# Patient Record
Sex: FEMALE | Race: BLACK OR AFRICAN AMERICAN | Employment: OTHER | URBAN - METROPOLITAN AREA
[De-identification: names, ages, dates, MRNs, and addresses within clinical notes are randomized per-mention and may not be internally consistent; named-entity substitution may affect disease eponyms.]

---

## 2017-01-03 ENCOUNTER — HOSPITAL ENCOUNTER (OUTPATIENT)
Dept: MRI IMAGING | Age: 67
Discharge: HOME | End: 2017-01-03
Attending: PSYCHIATRY & NEUROLOGY
Payer: MEDICARE

## 2017-01-03 DIAGNOSIS — G93.9 RIGHT FRONTAL LOBE LESION: ICD-10-CM

## 2017-01-03 PROCEDURE — 70553 MRI BRAIN STEM W/O & W/DYE: CPT

## 2017-01-03 PROCEDURE — A9585 GADOBUTROL INJECTION: HCPCS | Performed by: RADIOLOGY

## 2017-01-03 PROCEDURE — 74011250636 HC RX REV CODE- 250/636: Performed by: RADIOLOGY

## 2017-01-03 RX ADMIN — GADOBUTROL 8 ML: 604.72 INJECTION INTRAVENOUS at 08:59

## 2017-01-20 ENCOUNTER — OFFICE VISIT (OUTPATIENT)
Dept: INTERNAL MEDICINE CLINIC | Age: 67
End: 2017-01-20

## 2017-01-20 VITALS
OXYGEN SATURATION: 95 % | BODY MASS INDEX: 31.88 KG/M2 | TEMPERATURE: 98.2 F | SYSTOLIC BLOOD PRESSURE: 138 MMHG | HEART RATE: 70 BPM | WEIGHT: 186.7 LBS | HEIGHT: 64 IN | DIASTOLIC BLOOD PRESSURE: 76 MMHG

## 2017-01-20 DIAGNOSIS — I42.9 CARDIOMYOPATHY (HCC): ICD-10-CM

## 2017-01-20 DIAGNOSIS — I10 ESSENTIAL HYPERTENSION: ICD-10-CM

## 2017-01-20 DIAGNOSIS — M10.9 ACUTE GOUT OF LEFT FOOT, UNSPECIFIED CAUSE: Primary | ICD-10-CM

## 2017-01-20 RX ORDER — ALLOPURINOL 300 MG/1
300 TABLET ORAL DAILY
Qty: 30 TAB | Refills: 11 | Status: SHIPPED | OUTPATIENT
Start: 2017-01-20 | End: 2018-04-10 | Stop reason: SDUPTHER

## 2017-01-20 RX ORDER — PREDNISONE 20 MG/1
20 TABLET ORAL 2 TIMES DAILY
Qty: 30 TAB | Refills: 0 | Status: SHIPPED | OUTPATIENT
Start: 2017-01-20 | End: 2019-01-07

## 2017-01-20 RX ORDER — NAPROXEN 500 MG/1
500 TABLET ORAL 2 TIMES DAILY WITH MEALS
COMMUNITY
End: 2017-01-21 | Stop reason: CLARIF

## 2017-01-20 NOTE — PROGRESS NOTES
[address] [place] [address] and Primary Care  [facility]  [address] [place] [address]  Phone:  [phone]  Fax: [phone]       Chief Complaint   Patient presents with    Diabetes     6 week follow up     Foot Pain     left foot x 1 week   . SUBJECTIVE:    Diego Cook is a 61 y.o. female comes in for return visit complaining of pain in her left foot. This started about a week ago. She has not had any similar symptoms in the past.  It was rather severe. She went to [facility] and they gave her Naproxen which has not worked. From a cardiovascular standpoint she denies any shortness of breath or chest pain. From a diabetic perspective her insulin was changed presumably to Humulin N and she is taking 80 units of that every morning. She has been taking her antihypertensive medication as prescribed as is the case with her statin for increased cardiovascular risk. She continues to drink alcohol also. Current Outpatient Prescriptions   Medication Sig Dispense Refill    naproxen (NAPROSYN) 500 mg tablet Take 500 mg by mouth two (2) times daily (with meals).  predniSONE (DELTASONE) 20 mg tablet Take 1 Tab by mouth two (2) times a day. 30 Tab 0    allopurinol (ZYLOPRIM) 300 mg tablet Take 1 Tab by mouth daily. 30 Tab 11    glimepiride (AMARYL) 4 mg tablet   TAKE 1 TABLET BY MOUTH TWICE DAILY BEFORE A MEAL 180 Tab 3    alcohol swabs (BD SINGLE USE SWABS REGULAR) padm USE TO TEST BLOOD SUGAR 100 Pad 11    HYDROcodone-acetaminophen (NORCO) 5-325 mg per tablet Take 1 Tab by mouth every eight (8) hours as needed for Pain. Max Daily Amount: 3 Tabs. 40 Tab 0    traMADol (ULTRAM) 50 mg tablet Take 1 Tab by mouth every six (6) hours as needed for Pain.  Max Daily Amount: 200 mg. 30 Tab 0    metFORMIN ER (GLUCOPHAGE XR) 500 mg tablet TAKE 1 TABLET BY MOUTH THREE TIMES DAILY WITH MEALS 90 Tab 11    butalbital-acetaminophen (PHRENILIN)  mg tablet Take 1 Tab by mouth every six (6) hours as needed. 40 Tab 0    lisinopril (PRINIVIL, ZESTRIL) 20 mg tablet TAKE 1 TABLET BY MOUTH EVERY MORNING 90 Tab 3    metoprolol succinate (TOPROL-XL) 100 mg tablet TAKE 1 TABLET BY MOUTH DAILY 30 Tab 11    insulin glargine (LANTUS SOLOSTAR) 100 unit/mL (3 mL) pen INJECT 92 UNITS EVERY NIGHT 30 mL 11    acetaminophen-codeine (TYLENOL #3) 300-30 mg per tablet Take 1 Tab by mouth every six (6) hours as needed for Pain. Max Daily Amount: 4 Tabs. 50 Tab 0    potassium chloride SR (KLOR-CON 10) 10 mEq tablet 2 QAM 60 Tab 11    furosemide (LASIX) 40 mg tablet TAKE 3 TABLETS BY MOUTH EVERY MORNING 90 Tab 3    BD INSULIN PEN NEEDLE UF SHORT 31 gauge x 5/16\" ndle USE WITH INSULIN PENS TWICE DAILY AS DIRECTED 100 Pen Needle 11    ONE TOUCH DELICA 33 gauge misc   11    NURIA PEN NEEDLE 32 x 5/32 \" ndle       Blood-Glucose Meter monitoring kit Testing BID-DX:250.00 1 kit 0    Lancets (ONE TOUCH ULTRASOFT LANCETS) misc TESTING BID 1 Package 11    glucose blood VI test strips (ASCENSIA AUTODISC VI, ONE TOUCH ULTRA TEST VI) strip TESTING BID 1 Package 11    atorvastatin (LIPITOR) 40 mg tablet Take 1 tablet by mouth daily. 30 tablet 11     Past Medical History   Diagnosis Date    Arthritis     Congestive heart failure, unspecified     Diabetes ([facility] [address]     Hypercholesterolemia     Hypertension      History reviewed. No pertinent past surgical history.   No Known Allergies      REVIEW OF SYSTEMS:  General: negative for - chills or fever  ENT: negative for - headaches, nasal congestion or tinnitus  Respiratory: negative for - cough, hemoptysis, shortness of breath or wheezing  Cardiovascular : negative for - chest pain, edema, palpitations or shortness of breath  Gastrointestinal: negative for - abdominal pain, blood in stools, heartburn or nausea/vomiting  Genito-Urinary: no dysuria, trouble voiding, or hematuria  Musculoskeletal: negative for - gait disturbance, joint pain, joint stiffness or joint swelling  Neurological: no TIA or stroke symptoms  Hematologic: no bruises, no bleeding, no swollen glands  Integument: no lumps, mole changes, nail changes or rash  Endocrine: no malaise/lethargy or unexpected weight changes      Social History     Social History    Marital status:      Spouse name: N/A    Number of children: 3    Years of education: 12     Occupational History    retired      Social History Main Topics    Smoking status: Former Smoker     Packs/day: 0.25    Smokeless tobacco: Never Used    Alcohol use 0.0 oz/week     0 Standard drinks or equivalent per week      Comment: occ    Drug use: No    Sexual activity: Not Asked     Other Topics Concern    None     Social History Narrative     Family History   Problem Relation Age of Onset    Diabetes Mother        OBJECTIVE:    Visit Vitals    /76 (BP 1 Location: Left arm, BP Patient Position: Sitting)    Pulse 70    Temp 98.2 °F (36.8 °C) (Oral)    Ht 5' 4\" (1.626 m)    Wt 186 lb 11.2 oz (84.7 kg)    SpO2 95%    BMI 32.05 kg/m2     CONSTITUTIONAL: well , well nourished, appears age appropriate  EYES: perrla, eom intact  ENMT:moist mucous membranes, pharynx clear,no JVD  NECK: supple. Thyroid normal  RESPIRATORY: Chest: clear to ascultation and percussion   CARDIOVASCULAR: Heart: regular rate and rhythm  GASTROINTESTINAL: Abdomen: soft, bowel sounds active  HEMATOLOGIC: no pathological lymph nodes palpated  MUSCULOSKELETAL: Extremities: no edema, pulse 1+, moderate swelling first MTP joint left foot, mild swelling medial malleolus left  to palpation, mild tenderness to palpation of the second through fourth MTP joint left foot  INTEGUMENT: No unusual rashes or suspicious skin lesions noted. Nails appear normal.  NEUROLOGIC: non-focal exam   MENTAL STATUS: alert and oriented, appropriate affect      ASSESSMENT:  1. Acute gout of left foot, unspecified cause    2. Insulin dependent diabetes mellitus ([facility] [address]    3. Cardiomyopathy ([address]    4. Essential hypertension        PLAN:    1. The patient has gout of her foot. My options are limited as far as suppression of the inflammatory process. I am forced to put her on Prednisone 20 mg b.i.d for now. I will also concomitantly start Allopurinol 300 mg every day. 2. The steroids will indeed exacerbate her diabetes. I will call her on a daily basis for now and increase her insulin accordingly. 3. Her cardiomyopathy is reasonably stable but no evidence of decompensation. 4. Blood pressure is excellent. .  Orders Placed This Encounter    URIC ACID    METABOLIC PANEL, BASIC    HEMOGLOBIN A1C WITH EAG    naproxen (NAPROSYN) 500 mg tablet    predniSONE (DELTASONE) 20 mg tablet    allopurinol (ZYLOPRIM) 300 mg tablet         Follow-up Disposition:  Return in about 1 week (around 1/27/2017).       Iris Soares MD

## 2017-01-20 NOTE — MR AVS SNAPSHOT
Visit Information Date & Time Provider Department Dept. Phone Encounter #  
 1/20/2017 10:15 AM Sridhar Farrell MD [facility] and [facility] [phone] [id number] Upcoming Health Maintenance Date Due INFLUENZA AGE 9 TO ADULT 8/1/2017* EYE EXAM RETINAL OR DILATED Q1 10/10/2017* HEMOGLOBIN A1C Q6M 2/26/2017 FOOT EXAM Q1 4/29/2017 MICROALBUMIN Q1 4/29/2017 FOBT Q 1 YEAR AGE 50-75 4/29/2017 LIPID PANEL Q1 8/26/2017 PAP AKA CERVICAL CYTOLOGY 3/16/2018 BREAST CANCER SCRN MAMMOGRAM 8/29/2018 DTaP/Tdap/Td series (2 - Td) 1/20/2027 *Topic was postponed. The date shown is not the original due date. Allergies as of 1/20/2017  Review Complete On: 1/20/2017 By: Flaquita Ramos No Known Allergies Current Immunizations  Never Reviewed No immunizations on file. Not reviewed this visit You Were Diagnosed With   
  
 Codes Comments Acute gout of left foot, unspecified cause    -  Primary ICD-10-CM: M10.9 ICD-9-CM: 274.01 Insulin dependent diabetes mellitus (HCC)     ICD-10-CM: E11.9, Z79.4 ICD-9-CM: 250.00, V58.67 Cardiomyopathy ([facility]     ICD-10-CM: I42.9 ICD-9-CM: 425.4 Essential hypertension     ICD-10-CM: I10 
ICD-9-CM: 401.9 Vitals BP Pulse Temp Height(growth percentile) Weight(growth percentile) SpO2  
 138/76 (BP 1 Location: Left arm, BP Patient Position: Sitting) 70 98.2 °F (36.8 °C) (Oral) 5' 4\" (1.626 m) 186 lb 11.2 oz (84.7 kg) 95% BMI OB Status Smoking Status 32.05 kg/m2 Postmenopausal Former Smoker BMI and BSA Data Body Mass Index Body Surface Area 32.05 kg/m 2 1.96 m 2 Preferred Pharmacy Pharmacy Name Phone [facility] [id number] [place] [phone] Your Updated Medication List  
  
   
This list is accurate as of: 1/20/17  1:06 PM.  Always use your most recent med list.  
  
  
  
  
 acetaminophen-codeine 300-30 mg per tablet Commonly known as:  TYLENOL #3 Take 1 Tab by mouth every six (6) hours as needed for Pain. Max Daily Amount: 4 Tabs. alcohol swabs Padm Commonly known as:  BD Single Use Swabs Regular USE TO TEST BLOOD SUGAR  
  
 allopurinol 300 mg tablet Commonly known as:  Wandy Casa Take 1 Tab by mouth daily. atorvastatin 40 mg tablet Commonly known as:  LIPITOR Take 1 tablet by mouth daily. Blood-Glucose Meter monitoring kit Testing BID-DX:250.00  
  
 butalbital-acetaminophen  mg tablet Commonly known as:  Lendel Paradise Take 1 Tab by mouth every six (6) hours as needed. furosemide 40 mg tablet Commonly known as:  LASIX TAKE 3 TABLETS BY MOUTH EVERY MORNING  
  
 glimepiride 4 mg tablet Commonly known as:  AMARYL  
TAKE 1 TABLET BY MOUTH TWICE DAILY BEFORE A MEAL  
  
 glucose blood VI test strips strip Commonly known as:  ASCENSIA AUTODISC VI, ONE TOUCH ULTRA TEST VI  
TESTING BID HYDROcodone-acetaminophen 5-325 mg per tablet Commonly known as:  [place] Take 1 Tab by mouth every eight (8) hours as needed for Pain. Max Daily Amount: 3 Tabs. insulin glargine 100 unit/mL (3 mL) pen Commonly known as:  LANTUS SOLOSTAR INJECT 92 UNITS EVERY NIGHT * Lancets Misc Commonly known as:  ONETOUCH ULTRASOFT LANCETS TESTING BID * One Touch Delica 33 gauge Misc Generic drug:  lancets  
  
 lisinopril 20 mg tablet Commonly known as:  PRINIVIL, ZESTRIL  
TAKE 1 TABLET BY MOUTH EVERY MORNING  
  
 metFORMIN  mg tablet Commonly known as:  GLUCOPHAGE XR  
TAKE 1 TABLET BY MOUTH THREE TIMES DAILY WITH MEALS  
  
 metoprolol succinate 100 mg tablet Commonly known as:  TOPROL-XL  
TAKE 1 TABLET BY MOUTH DAILY Dariana Pen Needle 32 gauge x 5/32\" Ndle Generic drug:  Insulin Needles (Disposable) BD INSULIN PEN NEEDLE UF SHORT 31 gauge x 5/16\" Ndle Generic drug:  Insulin Needles (Disposable) USE WITH INSULIN PENS TWICE DAILY AS DIRECTED  
  
 naproxen 500 mg tablet Commonly known as:  NAPROSYN Take 500 mg by mouth two (2) times daily (with meals). potassium chloride SR 10 mEq tablet Commonly known as:  KLOR-CON 10  
2 QAM  
  
 predniSONE 20 mg tablet Commonly known as:  Latha Pound Take 1 Tab by mouth two (2) times a day. traMADol 50 mg tablet Commonly known as:  ULTRAM  
Take 1 Tab by mouth every six (6) hours as needed for Pain. Max Daily Amount: 200 mg.  
  
 * Notice: This list has 2 medication(s) that are the same as other medications prescribed for you. Read the directions carefully, and ask your doctor or other care provider to review them with you. Prescriptions Sent to Pharmacy Refills  
 predniSONE (DELTASONE) 20 mg tablet 0 Sig: Take 1 Tab by mouth two (2) times a day. Class: Normal  
 Pharmacy: [address] Ph #: [phone] Route: Oral  
 allopurinol (ZYLOPRIM) 300 mg tablet 11 Sig: Take 1 Tab by mouth daily. Class: Normal  
 Pharmacy: [address] Ph #: [phone] Route: Oral  
  
We Performed the Following HEMOGLOBIN A1C WITH EAG [97090 CPT(R)] METABOLIC PANEL, BASIC [99368 CPT(R)] URIC ACID [id number] CPT(R)] Please provide this summary of care documentation to your next provider. Your primary care clinician is listed as Pearl Wilson. If you have any questions after today's visit, please call [phone].

## 2017-01-20 NOTE — PROGRESS NOTES
Chief Complaint   Patient presents with    Diabetes     6 week follow up     Foot Pain     left foot x 1 week     1. Have you been to the ER, urgent care clinic since your last visit? Hospitalized since your last visit? Yes Reason for visit: foot pain last Tuesday. 2. Have you seen or consulted any other health care providers outside of the [address] [place] [address] since your last visit? Include any pap smears or colon screening.  Yes Where: [facility]

## 2017-01-21 DIAGNOSIS — M10.00 IDIOPATHIC GOUT, UNSPECIFIED CHRONICITY, UNSPECIFIED SITE: Primary | ICD-10-CM

## 2017-01-21 LAB
BUN SERPL-MCNC: 21 MG/DL (ref 8–27)
BUN/CREAT SERPL: 34 (ref 11–26)
CALCIUM SERPL-MCNC: 9.7 MG/DL (ref 8.7–10.3)
CHLORIDE SERPL-SCNC: 101 MMOL/L (ref 96–106)
CO2 SERPL-SCNC: 26 MMOL/L (ref 18–29)
CREAT SERPL-MCNC: 0.62 MG/DL (ref 0.57–1)
EST. AVERAGE GLUCOSE BLD GHB EST-MCNC: 206 MG/DL
GLUCOSE SERPL-MCNC: 82 MG/DL (ref 65–99)
HBA1C MFR BLD: 8.8 % (ref 4.8–5.6)
POTASSIUM SERPL-SCNC: 3.9 MMOL/L (ref 3.5–5.2)
SODIUM SERPL-SCNC: 146 MMOL/L (ref 134–144)
URATE SERPL-MCNC: 7.8 MG/DL (ref 2.5–7.1)

## 2017-01-21 RX ORDER — INDOMETHACIN 50 MG/1
50 CAPSULE ORAL 3 TIMES DAILY
Qty: 60 CAP | Refills: 0 | Status: SHIPPED | OUTPATIENT
Start: 2017-01-21 | End: 2017-04-21

## 2017-01-31 DIAGNOSIS — Z12.31 ENCOUNTER FOR SCREENING MAMMOGRAM FOR HIGH-RISK PATIENT: Primary | ICD-10-CM

## 2017-02-09 ENCOUNTER — TELEPHONE (OUTPATIENT)
Dept: INTERNAL MEDICINE CLINIC | Age: 67
End: 2017-02-09

## 2017-02-09 NOTE — TELEPHONE ENCOUNTER
Per dr. Jasmeet Jurado patient ask to re-start her allopurinol 300mg that she had stopped on her own.

## 2017-02-24 ENCOUNTER — HOSPITAL ENCOUNTER (OUTPATIENT)
Dept: MAMMOGRAPHY | Age: 67
Discharge: HOME | End: 2017-02-24
Attending: INTERNAL MEDICINE

## 2017-02-24 DIAGNOSIS — R92.8 ABNORMAL MAMMOGRAM: ICD-10-CM

## 2017-03-03 ENCOUNTER — HOSPITAL ENCOUNTER (OUTPATIENT)
Dept: MAMMOGRAPHY | Age: 67
Discharge: HOME | End: 2017-03-03
Attending: INTERNAL MEDICINE
Payer: MEDICARE

## 2017-03-03 ENCOUNTER — HOSPITAL ENCOUNTER (OUTPATIENT)
Dept: ULTRASOUND IMAGING | Age: 67
Discharge: HOME | End: 2017-03-03
Attending: INTERNAL MEDICINE
Payer: MEDICARE

## 2017-03-03 DIAGNOSIS — R92.8 ABNORMAL MAMMOGRAM: ICD-10-CM

## 2017-03-03 PROCEDURE — 77066 DX MAMMO INCL CAD BI: CPT

## 2017-03-22 RX ORDER — FUROSEMIDE 40 MG/1
TABLET ORAL
Qty: 90 TAB | Refills: 11 | Status: SHIPPED | OUTPATIENT
Start: 2017-03-22 | End: 2018-07-13 | Stop reason: SDUPTHER

## 2017-04-21 RX ORDER — LISINOPRIL 20 MG/1
TABLET ORAL
Qty: 90 TAB | Refills: 3 | Status: SHIPPED | OUTPATIENT
Start: 2017-04-21 | End: 2018-04-10 | Stop reason: SDUPTHER

## 2017-04-26 ENCOUNTER — TELEPHONE (OUTPATIENT)
Dept: INTERNAL MEDICINE CLINIC | Age: 67
End: 2017-04-26

## 2017-05-02 ENCOUNTER — OFFICE VISIT (OUTPATIENT)
Dept: INTERNAL MEDICINE CLINIC | Age: 67
End: 2017-05-02

## 2017-05-02 VITALS
DIASTOLIC BLOOD PRESSURE: 73 MMHG | HEART RATE: 69 BPM | TEMPERATURE: 97.9 F | HEIGHT: 64 IN | OXYGEN SATURATION: 94 % | BODY MASS INDEX: 33.41 KG/M2 | RESPIRATION RATE: 20 BRPM | WEIGHT: 195.7 LBS | SYSTOLIC BLOOD PRESSURE: 144 MMHG

## 2017-05-02 DIAGNOSIS — Z00.00 ROUTINE GENERAL MEDICAL EXAMINATION AT A HEALTH CARE FACILITY: ICD-10-CM

## 2017-05-02 DIAGNOSIS — E78.5 DYSLIPIDEMIA: ICD-10-CM

## 2017-05-02 DIAGNOSIS — I10 ESSENTIAL HYPERTENSION: ICD-10-CM

## 2017-05-02 DIAGNOSIS — I42.9 CARDIOMYOPATHY, UNSPECIFIED TYPE (HCC): ICD-10-CM

## 2017-05-02 NOTE — PROGRESS NOTES
Chief Complaint   Patient presents with   [facility] Annual Wellness Visit   1. Have you been to the ER, urgent care clinic since your last visit? Hospitalized since your last visit? No    2. Have you seen or consulted any other health care providers outside of the [address] [place] [address] since your last visit? Include any pap smears or colon screening.  No

## 2017-05-02 NOTE — MR AVS SNAPSHOT
Visit Information Date & Time Provider Department Dept. Phone Encounter #  
 5/2/2017  3:30 PM Krystal Juarez MD [facility] [phone] [id number] Your Appointments 8/22/2017  3:00 PM  
Any with Krystal Juarez MD  
[address] [place] [address] and Primary Care Ava Violet) Appt Note: 3 month f/u  
 [address] [facility]  
  
   
 [phone] [id number] Upcoming Health Maintenance Date Due  
 GLAUCOMA SCREENING Q2Y 3/4/2017 OSTEOPOROSIS SCREENING (DEXA) 3/4/2017 Pneumococcal 65+ Low/Medium Risk (1 of 2 - PCV13) 3/4/2017 MEDICARE YEARLY EXAM 3/4/2017 FOOT EXAM Q1 4/29/2017 MICROALBUMIN Q1 4/29/2017 FOBT Q 1 YEAR AGE 50-75 4/29/2017 EYE EXAM RETINAL OR DILATED Q1 10/10/2017* HEMOGLOBIN A1C Q6M 7/20/2017 INFLUENZA AGE 9 TO ADULT 8/1/2017 LIPID PANEL Q1 8/26/2017 BREAST CANCER SCRN MAMMOGRAM 3/3/2019 DTaP/Tdap/Td series (2 - Td) 1/20/2027 *Topic was postponed. The date shown is not the original due date. Allergies as of 5/2/2017  Review Complete On: 5/2/2017 By: Martine Moss No Known Allergies Current Immunizations  Never Reviewed No immunizations on file. Not reviewed this visit You Were Diagnosed With   
  
 Codes Comments Insulin dependent diabetes mellitus ([facility]    -  Primary ICD-10-CM: E11.9, Z79.4 ICD-9-CM: 250.00, V58.67 Cardiomyopathy, unspecified type     ICD-10-CM: I42.9 ICD-9-CM: 425.4 Dyslipidemia     ICD-10-CM: E78.5 ICD-9-CM: 272.4 Essential hypertension     ICD-10-CM: I10 
ICD-9-CM: 401.9 Vitals BP Pulse Temp Resp Height(growth percentile) Weight(growth percentile) 144/73 (BP 1 Location: Right arm, BP Patient Position: Sitting) 69 97.9 °F (36.6 °C) (Oral) 20 5' 4\" (1.626 m) 195 lb 11.2 oz (88.8 kg) SpO2 BMI OB Status Smoking Status 94% 33.59 kg/m2 Postmenopausal Former Smoker BMI and BSA Data Body Mass Index Body Surface Area  
 33.59 kg/m 2 2 m 2 Preferred Pharmacy Pharmacy Name Phone [facility] [address] [phone] Your Updated Medication List  
  
   
This list is accurate as of: 5/2/17  4:51 PM.  Always use your most recent med list.  
  
  
  
  
 acetaminophen-codeine 300-30 mg per tablet Commonly known as:  TYLENOL #3 Take 1 Tab by mouth every six (6) hours as needed for Pain. Max Daily Amount: 4 Tabs. alcohol swabs Padm Commonly known as:  BD Single Use Swabs Regular USE TO TEST BLOOD SUGAR  
  
 allopurinol 300 mg tablet Commonly known as:  Barry Donald Take 1 Tab by mouth daily. atorvastatin 40 mg tablet Commonly known as:  LIPITOR Take 1 tablet by mouth daily. Blood-Glucose Meter monitoring kit Testing BID-DX:250.00  
  
 butalbital-acetaminophen  mg tablet Commonly known as:  Uche Kelp Take 1 Tab by mouth every six (6) hours as needed. furosemide 40 mg tablet Commonly known as:  LASIX TAKE 3 TABLETS BY MOUTH EVERY MORNING  
  
 glimepiride 4 mg tablet Commonly known as:  AMARYL  
TAKE 1 TABLET BY MOUTH TWICE DAILY BEFORE A MEAL  
  
 glucose blood VI test strips strip Commonly known as:  ASCENSIA AUTODISC VI, ONE TOUCH ULTRA TEST VI  
TESTING BID HYDROcodone-acetaminophen 5-325 mg per tablet Commonly known as:  [place] Take 1 Tab by mouth every eight (8) hours as needed for Pain. Max Daily Amount: 3 Tabs. insulin glargine 100 unit/mL (3 mL) pen Commonly known as:  LANTUS SOLOSTAR INJECT 92 UNITS EVERY NIGHT * Lancets Misc Commonly known as:  ONETOUCH ULTRASOFT LANCETS TESTING BID * One Touch Delica 33 gauge Misc Generic drug:  lancets  
  
 lisinopril 20 mg tablet Commonly known as:  PRINIVIL, ZESTRIL  
TAKE 1 TABLET BY MOUTH EVERY MORNING  
  
 metFORMIN  mg tablet Commonly known as:  GLUCOPHAGE XR  
TAKE 1 TABLET BY MOUTH THREE TIMES DAILY WITH MEALS  
  
 metoprolol succinate 100 mg tablet Commonly known as:  TOPROL-XL  
TAKE 1 TABLET BY MOUTH DAILY Dariana Pen Needle 32 gauge x 5/32\" Ndle Generic drug:  Insulin Needles (Disposable) BD INSULIN PEN NEEDLE UF SHORT 31 gauge x 5/16\" Ndle Generic drug:  Insulin Needles (Disposable) USE WITH INSULIN PENS TWICE DAILY AS DIRECTED  
  
 potassium chloride SR 10 mEq tablet Commonly known as:  KLOR-CON 10  
2 QAM  
  
 predniSONE 20 mg tablet Commonly known as:  Aracely Tia Take 1 Tab by mouth two (2) times a day. traMADol 50 mg tablet Commonly known as:  ULTRAM  
Take 1 Tab by mouth every six (6) hours as needed for Pain. Max Daily Amount: 200 mg.  
  
 * Notice: This list has 2 medication(s) that are the same as other medications prescribed for you. Read the directions carefully, and ask your doctor or other care provider to review them with you. We Performed the Following HEMOGLOBIN A1C WITH EAG [24074 CPT(R)] METABOLIC PANEL, BASIC [60358 CPT(R)] Introducing [facility] SERVICES! Dear Nikunj Francis: Thank you for requesting a [employer] account. Our records indicate that you have previously registered for a [employer] account but its currently inactive. Please call our [employer] support line at [phone]. Additional Information If you have questions, please visit the Frequently Asked Questions section of the [employer] website at [email or web address] Remember, [employer] is NOT to be used for urgent needs. For medical emergencies, dial 911. Now available from your iPhone and Android! Please provide this summary of care documentation to your next provider. Your primary care clinician is listed as Pearl [facility]. If you have any questions after today's visit, please call [phone].

## 2017-05-04 LAB
BUN SERPL-MCNC: 18 MG/DL (ref 8–27)
BUN/CREAT SERPL: 30 (ref 12–28)
CALCIUM SERPL-MCNC: 9.2 MG/DL (ref 8.7–10.3)
CHLORIDE SERPL-SCNC: 103 MMOL/L (ref 96–106)
CO2 SERPL-SCNC: 22 MMOL/L (ref 18–29)
CREAT SERPL-MCNC: 0.61 MG/DL (ref 0.57–1)
EST. AVERAGE GLUCOSE BLD GHB EST-MCNC: 143 MG/DL
GLUCOSE SERPL-MCNC: 96 MG/DL (ref 65–99)
HBA1C MFR BLD: 6.6 % (ref 4.8–5.6)
POTASSIUM SERPL-SCNC: 4 MMOL/L (ref 3.5–5.2)
SODIUM SERPL-SCNC: 143 MMOL/L (ref 134–144)

## 2017-05-07 NOTE — PROGRESS NOTES
[address] [place] [address] and Primary Care  [facility]  [address] [place] [address]  Phone:  [phone]  Fax: [phone]       Chief Complaint   Patient presents with   [facility] Visit   . SUBJECTIVE:    Sandhya Leonard is a 74 y.o. female comes in for return visit for general follow-up. The patient is being followed at [facility] and her insulin was changed to Humulin N. This is not optimal for her but it accomodates the insurance company because they will not pay for basal insulin. Her risk of hypoglycemia and weight gain is significantly higher with this intermediate insulin. She denies any orthopnea, PND, or dyspnea on exertion. She did see a cardiologist.  She is well compensated. She has been taking her antihypertensive medication as prescribed as is the case with her statin in view of her increased cardiovascular risk. She also wants something to stimulate her libido which she has had previously. Current Outpatient Prescriptions   Medication Sig Dispense Refill    lisinopril (PRINIVIL, ZESTRIL) 20 mg tablet TAKE 1 TABLET BY MOUTH EVERY MORNING 90 Tab 3    furosemide (LASIX) 40 mg tablet TAKE 3 TABLETS BY MOUTH EVERY MORNING 90 Tab 11    predniSONE (DELTASONE) 20 mg tablet Take 1 Tab by mouth two (2) times a day. 30 Tab 0    allopurinol (ZYLOPRIM) 300 mg tablet Take 1 Tab by mouth daily. 30 Tab 11    glimepiride (AMARYL) 4 mg tablet   TAKE 1 TABLET BY MOUTH TWICE DAILY BEFORE A MEAL 180 Tab 3    alcohol swabs (BD SINGLE USE SWABS REGULAR) padm USE TO TEST BLOOD SUGAR 100 Pad 11    HYDROcodone-acetaminophen (NORCO) 5-325 mg per tablet Take 1 Tab by mouth every eight (8) hours as needed for Pain. Max Daily Amount: 3 Tabs. 40 Tab 0    traMADol (ULTRAM) 50 mg tablet Take 1 Tab by mouth every six (6) hours as needed for Pain.  Max Daily Amount: 200 mg. 30 Tab 0    metFORMIN ER (GLUCOPHAGE XR) 500 mg tablet TAKE 1 TABLET BY MOUTH THREE TIMES DAILY WITH MEALS 90 Tab 11    butalbital-acetaminophen (PHRENILIN)  mg tablet Take 1 Tab by mouth every six (6) hours as needed. 40 Tab 0    metoprolol succinate (TOPROL-XL) 100 mg tablet TAKE 1 TABLET BY MOUTH DAILY 30 Tab 11    insulin glargine (LANTUS SOLOSTAR) 100 unit/mL (3 mL) pen INJECT 92 UNITS EVERY NIGHT 30 mL 11    acetaminophen-codeine (TYLENOL #3) 300-30 mg per tablet Take 1 Tab by mouth every six (6) hours as needed for Pain. Max Daily Amount: 4 Tabs. 50 Tab 0    potassium chloride SR (KLOR-CON 10) 10 mEq tablet 2 QAM 60 Tab 11    BD INSULIN PEN NEEDLE UF SHORT 31 gauge x 5/16\" ndle USE WITH INSULIN PENS TWICE DAILY AS DIRECTED 100 Pen Needle 11    ONE TOUCH DELICA 33 gauge misc   11    NURIA PEN NEEDLE 32 x 5/32 \" ndle       Blood-Glucose Meter monitoring kit Testing BID-DX:250.00 1 kit 0    Lancets (ONE TOUCH ULTRASOFT LANCETS) misc TESTING BID 1 Package 11    glucose blood VI test strips (ASCENSIA AUTODISC VI, ONE TOUCH ULTRA TEST VI) strip TESTING BID 1 Package 11    atorvastatin (LIPITOR) 40 mg tablet Take 1 tablet by mouth daily. 30 tablet 11     Past Medical History:   Diagnosis Date    Arthritis     Congestive heart failure, unspecified     Diabetes ([facility] [address]     Hypercholesterolemia     Hypertension      History reviewed. No pertinent surgical history.   No Known Allergies      REVIEW OF SYSTEMS:  General: negative for - chills or fever  ENT: negative for - headaches, nasal congestion or tinnitus  Respiratory: negative for - cough, hemoptysis, shortness of breath or wheezing  Cardiovascular : negative for - chest pain, edema, palpitations or shortness of breath  Gastrointestinal: negative for - abdominal pain, blood in stools, heartburn or nausea/vomiting  Genito-Urinary: no dysuria, trouble voiding, or hematuria  Musculoskeletal: negative for - gait disturbance, joint pain, joint stiffness or joint swelling  Neurological: no TIA or stroke symptoms  Hematologic: no bruises, no bleeding, no swollen glands  Integument: no lumps, mole changes, nail changes or rash  Endocrine: no malaise/lethargy or unexpected weight changes      Social History     Social History    Marital status:      Spouse name: N/A    Number of children: 3    Years of education: 12     Occupational History    retired      Social History Main Topics    Smoking status: Former Smoker     Packs/day: 0.25    Smokeless tobacco: Never Used    Alcohol use 0.0 oz/week     0 Standard drinks or equivalent per week      Comment: occ    Drug use: No    Sexual activity: Not Asked     Other Topics Concern    None     Social History Narrative     Family History   Problem Relation Age of Onset    Diabetes Mother        OBJECTIVE:    Visit Vitals    /73 (BP 1 Location: Right arm, BP Patient Position: Sitting)    Pulse 69    Temp 97.9 °F (36.6 °C) (Oral)    Resp 20    Ht 5' 4\" (1.626 m)    Wt 195 lb 11.2 oz (88.8 kg)    SpO2 94%    BMI 33.59 kg/m2     CONSTITUTIONAL: well , well nourished, appears age appropriate  EYES: perrla, eom intact  ENMT:moist mucous membranes, pharynx clear  NECK: supple. Thyroid normal  RESPIRATORY: Chest: clear to ascultation and percussion   CARDIOVASCULAR: Heart: regular rate and rhythm  GASTROINTESTINAL: Abdomen: soft, bowel sounds active  HEMATOLOGIC: no pathological lymph nodes palpated  MUSCULOSKELETAL: Extremities: no edema, pulse 1+   INTEGUMENT: No unusual rashes or suspicious skin lesions noted. Nails appear normal.  NEUROLOGIC: non-focal exam   MENTAL STATUS: alert and oriented, appropriate affect      ASSESSMENT:  1. Insulin dependent diabetes mellitus (HCC)    2. Cardiomyopathy, unspecified type    3. Dyslipidemia    4. Essential hypertension    5. Routine general medical examination at a health care facility        PLAN:    1. Her diabetes hopefully is fairly well controlled but I have no idea. She checks her blood sugars quite sporadically.   Additionally she is taking an intermediate insulin. This was changed at [facility] inappropriately. This was done exclusively for cost reasons. The likelihood of her getting an insulin reaction is extremely high. I will await the results of her hemoglobin A1C.   2. Her cardiomyopathy is reasonably stable. She did see a cardiologist and no adjustments were made in her medication. 3. She will continue her statin as prescribed in view of her increased cardiovascular risk. 4. Blood pressure is excellent today and no adjustments are made. 5. Her alcohol consumption os reasonably acceptable at this point. 6. She would like a medication to stimulate her libido. .  Orders Placed This Encounter    HEMOGLOBIN A1C WITH EAG    METABOLIC PANEL, BASIC    LIPID PANEL         Follow-up Disposition:  Return in about 3 months (around 8/2/2017). Dayna Rinaldi MD    This is a Subsequent Medicare Annual Wellness Visit providing Personalized Prevention Plan Services (PPPS) (Performed 12 months after initial AWV and PPPS )    I have reviewed the patient's medical history in detail and updated the computerized patient record. History     Past Medical History:   Diagnosis Date    Arthritis     Congestive heart failure, unspecified     [facility] ([facility] [address]     Hypercholesterolemia     Hypertension       History reviewed. No pertinent surgical history. Current Outpatient Prescriptions   Medication Sig Dispense Refill    lisinopril (PRINIVIL, ZESTRIL) 20 mg tablet TAKE 1 TABLET BY MOUTH EVERY MORNING 90 Tab 3    furosemide (LASIX) 40 mg tablet TAKE 3 TABLETS BY MOUTH EVERY MORNING 90 Tab 11    predniSONE (DELTASONE) 20 mg tablet Take 1 Tab by mouth two (2) times a day. 30 Tab 0    allopurinol (ZYLOPRIM) 300 mg tablet Take 1 Tab by mouth daily.  30 Tab 11    glimepiride (AMARYL) 4 mg tablet   TAKE 1 TABLET BY MOUTH TWICE DAILY BEFORE A MEAL 180 Tab 3    alcohol swabs (BD SINGLE USE SWABS REGULAR) padm USE TO TEST BLOOD SUGAR 100 Pad 11  HYDROcodone-acetaminophen (NORCO) 5-325 mg per tablet Take 1 Tab by mouth every eight (8) hours as needed for Pain. Max Daily Amount: 3 Tabs. 40 Tab 0    traMADol (ULTRAM) 50 mg tablet Take 1 Tab by mouth every six (6) hours as needed for Pain. Max Daily Amount: 200 mg. 30 Tab 0    metFORMIN ER (GLUCOPHAGE XR) 500 mg tablet TAKE 1 TABLET BY MOUTH THREE TIMES DAILY WITH MEALS 90 Tab 11    butalbital-acetaminophen (PHRENILIN)  mg tablet Take 1 Tab by mouth every six (6) hours as needed. 40 Tab 0    metoprolol succinate (TOPROL-XL) 100 mg tablet TAKE 1 TABLET BY MOUTH DAILY 30 Tab 11    insulin glargine (LANTUS SOLOSTAR) 100 unit/mL (3 mL) pen INJECT 92 UNITS EVERY NIGHT 30 mL 11    acetaminophen-codeine (TYLENOL #3) 300-30 mg per tablet Take 1 Tab by mouth every six (6) hours as needed for Pain. Max Daily Amount: 4 Tabs. 50 Tab 0    potassium chloride SR (KLOR-CON 10) 10 mEq tablet 2 QAM 60 Tab 11    BD INSULIN PEN NEEDLE UF SHORT 31 gauge x 5/16\" ndle USE WITH INSULIN PENS TWICE DAILY AS DIRECTED 100 Pen Needle 11    ONE TOUCH DELICA 33 gauge misc   11    NURIA PEN NEEDLE 32 x 5/32 \" ndle       Blood-Glucose Meter monitoring kit Testing BID-DX:250.00 1 kit 0    Lancets (ONE TOUCH ULTRASOFT LANCETS) misc TESTING BID 1 Package 11    glucose blood VI test strips (ASCENSIA AUTODISC VI, ONE TOUCH ULTRA TEST VI) strip TESTING BID 1 Package 11    atorvastatin (LIPITOR) 40 mg tablet Take 1 tablet by mouth daily.  30 tablet 11     No Known Allergies  Family History   Problem Relation Age of Onset    Diabetes Mother      Social History   Substance Use Topics    Smoking status: Former Smoker     Packs/day: 0.25    Smokeless tobacco: Never Used    Alcohol use 0.0 oz/week     0 Standard drinks or equivalent per week      Comment: occ     Patient Active Problem List   Diagnosis Code    Cardiomyopathy ([facility] [address] I42.9    Insulin dependent diabetes mellitus ([facility] [address] E11.9, Z79.4    Dyslipidemia E78.5    Gout M10.9    Alcohol abuse F10.10    Hypertension I10    Dermatitis L30.9    Primary osteoarthritis of right knee M17.11    Carpal tunnel syndrome of right wrist G56.01    Reactive airway disease J45.909    Former tobacco use--stopped 2014 after >40 yrs smoking Z87.891    Obesity (BMI 30.0-34. 9) E66.9       Depression Risk Factor Screening:     PHQ over the last two weeks 5/2/2017   Little interest or pleasure in doing things Not at all   Feeling down, depressed or hopeless Not at all   Total Score PHQ 2 0     Alcohol Risk Factor Screening: On any occasion during the past 3 months, have you had more than 3 drinks containing alcohol? Yes    Do you average more than 7 drinks per week? Yes      Functional Ability and Level of Safety:     Hearing Loss   none    Activities of Daily Living   Self-care. Requires assistance with: no ADLs    Fall Risk     Fall Risk Assessment, last 12 mths 5/2/2017   Able to walk? Yes   Fall in past 12 months? No     Abuse Screen   Patient is not abused    Review of Systems   A comprehensive review of systems was negative. Physical Examination     Evaluation of Cognitive Function:  Mood/affect:  neutral  Appearance: age appropriate  Family member/caregiver input: na    Visit Vitals    /73 (BP 1 Location: Right arm, BP Patient Position: Sitting)    Pulse 69    Temp 97.9 °F (36.6 °C) (Oral)    Resp 20    Ht 5' 4\" (1.626 m)    Wt 195 lb 11.2 oz (88.8 kg)    SpO2 94%    BMI 33.59 kg/m2     General appearance: alert, cooperative, no distress, appears stated age  Neck: supple, symmetrical, trachea midline, no adenopathy, thyroid: not enlarged, symmetric, no tenderness/mass/nodules, no carotid bruit and no JVD  Lungs: clear to auscultation bilaterally  Heart: regular rate and rhythm, S1, S2 normal, no murmur, click, rub or gallop  Abdomen: soft, non-tender.  Bowel sounds normal. No masses,  no organomegaly  Extremities: extremities normal, atraumatic, no cyanosis or edema  Neurologic: Grossly normal    Patient Care Team:  Shaan Solares MD as PCP - General (Internal Medicine)    Advice/Referrals/Counseling   Education and counseling provided:  Are appropriate based on today's review and evaluation      Assessment/Plan       ICD-10-CM ICD-9-CM    1. Insulin dependent diabetes mellitus (HCC) E11.9 250.00 HEMOGLOBIN A1C WITH EAG    Z79.4 V58.67    2. Cardiomyopathy, unspecified type N36.5 198.5 METABOLIC PANEL, BASIC   3. Dyslipidemia E78.5 272.4 LIPID PANEL   4. Essential hypertension I10 401.9    5. Routine general medical examination at a health care facility Z00.00 V70.0    .

## 2017-06-30 ENCOUNTER — PATIENT OUTREACH (OUTPATIENT)
Dept: INTERNAL MEDICINE CLINIC | Age: 67
End: 2017-06-30

## 2017-07-25 RX ORDER — POTASSIUM CHLORIDE 750 MG/1
TABLET, FILM COATED, EXTENDED RELEASE ORAL
Qty: 60 TAB | Refills: 11 | Status: SHIPPED | OUTPATIENT
Start: 2017-07-25 | End: 2018-09-17 | Stop reason: SDUPTHER

## 2017-08-11 ENCOUNTER — HOSPITAL ENCOUNTER (EMERGENCY)
Age: 67
Discharge: HOME | End: 2017-08-11
Attending: EMERGENCY MEDICINE
Payer: MEDICARE

## 2017-08-11 VITALS
HEART RATE: 97 BPM | DIASTOLIC BLOOD PRESSURE: 93 MMHG | HEIGHT: 66 IN | SYSTOLIC BLOOD PRESSURE: 161 MMHG | TEMPERATURE: 98.4 F | OXYGEN SATURATION: 96 % | BODY MASS INDEX: 29.73 KG/M2 | WEIGHT: 185 LBS | RESPIRATION RATE: 18 BRPM

## 2017-08-11 DIAGNOSIS — R04.0 EPISTAXIS: Primary | ICD-10-CM

## 2017-08-11 DIAGNOSIS — R03.0 ELEVATED BLOOD PRESSURE READING: ICD-10-CM

## 2017-08-11 LAB
ALBUMIN SERPL BCP-MCNC: 3.6 G/DL (ref 3.5–5)
ALBUMIN/GLOB SERPL: 0.8 {RATIO} (ref 1.1–2.2)
ALP SERPL-CCNC: 65 U/L (ref 45–117)
ALT SERPL-CCNC: 37 U/L (ref 12–78)
ANION GAP BLD CALC-SCNC: 6 MMOL/L (ref 5–15)
AST SERPL W P-5'-P-CCNC: 25 U/L (ref 15–37)
BASOPHILS # BLD AUTO: 0 K/UL (ref 0–0.1)
BASOPHILS # BLD: 0 % (ref 0–1)
BILIRUB SERPL-MCNC: 0.4 MG/DL (ref 0.2–1)
BUN SERPL-MCNC: 20 MG/DL (ref 6–20)
BUN/CREAT SERPL: 31 (ref 12–20)
CALCIUM SERPL-MCNC: 8.7 MG/DL (ref 8.5–10.1)
CHLORIDE SERPL-SCNC: 104 MMOL/L (ref 97–108)
CO2 SERPL-SCNC: 27 MMOL/L (ref 21–32)
CREAT SERPL-MCNC: 0.64 MG/DL (ref 0.55–1.02)
EOSINOPHIL # BLD: 0.2 K/UL (ref 0–0.4)
EOSINOPHIL NFR BLD: 3 % (ref 0–7)
ERYTHROCYTE [DISTWIDTH] IN BLOOD BY AUTOMATED COUNT: 13.3 % (ref 11.5–14.5)
GLOBULIN SER CALC-MCNC: 4.8 G/DL (ref 2–4)
GLUCOSE BLD STRIP.AUTO-MCNC: 197 MG/DL (ref 65–100)
GLUCOSE SERPL-MCNC: 226 MG/DL (ref 65–100)
HCT VFR BLD AUTO: 33.5 % (ref 35–47)
HGB BLD-MCNC: 11.1 G/DL (ref 11.5–16)
LYMPHOCYTES # BLD AUTO: 27 % (ref 12–49)
LYMPHOCYTES # BLD: 1.3 K/UL (ref 0.8–3.5)
MCH RBC QN AUTO: 27.7 PG (ref 26–34)
MCHC RBC AUTO-ENTMCNC: 33.1 G/DL (ref 30–36.5)
MCV RBC AUTO: 83.5 FL (ref 80–99)
MONOCYTES # BLD: 0.1 K/UL (ref 0–1)
MONOCYTES NFR BLD AUTO: 2 % (ref 5–13)
NEUTS SEG # BLD: 3.4 K/UL (ref 1.8–8)
NEUTS SEG NFR BLD AUTO: 68 % (ref 32–75)
PLATELET # BLD AUTO: 178 K/UL (ref 150–400)
POTASSIUM SERPL-SCNC: 3.7 MMOL/L (ref 3.5–5.1)
PROT SERPL-MCNC: 8.4 G/DL (ref 6.4–8.2)
RBC # BLD AUTO: 4.01 M/UL (ref 3.8–5.2)
SERVICE CMNT-IMP: ABNORMAL
SODIUM SERPL-SCNC: 137 MMOL/L (ref 136–145)
WBC # BLD AUTO: 5 K/UL (ref 3.6–11)

## 2017-08-11 PROCEDURE — 74011000250 HC RX REV CODE- 250: Performed by: EMERGENCY MEDICINE

## 2017-08-11 PROCEDURE — 77030011649 HC PK NSL RHNO S&N -B

## 2017-08-11 PROCEDURE — 99285 EMERGENCY DEPT VISIT HI MDM: CPT

## 2017-08-11 PROCEDURE — 74011000250 HC RX REV CODE- 250

## 2017-08-11 PROCEDURE — 80053 COMPREHEN METABOLIC PANEL: CPT | Performed by: EMERGENCY MEDICINE

## 2017-08-11 PROCEDURE — 85025 COMPLETE CBC W/AUTO DIFF WBC: CPT | Performed by: EMERGENCY MEDICINE

## 2017-08-11 PROCEDURE — 82962 GLUCOSE BLOOD TEST: CPT

## 2017-08-11 PROCEDURE — 36415 COLL VENOUS BLD VENIPUNCTURE: CPT | Performed by: EMERGENCY MEDICINE

## 2017-08-11 PROCEDURE — 74011250637 HC RX REV CODE- 250/637: Performed by: EMERGENCY MEDICINE

## 2017-08-11 PROCEDURE — 75810000284 HC CNTRL NASAL HEMORHRAGE SIMPLE

## 2017-08-11 RX ORDER — LISINOPRIL 20 MG/1
20 TABLET ORAL
Status: COMPLETED | OUTPATIENT
Start: 2017-08-11 | End: 2017-08-11

## 2017-08-11 RX ORDER — LIDOCAINE HYDROCHLORIDE 20 MG/ML
15 SOLUTION OROPHARYNGEAL
Status: COMPLETED | OUTPATIENT
Start: 2017-08-11 | End: 2017-08-11

## 2017-08-11 RX ORDER — HYDROCODONE BITARTRATE AND ACETAMINOPHEN 5; 325 MG/1; MG/1
1 TABLET ORAL
Qty: 10 TAB | Refills: 0 | Status: SHIPPED | OUTPATIENT
Start: 2017-08-11 | End: 2018-01-09 | Stop reason: CLARIF

## 2017-08-11 RX ORDER — METOPROLOL SUCCINATE 50 MG/1
100 TABLET, EXTENDED RELEASE ORAL
Status: COMPLETED | OUTPATIENT
Start: 2017-08-11 | End: 2017-08-11

## 2017-08-11 RX ORDER — SILVER NITRATE 38.21; 12.74 MG/1; MG/1
STICK TOPICAL
Status: COMPLETED
Start: 2017-08-11 | End: 2017-08-11

## 2017-08-11 RX ORDER — CEPHALEXIN 250 MG/1
500 CAPSULE ORAL
Status: COMPLETED | OUTPATIENT
Start: 2017-08-11 | End: 2017-08-11

## 2017-08-11 RX ORDER — AMOXICILLIN AND CLAVULANATE POTASSIUM 875; 125 MG/1; MG/1
1 TABLET, FILM COATED ORAL 2 TIMES DAILY
Qty: 10 TAB | Refills: 0 | Status: SHIPPED | OUTPATIENT
Start: 2017-08-11 | End: 2017-08-16

## 2017-08-11 RX ADMIN — SILVER NITRATE APPLICATORS 1 APPLICATOR: 25; 75 STICK TOPICAL at 06:30

## 2017-08-11 RX ADMIN — LISINOPRIL 20 MG: 20 TABLET ORAL at 07:55

## 2017-08-11 RX ADMIN — CEPHALEXIN 500 MG: 250 CAPSULE ORAL at 08:34

## 2017-08-11 RX ADMIN — LIDOCAINE HYDROCHLORIDE 15 ML: 20 SOLUTION ORAL; TOPICAL at 06:50

## 2017-08-11 RX ADMIN — METOPROLOL SUCCINATE 100 MG: 50 TABLET, EXTENDED RELEASE ORAL at 08:34

## 2017-08-11 NOTE — ED NOTES
Patient ambulated to the restroom. Patient reports no additional bleeding, dizziness, or any other symptoms. Patient also reports coughing up less blood.

## 2017-08-11 NOTE — ED NOTES
Bedside shift change report given to Analy King (oncoming nurse) by Princess Cohn (offgoing nurse). Report included the following information SBAR, Kardex, ED Summary and MAR.

## 2017-08-11 NOTE — ED NOTES
Assumed care of patient who is lying quietly on the stretcher in no apparent distress. Pt is alert and oriented x 4. Respirations are even and unlabored. No needs are expressed at this time.

## 2017-08-11 NOTE — ED NOTES
Dr Washington Nj reviewed discharge instructions with the patient. The patient verbalized understanding. All questions and concerns were addressed. The patient declined a wheelchair and is discharged ambulatory in the care of family members with instructions and prescriptions in hand. Pt is alert and oriented x 4. Respirations are clear and unlabored.

## 2017-08-11 NOTE — ED NOTES
Provider reviewing discharge information with patient. All questions and concerns answered at this time. No additional needs. Patient ambulatory out.

## 2017-08-11 NOTE — ED PROVIDER NOTES
HPI Comments: Luther Ellis, 74 y.o. Female with PMHx of HTN, DM, CHF, and hypercholesterolemia presents via EMS to the ED with cc of bleeding from bilateral nares x 5 AM. She has not had a nosebleed in the past. She denies any injury or pain to her nose. Pt is taking HTN medications and normally takes it in the morning but she has been unable to do so thus far. She is not on O2 therapy at home. Pt denies any fevers, chills, N/V/D, pain, cough, congestion, rhinorrhea, SOB or sinus pressure. PCP: Maria E Callejas MD    Social history significant for: - Tobacco, + EtOH, - Illicit Drug Use    There are no other complaints, changes, or physical findings at this time. Written by Abel Oneill ED Scribe, as dictated by Juan David Benrabe. [place] MD.      The history is provided by the patient. No  was used. Past Medical History:   Diagnosis Date    Arthritis     Congestive heart failure, unspecified     Diabetes ([address]     Hypercholesterolemia     Hypertension        History reviewed. No pertinent surgical history. Family History:   Problem Relation Age of Onset    Diabetes Mother        Social History     Social History    Marital status:      Spouse name: N/A    Number of children: 3    Years of education: 15     Occupational History    retired      Social History Main Topics    Smoking status: Former Smoker     Packs/day: 0.25    Smokeless tobacco: Never Used    Alcohol use 0.0 oz/week     0 Standard drinks or equivalent per week      Comment: occ    Drug use: No    Sexual activity: Not on file     Other Topics Concern    Not on file     Social History Narrative         ALLERGIES: Review of patient's allergies indicates no known allergies. Review of Systems   Constitutional: Negative for chills and fever. HENT: Positive for nosebleeds. Negative for congestion. Eyes: Negative for visual disturbance. Respiratory: Negative for chest tightness. Cardiovascular: Negative for chest pain and leg swelling. Gastrointestinal: Negative for abdominal pain and vomiting. Endocrine: Negative for polyuria. Genitourinary: Negative for dysuria and frequency. Musculoskeletal: Negative for myalgias. Skin: Negative for color change. Allergic/Immunologic: Negative for immunocompromised state. Neurological: Negative for numbness. Patient Vitals for the past 12 hrs:   Temp Pulse Resp BP SpO2   08/11/17 1015 - - - (!) 161/93 96 %   08/11/17 1000 - - - (!) 164/94 97 %   08/11/17 0945 - - - 154/80 97 %   08/11/17 0915 - - - 161/77 96 %   08/11/17 0900 - - - 171/79 95 %   08/11/17 0845 - - - 140/87 95 %   08/11/17 0830 - - - 172/78 96 %   08/11/17 0815 - - - 155/84 94 %   08/11/17 0800 - - - 170/81 97 %   08/11/17 0755 - 97 - 158/90 -   08/11/17 0754 - - - 158/90 97 %   08/11/17 0543 98.4 °F (36.9 °C) 93 18 (!) 193/110 93 %       Physical Exam     Nursing note and vitals reviewed. General appearance: non-toxic, slightly anxious  Eyes: PERRL, EOMI, conjunctiva normal, anicteric sclera  HEENT: mucous membranes moist w/ blood in OP. Blood in bilateral nares (R>L)  Pulmonary: clear to auscultation bilaterally  Cardiac: normal rate and regular rhythm, no murmurs, gallops, or rubs, 2+DP pulses, 2+ radial pulses  Abdomen: soft, nontender, nondistended, bowel sounds present  MSK: moves all extremities well  Neuro: Alert, answers questions appropriately  Skin: capillary refill brisk      MDM  Number of Diagnoses or Management Options  Elevated blood pressure reading:   Epistaxis:   Diagnosis management comments: DDx: anterior vs posterior epistaxis, possibly HTN related. No nasal trauma reported. Required B/L A-P packing to achieve hemostasis. Normal respirations, O2 98%. Start ppx Abx, d/w ENT and recommend d/c with f/u Mon or Tuesday. Careful return precautions reviewed. Advised pt to sleep upright in chair for comfort and will require mouth breathing. Amount and/or Complexity of Data Reviewed  Clinical lab tests: ordered and reviewed  Review and summarize past medical records: yes  Discuss the patient with other providers: yes (ENT)    Patient Progress  Patient progress: stable    ED Course       Procedures      Procedure Note - Epistaxis Management:   6:49 AM  Performed by: Austin Fuentes MD .  Complexity: complex  After administration of viscous lidocaine, the patient underwent nasal pack placement with RhinoRocket in the bilateral nare(s). Anterior RhinoRocket - hemostasis not achieved. Anterior RhinoRocket removed. Anterior-posterior RhinoRocket placed. Hemostasis not achieved. Bilateral anterior-posterior RhinoRocket placed with hemostasis after B/L packing/placement. Estimated blood loss: 20-30 mL  The procedure took 16-30 minutes, and pt tolerated well. Written by Sonia Vinson ED Scribe, as dictated by Fermin Doe. Carol Ann Fuentes MD.      8:23 AM   Pt ambulated without difficulty in the ED with no dizziness or nose bleeding reported. Oxygen saturation 98%. Written by Sonia Vinson ED Scribe, as dictated by Fermin Doe. Carol Ann Fuentes MD.        CONSULT NOTE:   9:44 AM  Fermin Doe. Carol Ann Fuentes MD spoke with Dr. Lazarus Bruin,   Specialty: ENT  Discussed pt's hx, disposition, and available diagnostic and imaging results. Reviewed care plans. Consultant agrees with plans as outlined. Dr. Viveros thinks d/c home is reasonable ad recommends f/u on Monday in their office. Written by Sonia Vinson ED Scribe, as dictated by Fermin Doe.  Carol Ann Fuentes MD.      LABORATORY TESTS:  Recent Results (from the past 12 hour(s))   GLUCOSE, POC    Collection Time: 08/11/17  6:24 AM   Result Value Ref Range    Glucose (POC) 197 (H) 65 - 100 mg/dL    Performed by Adry Iglesias    CBC WITH AUTOMATED DIFF    Collection Time: 08/11/17  7:43 AM   Result Value Ref Range    WBC 5.0 3.6 - 11.0 K/uL    RBC 4.01 3.80 - 5.20 M/uL    HGB 11.1 (L) 11.5 - 16.0 g/dL    HCT 33.5 (L) 35.0 - 47.0 %    MCV 83.5 80.0 - 99.0 FL MCH 27.7 26.0 - 34.0 PG    MCHC 33.1 30.0 - 36.5 g/dL    RDW 13.3 11.5 - 14.5 %    PLATELET 529 893 - 257 K/uL    NEUTROPHILS 68 32 - 75 %    LYMPHOCYTES 27 12 - 49 %    MONOCYTES 2 (L) 5 - 13 %    EOSINOPHILS 3 0 - 7 %    BASOPHILS 0 0 - 1 %    ABS. NEUTROPHILS 3.4 1.8 - 8.0 K/UL    ABS. LYMPHOCYTES 1.3 0.8 - 3.5 K/UL    ABS. MONOCYTES 0.1 0.0 - 1.0 K/UL    ABS. EOSINOPHILS 0.2 0.0 - 0.4 K/UL    ABS. BASOPHILS 0.0 0.0 - 0.1 K/UL   METABOLIC PANEL, COMPREHENSIVE    Collection Time: 08/11/17  7:43 AM   Result Value Ref Range    Sodium 137 136 - 145 mmol/L    Potassium 3.7 3.5 - 5.1 mmol/L    Chloride 104 97 - 108 mmol/L    CO2 27 21 - 32 mmol/L    Anion gap 6 5 - 15 mmol/L    Glucose 226 (H) 65 - 100 mg/dL    BUN 20 6 - 20 MG/DL    Creatinine 0.64 0.55 - 1.02 MG/DL    BUN/Creatinine ratio 31 (H) 12 - 20      GFR est AA >60 >60 ml/min/1.73m2    GFR est non-AA >60 >60 ml/min/1.73m2    Calcium 8.7 8.5 - 10.1 MG/DL    Bilirubin, total 0.4 0.2 - 1.0 MG/DL    ALT (SGPT) 37 12 - 78 U/L    AST (SGOT) 25 15 - 37 U/L    Alk. phosphatase 65 45 - 117 U/L    Protein, total 8.4 (H) 6.4 - 8.2 g/dL    Albumin 3.6 3.5 - 5.0 g/dL    Globulin 4.8 (H) 2.0 - 4.0 g/dL    A-G Ratio 0.8 (L) 1.1 - 2.2           MEDICATIONS GIVEN:  Medications   silver nitrate applicators (ARZOL) 00-44 % (1 Applicator  Given 1/21/01 0630)   lidocaine (XYLOCAINE) 2 % viscous solution 15 mL (15 mL Mouth/Throat Given 8/11/17 0650)   lisinopril (PRINIVIL, ZESTRIL) tablet 20 mg (20 mg Oral Given 8/11/17 8571)   cephALEXin (KEFLEX) capsule 500 mg (500 mg Oral Given 8/11/17 0834)   metoprolol succinate (TOPROL-XL) XL tablet 100 mg (100 mg Oral Given 8/11/17 0834)       IMPRESSION:  1. Epistaxis    2. Elevated blood pressure reading        PLAN:  1. Current Discharge Medication List      START taking these medications    Details   amoxicillin-clavulanate (AUGMENTIN) 875-125 mg per tablet Take 1 Tab by mouth two (2) times a day for 5 days.   Qty: 10 Tab, Refills: 0         CONTINUE these medications which have CHANGED    Details   HYDROcodone-acetaminophen (LORTAB 5-325) 5-325 mg per tablet Take 1 Tab by mouth every four (4) hours as needed for Pain. Max Daily Amount: 6 Tabs. Indications: causes drowsiness;do not drink,drive, or use machinery while taking; take with a stool softener  Qty: 10 Tab, Refills: 0         CONTINUE these medications which have NOT CHANGED    Details   potassium chloride SR (KLOR-CON 10) 10 mEq tablet 2 QAM  Qty: 60 Tab, Refills: 11      insulin NPH (NOVOLIN N, HUMULIN N) 100 unit/mL injection INJECT 75 UNITS  EVERY MORNING  Qty: 20 mL, Refills: 11      !! glucose blood VI test strips (FREESTYLE LITE STRIPS) strip Use to test blood sugar three times daily. [id number]  Qty: 100 Strip, Refills: 11      lisinopril (PRINIVIL, ZESTRIL) 20 mg tablet TAKE 1 TABLET BY MOUTH EVERY MORNING  Qty: 90 Tab, Refills: 3    Comments: **Patient requests 90 days supply**      furosemide (LASIX) 40 mg tablet TAKE 3 TABLETS BY MOUTH EVERY MORNING  Qty: 90 Tab, Refills: 11      predniSONE (DELTASONE) 20 mg tablet Take 1 Tab by mouth two (2) times a day. Qty: 30 Tab, Refills: 0      allopurinol (ZYLOPRIM) 300 mg tablet Take 1 Tab by mouth daily. Qty: 30 Tab, Refills: 11      glimepiride (AMARYL) 4 mg tablet   TAKE 1 TABLET BY MOUTH TWICE DAILY BEFORE A MEAL  Qty: 180 Tab, Refills: 3      alcohol swabs (BD SINGLE USE SWABS REGULAR) padm USE TO TEST BLOOD SUGAR  Qty: 100 Pad, Refills: 11      metFORMIN ER (GLUCOPHAGE XR) 500 mg tablet TAKE 1 TABLET BY MOUTH THREE TIMES DAILY WITH MEALS  Qty: 90 Tab, Refills: 11      butalbital-acetaminophen (PHRENILIN)  mg tablet Take 1 Tab by mouth every six (6) hours as needed.   Qty: 40 Tab, Refills: 0      metoprolol succinate (TOPROL-XL) 100 mg tablet TAKE 1 TABLET BY MOUTH DAILY  Qty: 30 Tab, Refills: 11      insulin glargine (LANTUS SOLOSTAR) 100 unit/mL (3 mL) pen INJECT 92 UNITS EVERY NIGHT  Qty: 30 mL, Refills: 11      BD INSULIN PEN NEEDLE UF SHORT 31 gauge x 5/16\" ndle USE WITH INSULIN PENS TWICE DAILY AS DIRECTED  Qty: 100 Pen Needle, Refills: 11      !! ONE TOUCH DELICA 33 gauge misc Refills: 11      NURIA PEN NEEDLE 32 x 5 \" ndle       Blood-Glucose Meter monitoring kit Testing [id number]  Qty: 1 kit, Refills: 0    Comments: One touch ultra glucose meter      !! Lancets (ONE TOUCH ULTRASOFT LANCETS) misc TESTING BID  Qty: 1 Package, Refills: 11      !! glucose blood VI test strips (ASCENSIA AUTODISC VI, ONE TOUCH ULTRA TEST VI) strip TESTING BID  Qty: 1 Package, Refills: 11      atorvastatin (LIPITOR) 40 mg tablet Take 1 tablet by mouth daily. Qty: 30 tablet, Refills: 11       !! - Potential duplicate medications found. Please discuss with provider. STOP taking these medications       traMADol (ULTRAM) 50 mg tablet Comments:   Reason for Stopping:         acetaminophen-codeine (TYLENOL #3) 300-30 mg per tablet Comments:   Reason for Stoppin.   Follow-up Information     Follow up With Details Comments Aric Covarrubias MD Schedule an appointment as soon as possible for a visit in 3 days As needed [address]  [phone]      [facility] EMERGENCY DEPT Go in 1 day If symptoms worsen [address] Munir Viveros IV, MD Schedule an appointment as soon as possible for a visit today [place] [address] [phone]          Return to ED if worse     Discharge Note:  10:36 AM  The pt is ready for discharge. The pt's signs, symptoms, diagnosis, and discharge instructions have been discussed and pt has conveyed their understanding. The pt is to follow up as recommended or return to ER should their symptoms worsen. Plan has been discussed and pt is in agreement. This note is prepared by Hanny Porras, acting as a Scribe for  Meryle Dec. Clabe Cluck, MD.    Meryle Dec. Bebeto Kim MD: The scribe's documentation has been prepared under my direction and personally reviewed by me in its entirety. I confirm that the notes above accurately reflects all work, treatment, procedures, and medical decision making performed by me.

## 2017-08-11 NOTE — DISCHARGE INSTRUCTIONS
Nosebleeds: Care Instructions  Your Care Instructions    Nosebleeds are common, especially if you have colds or allergies. Many things can cause a nosebleed. Some nosebleeds stop on their own with pressure. Others need packing. Some get cauterized (sealed). If you have gauze or other packing materials in your nose, you will need to follow up with your doctor to have the packing removed. You may need more treatment if you get nosebleeds a lot. The doctor has checked you carefully, but problems can develop later. If you notice any problems or new symptoms, get medical treatment right away. Follow-up care is a key part of your treatment and safety. Be sure to make and go to all appointments, and call your doctor if you are having problems. It's also a good idea to know your test results and keep a list of the medicines you take. How can you care for yourself at home? · If you get another nosebleed:  ¨ Sit up and tilt your head slightly forward. This keeps blood from going down your throat. ¨ Use your thumb and index finger to pinch your nose shut for 10 minutes. Use a clock. Do not check to see if the bleeding has stopped before the 10 minutes are up. If the bleeding has not stopped, pinch your nose shut for another 10 minutes. ¨ When the bleeding has stopped, try not to pick, rub, or blow your nose for 12 hours. Avoiding these things helps keep your nose from bleeding again. · If your doctor prescribed antibiotics, take them as directed. Do not stop taking them just because you feel better. You need to take the full course of antibiotics. To prevent nosebleeds  · Do not blow your nose too hard. · Try not to lift or strain after a nosebleed. · Raise your head on a pillow while you sleep. · Put a thin layer of a saline- or water-based nasal gel, such as NasoGel, inside your nose. Put it on the septum, which divides your nostrils. This will prevent dryness that can cause nosebleeds.   · Use a vaporizer or humidifier to add moisture to your bedroom. Follow the directions for cleaning the machine. · Do not use aspirin, ibuprofen (Advil, Motrin), or naproxen (Aleve) for 36 to 48 hours after a nosebleed unless your doctor tells you to. You can use acetaminophen (Tylenol) for pain relief. · Talk to your doctor about stopping any other medicines you are taking. Some medicines may make you more likely to get a nosebleed. · Do not use cold medicines or nasal sprays without first talking to your doctor. They can make your nose dry. When should you call for help? Call 911 anytime you think you may need emergency care. For example, call if:  · You passed out (lost consciousness). Call your doctor now or seek immediate medical care if:  · You get another nosebleed and your nose is still bleeding after you have applied pressure 3 times for 10 minutes each time (30 minutes total). · There is a lot of blood running down the back of your throat even after you pinch your nose and tilt your head forward. · You have a fever. · You have sinus pain. Watch closely for changes in your health, and be sure to contact your doctor if:  · You get nosebleeds often, even if they stop. · You do not get better as expected. Where can you learn more? Go to http://mitch-north.info/. Enter S156 in the search box to learn more about \"Nosebleeds: Care Instructions. \"  Current as of: March 20, 2017  Content Version: 11.3  © 5247-3390 [employer]. Care instructions adapted under license by [employer] (which disclaims liability or warranty for this information). If you have questions about a medical condition or this instruction, always ask your healthcare professional. [place] [address] any warranty or liability for your use of this information.          Elevated Blood Pressure: Care Instructions  Your Care Instructions    Blood pressure is a measure of how hard the blood pushes against the walls of your arteries. It's normal for blood pressure to go up and down throughout the day. But if it stays up over time, you have high blood pressure. Two numbers tell you your blood pressure. The first number is the systolic pressure. It shows how hard the blood pushes when your heart is pumping. The second number is the diastolic pressure. It shows how hard the blood pushes between heartbeats, when your heart is relaxed and filling with blood. An ideal blood pressure in adults is less than 120/80 (say \"120 over 80\"). High blood pressure is 140/90 or higher. You have high blood pressure if your top number is 140 or higher or your bottom number is 90 or higher, or both. The main test for high blood pressure is simple, fast, and painless. To diagnose high blood pressure, your doctor will test your blood pressure at different times. After testing your blood pressure, your doctor may ask you to test it again when you are home. If you are diagnosed with high blood pressure, you can work with your doctor to make a long-term plan to manage it. Follow-up care is a key part of your treatment and safety. Be sure to make and go to all appointments, and call your doctor if you are having problems. It's also a good idea to know your test results and keep a list of the medicines you take. How can you care for yourself at home? · Do not smoke. Smoking increases your risk for heart attack and stroke. If you need help quitting, talk to your doctor about stop-smoking programs and medicines. These can increase your chances of quitting for good. · Stay at a healthy weight. · Try to limit how much sodium you eat to less than 2,300 milligrams (mg) a day. Your doctor may ask you to try to eat less than 1,500 mg a day. · Be physically active. Get at least 30 minutes of exercise on most days of the week. Walking is a good choice.  You also may want to do other activities, such as running, swimming, cycling, or playing tennis or team sports. · Avoid or limit alcohol. Talk to your doctor about whether you can drink any alcohol. · Eat plenty of fruits, vegetables, and low-fat dairy products. Eat less saturated and total fats. · Learn how to check your blood pressure at home. When should you call for help? Call your doctor now or seek immediate medical care if:  · Your blood pressure is much higher than normal (such as 180/110 or higher). · You think high blood pressure is causing symptoms such as:  ¨ Severe headache. ¨ Blurry vision. Watch closely for changes in your health, and be sure to contact your doctor if:  · You do not get better as expected. Where can you learn more? Go to http://mitch-north.info/. Enter K593 in the search box to learn more about \"Elevated Blood Pressure: Care Instructions. \"  Current as of: April 3, 2017  Content Version: 11.3  © 5089-9188 [employer]. Care instructions adapted under license by [employer] (which disclaims liability or warranty for this information). If you have questions about a medical condition or this instruction, always ask your healthcare professional. [place] [address] any warranty or liability for your use of this information.

## 2017-08-13 ENCOUNTER — HOSPITAL ENCOUNTER (EMERGENCY)
Age: 67
Discharge: HOME | End: 2017-08-13
Attending: EMERGENCY MEDICINE | Admitting: EMERGENCY MEDICINE
Payer: MEDICARE

## 2017-08-13 VITALS
RESPIRATION RATE: 16 BRPM | WEIGHT: 189.15 LBS | DIASTOLIC BLOOD PRESSURE: 76 MMHG | HEIGHT: 66 IN | BODY MASS INDEX: 30.4 KG/M2 | OXYGEN SATURATION: 98 % | HEART RATE: 87 BPM | SYSTOLIC BLOOD PRESSURE: 137 MMHG

## 2017-08-13 DIAGNOSIS — R04.0 EPISTAXIS: Primary | ICD-10-CM

## 2017-08-13 PROCEDURE — 74011250637 HC RX REV CODE- 250/637: Performed by: EMERGENCY MEDICINE

## 2017-08-13 PROCEDURE — 77030013848 HC PK NSL EPITAX S&N -B

## 2017-08-13 PROCEDURE — 99284 EMERGENCY DEPT VISIT MOD MDM: CPT

## 2017-08-13 RX ORDER — OXYMETAZOLINE HCL 0.05 %
2 SPRAY, NON-AEROSOL (ML) NASAL
Status: COMPLETED | OUTPATIENT
Start: 2017-08-13 | End: 2017-08-13

## 2017-08-13 RX ADMIN — OXYMETAZOLINE HYDROCHLORIDE 2 SPRAY: 5 SPRAY NASAL at 08:30

## 2017-08-13 NOTE — ED NOTES
Pt for discharge a blood clot had just spit out  Dr. Jennifer Lanes in with pt  Patient identified and read over and explained discharge instructions with time for questions by attending MD/PA. Patient has verbalized understanding of discharge instructions.

## 2017-08-13 NOTE — DISCHARGE INSTRUCTIONS
Nosebleeds: Care Instructions  Your Care Instructions    Nosebleeds are common, especially if you have colds or allergies. Many things can cause a nosebleed. Some nosebleeds stop on their own with pressure. Others need packing. Some get cauterized (sealed). If you have gauze or other packing materials in your nose, you will need to follow up with your doctor to have the packing removed. You may need more treatment if you get nosebleeds a lot. The doctor has checked you carefully, but problems can develop later. If you notice any problems or new symptoms, get medical treatment right away. Follow-up care is a key part of your treatment and safety. Be sure to make and go to all appointments, and call your doctor if you are having problems. It's also a good idea to know your test results and keep a list of the medicines you take. How can you care for yourself at home? · If you get another nosebleed:  ¨ Sit up and tilt your head slightly forward. This keeps blood from going down your throat. ¨ Use your thumb and index finger to pinch your nose shut for 10 minutes. Use a clock. Do not check to see if the bleeding has stopped before the 10 minutes are up. If the bleeding has not stopped, pinch your nose shut for another 10 minutes. ¨ When the bleeding has stopped, try not to pick, rub, or blow your nose for 12 hours. Avoiding these things helps keep your nose from bleeding again. · If your doctor prescribed antibiotics, take them as directed. Do not stop taking them just because you feel better. You need to take the full course of antibiotics. To prevent nosebleeds  · Do not blow your nose too hard. · Try not to lift or strain after a nosebleed. · Raise your head on a pillow while you sleep. · Put a thin layer of a saline- or water-based nasal gel, such as NasoGel, inside your nose. Put it on the septum, which divides your nostrils. This will prevent dryness that can cause nosebleeds.   · Use a vaporizer or humidifier to add moisture to your bedroom. Follow the directions for cleaning the machine. · Do not use aspirin, ibuprofen (Advil, Motrin), or naproxen (Aleve) for 36 to 48 hours after a nosebleed unless your doctor tells you to. You can use acetaminophen (Tylenol) for pain relief. · Talk to your doctor about stopping any other medicines you are taking. Some medicines may make you more likely to get a nosebleed. · Do not use cold medicines or nasal sprays without first talking to your doctor. They can make your nose dry. When should you call for help? Call 911 anytime you think you may need emergency care. For example, call if:  · You passed out (lost consciousness). Call your doctor now or seek immediate medical care if:  · You get another nosebleed and your nose is still bleeding after you have applied pressure 3 times for 10 minutes each time (30 minutes total). · There is a lot of blood running down the back of your throat even after you pinch your nose and tilt your head forward. · You have a fever. · You have sinus pain. Watch closely for changes in your health, and be sure to contact your doctor if:  · You get nosebleeds often, even if they stop. · You do not get better as expected. Where can you learn more? Go to http://mitch-north.info/. Enter S156 in the search box to learn more about \"Nosebleeds: Care Instructions. \"  Current as of: March 20, 2017  Content Version: 11.3  © 8266-3304 [employer]. Care instructions adapted under license by [employer] (which disclaims liability or warranty for this information). If you have questions about a medical condition or this instruction, always ask your healthcare professional. [address] any warranty or liability for your use of this information.

## 2017-08-13 NOTE — ED PROVIDER NOTES
HPI Comments: Paula Ontiveros is a 74 y.o. female with PMhx significant for HTN, DM, CHF, and hypercholesterolemia who presents ambulatory to the ED with cc of continued BL nosebleed since onset at 0730 this morning. Pt states that she was seen in the ED on 8/11/2017 for the same symptoms, at which point she had bilateral rhinorockets placed with achieved hemostasis. However, since this morning pt states she has continued to experience bleeding from the bilateral nares, noting blood is also passing post-nasally. She denies any use of anticoagulants. She has an appointment scheduled to be seen by ENT tomorrow. She denies any alleviating or exacerbating factors. She denies any fever, N/V/D. Social Hx: - Tobacco (former), + EtOH (occasional), - Illicit Drugs    PCP: Joi Worthington MD    There are no other complaints, changes or physical findings at this time. The history is provided by the patient. No  was used. Past Medical History:   Diagnosis Date    Arthritis     Congestive heart failure, unspecified     Diabetes ([facility] [address]     Hypercholesterolemia     Hypertension        No past surgical history on file. Family History:   Problem Relation Age of Onset    Diabetes Mother        Social History     Social History    Marital status:      Spouse name: N/A    Number of children: 3    Years of education: 15     Occupational History    retired      Social History Main Topics    Smoking status: Former Smoker     Packs/day: 0.25    Smokeless tobacco: Never Used    Alcohol use 0.0 oz/week     0 Standard drinks or equivalent per week      Comment: occ    Drug use: No    Sexual activity: Not on file     Other Topics Concern    Not on file     Social History Narrative         ALLERGIES: Review of patient's allergies indicates no known allergies. Review of Systems   Constitutional: Negative for fatigue and fever. HENT: Positive for nosebleeds. Eyes: Negative. Respiratory: Negative for shortness of breath and wheezing. Cardiovascular: Negative for chest pain and leg swelling. Gastrointestinal: Negative for blood in stool, constipation, diarrhea, nausea and vomiting. Endocrine: Negative. Genitourinary: Negative for difficulty urinating and dysuria. Musculoskeletal: Negative. Skin: Negative for rash. Allergic/Immunologic: Negative. Neurological: Negative for weakness and numbness. Hematological: Negative. Psychiatric/Behavioral: Negative. Patient Vitals for the past 12 hrs:   Pulse Resp BP SpO2   08/13/17 0930 - - 137/76 98 %   08/13/17 0900 - - 139/81 97 %   08/13/17 0833 87 16 131/71 -   08/13/17 0830 - - 131/71 97 %   08/13/17 0821 85 16 125/78 97 %   08/13/17 0815 - - 125/78 98 %   08/13/17 0802 68 18 160/78 -            Physical Exam   Constitutional: She is oriented to person, place, and time. She appears well-developed and well-nourished. No distress. HENT:   Head: Normocephalic and atraumatic. BL Rhinorockets in place. Unable to visualize oropharynx. Eyes: Conjunctivae and EOM are normal.   Neck: Neck supple. No JVD present. No tracheal deviation present. Cardiovascular: Normal rate, regular rhythm and intact distal pulses. Exam reveals no gallop and no friction rub. No murmur heard. Pulmonary/Chest: Effort normal and breath sounds normal. No stridor. No respiratory distress. She has no wheezes. Abdominal: Soft. Bowel sounds are normal. She exhibits no distension and no mass. There is no tenderness. There is no guarding. Musculoskeletal: Normal range of motion. She exhibits no edema or tenderness. No deformity   Neurological: She is alert and oriented to person, place, and time. She has normal strength. No focal deficits   Skin: Skin is warm, dry and intact. No rash noted. Psychiatric: She has a normal mood and affect.  Her behavior is normal. Judgment and thought content normal.   Nursing note and vitals reviewed. MDM  Number of Diagnoses or Management Options  Epistaxis:   Diagnosis management comments: Pt presenting with epistaxis, had been seen several days ago for similar presentation and has bilateral rhino rockets in place. BP is controlled. Will treat symptomatically with afrin, then reassess. If patient continues to bleed, will likely have to repack. Amount and/or Complexity of Data Reviewed  Review and summarize past medical records: yes    Patient Progress  Patient progress: stable    ED Course       Procedures    PROGRESS NOTE:  9:01 AM  Pt has been re-evaluated. Bleeding has improved following use of Afrin Nasal Spray. Will continue to monitor to ensure bleeding has resolved. Procedure Note - Epistaxis Management:   9:21 AM  Performed by: Jonas Chiang DO  Complexity: Complex  Afrin was administered to the BL nares where Rhinorockets were placed (see procedure note from prior visit). Hemostasis was achieved after placement. Estimated blood loss: Less than 5mL's  The procedure took 1-15 minutes, and pt tolerated well. PROGRESS NOTE:  9:42 AM  Pt has been re-evaluated. Bleeding has largely resolved. Will leave rhino rockets in place. She will follow up with ENT tomorrow as scheduled. Anticipate discharge. MEDICATIONS GIVEN:  Medications   oxymetazoline (AFRIN) 0.05 % nasal spray 2 Spray (2 Sprays Both Nostrils Given 8/13/17 0830)       IMPRESSION:  1. Epistaxis        PLAN:  1. Continue to use the Afrin Nasal Spray as needed. Follow up with ENT tomorrow as scheduled. 2.   Follow-up Information     Follow up With Details Comments Contact Info    Your ENT doctor Go in 1 day      [facility]  As needed, If symptoms worsen [address] [place] [address]  [phone]        Return to ED if worse     Discharge Note:  9:50 AM  The patient has been re-evaluated and is ready for discharge. Reviewed available results with patient.  Counseled patient on diagnosis and care plan. Patient has expressed understanding, and all questions have been answered. Patient agrees with plan and agrees to follow up as recommended, or return to the ED if their symptoms worsen. Discharge instructions have been provided and explained to the patient, along with reasons to return to the ED. Attestation: This note is prepared by Katherine Galo, acting as Scribe for Seamus Lugo DO. Seamus Lugo DO: The scribe's documentation has been prepared under my direction and personally reviewed by me in its entirety. I confirm that the note above accurately reflects all work, treatment, procedures, and medical decision making performed by me.

## 2017-08-14 ENCOUNTER — HOSPITAL ENCOUNTER (EMERGENCY)
Age: 67
Discharge: HOME | End: 2017-08-14
Attending: EMERGENCY MEDICINE
Payer: MEDICARE

## 2017-08-14 ENCOUNTER — PATIENT OUTREACH (OUTPATIENT)
Dept: INTERNAL MEDICINE CLINIC | Age: 67
End: 2017-08-14

## 2017-08-14 VITALS
RESPIRATION RATE: 18 BRPM | WEIGHT: 189.15 LBS | OXYGEN SATURATION: 95 % | DIASTOLIC BLOOD PRESSURE: 51 MMHG | TEMPERATURE: 97.8 F | HEIGHT: 66 IN | SYSTOLIC BLOOD PRESSURE: 113 MMHG | HEART RATE: 72 BPM | BODY MASS INDEX: 30.4 KG/M2

## 2017-08-14 DIAGNOSIS — R04.0 EPISTAXIS: Primary | ICD-10-CM

## 2017-08-14 LAB
ALBUMIN SERPL BCP-MCNC: 3.8 G/DL (ref 3.5–5)
ALBUMIN/GLOB SERPL: 0.7 {RATIO} (ref 1.1–2.2)
ALP SERPL-CCNC: 55 U/L (ref 45–117)
ALT SERPL-CCNC: 33 U/L (ref 12–78)
ANION GAP BLD CALC-SCNC: 7 MMOL/L (ref 5–15)
AST SERPL W P-5'-P-CCNC: 24 U/L (ref 15–37)
BASOPHILS # BLD AUTO: 0 K/UL (ref 0–0.1)
BASOPHILS # BLD: 0 % (ref 0–1)
BILIRUB SERPL-MCNC: 0.7 MG/DL (ref 0.2–1)
BUN SERPL-MCNC: 27 MG/DL (ref 6–20)
BUN/CREAT SERPL: 26 (ref 12–20)
CALCIUM SERPL-MCNC: 9.1 MG/DL (ref 8.5–10.1)
CHLORIDE SERPL-SCNC: 99 MMOL/L (ref 97–108)
CO2 SERPL-SCNC: 30 MMOL/L (ref 21–32)
CREAT SERPL-MCNC: 1.02 MG/DL (ref 0.55–1.02)
EOSINOPHIL # BLD: 0.3 K/UL (ref 0–0.4)
EOSINOPHIL NFR BLD: 4 % (ref 0–7)
ERYTHROCYTE [DISTWIDTH] IN BLOOD BY AUTOMATED COUNT: 13.7 % (ref 11.5–14.5)
GLOBULIN SER CALC-MCNC: 5.6 G/DL (ref 2–4)
GLUCOSE SERPL-MCNC: 157 MG/DL (ref 65–100)
HCT VFR BLD AUTO: 31.3 % (ref 35–47)
HGB BLD-MCNC: 10 G/DL (ref 11.5–16)
INR PPP: 1.1 (ref 0.9–1.1)
LYMPHOCYTES # BLD AUTO: 26 % (ref 12–49)
LYMPHOCYTES # BLD: 1.8 K/UL (ref 0.8–3.5)
MCH RBC QN AUTO: 27.9 PG (ref 26–34)
MCHC RBC AUTO-ENTMCNC: 31.9 G/DL (ref 30–36.5)
MCV RBC AUTO: 87.2 FL (ref 80–99)
MONOCYTES # BLD: 0 K/UL (ref 0–1)
MONOCYTES NFR BLD AUTO: 0 % (ref 5–13)
NEUTS SEG # BLD: 5 K/UL (ref 1.8–8)
NEUTS SEG NFR BLD AUTO: 70 % (ref 32–75)
PLATELET # BLD AUTO: 230 K/UL (ref 150–400)
POTASSIUM SERPL-SCNC: 3.3 MMOL/L (ref 3.5–5.1)
PROT SERPL-MCNC: 9.4 G/DL (ref 6.4–8.2)
PROTHROMBIN TIME: 10.8 SEC (ref 9–11.1)
RBC # BLD AUTO: 3.59 M/UL (ref 3.8–5.2)
SODIUM SERPL-SCNC: 136 MMOL/L (ref 136–145)
WBC # BLD AUTO: 7.1 K/UL (ref 3.6–11)

## 2017-08-14 PROCEDURE — 99285 EMERGENCY DEPT VISIT HI MDM: CPT

## 2017-08-14 PROCEDURE — 77030011649 HC PK NSL RHNO S&N -B

## 2017-08-14 PROCEDURE — 74011250637 HC RX REV CODE- 250/637: Performed by: EMERGENCY MEDICINE

## 2017-08-14 PROCEDURE — 80053 COMPREHEN METABOLIC PANEL: CPT | Performed by: EMERGENCY MEDICINE

## 2017-08-14 PROCEDURE — 74011000250 HC RX REV CODE- 250: Performed by: EMERGENCY MEDICINE

## 2017-08-14 PROCEDURE — 36415 COLL VENOUS BLD VENIPUNCTURE: CPT | Performed by: EMERGENCY MEDICINE

## 2017-08-14 PROCEDURE — 75810000284 HC CNTRL NASAL HEMORHRAGE SIMPLE

## 2017-08-14 PROCEDURE — 85025 COMPLETE CBC W/AUTO DIFF WBC: CPT | Performed by: EMERGENCY MEDICINE

## 2017-08-14 PROCEDURE — 85610 PROTHROMBIN TIME: CPT | Performed by: EMERGENCY MEDICINE

## 2017-08-14 RX ORDER — OXYCODONE HYDROCHLORIDE 5 MG/1
5 TABLET ORAL
Status: COMPLETED | OUTPATIENT
Start: 2017-08-14 | End: 2017-08-14

## 2017-08-14 RX ORDER — OXYMETAZOLINE HCL 0.05 %
2 SPRAY, NON-AEROSOL (ML) NASAL
Status: DISCONTINUED | OUTPATIENT
Start: 2017-08-14 | End: 2017-08-15 | Stop reason: HOSPADM

## 2017-08-14 RX ORDER — LIDOCAINE HYDROCHLORIDE 20 MG/ML
15 SOLUTION OROPHARYNGEAL
Status: COMPLETED | OUTPATIENT
Start: 2017-08-14 | End: 2017-08-14

## 2017-08-14 RX ADMIN — LIDOCAINE HYDROCHLORIDE 15 ML: 20 SOLUTION ORAL; TOPICAL at 23:21

## 2017-08-14 RX ADMIN — OXYCODONE HYDROCHLORIDE 5 MG: 5 TABLET ORAL at 23:19

## 2017-08-14 RX ADMIN — OXYMETAZOLINE HYDROCHLORIDE 2 SPRAY: 5 SPRAY NASAL at 23:21

## 2017-08-14 NOTE — PROGRESS NOTES
[facility] 39 Discharge Follow-Up      Date/Time:  8/14/2017 11:46 AM    Patient listed on discharge ROBERTS D [facility]) report on 8/11/2017. Patient discharged from [facility] for Epistaxis/HTN. RRAT score: Low Risk            3       Total Score        3 Charlson Comorbidity Score (Age + Comorbid Conditions)        Criteria that do not apply:    Has Seen PCP in Last 6 Months (Yes=3, No=0)    . Living with Significant Other. Assisted Living. LTAC. SNF. or   Rehab    Patient Length of Stay (>5 days = 3)    IP Visits Last 12 Months (1-3=4, 4=9, >4=11)    Pt. Coverage (Medicare=5 , Medicaid, or Self-Pay=4)     Low    Medical History:     Past Medical History:   Diagnosis Date    Arthritis     Congestive heart failure, unspecified     Diabetes ([facility] [address]     Hypercholesterolemia     Hypertension      Epistaxis    Elevated blood pressure reading     74 y.o. Female with PMHx of HTN, DM, CHF, and hypercholesterolemia presents via EMS to the ED with cc of bleeding from bilateral nares x 5 AM. She has not had a nosebleed in the past. She denies any injury or pain to her nose. Pt is taking HTN medications and normally takes it in the morning but she has been unable to do so thus far. She is not on O2 therapy at home. Pt denies any fevers, chills, N/V/D, pain, cough, congestion, rhinorrhea, SOB or sinus pressure.      08/11/17 1015 - - - (!) 161/93 96 %   08/11/17 1000 - - - (!) 164/94 97 %     08/11/17 0543 98.4 °F (36.9 °C) 93 18 (!) 193/110 93 %      Performed by: Bessy Kenny. Rajan Rosales MD .  Complexity: complex  After administration of viscous lidocaine, the patient underwent nasal pack placement with RhinoRocket in the bilateral nare(s). Anterior RhinoRocket - hemostasis not achieved. Anterior RhinoRocket removed. Anterior-posterior RhinoRocket placed. Hemostasis not achieved. Bilateral anterior-posterior RhinoRocket placed with hemostasis after B/L packing/placement.     Estimated blood loss: 20-30 mL  The procedure took 16-30 minutes, and pt tolerated well. Nurse Navigator(NN) contacted the patient by telephone to perform post [facility] ED discharge assessment. Verified  and address with patient as identifiers. Provided introduction to self, and explanation of the Nurses Navigator role. Diet:   Patient reports: Diabetic Diet and Renal Diet. Activity:    Patient reports: mostly moving around the house    Medication:   Performed medication reconciliation with patient, and patient verbalizes understanding of administration of home medications. There were no barriers to obtaining medications identified at this time. Support system:  patient and spouse    Discharge Instructions :  Reviewed discharge instructions with patient. Patient verbalizes understanding of discharge instructions and follow-up care. Red Flags:  Nose Bleed. HA. Dizziness/Lightheadedness      Advance Care Planning:   Patient was offered the opportunity to discuss advance care planning:  No-patient had nose packing-agreed to speak on at f/u visit    Does patient have an Advance Directive:  no   If no, did you provide information on Caring Connections? No-patient was not wanting to speak on due to nose packing difficulty. PCP/Specialist follow up: Patient scheduled to follow up with Chao Mercado MD on 2017 (agreed to up date closer post ENT Visit). Reviewed red flags with patient, and patient verbalizes understanding. Patient given an opportunity to ask questions. No other clinical/social/functional needs noted. The patient agrees to contact the PCP office for questions related to their healthcare. Patient sees ENT Specialist today. The patient expressed thanks, offered no additional questions and ended the call. Case management plan: Attempt to contact the patient by telephone or during office visit within the next 7-10 days. Will continue to follow as necessary for the next 30 days.  Will reassess for case management needs prior to discharge from case management service on or about 30 days.

## 2017-08-15 ENCOUNTER — PATIENT OUTREACH (OUTPATIENT)
Dept: INTERNAL MEDICINE CLINIC | Age: 67
End: 2017-08-15

## 2017-08-15 NOTE — ED NOTES
Pt received discharge instructions from Dr. Malika Masterson. Pt ambulatory to exit with a steady gait.

## 2017-08-15 NOTE — DISCHARGE INSTRUCTIONS
Nosebleeds: Care Instructions  Your Care Instructions    Nosebleeds are common, especially if you have colds or allergies. Many things can cause a nosebleed. Some nosebleeds stop on their own with pressure. Others need packing. Some get cauterized (sealed). If you have gauze or other packing materials in your nose, you will need to follow up with your doctor to have the packing removed. You may need more treatment if you get nosebleeds a lot. The doctor has checked you carefully, but problems can develop later. If you notice any problems or new symptoms, get medical treatment right away. Follow-up care is a key part of your treatment and safety. Be sure to make and go to all appointments, and call your doctor if you are having problems. It's also a good idea to know your test results and keep a list of the medicines you take. How can you care for yourself at home? · If you get another nosebleed:  ¨ Sit up and tilt your head slightly forward. This keeps blood from going down your throat. ¨ Use your thumb and index finger to pinch your nose shut for 10 minutes. Use a clock. Do not check to see if the bleeding has stopped before the 10 minutes are up. If the bleeding has not stopped, pinch your nose shut for another 10 minutes. ¨ When the bleeding has stopped, try not to pick, rub, or blow your nose for 12 hours. Avoiding these things helps keep your nose from bleeding again. · If your doctor prescribed antibiotics, take them as directed. Do not stop taking them just because you feel better. You need to take the full course of antibiotics. To prevent nosebleeds  · Do not blow your nose too hard. · Try not to lift or strain after a nosebleed. · Raise your head on a pillow while you sleep. · Put a thin layer of a saline- or water-based nasal gel, such as NasoGel, inside your nose. Put it on the septum, which divides your nostrils. This will prevent dryness that can cause nosebleeds.   · Use a vaporizer or humidifier to add moisture to your bedroom. Follow the directions for cleaning the machine. · Do not use aspirin, ibuprofen (Advil, Motrin), or naproxen (Aleve) for 36 to 48 hours after a nosebleed unless your doctor tells you to. You can use acetaminophen (Tylenol) for pain relief. · Talk to your doctor about stopping any other medicines you are taking. Some medicines may make you more likely to get a nosebleed. · Do not use cold medicines or nasal sprays without first talking to your doctor. They can make your nose dry. When should you call for help? Call 911 anytime you think you may need emergency care. For example, call if:  · You passed out (lost consciousness). Call your doctor now or seek immediate medical care if:  · You get another nosebleed and your nose is still bleeding after you have applied pressure 3 times for 10 minutes each time (30 minutes total). · There is a lot of blood running down the back of your throat even after you pinch your nose and tilt your head forward. · You have a fever. · You have sinus pain. Watch closely for changes in your health, and be sure to contact your doctor if:  · You get nosebleeds often, even if they stop. · You do not get better as expected. Where can you learn more? Go to http://mitch-north.info/. Enter S156 in the search box to learn more about \"Nosebleeds: Care Instructions. \"  Current as of: March 20, 2017  Content Version: 11.3  © 1380-7965 [employer]. Care instructions adapted under license by [employer] (which disclaims liability or warranty for this information). If you have questions about a medical condition or this instruction, always ask your healthcare professional. [address] any warranty or liability for your use of this information.

## 2017-08-15 NOTE — PROGRESS NOTES
NNTOCED ED [facility] 2017  Epistaxis    Ashley 39 Discharge Follow-Up      Date/Time:  2017 3:32 PM    Patient listed on discharge [facility]) report on 8/15/2017. Patient discharged from [facility] for Epistaxis. RRAT score: Low Risk            3       Total Score        3 Charlson Comorbidity Score (Age + Comorbid Conditions)        Criteria that do not apply:    Has Seen PCP in Last 6 Months (Yes=3, No=0)    . Living with Significant Other. Assisted Living. LTAC. SNF. or   Rehab    Patient Length of Stay (>5 days = 3)    IP Visits Last 12 Months (1-3=4, 4=9, >4=11)    Pt. Coverage (Medicare=5 , Medicaid, or Self-Pay=4)     Low    Medical History:     Past Medical History:   Diagnosis Date    Arthritis     Congestive heart failure, unspecified     Diabetes ([facility] [address]     Hypercholesterolemia     Hypertension        Nurse Navigator(NN) contacted the patient by telephone to perform post ED [facility] ED discharge assessment. Verified  and address with patient as identifiers. Provided introduction to self, and explanation of the Nurses Navigator role. Diet:   Patient reports: Renal Diet     Activity:    Patient reports: somewalking outside the house    Medication:   Performed medication reconciliation with patient, and patient verbalizes understanding of administration of home medications. There were no barriers to obtaining medications identified at this time. Support system:  patient and spouse    Discharge Instructions :  Reviewed discharge instructions with patient. Patient verbalizes understanding of discharge instructions and follow-up care. Red Flags:  Nose bleed    Advance Care Planning:   Patient was offered the opportunity to discuss advance care planning:  yes     Does patient have an Advance Directive:  no   If no, did you provide information on Caring Connections?   yes     PCP/Specialist follow up: Patient scheduled to follow up with Loan Miller MD on 8/22/2017. Reviewed red flags with patient, and patient verbalizes understanding. Patient given an opportunity to ask questions. No other clinical/social/functional needs noted. The patient agrees to contact the PCP office for questions related to their healthcare. The patient expressed thanks, offered no additional questions and ended the call. Case management plan: Attempt to contact the patient by telephone or during office visit within the next 7-10 days. Will continue to follow as necessary for the next 30 days. Will reassess for case management needs prior to discharge from case management service on or about 30 days.

## 2017-08-15 NOTE — ED PROVIDER NOTES
HPI Comments: Baljinder Montemayor is a 74 y.o. female with pertinent PMHx of HTN, DM, hypercholesterolemia, CHF, and arthritis who presents ambulatory to ED for evaluation after her right nasal packing fell out. Pt states that she was evaluated by ENT today and reports that afterwards her packing fell out. She states that the left nasal packing is still intact. She notes that she has another standing appointment with ENT tomorrow. She reports intermittent nasal bleeding. Pt denies any current anticoagulant use. Pt also denies any liver problems or Hx of easy bleeding. Pt specifically denies any rash. PCP:  Zan Solitario MD  ENT: Dr. Kathy England Hx:  tobacco use (former), EtOH use (+)    There are no other complaints, changes or physical findings at this time. The history is provided by the patient. No  was used. Past Medical History:   Diagnosis Date    Arthritis     Congestive heart failure, unspecified     Diabetes ([address]     Hypercholesterolemia     Hypertension        History reviewed. No pertinent surgical history. Family History:   Problem Relation Age of Onset    Diabetes Mother        Social History     Social History    Marital status:      Spouse name: N/A    Number of children: 3    Years of education: 15     Occupational History    retired      Social History Main Topics    Smoking status: Former Smoker     Packs/day: 0.25    Smokeless tobacco: Never Used    Alcohol use 0.0 oz/week     0 Standard drinks or equivalent per week      Comment: occ    Drug use: No    Sexual activity: Not on file     Other Topics Concern    Not on file     Social History Narrative         ALLERGIES: Review of patient's allergies indicates no known allergies. Review of Systems   Constitutional: Negative for chills, fatigue and fever. HENT: Negative for congestion and sore throat. (+) epistaxis   Eyes: Negative for pain and visual disturbance. Respiratory: Negative for cough and shortness of breath. Cardiovascular: Negative for chest pain and leg swelling. Gastrointestinal: Negative for abdominal distention, abdominal pain, blood in stool, diarrhea and nausea. Endocrine: Negative for polyuria. Genitourinary: Negative for difficulty urinating, dysuria, flank pain, vaginal bleeding and vaginal discharge. Musculoskeletal: Negative for myalgias and neck pain. Skin: Negative for rash. Allergic/Immunologic: Negative for immunocompromised state. Neurological: Negative for weakness and headaches. Psychiatric/Behavioral: Negative for dysphoric mood. Patient Vitals for the past 12 hrs:   Temp Pulse Resp BP SpO2   08/14/17 2057 97.8 °F (36.6 °C) 72 18 120/71 98 %       Physical Exam   Constitutional: She is oriented to person, place, and time. She appears well-developed and well-nourished. HENT:   Head: Normocephalic and atraumatic. Moist mucous membranes  A rhino rocket in the left nare. Bleeding from the right. Turbinates are edematous no obvious source of bleeding, could be due to some blood in the posterior oropharynx. Eyes: Conjunctivae are normal. Pupils are equal, round, and reactive to light. Right eye exhibits no discharge. Left eye exhibits no discharge. Neck: Normal range of motion. Neck supple. No tracheal deviation present. Cardiovascular: Normal rate, regular rhythm, normal heart sounds and intact distal pulses. No murmur heard. Pulmonary/Chest: Effort normal and breath sounds normal. No respiratory distress. She has no wheezes. She has no rales. Abdominal: Soft. Bowel sounds are normal. There is no tenderness. There is no rebound and no guarding. Musculoskeletal: Normal range of motion. She exhibits no edema, tenderness or deformity. Neurological: She is alert and oriented to person, place, and time. Skin: Skin is warm and dry. No rash noted. No erythema.    Psychiatric: She has a normal mood and affect. Her behavior is normal.   Nursing note and vitals reviewed. MDM  Number of Diagnoses or Management Options  Epistaxis:   Diagnosis management comments: DDx: consistent with nose bleed. Rule out thrombocytopenia and coagulopathy. Will assess for acute blood anemia. Will repack nose and discharge home if lab results are normal. Although pt's H&H has dropped slightly, I do not feel the need for hospitalization as we have stabilized the bleeding and pt can follow up with ENT. Amount and/or Complexity of Data Reviewed  Clinical lab tests: ordered and reviewed  Review and summarize past medical records: yes    Patient Progress  Patient progress: stable    ED Course       Procedures    Procedure Note - Nasal packing  10:20 PM   Performed by: Dr. Edin Riggins DO  Nasal packing performed with Rhino rocket, 7.5 centimeters. anaesthetistetised with lidocaine jelly transmucosaly prior to the procedure. Hemostasis obtained. Estimated blood loss: Minimal, will observe for another 30 minutes for breakthrough bleeding. The procedure took 1-15 minutes, and pt tolerated well. Written by Obdulia Vazquez ED Scribe, as dictated by Dr. Edin Riggins DO. Progress Note  11:02 PM  There is no active bleeding in the posterior pharynx and pt no longer spitting up blood. Her H&H has dropped slightly, but now that the bleeding is controlled I do not feel she warrants hospitalization.     LABORATORY TESTS:  Recent Results (from the past 12 hour(s))   CBC WITH AUTOMATED DIFF    Collection Time: 08/14/17 10:04 PM   Result Value Ref Range    WBC 7.1 3.6 - 11.0 K/uL    RBC 3.59 (L) 3.80 - 5.20 M/uL    HGB 10.0 (L) 11.5 - 16.0 g/dL    HCT 31.3 (L) 35.0 - 47.0 %    MCV 87.2 80.0 - 99.0 FL    MCH 27.9 26.0 - 34.0 PG    MCHC 31.9 30.0 - 36.5 g/dL    RDW 13.7 11.5 - 14.5 %    PLATELET 656 707 - 978 K/uL    NEUTROPHILS 70 32 - 75 %    LYMPHOCYTES 26 12 - 49 %    MONOCYTES 0 (L) 5 - 13 %    EOSINOPHILS 4 0 - 7 %    BASOPHILS 0 0 - 1 % ABS. NEUTROPHILS 5.0 1.8 - 8.0 K/UL    ABS. LYMPHOCYTES 1.8 0.8 - 3.5 K/UL    ABS. MONOCYTES 0.0 0.0 - 1.0 K/UL    ABS. EOSINOPHILS 0.3 0.0 - 0.4 K/UL    ABS. BASOPHILS 0.0 0.0 - 0.1 K/UL   METABOLIC PANEL, COMPREHENSIVE    Collection Time: 08/14/17 10:04 PM   Result Value Ref Range    Sodium 136 136 - 145 mmol/L    Potassium 3.3 (L) 3.5 - 5.1 mmol/L    Chloride 99 97 - 108 mmol/L    CO2 30 21 - 32 mmol/L    Anion gap 7 5 - 15 mmol/L    Glucose 157 (H) 65 - 100 mg/dL    BUN 27 (H) 6 - 20 MG/DL    Creatinine 1.02 0.55 - 1.02 MG/DL    BUN/Creatinine ratio 26 (H) 12 - 20      GFR est AA >60 >60 ml/min/1.73m2    GFR est non-AA 54 (L) >60 ml/min/1.73m2    Calcium 9.1 8.5 - 10.1 MG/DL    Bilirubin, total 0.7 0.2 - 1.0 MG/DL    ALT (SGPT) 33 12 - 78 U/L    AST (SGOT) 24 15 - 37 U/L    Alk. phosphatase 55 45 - 117 U/L    Protein, total 9.4 (H) 6.4 - 8.2 g/dL    Albumin 3.8 3.5 - 5.0 g/dL    Globulin 5.6 (H) 2.0 - 4.0 g/dL    A-G Ratio 0.7 (L) 1.1 - 2.2     PROTHROMBIN TIME + INR    Collection Time: 08/14/17 10:04 PM   Result Value Ref Range    INR 1.1 0.9 - 1.1      Prothrombin time 10.8 9.0 - 11.1 sec       MEDICATIONS GIVEN:  Medications   oxymetazoline (AFRIN) 0.05 % nasal spray 2 Spray (not administered)   lidocaine (XYLOCAINE) 2 % viscous solution 15 mL (not administered)   oxyCODONE IR (ROXICODONE) tablet 5 mg (not administered)       IMPRESSION:  1. Epistaxis        PLAN:  1. Current Discharge Medication List        2. Follow-up Information     Follow up With Details Comments [address] T May MD [facility]  Tomorrow as scheduled [address]  [phone]          Return to ED if worse     Discharge Note:  11:10 PM  The pt is ready for discharge. The pt's signs, symptoms, diagnosis, and discharge instructions have been discussed and pt has conveyed their understanding. The pt is to follow up as recommended or return to ER should their symptoms worsen.  Plan has been discussed and pt is in agreement. This note is prepared by Ivory Marsh, acting as a Scribe for Dr. Kala Oakes.    Dr. Vivian Burris, DO: The scribe's documentation has been prepared under my direction and personally reviewed by me in its entirety. I confirm that the notes above accurately reflects all work, treatment, procedures, and medical decision making performed by me.

## 2017-08-15 NOTE — ED NOTES
Pt arrives via wheelchair to room. Pt c/o epistaxis that has been uncontrolled x 1 hour. Pt seen by ENT this am and is due to go back tomorrow. Monitor x 2. Call bell within reach and plan of care discussed.

## 2017-08-22 ENCOUNTER — OFFICE VISIT (OUTPATIENT)
Dept: INTERNAL MEDICINE CLINIC | Age: 67
End: 2017-08-22

## 2017-08-22 VITALS
BODY MASS INDEX: 30.13 KG/M2 | SYSTOLIC BLOOD PRESSURE: 112 MMHG | HEART RATE: 66 BPM | TEMPERATURE: 98.1 F | OXYGEN SATURATION: 94 % | DIASTOLIC BLOOD PRESSURE: 60 MMHG | WEIGHT: 187.5 LBS | HEIGHT: 66 IN | RESPIRATION RATE: 16 BRPM

## 2017-08-22 DIAGNOSIS — R04.0 EPISTAXIS: Primary | ICD-10-CM

## 2017-08-22 DIAGNOSIS — I42.9 CARDIOMYOPATHY, UNSPECIFIED TYPE (HCC): ICD-10-CM

## 2017-08-22 DIAGNOSIS — F10.10 ALCOHOL ABUSE: ICD-10-CM

## 2017-08-22 DIAGNOSIS — M10.00 IDIOPATHIC GOUT, UNSPECIFIED CHRONICITY, UNSPECIFIED SITE: ICD-10-CM

## 2017-08-22 NOTE — MR AVS SNAPSHOT
Visit Information Date & Time Provider Department Dept. Phone Encounter #  
 8/22/2017  3:00 PM Kiersten Kumar MD [facility] [phone] Upcoming Health Maintenance Date Due  
 GLAUCOMA SCREENING Q2Y 3/4/2017 OSTEOPOROSIS SCREENING (DEXA) 3/4/2017 Pneumococcal 65+ Low/Medium Risk (1 of 2 - PCV13) 3/4/2017 FOOT EXAM Q1 4/29/2017 MICROALBUMIN Q1 4/29/2017 FOBT Q 1 YEAR AGE 50-75 4/29/2017 INFLUENZA AGE 9 TO ADULT 8/1/2017 LIPID PANEL Q1 8/26/2017 EYE EXAM RETINAL OR DILATED Q1 10/10/2017* HEMOGLOBIN A1C Q6M 11/3/2017 MEDICARE YEARLY EXAM 5/3/2018 BREAST CANCER SCRN MAMMOGRAM 3/3/2019 DTaP/Tdap/Td series (2 - Td) 1/20/2027 *Topic was postponed. The date shown is not the original due date. Allergies as of 8/22/2017  Review Complete On: 8/22/2017 By: Kuldeep Bangura No Known Allergies Current Immunizations  Never Reviewed No immunizations on file. Not reviewed this visit You Were Diagnosed With   
  
 Codes Comments Epistaxis    -  Primary ICD-10-CM: R04.0 ICD-9-CM: 784.7 Insulin dependent diabetes mellitus (HCC)     ICD-10-CM: E11.9, Z79.4 ICD-9-CM: 250.00, V58.67 Cardiomyopathy, unspecified type ([facility]     ICD-10-CM: I42.9 ICD-9-CM: 425.4 Alcohol abuse     ICD-10-CM: F10.10 ICD-9-CM: 305.00 Vitals BP Pulse Temp Resp Height(growth percentile) Weight(growth percentile) 112/60 (BP 1 Location: Left arm, BP Patient Position: Sitting) 66 98.1 °F (36.7 °C) (Oral) 16 5' 6\" (1.676 m) 187 lb 8 oz (85 kg) SpO2 BMI OB Status Smoking Status 94% 30.26 kg/m2 Postmenopausal Former Smoker BMI and BSA Data Body Mass Index Body Surface Area  
 30.26 kg/m 2 1.99 m 2 Preferred Pharmacy Pharmacy Name Phone [facility] [phone] Your Updated Medication List  
  
   
 This list is accurate as of: 8/22/17  4:53 PM.  Always use your most recent med list.  
  
  
  
  
 alcohol swabs Padm Commonly known as:  BD Single Use Swabs Regular USE TO TEST BLOOD SUGAR  
  
 allopurinol 300 mg tablet Commonly known as:  Evorn Slight Take 1 Tab by mouth daily. atorvastatin 40 mg tablet Commonly known as:  LIPITOR Take 1 tablet by mouth daily. Blood-Glucose Meter monitoring kit Testing BID-DX:250.00  
  
 butalbital-acetaminophen  mg tablet Commonly known as:  Volney Denney Take 1 Tab by mouth every six (6) hours as needed. furosemide 40 mg tablet Commonly known as:  LASIX TAKE 3 TABLETS BY MOUTH EVERY MORNING  
  
 glimepiride 4 mg tablet Commonly known as:  AMARYL  
TAKE 1 TABLET BY MOUTH TWICE DAILY BEFORE A MEAL  
  
 * glucose blood VI test strips strip Commonly known as:  ASCENSIA AUTODISC VI, ONE TOUCH ULTRA TEST VI  
TESTING BID  
  
 * glucose blood VI test strips strip Commonly known as:  FREESTYLE LITE STRIPS Use to test blood sugar three times daily. Dx.e11.9 HYDROcodone-acetaminophen 5-325 mg per tablet Commonly known as:  LORTAB 5-325 Take 1 Tab by mouth every four (4) hours as needed for Pain. Max Daily Amount: 6 Tabs. Indications: causes drowsiness;do not drink,drive, or use machinery while taking; take with a stool softener  
  
 insulin glargine 100 unit/mL (3 mL) Inpn Commonly known as:  Juan Funes INJECT 92 UNITS EVERY NIGHT  
  
 insulin  unit/mL injection Commonly known as:  NOVOLIN N, HUMULIN N  
INJECT 75 UNITS  EVERY MORNING * Lancets Misc Commonly known as:  ONETOUCH ULTRASOFT LANCETS TESTING BID * One Touch Delica 33 gauge Misc Generic drug:  lancets  
  
 lisinopril 20 mg tablet Commonly known as:  PRINIVIL, ZESTRIL  
TAKE 1 TABLET BY MOUTH EVERY MORNING  
  
 metFORMIN  mg tablet Commonly known as:  GLUCOPHAGE XR  
 TAKE 1 TABLET BY MOUTH THREE TIMES DAILY WITH MEALS  
  
 metoprolol succinate 100 mg tablet Commonly known as:  TOPROL-XL  
TAKE 1 TABLET BY MOUTH DAILY Dariana Pen Needle 32 gauge x 5/32\" Ndle Generic drug:  Insulin Needles (Disposable) BD INSULIN PEN NEEDLE UF SHORT 31 gauge x 5/16\" Ndle Generic drug:  Insulin Needles (Disposable) USE WITH INSULIN PENS TWICE DAILY AS DIRECTED  
  
 potassium chloride SR 10 mEq tablet Commonly known as:  KLOR-CON 10  
2 QAM  
  
 predniSONE 20 mg tablet Commonly known as:  Cecillia [place] Take 1 Tab by mouth two (2) times a day. * Notice: This list has 4 medication(s) that are the same as other medications prescribed for you. Read the directions carefully, and ask your doctor or other care provider to review them with you. We Performed the Following CBC WITH AUTOMATED DIFF [26345 CPT(R)] HEMOGLOBIN A1C WITH EAG [05091 CPT(R)] METABOLIC PANEL, BASIC [88780 CPT(R)] PROTEIN ELECTROPHORESIS [46480 CPT(R)] Introducing [address]! [facility] introduces Near Page patient portal. Now you can access parts of your medical record, email your doctor's office, and request medication refills online. 1. In your internet browser, go to [email or web address] 2. Click on the First Time User? Click Here link in the Sign In box. You will see the New Member Sign Up page. 3. Enter your Near Page Access Code exactly as it appears below. You will not need to use this code after youve completed the sign-up process. If you do not sign up before the expiration date, you must request a new code. · Near Page Access Code: [email or web address] Expires: 11/9/2017  6:05 AM 
 
4. Enter the last four digits of your Social Security Number (xxxx) and Date of Birth (mm/dd/yyyy) as indicated and click Submit. You will be taken to the next sign-up page. 5. Create a Near Page ID.  This will be your Near Page login ID and cannot be changed, so think of one that is secure and easy to remember. 6. Create a PriceTag password. You can change your password at any time. 7. Enter your Password Reset Question and Answer. This can be used at a later time if you forget your password. 8. Enter your e-mail address. You will receive e-mail notification when new information is available in [address] 9. Click Sign Up. You can now view and download portions of your medical record. 10. Click the Download Summary menu link to download a portable copy of your medical information. If you have questions, please visit the Frequently Asked Questions section of the PriceTag website. Remember, PriceTag is NOT to be used for urgent needs. For medical emergencies, dial 911. Now available from your iPhone and Android! Please provide this summary of care documentation to your next provider. Your primary care clinician is listed as Pearl Wilson. If you have any questions after today's visit, please call [phone].

## 2017-08-22 NOTE — PROGRESS NOTES
Chief Complaint   Patient presents with    Epistaxis     patient states that she has been to the ED 4 times. patient states that she has been [address]     1. Have you been to the ER, urgent care clinic since your last visit? Hospitalized since your last visit? Yes Reason for visit: nose bleeds    2. Have you seen or consulted any other health care providers outside of the [address] [place] [address] since your last visit? Include any pap smears or colon screening.  Yes Where: [facility]

## 2017-08-23 NOTE — PROGRESS NOTES
[address] [facility]  [place] [address]  [address] [place] [address]  Phone:  [phone]  Fax: [phone]       Chief Complaint   Patient presents with    Epistaxis     patient states that she has been to the ED 4 times. patient states that she has been [facility] [employer]   .      SUBJECTIVE:    Carolina Joel is a 74 y.o. female Comes in for return visit having had four visits to the emergency room because of epistaxis. She has never had this before. She was packed on multiple occasions. She is not tasking any new medications and has not taken aspirin two weeks prior to the episode. She is also not taking any NSAID's. Her diabetes has been doing well. She is taking her Humulin N at 75 units every morning. She has done surprisingly well, as far as having no hyperglycemic reaction, and a reasonably stable hemoglobin A1c. She denies any shortness of breath, orthopnea, or PND. She states that her beer consumption is infrequent currently. Historically his diabetes has indeed been doing quite well. Current Outpatient Prescriptions   Medication Sig Dispense Refill    HYDROcodone-acetaminophen (LORTAB 5-325) 5-325 mg per tablet Take 1 Tab by mouth every four (4) hours as needed for Pain. Max Daily Amount: 6 Tabs. Indications: causes drowsiness;do not drink,drive, or use machinery while taking; take with a stool softener 10 Tab 0    potassium chloride SR (KLOR-CON 10) 10 mEq tablet 2 QAM 60 Tab 11    insulin NPH (NOVOLIN N, HUMULIN N) 100 unit/mL injection INJECT 75 UNITS  EVERY MORNING 20 mL 11    glucose blood VI test strips (FREESTYLE LITE STRIPS) strip Use to test blood sugar three times daily.  Dx.e11.9 100 Strip 11    lisinopril (PRINIVIL, ZESTRIL) 20 mg tablet TAKE 1 TABLET BY MOUTH EVERY MORNING 90 Tab 3    furosemide (LASIX) 40 mg tablet TAKE 3 TABLETS BY MOUTH EVERY MORNING 90 Tab 11    predniSONE (DELTASONE) 20 mg tablet Take 1 Tab by mouth two (2) times a day. 30 Tab 0    allopurinol (ZYLOPRIM) 300 mg tablet Take 1 Tab by mouth daily. 30 Tab 11    glimepiride (AMARYL) 4 mg tablet   TAKE 1 TABLET BY MOUTH TWICE DAILY BEFORE A MEAL 180 Tab 3    alcohol swabs (BD SINGLE USE SWABS REGULAR) padm USE TO TEST BLOOD SUGAR 100 Pad 11    metFORMIN ER (GLUCOPHAGE XR) 500 mg tablet TAKE 1 TABLET BY MOUTH THREE TIMES DAILY WITH MEALS 90 Tab 11    butalbital-acetaminophen (PHRENILIN)  mg tablet Take 1 Tab by mouth every six (6) hours as needed. 40 Tab 0    metoprolol succinate (TOPROL-XL) 100 mg tablet TAKE 1 TABLET BY MOUTH DAILY 30 Tab 11    insulin glargine (LANTUS SOLOSTAR) 100 unit/mL (3 mL) pen INJECT 92 UNITS EVERY NIGHT 30 mL 11    BD INSULIN PEN NEEDLE UF SHORT 31 gauge x 5/16\" ndle USE WITH INSULIN PENS TWICE DAILY AS DIRECTED 100 Pen Needle 11    ONE TOUCH DELICA 33 gauge misc   11    NURIA PEN NEEDLE 32 x 5/32 \" ndle       Blood-Glucose Meter monitoring kit Testing BID-DX:250.00 1 kit 0    Lancets (ONE TOUCH ULTRASOFT LANCETS) misc TESTING BID 1 Package 11    glucose blood VI test strips (ASCENSIA AUTODISC VI, ONE TOUCH ULTRA TEST VI) strip TESTING BID 1 Package 11    atorvastatin (LIPITOR) 40 mg tablet Take 1 tablet by mouth daily. 30 tablet 11     Past Medical History:   Diagnosis Date    Arthritis     Congestive heart failure, unspecified     Diabetes ([address]     Hypercholesterolemia     Hypertension      History reviewed. No pertinent surgical history.   No Known Allergies      REVIEW OF SYSTEMS:  General: negative for - chills or fever  ENT: negative for - headaches, nasal congestion or tinnitus  Respiratory: negative for - cough, hemoptysis, shortness of breath or wheezing  Cardiovascular : negative for - chest pain, edema, palpitations or shortness of breath  Gastrointestinal: negative for - abdominal pain, blood in stools, heartburn or nausea/vomiting  Genito-Urinary: no dysuria, trouble voiding, or hematuria  Musculoskeletal: negative for - gait disturbance, joint pain, joint stiffness or joint swelling  Neurological: no TIA or stroke symptoms  Hematologic: no bruises, no bleeding, no swollen glands  Integument: no lumps, mole changes, nail changes or rash  Endocrine: no malaise/lethargy or unexpected weight changes      Social History     Social History    Marital status:      Spouse name: N/A    Number of children: 3    Years of education: 12     Occupational History    retired      Social History Main Topics    Smoking status: Former Smoker     Packs/day: 0.25    Smokeless tobacco: Never Used    Alcohol use 0.0 oz/week     0 Standard drinks or equivalent per week      Comment: occ    Drug use: No    Sexual activity: Yes     Partners: Male     Other Topics Concern    None     Social History Narrative     Family History   Problem Relation Age of Onset    Diabetes Mother        OBJECTIVE:    Visit Vitals    /60 (BP 1 Location: Left arm, BP Patient Position: Sitting)    Pulse 66    Temp 98.1 °F (36.7 °C) (Oral)    Resp 16    Ht 5' 6\" (1.676 m)    Wt 187 lb 8 oz (85 kg)    SpO2 94%    BMI 30.26 kg/m2     CONSTITUTIONAL: well , well nourished, appears age appropriate  EYES: perrla, eom intact  ENMT:moist mucous membranes, pharynx clear  NECK: supple. Thyroid normal  RESPIRATORY: Chest: clear to ascultation and percussion   CARDIOVASCULAR: Heart: regular rate and rhythm  GASTROINTESTINAL: Abdomen: soft, bowel sounds active  HEMATOLOGIC: no pathological lymph nodes palpated  MUSCULOSKELETAL: Extremities: no edema, pulse 1+   INTEGUMENT: No unusual rashes or suspicious skin lesions noted. Nails appear normal.  NEUROLOGIC: non-focal exam   MENTAL STATUS: alert and oriented, appropriate affect      ASSESSMENT:  1. Epistaxis    2. Insulin dependent diabetes mellitus (HCC)    3. Cardiomyopathy, unspecified type ([facility]    4. Alcohol abuse    5.  Idiopathic gout, unspecified chronicity, unspecified site        PLAN:    1. As far as the epistaxis is concerned, I am going to look for other potential facilitators of this. She has seen two different EMT physicians, so the anatomy has been well defined. Currently with her last dealing she was not actively bleeding. She is not to take any aspirin. 2. Her diabetes is doing well. Hemoglobin A1c will be checked on return visit. 3. Her cardiomyopathy is stable with no overt signs of congestive heart failure. 4. Her alcohol consumption has significantly decreased, which is great. 5. Her gout has been reasonably stable also. She remains on her Allopurinol. 1.      .  Orders Placed This Encounter    PROTEIN ELECTROPHORESIS    HEMOGLOBIN A1C WITH EAG    METABOLIC PANEL, BASIC    CBC WITH AUTOMATED DIFF         Follow-up Disposition:  Return in about 4 weeks (around 9/19/2017).       Jesenia Steele MD

## 2017-08-26 LAB
ALBUMIN SERPL ELPH-MCNC: 3.6 G/DL (ref 2.9–4.4)
ALBUMIN/GLOB SERPL: 0.8 {RATIO} (ref 0.7–1.7)
ALPHA1 GLOB SERPL ELPH-MCNC: 0.3 G/DL (ref 0–0.4)
ALPHA2 GLOB SERPL ELPH-MCNC: 1.1 G/DL (ref 0.4–1)
B-GLOBULIN SERPL ELPH-MCNC: 1.3 G/DL (ref 0.7–1.3)
BASOPHILS # BLD AUTO: 0 X10E3/UL (ref 0–0.2)
BASOPHILS NFR BLD AUTO: 0 %
BUN SERPL-MCNC: 22 MG/DL (ref 8–27)
BUN/CREAT SERPL: 26 (ref 12–28)
CALCIUM SERPL-MCNC: 10.1 MG/DL (ref 8.7–10.3)
CHLORIDE SERPL-SCNC: 96 MMOL/L (ref 96–106)
CO2 SERPL-SCNC: 26 MMOL/L (ref 18–29)
CREAT SERPL-MCNC: 0.84 MG/DL (ref 0.57–1)
EOSINOPHIL # BLD AUTO: 0.3 X10E3/UL (ref 0–0.4)
EOSINOPHIL NFR BLD AUTO: 4 %
ERYTHROCYTE [DISTWIDTH] IN BLOOD BY AUTOMATED COUNT: 14.4 % (ref 12.3–15.4)
EST. AVERAGE GLUCOSE BLD GHB EST-MCNC: 174 MG/DL
GAMMA GLOB SERPL ELPH-MCNC: 1.9 G/DL (ref 0.4–1.8)
GLOBULIN SER CALC-MCNC: 4.6 G/DL (ref 2.2–3.9)
GLUCOSE SERPL-MCNC: 66 MG/DL (ref 65–99)
HBA1C MFR BLD: 7.7 % (ref 4.8–5.6)
HCT VFR BLD AUTO: 27.9 % (ref 34–46.6)
HGB BLD-MCNC: 9.1 G/DL (ref 11.1–15.9)
IMM GRANULOCYTES # BLD: 0 X10E3/UL (ref 0–0.1)
IMM GRANULOCYTES NFR BLD: 0 %
LYMPHOCYTES # BLD AUTO: 2.5 X10E3/UL (ref 0.7–3.1)
LYMPHOCYTES NFR BLD AUTO: 31 %
M PROTEIN SERPL ELPH-MCNC: ABNORMAL G/DL
MCH RBC QN AUTO: 27.9 PG (ref 26.6–33)
MCHC RBC AUTO-ENTMCNC: 32.6 G/DL (ref 31.5–35.7)
MCV RBC AUTO: 86 FL (ref 79–97)
MONOCYTES # BLD AUTO: 0.4 X10E3/UL (ref 0.1–0.9)
MONOCYTES NFR BLD AUTO: 5 %
NEUTROPHILS # BLD AUTO: 4.7 X10E3/UL (ref 1.4–7)
NEUTROPHILS NFR BLD AUTO: 60 %
PLATELET # BLD AUTO: 404 X10E3/UL (ref 150–379)
PLEASE NOTE, 011150: ABNORMAL
POTASSIUM SERPL-SCNC: 4.4 MMOL/L (ref 3.5–5.2)
PROT SERPL-MCNC: 8.2 G/DL (ref 6–8.5)
RBC # BLD AUTO: 3.26 X10E6/UL (ref 3.77–5.28)
SODIUM SERPL-SCNC: 140 MMOL/L (ref 134–144)
WBC # BLD AUTO: 7.9 X10E3/UL (ref 3.4–10.8)

## 2017-10-03 ENCOUNTER — OFFICE VISIT (OUTPATIENT)
Dept: INTERNAL MEDICINE CLINIC | Age: 67
End: 2017-10-03

## 2017-10-03 VITALS
TEMPERATURE: 97.3 F | HEART RATE: 64 BPM | OXYGEN SATURATION: 96 % | BODY MASS INDEX: 30.76 KG/M2 | WEIGHT: 191.4 LBS | HEIGHT: 66 IN | RESPIRATION RATE: 16 BRPM | SYSTOLIC BLOOD PRESSURE: 158 MMHG | DIASTOLIC BLOOD PRESSURE: 75 MMHG

## 2017-10-03 DIAGNOSIS — M10.00 IDIOPATHIC GOUT, UNSPECIFIED CHRONICITY, UNSPECIFIED SITE: ICD-10-CM

## 2017-10-03 DIAGNOSIS — I42.9 CARDIOMYOPATHY, UNSPECIFIED TYPE (HCC): ICD-10-CM

## 2017-10-03 DIAGNOSIS — R04.0 EPISTAXIS: ICD-10-CM

## 2017-10-03 DIAGNOSIS — R68.82 DECREASED LIBIDO: ICD-10-CM

## 2017-10-03 DIAGNOSIS — R43.0 ANOSMIA: ICD-10-CM

## 2017-10-03 NOTE — PROGRESS NOTES
Chief Complaint   Patient presents with    Diabetes     routine follow up      1. Have you been to the ER, urgent care clinic since your last visit? Hospitalized since your last visit? No    2. Have you seen or consulted any other health care providers outside of the [address] [place] [address] since your last visit? Include any pap smears or colon screening.  No

## 2017-10-03 NOTE — MR AVS SNAPSHOT
Visit Information Date & Time Provider Department Dept. Phone Encounter #  
 10/3/2017 11:30 AM Blake Bae MD [facility] Medicine and [place] [address] [id number] Upcoming Health Maintenance Date Due  
 GLAUCOMA SCREENING Q2Y 3/4/2017 FOOT EXAM Q1 4/29/2017 MICROALBUMIN Q1 4/29/2017 FOBT Q 1 YEAR AGE 50-75 4/29/2017 INFLUENZA AGE 9 TO ADULT 8/1/2017 LIPID PANEL Q1 8/26/2017 EYE EXAM RETINAL OR DILATED Q1 10/10/2017* HEMOGLOBIN A1C Q6M 2/22/2018 MEDICARE YEARLY EXAM 5/3/2018 Pneumococcal 65+ Low/Medium Risk (2 of 2 - PPSV23) 10/3/2018 BREAST CANCER SCRN MAMMOGRAM 3/3/2019 DTaP/Tdap/Td series (2 - Td) 1/20/2027 *Topic was postponed. The date shown is not the original due date. Allergies as of 10/3/2017  Review Complete On: 10/3/2017 By: Virgil Moses No Known Allergies Current Immunizations  Never Reviewed No immunizations on file. Not reviewed this visit You Were Diagnosed With   
  
 Codes Comments Epistaxis    -  Primary ICD-10-CM: R04.0 ICD-9-CM: 812. 7 Anosmia     ICD-10-CM: R43.0 ICD-9-CM: 753. 1 [facility]omyopathy, unspecified [facility] ([facility]     ICD-10-CM: I42.9 ICD-9-CM: 425.4 Insulin dependent diabetes mellitus (HCC)     ICD-10-CM: E11.9, Z79.4 ICD-9-CM: 250.00, V58.67 Vitals BP Pulse Temp Resp Height(growth percentile) Weight(growth percentile) 158/75 (BP 1 Location: Right arm, BP Patient Position: Sitting) 64 97.3 °F (36.3 °C) (Oral) 16 5' 6\" (1.676 m) 191 lb 6.4 oz (86.8 kg) SpO2 BMI OB Status Smoking Status 96% 30.89 kg/m2 Postmenopausal Former Smoker Vitals History BMI and BSA Data Body Mass Index Body Surface Area  
 30.89 kg/m 2 2.01 m 2 Preferred Pharmacy Pharmacy Name Phone [facility] [address] [place] [phone] Your Updated Medication List  
  
   
 This list is accurate as of: 10/3/17 12:47 PM.  Always use your most recent med list.  
  
  
  
  
 alcohol swabs Padm Commonly known as:  BD Single Use Swabs Regular USE TO TEST BLOOD SUGAR  
  
 allopurinol 300 mg tablet Commonly known as:  Rawland Pat Take 1 Tab by mouth daily. atorvastatin 40 mg tablet Commonly known as:  LIPITOR Take 1 tablet by mouth daily. Blood-Glucose Meter monitoring kit Testing BID-DX:250.00  
  
 butalbital-acetaminophen  mg tablet Commonly known as:  Rollene Americus Take 1 Tab by mouth every six (6) hours as needed. furosemide 40 mg tablet Commonly known as:  LASIX TAKE 3 TABLETS BY MOUTH EVERY MORNING  
  
 glimepiride 4 mg tablet Commonly known as:  AMARYL  
TAKE 1 TABLET BY MOUTH TWICE DAILY BEFORE A MEAL  
  
 * glucose blood VI test strips strip Commonly known as:  ASCENSIA AUTODISC VI, ONE TOUCH ULTRA TEST VI  
TESTING BID  
  
 * glucose blood VI test strips strip Commonly known as:  FREESTYLE LITE STRIPS Use to test blood sugar three times daily. Dx.e11.9 HYDROcodone-acetaminophen 5-325 mg per tablet Commonly known as:  LORTAB 5-325 Take 1 Tab by mouth every four (4) hours as needed for Pain. Max Daily Amount: 6 Tabs. Indications: causes drowsiness;do not drink,drive, or use machinery while taking; take with a stool softener  
  
 insulin glargine 100 unit/mL (3 mL) Inpn Commonly known as:  Dimitrios Motto INJECT 92 UNITS EVERY NIGHT  
  
 insulin  unit/mL injection Commonly known as:  NOVOLIN N, HUMULIN N  
INJECT 75 UNITS  EVERY MORNING * Lancets Misc Commonly known as:  ONETOUCH ULTRASOFT LANCETS TESTING BID * One Touch Delica 33 gauge Misc Generic drug:  lancets  
  
 lisinopril 20 mg tablet Commonly known as:  PRINIVIL, ZESTRIL  
TAKE 1 TABLET BY MOUTH EVERY MORNING  
  
 metFORMIN  mg tablet Commonly known as:  GLUCOPHAGE XR  
 TAKE 1 TABLET BY MOUTH THREE TIMES DAILY WITH MEALS  
  
 metoprolol succinate 100 mg tablet Commonly known as:  TOPROL-XL  
TAKE 1 TABLET BY MOUTH DAILY Dairana Pen Needle 32 gauge x 5/32\" Ndle Generic drug:  Insulin Needles (Disposable) BD INSULIN PEN NEEDLE UF SHORT 31 gauge x 5/16\" Ndle Generic drug:  Insulin Needles (Disposable) USE WITH INSULIN PENS TWICE DAILY AS DIRECTED  
  
 potassium chloride SR 10 mEq tablet Commonly known as:  KLOR-CON 10  
2 QAM  
  
 predniSONE 20 mg tablet Commonly known as:  Nando Corbett Take 1 Tab by mouth two (2) times a day. * Notice: This list has 4 medication(s) that are the same as other medications prescribed for you. Read the directions carefully, and ask your doctor or other care provider to review them with you. Introducing [facility] & [facility]! Jeffrey Banks introduces [employer] patient portal. Now you can access parts of your medical record, email your doctor's office, and request medication refills online. 1. In your internet browser, go to [email or web address]/[email or web address]TR 2. Click on the First Time User? Click Here link in the Sign In box. You will see the New Member Sign Up page. 3. Enter your Waveseis Access Code exactly as it appears below. You will not need to use this code after youve completed the sign-up process. If you do not sign up before the expiration date, you must request a new code. · [employer] Access Code: [email or web address] Expires: 11/9/2017  6:05 AM 
 
4. Enter the last four digits of your Social Security Number (xxxx) and Date of Birth (mm/dd/yyyy) as indicated and click Submit. You will be taken to the next sign-up page. 5. Create a W[employer]eseis ID. This will be your W[employer]eseis login ID and cannot be changed, so think of one that is secure and easy to remember. 6. Create a W[employer] password. You can change your password at any time. 7. Enter your Password Reset Question and Answer.  This can be used at a later time if you forget your password. 8. Enter your e-mail address. You will receive e-mail notification when new information is available in [address] 9. Click Sign Up. You can now view and download portions of your medical record. 10. Click the Download Summary menu link to download a portable copy of your medical information. If you have questions, please visit the Frequently Asked Questions section of the [employer] website. Remember, An[employer]oom is NOT to be used for urgent needs. For medical emergencies, dial 911. Now available from your iPhone and Android! Please provide this summary of care documentation to your next provider. Your primary care clinician is listed as Pearl Wilson. If you have any questions after today's visit, please call [phone].

## 2017-10-08 NOTE — PROGRESS NOTES
[facility]  [facility]  [address] [place] [address]  Phone:  [phone]  Fax: [phone]       Chief Complaint   Patient presents with    Diabetes     routine follow up    . SUBJECTIVE:    Jeffry Lake is a 74 y.o. female Comes in for return visit stating that she has done fairly well. Concern is about increased urinary frequency. She is now taking Furosemide 120 mg every morning, which was done by the cardiologist.  Her last echocardiogram demonstrated a normal ejection fraction. The follow up with the cardiologist has been somewhat erratic. Her diabetes historically has been doing well. It is now time for a repeat hemoglobin A1c at this point. She has not had any symptoms related to interventional hypoglycemia. She has not had any gouty episodes either. Her intake of alcohol is also significantly decreased. Finally, she has not had any more episodes of epistaxis. She had a rather recalcitrant course, having seen ENT on two or three separate occasions. No apparent coagulopathy existed. Current Outpatient Prescriptions   Medication Sig Dispense Refill    HYDROcodone-acetaminophen (LORTAB 5-325) 5-325 mg per tablet Take 1 Tab by mouth every four (4) hours as needed for Pain. Max Daily Amount: 6 Tabs. Indications: causes drowsiness;do not drink,drive, or use machinery while taking; take with a stool softener 10 Tab 0    potassium chloride SR (KLOR-CON 10) 10 mEq tablet 2 QAM 60 Tab 11    insulin NPH (NOVOLIN N, HUMULIN N) 100 unit/mL injection INJECT 75 UNITS  EVERY MORNING 20 mL 11    glucose blood VI test strips (FREESTYLE LITE STRIPS) strip Use to test blood sugar three times daily.  Dx.e11.9 100 Strip 11    lisinopril (PRINIVIL, ZESTRIL) 20 mg tablet TAKE 1 TABLET BY MOUTH EVERY MORNING 90 Tab 3    furosemide (LASIX) 40 mg tablet TAKE 3 TABLETS BY MOUTH EVERY MORNING 90 Tab 11    predniSONE (DELTASONE) 20 mg tablet Take 1 Tab by mouth two (2) times a day. 30 Tab 0    allopurinol (ZYLOPRIM) 300 mg tablet Take 1 Tab by mouth daily. 30 Tab 11    glimepiride (AMARYL) 4 mg tablet   TAKE 1 TABLET BY MOUTH TWICE DAILY BEFORE A MEAL 180 Tab 3    alcohol swabs (BD SINGLE USE SWABS REGULAR) padm USE TO TEST BLOOD SUGAR 100 Pad 11    metFORMIN ER (GLUCOPHAGE XR) 500 mg tablet TAKE 1 TABLET BY MOUTH THREE TIMES DAILY WITH MEALS 90 Tab 11    butalbital-acetaminophen (PHRENILIN)  mg tablet Take 1 Tab by mouth every six (6) hours as needed. 40 Tab 0    metoprolol succinate (TOPROL-XL) 100 mg tablet TAKE 1 TABLET BY MOUTH DAILY 30 Tab 11    insulin glargine (LANTUS SOLOSTAR) 100 unit/mL (3 mL) pen INJECT 92 UNITS EVERY NIGHT 30 mL 11    BD INSULIN PEN NEEDLE UF SHORT 31 gauge x 5/16\" ndle USE WITH INSULIN PENS TWICE DAILY AS DIRECTED 100 Pen Needle 11    ONE TOUCH DELICA 33 gauge misc   11    NURIA PEN NEEDLE 32 x 5/32 \" ndle       Blood-Glucose Meter monitoring kit Testing BID-DX:250.00 1 kit 0    Lancets (ONE TOUCH ULTRASOFT LANCETS) misc TESTING BID 1 Package 11    glucose blood VI test strips (ASCENSIA AUTODISC VI, ONE TOUCH ULTRA TEST VI) strip TESTING BID 1 Package 11    atorvastatin (LIPITOR) 40 mg tablet Take 1 tablet by mouth daily. 30 tablet 11     Past Medical History:   Diagnosis Date    Arthritis     Congestive heart failure, unspecified     Diabetes ([facility]     Hypercholesterolemia     Hypertension      History reviewed. No pertinent surgical history.   No Known Allergies      REVIEW OF SYSTEMS:  General: negative for - chills or fever  ENT: negative for - headaches, nasal congestion or tinnitus  Respiratory: negative for - cough, hemoptysis, shortness of breath or wheezing  Cardiovascular : negative for - chest pain, edema, palpitations or shortness of breath  Gastrointestinal: negative for - abdominal pain, blood in stools, heartburn or nausea/vomiting  Genito-Urinary: no dysuria, trouble voiding, or hematuria  Musculoskeletal: negative for - gait disturbance, joint pain, joint stiffness or joint swelling  Neurological: no TIA or stroke symptoms  Hematologic: no bruises, no bleeding, no swollen glands  Integument: no lumps, mole changes, nail changes or rash  Endocrine: no malaise/lethargy or unexpected weight changes      Social History     Social History    Marital status:      Spouse name: N/A    Number of children: 3    Years of education: 12     Occupational History    retired      Social History Main Topics    Smoking status: Former Smoker     Packs/day: 0.25    Smokeless tobacco: Never Used    Alcohol use 0.0 oz/week     0 Standard drinks or equivalent per week      Comment: occ    Drug use: No    Sexual activity: Yes     Partners: Male     Other Topics Concern    None     Social History Narrative     Family History   Problem Relation Age of Onset    Diabetes Mother        OBJECTIVE:    Visit Vitals    /75 (BP 1 Location: Right arm, BP Patient Position: Sitting)    Pulse 64    Temp 97.3 °F (36.3 °C) (Oral)    Resp 16    Ht 5' 6\" (1.676 m)    Wt 191 lb 6.4 oz (86.8 kg)    SpO2 96%    BMI 30.89 kg/m2     CONSTITUTIONAL: well , well nourished, appears age appropriate  EYES: perrla, eom intact  ENMT:moist mucous membranes, pharynx clear  NECK: supple. Thyroid normal  RESPIRATORY: Chest: clear to ascultation and percussion   CARDIOVASCULAR: Heart: regular rate and rhythm  GASTROINTESTINAL: Abdomen: soft, bowel sounds active  HEMATOLOGIC: no pathological lymph nodes palpated  MUSCULOSKELETAL: Extremities: no edema, pulse 1+   INTEGUMENT: No unusual rashes or suspicious skin lesions noted. Nails appear normal.  NEUROLOGIC: non-focal exam   MENTAL STATUS: alert and oriented, appropriate affect      ASSESSMENT:  1. Insulin dependent diabetes mellitus (HCC)    2. Cardiomyopathy, unspecified type ([facility]    3. Epistaxis    4. Anosmia    5. Decreased libido    6.  Idiopathic gout, unspecified chronicity, unspecified site        PLAN:    1. Her cardiomyopathy appears to be quite stable. I will reduce her Furosemide to 80 mg q.d. If she notices any symptoms, dyspnea on exertion, orthopnea, PND or weight she will inform me. 2. Her diabetes hopefully is doing well. I will remind her of the need to minimize refined carbohydrate intake, specifically avoiding sweets, white bread, white potatoes. I also suggest to her that she eat at the same time every day. Hemoglobin A1c will be checked on return visit in one month. 3. Fortunately she has not had any more episodes of epistaxis. She needs to moisturize her mucous membranes. .  4. She continues to complain of decreased libido, and we will attempt to get her topical testosterone preparation. Follow-up Disposition:  Return in about 4 weeks (around 10/31/2017).       Juan Carlos Plascencia MD

## 2017-10-12 ENCOUNTER — TELEPHONE (OUTPATIENT)
Dept: INTERNAL MEDICINE CLINIC | Age: 67
End: 2017-10-12

## 2017-11-07 ENCOUNTER — OFFICE VISIT (OUTPATIENT)
Dept: INTERNAL MEDICINE CLINIC | Age: 67
End: 2017-11-07

## 2017-11-07 VITALS
BODY MASS INDEX: 30.52 KG/M2 | HEIGHT: 66 IN | WEIGHT: 189.9 LBS | RESPIRATION RATE: 16 BRPM | HEART RATE: 65 BPM | TEMPERATURE: 98.5 F | DIASTOLIC BLOOD PRESSURE: 78 MMHG | OXYGEN SATURATION: 97 % | SYSTOLIC BLOOD PRESSURE: 131 MMHG

## 2017-11-07 DIAGNOSIS — I10 ESSENTIAL HYPERTENSION: ICD-10-CM

## 2017-11-07 DIAGNOSIS — I42.9 CARDIOMYOPATHY, UNSPECIFIED TYPE (HCC): ICD-10-CM

## 2017-11-07 DIAGNOSIS — E78.5 DYSLIPIDEMIA: ICD-10-CM

## 2017-11-07 DIAGNOSIS — M17.11 PRIMARY OSTEOARTHRITIS OF RIGHT KNEE: Primary | ICD-10-CM

## 2017-11-07 NOTE — PROGRESS NOTES
Chief Complaint   Patient presents with    Diabetes     1 month follow up      1. Have you been to the ER, urgent care clinic since your last visit? Hospitalized since your last visit? No    2. Have you seen or consulted any other health care providers outside of the [address] [place] [address] since your last visit? Include any pap smears or colon screening.  No

## 2017-11-07 NOTE — MR AVS SNAPSHOT
Visit Information Date & Time Provider Department Dept. Phone Encounter #  
 11/7/2017 11:30 AM Magdalena Brush MD [facility] and [address] [id number] Upcoming Health Maintenance Date Due  
 GLAUCOMA SCREENING Q2Y 3/4/2017 FOOT EXAM Q1 4/29/2017 MICROALBUMIN Q1 4/29/2017 FOBT Q 1 YEAR AGE 50-75 4/29/2017 LIPID PANEL Q1 8/26/2017 EYE EXAM RETINAL OR DILATED Q1 2/7/2018* HEMOGLOBIN A1C Q6M 2/22/2018 MEDICARE YEARLY EXAM 5/3/2018 Pneumococcal 65+ Low/Medium Risk (2 of 2 - PPSV23) 10/3/2018 BREAST CANCER SCRN MAMMOGRAM 3/3/2019 DTaP/Tdap/Td series (2 - Td) 1/20/2027 *Topic was postponed. The date shown is not the original due date. Allergies as of 11/7/2017  Review Complete On: 11/7/2017 By: Teresa Romero No Known Allergies Current Immunizations  Never Reviewed No immunizations on file. Not reviewed this visit You Were Diagnosed With   
  
 Codes Comments Primary osteoarthritis of right knee    -  Primary ICD-10-CM: M17.11 ICD-9-CM: 715.16 Cardiomyopathy, unspecified type ([facility]     ICD-10-CM: I42.9 ICD-9-CM: 425.4 Insulin dependent diabetes mellitus (HCC)     ICD-10-CM: E11.9, Z79.4 ICD-9-CM: 250.00, V58.67 Essential hypertension     ICD-10-CM: I10 
ICD-9-CM: 401.9 Dyslipidemia     ICD-10-CM: E78.5 ICD-9-CM: 272.4 Vitals BP Pulse Temp Resp Height(growth percentile) Weight(growth percentile) 131/78 (BP 1 Location: Right arm, BP Patient Position: Sitting) 65 98.5 °F (36.9 °C) (Oral) 16 5' 6\" (1.676 m) 189 lb 14.4 oz (86.1 kg) SpO2 BMI OB Status Smoking Status 97% 30.65 kg/m2 Postmenopausal Former Smoker Vitals History BMI and BSA Data Body Mass Index Body Surface Area  
 30.65 kg/m 2 2 m 2 Preferred Pharmacy Pharmacy Name Phone [facility] [address] [place] [address] [phone] Your Updated Medication List  
  
   
This list is accurate as of: 11/7/17  1:26 PM.  Always use your most recent med list.  
  
  
  
  
 alcohol swabs Padm Commonly known as:  BD Single Use Swabs Regular USE TO TEST BLOOD SUGAR  
  
 allopurinol 300 mg tablet Commonly known as:  Cameron Cons Take 1 Tab by mouth daily. atorvastatin 40 mg tablet Commonly known as:  LIPITOR Take 1 tablet by mouth daily. Blood-Glucose Meter monitoring kit Testing BID-DX:250.00  
  
 butalbital-acetaminophen  mg tablet Commonly known as:  Renny Clement Take 1 Tab by mouth every six (6) hours as needed. furosemide 40 mg tablet Commonly known as:  LASIX TAKE 3 TABLETS BY MOUTH EVERY MORNING  
  
 glimepiride 4 mg tablet Commonly known as:  AMARYL  
TAKE 1 TABLET BY MOUTH TWICE DAILY BEFORE A MEAL  
  
 * glucose blood VI test strips strip Commonly known as:  ASCENSIA AUTODISC VI, ONE TOUCH ULTRA TEST VI  
TESTING BID  
  
 * glucose blood VI test strips strip Commonly known as:  FREESTYLE LITE STRIPS Use to test blood sugar three times daily. Dx.e11.9 HYDROcodone-acetaminophen 5-325 mg per tablet Commonly known as:  LORTAB 5-325 Take 1 Tab by mouth every four (4) hours as needed for Pain. Max Daily Amount: 6 Tabs. Indications: causes drowsiness;do not drink,drive, or use machinery while taking; take with a stool softener  
  
 insulin glargine 100 unit/mL (3 mL) Inpn Commonly known as:  Bevely Albino INJECT 92 UNITS EVERY NIGHT  
  
 insulin  unit/mL injection Commonly known as:  NOVOLIN N, HUMULIN N  
INJECT 75 UNITS  EVERY MORNING * Lancets Misc Commonly known as:  ONETOUCH ULTRASOFT LANCETS TESTING BID * One Touch Delica 33 gauge Misc Generic drug:  lancets  
  
 lisinopril 20 mg tablet Commonly known as:  PRINIVIL, ZESTRIL  
TAKE 1 TABLET BY MOUTH EVERY MORNING  
  
 metFORMIN  mg tablet Commonly known as:  GLUCOPHAGE XR  
 TAKE 1 TABLET BY MOUTH THREE TIMES DAILY WITH MEALS  
  
 metoprolol succinate 100 mg tablet Commonly known as:  TOPROL-XL  
TAKE 1 TABLET BY MOUTH DAILY Dariana Pen Needle 32 gauge x 5/32\" Ndle Generic drug:  Insulin Needles (Disposable) BD INSULIN PEN NEEDLE UF SHORT 31 gauge x 5/16\" Ndle Generic drug:  Insulin Needles (Disposable) USE WITH INSULIN PENS TWICE DAILY AS DIRECTED  
  
 potassium chloride SR 10 mEq tablet Commonly known as:  KLOR-CON 10  
2 QAM  
  
 predniSONE 20 mg tablet Commonly known as:  Nanine Knife Take 1 Tab by mouth two (2) times a day. * Notice: This list has 4 medication(s) that are the same as other medications prescribed for you. Read the directions carefully, and ask your doctor or other care provider to review them with you. We Performed the Following BNP [id number] CPT(R)] METABOLIC PANEL, BASIC [84027 CPT(R)] Introducing [facility] & HEALTH SERVICES! Queta Musa introduces [employer] patient portal. Now you can access parts of your medical record, email your doctor's office, and request medication refills online. 1. In your internet browser, go to [email or web address] 2. Click on the First Time User? Click Here link in the Sign In box. You will see the New Member Sign Up page. 3. Enter your OnSwipe Access Code exactly as it appears below. You will not need to use this code after youve completed the sign-up process. If you do not sign up before the expiration date, you must request a new code. · On[employer] Access Code: [email or web address] Expires: 2/3/2018 12:30 PM 
 
4. Enter the last four digits of your Social Security Number (xxxx) and Date of Birth (mm/dd/yyyy) as indicated and click Submit. You will be taken to the next sign-up page. 5. Create a OnSwipe ID. This will be your OnSwipe login ID and cannot be changed, so think of one that is secure and easy to remember. 6. Create a rollApp password. You can change your password at any time. 7. Enter your Password Reset Question and Answer. This can be used at a later time if you forget your password. 8. Enter your e-mail address. You will receive e-mail notification when new information is available in [address] 9. Click Sign Up. You can now view and download portions of your medical record. 10. Click the Download Summary menu link to download a portable copy of your medical information. If you have questions, please visit the Frequently Asked Questions section of the rollApp website. Remember, rollApp is NOT to be used for urgent needs. For medical emergencies, dial 911. Now available from your iPhone and Android! Please provide this summary of care documentation to your next provider. Your primary care clinician is listed as Pearl Wilson. If you have any questions after today's visit, please call [phone].

## 2017-11-07 NOTE — PROGRESS NOTES
[address] [facility]  [place] [address]  [address] [place] [address]  Phone:  [phone]  Fax: [phone]       Chief Complaint   Patient presents with    Diabetes     1 month follow up    . SUBJECTIVE:    Milind Velásquez is a 74 y.o. female Comes in for return visit complaining of increasing pain in her right knee. She has a severe arthritic right knee, really needs a replacement. Her diabetes has been doing quite well. She has had no interventional hypoglycemia. She is currently taking 25 units of the Humulin N. In her last visit I reduced her Furosemide to 80 mg daily from the 120. She has had no dyspnea on exertion, orthopnea or PND. She has a past history of primary hypertension and dyslipidemia. Current Outpatient Prescriptions   Medication Sig Dispense Refill    HYDROcodone-acetaminophen (LORTAB 5-325) 5-325 mg per tablet Take 1 Tab by mouth every four (4) hours as needed for Pain. Max Daily Amount: 6 Tabs. Indications: causes drowsiness;do not drink,drive, or use machinery while taking; take with a stool softener 10 Tab 0    potassium chloride SR (KLOR-CON 10) 10 mEq tablet 2 QAM 60 Tab 11    insulin NPH (NOVOLIN N, HUMULIN N) 100 unit/mL injection INJECT 75 UNITS  EVERY MORNING 20 mL 11    glucose blood VI test strips (FREESTYLE LITE STRIPS) strip Use to test blood sugar three times daily. Dx.e11.9 100 Strip 11    lisinopril (PRINIVIL, ZESTRIL) 20 mg tablet TAKE 1 TABLET BY MOUTH EVERY MORNING 90 Tab 3    furosemide (LASIX) 40 mg tablet TAKE 3 TABLETS BY MOUTH EVERY MORNING 90 Tab 11    predniSONE (DELTASONE) 20 mg tablet Take 1 Tab by mouth two (2) times a day. 30 Tab 0    allopurinol (ZYLOPRIM) 300 mg tablet Take 1 Tab by mouth daily.  30 Tab 11    glimepiride (AMARYL) 4 mg tablet   TAKE 1 TABLET BY MOUTH TWICE DAILY BEFORE A MEAL 180 Tab 3    alcohol swabs (BD SINGLE USE SWABS REGULAR) padm USE TO TEST BLOOD SUGAR 100 Pad 11    metFORMIN ER (GLUCOPHAGE XR) 500 mg tablet TAKE 1 TABLET BY MOUTH THREE TIMES DAILY WITH MEALS 90 Tab 11    butalbital-acetaminophen (PHRENILIN)  mg tablet Take 1 Tab by mouth every six (6) hours as needed. 40 Tab 0    metoprolol succinate (TOPROL-XL) 100 mg tablet TAKE 1 TABLET BY MOUTH DAILY 30 Tab 11    insulin glargine (LANTUS SOLOSTAR) 100 unit/mL (3 mL) pen INJECT 92 UNITS EVERY NIGHT 30 mL 11    BD INSULIN PEN NEEDLE UF SHORT 31 gauge x 5/16\" ndle USE WITH INSULIN PENS TWICE DAILY AS DIRECTED 100 Pen Needle 11    ONE TOUCH DELICA 33 gauge misc   11    NURIA PEN NEEDLE 32 x 5/32 \" ndle       Blood-Glucose Meter monitoring kit Testing BID-DX:250.00 1 kit 0    Lancets (ONE TOUCH ULTRASOFT LANCETS) misc TESTING BID 1 Package 11    glucose blood VI test strips (ASCENSIA AUTODISC VI, ONE TOUCH ULTRA TEST VI) strip TESTING BID 1 Package 11    atorvastatin (LIPITOR) 40 mg tablet Take 1 tablet by mouth daily. 30 tablet 11     Past Medical History:   Diagnosis Date    Arthritis     Congestive heart failure, unspecified     Diabetes ([facility]     Hypercholesterolemia     Hypertension      History reviewed. No pertinent surgical history.   No Known Allergies      REVIEW OF SYSTEMS:  General: negative for - chills or fever  ENT: negative for - headaches, nasal congestion or tinnitus  Respiratory: negative for - cough, hemoptysis, shortness of breath or wheezing  Cardiovascular : negative for - chest pain, edema, palpitations or shortness of breath  Gastrointestinal: negative for - abdominal pain, blood in stools, heartburn or nausea/vomiting  Genito-Urinary: no dysuria, trouble voiding, or hematuria  Musculoskeletal: negative for - gait disturbance, joint pain, joint stiffness or joint swelling  Neurological: no TIA or stroke symptoms  Hematologic: no bruises, no bleeding, no swollen glands  Integument: no lumps, mole changes, nail changes or rash  Endocrine: no malaise/lethargy or unexpected weight changes      Social History     Social History    Marital status:      Spouse name: N/A    Number of children: 3    Years of education: 15     Occupational History    retired      Social History Main Topics    Smoking status: Former Smoker     Packs/day: 0.25    Smokeless tobacco: Never Used    Alcohol use 0.0 oz/week     0 Standard drinks or equivalent per week      Comment: occ    Drug use: No    Sexual activity: Yes     Partners: Male     Other Topics Concern    None     Social History Narrative     Family History   Problem Relation Age of Onset    Diabetes Mother        OBJECTIVE:    Visit Vitals    /78 (BP 1 Location: Right arm, BP Patient Position: Sitting)    Pulse 65    Temp 98.5 °F (36.9 °C) (Oral)    Resp 16    Ht 5' 6\" (1.676 m)    Wt 189 lb 14.4 oz (86.1 kg)    SpO2 97%    BMI 30.65 kg/m2     CONSTITUTIONAL: well , well nourished, appears age appropriate  EYES: perrla, eom intact  ENMT:moist mucous membranes, pharynx clear  NECK: supple. Thyroid normal,no JVD  RESPIRATORY: Chest: clear to ascultation and percussion   CARDIOVASCULAR: Heart: regular rate and rhythm  GASTROINTESTINAL: Abdomen: soft, bowel sounds active  HEMATOLOGIC: no pathological lymph nodes palpated  MUSCULOSKELETAL: Extremities: no edema, pulse 1+   INTEGUMENT: No unusual rashes or suspicious skin lesions noted. Nails appear normal.  NEUROLOGIC: non-focal exam   MENTAL STATUS: alert and oriented, appropriate affect      ASSESSMENT:  1. Primary osteoarthritis of right knee    2. Cardiomyopathy, unspecified type ([address]    3. Insulin dependent diabetes mellitus ([address]    4. Essential hypertension    5. Dyslipidemia        PLAN:    1. She has a severely inflamed right knee, which is usual.  This is superimposed on severe osteoarthritis. I suggest I will ease this with injection of steroid, but she declines. She really needs to see an orthopaedic physician for a replacement.    2. As far as the diabetes is concerned, no intervention for now. Dual treatment is occurring with the [facility] people and myself making it difficult for continuity. 3. Her cardiac status appears to be stable. The  MBP will be checked today. 4. Blood pressure is excellent. No adjustments are made. 5. She will continue statin as prescribed. Follow-up Disposition:  Return in about 3 months (around 2/7/2018).       Nahed Dickson MD

## 2017-11-08 LAB
BNP SERPL-MCNC: 53.9 PG/ML (ref 0–100)
BUN SERPL-MCNC: 26 MG/DL (ref 8–27)
BUN/CREAT SERPL: 36 (ref 12–28)
CALCIUM SERPL-MCNC: 9.9 MG/DL (ref 8.7–10.3)
CHLORIDE SERPL-SCNC: 100 MMOL/L (ref 96–106)
CO2 SERPL-SCNC: 29 MMOL/L (ref 18–29)
CREAT SERPL-MCNC: 0.73 MG/DL (ref 0.57–1)
GFR SERPLBLD CREATININE-BSD FMLA CKD-EPI: 100 ML/MIN/1.73
GFR SERPLBLD CREATININE-BSD FMLA CKD-EPI: 87 ML/MIN/1.73
GLUCOSE SERPL-MCNC: 60 MG/DL (ref 65–99)
POTASSIUM SERPL-SCNC: 4 MMOL/L (ref 3.5–5.2)
SODIUM SERPL-SCNC: 146 MMOL/L (ref 134–144)

## 2017-11-15 ENCOUNTER — TELEPHONE (OUTPATIENT)
Dept: INTERNAL MEDICINE CLINIC | Age: 67
End: 2017-11-15

## 2017-11-16 RX ORDER — SIMVASTATIN 40 MG/1
40 TABLET, FILM COATED ORAL
Qty: 90 TAB | Refills: 3 | Status: SHIPPED | OUTPATIENT
Start: 2017-11-16 | End: 2017-12-16

## 2017-12-21 RX ORDER — ISOPROPYL ALCOHOL 70 ML/100ML
SWAB TOPICAL
Qty: 100 PAD | Refills: 11 | Status: SHIPPED | OUTPATIENT
Start: 2017-12-21 | End: 2018-01-05 | Stop reason: SDUPTHER

## 2018-01-06 RX ORDER — ISOPROPYL ALCOHOL 70 ML/100ML
SWAB TOPICAL
Qty: 100 PAD | Refills: 11 | Status: SHIPPED | OUTPATIENT
Start: 2018-01-06 | End: 2020-02-18 | Stop reason: SDUPTHER

## 2018-01-09 ENCOUNTER — OFFICE VISIT (OUTPATIENT)
Dept: INTERNAL MEDICINE CLINIC | Age: 68
End: 2018-01-09

## 2018-01-09 VITALS
OXYGEN SATURATION: 92 % | SYSTOLIC BLOOD PRESSURE: 145 MMHG | TEMPERATURE: 97.7 F | DIASTOLIC BLOOD PRESSURE: 76 MMHG | RESPIRATION RATE: 18 BRPM | WEIGHT: 196.3 LBS | BODY MASS INDEX: 31.55 KG/M2 | HEART RATE: 71 BPM | HEIGHT: 66 IN

## 2018-01-09 DIAGNOSIS — I42.9 CARDIOMYOPATHY, UNSPECIFIED TYPE (HCC): Primary | ICD-10-CM

## 2018-01-09 DIAGNOSIS — I10 ESSENTIAL HYPERTENSION: ICD-10-CM

## 2018-01-09 DIAGNOSIS — D64.9 ANEMIA, UNSPECIFIED TYPE: ICD-10-CM

## 2018-01-09 DIAGNOSIS — E78.5 DYSLIPIDEMIA: ICD-10-CM

## 2018-01-09 DIAGNOSIS — M17.11 PRIMARY OSTEOARTHRITIS OF RIGHT KNEE: ICD-10-CM

## 2018-01-09 DIAGNOSIS — E66.9 OBESITY (BMI 30.0-34.9): ICD-10-CM

## 2018-01-09 RX ORDER — TRAMADOL HYDROCHLORIDE 50 MG/1
50 TABLET ORAL
Qty: 40 TAB | Refills: 2 | Status: SHIPPED | OUTPATIENT
Start: 2018-01-09 | End: 2018-07-03 | Stop reason: SDUPTHER

## 2018-01-09 NOTE — MR AVS SNAPSHOT
Visit Information Date & Time Provider Department Dept. Phone Encounter #  
 1/9/2018 11:30 AM Coby Benitez MD [facility] [address] [id number] Upcoming Health Maintenance Date Due  
 GLAUCOMA SCREENING Q2Y 3/4/2017 FOOT EXAM Q1 4/29/2017 MICROALBUMIN Q1 4/29/2017 FOBT Q 1 YEAR AGE 50-75 4/29/2017 LIPID PANEL Q1 8/26/2017 EYE EXAM RETINAL OR DILATED Q1 2/7/2018* HEMOGLOBIN A1C Q6M 2/22/2018 MEDICARE YEARLY EXAM 5/3/2018 Pneumococcal 65+ Low/Medium Risk (2 of 2 - PPSV23) 10/3/2018 BREAST CANCER SCRN MAMMOGRAM 3/3/2019 DTaP/Tdap/Td series (2 - Td) 1/20/2027 *Topic was postponed. The date shown is not the original due date. Allergies as of 1/9/2018  Review Complete On: 1/9/2018 By: Coby Benitez MD  
 No Known Allergies Current Immunizations  Never Reviewed No immunizations on file. Not reviewed this visit You Were Diagnosed With   
  
 Codes Comments Cardiomyopathy, unspecified type ([facility] [address]    -  Primary ICD-10-CM: I42.9 ICD-9-CM: 425.4 Insulin dependent diabetes mellitus (HCC)     ICD-10-CM: E11.9, Z79.4 ICD-9-CM: 250.00, V58.67 Dyslipidemia     ICD-10-CM: E78.5 ICD-9-CM: 272.4 Essential hypertension     ICD-10-CM: I10 
ICD-9-CM: 401.9 Primary osteoarthritis of right knee     ICD-10-CM: M17.11 ICD-9-CM: 715.16 Obesity (BMI 30.0-34.9)     ICD-10-CM: M32.5 ICD-9-CM: 278.00 Anemia, unspecified type     ICD-10-CM: D64.9 ICD-9-CM: 905. 9 Vitals BP Pulse Temp Resp Height(growth percentile) Weight(growth percentile) 145/76 (BP 1 Location: Left arm, BP Patient Position: Sitting) 71 97.7 °F (36.5 °C) (Oral) 18 5' 6\" (1.676 m) 196 lb 4.8 oz (89 kg) SpO2 BMI OB Status Smoking Status 92% 31.68 kg/m2 Postmenopausal Former Smoker Vitals History BMI and BSA Data Body Mass Index Body Surface Area  
 31.68 kg/m 2 2.04 m 2 Preferred Pharmacy Pharmacy Name Phone [facility] [phone] Your Updated Medication List  
  
   
This list is accurate as of: 1/9/18 12:11 PM.  Always use your most recent med list.  
  
  
  
  
 alcohol swabs Padm Commonly known as:  BD Single Use Swabs Regular USE TO TEST BLOOD SUGAR.dx.e11.9  
  
 allopurinol 300 mg tablet Commonly known as:  Saran Croon Take 1 Tab by mouth daily. Blood-Glucose Meter monitoring kit Testing BID-DX:250.00  
  
 butalbital-acetaminophen  mg tablet Commonly known as:  May Copper Take 1 Tab by mouth every six (6) hours as needed. furosemide 40 mg tablet Commonly known as:  LASIX TAKE 3 TABLETS BY MOUTH EVERY MORNING  
  
 glimepiride 4 mg tablet Commonly known as:  AMARYL  
TAKE 1 TABLET BY MOUTH TWICE DAILY BEFORE A MEAL  
  
 * glucose blood VI test strips strip Commonly known as:  ASCENSIA AUTODISC VI, ONE TOUCH ULTRA TEST VI  
TESTING BID  
  
 * glucose blood VI test strips strip Commonly known as:  FREESTYLE LITE STRIPS Use to test blood sugar three times daily. Dx.e11.9  
  
 insulin glargine 100 unit/mL (3 mL) Inpn Commonly known as:  Cherrie Sharps INJECT 92 UNITS EVERY NIGHT  
  
 insulin  unit/mL injection Commonly known as:  NOVOLIN N, HUMULIN N  
INJECT 75 UNITS  EVERY MORNING * Lancets Misc Commonly known as:  ONETOUCH ULTRASOFT LANCETS TESTING BID * One Touch Delica 33 gauge Misc Generic drug:  lancets  
  
 lisinopril 20 mg tablet Commonly known as:  PRINIVIL, ZESTRIL  
TAKE 1 TABLET BY MOUTH EVERY MORNING  
  
 metFORMIN  mg tablet Commonly known as:  GLUCOPHAGE XR  
TAKE 1 TABLET BY MOUTH THREE TIMES DAILY WITH MEALS  
  
 metoprolol succinate 100 mg tablet Commonly known as:  TOPROL-XL  
TAKE 1 TABLET BY MOUTH DAILY Dariana Pen Needle 32 gauge x 5/32\" Ndle Generic drug:  Insulin Needles (Disposable) BD INSULIN PEN NEEDLE UF SHORT 31 gauge x 5/16\" Ndle Generic drug:  Insulin Needles (Disposable) USE WITH INSULIN PENS TWICE DAILY AS DIRECTED  
  
 potassium chloride SR 10 mEq tablet Commonly known as:  KLOR-CON 10  
2 QAM  
  
 predniSONE 20 mg tablet Commonly known as:  Carron Cozier Take 1 Tab by mouth two (2) times a day. traMADol 50 mg tablet Commonly known as:  ULTRAM  
Take 1 Tab by mouth two (2) times daily as needed for Pain. Max Daily Amount: 100 mg.  
  
 * Notice: This list has 4 medication(s) that are the same as other medications prescribed for you. Read the directions carefully, and ask your doctor or other care provider to review them with you. Prescriptions Printed Refills  
 traMADol (ULTRAM) 50 mg tablet 2 Sig: Take 1 Tab by mouth two (2) times daily as needed for Pain. Max Daily Amount: 100 mg. Class: Print Route: Oral  
  
We Performed the Following APOLIPOPROTEIN B Y4000409 CPT(R)] CBC WITH AUTOMATED DIFF [17347 CPT(R)] FERRITIN [94772 CPT(R)] GLUCOSE-6-PHOSPHATE DEHYDROGENASE [id number] CPT(R)] HEMOGLOBIN A1C WITH EAG [85876 CPT(R)] HEMOGLOBIN FRACTIONATION [EOM47064 Custom] IRON PROFILE [id number] CPT(R)] MS COLLECTION VENOUS BLOOD,VENIPUNCTURE L9139433 CPT(R)] VITAMIN B12 [id number] CPT(R)] Introducing [facility]! Carmelita Fothergill introduces [employer] patient portal. Now you can access parts of your medical record, email your doctor's office, and request medication refills online. 1. In your internet browser, go to [email or web address]. [employer]/[facility] 2. Click on the First Time User? Click Here link in the Sign In box. You will see the New Member Sign Up page. 3. Enter your [employer] Access Code exactly as it appears below. You will not need to use this code after youve completed the sign-up process. If you do not sign up before the expiration date, you must request a new code. · [email or web address] Access Code: [email or web address] Expires: 2/3/2018 12:30 PM 
 
4. Enter the last four digits of your Social Security Number (xxxx) and Date of Birth (mm/dd/yyyy) as indicated and click Submit. You will be taken to the next sign-up page. 5. Create a [email or web address] ID. This will be your [email or web address] login ID and cannot be changed, so think of one that is secure and easy to remember. 6. Create a [email or web address] password. You can change your password at any time. 7. Enter your Password Reset Question and Answer. This can be used at a later time if you forget your password. 8. Enter your e-mail address. You will receive e-mail notification when new information is available in [address] 9. Click Sign Up. You can now view and download portions of your medical record. 10. Click the Download Summary menu link to download a portable copy of your medical information. If you have questions, please visit the Frequently Asked Questions section of the [email or web address] website. Remember, [email or web address] is NOT to be used for urgent needs. For medical emergencies, dial 911. Now available from your iPhone and Android! Please provide this summary of care documentation to your next provider. Your primary care clinician is listed as Pearl Wilson. If you have any questions after today's visit, please call [phone].

## 2018-01-09 NOTE — PROGRESS NOTES
[address] [place] [address] and Primary Care  [place] [address]  [address]  [place] [address]  Phone:  [phone]  Fax: [phone]       Chief Complaint   Patient presents with    Diabetes     rm 8 here to follow up     Knee Pain     would like a referral to another knee doctor    . SUBJECTIVE:    Tabitha El is a 74 y.o. female comes in for return visit stating that she has done fairly well other than continuing to have pain in her right knee. She has severe osteoarthritis of her knee. She does not want a replacement at this point. She would like analgesics. Her diabetes allegedly has been doing well. She is currently taking 75 units of Humulin-N daily. This obviously is not optimal.  Her last hemoglobin A1c was reasonable. She has not had any interventional hypoglycemia. She denies any shortness of breath, orthopnea or PND. She is taking all of her cardiac medications. She has a nonischemic cardiomyopathy. She continues to drink moderate amounts of alcohol nocturnally. She has an anemia. This has not been well characterized. It appears to be progressive. This will have to be evaluated. There has been no meaningful weight loss. She is indeed obese. We talked about this at length. Current Outpatient Prescriptions   Medication Sig Dispense Refill    traMADol (ULTRAM) 50 mg tablet Take 1 Tab by mouth two (2) times daily as needed for Pain. Max Daily Amount: 100 mg. 40 Tab 2    alcohol swabs (BD SINGLE USE SWABS REGULAR) padm USE TO TEST BLOOD SUGAR.dx.e11.9 100 Pad 11    potassium chloride SR (KLOR-CON 10) 10 mEq tablet 2 QAM 60 Tab 11    insulin NPH (NOVOLIN N, HUMULIN N) 100 unit/mL injection INJECT 75 UNITS  EVERY MORNING 20 mL 11    glucose blood VI test strips (FREESTYLE LITE STRIPS) strip Use to test blood sugar three times daily.  Dx.e11.9 100 Strip 11    lisinopril (PRINIVIL, ZESTRIL) 20 mg tablet TAKE 1 TABLET BY MOUTH EVERY MORNING 90 Tab 3  furosemide (LASIX) 40 mg tablet TAKE 3 TABLETS BY MOUTH EVERY MORNING 90 Tab 11    allopurinol (ZYLOPRIM) 300 mg tablet Take 1 Tab by mouth daily. 30 Tab 11    glimepiride (AMARYL) 4 mg tablet   TAKE 1 TABLET BY MOUTH TWICE DAILY BEFORE A MEAL 180 Tab 3    metFORMIN ER (GLUCOPHAGE XR) 500 mg tablet TAKE 1 TABLET BY MOUTH THREE TIMES DAILY WITH MEALS 90 Tab 11    butalbital-acetaminophen (PHRENILIN)  mg tablet Take 1 Tab by mouth every six (6) hours as needed. 40 Tab 0    metoprolol succinate (TOPROL-XL) 100 mg tablet TAKE 1 TABLET BY MOUTH DAILY 30 Tab 11    insulin glargine (LANTUS SOLOSTAR) 100 unit/mL (3 mL) pen INJECT 92 UNITS EVERY NIGHT 30 mL 11    BD INSULIN PEN NEEDLE UF SHORT 31 gauge x 5/16\" ndle USE WITH INSULIN PENS TWICE DAILY AS DIRECTED 100 Pen Needle 11    ONE TOUCH DELICA 33 gauge misc   11    NURIA PEN NEEDLE 32 x 5/32 \" ndle       Blood-Glucose Meter monitoring kit Testing BID-DX:250.00 1 kit 0    Lancets (ONE TOUCH ULTRASOFT LANCETS) misc TESTING BID 1 Package 11    glucose blood VI test strips (ASCENSIA AUTODISC VI, ONE TOUCH ULTRA TEST VI) strip TESTING BID 1 Package 11    predniSONE (DELTASONE) 20 mg tablet Take 1 Tab by mouth two (2) times a day. 30 Tab 0     Past Medical History:   Diagnosis Date    Arthritis     Congestive heart failure, unspecified     Diabetes ([facility] [address]     Hypercholesterolemia     Hypertension      History reviewed. No pertinent surgical history.   No Known Allergies      REVIEW OF SYSTEMS:  General: negative for - chills or fever  ENT: negative for - headaches, nasal congestion or tinnitus  Respiratory: negative for - cough, hemoptysis, shortness of breath or wheezing  Cardiovascular : negative for - chest pain, edema, palpitations or shortness of breath  Gastrointestinal: negative for - abdominal pain, blood in stools, heartburn or nausea/vomiting  Genito-Urinary: no dysuria, trouble voiding, or hematuria  Musculoskeletal: negative for - gait disturbance, joint pain, joint stiffness or joint swelling  Neurological: no TIA or stroke symptoms  Hematologic: no bruises, no bleeding, no swollen glands  Integument: no lumps, mole changes, nail changes or rash  Endocrine: no malaise/lethargy or unexpected weight changes      Social History     Social History    Marital status:      Spouse name: N/A    Number of children: 3    Years of education: 12     Occupational History    retired      Social History Main Topics    Smoking status: Former Smoker     Packs/day: 0.25    Smokeless tobacco: Never Used    Alcohol use 0.0 oz/week     0 Standard drinks or equivalent per week      Comment: occ    Drug use: No    Sexual activity: Yes     Partners: Male     Other Topics Concern    None     Social History Narrative     Family History   Problem Relation Age of Onset    Diabetes Mother        OBJECTIVE:    Visit Vitals    /76 (BP 1 Location: Left arm, BP Patient Position: Sitting)  Comment: repeat 128/70    Pulse 71    Temp 97.7 °F (36.5 °C) (Oral)    Resp 18    Ht 5' 6\" (1.676 m)    Wt 196 lb 4.8 oz (89 kg)    SpO2 92%    BMI 31.68 kg/m2     CONSTITUTIONAL: well , well nourished, appears age appropriate  EYES: perrla, eom intact  ENMT:moist mucous membranes, pharynx clear  NECK: supple. Thyroid normal  RESPIRATORY: Chest: clear to ascultation and percussion   CARDIOVASCULAR: Heart: regular rate and rhythm  GASTROINTESTINAL: Abdomen: soft, bowel sounds active  HEMATOLOGIC: no pathological lymph nodes palpated  MUSCULOSKELETAL: Extremities: no edema, pulse 1+   INTEGUMENT: No unusual rashes or suspicious skin lesions noted. Nails appear normal.  NEUROLOGIC: non-focal exam   MENTAL STATUS: alert and oriented, appropriate affect      ASSESSMENT:  1. Cardiomyopathy, unspecified type ([address]    2. Insulin dependent diabetes mellitus ([address]    3. Dyslipidemia    4. Essential hypertension    5. Primary osteoarthritis of right knee    6.  Obesity (BMI 30.0-34.9)    7. Anemia, unspecified type      Diagnoses and all orders for this visit:    1. Cardiomyopathy, unspecified type [facility])  Assessment & Plan:  Stable, based on history, physical exam and review of pertinent labs, studies and medications; meds reconciled; continue current treatment plan. Key CAD CHF Meds             lisinopril (PRINIVIL, ZESTRIL) 20 mg tablet  (Taking) TAKE 1 TABLET BY MOUTH EVERY MORNING    furosemide (LASIX) 40 mg tablet  (Taking) TAKE 3 TABLETS BY MOUTH EVERY MORNING    metoprolol succinate (TOPROL-XL) 100 mg tablet  (Taking) TAKE 1 TABLET BY MOUTH DAILY        Kim Antihyperlipidemia Meds     The patient is on no antihyperlipidemia meds. Lab Results   Component Value Date/Time    B-type Natriuretic Peptide 53.9 11/07/2017 01:19 PM    Sodium 146 11/07/2017 01:19 PM    Potassium 4.0 11/07/2017 01:19 PM    Cholesterol, total 141 08/26/2016 11:48 AM    HDL Cholesterol 43 08/26/2016 11:48 AM    LDL, calculated 25 08/26/2016 11:48 AM    Triglyceride 364 08/26/2016 11:48 AM    INR 1.1 08/14/2017 10:04 PM    Prothrombin time 10.8 08/14/2017 10:04 PM         2. Insulin dependent diabetes mellitus (HCC)  Assessment & Plan:  Stable, based on history, physical exam and review of pertinent labs, studies and medications; meds reconciled; Optimal control cannot be done with an intermediate acting insulin only. Additional things need to occur but is extremely difficult under the current circumstances.   Key Antihyperglycemic Medications             insulin NPH (NOVOLIN N, HUMULIN N) 100 unit/mL injection  (Taking) INJECT 75 UNITS  EVERY MORNING    glimepiride (AMARYL) 4 mg tablet  (Taking)   TAKE 1 TABLET BY MOUTH TWICE DAILY BEFORE A MEAL    metFORMIN ER (GLUCOPHAGE XR) 500 mg tablet  (Taking) TAKE 1 TABLET BY MOUTH THREE TIMES DAILY WITH MEALS    insulin glargine (LANTUS SOLOSTAR) 100 unit/mL (3 mL) pen  (Taking) INJECT 92 UNITS EVERY NIGHT        Other Key Diabetic Medications lisinopril (PRINIVIL, ZESTRIL) 20 mg tablet  (Taking) TAKE 1 TABLET BY MOUTH EVERY MORNING        Lab Results   Component Value Date/Time    Hemoglobin A1c 7.7 08/22/2017 04:47 PM    Glucose 60 11/07/2017 01:19 PM    Creatinine 0.73 11/07/2017 01:19 PM    Cholesterol, total 141 08/26/2016 11:48 AM    HDL Cholesterol 43 08/26/2016 11:48 AM    LDL, calculated 25 08/26/2016 11:48 AM    Triglyceride 364 08/26/2016 11:48 AM     Diabetic Foot and Eye Exam HM Status   Topic Date Due    Diabetic Foot Care  04/29/2017    Eye Exam  02/07/2018 (Originally 3/16/2016)       Orders:  -     HEMOGLOBIN A1C WITH EAG    3. Dyslipidemia  -     APOLIPOPROTEIN B    4. Essential hypertension  -     MO COLLECTION VENOUS BLOOD,VENIPUNCTURE    5. Primary osteoarthritis of right knee  -     traMADol (ULTRAM) 50 mg tablet; Take 1 Tab by mouth two (2) times daily as needed for Pain. Max Daily Amount: 100 mg.    6. Obesity (BMI 30.0-34.9)    7. Anemia, unspecified type  -     CBC WITH AUTOMATED DIFF  -     FERRITIN  -     GLUCOSE-6-PHOSPHATE DEHYDROGENASE  -     HEMOGLOBIN FRACTIONATION  -     IRON PROFILE  -     VITAMIN B12        PLAN:    1. Her cardiomyopathy appears to be well compensated in that she is not in overt heart failure. She will continue her cardiac medications as prescribed. 2. Her diabetes hopefully is doing well. Her last hemoglobin A1c was reasonable. The solitary use of an intermediate acting insulin does not achieve adequate control. 3. The patient probably needs to be on medication for dyslipidemia. She represents an individual who is at increased cardiovascular risk. She however has a nonischemic cardiomyopathy. 4. The patient has high blood pressure which is stable. 5. She has severe osteoarthritis of her right knee and will be given Tramadol. 6. I encouraged her to lose weight by eating meals, reducing snacking and reducing her consumption of processed carbohydrates.     7. Her anemia will be worked up also.      .  Orders Placed This Encounter    CBC WITH AUTOMATED DIFF    APOLIPOPROTEIN B    HEMOGLOBIN A1C WITH EAG    FERRITIN    GLUCOSE-6-PHOSPHATE DEHYDROGENASE    HEMOGLOBIN FRACTIONATION    IRON PROFILE    VITAMIN B12    traMADol (ULTRAM) 50 mg tablet         Follow-up Disposition:  Return in about 3 months (around 4/9/2018).       Sandra Grayson MD

## 2018-01-09 NOTE — ASSESSMENT & PLAN NOTE
Stable, based on history, physical exam and review of pertinent labs, studies and medications; meds reconciled; Optimal control cannot be done with an intermediate acting insulin only. Additional things need to occur but is extremely difficult under the current circumstances.   Key Antihyperglycemic Medications             insulin NPH (NOVOLIN N, HUMULIN N) 100 unit/mL injection  (Taking) INJECT 75 UNITS  EVERY MORNING    glimepiride (AMARYL) 4 mg tablet  (Taking)   TAKE 1 TABLET BY MOUTH TWICE DAILY BEFORE A MEAL    metFORMIN ER (GLUCOPHAGE XR) 500 mg tablet  (Taking) TAKE 1 TABLET BY MOUTH THREE TIMES DAILY WITH MEALS    insulin glargine (LANTUS SOLOSTAR) 100 unit/mL (3 mL) pen  (Taking) INJECT 92 UNITS EVERY NIGHT        Other Key Diabetic Medications             lisinopril (PRINIVIL, ZESTRIL) 20 mg tablet  (Taking) TAKE 1 TABLET BY MOUTH EVERY MORNING        Lab Results   Component Value Date/Time    Hemoglobin A1c 7.7 08/22/2017 04:47 PM    Glucose 60 11/07/2017 01:19 PM    Creatinine 0.73 11/07/2017 01:19 PM    Cholesterol, total 141 08/26/2016 11:48 AM    HDL Cholesterol 43 08/26/2016 11:48 AM    LDL, calculated 25 08/26/2016 11:48 AM    Triglyceride 364 08/26/2016 11:48 AM     Diabetic Foot and Eye Exam HM Status   Topic Date Due    Diabetic Foot Care  04/29/2017    Eye Exam  02/07/2018 (Originally 3/16/2016)

## 2018-01-09 NOTE — PROGRESS NOTES
Chief Complaint   Patient presents with    Diabetes     rm 8 here to follow up     Knee Pain     would like a referral to another knee doctor      1. Have you been to the ER, urgent care clinic since your last visit? Hospitalized since your last visit? No    2. Have you seen or consulted any other health care providers outside of the [address] [place] [address] since your last visit? Include any pap smears or colon screening.  No

## 2018-01-09 NOTE — ASSESSMENT & PLAN NOTE
Stable, based on history, physical exam and review of pertinent labs, studies and medications; meds reconciled; continue current treatment plan. Key CAD CHF Meds             lisinopril (PRINIVIL, ZESTRIL) 20 mg tablet  (Taking) TAKE 1 TABLET BY MOUTH EVERY MORNING    furosemide (LASIX) 40 mg tablet  (Taking) TAKE 3 TABLETS BY MOUTH EVERY MORNING    metoprolol succinate (TOPROL-XL) 100 mg tablet  (Taking) TAKE 1 TABLET BY MOUTH DAILY        Kim Antihyperlipidemia Meds     The patient is on no antihyperlipidemia meds.         Lab Results   Component Value Date/Time    B-type Natriuretic Peptide 53.9 11/07/2017 01:19 PM    Sodium 146 11/07/2017 01:19 PM    Potassium 4.0 11/07/2017 01:19 PM    Cholesterol, total 141 08/26/2016 11:48 AM    HDL Cholesterol 43 08/26/2016 11:48 AM    LDL, calculated 25 08/26/2016 11:48 AM    Triglyceride 364 08/26/2016 11:48 AM    INR 1.1 08/14/2017 10:04 PM    Prothrombin time 10.8 08/14/2017 10:04 PM

## 2018-01-15 LAB
APO B SERPL-MCNC: 108 MG/DL (ref 54–133)
BASOPHILS # BLD AUTO: 0 X10E3/UL (ref 0–0.2)
BASOPHILS NFR BLD AUTO: 0 %
EOSINOPHIL # BLD AUTO: 0.2 X10E3/UL (ref 0–0.4)
EOSINOPHIL NFR BLD AUTO: 4 %
ERYTHROCYTE [DISTWIDTH] IN BLOOD BY AUTOMATED COUNT: 15.4 % (ref 12.3–15.4)
EST. AVERAGE GLUCOSE BLD GHB EST-MCNC: 163 MG/DL
FERRITIN SERPL-MCNC: 226 NG/ML (ref 15–150)
G6PD BLD QN: 175 U/10E12 RBC (ref 146–376)
HBA1C MFR BLD: 7.3 % (ref 4.8–5.6)
HCT VFR BLD AUTO: 36.3 % (ref 34–46.6)
HGB A MFR BLD: 98.2 % (ref 96.4–98.8)
HGB A2 MFR BLD COLUMN CHROM: 1.8 % (ref 1.8–3.2)
HGB BLD-MCNC: 11 G/DL (ref 11.1–15.9)
HGB C MFR BLD: 0 %
HGB F MFR BLD: 0 % (ref 0–2)
HGB FRACT BLD-IMP: NORMAL
HGB OTHER MFR BLD HPLC: NORMAL %
HGB S BLD QL SOLY: NEGATIVE
HGB S MFR BLD: 0 %
IMM GRANULOCYTES # BLD: 0 X10E3/UL (ref 0–0.1)
IMM GRANULOCYTES NFR BLD: 0 %
IRON SATN MFR SERPL: 27 % (ref 15–55)
IRON SERPL-MCNC: 78 UG/DL (ref 27–139)
LYMPHOCYTES # BLD AUTO: 1.1 X10E3/UL (ref 0.7–3.1)
LYMPHOCYTES NFR BLD AUTO: 23 %
MCH RBC QN AUTO: 25.8 PG (ref 26.6–33)
MCHC RBC AUTO-ENTMCNC: 30.3 G/DL (ref 31.5–35.7)
MCV RBC AUTO: 85 FL (ref 79–97)
MONOCYTES # BLD AUTO: 0.3 X10E3/UL (ref 0.1–0.9)
MONOCYTES NFR BLD AUTO: 6 %
NEUTROPHILS # BLD AUTO: 3 X10E3/UL (ref 1.4–7)
NEUTROPHILS NFR BLD AUTO: 67 %
PLATELET # BLD AUTO: 170 X10E3/UL (ref 150–379)
RBC # BLD AUTO: 4.27 X10E6/UL (ref 3.77–5.28)
TIBC SERPL-MCNC: 291 UG/DL (ref 250–450)
UIBC SERPL-MCNC: 213 UG/DL (ref 118–369)
VIT B12 SERPL-MCNC: 582 PG/ML (ref 232–1245)
WBC # BLD AUTO: 4.5 X10E3/UL (ref 3.4–10.8)

## 2018-04-10 ENCOUNTER — OFFICE VISIT (OUTPATIENT)
Dept: INTERNAL MEDICINE CLINIC | Age: 68
End: 2018-04-10

## 2018-04-10 VITALS
DIASTOLIC BLOOD PRESSURE: 65 MMHG | RESPIRATION RATE: 16 BRPM | BODY MASS INDEX: 30.97 KG/M2 | TEMPERATURE: 97.4 F | SYSTOLIC BLOOD PRESSURE: 135 MMHG | OXYGEN SATURATION: 93 % | HEIGHT: 66 IN | WEIGHT: 192.7 LBS | HEART RATE: 72 BPM

## 2018-04-10 DIAGNOSIS — I42.9 CARDIOMYOPATHY, UNSPECIFIED TYPE (HCC): Primary | ICD-10-CM

## 2018-04-10 DIAGNOSIS — I10 ESSENTIAL HYPERTENSION: ICD-10-CM

## 2018-04-10 DIAGNOSIS — E66.9 OBESITY (BMI 30.0-34.9): ICD-10-CM

## 2018-04-10 DIAGNOSIS — E78.5 DYSLIPIDEMIA: ICD-10-CM

## 2018-04-10 DIAGNOSIS — M17.11 PRIMARY OSTEOARTHRITIS OF RIGHT KNEE: ICD-10-CM

## 2018-04-10 NOTE — MR AVS SNAPSHOT
[address] [place] [address] 
 
 
 [id number] 
[phone] Patient: Maikol Pugh MRN: [id number] UQK:[birth year] Visit Information Date & Time Provider Department Dept. Phone Encounter #  
 4/10/2018 11:30 AM MD [facility] [phone] [id number] Your Appointments 7/10/2018  9:30 AM  
Any with Chika Escalona MD  
[address] [place] [address] and Primary Care [facility] Appt Note: 3 month follow up  
 [address] [facility]  
  
   
 [id number] Upcoming Health Maintenance Date Due  
 EYE EXAM RETINAL OR DILATED Q1 3/16/2016 GLAUCOMA SCREENING Q2Y 3/4/2017 MICROALBUMIN Q1 4/29/2017 FOBT Q 1 YEAR AGE 50-75 4/29/2017 LIPID PANEL Q1 8/26/2017 MEDICARE YEARLY EXAM 5/3/2018 HEMOGLOBIN A1C Q6M 7/9/2018 Pneumococcal 65+ Low/Medium Risk (2 of 2 - PPSV23) 10/3/2018 BREAST CANCER SCRN MAMMOGRAM 3/3/2019 FOOT EXAM Q1 4/10/2019 DTaP/Tdap/Td series (2 - Td) 1/20/2027 Allergies as of 4/10/2018  Review Complete On: 4/10/2018 By: Dinesh Cross No Known Allergies Current Immunizations  Never Reviewed No immunizations on file. Not reviewed this visit You Were Diagnosed With   
  
 Codes Comments Cardiomy[facility] unspecified [facility] ([facility]    -  Primary ICD-10-CM: I42.9 ICD-9-CM: 425.4 Insulin dependent diabetes mellitus (HCC)     ICD-10-CM: E11.9, Z79.4 ICD-9-CM: 250.00, V58.67 Dyslipidemia     ICD-10-CM: E78.5 ICD-9-CM: 272.4 Essential hypertension     ICD-10-CM: I10 
ICD-9-CM: 401.9 Obesity (BMI 30.0-34.9)     ICD-10-CM: O75.3 ICD-9-CM: 278.00 Primary osteoarthritis of right knee     ICD-10-CM: M17.11 ICD-9-CM: 715.16 Vitals BP Pulse Temp Resp Height(growth percentile) Weight(growth percentile) 135/65 (BP 1 Location: Right arm, BP Patient Position: Sitting) 72 97.4 °F (36.3 °C) (Oral) 16 5' 6\" (1.676 m) 192 lb 11.2 oz (87.4 kg) SpO2 BMI OB Status Smoking Status 93% 31.1 kg/m2 Postmenopausal Former Smoker Vitals History BMI and BSA Data Body Mass Index Body Surface Area  
 31.1 kg/m 2 2.02 m 2 Preferred Pharmacy Pharmacy Name Phone [facility] [phone] Your Updated Medication List  
  
   
This list is accurate as of 4/10/18  1:34 PM.  Always use your most recent med list.  
  
  
  
  
 alcohol swabs Padm Commonly known as:  BD Single Use Swabs Regular USE TO TEST BLOOD SUGAR.dx.e11.9  
  
 allopurinol 300 mg tablet Commonly known as:  Carlos Sprawls Take 1 Tab by mouth daily. Blood-Glucose Meter monitoring kit Testing BID-DX:250.00  
  
 butalbital-acetaminophen  mg tablet Commonly known as:  Creig Grand Take 1 Tab by mouth every six (6) hours as needed. furosemide 40 mg tablet Commonly known as:  LASIX TAKE 3 TABLETS BY MOUTH EVERY MORNING  
  
 glimepiride 4 mg tablet Commonly known as:  AMARYL  
TAKE 1 TABLET BY MOUTH TWICE DAILY BEFORE A MEAL  
  
 * glucose blood VI test strips strip Commonly known as:  ASCENSIA AUTODISC VI, ONE TOUCH ULTRA TEST VI  
TESTING BID  
  
 * glucose blood VI test strips strip Commonly known as:  FREESTYLE LITE STRIPS Use to test blood sugar three times daily. Dx.e11.9  
  
 insulin glargine 100 unit/mL (3 mL) Inpn Commonly known as:  Ofilia Bosworth INJECT 92 UNITS EVERY NIGHT  
  
 insulin  unit/mL injection Commonly known as:  NOVOLIN N, HUMULIN N  
INJECT 75 UNITS  EVERY MORNING * Lancets Misc Commonly known as:  ONETOUCH ULTRASOFT LANCETS TESTING BID * One Touch Delica 33 gauge Misc Generic drug:  lancets  
  
 lisinopril 20 mg tablet Commonly known as:  Judah Gonzalesing  
 TAKE 1 TABLET BY MOUTH EVERY MORNING  
  
 metFORMIN  mg tablet Commonly known as:  GLUCOPHAGE XR  
TAKE 1 TABLET BY MOUTH THREE TIMES DAILY WITH MEALS  
  
 metoprolol succinate 100 mg tablet Commonly known as:  TOPROL-XL  
TAKE 1 TABLET BY MOUTH DAILY Dariana Pen Needle 32 gauge x 5/32\" Ndle Generic drug:  Insulin Needles (Disposable) BD INSULIN PEN NEEDLE UF SHORT 31 gauge x 5/16\" Ndle Generic drug:  Insulin Needles (Disposable) USE WITH INSULIN PENS TWICE DAILY AS DIRECTED  
  
 potassium chloride SR 10 mEq tablet Commonly known as:  KLOR-CON 10  
2 QAM  
  
 predniSONE 20 mg tablet Commonly known as:  Jefm Canter Take 1 Tab by mouth two (2) times a day. traMADol 50 mg tablet Commonly known as:  ULTRAM  
Take 1 Tab by mouth two (2) times daily as needed for Pain. Max Daily Amount: 100 mg.  
  
 * Notice: This list has 4 medication(s) that are the same as other medications prescribed for you. Read the directions carefully, and ask your doctor or other care provider to review them with you. We Performed the Following HEMOGLOBIN A1C WITH EAG [76878 CPT(R)] LIPID PANEL [76227 CPT(R)] METABOLIC PANEL, BASIC [37193 CPT(R)] MICROALBUMIN, UR, RAND W/ MICROALB/CREAT RATIO [id number] CPT(R)] Introducing [facility]! [employer] introduces [employer] patient portal. Now you can access parts of your medical record, email your doctor's office, and request medication refills online. 1. In your internet browser, go to [email or web address] 2. Click on the First Time User? Click Here link in the Sign In box. You will see the New Member Sign Up page. 3. Enter your [employer] Access Code exactly as it appears below. You will not need to use this code after youve completed the sign-up process. If you do not sign up before the expiration date, you must request a new code. · [employer] Access Code: [email or web address] Expires: 7/9/2018  1:34 PM 
 4. Enter the last four digits of your Social Security Number (xxxx) and Date of Birth (mm/dd/yyyy) as indicated and click Submit. You will be taken to the next sign-up page. 5. Create a [employer] ID. This will be your [employer] login ID and cannot be changed, so think of one that is secure and easy to remember. 6. Create a [employer] password. You can change your password at any time. 7. Enter your Password Reset Question and Answer. This can be used at a later time if you forget your password. 8. Enter your e-mail address. You will receive e-mail notification when new information is available in [address] 9. Click Sign Up. You can now view and download portions of your medical record. 10. Click the Download Summary menu link to download a portable copy of your medical information. If you have questions, please visit the Frequently Asked Questions section of the [employer] website. Remember, [employer] is NOT to be used for urgent needs. For medical emergencies, dial 911. Now available from your iPhone and Android! Please provide this summary of care documentation to your next provider. Your primary care clinician is listed as Pearl Wilson. If you have any questions after today's visit, please call [phone].

## 2018-04-10 NOTE — PROGRESS NOTES
Chief Complaint   Patient presents with    Hypertension     3 month follow up        1. Have you been to the ER, urgent care clinic since your last visit? Hospitalized since your last visit? No    2. Have you seen or consulted any other health care providers outside of the [facility] since your last visit? Include any pap smears or colon screening.  No

## 2018-04-11 LAB
BUN SERPL-MCNC: 22 MG/DL (ref 8–27)
BUN/CREAT SERPL: 30 (ref 12–28)
CALCIUM SERPL-MCNC: 10.1 MG/DL (ref 8.7–10.3)
CHLORIDE SERPL-SCNC: 99 MMOL/L (ref 96–106)
CHOLEST SERPL-MCNC: 175 MG/DL (ref 100–199)
CO2 SERPL-SCNC: 26 MMOL/L (ref 18–29)
CREAT SERPL-MCNC: 0.73 MG/DL (ref 0.57–1)
EST. AVERAGE GLUCOSE BLD GHB EST-MCNC: 137 MG/DL
GFR SERPLBLD CREATININE-BSD FMLA CKD-EPI: 86 ML/MIN/1.73
GFR SERPLBLD CREATININE-BSD FMLA CKD-EPI: 99 ML/MIN/1.73
GLUCOSE SERPL-MCNC: 53 MG/DL (ref 65–99)
HBA1C MFR BLD: 6.4 % (ref 4.8–5.6)
HDLC SERPL-MCNC: 56 MG/DL
LDLC SERPL CALC-MCNC: 75 MG/DL (ref 0–99)
POTASSIUM SERPL-SCNC: 4 MMOL/L (ref 3.5–5.2)
SODIUM SERPL-SCNC: 142 MMOL/L (ref 134–144)
TRIGL SERPL-MCNC: 221 MG/DL (ref 0–149)
VLDLC SERPL CALC-MCNC: 44 MG/DL (ref 5–40)

## 2018-04-15 RX ORDER — ALLOPURINOL 300 MG/1
300 TABLET ORAL DAILY
Qty: 90 TAB | Refills: 3 | Status: SHIPPED | OUTPATIENT
Start: 2018-04-15 | End: 2018-09-17 | Stop reason: SDUPTHER

## 2018-04-15 RX ORDER — LISINOPRIL 20 MG/1
TABLET ORAL
Qty: 90 TAB | Refills: 3 | Status: SHIPPED | OUTPATIENT
Start: 2018-04-15 | End: 2018-06-21 | Stop reason: SDUPTHER

## 2018-04-15 RX ORDER — GLIMEPIRIDE 4 MG/1
TABLET ORAL
Qty: 180 TAB | Refills: 3 | Status: SHIPPED | OUTPATIENT
Start: 2018-04-15 | End: 2019-01-07

## 2018-04-15 RX ORDER — METOPROLOL SUCCINATE 100 MG/1
TABLET, EXTENDED RELEASE ORAL
Qty: 90 TAB | Refills: 3 | Status: SHIPPED | OUTPATIENT
Start: 2018-04-15 | End: 2019-09-11 | Stop reason: SDUPTHER

## 2018-04-15 NOTE — PROGRESS NOTES
[facility]  [facility]  [address] [facility] [address]  Phone:  [phone]  Fax: [phone]       Chief Complaint   Patient presents with    Hypertension     3 month follow up    . SUBJECTIVE:    Savannah Scott is a 79 y.o. female comes in for return visit complaining of increasing pain in her right knee. She has severe osteoarthritis of her knees which is getting worse. She denies any orthopnea, PND or dyspnea on exertion. She does have a cardiomyopathy, nonischemic. She wants to follow with her cardiologist.  The last one that she had apparently follow-up was not adequate. She states her diabetes is indeed doing well. She has had no interventional hypoglycemia. Her alcohol consumption has decreased significantly. She has a past history of primary hypertension and gout. Additionally, she has had a modest weight problem but has actually been losing weight of late. Current Outpatient Prescriptions   Medication Sig Dispense Refill    lisinopril (PRINIVIL, ZESTRIL) 20 mg tablet TAKE 1 TABLET BY MOUTH EVERY MORNING 90 Tab 3    allopurinol (ZYLOPRIM) 300 mg tablet Take 1 Tab by mouth daily. 90 Tab 3    glimepiride (AMARYL) 4 mg tablet   TAKE 1 TABLET BY MOUTH TWICE DAILY BEFORE A MEAL 180 Tab 3    metoprolol succinate (TOPROL-XL) 100 mg tablet TAKE 1 TABLET BY MOUTH DAILY 90 Tab 3    traMADol (ULTRAM) 50 mg tablet Take 1 Tab by mouth two (2) times daily as needed for Pain. Max Daily Amount: 100 mg. 40 Tab 2    alcohol swabs (BD SINGLE USE SWABS REGULAR) padm USE TO TEST BLOOD SUGAR.dx.e11.9 100 Pad 11    potassium chloride SR (KLOR-CON 10) 10 mEq tablet 2 QAM 60 Tab 11    insulin NPH (NOVOLIN N, HUMULIN N) 100 unit/mL injection INJECT 75 UNITS  EVERY MORNING 20 mL 11    glucose blood VI test strips (FREESTYLE LITE STRIPS) strip Use to test blood sugar three times daily.  Dx.e11.9 100 Strip 11    furosemide (LASIX) 40 mg tablet TAKE 3 TABLETS BY MOUTH EVERY MORNING 90 Tab 11    predniSONE (DELTASONE) 20 mg tablet Take 1 Tab by mouth two (2) times a day. 30 Tab 0    metFORMIN ER (GLUCOPHAGE XR) 500 mg tablet TAKE 1 TABLET BY MOUTH THREE TIMES DAILY WITH MEALS 90 Tab 11    butalbital-acetaminophen (PHRENILIN)  mg tablet Take 1 Tab by mouth every six (6) hours as needed. 40 Tab 0    insulin glargine (LANTUS SOLOSTAR) 100 unit/mL (3 mL) pen INJECT 92 UNITS EVERY NIGHT 30 mL 11    BD INSULIN PEN NEEDLE UF SHORT 31 gauge x 5/16\" ndle USE WITH INSULIN PENS TWICE DAILY AS DIRECTED 100 Pen Needle 11    ONE TOUCH DELICA 33 gauge misc   11    NURIA PEN NEEDLE 32 x 5/32 \" ndle       Blood-Glucose Meter monitoring kit Testing BID-DX:250.00 1 kit 0    Lancets (ONE TOUCH ULTRASOFT LANCETS) misc TESTING BID 1 Package 11    glucose blood VI test strips (ASCENSIA AUTODISC VI, ONE TOUCH ULTRA TEST VI) strip TESTING BID 1 Package 11    levoFLOXacin (LEVAQUIN) 750 mg tablet Take 750 mg by mouth daily.  Lactobacillus acidophilus (ACIDOPHILUS) cap Take 2 Caps by mouth daily. Past Medical History:   Diagnosis Date    Arthritis     Congestive heart failure, unspecified     Diabetes ([facility]     Hypercholesterolemia     Hypertension      History reviewed. No pertinent surgical history.   No Known Allergies      REVIEW OF SYSTEMS:  General: negative for - chills or fever  ENT: negative for - headaches, nasal congestion or tinnitus  Respiratory: negative for - cough, hemoptysis, shortness of breath or wheezing  Cardiovascular : negative for - chest pain, edema, palpitations or shortness of breath  Gastrointestinal: negative for - abdominal pain, blood in stools, heartburn or nausea/vomiting  Genito-Urinary: no dysuria, trouble voiding, or hematuria  Musculoskeletal: negative for - gait disturbance, joint pain, joint stiffness or joint swelling  Neurological: no TIA or stroke symptoms  Hematologic: no bruises, no bleeding, no swollen glands  Integument: no lumps, mole changes, nail changes or rash  Endocrine: no malaise/lethargy or unexpected weight changes      Social History     Social History    Marital status:      Spouse name: N/A    Number of children: 3    Years of education: 12     Occupational History    retired      Social History Main Topics    Smoking status: Former Smoker     Packs/day: 0.25    Smokeless tobacco: Never Used    Alcohol use 0.0 oz/week     0 Standard drinks or equivalent per week      Comment: occ    Drug use: No    Sexual activity: Yes     Partners: Male     Other Topics Concern    None     Social History Narrative     Family History   Problem Relation Age of Onset    Diabetes Mother        OBJECTIVE:    Visit Vitals    /65 (BP 1 Location: Right arm, BP Patient Position: Sitting)    Pulse 72    Temp 97.4 °F (36.3 °C) (Oral)    Resp 16    Ht 5' 6\" (1.676 m)    Wt 192 lb 11.2 oz (87.4 kg)    SpO2 93%    BMI 31.1 kg/m2     CONSTITUTIONAL: well , well nourished, appears age appropriate  EYES: perrla, eom intact  ENMT:moist mucous membranes, pharynx clear  NECK: supple. Thyroid normal  RESPIRATORY: Chest: clear to ascultation and percussion   CARDIOVASCULAR: Heart: regular rate and rhythm  GASTROINTESTINAL: Abdomen: soft, bowel sounds active  HEMATOLOGIC: no pathological lymph nodes palpated  MUSCULOSKELETAL: Extremities: no edema, pulse 1+   INTEGUMENT: No unusual rashes or suspicious skin lesions noted. Nails appear normal.  NEUROLOGIC: non-focal exam   MENTAL STATUS: alert and oriented, appropriate affect      ASSESSMENT:  1. Cardiomyopathy, unspecified type ([address]    2. Insulin dependent diabetes mellitus ([address]    3. Dyslipidemia    4. Essential hypertension    5. Obesity (BMI 30.0-34.9)    6. Primary osteoarthritis of right knee        PLAN:    1. The patient is well-compensated from a cardiac perspective.   An appointment will be made for her to see a cardiologist.    2. She has severe osteoarthritis of her right knee which is getting worse. Weight loss is the most important thing. At her request, she will be seen by an orthopaedic physician. 3. Her diabetes hopefully is doing well but I will await the results of the hemoglobin A1c. She has not had any interventional hypoglycemia. 4. She is not on a statin but ideally needs one based on her existing comorbidities as well as her long-standing diabetes. 5. Blood pressure is excellent today. 6. I encouraged her to lose weight which can by accomplished by adhering to her diabetic regimen. This mean eating meals, avoiding snacks other than her hs snack for diabetes and eliminating the consumption of processed carbohydrates. .  Orders Placed This Encounter    LIPID PANEL    MICROALBUMIN, UR, RAND    HEMOGLOBIN A1C WITH EAG    METABOLIC PANEL, BASIC    REFERRAL TO OPHTHALMOLOGY    REFERRAL TO ORTHOPEDICS    REFERRAL TO CARDIOLOGY    REFERRAL TO GYNECOLOGY    lisinopril (PRINIVIL, ZESTRIL) 20 mg tablet    allopurinol (ZYLOPRIM) 300 mg tablet    glimepiride (AMARYL) 4 mg tablet    metoprolol succinate (TOPROL-XL) 100 mg tablet         Follow-up Disposition:  Return in about 3 months (around 7/10/2018).       Maurizio Jimenez MD

## 2018-05-09 ENCOUNTER — OFFICE VISIT (OUTPATIENT)
Dept: CARDIOLOGY CLINIC | Age: 68
End: 2018-05-09

## 2018-05-09 VITALS — DIASTOLIC BLOOD PRESSURE: 71 MMHG | SYSTOLIC BLOOD PRESSURE: 124 MMHG | HEART RATE: 59 BPM

## 2018-05-09 DIAGNOSIS — E78.5 DYSLIPIDEMIA: ICD-10-CM

## 2018-05-09 DIAGNOSIS — R06.09 DOE (DYSPNEA ON EXERTION): ICD-10-CM

## 2018-05-09 DIAGNOSIS — J45.20 MILD INTERMITTENT REACTIVE AIRWAY DISEASE WITHOUT COMPLICATION: ICD-10-CM

## 2018-05-09 DIAGNOSIS — F10.10 ALCOHOL ABUSE: ICD-10-CM

## 2018-05-09 DIAGNOSIS — I42.9 CARDIOMYOPATHY, UNSPECIFIED TYPE (HCC): ICD-10-CM

## 2018-05-09 DIAGNOSIS — E66.9 OBESITY (BMI 30.0-34.9): ICD-10-CM

## 2018-05-09 DIAGNOSIS — I10 HYPERTENSION, UNSPECIFIED TYPE: ICD-10-CM

## 2018-05-09 DIAGNOSIS — R68.82 DECREASED LIBIDO: ICD-10-CM

## 2018-05-09 DIAGNOSIS — R07.9 CHEST PAIN, EXERTIONAL: Primary | ICD-10-CM

## 2018-05-09 DIAGNOSIS — Z87.891 FORMER TOBACCO USE: ICD-10-CM

## 2018-05-09 NOTE — PATIENT INSTRUCTIONS
-- We will get an echo and nuclear stress test  -- Please follow up in 2 weeks, after testing    Cardiac Perfusion Scan (Medicine): About This Test  What is it? A cardiac perfusion scan measures the amount of blood in your heart muscle at rest and after your heart has been made to work hard. During the scan, a camera takes pictures of your heart after a radioactive tracer is injected into a vein in your arm. The tracer travels through the blood and into your heart. As the tracer moves through your heart, areas that have good blood flow absorb the tracer. Areas that do not absorb the tracer may not be getting enough blood or may have been damaged by a heart attack. The pictures show this difference. Two sets of pictures may be made during the test. One set is taken while you are resting. Another set is taken after your heart has been made to work harder (called a stress test). The heart can be stressed by using medicine or exercise. This information is about using medicine to stress the heart. This test is also known by other names, including myocardial perfusion scan, myocardial perfusion imaging, thallium scan, sestamibi cardiac scan, and nuclear stress test.  Why is this test done? The test is often done to find out what may be causing chest pain or pressure. It may be done after a heart attack to see if areas of the heart are not getting enough blood or to find out how much your heart has been damaged from the heart attack. How can you prepare for the test?  Tell your doctor if:  · You take medicine for an erection problem, such as sildenafil (Viagra), tadalafil (Cialis), and vardenafil (Levitra). You may need to take nitroglycerin during this test, which can cause a serious reaction if you have taken a medicine for an erection problem within the past 48 hours. · You have had bleeding problems, or if you take aspirin or some other blood thinner. · You are or might be pregnant. · You are breastfeeding. Don't use your breast milk for 1 to 2 days after the scan. Use formula instead. What happens during the test?  Resting or baseline scan  · You will take your top off and be given a gown to wear. · Electrodes will be attached to your chest to keep track of your heartbeats. · Your arm will be cleaned. You will have a tube, called an IV, going into your arm. A small amount of the radioactive tracer will be put in the IV. · You will lie on your back or your stomach on a table with a large camera positioned above your chest. The camera records the tracer's signals as it moves through your blood. The camera does not produce any radiation, so you are not exposed to any additional radiation while the scan is being done. · You will be asked to remain very still during each scan, which takes about 5 to 10 minutes. The camera will move to take more pictures at different angles. Several scans will be taken. This test takes about 30 to 40 minutes. Stress scan using medicine  The stress scan is done in two parts. In many hospitals, you first have the resting scan. You are then given a medicine that makes your heart work harder and you have another scan. Sometimes the stress scan is done first.  · You will have a test called an electrocardiogram (EKG or ECG), which takes about 5 to 10 minutes. You may have other EKGs during and after the stress test.  · Medicine will be put in your IV. It will make your heart work harder. You may get a headache and feel dizzy, flushed, and nauseated from the medicine, but these symptoms usually do not last long. · Your heartbeat and blood pressure may be checked. · Your symptoms such as chest pain and shortness of breath will be checked. · A few minutes after you get the medicine, another small amount of radioactive tracer is injected. You may be given something to reverse the medicine used to make your heart work harder.   · You will wait for 30 to 40 minutes and then have another resting scan. See the \"Resting or baseline scan\" section. This test takes about 30 to 40 minutes. What else should you know about the test?  · Sometimes more pictures are taken 2 to 4 hours after the stress scan. · No electricity passes through your body during the test. There is no danger of getting an electrical shock. What happens after the test?  · You can go back to your usual activities right away. · You will probably be able to go home right away. · Drink plenty of fluids for the next 24 hours to help flush the tracer out of your body. If you have kidney,heart, or liver disease and have to limit fluids, talk with your doctor before you increase the amountof fluids you drink. When should you call for help? Call 911 anytime you think you may need emergency care. For example, call if:  · You passed out (lost consciousness). · You have been diagnosed with angina, and you have angina symptoms that do not go away with rest or are not getting better within 5 minutes after you take a dose of nitroglycerin. · You have symptoms of a heart attack. These may include:  ¨ Chest pain or pressure, or a strange feeling in the chest.  ¨ Sweating. ¨ Shortness of breath. ¨ Nausea or vomiting. ¨ Pain, pressure, or a strange feeling in the back, neck, jaw, or upper belly or in one or both shoulders or arms. ¨ Lightheadedness or sudden weakness. ¨ A fast or irregular heartbeat. After you call 911, the  may tell you to chew 1 adult-strength or 2 to 4 low-dose aspirin. Wait for an ambulance. Do not try to drive yourself. Watch closely for changes in your health, and be sure to contact your doctor if you have any problems. Follow-up care is a key part of your treatment and safety. Be sure to make and go to all appointments, and call your doctor if you are having problems. It's also a good idea to keep a list of the medicines you take. Ask your doctor when you can expect to have your test results.   Where can you learn more? Go to http://mitch-north.info/. Enter R320 in the search box to learn more about \"Cardiac Perfusion Scan (Medicine): About This Test.\"  Current as of: September 21, 2016  Content Version: 11.4  © 6338-0437 Healthwise, [employer]. Care instructions adapted under license by [employer] (which disclaims liability or warranty for this information). If you have questions about a medical condition or this instruction, always ask your healthcare professional. [address] any warranty or liability for your use of this information.

## 2018-05-09 NOTE — PROGRESS NOTES
Chief Complaint   Patient presents with    Shortness of Breath     pt c/o chest discomfort and right knee swelling. 1. Have you been to the ER, urgent care clinic since your last visit? Hospitalized since your last visit? Yes [facility]: [facility]  [facility] [place] 8/2017    2. Have you seen or consulted any other health care providers outside of the [address] [place] [address] since your last visit? Include any pap smears or colon screening.  No

## 2018-05-09 NOTE — MR AVS SNAPSHOT
[facility] 
 
 
 [address] [place] [address] 
[phone] Patient: Katja Faustin MRN: [id number] HKW:[birth year] Visit Information Date & Time Provider Department Dept. Phone Encounter #  
 5/9/2018 11:30 AM MD [facility] [place] [id number] Your Appointments 5/29/2018  9:15 AM  
ESTABLISHED PATIENT with MD [facility]) Appt Note: 2 WEEKS F/U FOLLOWING ECHO STRESS TEST  
 [address] 
[address] [place] [address]  
[phone] [address]  
  
    
 7/10/2018  9:30 AM  
Any with Sal Christy MD  
[address] [place] [address] and [facility]) Appt Note: 3 month follow up  
 [address] [facility]  
  
   
 [address] [id number] Upcoming Health Maintenance Date Due  
 EYE EXAM RETINAL OR DILATED Q1 3/16/2016 GLAUCOMA SCREENING Q2Y 3/4/2017 MICROALBUMIN Q1 4/29/2017 FOBT Q 1 YEAR AGE 50-75 4/29/2017 MEDICARE YEARLY EXAM 5/3/2018 Influenza Age 5 to Adult 8/1/2018 Pneumococcal 65+ Low/Medium Risk (2 of 2 - PPSV23) 10/3/2018 HEMOGLOBIN A1C Q6M 10/10/2018 BREAST CANCER SCRN MAMMOGRAM 3/3/2019 FOOT EXAM Q1 4/10/2019 LIPID PANEL Q1 4/10/2019 DTaP/Tdap/Td series (2 - Td) 1/20/2027 Allergies as of 5/9/2018  Review Complete On: 5/9/2018 By: Elsy Tyson LPN No Known Allergies Current Immunizations  Never Reviewed No immunizations on file. Not reviewed this visit You Were Diagnosed With   
  
 Codes Comments Chest pain, unspecified type    -  Primary ICD-10-CM: R07.9 ICD-9-CM: 786.50 Hypertension, unspecified type     ICD-10-CM: I10 
ICD-9-CM: 401.9 Former tobacco use     ICD-10-CM: S71.416 ICD-9-CM: V15.82 Obesity (BMI 30.0-34.9)     ICD-10-CM: K15.2 ICD-9-CM: 278.00 Mild intermittent reactive airway disease without complication     [address]: J45.20 ICD-9-CM: 493.90 Cardiomyopathy, unspecified type ([facility].)     ICD-10-CM: I42.9 ICD-9-CM: 425.4 Insulin dependent diabetes mellitus (HCC)     ICD-10-CM: E11.9, Z79.4 ICD-9-CM: 250.00, V58.67 Alcohol abuse     ICD-10-CM: F10.10 ICD-9-CM: 305.00 Dyslipidemia     ICD-10-CM: E78.5 ICD-9-CM: 272.4 Decreased libido     ICD-10-CM: G96.77 
ICD-9-CM: 799.81 Vitals BP Pulse OB Status Smoking Status 124/71 (BP 1 Location: Right arm, BP Patient Position: Sitting) (!) 61 Postmenopausal Former Smoker Vitals History Preferred Pharmacy Pharmacy Name Phone Darius Solorzano[facility] [phone] Your Updated Medication List  
  
   
This list is accurate as of 5/9/18 12:17 PM.  Always use your most recent med list.  
  
  
  
  
 alcohol swabs Padm Commonly known as:  BD Single Use Swabs Regular USE TO TEST BLOOD SUGAR.dx.e11.9  
  
 allopurinol 300 mg tablet Commonly known as:  Ree Abide Take 1 Tab by mouth daily. Blood-Glucose Meter monitoring kit Testing BID-DX:250.00  
  
 butalbital-acetaminophen  mg tablet Commonly known as:  Sushma Hayes Take 1 Tab by mouth every six (6) hours as needed. furosemide 40 mg tablet Commonly known as:  LASIX TAKE 3 TABLETS BY MOUTH EVERY MORNING  
  
 glimepiride 4 mg tablet Commonly known as:  AMARYL  
TAKE 1 TABLET BY MOUTH TWICE DAILY BEFORE A MEAL  
  
 * glucose blood VI test strips strip Commonly known as:  ASCENSIA AUTODISC VI, ONE TOUCH ULTRA TEST VI  
TESTING BID  
  
 * glucose blood VI test strips strip Commonly known as:  FREESTYLE LITE STRIPS Use to test blood sugar three times daily. Dx.e11.9  
  
 insulin glargine 100 unit/mL (3 mL) Inpn Commonly known as:  Maddison Gails INJECT 92 UNITS EVERY NIGHT  
  
 insulin  unit/mL injection Commonly known as:  NOVOLIN N, HUMULIN N  
INJECT 75 UNITS  EVERY MORNING * Lancets Misc Commonly known as:  ONETOUCH ULTRASOFT LANCETS TESTING BID * One Touch Delica 33 gauge Misc Generic drug:  lancets  
  
 lisinopril 20 mg tablet Commonly known as:  PRINIVIL, ZESTRIL  
TAKE 1 TABLET BY MOUTH EVERY MORNING  
  
 metFORMIN  mg tablet Commonly known as:  GLUCOPHAGE XR  
TAKE 1 TABLET BY MOUTH THREE TIMES DAILY WITH MEALS  
  
 metoprolol succinate 100 mg tablet Commonly known as:  TOPROL-XL  
TAKE 1 TABLET BY MOUTH DAILY Dariana Pen Needle 32 gauge x 5/32\" Ndle Generic drug:  Insulin Needles (Disposable) BD ULTRA-FINE SHORT PEN NEEDLE 31 gauge x 5/16\" Ndle Generic drug:  Insulin Needles (Disposable) USE WITH INSULIN PENS TWICE DAILY AS DIRECTED  
  
 potassium chloride SR 10 mEq tablet Commonly known as:  KLOR-CON 10  
2 QAM  
  
 predniSONE 20 mg tablet Commonly known as:  Jones Aver Take 1 Tab by mouth two (2) times a day. traMADol 50 mg tablet Commonly known as:  ULTRAM  
Take 1 Tab by mouth two (2) times daily as needed for Pain. Max Daily Amount: 100 mg.  
  
 * Notice: This list has 4 medication(s) that are the same as other medications prescribed for you. Read the directions carefully, and ask your doctor or other care provider to review them with you. We Performed the Following 2D ECHO COMPLETE ADULT (TTE) W OR WO CONTR [78154 CPT(R)] AMB POC EKG ROUTINE W/ 12 LEADS, INTER & REP [10908 CPT(R)] To-Do List   
 05/15/2018 Imaging:  NM CARDIAC SPECT W STRS/REST MULT   
  
 05/15/2018 ECG:  NUCLEAR STRESS TEST Patient Instructions   
-- We will get an echo and nuclear stress test 
-- Please follow up in 2 weeks, after testing Cardiac Perfusion Scan (Medicine): About This Test 
What is it? A cardiac perfusion scan measures the amount of blood in your heart muscle at rest and after your heart has been made to work hard. During the scan, a camera takes pictures of your heart after a radioactive tracer is injected into a vein in your arm. The tracer travels through the blood and into your heart. As the tracer moves through your heart, areas that have good blood flow absorb the tracer. Areas that do not absorb the tracer may not be getting enough blood or may have been damaged by a heart attack. The pictures show this difference. Two sets of pictures may be made during the test. One set is taken while you are resting. Another set is taken after your heart has been made to work harder (called a stress test). The heart can be stressed by using medicine or exercise. This information is about using medicine to stress the heart. This test is also known by other names, including myocardial perfusion scan, myocardial perfusion imaging, thallium scan, sestamibi cardiac scan, and nuclear stress test. 
Why is this test done? The test is often done to find out what may be causing chest pain or pressure. It may be done after a heart attack to see if areas of the heart are not getting enough blood or to find out how much your heart has been damaged from the heart attack. How can you prepare for the test? 
Tell your doctor if: 
· You take medicine for an erection problem, such as sildenafil (Viagra), tadalafil (Cialis), and vardenafil (Levitra). You may need to take nitroglycerin during this test, which can cause a serious reaction if you have taken a medicine for an erection problem within the past 48 hours. · You have had bleeding problems, or if you take aspirin or some other blood thinner. · You are or might be pregnant. · You are breastfeeding. Don't use your breast milk for 1 to 2 days after the scan. Use formula instead. What happens during the test? 
Resting or baseline scan · You will take your top off and be given a gown to wear. · Electrodes will be attached to your chest to keep track of your heartbeats. · Your arm will be cleaned. You will have a tube, called an IV, going into your arm. A small amount of the radioactive tracer will be put in the IV. · You will lie on your back or your stomach on a table with a large camera positioned above your chest. The camera records the tracer's signals as it moves through your blood. The camera does not produce any radiation, so you are not exposed to any additional radiation while the scan is being done. · You will be asked to remain very still during each scan, which takes about 5 to 10 minutes. The camera will move to take more pictures at different angles. Several scans will be taken. This test takes about 30 to 40 minutes. Stress scan using medicine The stress scan is done in two parts. In many hospitals, you first have the resting scan. You are then given a medicine that makes your heart work harder and you have another scan. Sometimes the stress scan is done first. 
· You will have a test called an electrocardiogram (EKG or ECG), which takes about 5 to 10 minutes. You may have other EKGs during and after the stress test. 
· Medicine will be put in your IV. It will make your heart work harder. You may get a headache and feel dizzy, flushed, and nauseated from the medicine, but these symptoms usually do not last long. · Your heartbeat and blood pressure may be checked. · Your symptoms such as chest pain and shortness of breath will be checked. · A few minutes after you get the medicine, another small amount of radioactive tracer is injected. You may be given something to reverse the medicine used to make your heart work harder. · You will wait for 30 to 40 minutes and then have another resting scan. See the \"Resting or baseline scan\" section. This test takes about 30 to 40 minutes.  
What else should you know about the test? 
 · Sometimes more pictures are taken 2 to 4 hours after the stress scan. · No electricity passes through your body during the test. There is no danger of getting an electrical shock. What happens after the test? 
· You can go back to your usual activities right away. · You will probably be able to go home right away. · Drink plenty of fluids for the next 24 hours to help flush the tracer out of your body. If you have kidney,heart, or liver disease and have to limit fluids, talk with your doctor before you increase the amountof fluids you drink. When should you call for help? Call 911 anytime you think you may need emergency care. For example, call if: 
· You passed out (lost consciousness). · You have been diagnosed with angina, and you have angina symptoms that do not go away with rest or are not getting better within 5 minutes after you take a dose of nitroglycerin. · You have symptoms of a heart attack. These may include: ¨ Chest pain or pressure, or a strange feeling in the chest. 
¨ Sweating. ¨ Shortness of breath. ¨ Nausea or vomiting. ¨ Pain, pressure, or a strange feeling in the back, neck, jaw, or upper belly or in one or both shoulders or arms. ¨ Lightheadedness or sudden weakness. ¨ A fast or irregular heartbeat. After you call 911, the  may tell you to chew 1 adult-strength or 2 to 4 low-dose aspirin. Wait for an ambulance. Do not try to drive yourself. Watch closely for changes in your health, and be sure to contact your doctor if you have any problems. Follow-up care is a key part of your treatment and safety. Be sure to make and go to all appointments, and call your doctor if you are having problems. It's also a good idea to keep a list of the medicines you take. Ask your doctor when you can expect to have your test results. Where can you learn more? Go to http://mitch-north.info/.  
Enter R320 in the search box to learn more about \"Cardiac Perfusion Scan (Medicine): About This Test.\" Current as of: September 21, 2016 Content Version: 11.4 © 7411-4800 [employer]. Care instructions adapted under license by [employer] (which disclaims liability or warranty for this information). If you have questions about a medical condition or this instruction, always ask your healthcare professional. [address] any warranty or liability for your use of this information. Introducing [facility]! Amalia Clemente introduces [employer] patient portal. Now you can access parts of your medical record, email your doctor's office, and request medication refills online. 1. In your internet browser, go to [email or web address] 2. Click on the First Time User? Click Here link in the Sign In box. You will see the New Member Sign Up page. 3. Enter your [employer] Access Code exactly as it appears below. You will not need to use this code after youve completed the sign-up process. If you do not sign up before the expiration date, you must request a new code. · [employer] Access Code: [email or web address] Expires: 7/9/2018  1:34 PM 
 
4. Enter the last four digits of your Social Security Number (xxxx) and Date of Birth (mm/dd/yyyy) as indicated and click Submit. You will be taken to the next sign-up page. 5. Create a [employer] ID. This will be your [employer] login ID and cannot be changed, so think of one that is secure and easy to remember. 6. Create a [employer] password. You can change your password at any time. 7. Enter your Password Reset Question and Answer. This can be used at a later time if you forget your password. 8. Enter your e-mail address. You will receive e-mail notification when new information is available in [address] 9. Click Sign Up. You can now view and download portions of your medical record. 10. Click the Download Summary menu link to download a portable copy of your medical information. If you have questions, please visit the Frequently Asked Questions section of the [employer] website. Remember, [employer] is NOT to be used for urgent needs. For medical emergencies, dial 911. Now available from your iPhone and Android! Please provide this summary of care documentation to your next provider. Your primary care clinician is listed as Pearl Wilson. If you have any questions after today's visit, please call [phone].

## 2018-05-10 PROBLEM — R06.09 DOE (DYSPNEA ON EXERTION): Status: ACTIVE | Noted: 2018-05-10

## 2018-05-10 NOTE — PROGRESS NOTES
[facility]   [address] [facility], [address] [place] [address] Road                                          NEW PATIENT HPI/FOLLOW-UP      NAME:  Vickie Cuevas   :   1950   MRN:   [id number]   PCP:  Carlos Chaudhary MD           Subjective: The patient is a 79y.o. year old female former smoker with PMHx of NICM due to uncontrolled HTN,IDDM,HTN, dyslipidemia, obesity, alcohol abuse, gout, OA, chest pain with normal coronary arteries [facility] since  last f/u  at which time patient switched to this practice( echo done at this time revealed LVEF 45-55%) who returns after initial visit  with progressive PARDO,chest pressure over very short distances over last few weeks/months. Is associated with marked fatigue which resolves with rest. Denies edema, medication intolerance, palpitations, PND/orthopnea wheezing, sputum, syncope, dizziness or light headedness. Concerned about worsening \"heart problems\". Review of Systems  General: Pt denies excessive weight gain or loss. Pt is able to conduct ADL's. Respiratory: +PARDO, -wheezing or stridor.   Cardiovascular: +precordial pain, -palpitations, edema or PND  Gastrointestinal: Denies poor appetite, indigestion, abdominal pain or blood in stool  Peripheral vascular: Denies claudication, leg cramps  Neuropsychiatric: Denies paresthesias,tingling,numbness,anxiety,depression,fatigue  Musculoskeletal: Denies pain,tenderness, soreness,swelling      Past Medical History:   Diagnosis Date    Arthritis     Congestive heart failure, unspecified     Diabetes ([facility]     Hypercholesterolemia     Hypertension      Patient Active Problem List    Diagnosis Date Noted    Chest pain 2018    Decreased libido 10/03/2017    Epistaxis 2017    Former tobacco use--stopped  after >40 yrs smoking 11/15/2016    Obesity (BMI 30.0-34.9) 11/15/2016    Reactive airway disease 2016    Carpal tunnel syndrome of right wrist 12/06/2015    Primary osteoarthritis of right knee 07/12/2015    Cardiomyopathy ([facility] 09/09/2014    Insulin dependent diabetes mellitus ([facility] 09/09/2014    Dyslipidemia 09/09/2014    Gout 09/09/2014    Alcohol abuse 09/09/2014    Hypertension 09/09/2014    Dermatitis 09/09/2014      History reviewed. No pertinent surgical history. No Known Allergies   Family History   Problem Relation Age of Onset    Diabetes Mother       Social History     Social History    Marital status:      Spouse name: N/A    Number of children: 3    Years of education: 15     Occupational History    retired      Social History Main Topics    Smoking status: Former Smoker     Packs/day: 0.25    Smokeless tobacco: Never Used    Alcohol use 0.0 oz/week     0 Standard drinks or equivalent per week      Comment: occ    Drug use: No    Sexual activity: Yes     Partners: Male     Other Topics Concern    Not on file     Social History Narrative      Current Outpatient Prescriptions   Medication Sig    lisinopril (PRINIVIL, ZESTRIL) 20 mg tablet TAKE 1 TABLET BY MOUTH EVERY MORNING    allopurinol (ZYLOPRIM) 300 mg tablet Take 1 Tab by mouth daily.  glimepiride (AMARYL) 4 mg tablet   TAKE 1 TABLET BY MOUTH TWICE DAILY BEFORE A MEAL    metoprolol succinate (TOPROL-XL) 100 mg tablet TAKE 1 TABLET BY MOUTH DAILY    traMADol (ULTRAM) 50 mg tablet Take 1 Tab by mouth two (2) times daily as needed for Pain. Max Daily Amount: 100 mg.    alcohol swabs (BD SINGLE USE SWABS REGULAR) padm USE TO TEST BLOOD SUGAR.dx.e11.9    potassium chloride SR (KLOR-CON 10) 10 mEq tablet 2 QAM    insulin NPH (NOVOLIN N, HUMULIN N) 100 unit/mL injection INJECT 75 UNITS  EVERY MORNING    glucose blood VI test strips (FREESTYLE LITE STRIPS) strip Use to test blood sugar three times daily.  Dx.e11.9    furosemide (LASIX) 40 mg tablet TAKE 3 TABLETS BY MOUTH EVERY MORNING    metFORMIN ER (GLUCOPHAGE XR) 500 mg tablet TAKE 1 TABLET BY MOUTH THREE TIMES DAILY WITH MEALS    insulin glargine (LANTUS SOLOSTAR) 100 unit/mL (3 mL) pen INJECT 92 UNITS EVERY NIGHT    BD INSULIN PEN NEEDLE UF SHORT 31 gauge x 5/16\" ndle USE WITH INSULIN PENS TWICE DAILY AS DIRECTED    ONE TOUCH DELICA 33 gauge misc     NURIA PEN NEEDLE 32 x 5/32 \" ndle     Blood-Glucose Meter monitoring kit Testing BID-DX:250.00    Lancets (ONE TOUCH ULTRASOFT LANCETS) misc TESTING BID    glucose blood VI test strips (ASCENSIA AUTODISC VI, ONE TOUCH ULTRA TEST VI) strip TESTING BID    predniSONE (DELTASONE) 20 mg tablet Take 1 Tab by mouth two (2) times a day.  butalbital-acetaminophen (PHRENILIN)  mg tablet Take 1 Tab by mouth every six (6) hours as needed. No current facility-administered medications for this visit. I have reviewed the nurses notes, vitals, problem list, allergy list, medical history, family medical, social history and medications. Objective:     Physical Exam:     Vitals:    05/09/18 1140 05/09/18 1141   BP: 120/68 124/71   Pulse: 64 (!) 59    There is no height or weight on file to calculate BMI. General: Well developed, in no acute distress. HEENT: No carotid bruits, no JVD, trach is midline. Heart:  Normal S1/S2 negative S3 or S4. Regular, no murmur, gallop or rub.   Respiratory: Clear bilaterally, no wheezing or rales  Abdomen:   Soft, non-tender, bowel sounds are active.   Extremities:  No edema, normal cap refill, no cyanosis. Neuro: A&Ox3, speech clear, gait stable. Skin: Skin color is normal. No rashes or lesions. No diaphoresis.   Vascular: 2+ pulses symmetric in all extremities        Data Review:       Cardiographics:    EKG: SB, 1st degree AV HB, probable LVH with strain and/or ischemia    Cardiology Labs:    Results for orders placed or performed during the hospital encounter of 10/24/16   EKG, 12 LEAD, INITIAL   Result Value Ref Range    Ventricular Rate 78 BPM    Atrial Rate 78 BPM    P-R Interval 210 ms QRS Duration 92 ms    Q-T Interval 390 ms    QTC Calculation (Bezet) 444 ms    Calculated P Axis 61 degrees    Calculated R Axis 7 degrees    Calculated T Axis 21 degrees    Diagnosis       Sinus rhythm with 1st degree AV block with occasional premature ventricular   complexes  Left atrial enlargement  Left ventricular hypertrophy  Nonspecific T wave abnormality  Abnormal ECG    Confirmed by Joaquin Fair MD, Shannan Jackson ([id number]) on 10/24/2016 11:11:39 AM         Lab Results   Component Value Date/Time    Cholesterol, total 175 04/10/2018 01:19 PM    HDL Cholesterol 56 04/10/2018 01:19 PM    LDL, calculated 75 04/10/2018 01:19 PM    Triglyceride 221 (H) 04/10/2018 01:19 PM       Lab Results   Component Value Date/Time    Sodium 142 04/10/2018 01:19 PM    Potassium 4.0 04/10/2018 01:19 PM    Chloride 99 04/10/2018 01:19 PM    CO2 26 04/10/2018 01:19 PM    Anion gap 7 08/14/2017 10:04 PM    Glucose 53 (L) 04/10/2018 01:19 PM    BUN 22 04/10/2018 01:19 PM    Creatinine 0.73 04/10/2018 01:19 PM    BUN/Creatinine ratio 30 (H) 04/10/2018 01:19 PM    GFR est AA 99 04/10/2018 01:19 PM    GFR est non-AA 86 04/10/2018 01:19 PM    Calcium 10.1 04/10/2018 01:19 PM    Bilirubin, total 0.7 08/14/2017 10:04 PM    AST (SGOT) 24 08/14/2017 10:04 PM    Alk. phosphatase 55 08/14/2017 10:04 PM    Protein, total 8.2 08/22/2017 04:47 PM    Albumin 3.8 08/14/2017 10:04 PM    Globulin 5.6 (H) 08/14/2017 10:04 PM    A-G Ratio 0.7 (L) 08/14/2017 10:04 PM    ALT (SGPT) 33 08/14/2017 10:04 PM          Assessment:       ICD-10-CM ICD-9-CM    1. Chest pain, exertional R07.9 786.50 AMB POC EKG ROUTINE W/ 12 LEADS, INTER & REP      2D ECHO COMPLETE ADULT (TTE) W OR WO CONTR      NUCLEAR STRESS TEST      NM CARDIAC SPECT W STRS/REST MULT   2. PARDO (dyspnea on exertion) R06.09 786.09 AMB POC EKG ROUTINE W/ 12 LEADS, INTER & REP      2D ECHO COMPLETE ADULT (TTE) W OR WO CONTR      NUCLEAR STRESS TEST      NM CARDIAC SPECT W STRS/REST MULT   3.  Hypertension, unspecified type I10 401.9 AMB POC EKG ROUTINE W/ 12 LEADS, INTER & REP      2D ECHO COMPLETE ADULT (TTE) W OR WO CONTR      NUCLEAR STRESS TEST      NM CARDIAC SPECT W STRS/REST MULT   4. Former tobacco use--stopped 2014 after >40 yrs smoking Z87. 891 V15.82 2D ECHO COMPLETE ADULT (TTE) W OR WO CONTR      NUCLEAR STRESS TEST      NM CARDIAC SPECT W STRS/REST MULT   5. Obesity (BMI 30.0-34. 9) E66.9 278.00 2D ECHO COMPLETE ADULT (TTE) W OR WO CONTR      NUCLEAR STRESS TEST      NM CARDIAC SPECT W STRS/REST MULT   6. Mild intermittent reactive airway disease without complication O53.74 808.71 2D ECHO COMPLETE ADULT (TTE) W OR WO CONTR      NUCLEAR STRESS TEST      NM CARDIAC SPECT W STRS/REST MULT   7. Cardiomyopathy, unspecified type ([address] I42.9 425.4 2D ECHO COMPLETE ADULT (TTE) W OR WO CONTR      NUCLEAR STRESS TEST      NM CARDIAC SPECT W STRS/REST MULT   8. Insulin dependent diabetes mellitus (HCC) E11.9 250.00 2D ECHO COMPLETE ADULT (TTE) W OR WO CONTR    Z79.4 V58.67 NUCLEAR STRESS TEST      NM CARDIAC SPECT W STRS/REST MULT   9. Alcohol abuse F10.10 305.00 2D ECHO COMPLETE ADULT (TTE) W OR WO CONTR      NUCLEAR STRESS TEST      NM CARDIAC SPECT W STRS/REST MULT   10. Dyslipidemia E78.5 272.4 NUCLEAR STRESS TEST      NM CARDIAC SPECT W STRS/REST MULT   11. Decreased libido R68.82 799.81 NUCLEAR STRESS TEST      NM CARDIAC SPECT W STRS/REST MULT         Discussion: Patient presents at this time with progressive new-onset marked PARDO over short distances accompanied by non-radiating precordial chest pressure,tightening. Suspect progressive angina pectoris. Will obtain Lexiscan NST to exclude underlying CAD given impressive CAD risk factors and echo to exclude structural heart disease. Have advised avoiding strenuous activities. If worse then to ED via EMS. Pleased with present status. Plan: 1. Continue same meds. Lipid profile and labs followed by PCP    2. Encouraged to exercise to tolerance, lose weight and follow low fat, low cholesterol, low sodium predominantly Plant-based (consider Mediterranean) diet. Call with questions or concerns. Will follow up any test results by phone and/or f/u here in office if needed. Melody Worley 3.Follow up: 2-3 weeks    I have discussed the diagnosis with the patient and the intended plan as seen in the above orders. The patient has received an after-visit summary and questions were answered concerning future plans. I have discussed any concerning medication side effects and warnings with the patient as well.     Jonna Ferrer MD  5/10/2018

## 2018-05-18 ENCOUNTER — PATIENT OUTREACH (OUTPATIENT)
Dept: INTERNAL MEDICINE CLINIC | Age: 68
End: 2018-05-18

## 2018-05-22 ENCOUNTER — PATIENT OUTREACH (OUTPATIENT)
Dept: INTERNAL MEDICINE CLINIC | Age: 68
End: 2018-05-22

## 2018-05-22 RX ORDER — LEVOFLOXACIN 750 MG/1
750 TABLET ORAL DAILY
COMMUNITY
End: 2019-01-07

## 2018-05-22 NOTE — PROGRESS NOTES
[facility] 5/18/2018-present:  SOB/CHF/Bacteremia    Hospital Discharge Follow-Up  Date/Time:  5/22/2018 5:00 PM    Patient was admitted to [facility] on 5/18 and discharged on 5/21/2018 for the above. The physician discharge summary was available at the time of outreach. Patient was contacted within 1 business days of discharge. Top Challenges reviewed with the provider   none         Method of communication with provider :none  Inpatient RRAT score: none given  Was this a readmission?  no

## 2018-05-22 NOTE — PROGRESS NOTES
[facility] 5/18/2018-present:  Edema/SOB/CHF/Bacteremia    HCA Chart Review:  Patient remains [facility]

## 2018-05-29 ENCOUNTER — HOSPITAL ENCOUNTER (OUTPATIENT)
Dept: NUCLEAR MEDICINE | Age: 68
Discharge: HOME | End: 2018-05-29
Attending: INTERNAL MEDICINE
Payer: MEDICARE

## 2018-05-29 ENCOUNTER — HOSPITAL ENCOUNTER (OUTPATIENT)
Dept: NON INVASIVE DIAGNOSTICS | Age: 68
Discharge: HOME | End: 2018-05-29
Attending: INTERNAL MEDICINE
Payer: MEDICARE

## 2018-05-29 ENCOUNTER — OFFICE VISIT (OUTPATIENT)
Dept: CARDIOLOGY CLINIC | Age: 68
End: 2018-05-29

## 2018-05-29 VITALS
SYSTOLIC BLOOD PRESSURE: 125 MMHG | OXYGEN SATURATION: 99 % | HEART RATE: 56 BPM | BODY MASS INDEX: 30.05 KG/M2 | DIASTOLIC BLOOD PRESSURE: 61 MMHG | HEIGHT: 66 IN | WEIGHT: 187 LBS | RESPIRATION RATE: 12 BRPM

## 2018-05-29 DIAGNOSIS — I10 HYPERTENSION, UNSPECIFIED TYPE: ICD-10-CM

## 2018-05-29 DIAGNOSIS — J45.909 REACTIVE AIRWAY DISEASE WITHOUT COMPLICATION, UNSPECIFIED ASTHMA SEVERITY, UNSPECIFIED WHETHER PERSISTENT: ICD-10-CM

## 2018-05-29 DIAGNOSIS — E78.5 DYSLIPIDEMIA: ICD-10-CM

## 2018-05-29 DIAGNOSIS — R68.82 DECREASED LIBIDO: ICD-10-CM

## 2018-05-29 DIAGNOSIS — E66.9 OBESITY (BMI 30.0-34.9): ICD-10-CM

## 2018-05-29 DIAGNOSIS — R06.09 DOE (DYSPNEA ON EXERTION): Primary | ICD-10-CM

## 2018-05-29 DIAGNOSIS — R07.9 CHEST PAIN, EXERTIONAL: ICD-10-CM

## 2018-05-29 DIAGNOSIS — R06.09 DOE (DYSPNEA ON EXERTION): ICD-10-CM

## 2018-05-29 DIAGNOSIS — Z87.891 FORMER TOBACCO USE: ICD-10-CM

## 2018-05-29 DIAGNOSIS — F10.10 ALCOHOL ABUSE: ICD-10-CM

## 2018-05-29 DIAGNOSIS — J45.20 MILD INTERMITTENT REACTIVE AIRWAY DISEASE WITHOUT COMPLICATION: ICD-10-CM

## 2018-05-29 DIAGNOSIS — I42.9 CARDIOMYOPATHY, UNSPECIFIED TYPE (HCC): ICD-10-CM

## 2018-05-29 LAB
ATTENDING PHYSICIAN, CST07: NORMAL
DIAGNOSIS, 93000: NORMAL
DUKE TM SCORE RESULT, CST14: NORMAL
DUKE TREADMILL SCORE, CST13: 1
ECG INTERP BEFORE EX, CST11: NORMAL
ECG INTERP DURING EX, CST12: NORMAL
FUNCTIONAL CAPACITY, CST17: NORMAL
KNOWN CARDIAC CONDITION, CST08: NORMAL
MAX. DIASTOLIC BP, CST04: 88 MMHG
MAX. HEART RATE, CST05: 80 BPM
MAX. SYSTOLIC BP, CST03: 158 MMHG
OVERALL BP RESPONSE TO EXERCISE, CST16: NORMAL
OVERALL HR RESPONSE TO EXERCISE, CST15: NORMAL
PEAK EX METS, CST10: 1 METS
PROTOCOL NAME, CST01: NORMAL
TEST INDICATION, CST09: NORMAL

## 2018-05-29 PROCEDURE — 78452 HT MUSCLE IMAGE SPECT MULT: CPT

## 2018-05-29 PROCEDURE — 93017 CV STRESS TEST TRACING ONLY: CPT

## 2018-05-29 PROCEDURE — 74011250636 HC RX REV CODE- 250/636: Performed by: INTERNAL MEDICINE

## 2018-05-29 RX ORDER — SODIUM CHLORIDE 0.9 % (FLUSH) 0.9 %
SYRINGE (ML) INJECTION
Status: DISPENSED
Start: 2018-05-29 | End: 2018-05-29

## 2018-05-29 RX ORDER — SODIUM CHLORIDE 0.9 % (FLUSH) 0.9 %
10 SYRINGE (ML) INJECTION AS NEEDED
Status: DISCONTINUED | OUTPATIENT
Start: 2018-05-29 | End: 2018-06-02 | Stop reason: HOSPADM

## 2018-05-29 RX ADMIN — Medication 10 ML: at 12:50

## 2018-05-29 RX ADMIN — REGADENOSON 0.4 MG: 0.08 INJECTION, SOLUTION INTRAVENOUS at 12:49

## 2018-05-29 RX ADMIN — Medication 10 ML: at 12:49

## 2018-05-29 NOTE — PROGRESS NOTES
PROGRESS NOTE    The patient Camryn Beard a 79 y.o. female arrived on 5/29/2018 for an appointment in cardiology. Patient was transported to cardiology outpatient unit by Gonzalo Barriga RN by wheelchair. RN verifies test explanation by physician with patient. Reviews test and allows for questions. No further questions. Medical history and allergies reviewed  and noted. Home Meds reviewed. Prior to Admission medications    Medication Sig Start Date End Date Taking? Authorizing Provider   levoFLOXacin (LEVAQUIN) 750 mg tablet Take 750 mg by mouth daily. Historical Provider   Lactobacillus acidophilus (ACIDOPHILUS) cap Take 2 Caps by mouth daily. Historical Provider   lisinopril (PRINIVIL, ZESTRIL) 20 mg tablet TAKE 1 TABLET BY MOUTH EVERY MORNING 4/15/18   Juan Manuel Argueta MD   allopurinol (ZYLOPRIM) 300 mg tablet Take 1 Tab by mouth daily. 4/15/18   Juan Manuel Argueta MD   glimepiride (AMARYL) 4 mg tablet   TAKE 1 TABLET BY MOUTH TWICE DAILY BEFORE A MEAL 4/15/18   Juan Manuel Argueta MD   metoprolol succinate (TOPROL-XL) 100 mg tablet TAKE 1 TABLET BY MOUTH DAILY 4/15/18   Juan Manuel Argueta MD   traMADol (ULTRAM) 50 mg tablet Take 1 Tab by mouth two (2) times daily as needed for Pain. Max Daily Amount: 100 mg. 1/9/18   Juan Manuel Argueta MD   alcohol swabs (BD SINGLE USE SWABS REGULAR) padm USE TO TEST BLOOD SUGAR.dx.e11.9 1/6/18   Juan Manuel Argueta MD   potassium chloride SR (KLOR-CON 10) 10 mEq tablet 2 QAM 7/25/17   Juan Manuel Argueta MD   insulin NPH (NOVOLIN N, HUMULIN N) 100 unit/mL injection INJECT 75 UNITS  EVERY MORNING 7/14/17   Dasia Haji MD   glucose blood VI test strips (FREESTYLE LITE STRIPS) strip Use to test blood sugar three times daily.  Dx.e11.9 7/14/17   Dasia Haji MD   furosemide (LASIX) 40 mg tablet TAKE 3 TABLETS BY MOUTH EVERY MORNING  Patient taking differently: TAKE 2 TABLETS BY MOUTH EVERY MORNING 3/22/17   Juan Manuel Argueta MD   predniSONE (DELTASONE) 20 mg tablet Take 1 Tab by mouth two (2) times a day. 1/20/17   Loan Miller MD   metFORMIN ER (GLUCOPHAGE XR) 500 mg tablet TAKE 1 TABLET BY MOUTH THREE TIMES DAILY WITH MEALS 10/24/16   Loan Miller MD   butalbital-acetaminophen (PHRENILIN)  mg tablet Take 1 Tab by mouth every six (6) hours as needed. 10/24/16   Loan Miller MD   insulin glargine (LANTUS SOLOSTAR) 100 unit/mL (3 mL) pen INJECT 92 UNITS EVERY NIGHT 9/7/16   Loan Miller MD   BD INSULIN PEN NEEDLE UF SHORT 31 gauge x 5/16\" ndle USE WITH INSULIN PENS TWICE DAILY AS DIRECTED 3/31/16   Loan Miller MD   [facility]. 33 gauge misc  99/80/15   Historical Provider   NURIA PEN NEEDLE 32 x 5/32 \" ndle  12/15/14   Historical Provider   Blood-Glucose Meter monitoring kit Testing BID-DX:250.00 12/18/14   Loan Miller MD   Lancets (ONE TOUCH ULTRASOFT LANCETS) misc TESTING BID 12/18/14   Loan Miller MD   glucose blood VI test strips (ASCENSIA AUTODISC VI, ONE TOUCH ULTRA TEST VI) strip TESTING BID 12/18/14   Loan Miller MD     Pt was sitting in Dr Ashwin Delgado office and needed to be here in department. Narcisa LOPEZ escorted pt over to [facility]. Patient resting on stretcher with side rails in up position for safety. All BP's noted on stress strip. Patient monitored throughout test. Physician and RN remained in room with patient throughout test.148/88 hr 70 resp 18 room air sats 100 %    Patient prepped for test by this rn. IV started # 22 lft ac by [facility] tech Danita       1230  Dr. Natalio Samayoa, BRENDA and Nuclear Medicine Tech in room, Timeout called. Universal protocol followed. Dr Roberto Rosenberg explained test to t he patient, no further questions noted. Consent obtained. Patient sitting on side of bed with seated leg exercie for Lexiscan stress test.    Patient IV checked for patency. Normal saline flush 10 cc injected and IV remains patent.   [facility] 157 0.04mg/5ml injected over ten seconds followed by saline flush of 10cc. After 40 seconds Myoview injected by Nuclear Medicine Tech and then saline flush of 10 cc injected to maintain patency of IV.  1306  Patient given snack and soda. Patient Marquis Lynne monitored in unit until BP and heart rate returned to baseline. Patient allowed to dress and this RN transported patient by wheelchair to lobby to wait on nuclear medicine images. .iv removed , tip intact, dsg placed. Dr Sissy Dwyer discussed need for pt to have echo scheduled from the office. Pt agreeable. All personal belongings returned to patient. This RN notes to patient if chest pain or shortness of breath occurs in the future, please return to the Emergency Room.

## 2018-05-29 NOTE — PROGRESS NOTES
Chief Complaint   Patient presents with    Hypertension     pt c/o headache ,swelling right knee.  Shortness of Breath     1. Have you been to the ER, urgent care clinic since your last visit? Hospitalized since your last visit? Yes When: 5/16/18; respiratory distress; [facility]    2. Have you seen or consulted any other health care providers outside of the [address] [place] [address] since your last visit? Include any pap smears or colon screening.  Yes When: 2 weeks; [facility] foot doctor; toenails clipped

## 2018-05-29 NOTE — MR AVS SNAPSHOT
[address] [facility] 
 
 
 [address] [place] [address] 
[phone] Patient: Catalina Iglesias MRN: [id number] OHN:[id number] Visit Information Date & Time Provider Department Dept. Phone Encounter #  
 5/29/2018  9:15 AM MD Philomena Fuentes [facility]  Your Appointments 5/31/2018  9:30 AM  
Any with Jackeline Jackson MD  
[address] [place] [address] and [facility]) Appt Note: MOIRA: post [facility] hospital discharge: CHF  
 [address] [phone]  
  
   
 [address] [phone]  
  
    
 7/10/2018  9:30 AM  
Any with Jackeline Jackson MD  
[address] [place] [address] and [facility]) Appt Note: 3 month follow up  
 [address]  
[phone]  
  
    
 11/20/2018 11:15 AM  
ESTABLISHED PATIENT with MD Philomena Fuentes [facility]) Appt Note: 6 MO. F/U FOLLOWING STRESS TEST  
 [address] 
[address] [facility] [address]  
[phone] [address] Upcoming Health Maintenance Date Due  
 EYE EXAM RETINAL OR DILATED Q1 3/16/2016 GLAUCOMA SCREENING Q2Y 3/4/2017 MICROALBUMIN Q1 4/29/2017 FOBT Q 1 YEAR AGE 50-75 4/29/2017 MEDICARE YEARLY EXAM 5/3/2018 Influenza Age 5 to Adult 8/1/2018 Pneumococcal 65+ Low/Medium Risk (2 of 2 - PPSV23) 10/3/2018 HEMOGLOBIN A1C Q6M 10/10/2018 BREAST CANCER SCRN MAMMOGRAM 3/3/2019 FOOT EXAM Q1 4/10/2019 LIPID PANEL Q1 4/10/2019 DTaP/Tdap/Td series (2 - Td) 1/20/2027 Allergies as of 5/29/2018  Review Complete On: 5/29/2018 By: Jovana Cespedes LPN No Known Allergies Current Immunizations  Never Reviewed No immunizations on file. Not reviewed this visit You Were Diagnosed With   
  
 Codes Comments Dyslipidemia    -  Primary ICD-10-CM: E78.5 ICD-9-CM: 272.4 Vitals BP Pulse Resp Height(growth percentile) Weight(growth percentile) SpO2  
 125/61 (BP 1 Location: Right arm, BP Patient Position: Sitting) (!) 56 12 5' 6\" (1.676 m) 187 lb (84.8 kg) 99% BMI OB Status Smoking Status 30.18 kg/m2 Postmenopausal Former Smoker Vitals History BMI and BSA Data Body Mass Index Body Surface Area  
 30.18 kg/m 2 1.99 m 2 Preferred Pharmacy Pharmacy Name Phone [address], [place] [phone] Your Updated Medication List  
  
   
This list is accurate as of 5/29/18 11:25 AM.  Always use your most recent med list.  
  
  
  
  
 ACIDOPHILUS Cap Generic drug:  Lactobacillus acidophilus Take 2 Caps by mouth daily. alcohol swabs Padm Commonly known as:  BD Single Use Swabs Regular USE TO TEST BLOOD SUGAR.dx.e11.9  
  
 allopurinol 300 mg tablet Commonly known as:  Amaliaol Kraig Take 1 Tab by mouth daily. Blood-Glucose Meter monitoring kit Testing BID-DX:250.00  
  
 butalbital-acetaminophen  mg tablet Commonly known as:  Rosalynd Irasema Take 1 Tab by mouth every six (6) hours as needed. furosemide 40 mg tablet Commonly known as:  LASIX TAKE 3 TABLETS BY MOUTH EVERY MORNING  
  
 glimepiride 4 mg tablet Commonly known as:  AMARYL  
TAKE 1 TABLET BY MOUTH TWICE DAILY BEFORE A MEAL  
  
 * glucose blood VI test strips strip Commonly known as:  ASCENSIA AUTODISC VI, ONE TOUCH ULTRA TEST VI  
TESTING BID  
  
 * glucose blood VI test strips strip Commonly known as:  FREESTYLE LITE STRIPS Use to test blood sugar three times daily. Dx.e11.9  
  
 insulin glargine 100 unit/mL (3 mL) Inpn Commonly known as:  Kate Baltazar INJECT 92 UNITS EVERY NIGHT  
  
 insulin  unit/mL injection Commonly known as:  Adarsh Dove  
 INJECT 75 UNITS  EVERY MORNING * Lancets Misc Commonly known as:  ONETOUCH ULTRASOFT LANCETS TESTING BID * One Touch Delica 33 gauge Misc Generic drug:  lancets  
  
 levoFLOXacin 750 mg tablet Commonly known as:  Hollis Gores Take 750 mg by mouth daily. lisinopril 20 mg tablet Commonly known as:  PRINIVIL, ZESTRIL  
TAKE 1 TABLET BY MOUTH EVERY MORNING  
  
 metFORMIN  mg tablet Commonly known as:  GLUCOPHAGE XR  
TAKE 1 TABLET BY MOUTH THREE TIMES DAILY WITH MEALS  
  
 metoprolol succinate 100 mg tablet Commonly known as:  TOPROL-XL  
TAKE 1 TABLET BY MOUTH DAILY Dariana Pen Needle 32 gauge x 5/32\" Ndle Generic drug:  Insulin Needles (Disposable) BD ULTRA-FINE SHORT PEN NEEDLE 31 gauge x 5/16\" Ndle Generic drug:  Insulin Needles (Disposable) USE WITH INSULIN PENS TWICE DAILY AS DIRECTED  
  
 potassium chloride SR 10 mEq tablet Commonly known as:  KLOR-CON 10  
2 QAM  
  
 predniSONE 20 mg tablet Commonly known as:  Akiko Norlander Take 1 Tab by mouth two (2) times a day. traMADol 50 mg tablet Commonly known as:  ULTRAM  
Take 1 Tab by mouth two (2) times daily as needed for Pain. Max Daily Amount: 100 mg.  
  
 * Notice: This list has 4 medication(s) that are the same as other medications prescribed for you. Read the directions carefully, and ask your doctor or other care provider to review them with you. We Performed the Following AMB POC EKG ROUTINE W/ 12 LEADS, INTER & REP [91726 CPT(R)] To-Do List   
 05/29/2018 11:30 AM  
  Appointment with STRESS ROOM [facility] at [facility] NON-INVASIVE [address] [place] [address] ([phone])  
  
 05/29/2018 12:00 PM  
  Appointment with [address] at [address] ([phone]) Introducing [facility]! Swathi Mayfield introduces woohoo mobile marketing patient portal. Now you can access parts of your medical record, email your doctor's office, and request medication refills online.    
 
1. In your internet browser, go to [email or web address] 2. Click on the First Time User? Click Here link in the Sign In box. You will see the New Member Sign Up page. 3. Enter your [employer] Access Code exactly as it appears below. You will not need to use this code after youve completed the sign-up process. If you do not sign up before the expiration date, you must request a new code. · [employer] Access Code: [email or web address] Expires: 7/9/2018  1:34 PM 
 
4. Enter the last four digits of your Social Security Number (xxxx) and Date of Birth (mm/dd/yyyy) as indicated and click Submit. You will be taken to the next sign-up page. 5. Create a The Switch[employer] ID. This will be your [employer] login ID and cannot be changed, so think of one that is secure and easy to remember. 6. Create a [employer] password. You can change your password at any time. 7. Enter your Password Reset Question and Answer. This can be used at a later time if you forget your password. 8. Enter your e-mail address. You will receive e-mail notification when new information is available in [address] 9. Click Sign Up. You can now view and download portions of your medical record. 10. Click the Download Summary menu link to download a portable copy of your medical information. If you have questions, please visit the Frequently Asked Questions section of the [employer] website. Remember, [employer] is NOT to be used for urgent needs. For medical emergencies, dial 911. Now available from your iPhone and Android! Please provide this summary of care documentation to your next provider. Your primary care clinician is listed as Pearl Wilson. If you have any questions after today's visit, please call [phone].

## 2018-05-29 NOTE — PROGRESS NOTES
Progress Note    Patient: Gisela Argueta MRN: [id number]  SSN: [id number]    YOB: 1950  Age: 79 y.o. Sex: female        Subjective:   Ms. Audelia Zurita is a 80 y/o female with history of cardiomyopathy, hypertension, insulin dependent diabetes mellitus and dyslipidemia presenting to clinic for follow up. Patient reports shortness of breath similar to her last visit but feels as if it comes on with shorter distances. Patient denies chest pain, syncope , or edema. Patient is scheduled for lexiscan NST today to exclude CAD. Patient states she felt extremely fatigued yesterday and had blurry vision which she attributes to diabetes. Patient said her blood sugar was low yesterday and is 65 today. She has an appointment with Angelika Ojeda on Friday to discuss DMII medication changes. Patient states she is feeling much better today. On 5/18/18 patient was feeling very short of breath and drove herself to the nearest ED which was [facility]. Patient was admitted for HTN Crisis and stayed at the hospital for two days. Patient denies any cardiology work up while she was in the hospital. She was called back after discharge to be treated for a UTI. She was prescribed Keflex and then switched to  levofloxacin 750mg and has a few more days left. Review of Systems  General: Pt denies excessive weight gain or loss. Pt is able to conduct ADL's. Respiratory: Admits dyspnea on exertion. Denies wheezing or stridor.   Cardiovascular: Denies precordial pain, palpitations, edema or PND  Peripheral vascular: Denies claudication, leg cramps  Neuropsychiatric: Denies paresthesias,tingling,numbness,anxiety,depression  Musculoskeletal: Denies pain,tenderness, soreness,swelling    Objective:     Vitals:    05/29/18 0956 05/29/18 0958   BP: 129/72 125/61   Pulse: (!) 57 (!) 56   Resp: 12    SpO2: 99%    Weight: 187 lb (84.8 kg)    Height: 5' 6\" (1.676 m)      Physical Exam   General: Well developed, in no acute distress. HEENT: No carotid bruits, no JVD, trach is midline. Heart:  Normal S1/S2 negative S3 or S4. Regular, no murmur, gallop or rub. Respiratory: Clear bilaterally, no wheezing or rales  Extremities:  No edema, normal cap refill, no cyanosis. Neuro: A&Ox3, speech clear, gait stable. Skin: Skin color is normal. No rashes or lesions. No diaphoresis. Vascular: 2+ pulses symmetric in all extremities      Assessment/Plan   1. Dyspnea on Exertion   - Complete stress test today   2. Hypertension   - Continue medications    - Follow  Low Sodium Diet      3. DMII   -Keep scheduled appointment with Dr. Zaldivar Comes to discuss medication changes  4. Dyslipidemia   -Continue medications   -Lipid panel followed by PCP  5. Will call to discuss results of Stress Test and determine next follow up appointment     Discussion:    Today patient reports that she is feeling okay but continues to have symptoms of fatigue and exertional dyspnea. She noted that she gets short of breath with shorter distances than before. Patient is scheduled for Lexiscan NST today to exclude CAD. Patient was advised to have echo but it appears she is not scheduled today. Patient will be called with results and follow up will be determined at that time. Patient seen and examined. All pertinent data reviewed. I have reviewed detailed note as outlined by Anais Cole. Case discussed with Nursing/medical assistant staff and Anais Cole. Patient presents after recent hospitalization for UTI CJW. Slowly recovering. Scheduled for NST today. Will schedule echo as well. F/U to discuss. Plans as outlined.        Discussed/Seen with Neeraj Roach PA-C   Signed By: Nolberto Villeda     May 29, 2018      NEWELL [place]

## 2018-05-31 ENCOUNTER — OFFICE VISIT (OUTPATIENT)
Dept: INTERNAL MEDICINE CLINIC | Age: 68
End: 2018-05-31

## 2018-05-31 VITALS
HEART RATE: 60 BPM | BODY MASS INDEX: 30.2 KG/M2 | WEIGHT: 187.9 LBS | TEMPERATURE: 97.6 F | OXYGEN SATURATION: 92 % | RESPIRATION RATE: 14 BRPM | SYSTOLIC BLOOD PRESSURE: 118 MMHG | HEIGHT: 66 IN | DIASTOLIC BLOOD PRESSURE: 69 MMHG

## 2018-05-31 DIAGNOSIS — Z00.00 WELLNESS EXAMINATION: ICD-10-CM

## 2018-05-31 DIAGNOSIS — I10 HYPERTENSION, UNSPECIFIED TYPE: ICD-10-CM

## 2018-05-31 DIAGNOSIS — Z13.39 SCREENING FOR ALCOHOLISM: ICD-10-CM

## 2018-05-31 DIAGNOSIS — I50.41 ACUTE COMBINED SYSTOLIC AND DIASTOLIC CONGESTIVE HEART FAILURE (HCC): ICD-10-CM

## 2018-05-31 DIAGNOSIS — E66.9 OBESITY (BMI 30.0-34.9): ICD-10-CM

## 2018-05-31 DIAGNOSIS — M17.11 PRIMARY OSTEOARTHRITIS OF RIGHT KNEE: ICD-10-CM

## 2018-05-31 DIAGNOSIS — I42.9 CARDIOMYOPATHY, UNSPECIFIED TYPE (HCC): Primary | ICD-10-CM

## 2018-05-31 DIAGNOSIS — Z13.31 SCREENING FOR DEPRESSION: ICD-10-CM

## 2018-05-31 NOTE — PROGRESS NOTES
[facility]  [facility]  [address] [place] [id number]  Phone:  [phone]  Fax: [phone]       Chief Complaint   Patient presents with    Annual Wellness Visit    Transitions Of Care     Patient admitted to hospital for htn and shortness of breath on 5/17/18   . SUBJECTIVE:    Violeta Hill is a 79 y.o. female comes in for a return visit saying that she was most recently hospitalized for congestive heart failure at [facility].  She was also possibly septic during that time. In any event, she had an uneventful recovery. She has subsequently seen her cardiologist and a stress done that was negative. The ejection fraction on the radio nuclide testing was 46% with no evidence of ischemia. She has always had a non-ischemic cardiomyopathy based on previous cardiac catheterizations that have been done for her. Her diabetes is doing fairly well based on her last hemoglobin A1C. She has noted however that her blood sugars have been a bit low of late. She is currently taking Novolin 75 units daily. There has been no meaningful weight loss. I am not entirely sure of the origin of the septic episode but presume it was . She remains on antibiotic for now. She has a past history of primary hypertension and gout. Current Outpatient Prescriptions   Medication Sig Dispense Refill    levoFLOXacin (LEVAQUIN) 750 mg tablet Take 750 mg by mouth daily.  Lactobacillus acidophilus (ACIDOPHILUS) cap Take 2 Caps by mouth daily.  lisinopril (PRINIVIL, ZESTRIL) 20 mg tablet TAKE 1 TABLET BY MOUTH EVERY MORNING 90 Tab 3    allopurinol (ZYLOPRIM) 300 mg tablet Take 1 Tab by mouth daily.  90 Tab 3    glimepiride (AMARYL) 4 mg tablet   TAKE 1 TABLET BY MOUTH TWICE DAILY BEFORE A MEAL 180 Tab 3    metoprolol succinate (TOPROL-XL) 100 mg tablet TAKE 1 TABLET BY MOUTH DAILY 90 Tab 3    traMADol (ULTRAM) 50 mg tablet Take 1 Tab by mouth two (2) times daily as needed for Pain. Max Daily Amount: 100 mg. 40 Tab 2    alcohol swabs (BD SINGLE USE SWABS REGULAR) padm USE TO TEST BLOOD SUGAR.dx.e11.9 100 Pad 11    potassium chloride SR (KLOR-CON 10) 10 mEq tablet 2 QAM 60 Tab 11    insulin NPH (NOVOLIN N, HUMULIN N) 100 unit/mL injection INJECT 75 UNITS  EVERY MORNING 20 mL 11    glucose blood VI test strips (FREESTYLE LITE STRIPS) strip Use to test blood sugar three times daily. Dx.e11.9 100 Strip 11    furosemide (LASIX) 40 mg tablet TAKE 3 TABLETS BY MOUTH EVERY MORNING (Patient taking differently: TAKE 2 TABLETS BY MOUTH EVERY MORNING) 90 Tab 11    predniSONE (DELTASONE) 20 mg tablet Take 1 Tab by mouth two (2) times a day. 30 Tab 0    metFORMIN ER (GLUCOPHAGE XR) 500 mg tablet TAKE 1 TABLET BY MOUTH THREE TIMES DAILY WITH MEALS 90 Tab 11    butalbital-acetaminophen (PHRENILIN)  mg tablet Take 1 Tab by mouth every six (6) hours as needed. 40 Tab 0    insulin glargine (LANTUS SOLOSTAR) 100 unit/mL (3 mL) pen INJECT 92 UNITS EVERY NIGHT 30 mL 11    BD INSULIN PEN NEEDLE UF SHORT 31 gauge x 5/16\" ndle USE WITH INSULIN PENS TWICE DAILY AS DIRECTED 100 Pen Needle 11    ONE TOUCH DELICA 33 gauge misc   11    NURIA PEN NEEDLE 32 x 5/32 \" ndle       Blood-Glucose Meter monitoring kit Testing BID-DX:250.00 1 kit 0    Lancets (ONE TOUCH ULTRASOFT LANCETS) misc TESTING BID 1 Package 11    glucose blood VI test strips (ASCENSIA AUTODISC VI, ONE TOUCH ULTRA TEST VI) strip TESTING BID 1 Package 11     Past Medical History:   Diagnosis Date    Arthritis     Congestive heart failure, unspecified     Diabetes (HCC)     Hypercholesterolemia     Hypertension      History reviewed. No pertinent surgical history.   No Known Allergies      REVIEW OF SYSTEMS:  General: negative for - chills or fever  ENT: negative for - headaches, nasal congestion or tinnitus  Respiratory: negative for - cough, hemoptysis, shortness of breath or wheezing  Cardiovascular Elayne Argue negative for - chest pain, edema, palpitations or shortness of breath  Gastrointestinal: negative for - abdominal pain, blood in stools, heartburn or nausea/vomiting  Genito-Urinary: no dysuria, trouble voiding, or hematuria  Musculoskeletal: negative for - gait disturbance, joint pain, joint stiffness or joint swelling  Neurological: no TIA or stroke symptoms  Hematologic: no bruises, no bleeding, no swollen glands  Integument: no lumps, mole changes, nail changes or rash  Endocrine: no malaise/lethargy or unexpected weight changes      Social History     Social History    Marital status:      Spouse name: N/A    Number of children: 3    Years of education: 12     Occupational History    retired      Social History Main Topics    Smoking status: Former Smoker     Packs/day: 0.25    Smokeless tobacco: Never Used    Alcohol use 0.0 oz/week     0 Standard drinks or equivalent per week      Comment: occ    Drug use: No    Sexual activity: Yes     Partners: Male     Other Topics Concern    None     Social History Narrative     Family History   Problem Relation Age of Onset    Diabetes Mother        OBJECTIVE:    Visit Vitals    /69    Pulse 60    Temp 97.6 °F (36.4 °C) (Oral)    Resp 14    Ht 5' 6\" (1.676 m)    Wt 187 lb 14.4 oz (85.2 kg)    SpO2 92%    BMI 30.33 kg/m2     CONSTITUTIONAL: well , well nourished, appears age appropriate  EYES: perrla, eom intact  ENMT:moist mucous membranes, pharynx clear  NECK: supple. Thyroid normal, no JVD  RESPIRATORY: Chest: clear to ascultation and percussion   CARDIOVASCULAR: Heart: regular rate and rhythm  GASTROINTESTINAL: Abdomen: soft, bowel sounds active  HEMATOLOGIC: no pathological lymph nodes palpated  MUSCULOSKELETAL: Extremities: no edema, pulse 1+, moderate degenerative changes noted both knees  INTEGUMENT: No unusual rashes or suspicious skin lesions noted.  Nails appear normal.  NEUROLOGIC: non-focal exam   MENTAL STATUS: alert and oriented, appropriate affect      ASSESSMENT:  1. Cardiomyopathy, unspecified type ([facility]    2. Acute combined systolic and diastolic congestive heart failure ([facility]    3. Insulin dependent diabetes mellitus ([facility]    4. Hypertension, unspecified type    5. Primary osteoarthritis of right knee    6. Obesity (BMI 30.0-34.9)    7. Screening for alcoholism    8. Screening for depression    9. Wellness examination        PLAN:    1. She is well-compensated today. I suggest she resume her furosemide 120 mg daily. She was on this before and we reduced this to 80 mg and there were no flare-ups so I said to continue this for now. Given the current flare, I would increase the furosemide back up to 120 mg.    2. From a diabetic perspective, she reduced her insulin to 68 units daily. 3. Her blood pressure is excellent today; no adjustments are made. 4. Her osteoarthritic right knee is doing well. 5. I encouraged her to lose weight. 6. She will follow-up with her cardiologist on a regular basis. Follow-up Disposition:  Return in about 4 weeks (around 6/28/2018). Gopi Chester MD      This is the Subsequent Medicare Annual Wellness Exam, performed 12 months or more after the Initial AWV or the last Subsequent AWV    I have reviewed the patient's medical history in detail and updated the computerized patient record. History     Past Medical History:   Diagnosis Date    Arthritis     Congestive heart failure, unspecified     Diabetes ([facility]     Hypercholesterolemia     Hypertension       History reviewed. No pertinent surgical history. Current Outpatient Prescriptions   Medication Sig Dispense Refill    levoFLOXacin (LEVAQUIN) 750 mg tablet Take 750 mg by mouth daily.  Lactobacillus acidophilus (ACIDOPHILUS) cap Take 2 Caps by mouth daily.       lisinopril (PRINIVIL, ZESTRIL) 20 mg tablet TAKE 1 TABLET BY MOUTH EVERY MORNING 90 Tab 3    allopurinol (ZYLOPRIM) 300 mg tablet Take 1 Tab by mouth daily. 90 Tab 3    glimepiride (AMARYL) 4 mg tablet   TAKE 1 TABLET BY MOUTH TWICE DAILY BEFORE A MEAL 180 Tab 3    metoprolol succinate (TOPROL-XL) 100 mg tablet TAKE 1 TABLET BY MOUTH DAILY 90 Tab 3    traMADol (ULTRAM) 50 mg tablet Take 1 Tab by mouth two (2) times daily as needed for Pain. Max Daily Amount: 100 mg. 40 Tab 2    alcohol swabs (BD SINGLE USE SWABS REGULAR) padm USE TO TEST BLOOD SUGAR.dx.e11.9 100 Pad 11    potassium chloride SR (KLOR-CON 10) 10 mEq tablet 2 QAM 60 Tab 11    insulin NPH (NOVOLIN N, HUMULIN N) 100 unit/mL injection INJECT 75 UNITS  EVERY MORNING 20 mL 11    glucose blood VI test strips (FREESTYLE LITE STRIPS) strip Use to test blood sugar three times daily. Dx.e11.9 100 Strip 11    furosemide (LASIX) 40 mg tablet TAKE 3 TABLETS BY MOUTH EVERY MORNING (Patient taking differently: TAKE 2 TABLETS BY MOUTH EVERY MORNING) 90 Tab 11    predniSONE (DELTASONE) 20 mg tablet Take 1 Tab by mouth two (2) times a day. 30 Tab 0    metFORMIN ER (GLUCOPHAGE XR) 500 mg tablet TAKE 1 TABLET BY MOUTH THREE TIMES DAILY WITH MEALS 90 Tab 11    butalbital-acetaminophen (PHRENILIN)  mg tablet Take 1 Tab by mouth every six (6) hours as needed.  40 Tab 0    insulin glargine (LANTUS SOLOSTAR) 100 unit/mL (3 mL) pen INJECT 92 UNITS EVERY NIGHT 30 mL 11    BD INSULIN PEN NEEDLE UF SHORT 31 gauge x 5/16\" ndle USE WITH INSULIN PENS TWICE DAILY AS DIRECTED 100 Pen Needle 11    ONE TOUCH DELICA 33 gauge misc   11    NURIA PEN NEEDLE 32 x 5/32 \" ndle       Blood-Glucose Meter monitoring kit Testing BID-DX:250.00 1 kit 0    Lancets (ONE TOUCH ULTRASOFT LANCETS) misc TESTING BID 1 Package 11    glucose blood VI test strips (ASCENSIA AUTODISC VI, ONE TOUCH ULTRA TEST VI) strip TESTING BID 1 Package 11     No Known Allergies  Family History   Problem Relation Age of Onset    Diabetes Mother      Social History   Substance Use Topics    Smoking status: Former Smoker     Packs/day: 0.25  Smokeless tobacco: Never Used    Alcohol use 0.0 oz/week     0 Standard drinks or equivalent per week      Comment: occ     Patient Active Problem List   Diagnosis Code    Cardiomyopathy ([facility] I42.9    Insulin dependent diabetes mellitus ([facility] E11.9, Z79.4    Dyslipidemia E78.5    Gout M10.9    Alcohol abuse F10.10    Hypertension I10    Dermatitis L30.9    Primary osteoarthritis of right knee M17.11    Carpal tunnel syndrome of right wrist G56.01    Reactive airway disease J45.909    Former tobacco use--stopped 2014 after >40 yrs smoking Z87.891    Obesity (BMI 30.0-34. 9) E66.9    Epistaxis R04.0    Decreased libido R68.82    Chest pain, exertional R07.9    PARDO (dyspnea on exertion) R06.09       Depression Risk Factor Screening:     PHQ over the last two weeks 5/31/2018   Little interest or pleasure in doing things Not at all   Feeling down, depressed or hopeless Not at all   Total Score PHQ 2 0     Alcohol Risk Factor Screening: You do not drink alcohol or very rarely. Functional Ability and Level of Safety:   Hearing Loss  Hearing is good. Activities of Daily Living  The home contains: no safety equipment. Patient does total self care    Fall Risk  Fall Risk Assessment, last 12 mths 5/31/2018   Able to walk? Yes   Fall in past 12 months? No       Abuse Screen  Patient is not abused    Cognitive Screening   Evaluation of Cognitive Function:  Has your family/caregiver stated any concerns about your memory: no  Normal    Patient Care Team   Patient Care Team:  Dana Fothergill, MD as PCP - General (Internal Medicine)  [facility], RN as Ambulatory Care Navigator (General Practice)    Assessment/Plan   Education and counseling provided:  Are appropriate based on today's review and evaluation    Diagnoses and all orders for this visit:    1. Cardiomyopathy, unspecified type ([address]    2. Acute combined systolic and diastolic congestive heart failure ([address]    3.  Insulin dependent diabetes mellitus ([facility]    4. Hypertension, unspecified type    5. Primary osteoarthritis of right knee    6. Obesity (BMI 30.0-34.9)    7. Screening for alcoholism  -     Annual  Alcohol Screen 15 min ()    8. Screening for depression  -     Depression Screen Annual    9.  Wellness examination        Health Maintenance Due   Topic Date Due    GLAUCOMA SCREENING Q2Y  03/04/2017    MICROALBUMIN Q1  04/29/2017    FOBT Q 1 YEAR AGE 50-75  04/29/2017

## 2018-05-31 NOTE — MR AVS SNAPSHOT
[facility] 
 
 
 [id number] 
[phone] Patient: Brissa Renee MRN: [id number] SUGAR:[birth year] Visit Information Date & Time Provider Department Dept. Phone Encounter #  
 5/31/2018  9:30 AM Loulou Lindsey MD [facility] [phone] [id number] Follow-up Instructions Return in about 4 weeks (around 6/28/2018). Your Appointments 7/10/2018  9:30 AM  
Any with Loulou Lindsey MD  
[address] [place] [address] and Primary Care [facility]) Appt Note: 3 month follow up  
 [address] Godinez 1 [facility]  
  
   
 [id number]  
  
    
 11/20/2018 11:15 AM  
ESTABLISHED PATIENT with Bassam Navarro MD  
[facility]) Appt Note: 6 MO. F/U FOLLOWING STRESS TEST  
 [address] [place] [address] [address] [place], [place] [phone] [address] [place] [address] [address] Upcoming Health Maintenance Date Due  
 GLAUCOMA SCREENING Q2Y 3/4/2017 MICROALBUMIN Q1 4/29/2017 FOBT Q 1 YEAR AGE 50-75 4/29/2017 MEDICARE YEARLY EXAM 5/3/2018 EYE EXAM RETINAL OR DILATED Q1 8/31/2018* Influenza Age 5 to Adult 8/1/2018 Pneumococcal 65+ Low/Medium Risk (2 of 2 - PPSV23) 10/3/2018 HEMOGLOBIN A1C Q6M 10/10/2018 BREAST CANCER SCRN MAMMOGRAM 3/3/2019 FOOT EXAM Q1 4/10/2019 LIPID PANEL Q1 4/10/2019 DTaP/Tdap/Td series (2 - Td) 1/20/2027 *Topic was postponed. The date shown is not the original due date. Allergies as of 5/31/2018  Review Complete On: 5/31/2018 By: Demond Moreno No Known Allergies Current Immunizations  Never Reviewed No immunizations on file. Not reviewed this visit You Were Diagnosed With   
  
 Codes Comments Cardiomyopathy, unspecified type ([facility]    -  Primary ICD-10-CM: I42.9 ICD-9-CM: 425.4 Acute combined systolic and diastolic congestive heart failure (HCC)     ICD-10-CM: I50.41 ICD-9-CM: 428.41, 428.0 Insulin dependent diabetes mellitus (HCC)     ICD-10-CM: E11.9, Z79.4 ICD-9-CM: 250.00, V58.67 Hypertension, unspecified type     ICD-10-CM: I10 
ICD-9-CM: 401.9 Primary osteoarthritis of right knee     ICD-10-CM: M17.11 ICD-9-CM: 715.16 Obesity (BMI 30.0-34.9)     ICD-10-CM: O71.1 ICD-9-CM: 278.00 Vitals BP Pulse Temp Resp Height(growth percentile) Weight(growth percentile)  
 118/69 60 97.6 °F (36.4 °C) (Oral) 14 5' 6\" (1.676 m) 187 lb 14.4 oz (85.2 kg) SpO2 BMI OB Status Smoking Status 92% 30.33 kg/m2 Postmenopausal Former Smoker Vitals History BMI and BSA Data Body Mass Index Body Surface Area  
 30.33 kg/m 2 1.99 m 2 Preferred Pharmacy Pharm[facility] Name Phone [address] [facility] [phone] Your Updated Medication List  
  
   
This list is accurate as of 5/31/18 11:05 AM.  Always use your most recent med list.  
  
  
  
  
 ACIDOPHILUS Cap Generic drug:  Lactobacillus acidophilus Take 2 Caps by mouth daily. alcohol swabs Padm Commonly known as:  BD Single Use Swabs Regular USE TO TEST BLOOD SUGAR.dx.e11.9  
  
 allopurinol 300 mg tablet Commonly known as:  Hebert Riedel Take 1 Tab by mouth daily. Blood-Glucose Meter monitoring kit Testing BID-DX:250.00  
  
 butalbital-acetaminophen  mg tablet Commonly known as:  Gutierrez Mend Take 1 Tab by mouth every six (6) hours as needed. furosemide 40 mg tablet Commonly known as:  LASIX TAKE 3 TABLETS BY MOUTH EVERY MORNING  
  
 glimepiride 4 mg tablet Commonly known as:  AMARYL  
TAKE 1 TABLET BY MOUTH TWICE DAILY BEFORE A MEAL  
  
 * glucose blood VI test strips strip Commonly known as:  ASCENSIA AUTODISC VI, ONE TOUCH ULTRA TEST VI  
TESTING BID  
  
 * glucose blood VI test strips strip Commonly known as:  FREESTYLE LITE STRIPS Use to test blood sugar three times daily. Dx.e11.9  
  
 insulin glargine 100 unit/mL (3 mL) Inpn Commonly known as:  Yovanny Sena INJECT 92 UNITS EVERY NIGHT  
  
 insulin  unit/mL injection Commonly known as:  NOVOLIN N, HUMULIN N  
INJECT 75 UNITS  EVERY MORNING * Lancets Misc Commonly known as:  ONETOUCH ULTRASOFT LANCETS TESTING BID * One Touch Delica 33 gauge Misc Generic drug:  lancets  
  
 levoFLOXacin 750 mg tablet Commonly known as:  Paulett Murrain Take 750 mg by mouth daily. lisinopril 20 mg tablet Commonly known as:  PRINIVIL, ZESTRIL  
TAKE 1 TABLET BY MOUTH EVERY MORNING  
  
 metFORMIN  mg tablet Commonly known as:  GLUCOPHAGE XR  
TAKE 1 TABLET BY MOUTH THREE TIMES DAILY WITH MEALS  
  
 metoprolol succinate 100 mg tablet Commonly known as:  TOPROL-XL  
TAKE 1 TABLET BY MOUTH DAILY Dariana Pen Needle 32 gauge x 5/32\" Ndle Generic drug:  Insulin Needles (Disposable) BD ULTRA-FINE SHORT PEN NEEDLE 31 gauge x 5/16\" Ndle Generic drug:  Insulin Needles (Disposable) USE WITH INSULIN PENS TWICE DAILY AS DIRECTED  
  
 potassium chloride SR 10 mEq tablet Commonly known as:  KLOR-CON 10  
2 QAM  
  
 predniSONE 20 mg tablet Commonly known as:  Garrel Moron Take 1 Tab by mouth two (2) times a day. traMADol 50 mg tablet Commonly known as:  ULTRAM  
Take 1 Tab by mouth two (2) times daily as needed for Pain. Max Daily Amount: 100 mg.  
  
 * Notice: This list has 4 medication(s) that are the same as other medications prescribed for you. Read the directions carefully, and ask your doctor or other care provider to review them with you. Follow-up Instructions Return in about 4 weeks (around 6/28/2018). Introducing [address]!    
 [employer] introduces [employer] patient portal. Now you can access parts of your medical record, email your doctor's office, and request medication refills online. 1. In your internet browser, go to [email or web address] 2. Click on the First Time User? Click Here link in the Sign In box. You will see the New Member Sign Up page. 3. Enter your [employer] Access Code exactly as it appears below. You will not need to use this code after youve completed the sign-up process. If you do not sign up before the expiration date, you must request a new code. · [employer] Access Code: [email or web address] Expires: 7/9/2018  1:34 PM 
 
4. Enter the last four digits of your Social Security Number (xxxx) and Date of Birth (mm/dd/yyyy) as indicated and click Submit. You will be taken to the next sign-up page. 5. Create a [employer] ID. This will be your [employer] login ID and cannot be changed, so think of one that is secure and easy to remember. 6. Create a [employer] password. You can change your password at any time. 7. Enter your Password Reset Question and Answer. This can be used at a later time if you forget your password. 8. Enter your e-mail address. You will receive e-mail notification when new information is available in [address] 9. Click Sign Up. You can now view and download portions of your medical record. 10. Click the Download Summary menu link to download a portable copy of your medical information. If you have questions, please visit the Frequently Asked Questions section of the [employer] website. Remember, [employer] is NOT to be used for urgent needs. For medical emergencies, dial 911. Now available from your iPhone and Android! Please provide this summary of care documentation to your next provider. Your primary care clinician is listed as Pearl Wilson. If you have any questions after today's visit, please call [phone].

## 2018-05-31 NOTE — PROGRESS NOTES
Chief Complaint   Patient presents with    Annual Wellness Visit    Transitions Of Care     Patient admitted to hospital for htn and shortness of breath on 5/17/18     1. Have you been to the ER, urgent care clinic since your last visit? Hospitalized since your last visit? Yes When: 5/17/18 Where: [facility] Reason for visit: htn and shortness of breath on 5/17/18    2. Have you seen or consulted any other health care providers outside of the [address] [place] [address] since your last visit? Include any pap smears or colon screening.  No

## 2018-06-03 NOTE — PATIENT INSTRUCTIONS
Medicare Wellness Visit, Female    The best way to live healthy is to have a lifestyle where you eat a well-balanced diet, exercise regularly, limit alcohol use, and quit all forms of tobacco/nicotine, if applicable. Regular preventive services are another way to keep healthy. Preventive services (vaccines, screening tests, monitoring & exams) can help personalize your care plan, which helps you manage your own care. Screening tests can find health problems at the earliest stages, when they are easiest to treat. [facility] follows the current, evidence-based guidelines published by the [facility] Mcihael Ebenezer ([facility]) when recommending preventive services for our patients. Because we follow these guidelines, sometimes recommendations change over time as research supports it. (For example, mammograms used to be recommended annually. Even though Medicare will still pay for an annual mammogram, the newer guidelines recommend a mammogram every two years for women of average risk.)    Of course, you and your provider may decide to screen more often for some diseases, based on your risk and co-morbidities (chronic disease you are already diagnosed with). Preventive services for you include:    - Medicare offers their members a free annual wellness visit, which is time for you and your primary care provider to discuss and plan for your preventive service needs. Take advantage of this benefit every year!    -All people over age 72 should receive the recommended pneumonia vaccines. Current USPSTF guidelines recommend a series of two vaccines for the best pneumonia protection.     -All adults should have a yearly flu vaccine and a tetanus vaccine every 10 years. All adults age 61 years should receive a shingles vaccine once in their lifetime.      -A bone mass density test is recommended when a woman turns 65 to screen for osteoporosis.  This test is only recommended once as a screening. Some providers will use this same test as a disease monitoring tool if you already have osteoporosis. -All adults age 38-68 years who are overweight should have a diabetes screening test once every three years.     -Other screening tests & preventive services for persons with diabetes include: an eye exam to screen for diabetic retinopathy, a kidney function test, a foot exam, and stricter control over your cholesterol.     -Cardiovascular screening for adults with routine risk involves an electrocardiogram (ECG) at intervals determined by the provider.     -Colorectal cancer screenings should be done for adults age 54-65 years with normal risk. There are a number of acceptable methods of screening for this type of cancer. Each test has its own benefits and drawbacks. Discuss with your provider what is most appropriate for you during your annual wellness visit. The different tests include: colonoscopy (considered the best screening method), a fecal occult blood test, a fecal DNA test, and sigmoidoscopy. -Breast cancer screenings are recommended every other year for women of normal risk age 54-69 years.     -Cervical cancer screenings for women over age 72 are only recommended with certain risk factors.     -All adults born between [facility] should be screened once for Hepatitis C.      Here is a list of your current Health Maintenance items (your personalized list of preventive services) with a due date:  Health Maintenance Due   Topic Date Due    Glaucoma Screening   03/04/2017    Albumin Urine Test  04/29/2017    Stool testing for trace blood  04/29/2017

## 2018-06-04 NOTE — PROGRESS NOTES
Spoke with pt . Verified 2 identifers. told pt per   STRESS TEST NEGATIVE     F/U AFTER ECHO TO DISCUSS. Patient verbalize understanding .

## 2018-07-03 ENCOUNTER — OFFICE VISIT (OUTPATIENT)
Dept: INTERNAL MEDICINE CLINIC | Age: 68
End: 2018-07-03

## 2018-07-03 VITALS
TEMPERATURE: 97.6 F | HEIGHT: 66 IN | DIASTOLIC BLOOD PRESSURE: 62 MMHG | SYSTOLIC BLOOD PRESSURE: 106 MMHG | OXYGEN SATURATION: 95 % | BODY MASS INDEX: 29.73 KG/M2 | HEART RATE: 60 BPM | WEIGHT: 185 LBS | RESPIRATION RATE: 16 BRPM

## 2018-07-03 DIAGNOSIS — M75.02 ADHESIVE CAPSULITIS OF LEFT SHOULDER: Primary | ICD-10-CM

## 2018-07-03 DIAGNOSIS — I10 HYPERTENSION, UNSPECIFIED TYPE: ICD-10-CM

## 2018-07-03 DIAGNOSIS — I42.9 CARDIOMYOPATHY, UNSPECIFIED TYPE (HCC): ICD-10-CM

## 2018-07-03 DIAGNOSIS — E78.5 DYSLIPIDEMIA: ICD-10-CM

## 2018-07-03 DIAGNOSIS — R19.5 OCCULT BLOOD IN STOOLS: ICD-10-CM

## 2018-07-03 DIAGNOSIS — M17.11 PRIMARY OSTEOARTHRITIS OF RIGHT KNEE: ICD-10-CM

## 2018-07-03 RX ORDER — TRAMADOL HYDROCHLORIDE 50 MG/1
50 TABLET ORAL
Qty: 40 TAB | Refills: 2 | Status: SHIPPED | OUTPATIENT
Start: 2018-07-03 | End: 2019-01-22

## 2018-07-03 NOTE — MR AVS SNAPSHOT
[address] [facility] [address] 
[phone] Patient: Anitha Yan MRN: [id number] VOY:[birth year] Visit Information Date & Time Provider Department Dept. Phone Encounter #  
 7/3/2018  9:30 AM Alea [facility] [address] [id number] Your Appointments 11/20/2018 11:15 AM  
ESTABLISHED PATIENT with Sharan Guzman MD  
[facility]) Appt Note: 6 MO. F/U FOLLOWING STRESS TEST  
 [address] [facility] 
[address]  
[phone] [address] Upcoming Health Maintenance Date Due  
 GLAUCOMA SCREENING Q2Y 3/4/2017 MICROALBUMIN Q1 4/29/2017 FOBT Q 1 YEAR AGE 50-75 4/29/2017 EYE EXAM RETINAL OR DILATED Q1 8/31/2018* Influenza Age 5 to Adult 8/1/2018 Pneumococcal 65+ Low/Medium Risk (2 of 2 - PPSV23) 10/3/2018 HEMOGLOBIN A1C Q6M 10/10/2018 BREAST CANCER SCRN MAMMOGRAM 3/3/2019 FOOT EXAM Q1 4/10/2019 LIPID PANEL Q1 4/10/2019 DTaP/Tdap/Td series (2 - Td) 1/20/2027 *Topic was postponed. The date shown is not the original due date. Allergies as of 7/3/2018  Review Complete On: 7/3/2018 By: Corina Queen No Known Allergies Current Immunizations  Never Reviewed No immunizations on file. Not reviewed this visit You Were Diagnosed With   
  
 Codes Comments Adhesive capsulitis of left shoulder    -  Primary ICD-10-CM: M75.02 
ICD-9-CM: 726.0 Primary osteoarthritis of right knee     ICD-10-CM: M17.11 ICD-9-CM: 715.16 Cardiomyopathy, unspecified type ([facility] [address]     ICD-10-CM: I42.9 ICD-9-CM: 425.4 Insulin dependent diabetes mellitus (HCC)     ICD-10-CM: E11.9, Z79.4 ICD-9-CM: 250.00, V58.67 Dyslipidemia     ICD-10-CM: E78.5 ICD-9-CM: 272.4 Hypertension, unspecified type     ICD-10-CM: I10 
ICD-9-CM: 401.9 Occult blood in stools     ICD-10-CM: R19.5 ICD-9-CM: 792.1 Vitals BP Pulse Temp Resp Height(growth percentile) Weight(growth percentile) 106/62 60 97.6 °F (36.4 °C) (Oral) 16 5' 6\" (1.676 m) 185 lb (83.9 kg) SpO2 BMI OB Status Smoking Status 95% 29.86 kg/m2 Postmenopausal Former Smoker Vitals History BMI and BSA Data Body Mass Index Body Surface Area  
 29.86 kg/m 2 1.98 m 2 Preferred Pharmacy Pharmacy Name Phone [phone], [place] [phone] Your Updated Medication List  
  
   
This list is accurate as of 7/3/18 10:48 AM.  Always use your most recent med list.  
  
  
  
  
 ACIDOPHILUS Cap Generic drug:  Lactobacillus acidophilus Take 2 Caps by mouth daily. alcohol swabs Padm Commonly known as:  BD Single Use Swabs Regular USE TO TEST BLOOD SUGAR.dx.e11.9  
  
 allopurinol 300 mg tablet Commonly known as:  Bernett Devyn Take 1 Tab by mouth daily. Blood-Glucose Meter monitoring kit Testing BID-DX:250.00  
  
 butalbital-acetaminophen  mg tablet Commonly known as:  Andrena Dennison Take 1 Tab by mouth every six (6) hours as needed. furosemide 40 mg tablet Commonly known as:  LASIX TAKE 3 TABLETS BY MOUTH EVERY MORNING  
  
 glimepiride 4 mg tablet Commonly known as:  AMARYL  
TAKE 1 TABLET BY MOUTH TWICE DAILY BEFORE A MEAL  
  
 * glucose blood VI test strips strip Commonly known as:  ASCENSIA AUTODISC VI, ONE TOUCH ULTRA TEST VI  
TESTING BID  
  
 * glucose blood VI test strips strip Commonly known as:  FREESTYLE LITE STRIPS Use to test blood sugar three times daily. Dx.e11.9  
  
 insulin glargine 100 unit/mL (3 mL) Inpn Commonly known as:  True Hilt INJECT 92 UNITS EVERY NIGHT  
  
 insulin  unit/mL injection Commonly known as:  Urbano Rodríguez  
 INJECT 75 UNITS  EVERY MORNING * Lancets Misc Commonly known as:  ONETOUCH ULTRASOFT LANCETS TESTING BID * One Touch Delica 33 gauge Misc Generic drug:  lancets  
  
 levoFLOXacin 750 mg tablet Commonly known as:  Liliana Oar Take 750 mg by mouth daily. lisinopril 20 mg tablet Commonly known as:  PRINIVIL, ZESTRIL  
TAKE 1 TABLET BY MOUTH ONCE DAILY IN THE MORNING  
  
 metFORMIN  mg tablet Commonly known as:  GLUCOPHAGE XR  
TAKE 1 TABLET BY MOUTH THREE TIMES DAILY WITH MEALS  
  
 metoprolol succinate 100 mg tablet Commonly known as:  TOPROL-XL  
TAKE 1 TABLET BY MOUTH DAILY Dariana Pen Needle 32 gauge x 5/32\" Ndle Generic drug:  Insulin Needles (Disposable) BD ULTRA-FINE SHORT PEN NEEDLE 31 gauge x 5/16\" Ndle Generic drug:  Insulin Needles (Disposable) USE WITH INSULIN PENS TWICE DAILY AS DIRECTED  
  
 potassium chloride SR 10 mEq tablet Commonly known as:  KLOR-CON 10  
2 QAM  
  
 predniSONE 20 mg tablet Commonly known as:  Jennifer Greek Take 1 Tab by mouth two (2) times a day. traMADol 50 mg tablet Commonly known as:  ULTRAM  
Take 1 Tab by mouth two (2) times daily as needed for Pain. Max Daily Amount: 100 mg.  
  
 * Notice: This list has 4 medication(s) that are the same as other medications prescribed for you. Read the directions carefully, and ask your doctor or other care provider to review them with you. Prescriptions Printed Refills  
 traMADol (ULTRAM) 50 mg tablet 2 Sig: Take 1 Tab by mouth two (2) times daily as needed for Pain. Max Daily Amount: 100 mg. Class: Print Route: Oral  
  
We Performed the Following HEMOGLOBIN A1C WITH EAG [67842 CPT(R)] METABOLIC PANEL, BASIC [62467 CPT(R)] MICROALBUMIN, UR, RAND W/ MICROALB/CREAT RATIO [id number] CPT(R)] OCCULT BLOOD, IMMUNOASSAY (FIT) [id number] CPT(R)] REFERRAL TO ORTHOPEDIC SURGERY [REF62 Custom] Comments: Severe osteoarthritis right knee with need for replacement Referral Information Referral ID Referred By Referred To  
  
 [id number] Gianni POWELL Not Available Visits Status Start Date End Date 1 New Request 7/3/18 7/3/19 If your referral has a status of pending review or denied, additional information will be sent to support the outcome of this decision. Introducing [facility]! Saundra Gill introduces Keypr patient portal. Now you can access parts of your medical record, email your doctor's office, and request medication refills online. 1. In your internet browser, go to [email or web address] 2. Click on the First Time User? Click Here link in the Sign In box. You will see the New Member Sign Up page. 3. Enter your Keypr Access Code exactly as it appears below. You will not need to use this code after youve completed the sign-up process. If you do not sign up before the expiration date, you must request a new code. · Keypr Access Code: [email or web address] Expires: 7/9/2018  1:34 PM 
 
4. Enter the last four digits of your Social Security Number (xxxx) and Date of Birth (mm/dd/yyyy) as indicated and click Submit. You will be taken to the next sign-up page. 5. Create a Keypr ID. This will be your Keypr login ID and cannot be changed, so think of one that is secure and easy to remember. 6. Create a Keypr password. You can change your password at any time. 7. Enter your Password Reset Question and Answer. This can be used at a later time if you forget your password. 8. Enter your e-mail address. You will receive e-mail notification when new information is available in [address] 9. Click Sign Up. You can now view and download portions of your medical record. 10. Click the Download Summary menu link to download a portable copy of your medical information.  
 
If you have questions, please visit the Frequently Asked Questions section of the [facility]. Remember, [employer] is NOT to be used for urgent needs. For medical emergencies, dial 911. Now available from your iPhone and Android! Please provide this summary of care documentation to your next provider. Your primary care clinician is listed as Pearl Wilson. If you have any questions after today's visit, please call [phone].

## 2018-07-03 NOTE — PROGRESS NOTES
Chief Complaint   Patient presents with    CHF     1 month follow up      1. Have you been to the ER, urgent care clinic since your last visit? Hospitalized since your last visit? No    2. Have you seen or consulted any other health care providers outside of the [facility] since your last visit? Include any pap smears or colon screening. No     for ultram verified and approved per Dr. Baldev Hall.

## 2018-07-03 NOTE — PROGRESS NOTES
[address] [facility]  [place] [address]  [address] [place] [address]  Phone:  [phone]  Fax: [phone]       Chief Complaint   Patient presents with    CHF     1 month follow up    . SUBJECTIVE:    Wade Dominguez is a 79 y.o. female comes in for a return visit stating that she has not been doing well. She has pain in her right knee and left shoulder. She has severe osteoarthritis of her right knee and a pericapsulitis of her left shoulder. Her diabetes historically has been doing well. Her last hemoglobin A1c has been excellent, but that was also 2½ months ago. She was hospitalized as noted in my previous note with congestive heart failure with slight reduction in her ejection fraction. She has a past history of primary hypertension and dyslipidemia. Current Outpatient Prescriptions   Medication Sig Dispense Refill    traMADol (ULTRAM) 50 mg tablet Take 1 Tab by mouth two (2) times daily as needed for Pain. Max Daily Amount: 100 mg. 40 Tab 2    insulin NPH (NOVOLIN N, HUMULIN N) 100 unit/mL injection INJECT 75 UNITS  EVERY MORNING 20 mL 11    lisinopril (PRINIVIL, ZESTRIL) 20 mg tablet TAKE 1 TABLET BY MOUTH ONCE DAILY IN THE MORNING 90 Tab 3    levoFLOXacin (LEVAQUIN) 750 mg tablet Take 750 mg by mouth daily.  Lactobacillus acidophilus (ACIDOPHILUS) cap Take 2 Caps by mouth daily.  allopurinol (ZYLOPRIM) 300 mg tablet Take 1 Tab by mouth daily. 90 Tab 3    glimepiride (AMARYL) 4 mg tablet   TAKE 1 TABLET BY MOUTH TWICE DAILY BEFORE A MEAL 180 Tab 3    metoprolol succinate (TOPROL-XL) 100 mg tablet TAKE 1 TABLET BY MOUTH DAILY 90 Tab 3    alcohol swabs (BD SINGLE USE SWABS REGULAR) padm USE TO TEST BLOOD SUGAR.dx.e11.9 100 Pad 11    potassium chloride SR (KLOR-CON 10) 10 mEq tablet 2 QAM 60 Tab 11    glucose blood VI test strips (FREESTYLE LITE STRIPS) strip Use to test blood sugar three times daily.  Dx.e11.9 100 Strip 11    furosemide (LASIX) 40 mg tablet TAKE 3 TABLETS BY MOUTH EVERY MORNING (Patient taking differently: TAKE 2 TABLETS BY MOUTH EVERY MORNING) 90 Tab 11    predniSONE (DELTASONE) 20 mg tablet Take 1 Tab by mouth two (2) times a day. 30 Tab 0    metFORMIN ER (GLUCOPHAGE XR) 500 mg tablet TAKE 1 TABLET BY MOUTH THREE TIMES DAILY WITH MEALS 90 Tab 11    butalbital-acetaminophen (PHRENILIN)  mg tablet Take 1 Tab by mouth every six (6) hours as needed. 40 Tab 0    insulin glargine (LANTUS SOLOSTAR) 100 unit/mL (3 mL) pen INJECT 92 UNITS EVERY NIGHT 30 mL 11    BD INSULIN PEN NEEDLE UF SHORT 31 gauge x 5/16\" ndle USE WITH INSULIN PENS TWICE DAILY AS DIRECTED 100 Pen Needle 11    ONE TOUCH DELICA 33 gauge misc   11    NURIA PEN NEEDLE 32 x 5/32 \" ndle       Blood-Glucose Meter monitoring kit Testing BID-DX:250.00 1 kit 0    Lancets (ONE TOUCH ULTRASOFT LANCETS) misc TESTING BID 1 Package 11    glucose blood VI test strips (ASCENSIA AUTODISC VI, ONE TOUCH ULTRA TEST VI) strip TESTING BID 1 Package 11     Past Medical History:   Diagnosis Date    Arthritis     Congestive heart failure, unspecified     Diabetes (HCC)     Hypercholesterolemia     Hypertension      History reviewed. No pertinent surgical history.   No Known Allergies      REVIEW OF SYSTEMS:  General: negative for - chills or fever  ENT: negative for - headaches, nasal congestion or tinnitus  Respiratory: negative for - cough, hemoptysis, shortness of breath or wheezing  Cardiovascular : negative for - chest pain, edema, palpitations or shortness of breath  Gastrointestinal: negative for - abdominal pain, blood in stools, heartburn or nausea/vomiting  Genito-Urinary: no dysuria, trouble voiding, or hematuria  Musculoskeletal: negative for - gait disturbance, joint pain, joint stiffness or joint swelling  Neurological: no TIA or stroke symptoms  Hematologic: no bruises, no bleeding, no swollen glands  Integument: no lumps, mole changes, nail changes or rash  Endocrine: no malaise/lethargy or unexpected weight changes      Social History     Social History    Marital status:      Spouse name: N/A    Number of children: 3    Years of education: 15     Occupational History    retired      Social History Main Topics    Smoking status: Former Smoker     Packs/day: 0.25    Smokeless tobacco: Never Used    Alcohol use 0.0 oz/week     0 Standard drinks or equivalent per week      Comment: occ    Drug use: No    Sexual activity: Yes     Partners: Male     Other Topics Concern    None     Social History Narrative     Family History   Problem Relation Age of Onset    Diabetes Mother        OBJECTIVE:    Visit Vitals    /62    Pulse 60    Temp 97.6 °F (36.4 °C) (Oral)    Resp 16    Ht 5' 6\" (1.676 m)    Wt 185 lb (83.9 kg)    SpO2 95%    BMI 29.86 kg/m2     CONSTITUTIONAL: well , well nourished, appears age appropriate  EYES: perrla, eom intact  ENMT:moist mucous membranes, pharynx clear  NECK: supple. Thyroid normal,no JVD  RESPIRATORY: Chest: clear to ascultation and percussion   CARDIOVASCULAR: Heart: regular rate and rhythm  GASTROINTESTINAL: Abdomen: soft, bowel sounds active  HEMATOLOGIC: no pathological lymph nodes palpated  MUSCULOSKELETAL: Extremities: no edema, pulse 1+, pain elicited to range of motion left shoulder, severe degenerative changes noted right knee  INTEGUMENT: No unusual rashes or suspicious skin lesions noted. Nails appear normal.  NEUROLOGIC: non-focal exam   MENTAL STATUS: alert and oriented, appropriate affect      ASSESSMENT:  1. Adhesive capsulitis of left shoulder    2. Primary osteoarthritis of right knee    3. Cardiomyopathy, unspecified type ([address]    4. Insulin dependent diabetes mellitus ([address]    5. Dyslipidemia    6. Hypertension, unspecified type    7. Occult blood in stools        PLAN:    1. She has a pericapsulitis of her left shoulder. She needs an injection but declines.   She will be seen by orthopedics as it relates to this. 2. She has severe end stage osteoarthritis of her right knee and needs a replacement. Orthopedics will address. 3. She is well-compensated from a cardiac perspective. 4. She will continue a statin as prescribed. 5. Blood pressure is excellent today. .  Orders Placed This Encounter    MICROALBUMIN, UR, RAND    OCCULT BLOOD, IMMUNOASSAY (FIT)    HEMOGLOBIN A1C WITH EAG    METABOLIC PANEL, BASIC    REFERRAL TO ORTHOPEDIC SURGERY    traMADol (ULTRAM) 50 mg tablet         Follow-up Disposition:  Return in about 3 months (around 10/3/2018).       Tracy Louis MD

## 2018-07-13 RX ORDER — FUROSEMIDE 40 MG/1
TABLET ORAL
Qty: 90 TAB | Refills: 11 | Status: SHIPPED | OUTPATIENT
Start: 2018-07-13 | End: 2019-10-20

## 2018-09-18 RX ORDER — ALLOPURINOL 300 MG/1
300 TABLET ORAL DAILY
Qty: 90 TAB | Refills: 3 | Status: SHIPPED | OUTPATIENT
Start: 2018-09-18 | End: 2019-10-13 | Stop reason: SDUPTHER

## 2018-09-18 RX ORDER — POTASSIUM CHLORIDE 750 MG/1
TABLET, FILM COATED, EXTENDED RELEASE ORAL
Qty: 180 TAB | Refills: 3 | Status: SHIPPED | OUTPATIENT
Start: 2018-09-18 | End: 2019-11-13 | Stop reason: SDUPTHER

## 2018-12-11 ENCOUNTER — OFFICE VISIT (OUTPATIENT)
Dept: CARDIOLOGY CLINIC | Age: 68
End: 2018-12-11

## 2018-12-11 VITALS
HEART RATE: 80 BPM | HEIGHT: 66 IN | WEIGHT: 195.8 LBS | SYSTOLIC BLOOD PRESSURE: 162 MMHG | DIASTOLIC BLOOD PRESSURE: 78 MMHG | RESPIRATION RATE: 18 BRPM | BODY MASS INDEX: 31.47 KG/M2

## 2018-12-11 DIAGNOSIS — I10 ESSENTIAL HYPERTENSION: ICD-10-CM

## 2018-12-11 DIAGNOSIS — E78.5 DYSLIPIDEMIA: ICD-10-CM

## 2018-12-11 DIAGNOSIS — J45.909 MILD REACTIVE AIRWAYS DISEASE, UNSPECIFIED WHETHER PERSISTENT: ICD-10-CM

## 2018-12-11 DIAGNOSIS — Z01.818 PREOPERATIVE CLEARANCE: Primary | ICD-10-CM

## 2018-12-11 DIAGNOSIS — M17.11 PRIMARY OSTEOARTHRITIS OF RIGHT KNEE: ICD-10-CM

## 2018-12-11 DIAGNOSIS — R06.09 DOE (DYSPNEA ON EXERTION): ICD-10-CM

## 2018-12-11 DIAGNOSIS — E66.9 OBESITY (BMI 30.0-34.9): ICD-10-CM

## 2018-12-11 DIAGNOSIS — I42.9 CARDIOMYOPATHY, UNSPECIFIED TYPE (HCC): ICD-10-CM

## 2018-12-11 NOTE — PROGRESS NOTES
[facility]   [address] [facility], [address] [place] [address]                                          NEW PATIENT HPI/FOLLOW-UP      NAME:  Sandrita Canchola   :   1950   MRN:   [id number]   PCP:  Kika Lutz MD           Subjective: The patient is a 79y.o. year old female  who returns for a routine follow-up. Since the last visit, patient reports no new symptoms. Denies change in exercise tolerance, chest pain, edema, medication intolerance, palpitations, shortness of breath, PND/orthopnea wheezing, sputum, syncope, dizziness or light headedness. Doing satisfactorily. Review of Systems  General: Pt denies excessive weight gain or loss. Pt is able to conduct ADL's. Respiratory: Denies shortness of breath, PARDO, wheezing or stridor.   Cardiovascular: Denies precordial pain, palpitations, edema or PND  Gastrointestinal: Denies poor appetite, indigestion, abdominal pain or blood in stool  Peripheral vascular: Denies claudication, leg cramps  Neuropsychiatric: Denies paresthesias,tingling,numbness,anxiety,depression,fatigue  Musculoskeletal: Denies pain,tenderness, soreness,swelling      Past Medical History:   Diagnosis Date    Arthritis     Congestive heart failure, unspecified     Diabetes ([facility]     Hypercholesterolemia     Hypertension      Patient Active Problem List    Diagnosis Date Noted    Preoperative clearance 2018    PARDO (dyspnea on exertion) 05/10/2018    Chest pain, exertional 2018    Decreased libido 10/03/2017    Epistaxis 2017    Former tobacco use--stopped  after >40 yrs smoking 11/15/2016    Obesity (BMI 30.0-34.9) 11/15/2016    Reactive airway disease 2016    Carpal tunnel syndrome of right wrist 2015    Primary osteoarthritis of right knee 2015    Cardiomyopathy ([facility] [facility] 2014    Insulin dependent diabetes mellitus ([facility] [facility] 2014    Dyslipidemia 2014    Gout 2014    Alcohol abuse 2014    Hypertension 2014    Dermatitis 2014      No past surgical history on file. No Known Allergies   Family History   Problem Relation Age of Onset    Diabetes Mother       Social History     Socioeconomic History    Marital status:      Spouse name: Not on file    Number of children: 3    Years of education: 15    Highest education level: Not on file   Social Needs    Financial resource strain: Not on file    Food insecurity - worry: Not on file    Food insecurity - inability: Not on file   [employer] needs - medical: Not on file   [employer] needs - non-medical: Not on file   Occupational History    Occupation: retired   Tobacco Use    Smoking status: Former Smoker     Packs/day: 0.25     Years: 20.00     Pack years: 5.00     Last attempt to quit: 2014     Years since quittin.0    Smokeless tobacco: Never Used   Substance and Sexual Activity    Alcohol use: Yes     Alcohol/week: 0.0 oz     Comment: occ    Drug use: No    Sexual activity: Yes     Partners: Male   Other Topics Concern    Not on file   Social History Narrative    Not on file      Current Outpatient Medications   Medication Sig    allopurinol (ZYLOPRIM) 300 mg tablet Take 1 Tab by mouth daily.  potassium chloride SR (KLOR-CON 10) 10 mEq tablet 2 QAM    insulin NPH (HUMULIN N) 100 unit/mL (3 mL) inpn Inject 58 units daily. dx.e11.9    furosemide (LASIX) 40 mg tablet TAKE 3 TABLETS BY MOUTH EVERY MORNING    traMADol (ULTRAM) 50 mg tablet Take 1 Tab by mouth two (2) times daily as needed for Pain. Max Daily Amount: 100 mg.    lisinopril (PRINIVIL, ZESTRIL) 20 mg tablet TAKE 1 TABLET BY MOUTH ONCE DAILY IN THE MORNING    Lactobacillus acidophilus (ACIDOPHILUS) cap Take 2 Caps by mouth daily.     glimepiride (AMARYL) 4 mg tablet   TAKE 1 TABLET BY MOUTH TWICE DAILY BEFORE A MEAL    metoprolol succinate (TOPROL-XL) 100 mg tablet TAKE 1 TABLET BY MOUTH DAILY    alcohol swabs (BD SINGLE USE SWABS REGULAR) padm USE TO TEST BLOOD SUGAR.dx.e11.9    glucose blood VI test strips (FREESTYLE LITE STRIPS) strip Use to test blood sugar three times daily. Dx.e11.9    metFORMIN ER (GLUCOPHAGE XR) 500 mg tablet TAKE 1 TABLET BY MOUTH THREE TIMES DAILY WITH MEALS    butalbital-acetaminophen (PHRENILIN)  mg tablet Take 1 Tab by mouth every six (6) hours as needed.  BD INSULIN PEN NEEDLE UF SHORT 31 gauge x 5/16\" ndle USE WITH INSULIN PENS TWICE DAILY AS DIRECTED    ONE TOUCH DELICA 33 gauge misc     NURIA PEN NEEDLE 32 x 5/32 \" ndle     Blood-Glucose Meter monitoring kit Testing BID-DX:250.00    Lancets (ONE TOUCH ULTRASOFT LANCETS) misc TESTING BID    glucose blood VI test strips (ASCENSIA AUTODISC VI, ONE TOUCH ULTRA TEST VI) strip TESTING BID    levoFLOXacin (LEVAQUIN) 750 mg tablet Take 750 mg by mouth daily.  predniSONE (DELTASONE) 20 mg tablet Take 1 Tab by mouth two (2) times a day. No current facility-administered medications for this visit. I have reviewed the nurses notes, vitals, problem list, allergy list, medical history, family medical, social history and medications. Objective:     Physical Exam:     Vitals:    12/11/18 1350 12/11/18 1358   BP: 168/80 162/78   Pulse: 80    Resp: 18    Weight: 195 lb 12.8 oz (88.8 kg)    Height: 5' 6\" (1.676 m)     Body mass index is 31.6 kg/m². General: Well developed, in no acute distress. HEENT: No carotid bruits, no JVD, trach is midline. Heart:  Normal S1/S2 negative S3 or S4. Regular, no murmur, gallop or rub.   Respiratory: Clear bilaterally, no wheezing or rales  Abdomen:   Soft, non-tender, bowel sounds are active.   Extremities:  No edema, normal cap refill, no cyanosis. Neuro: A&Ox3, speech clear, gait stable. Skin: Skin color is normal. No rashes or lesions. No diaphoresis.   Vascular: 2+ pulses symmetric in all extremities        Data Review:       Cardiographics:    EKG: NSR,LVH with strain, FREDO      Study Result: NUKE    INDICATION:  Dyspnea, cardiac origin suspected; Dyspnea, on exertion; Typical  anginal chest pain; chest pain,htn,dyslipidemia,cardiomyopathy,right knee pain   Essential (primary) hypertension      EXAM: Nuclear Myocardial Perfusion SPECT and Gated Wall Motion Imaging.      Rest and Lexiscan myocardial perfusion SPECT imaging is performed with IV  injections of 11.0 and 33.0 mCi technetium 99m Sestamibi respectively.     SPECT images displayed in three planes show uniform radiotracer uptake in the  myocardium on both sequences. There is no ischemic reversibility. There is no  apparent infarct fixed defect.      Gated SPECT images reviewed in 3 planes show no segmental wall motion  abnormality of the left ventricular myocardium. The calculated LV ejection  fraction is 46%. Pulmonary uptake and left ventricular cavity size appear  normal.        IMPRESSION  IMPRESSION: No ischemia demonstrated. No infarct visible. LVEF 46%.           Cardiology Labs:    Results for orders placed or performed during the hospital encounter of 10/24/16   EKG, 12 LEAD, INITIAL   Result Value Ref Range    Ventricular Rate 78 BPM    Atrial Rate 78 BPM    P-R Interval 210 ms    QRS Duration 92 ms    Q-T Interval 390 ms    QTC Calculation (Bezet) 444 ms    Calculated P Axis 61 degrees    Calculated R Axis 7 degrees    Calculated T Axis 21 degrees    Diagnosis       Sinus rhythm with 1st degree AV block with occasional premature ventricular   complexes  Left atrial enlargement  Left ventricular hypertrophy  Nonspecific T wave abnormality  Abnormal ECG    Confirmed by Angie Devries MD, [facility] ([id number]) on 10/24/2016 11:11:39 AM         Lab Results   Component Value Date/Time    Cholesterol, total 175 04/10/2018 01:19 PM    HDL Cholesterol 56 04/10/2018 01:19 PM    LDL, calculated 75 04/10/2018 01:19 PM    Triglyceride 221 (H) 04/10/2018 01:19 PM       Lab Results   Component Value Date/Time    Sodium 142 04/10/2018 01:19 PM    Potassium 4.0 04/10/2018 01:19 PM    Chloride 99 04/10/2018 01:19 PM    CO2 26 04/10/2018 01:19 PM    Anion gap 7 08/14/2017 10:04 PM    Glucose 53 (L) 04/10/2018 01:19 PM    BUN 22 04/10/2018 01:19 PM    Creatinine 0.73 04/10/2018 01:19 PM    BUN/Creatinine ratio 30 (H) 04/10/2018 01:19 PM    GFR est AA 99 04/10/2018 01:19 PM    GFR est non-AA 86 04/10/2018 01:19 PM    Calcium 10.1 04/10/2018 01:19 PM    Bilirubin, total 0.7 08/14/2017 10:04 PM    AST (SGOT) 24 08/14/2017 10:04 PM    Alk. phosphatase 55 08/14/2017 10:04 PM    Protein, total 8.2 08/22/2017 04:47 PM    Albumin 3.8 08/14/2017 10:04 PM    Globulin 5.6 (H) 08/14/2017 10:04 PM    A-G Ratio 0.7 (L) 08/14/2017 10:04 PM    ALT (SGPT) 33 08/14/2017 10:04 PM          Assessment:       ICD-10-CM ICD-9-CM    1. Preoperative clearance Z01.818 V72.84 2D ECHO COMPLETE ADULT (TTE) W OR WO CONTR   2. Essential hypertension I10 401.9 AMB POC EKG ROUTINE W/ 12 LEADS, INTER & REP      2D ECHO COMPLETE ADULT (TTE) W OR WO CONTR   3. Mild reactive airways disease, unspecified whether persistent J45.909 493.90 2D ECHO COMPLETE ADULT (TTE) W OR WO CONTR   4. Primary osteoarthritis of right knee M17.11 715.16 2D ECHO COMPLETE ADULT (TTE) W OR WO CONTR   5. Obesity (BMI 30.0-34. 9) E66.9 278.00    6. Insulin dependent diabetes mellitus (HCC) E11.9 250.00 2D ECHO COMPLETE ADULT (TTE) W OR WO CONTR    Z79.4 V58.67    7. Dyslipidemia E78.5 272.4    8. PARDO (dyspnea on exertion) R06.09 786.09 2D ECHO COMPLETE ADULT (TTE) W OR WO CONTR   9. Cardiomyopathy, unspecified type ([address] I42.9 425.4 2D ECHO COMPLETE ADULT (TTE) W OR WO CONTR         Discussion: Patient presents at this time stable from a cardiac perspective. Recently had negative NST 5/18 anticipating right TKR 1/19. Has not had echo yet as have mild CM 45-50% EF. Will repeat as I suspect due to HHD/HTN. BP little high today. States usually runs in 120-130 range. Will high monitor as outpatient more consistently.  Call if > 140/90. Tentatively cleared for knee surgery at low-moderate risk but will await echo results. Plan: 1. Continue same meds. Lipid profile and labs followed by PCP. 2.Encouraged to exercise to tolerance, lose weight,stop smoking--4 MONTHS AGO and follow low fat, low carb, low cholesterol, low sodium predominantly Plant-based (consider Mediterranean) diet. Call with questions or concerns. Will follow up any test results by phone and/or f/u here in office if needed. [place] 3.Follow up: 6 months    I have discussed the diagnosis with the patient and the intended plan as seen in the above orders. The patient has received an after-visit summary and questions were answered concerning future plans. I have discussed any concerning medication side effects and warnings with the patient as well.     Lianne Toth MD  12/11/2018

## 2018-12-11 NOTE — PROGRESS NOTES
1. Have you been to the ER, urgent care clinic since your last visit? Hospitalized since your last visit? No    2. Have you seen or consulted any other health care providers outside of the [address] [place] [address] since your last visit? Include any pap smears or colon screening. No    Chief Complaint   Patient presents with    Hypertension     Preop clearance TKR right leg by Dr. Ida Sykes on 1-21-19. Denied cardiac symptoms.

## 2018-12-18 ENCOUNTER — HOSPITAL ENCOUNTER (OUTPATIENT)
Dept: NON INVASIVE DIAGNOSTICS | Age: 68
Discharge: HOME | End: 2018-12-18
Attending: INTERNAL MEDICINE
Payer: MEDICARE

## 2018-12-18 DIAGNOSIS — I10 ESSENTIAL HYPERTENSION: ICD-10-CM

## 2018-12-18 DIAGNOSIS — Z01.818 PREOPERATIVE CLEARANCE: ICD-10-CM

## 2018-12-18 DIAGNOSIS — M17.11 PRIMARY OSTEOARTHRITIS OF RIGHT KNEE: ICD-10-CM

## 2018-12-18 DIAGNOSIS — J45.909 MILD REACTIVE AIRWAYS DISEASE, UNSPECIFIED WHETHER PERSISTENT: ICD-10-CM

## 2018-12-18 DIAGNOSIS — I42.9 CARDIOMYOPATHY, UNSPECIFIED TYPE (HCC): ICD-10-CM

## 2018-12-18 DIAGNOSIS — R06.09 DOE (DYSPNEA ON EXERTION): ICD-10-CM

## 2018-12-18 PROCEDURE — 93306 TTE W/DOPPLER COMPLETE: CPT

## 2018-12-19 ENCOUNTER — TELEPHONE (OUTPATIENT)
Dept: CARDIOLOGY CLINIC | Age: 68
End: 2018-12-19

## 2018-12-19 NOTE — TELEPHONE ENCOUNTER
----- Message from Julisa Alvarado MD sent at 12/19/2018 12:23 AM EST -----  Regarding: ECHO  NORMAL ESSENTIALLY    B  ----- Message -----  From: Rogelio, Card Result In  Sent: 12/18/2018   6:27 PM  To:  Julisa Alvarado MD

## 2019-01-07 ENCOUNTER — HOSPITAL ENCOUNTER (OUTPATIENT)
Dept: PREADMISSION TESTING | Age: 69
Discharge: HOME | End: 2019-01-07
Payer: MEDICARE

## 2019-01-07 VITALS
HEART RATE: 65 BPM | HEIGHT: 64 IN | WEIGHT: 197.8 LBS | OXYGEN SATURATION: 100 % | SYSTOLIC BLOOD PRESSURE: 147 MMHG | TEMPERATURE: 97.3 F | RESPIRATION RATE: 18 BRPM | DIASTOLIC BLOOD PRESSURE: 64 MMHG | BODY MASS INDEX: 33.77 KG/M2

## 2019-01-07 LAB
ABO + RH BLD: NORMAL
ALBUMIN SERPL-MCNC: 3.7 G/DL (ref 3.5–5)
ALBUMIN/GLOB SERPL: 0.8 {RATIO} (ref 1.1–2.2)
ALP SERPL-CCNC: 64 U/L (ref 45–117)
ALT SERPL-CCNC: 27 U/L (ref 12–78)
ANION GAP SERPL CALC-SCNC: 9 MMOL/L (ref 5–15)
APPEARANCE UR: CLEAR
AST SERPL-CCNC: 17 U/L (ref 15–37)
BACTERIA URNS QL MICRO: NEGATIVE /HPF
BILIRUB SERPL-MCNC: 0.6 MG/DL (ref 0.2–1)
BILIRUB UR QL: NEGATIVE
BLOOD GROUP ANTIBODIES SERPL: NORMAL
BUN SERPL-MCNC: 22 MG/DL (ref 6–20)
BUN/CREAT SERPL: 31 (ref 12–20)
CALCIUM SERPL-MCNC: 9 MG/DL (ref 8.5–10.1)
CHLORIDE SERPL-SCNC: 105 MMOL/L (ref 97–108)
CO2 SERPL-SCNC: 28 MMOL/L (ref 21–32)
COLOR UR: ABNORMAL
CREAT SERPL-MCNC: 0.71 MG/DL (ref 0.55–1.02)
EPITH CASTS URNS QL MICRO: ABNORMAL /LPF
ERYTHROCYTE [DISTWIDTH] IN BLOOD BY AUTOMATED COUNT: 13.6 % (ref 11.5–14.5)
EST. AVERAGE GLUCOSE BLD GHB EST-MCNC: 160 MG/DL
GLOBULIN SER CALC-MCNC: 4.9 G/DL (ref 2–4)
GLUCOSE SERPL-MCNC: 94 MG/DL (ref 65–100)
GLUCOSE UR STRIP.AUTO-MCNC: NEGATIVE MG/DL
HBA1C MFR BLD: 7.2 % (ref 4.2–6.3)
HCT VFR BLD AUTO: 38.2 % (ref 35–47)
HGB BLD-MCNC: 12 G/DL (ref 11.5–16)
HGB UR QL STRIP: NEGATIVE
HYALINE CASTS URNS QL MICRO: ABNORMAL /LPF (ref 0–5)
INR PPP: 1.1 (ref 0.9–1.1)
KETONES UR QL STRIP.AUTO: NEGATIVE MG/DL
LEUKOCYTE ESTERASE UR QL STRIP.AUTO: NEGATIVE
MCH RBC QN AUTO: 27.8 PG (ref 26–34)
MCHC RBC AUTO-ENTMCNC: 31.4 G/DL (ref 30–36.5)
MCV RBC AUTO: 88.4 FL (ref 80–99)
NITRITE UR QL STRIP.AUTO: NEGATIVE
NRBC # BLD: 0 K/UL (ref 0–0.01)
NRBC BLD-RTO: 0 PER 100 WBC
PH UR STRIP: 5 [PH] (ref 5–8)
PLATELET # BLD AUTO: 209 K/UL (ref 150–400)
PMV BLD AUTO: 10.4 FL (ref 8.9–12.9)
POTASSIUM SERPL-SCNC: 3.8 MMOL/L (ref 3.5–5.1)
PROT SERPL-MCNC: 8.6 G/DL (ref 6.4–8.2)
PROT UR STRIP-MCNC: ABNORMAL MG/DL
PROTHROMBIN TIME: 11.2 SEC (ref 9–11.1)
RBC # BLD AUTO: 4.32 M/UL (ref 3.8–5.2)
RBC #/AREA URNS HPF: ABNORMAL /HPF (ref 0–5)
SODIUM SERPL-SCNC: 142 MMOL/L (ref 136–145)
SP GR UR REFRACTOMETRY: 1.02 (ref 1–1.03)
SPECIMEN EXP DATE BLD: NORMAL
UA: UC IF INDICATED,UAUC: ABNORMAL
UROBILINOGEN UR QL STRIP.AUTO: 1 EU/DL (ref 0.2–1)
WBC # BLD AUTO: 4.3 K/UL (ref 3.6–11)
WBC URNS QL MICRO: ABNORMAL /HPF (ref 0–4)

## 2019-01-07 PROCEDURE — 83036 HEMOGLOBIN GLYCOSYLATED A1C: CPT

## 2019-01-07 PROCEDURE — 86900 BLOOD TYPING SEROLOGIC ABO: CPT

## 2019-01-07 PROCEDURE — 85610 PROTHROMBIN TIME: CPT

## 2019-01-07 PROCEDURE — 85027 COMPLETE CBC AUTOMATED: CPT

## 2019-01-07 PROCEDURE — 80053 COMPREHEN METABOLIC PANEL: CPT

## 2019-01-07 PROCEDURE — 81001 URINALYSIS AUTO W/SCOPE: CPT

## 2019-01-07 PROCEDURE — 97161 PT EVAL LOW COMPLEX 20 MIN: CPT

## 2019-01-07 PROCEDURE — 36415 COLL VENOUS BLD VENIPUNCTURE: CPT

## 2019-01-07 PROCEDURE — 97110 THERAPEUTIC EXERCISES: CPT

## 2019-01-07 RX ORDER — SODIUM CHLORIDE, SODIUM LACTATE, POTASSIUM CHLORIDE, CALCIUM CHLORIDE 600; 310; 30; 20 MG/100ML; MG/100ML; MG/100ML; MG/100ML
25 INJECTION, SOLUTION INTRAVENOUS CONTINUOUS
Status: CANCELLED | OUTPATIENT
Start: 2019-01-21

## 2019-01-07 RX ORDER — GLYBURIDE 1.25 MG/1
1.25 TABLET ORAL
COMMUNITY
End: 2021-03-09 | Stop reason: ALTCHOICE

## 2019-01-07 NOTE — PROGRESS NOTES
[facility]  Physical Therapy Pre-surgery evaluation  [address] [facility], [address] [facility]    physical Therapy pre TKR surgery EVALUATION  Patient: Camila Howell (15 y.o. female)  Date: 1/7/2019  Primary Diagnosis: rt knee        Precautions:        ASSESSMENT :  Based on the objective data described below, the patient presents with impaired gait, balance, pain, and overall high level functional mobility due to end stage degenerative joint disease in the right knee. Discussed anticipated disposition to home with possible discharge within a 1 to 2 day time frame post-surgery. Patient and  in agreement. Patient's  present for session today and supportive. GOALS: (Goals have been discussed and agreed upon with patient.)  DISCHARGE GOALS: Time Frame: 1 DAY  1. Patient will demonstrate increased strength, range of motion, and pain control via a home exercise program in order to minimize functional deficits in preparation for their upcoming surgery. This will be achieved by using education, demonstration and through the use of an informational handout including a home exercise program.  REHABILITATION POTENTIAL FOR STATED GOALS: Good     RECOMMENDATIONS AND PLANNED INTERVENTIONS: (Benefits and precautions of physical therapy have been discussed with the patient.)  1. Home Exercise Program  TREATMENT PLAN EFFECTIVE DATES: 1/7/2019 TO 1/7/2019  FREQUENCY/DURATION: Patient to continue to perform home exercise program at least twice daily until her surgery. SUBJECTIVE:   Patient stated I hope my surgery helps.     OBJECTIVE DATA SUMMARY:   HISTORY:    Past Medical History:   Diagnosis Date    Arthritis     Congestive heart failure, unspecified     Diabetes ([address]     Hypercholesterolemia     Hypertension      Past Surgical History:   Procedure Laterality Date    HX ORTHOPAEDIC      Arthroscopy right knee     Prior Level of Function/Home Situation: Patient lives with  in a single story home with 4 steps to enter. Patient is ambulatory in community and driving, uses straight cane occasionally. No history of falls. Personal factors and/or comorbidities impacting plan of care:     Patient []   does  [x]   does not state signs/symptoms of shortness of breath/dyspnea on exertion/respiratory distress. Home Situation  Home Environment: (P) Private residence  # Steps to Enter: (P) 4  Rails to Enter: (P) Yes  Hand Rails : (P) Bilateral  One/Two Story Residence: (P) One story  Living Alone: (P) No  Support Systems: (P) Spouse/Significant Other/Partner  Patient Expects to be Discharged to[de-identified] (P) Private residence  Current DME Used/Available at Home: (P) Cane, straight, Raised toilet seat  Tub or Shower Type: (P) Tub/Shower combination    EXAMINATION/PRESENTATION/DECISION MAKING:     ADLs (Current Functional Status): Bathing/Showering:   [x] Independent  [] Requires Assistance from Someone  [] Sponge Bath Only   Ambulation:  [x] Independent  [] Walk Indoors Only  [] Walk Outdoors  [] Use Assistive Device  [] Use Wheelchair Only     Dressing:  [x] Independent    Requires Assistance from Someone for:  [] Sock/Shoes  [] Pants  [] Everything   Household Activities:  [] Routine house and yard work  [x] Light Housework Only  [] None       Critical Behavior:                Strength:    Strength:  Within functional limits                    Tone & Sensation:   Tone: Normal              Sensation: Intact               Range Of Motion:  AROM: Within functional limits           PROM: Within functional limits           Coordination:  Coordination: Within functional limits    Functional Mobility:  Transfers:  Sit to Stand: Independent  Stand to Sit: Independent                       Balance:   Sitting: Intact  Standing: Intact  Ambulation/Gait Training:  Distance (ft): 40 Feet (ft)  Assistive Device: Other (comment)  Ambulation - Level of Assistance: Independent        Gait Abnormalities: Antalgic                                      Therapeutic Exercises:   The patient was educated in, has demonstrated, and has received written instructions to complete for their home exercise program per total knee replacement protocol. Functional Measure:  Lower Extremity Functional Scale (LEFS):      Score21/80     Percentage of impairment CH  0% CI  1-19% CJ  20-39% CK  40-59% CL  60-79% CM  80-99% CN  100%   LEFS score:  0-80 80 64-79 47-63 31-46 16-30 1-15 0     Cutt-offs: None established  TKA and MADELEINE:   (Yariel et al, 2000)  MDC = 9 points   MCID = 9 points           Pain:                      Activity Tolerance:   good   Patient []   does  [x]   does not demonstrate signs/symptoms of shortness of breath/dyspnea on exertion/respiratory distress. COMMUNICATION/EDUCATION:   The patient was educated on:  [x]         Importance of post-operative mobility to achieve their desired outcomes and restore biological function  [x]         The key post-operative time frame to address ROM to prevent additional complications    The patients plan of care was discussed with:   [x]         The patient verbalized understanding of her plan in preparation for their upcoming surgery  [x]         The patient's  was present for this session  []         The patient reports that he/she does not have a  identified at this time  [x]         The  verbalized understanding of the education regarding the patient's upcoming surgery  [x]         Patient/family agree to work toward stated goals and plan of care. []         Patient understands intent and goals of therapy, but is neutral about his/her participation. []         Patient is unable to participate in goal setting and plan of care.     Thank you for this referral.  Steve Moore   Time Calculation: 21 mins

## 2019-01-07 NOTE — PERIOP NOTES
Incentive Spirometer        Using the incentive spirometer helps expand the small air sacs of your lungs, helps you breathe deeply, and helps improve your lung function. Use your incentive spirometer twice a day (10 breaths each time) prior to surgery. How to Use Your Incentive Spirometer:  1. Hold the incentive spirometer in an upright position. 2. Breathe out as usual.   3. Place the mouthpiece in your mouth and seal your lips tightly around it. 4. Take a deep breath. Breathe in slowly and as deeply as possible. Keep the blue flow rate guide between the arrows. 5. Hold your breath as long as possible. Then exhale slowly and allow the piston to fall to the bottom of the column. 6. Rest for a few seconds and repeat steps one through five at least 10 times. PAT Tidal Volume_____1750_____________  x____2____________  Date____1/7/2019___________________    Rolly Darting THE INCENTIVE SPIROMETER WITH YOU TO THE HOSPITAL ON THE DAY OF YOUR SURGERY. Opportunity given to ask and answer questions as well as to observe return demonstration.     Patient signature_____________________________    Witness____________________________

## 2019-01-07 NOTE — PERIOP NOTES
[facility]  Preoperative Instructions        Surgery Date 1/21/2019          Time of Arrival 10:30 AM    1. On the day of your surgery, please report to the Surgical Services Registration Desk and sign in at your designated time. The Surgery Center is located to the right of the Emergency Room. 2. You must have someone with you to drive you home. You should not drive a car for 24 hours following surgery. Please make arrangements for a friend or family member to stay with you for the first 24 hours after your surgery. 3. Do not have anything to eat or drink (including water, gum, mints, coffee, juice) after midnight ?1/20/2019? Lamine Ivet ? This may not apply to medications prescribed by your physician. ?(Please note below the special instructions with medications to take the morning of your procedure.)    4. We recommend you do not drink any alcoholic beverages for 24 hours before and after your surgery. 5. Contact your surgeons office for instructions on the following medications: non-steroidal anti-inflammatory drugs (i.e. Advil, Aleve), vitamins, and supplements. (Some surgeons will want you to stop these medications prior to surgery and others may allow you to take them)  **If you are currently taking Plavix, Coumadin, Aspirin and/or other blood-thinning agents, contact your surgeon for instructions. ** Your surgeon will partner with the physician prescribing these medications to determine if it is safe to stop or if you need to continue taking. Please do not stop taking these medications without instructions from your surgeon    6. Wear comfortable clothes. Wear glasses instead of contacts. Do not bring any money or jewelry. Please bring picture ID, insurance card, and any prearranged co-payment or hospital payment. Do not wear make-up, particularly mascara the morning of your surgery. Do not wear nail polish, particularly if you are having foot /hand surgery.   Wear your hair loose or down, no ponytails, buns, floyd pins or clips. All body piercings must be removed. Please shower with antibacterial soap for three consecutive days before and on the morning of surgery, but do not apply any lotions, powders or deodorants after the shower on the day of surgery. Please use a fresh towels after each shower. Please sleep in clean clothes and change bed linens the night before surgery. Please do not shave for 48 hours prior to surgery. Shaving of the face is acceptable. 7. You should understand that if you do not follow these instructions your surgery may be cancelled. If your physical condition changes (I.e. fever, cold or flu) please contact your surgeon as soon as possible. 8. It is important that you be on time. If a situation occurs where you may be late, please call [phone] (OR Holding Area). 9. If you have any questions and or problems, please call [phone] (Pre-admission Testing). 10. Your surgery time may be subject to change. You will receive a phone call the evening prior if your time changes. 11.  If having outpatient surgery, you must have someone to drive you here, stay with you during the duration of your stay, and to drive you home at time of discharge. 12.   In an effort to improve the efficiency, privacy, and safety for all of our Pre-op patients visitors are not allowed in the Holding area. Once you arrive and are registered your family/visitors will be asked to remain in the waiting room. The Pre-op staff will get you from the Surgical Waiting Area and will explain to you and your family/visitors that the Pre-op phase is beginning. The staff will answer any questions and provide instructions for tracking of the patient, by use of the existing tracking number and color-coded status board in the waiting room.   At this time the staff will also ask for your designated spokesperson information in the event that the physician or staff need to provide an update or obtain any pertinent information. The designated spokesperson will be notified if the physician needs to speak to family during the pre-operative phase. If at any time your family/visitors has questions or concerns they may approach the volunteer desk in the waiting area for assistance. Special Instructions: Bring Incentive spirometer with you to the hospital the day of surgery to the hospital. Verify with  Dr Zaldivar Comes  insulin management Insulin management prior to surgery. MEDICATIONS TO TAKE THE MORNING OF SURGERY WITH A SIP OF WATER:Allopurinol, Furosemide, metoprolol. Tramadol if needed. I understand a pre-operative phone call will be made to verify my surgery time. In the event that I am not available, I give permission for a message to be left on my answering service and/or with another person?   Yes [phone]         ___________________      __________   _________    (Signature of Patient)             (Witness)                (Date and Time)

## 2019-01-07 NOTE — PERIOP NOTES
Preventing Infections Before and After - Your Surgery    IMPORTANT INSTRUCTIONS    Please read and follow these instructions carefully. If you are unable to comply with the below instructions your procedure will be cancelled. Every Night for Three (3) nights before your surgery:  1. Shower with an antibacterial soap, such as Dial, or the soap provided at your preassessment appointment. A shower is better than a bath for cleaning your skin. 2. If needed, ask someone to help you reach all areas of your body. Dont forget to clean your belly button with every shower. The night before your surgery: If you lose your Hibiclens please contact surgery center or you can purchase it at a local pharmacy  1. On the night before your surgery, shower with an antibacterial soap, such as Dial, or the soap provided at your preassessment appointment. 2. With one packet of Hibiclens in hand, turn water off.  3. Apply Hibiclens antiseptic skin cleanser with a clean, freshly washed washcloth. ? Gently apply to your body from chin to toes (except the genital area) and especially the area(s) where your incision(s) will be. ? Leave Hibiclens on your skin for at least 20 seconds. CAUTION: If needed, Hibiclens may be used to clean the folds of skin of the legs (such as in the area of the groin) and on your buttocks and hips. However, do not use Hibiclens above the neck or in the genital area (your bottom) or put inside any area of your body. 4. Turn the water back on and rinse. 5. Dry gently with a clean, freshly washed towel. 6. After your shower, do not use any powder, deodorant, perfumes or lotion. 7. Use clean, freshly washed towels and washcloths every time you shower. 8. Wear clean, freshly washed pajamas to bed the night before surgery. 9. Sleep on clean, freshly washed sheets. 10. Do not allow pets to sleep in your bed with you. The Morning of your surgery:  1.  Shower again thoroughly with an antibacterial soap, such as Dial or the soap provided at your preassessment appointment. If needed, ask someone for help to reach all areas of your body. Dont forget to clean your belly button! Rinse. 2. Dry gently with a clean, freshly washed towel. 3. After your shower, do not use any powder, deodorant, perfumes or lotion prior to surgery. 4. Put on clean, freshly washed clothing. Tips to help prevent infections after your surgery:  1. Protect your surgical wound from germs:  ? Hand washing is the most important thing you and your caregivers can do to prevent infections. ? Keep your bandage clean and dry! ? Do not touch your surgical wound. 2. Use clean, freshly washed towels and washcloths every time you shower; do not share bath linens with others. 3. Until your surgical wound is healed, wear clothing and sleep on bed linens each day that are clean and freshly washed. 4. Do not allow pets to sleep in your bed with you or touch your surgical wound. 5. Do not smoke - smoking delays wound healing. This may be a good time to stop smoking. 6. If you have diabetes, it is important for you to manage your blood sugar levels properly before your surgery as well as after your surgery. Poorly managed blood sugar levels slow down wound healing and prevent you from healing completely. If you lose your Hibiclens, please call the [facility], or it is available for purchase at your pharmacy.                ___________________      ___________________      ________________  (Signature of Patient)          (Witness)                   (Date and Time)

## 2019-01-08 LAB
BACTERIA SPEC CULT: ABNORMAL
BACTERIA SPEC CULT: ABNORMAL
SERVICE CMNT-IMP: ABNORMAL

## 2019-01-08 RX ORDER — PREGABALIN 25 MG/1
75 CAPSULE ORAL ONCE
Status: CANCELLED | OUTPATIENT
Start: 2019-01-21 | End: 2019-01-21

## 2019-01-08 RX ORDER — ACETAMINOPHEN 500 MG
1000 TABLET ORAL ONCE
Status: CANCELLED | OUTPATIENT
Start: 2019-01-21 | End: 2019-01-21

## 2019-01-08 RX ORDER — CEFAZOLIN SODIUM/WATER 2 G/20 ML
2 SYRINGE (ML) INTRAVENOUS ONCE
Status: CANCELLED | OUTPATIENT
Start: 2019-01-21 | End: 2019-01-21

## 2019-01-08 RX ORDER — CELECOXIB 100 MG/1
200 CAPSULE ORAL ONCE
Status: CANCELLED | OUTPATIENT
Start: 2019-01-21 | End: 2019-01-21

## 2019-01-08 RX ORDER — HEPARIN SODIUM 1000 [USP'U]/ML
1000 INJECTION, SOLUTION INTRAVENOUS; SUBCUTANEOUS ONCE
Status: CANCELLED | OUTPATIENT
Start: 2019-01-21 | End: 2019-01-21

## 2019-01-09 RX ORDER — MUPIROCIN 20 MG/G
OINTMENT TOPICAL 2 TIMES DAILY
Qty: 22 G | Refills: 0 | Status: SHIPPED | OUTPATIENT
Start: 2019-01-09 | End: 2019-01-14

## 2019-01-09 RX ORDER — VANCOMYCIN/0.9 % SOD CHLORIDE 1.5G/250ML
1500 PLASTIC BAG, INJECTION (ML) INTRAVENOUS ONCE
Status: CANCELLED | OUTPATIENT
Start: 2019-01-21 | End: 2019-01-21

## 2019-01-09 NOTE — ADVANCED PRACTICE NURSE
PC to pt regarding positive nasal cx (MRSA) and need to start Mupirocin ointment BID x 5 days to B nostrils starting today. Pt verbalized understanding of instructions and will start today as recommended. Allergies and pharmacy of choice reviewed. Rx escribed to pt's pharmacy of choice. PTA medlist updated. Jesus Bowles, assistant to Dr. Goldie Ma notified of results and tx.  Vancomycin added per protocol

## 2019-01-10 NOTE — PERIOP NOTES
Pre-op Hgb A1C result faxed to Dr Batsheva Esparza for review.  Confirmation fax received and placed  in paper chart Zena Aldridge

## 2019-01-17 ENCOUNTER — OFFICE VISIT (OUTPATIENT)
Dept: INTERNAL MEDICINE CLINIC | Age: 69
End: 2019-01-17

## 2019-01-17 VITALS
BODY MASS INDEX: 33.43 KG/M2 | DIASTOLIC BLOOD PRESSURE: 70 MMHG | HEART RATE: 63 BPM | OXYGEN SATURATION: 98 % | SYSTOLIC BLOOD PRESSURE: 144 MMHG | TEMPERATURE: 98.1 F | RESPIRATION RATE: 18 BRPM | HEIGHT: 64 IN | WEIGHT: 195.8 LBS

## 2019-01-17 DIAGNOSIS — I10 ESSENTIAL HYPERTENSION: ICD-10-CM

## 2019-01-17 DIAGNOSIS — M17.11 PRIMARY OSTEOARTHRITIS OF RIGHT KNEE: ICD-10-CM

## 2019-01-17 DIAGNOSIS — E78.5 DYSLIPIDEMIA: ICD-10-CM

## 2019-01-17 DIAGNOSIS — I42.9 CARDIOMYOPATHY, UNSPECIFIED TYPE (HCC): Primary | ICD-10-CM

## 2019-01-17 DIAGNOSIS — E66.9 OBESITY (BMI 30.0-34.9): ICD-10-CM

## 2019-01-17 NOTE — PROGRESS NOTES
Chief Complaint   Patient presents with    Knee Pain     follow up for knee surgery on Monday     1. Have you been to the ER, urgent care clinic since your last visit? Hospitalized since your last visit? No    2. Have you seen or consulted any other health care providers outside of the [address] [place] [address] since your last visit? Include any pap smears or colon screening.  No

## 2019-01-18 NOTE — PERIOP NOTES
Pre-op call made and patient given TOA. Patient instructed may have up to 8 ounces of water up to 8:30 am and then NPO. Patient verbalized understanding.

## 2019-01-20 NOTE — PROGRESS NOTES
[address] [place] [address] and Primary Care  [place] [address]  [address]  [facility] [address]  Phone:  [phone]  Fax: [phone]    Chief Complaint   Patient presents with    Knee Pain     follow up for knee surgery on Monday       SUBJECTIVE:    Violeta Hill is a 79 y.o. female Comes in for return visit planning to have a right total knee replacement in the next several weeks. This will be done by Dr. Michael Sparks. She states that she has generally been doing well. She denies any shortness of breath, orthopnea or PND. She has a nonischemic cardiomyopathy, which has been reasonably stable as long as she is compliant with her medications. Her diabetes has indeed been doing well also based on her last hemoglobin A1c that I did some time ago. She states that most of her blood sugars are generally less than 140. She has had no episodes of gout. She has a past history of primary hypertension and technically dyslipidemia. Finally, there has been no meaningful weight loss. Current Outpatient Medications   Medication Sig Dispense Refill    mupirocin calcium (BACTROBAN) 2 % nasal ointment by Both Nostrils route two (2) times a day.  glyBURIDE (DIABETA) 1.25 mg tablet Take 1.25 mg by mouth Daily (before breakfast).  allopurinol (ZYLOPRIM) 300 mg tablet Take 1 Tab by mouth daily. 90 Tab 3    potassium chloride SR (KLOR-CON 10) 10 mEq tablet 2  Tab 3    insulin NPH (HUMULIN N) 100 unit/mL (3 mL) inpn Inject 58 units daily. dx.e11.9 20 mL 11    furosemide (LASIX) 40 mg tablet TAKE 3 TABLETS BY MOUTH EVERY MORNING 90 Tab 11    traMADol (ULTRAM) 50 mg tablet Take 1 Tab by mouth two (2) times daily as needed for Pain.  Max Daily Amount: 100 mg. 40 Tab 2    lisinopril (PRINIVIL, ZESTRIL) 20 mg tablet TAKE 1 TABLET BY MOUTH ONCE DAILY IN THE MORNING 90 Tab 3    metoprolol succinate (TOPROL-XL) 100 mg tablet TAKE 1 TABLET BY MOUTH DAILY 90 Tab 3    alcohol swabs (BD SINGLE USE SWABS REGULAR) padm USE TO TEST BLOOD SUGAR.dx.e11.9 100 Pad 11    glucose blood VI test strips (FREESTYLE LITE STRIPS) strip Use to test blood sugar three times daily.  Dx.e11.9 100 Strip 11    metFORMIN ER (GLUCOPHAGE XR) 500 mg tablet TAKE 1 TABLET BY MOUTH THREE TIMES DAILY WITH MEALS 90 Tab 11    BD INSULIN PEN NEEDLE UF SHORT 31 gauge x 5/16\" ndle USE WITH INSULIN PENS TWICE DAILY AS DIRECTED 100 Pen Needle 11    ONE TOUCH DELICA 33 gauge misc   11    NURIA PEN NEEDLE 32 x 5/32 \" ndle       Blood-Glucose Meter monitoring kit Testing BID-DX:250.00 1 kit 0    Lancets (ONE TOUCH ULTRASOFT LANCETS) misc TESTING BID 1 Package 11    glucose blood VI test strips (ASCENSIA AUTODISC VI, ONE TOUCH ULTRA TEST VI) strip TESTING BID 1 Package 11     Past Medical History:   Diagnosis Date    Arthritis     Congestive heart failure, unspecified     Diabetes ([facility]     Hypercholesterolemia     Hypertension      Past Surgical History:   Procedure Laterality Date    HX ORTHOPAEDIC      Arthroscopy right knee     No Known Allergies    REVIEW OF SYSTEMS:  General: negative for - chills or fever  ENT: negative for - headaches, nasal congestion or tinnitus  Respiratory: negative for - cough, hemoptysis, shortness of breath or wheezing  Cardiovascular : negative for - chest pain, edema, palpitations or shortness of breath  Gastrointestinal: negative for - abdominal pain, blood in stools, heartburn or nausea/vomiting  Genito-Urinary: no dysuria, trouble voiding, or hematuria  Musculoskeletal: negative for - gait disturbance, joint pain, joint stiffness or joint swelling  Neurological: no TIA or stroke symptoms  Hematologic: no bruises, no bleeding, no swollen glands  Integument: no lumps, mole changes, nail changes or rash  Endocrine:no malaise/lethargy or unexpected weight changes      Social History     Socioeconomic History    Marital status:      Spouse name: Not on file    Number of children: 3    Years of education: 15    Highest education level: Not on file   Occupational History    Occupation: retired   Tobacco Use    Smoking status: Former Smoker     Packs/day: 0.25     Years: 20.00     Pack years: 5.00     Last attempt to quit: 2014     Years since quittin.1    Smokeless tobacco: Never Used   Substance and Sexual Activity    Alcohol use: Yes     Alcohol/week: 0.0 oz     Comment: 21-24 Beers per week    Drug use: No    Sexual activity: Yes     Partners: Male     Family History   Problem Relation Age of Onset    Diabetes Mother        OBJECTIVE:     Visit Vitals  /70   Pulse 63   Temp 98.1 °F (36.7 °C) (Oral)   Resp 18   Ht 5' 4\" (1.626 m)   Wt 195 lb 12.8 oz (88.8 kg)   SpO2 98%   BMI 33.61 kg/m²     CONSTITUTIONAL: well , well nourished, appears age appropriate  EYES: perrla, eom intact  ENMT:moist mucous membranes, pharynx clear  NECK: supple. Thyroid normal,no JVD  RESPIRATORY: Chest: clear to ascultation and percussion   CARDIOVASCULAR: Heart: regular rate and rhythm  GASTROINTESTINAL: Abdomen: soft, bowel sounds active  HEMATOLOGIC: no pathological lymph nodes palpated  MUSCULOSKELETAL: Extremities: no edema, pulse 1+   INTEGUMENT: No unusual rashes or suspicious skin lesions noted. Nails appear normal.  NEUROLOGIC: non-focal exam   MENTAL STATUS: alert and oriented, appropriate affect     ASSESSMENT:   1. Cardiomyopathy, unspecified type ([address]    2. Essential hypertension    3. Insulin dependent diabetes mellitus ([address]    4. Dyslipidemia    5. Obesity (BMI 30.0-34.9)    6. Primary osteoarthritis of right knee        PLAN:    1. Patient appears to be medically stable for planned procedure pending review of lab work. 2. She is well compensated from a cardiac perspective. There are no overt signs of congestive heart failure. 3. Her blood pressure is excellent today, no adjustments are made. 4. Hopefully her diabetes is doing well.  I will await the results of her hemoglobin A1c.  5. I encouraged her to lose weight, but obviously this is not an issue currently, but in the long run it will be an issue. This is the driving force behind her dysmetabolic diseases, as well as her osteoarthritis. This can be accomplished by eating meals, eliminating snacks and avoiding the consumption of processed carbohydrates. Addendum:  Labs are stable,including her hemoglobin A1c. 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 ATTENTION:   This medical record was transcribed using an electronic medical records system. Although proofread, it may and can contain electronic and spelling errors. Other human spelling and other errors may be present. Corrections may be executed at a later time. Please feel free to contact us for any clarifications as needed. Follow-up Disposition:  Return in about 3 months (around 4/17/2019).       Royer James MD

## 2019-01-21 ENCOUNTER — ANESTHESIA EVENT (OUTPATIENT)
Dept: SURGERY | Age: 69
DRG: 470 | End: 2019-01-21
Payer: MEDICARE

## 2019-01-21 ENCOUNTER — HOSPITAL ENCOUNTER (INPATIENT)
Age: 69
LOS: 1 days | Discharge: HOME HEALTH | DRG: 470 | End: 2019-01-22
Attending: ORTHOPAEDIC SURGERY | Admitting: ORTHOPAEDIC SURGERY
Payer: MEDICARE

## 2019-01-21 ENCOUNTER — ANESTHESIA (OUTPATIENT)
Dept: SURGERY | Age: 69
DRG: 470 | End: 2019-01-21
Payer: MEDICARE

## 2019-01-21 DIAGNOSIS — Z96.651 S/P TOTAL KNEE ARTHROPLASTY, RIGHT: Primary | ICD-10-CM

## 2019-01-21 PROBLEM — M17.11 OSTEOARTHROSIS, LOCALIZED, PRIMARY, KNEE, RIGHT: Status: ACTIVE | Noted: 2019-01-21

## 2019-01-21 LAB
GLUCOSE BLD STRIP.AUTO-MCNC: 131 MG/DL (ref 65–100)
GLUCOSE BLD STRIP.AUTO-MCNC: 155 MG/DL (ref 65–100)
GLUCOSE BLD STRIP.AUTO-MCNC: 209 MG/DL (ref 65–100)
GLUCOSE BLD STRIP.AUTO-MCNC: 211 MG/DL (ref 65–100)
GLUCOSE BLD STRIP.AUTO-MCNC: 59 MG/DL (ref 65–100)
GLUCOSE BLD STRIP.AUTO-MCNC: 67 MG/DL (ref 65–100)
GLUCOSE BLD STRIP.AUTO-MCNC: 87 MG/DL (ref 65–100)
SERVICE CMNT-IMP: ABNORMAL
SERVICE CMNT-IMP: NORMAL
SERVICE CMNT-IMP: NORMAL

## 2019-01-21 PROCEDURE — 77030008684 HC TU ET CUF COVD -B: Performed by: NURSE ANESTHETIST, CERTIFIED REGISTERED

## 2019-01-21 PROCEDURE — 77030031139 HC SUT VCRL2 J&J -A: Performed by: ORTHOPAEDIC SURGERY

## 2019-01-21 PROCEDURE — 76060000034 HC ANESTHESIA 1.5 TO 2 HR: Performed by: ORTHOPAEDIC SURGERY

## 2019-01-21 PROCEDURE — 77030039497 HC CST PAD STERILE CHCS -A: Performed by: ORTHOPAEDIC SURGERY

## 2019-01-21 PROCEDURE — 77030006835 HC BLD SAW SAG STRY -B: Performed by: ORTHOPAEDIC SURGERY

## 2019-01-21 PROCEDURE — 77030032490 HC SLV COMPR SCD KNE COVD -B: Performed by: ORTHOPAEDIC SURGERY

## 2019-01-21 PROCEDURE — 77030011640 HC PAD GRND REM COVD -A: Performed by: ORTHOPAEDIC SURGERY

## 2019-01-21 PROCEDURE — 77030008467 HC STPLR SKN COVD -B: Performed by: ORTHOPAEDIC SURGERY

## 2019-01-21 PROCEDURE — 74011000250 HC RX REV CODE- 250

## 2019-01-21 PROCEDURE — 77030018846 HC SOL IRR STRL H20 ICUM -A: Performed by: ORTHOPAEDIC SURGERY

## 2019-01-21 PROCEDURE — 74011250637 HC RX REV CODE- 250/637: Performed by: ORTHOPAEDIC SURGERY

## 2019-01-21 PROCEDURE — 74011250636 HC RX REV CODE- 250/636: Performed by: ORTHOPAEDIC SURGERY

## 2019-01-21 PROCEDURE — 77030013708 HC HNDPC SUC IRR PULS STRY –B: Performed by: ORTHOPAEDIC SURGERY

## 2019-01-21 PROCEDURE — C1713 ANCHOR/SCREW BN/BN,TIS/BN: HCPCS | Performed by: ORTHOPAEDIC SURGERY

## 2019-01-21 PROCEDURE — 77030020788: Performed by: ORTHOPAEDIC SURGERY

## 2019-01-21 PROCEDURE — 0SRC0J9 REPLACEMENT OF RIGHT KNEE JOINT WITH SYNTHETIC SUBSTITUTE, CEMENTED, OPEN APPROACH: ICD-10-PCS | Performed by: ORTHOPAEDIC SURGERY

## 2019-01-21 PROCEDURE — 74011250636 HC RX REV CODE- 250/636: Performed by: ANESTHESIOLOGY

## 2019-01-21 PROCEDURE — 77030000032 HC CUF TRNQT ZIMM -B: Performed by: ORTHOPAEDIC SURGERY

## 2019-01-21 PROCEDURE — 76010000162 HC OR TIME 1.5 TO 2 HR INTENSV-TIER 1: Performed by: ORTHOPAEDIC SURGERY

## 2019-01-21 PROCEDURE — C1776 JOINT DEVICE (IMPLANTABLE): HCPCS | Performed by: ORTHOPAEDIC SURGERY

## 2019-01-21 PROCEDURE — 77030028907 HC WRP KNEE WO BGS SOLM -B

## 2019-01-21 PROCEDURE — 77030002912 HC SUT ETHBND J&J -A: Performed by: ORTHOPAEDIC SURGERY

## 2019-01-21 PROCEDURE — 77030021678 HC GLIDESCP STAT DISP VERT -B: Performed by: NURSE ANESTHETIST, CERTIFIED REGISTERED

## 2019-01-21 PROCEDURE — 74011000250 HC RX REV CODE- 250: Performed by: ORTHOPAEDIC SURGERY

## 2019-01-21 PROCEDURE — 76210000016 HC OR PH I REC 1 TO 1.5 HR: Performed by: ORTHOPAEDIC SURGERY

## 2019-01-21 PROCEDURE — 77030010785: Performed by: ORTHOPAEDIC SURGERY

## 2019-01-21 PROCEDURE — 77030018836 HC SOL IRR NACL ICUM -A: Performed by: ORTHOPAEDIC SURGERY

## 2019-01-21 PROCEDURE — 74011250636 HC RX REV CODE- 250/636

## 2019-01-21 PROCEDURE — 74011000250 HC RX REV CODE- 250: Performed by: ANESTHESIOLOGY

## 2019-01-21 PROCEDURE — 77030012406 HC DRN WND PENRS BARD -A: Performed by: ORTHOPAEDIC SURGERY

## 2019-01-21 PROCEDURE — 74011250636 HC RX REV CODE- 250/636: Performed by: NURSE PRACTITIONER

## 2019-01-21 PROCEDURE — 65270000029 HC RM PRIVATE

## 2019-01-21 PROCEDURE — 77030020782 HC GWN BAIR PAWS FLX 3M -B

## 2019-01-21 PROCEDURE — 77030019908 HC STETH ESOPH SIMS -A: Performed by: NURSE ANESTHETIST, CERTIFIED REGISTERED

## 2019-01-21 PROCEDURE — 82962 GLUCOSE BLOOD TEST: CPT

## 2019-01-21 PROCEDURE — 74011636637 HC RX REV CODE- 636/637: Performed by: ORTHOPAEDIC SURGERY

## 2019-01-21 PROCEDURE — 77030026438 HC STYL ET INTUB CARD -A: Performed by: NURSE ANESTHETIST, CERTIFIED REGISTERED

## 2019-01-21 DEVICE — IMPLANTABLE DEVICE: Type: IMPLANTABLE DEVICE | Site: KNEE | Status: FUNCTIONAL

## 2019-01-21 DEVICE — (D)CEMENT BNE HV R 40GM -- DUPE USE ITEM 353850: Type: IMPLANTABLE DEVICE | Site: KNEE | Status: FUNCTIONAL

## 2019-01-21 DEVICE — COMPONENT PAT DIA31MM THK8MM STD KNEE TI ALLY S STL UHMWPE: Type: IMPLANTABLE DEVICE | Site: KNEE | Status: FUNCTIONAL

## 2019-01-21 DEVICE — TRAY TIB L75MM KNEE CO CHROM I BEAM MOD INTLOK CEM VANGUARD: Type: IMPLANTABLE DEVICE | Site: KNEE | Status: FUNCTIONAL

## 2019-01-21 DEVICE — KNEE CEM FEM/TIB ARC/PAT -- VANGUARD K1: Type: IMPLANTABLE DEVICE | Status: FUNCTIONAL

## 2019-01-21 RX ORDER — ONDANSETRON 2 MG/ML
INJECTION INTRAMUSCULAR; INTRAVENOUS AS NEEDED
Status: DISCONTINUED | OUTPATIENT
Start: 2019-01-21 | End: 2019-01-21 | Stop reason: HOSPADM

## 2019-01-21 RX ORDER — CEFAZOLIN SODIUM/WATER 2 G/20 ML
2 SYRINGE (ML) INTRAVENOUS EVERY 8 HOURS
Status: COMPLETED | OUTPATIENT
Start: 2019-01-21 | End: 2019-01-22

## 2019-01-21 RX ORDER — TRANEXAMIC ACID 100 MG/ML
INJECTION, SOLUTION INTRAVENOUS
Status: DISPENSED
Start: 2019-01-21 | End: 2019-01-22

## 2019-01-21 RX ORDER — ONDANSETRON 4 MG/1
4 TABLET, ORALLY DISINTEGRATING ORAL
Status: DISCONTINUED | OUTPATIENT
Start: 2019-01-21 | End: 2019-01-22 | Stop reason: HOSPADM

## 2019-01-21 RX ORDER — FACIAL-BODY WIPES
10 EACH TOPICAL DAILY PRN
Status: DISCONTINUED | OUTPATIENT
Start: 2019-01-23 | End: 2019-01-22 | Stop reason: HOSPADM

## 2019-01-21 RX ORDER — SUCCINYLCHOLINE CHLORIDE 20 MG/ML
INJECTION INTRAMUSCULAR; INTRAVENOUS AS NEEDED
Status: DISCONTINUED | OUTPATIENT
Start: 2019-01-21 | End: 2019-01-21 | Stop reason: HOSPADM

## 2019-01-21 RX ORDER — OXYCODONE HYDROCHLORIDE 5 MG/1
5 TABLET ORAL
Status: DISCONTINUED | OUTPATIENT
Start: 2019-01-21 | End: 2019-01-22 | Stop reason: HOSPADM

## 2019-01-21 RX ORDER — ALLOPURINOL 300 MG/1
300 TABLET ORAL DAILY
Status: DISCONTINUED | OUTPATIENT
Start: 2019-01-22 | End: 2019-01-22 | Stop reason: HOSPADM

## 2019-01-21 RX ORDER — HEPARIN SODIUM 1000 [USP'U]/ML
1000 INJECTION, SOLUTION INTRAVENOUS; SUBCUTANEOUS ONCE
Status: DISCONTINUED | OUTPATIENT
Start: 2019-01-21 | End: 2019-01-21 | Stop reason: HOSPADM

## 2019-01-21 RX ORDER — POTASSIUM CHLORIDE 750 MG/1
10 TABLET, FILM COATED, EXTENDED RELEASE ORAL DAILY
Status: DISCONTINUED | OUTPATIENT
Start: 2019-01-22 | End: 2019-01-22 | Stop reason: HOSPADM

## 2019-01-21 RX ORDER — MIDAZOLAM HYDROCHLORIDE 1 MG/ML
INJECTION, SOLUTION INTRAMUSCULAR; INTRAVENOUS AS NEEDED
Status: DISCONTINUED | OUTPATIENT
Start: 2019-01-21 | End: 2019-01-21 | Stop reason: HOSPADM

## 2019-01-21 RX ORDER — NALOXONE HYDROCHLORIDE 0.4 MG/ML
0.4 INJECTION, SOLUTION INTRAMUSCULAR; INTRAVENOUS; SUBCUTANEOUS AS NEEDED
Status: DISCONTINUED | OUTPATIENT
Start: 2019-01-21 | End: 2019-01-22 | Stop reason: HOSPADM

## 2019-01-21 RX ORDER — FENTANYL CITRATE 50 UG/ML
INJECTION, SOLUTION INTRAMUSCULAR; INTRAVENOUS AS NEEDED
Status: DISCONTINUED | OUTPATIENT
Start: 2019-01-21 | End: 2019-01-21 | Stop reason: HOSPADM

## 2019-01-21 RX ORDER — SODIUM CHLORIDE, SODIUM LACTATE, POTASSIUM CHLORIDE, CALCIUM CHLORIDE 600; 310; 30; 20 MG/100ML; MG/100ML; MG/100ML; MG/100ML
25 INJECTION, SOLUTION INTRAVENOUS CONTINUOUS
Status: DISCONTINUED | OUTPATIENT
Start: 2019-01-21 | End: 2019-01-21 | Stop reason: HOSPADM

## 2019-01-21 RX ORDER — MAGNESIUM SULFATE 100 %
4 CRYSTALS MISCELLANEOUS AS NEEDED
Status: DISCONTINUED | OUTPATIENT
Start: 2019-01-21 | End: 2019-01-22 | Stop reason: HOSPADM

## 2019-01-21 RX ORDER — ASPIRIN 325 MG
325 TABLET ORAL 2 TIMES DAILY
Status: DISCONTINUED | OUTPATIENT
Start: 2019-01-22 | End: 2019-01-22 | Stop reason: HOSPADM

## 2019-01-21 RX ORDER — POLYETHYLENE GLYCOL 3350 17 G/17G
17 POWDER, FOR SOLUTION ORAL DAILY
Status: DISCONTINUED | OUTPATIENT
Start: 2019-01-22 | End: 2019-01-22 | Stop reason: HOSPADM

## 2019-01-21 RX ORDER — SODIUM CHLORIDE 0.9 % (FLUSH) 0.9 %
5-40 SYRINGE (ML) INJECTION EVERY 8 HOURS
Status: DISCONTINUED | OUTPATIENT
Start: 2019-01-21 | End: 2019-01-22 | Stop reason: HOSPADM

## 2019-01-21 RX ORDER — PHENYLEPHRINE HCL IN 0.9% NACL 0.4MG/10ML
SYRINGE (ML) INTRAVENOUS AS NEEDED
Status: DISCONTINUED | OUTPATIENT
Start: 2019-01-21 | End: 2019-01-21 | Stop reason: HOSPADM

## 2019-01-21 RX ORDER — AMOXICILLIN 250 MG
1 CAPSULE ORAL 2 TIMES DAILY
Status: DISCONTINUED | OUTPATIENT
Start: 2019-01-21 | End: 2019-01-22 | Stop reason: HOSPADM

## 2019-01-21 RX ORDER — DIPHENHYDRAMINE HYDROCHLORIDE 50 MG/ML
12.5 INJECTION, SOLUTION INTRAMUSCULAR; INTRAVENOUS AS NEEDED
Status: DISCONTINUED | OUTPATIENT
Start: 2019-01-21 | End: 2019-01-21 | Stop reason: HOSPADM

## 2019-01-21 RX ORDER — DEXTROSE 50 % IN WATER (D50W) INTRAVENOUS SYRINGE
AS NEEDED
Status: DISCONTINUED | OUTPATIENT
Start: 2019-01-21 | End: 2019-01-21 | Stop reason: HOSPADM

## 2019-01-21 RX ORDER — METOPROLOL SUCCINATE 50 MG/1
100 TABLET, EXTENDED RELEASE ORAL DAILY
Status: DISCONTINUED | OUTPATIENT
Start: 2019-01-22 | End: 2019-01-22 | Stop reason: HOSPADM

## 2019-01-21 RX ORDER — LIDOCAINE HYDROCHLORIDE 10 MG/ML
0.1 INJECTION, SOLUTION EPIDURAL; INFILTRATION; INTRACAUDAL; PERINEURAL AS NEEDED
Status: DISCONTINUED | OUTPATIENT
Start: 2019-01-21 | End: 2019-01-21 | Stop reason: HOSPADM

## 2019-01-21 RX ORDER — DEXAMETHASONE SODIUM PHOSPHATE 4 MG/ML
INJECTION, SOLUTION INTRA-ARTICULAR; INTRALESIONAL; INTRAMUSCULAR; INTRAVENOUS; SOFT TISSUE AS NEEDED
Status: DISCONTINUED | OUTPATIENT
Start: 2019-01-21 | End: 2019-01-21 | Stop reason: HOSPADM

## 2019-01-21 RX ORDER — FAMOTIDINE 20 MG/1
20 TABLET, FILM COATED ORAL 2 TIMES DAILY
Status: DISCONTINUED | OUTPATIENT
Start: 2019-01-21 | End: 2019-01-22 | Stop reason: HOSPADM

## 2019-01-21 RX ORDER — ADHESIVE BANDAGE
30 BANDAGE TOPICAL DAILY PRN
Status: DISCONTINUED | OUTPATIENT
Start: 2019-01-22 | End: 2019-01-22 | Stop reason: HOSPADM

## 2019-01-21 RX ORDER — DEXTROSE 50 % IN WATER (D50W) INTRAVENOUS SYRINGE
25-50 AS NEEDED
Status: DISCONTINUED | OUTPATIENT
Start: 2019-01-21 | End: 2019-01-22 | Stop reason: HOSPADM

## 2019-01-21 RX ORDER — DEXTROSE 50 % IN WATER (D50W) INTRAVENOUS SYRINGE
25 ONCE
Status: COMPLETED | OUTPATIENT
Start: 2019-01-21 | End: 2019-01-21

## 2019-01-21 RX ORDER — SODIUM CHLORIDE 0.9 % (FLUSH) 0.9 %
5-40 SYRINGE (ML) INJECTION EVERY 8 HOURS
Status: DISCONTINUED | OUTPATIENT
Start: 2019-01-21 | End: 2019-01-21 | Stop reason: HOSPADM

## 2019-01-21 RX ORDER — TRAMADOL HYDROCHLORIDE 50 MG/1
50 TABLET ORAL
Status: DISCONTINUED | OUTPATIENT
Start: 2019-01-21 | End: 2019-01-22 | Stop reason: HOSPADM

## 2019-01-21 RX ORDER — PROPOFOL 10 MG/ML
INJECTION, EMULSION INTRAVENOUS AS NEEDED
Status: DISCONTINUED | OUTPATIENT
Start: 2019-01-21 | End: 2019-01-21 | Stop reason: HOSPADM

## 2019-01-21 RX ORDER — EPHEDRINE SULFATE 50 MG/ML
INJECTION, SOLUTION INTRAVENOUS AS NEEDED
Status: DISCONTINUED | OUTPATIENT
Start: 2019-01-21 | End: 2019-01-21 | Stop reason: HOSPADM

## 2019-01-21 RX ORDER — FENTANYL CITRATE 50 UG/ML
25 INJECTION, SOLUTION INTRAMUSCULAR; INTRAVENOUS
Status: DISCONTINUED | OUTPATIENT
Start: 2019-01-21 | End: 2019-01-21 | Stop reason: HOSPADM

## 2019-01-21 RX ORDER — SODIUM CHLORIDE 0.9 % (FLUSH) 0.9 %
5-40 SYRINGE (ML) INJECTION AS NEEDED
Status: DISCONTINUED | OUTPATIENT
Start: 2019-01-21 | End: 2019-01-21 | Stop reason: HOSPADM

## 2019-01-21 RX ORDER — ACETAMINOPHEN 500 MG
1000 TABLET ORAL ONCE
Status: COMPLETED | OUTPATIENT
Start: 2019-01-21 | End: 2019-01-21

## 2019-01-21 RX ORDER — SODIUM CHLORIDE 9 MG/ML
125 INJECTION, SOLUTION INTRAVENOUS CONTINUOUS
Status: DISPENSED | OUTPATIENT
Start: 2019-01-21 | End: 2019-01-22

## 2019-01-21 RX ORDER — DIPHENHYDRAMINE HCL 12.5MG/5ML
12.5 ELIXIR ORAL
Status: DISCONTINUED | OUTPATIENT
Start: 2019-01-21 | End: 2019-01-22 | Stop reason: HOSPADM

## 2019-01-21 RX ORDER — SODIUM CHLORIDE 9 MG/ML
INJECTION, SOLUTION INTRAVENOUS
Status: COMPLETED
Start: 2019-01-21 | End: 2019-01-21

## 2019-01-21 RX ORDER — METFORMIN HYDROCHLORIDE 500 MG/1
500 TABLET, EXTENDED RELEASE ORAL
Status: DISCONTINUED | OUTPATIENT
Start: 2019-01-21 | End: 2019-01-22 | Stop reason: HOSPADM

## 2019-01-21 RX ORDER — MORPHINE SULFATE 10 MG/ML
1 INJECTION, SOLUTION INTRAMUSCULAR; INTRAVENOUS
Status: ACTIVE | OUTPATIENT
Start: 2019-01-21 | End: 2019-01-22

## 2019-01-21 RX ORDER — ROCURONIUM BROMIDE 10 MG/ML
INJECTION, SOLUTION INTRAVENOUS AS NEEDED
Status: DISCONTINUED | OUTPATIENT
Start: 2019-01-21 | End: 2019-01-21 | Stop reason: HOSPADM

## 2019-01-21 RX ORDER — CELECOXIB 200 MG/1
200 CAPSULE ORAL ONCE
Status: COMPLETED | OUTPATIENT
Start: 2019-01-21 | End: 2019-01-21

## 2019-01-21 RX ORDER — SODIUM CHLORIDE 0.9 % (FLUSH) 0.9 %
5-40 SYRINGE (ML) INJECTION AS NEEDED
Status: DISCONTINUED | OUTPATIENT
Start: 2019-01-21 | End: 2019-01-22 | Stop reason: HOSPADM

## 2019-01-21 RX ORDER — GLYCOPYRROLATE 0.2 MG/ML
INJECTION INTRAMUSCULAR; INTRAVENOUS AS NEEDED
Status: DISCONTINUED | OUTPATIENT
Start: 2019-01-21 | End: 2019-01-21 | Stop reason: HOSPADM

## 2019-01-21 RX ORDER — HYDROMORPHONE HYDROCHLORIDE 1 MG/ML
0.2 INJECTION, SOLUTION INTRAMUSCULAR; INTRAVENOUS; SUBCUTANEOUS
Status: DISCONTINUED | OUTPATIENT
Start: 2019-01-21 | End: 2019-01-21 | Stop reason: HOSPADM

## 2019-01-21 RX ORDER — NEOSTIGMINE METHYLSULFATE 1 MG/ML
INJECTION INTRAVENOUS AS NEEDED
Status: DISCONTINUED | OUTPATIENT
Start: 2019-01-21 | End: 2019-01-21 | Stop reason: HOSPADM

## 2019-01-21 RX ORDER — LIDOCAINE HYDROCHLORIDE 20 MG/ML
INJECTION, SOLUTION EPIDURAL; INFILTRATION; INTRACAUDAL; PERINEURAL AS NEEDED
Status: DISCONTINUED | OUTPATIENT
Start: 2019-01-21 | End: 2019-01-21 | Stop reason: HOSPADM

## 2019-01-21 RX ORDER — HEPARIN SODIUM 1000 [USP'U]/ML
INJECTION, SOLUTION INTRAVENOUS; SUBCUTANEOUS AS NEEDED
Status: DISCONTINUED | OUTPATIENT
Start: 2019-01-21 | End: 2019-01-21 | Stop reason: HOSPADM

## 2019-01-21 RX ORDER — ACETAMINOPHEN 325 MG/1
650 TABLET ORAL EVERY 6 HOURS
Status: DISCONTINUED | OUTPATIENT
Start: 2019-01-21 | End: 2019-01-22 | Stop reason: HOSPADM

## 2019-01-21 RX ORDER — INSULIN LISPRO 100 [IU]/ML
INJECTION, SOLUTION INTRAVENOUS; SUBCUTANEOUS
Status: DISCONTINUED | OUTPATIENT
Start: 2019-01-21 | End: 2019-01-22 | Stop reason: HOSPADM

## 2019-01-21 RX ORDER — CEFAZOLIN SODIUM/WATER 2 G/20 ML
2 SYRINGE (ML) INTRAVENOUS ONCE
Status: COMPLETED | OUTPATIENT
Start: 2019-01-21 | End: 2019-01-21

## 2019-01-21 RX ORDER — VANCOMYCIN/0.9 % SOD CHLORIDE 1.5G/250ML
1500 PLASTIC BAG, INJECTION (ML) INTRAVENOUS ONCE
Status: COMPLETED | OUTPATIENT
Start: 2019-01-21 | End: 2019-01-21

## 2019-01-21 RX ORDER — ONDANSETRON 2 MG/ML
4 INJECTION INTRAMUSCULAR; INTRAVENOUS
Status: ACTIVE | OUTPATIENT
Start: 2019-01-21 | End: 2019-01-22

## 2019-01-21 RX ORDER — PREGABALIN 75 MG/1
75 CAPSULE ORAL ONCE
Status: COMPLETED | OUTPATIENT
Start: 2019-01-21 | End: 2019-01-21

## 2019-01-21 RX ORDER — GLYBURIDE 2.5 MG/1
1.25 TABLET ORAL
Status: DISCONTINUED | OUTPATIENT
Start: 2019-01-22 | End: 2019-01-22 | Stop reason: HOSPADM

## 2019-01-21 RX ORDER — OXYCODONE HYDROCHLORIDE 5 MG/1
10 TABLET ORAL
Status: DISCONTINUED | OUTPATIENT
Start: 2019-01-21 | End: 2019-01-22 | Stop reason: HOSPADM

## 2019-01-21 RX ADMIN — SUCCINYLCHOLINE CHLORIDE 140 MG: 20 INJECTION INTRAMUSCULAR; INTRAVENOUS at 12:51

## 2019-01-21 RX ADMIN — STANDARDIZED SENNA CONCENTRATE AND DOCUSATE SODIUM 1 TABLET: 8.6; 5 TABLET, FILM COATED ORAL at 17:41

## 2019-01-21 RX ADMIN — ACETAMINOPHEN 1000 MG: 500 TABLET ORAL at 11:29

## 2019-01-21 RX ADMIN — FENTANYL CITRATE 50 MCG: 50 INJECTION, SOLUTION INTRAMUSCULAR; INTRAVENOUS at 13:10

## 2019-01-21 RX ADMIN — FENTANYL CITRATE 50 MCG: 50 INJECTION, SOLUTION INTRAMUSCULAR; INTRAVENOUS at 12:51

## 2019-01-21 RX ADMIN — Medication 2 G: at 13:04

## 2019-01-21 RX ADMIN — Medication 80 MCG: at 13:13

## 2019-01-21 RX ADMIN — DEXTROSE 50 % IN WATER (D50W) INTRAVENOUS SYRINGE 50 ML: at 14:21

## 2019-01-21 RX ADMIN — NEOSTIGMINE METHYLSULFATE 1 MG: 1 INJECTION INTRAVENOUS at 14:18

## 2019-01-21 RX ADMIN — HEPARIN SODIUM 1000 UNITS: 1000 INJECTION, SOLUTION INTRAVENOUS; SUBCUTANEOUS at 13:05

## 2019-01-21 RX ADMIN — INSULIN LISPRO 2 UNITS: 100 INJECTION, SOLUTION INTRAVENOUS; SUBCUTANEOUS at 23:08

## 2019-01-21 RX ADMIN — MIDAZOLAM HYDROCHLORIDE 2 MG: 1 INJECTION, SOLUTION INTRAMUSCULAR; INTRAVENOUS at 12:40

## 2019-01-21 RX ADMIN — ONDANSETRON 4 MG: 2 INJECTION INTRAMUSCULAR; INTRAVENOUS at 13:16

## 2019-01-21 RX ADMIN — SODIUM CHLORIDE, SODIUM LACTATE, POTASSIUM CHLORIDE, AND CALCIUM CHLORIDE 25 ML/HR: 600; 310; 30; 20 INJECTION, SOLUTION INTRAVENOUS at 11:27

## 2019-01-21 RX ADMIN — METFORMIN HYDROCHLORIDE 500 MG: 500 TABLET, EXTENDED RELEASE ORAL at 17:42

## 2019-01-21 RX ADMIN — DEXTROSE MONOHYDRATE 25 G: 25 INJECTION, SOLUTION INTRAVENOUS at 11:45

## 2019-01-21 RX ADMIN — LIDOCAINE HYDROCHLORIDE 80 MG: 20 INJECTION, SOLUTION EPIDURAL; INFILTRATION; INTRACAUDAL; PERINEURAL at 12:51

## 2019-01-21 RX ADMIN — PROPOFOL 15 MG: 10 INJECTION, EMULSION INTRAVENOUS at 13:07

## 2019-01-21 RX ADMIN — OXYCODONE HYDROCHLORIDE 5 MG: 5 TABLET ORAL at 19:55

## 2019-01-21 RX ADMIN — Medication 2 G: at 20:00

## 2019-01-21 RX ADMIN — SODIUM CHLORIDE 125 ML/HR: 900 INJECTION, SOLUTION INTRAVENOUS at 15:08

## 2019-01-21 RX ADMIN — PROPOFOL 120 MG: 10 INJECTION, EMULSION INTRAVENOUS at 12:51

## 2019-01-21 RX ADMIN — ROCURONIUM BROMIDE 5 MG: 10 INJECTION, SOLUTION INTRAVENOUS at 12:51

## 2019-01-21 RX ADMIN — FENTANYL CITRATE 25 MCG: 50 INJECTION, SOLUTION INTRAMUSCULAR; INTRAVENOUS at 15:09

## 2019-01-21 RX ADMIN — SODIUM CHLORIDE 125 ML/HR: 9 INJECTION, SOLUTION INTRAVENOUS at 15:08

## 2019-01-21 RX ADMIN — GLYCOPYRROLATE 0.2 MG: 0.2 INJECTION INTRAMUSCULAR; INTRAVENOUS at 14:18

## 2019-01-21 RX ADMIN — VANCOMYCIN HYDROCHLORIDE 1500 MG: 10 INJECTION, POWDER, LYOPHILIZED, FOR SOLUTION INTRAVENOUS at 11:27

## 2019-01-21 RX ADMIN — FENTANYL CITRATE 25 MCG: 50 INJECTION, SOLUTION INTRAMUSCULAR; INTRAVENOUS at 14:48

## 2019-01-21 RX ADMIN — PREGABALIN 75 MG: 75 CAPSULE ORAL at 11:29

## 2019-01-21 RX ADMIN — EPHEDRINE SULFATE 10 MG: 50 INJECTION, SOLUTION INTRAVENOUS at 13:10

## 2019-01-21 RX ADMIN — CELECOXIB 200 MG: 200 CAPSULE ORAL at 11:29

## 2019-01-21 RX ADMIN — TRANEXAMIC ACID 1000 MG: 100 INJECTION, SOLUTION INTRAVENOUS at 15:07

## 2019-01-21 RX ADMIN — ACETAMINOPHEN 650 MG: 325 TABLET ORAL at 17:41

## 2019-01-21 RX ADMIN — FAMOTIDINE 20 MG: 20 TABLET, FILM COATED ORAL at 17:41

## 2019-01-21 RX ADMIN — DEXAMETHASONE SODIUM PHOSPHATE 8 MG: 4 INJECTION, SOLUTION INTRA-ARTICULAR; INTRALESIONAL; INTRAMUSCULAR; INTRAVENOUS; SOFT TISSUE at 13:16

## 2019-01-21 RX ADMIN — EPHEDRINE SULFATE 10 MG: 50 INJECTION, SOLUTION INTRAVENOUS at 13:08

## 2019-01-21 RX ADMIN — OXYCODONE HYDROCHLORIDE 5 MG: 5 TABLET ORAL at 17:41

## 2019-01-21 RX ADMIN — ACETAMINOPHEN 650 MG: 325 TABLET ORAL at 23:08

## 2019-01-21 RX ADMIN — FENTANYL CITRATE 25 MCG: 50 INJECTION, SOLUTION INTRAMUSCULAR; INTRAVENOUS at 14:51

## 2019-01-21 NOTE — ANESTHESIA POSTPROCEDURE EVALUATION
Procedure(s):  RIGHT TOTAL KNEE REPLACEMENT (surgical pain sol). Anesthesia Post Evaluation        Patient location during evaluation: PACU  Note status: Adequate. Level of consciousness: responsive to verbal stimuli and sleepy but conscious  Pain management: satisfactory to patient  Airway patency: patent  Anesthetic complications: no  Cardiovascular status: acceptable  Respiratory status: acceptable  Hydration status: acceptable  Comments: +Post-Anesthesia Evaluation and Assessment    Patient: Hayley Tan MRN: [id number]  SSN: [id number]   YOB: 1950  Age: 79 y.o. Sex: female      Cardiovascular Function/Vital Signs    /64 (BP 1 Location: Left arm, BP Patient Position: At rest)   Pulse (!) 57   Temp 37.1 °C (98.7 °F)   Resp 16   Ht 5' 4\" (1.626 m)   Wt 88.2 kg (194 lb 7.1 oz)   SpO2 96%   BMI 33.38 kg/m²     Patient is status post Procedure(s):  RIGHT TOTAL KNEE REPLACEMENT (surgical pain sol). Nausea/Vomiting: Controlled. Postoperative hydration reviewed and adequate. Pain:  Pain Scale 1: Numeric (0 - 10) (01/21/19 1530)  Pain Intensity 1: 4 (01/21/19 1530)   Managed. Neurological Status:   Neuro (WDL): Exceptions to WDL (01/21/19 1434)   At baseline. Mental Status and Level of Consciousness: Arousable. Pulmonary Status:   O2 Device: Nasal cannula (01/21/19 1530)   Adequate oxygenation and airway patent. Complications related to anesthesia: None    Post-anesthesia assessment completed. No concerns.     Signed By: Belem Mendez MD    1/21/2019  Post anesthesia nausea and vomiting:  controlled      Visit Vitals  /64 (BP 1 Location: Left arm, BP Patient Position: At rest)   Pulse (!) 57   Temp 37.1 °C (98.7 °F)   Resp 16   Ht 5' 4\" (1.626 m)   Wt 88.2 kg (194 lb 7.1 oz)   SpO2 96%   BMI 33.38 kg/m²

## 2019-01-21 NOTE — PROGRESS NOTES
Order received and acknowledged. Initiated eval, nursing reported pt needed to use BSC. When attempting to mobilize RLE off the bed pt yelling out in pain . VSS but still slightly drowsy. Assisted nursing with putting pt on bedpan due to poor pain tolerance at this time. Session aborted.

## 2019-01-21 NOTE — BRIEF OP NOTE
BRIEF OPERATIVE NOTE    Date of Procedure: 1/21/2019   Preoperative Diagnosis: RIGHT KNEE OA  Postoperative Diagnosis: RIGHT KNEEM OA    Procedure(s):  RIGHT TOTAL KNEE REPLACEMENT (surgical pain sol)  Surgeon(s) and Role:     Pati Kearns MD - Primary         Surgical Assistant: 0    Surgical Staff:  Circ-1: Edward Houston RN  Scrub Tech-1: Mague Gaytan  Surg Asst-1: Viji Zaldivar [facility] Staff: Maia Carranza RN  No case tracking events are documented in the log.   Anesthesia: General   Estimated Blood Loss: 100  Specimens: * No specimens in log *   Findings: 0   Complications: 0  Implants: * No implants in log *

## 2019-01-21 NOTE — PERIOP NOTES
Handoff Report from Operating Room to PACU    Report received from T. Rosezena Litten and Ayanna Smallwood CRNA regarding Vickie Cuevas. Surgeon(s):  Alberto Jaquez MD  And Procedure(s) (LRB):  RIGHT TOTAL KNEE REPLACEMENT (surgical pain sol) (Right)  confirmed   with allergies, drains and dressings discussed. Anesthesia type, drugs, patient history, complications, estimated blood loss, vital signs, intake and output, and last pain medication, lines, reversal medications and temperature were reviewed.

## 2019-01-21 NOTE — OP NOTES
[address]  OPERATIVE REPORT    Maribell Haque  MR#: [id number]  : 1950  ACCOUNT #: [de-identified]   DATE OF SERVICE: 2019    PREOPERATIVE DIAGNOSIS:  Degenerative joint disease of right knee. PROCEDURE PERFORMED:  Right total knee replacement. SURGEON:  Travon Honeycutt MD    ANESTHESIA:  General.    ESTIMATED BLOOD LOSS:  100 mL. CLOSURE:  Staples. SPECIMENS REMOVED:  None. ASSISTANT:  None. COMPLICATIONS:  None. POSTOPERATIVE DIAGNOSIS:  Degenerative joint disease of right knee. IMPLANTS:  Yes. PROCEDURE IN DETAIL:  The patient was given smooth induction of general anesthesia in supine position on the operating table. The right lower extremity was prepped and draped with a thigh tourniquet. An anterior midline incision was made, then a medial parapatellar retinacular incision. The patella was everted and resected to restore 23 mm of thickness. Three drill holes were made for the 31 patellar button. The knee was then flexed and a drill hole made in the distal femur for the IM guide anthony, 12 mm of distal femur were resected at 4 degrees of valgus. The remaining cuts were made to accommodate a 65 [employer] Vanguard cruciate-retaining femoral prosthesis in 6 degrees of external rotation. The proximal tibia was resected perpendicular and sized at 75. The tibial stem preparation was completed in the appropriate rotation. The bone surfaces were copiously irrigated with antibiotic solution and pulsatile lavage. The 3 components were cemented simultaneously with PALACOS cement. Trial tibial bearings revealed that a 10 lipped would be ideal.  It was placed followed by the locking clip. The knee had excellent range of motion, stability and patellar tracking after a lateral release was performed. The wound was copiously irrigated, tourniquet deflated and hemostasis obtained. The soft tissues were injected with a Marcaine solution.   The fascia was closed with #1 Ethibond and Vicryl over a medium Hemovac drain. Subcutaneous tissues were closed with 3-0 Vicryl and skin with staples. Sterile dressings were applied. The patient was taken to the recovery room in stable extubated condition with a correct count and good pulse to her foot.       MD ANKUR Dimas / DEMETRI  D: 01/21/2019 14:30     T: 01/21/2019 15:29  JOB #: [id number]

## 2019-01-21 NOTE — PERIOP NOTES
TRANSFER - OUT REPORT:    Verbal report given to Kimberly Martinez RN (name) on Vickie Cuevas  being transferred to [facility](unit) for routine progression of care       Report consisted of patients Situation, Background, Assessment and   Recommendations(SBAR). Information from the following report(s) SBAR, OR Summary, Intake/Output, MAR, Recent Results and Cardiac Rhythm Sinus [facility] was reviewed with the receiving nurse. Opportunity for questions and clarification was provided.       Patient transported with:   O2 @ 4 liters  Tech

## 2019-01-21 NOTE — ANESTHESIA PREPROCEDURE EVALUATION
Anesthetic History   No history of anesthetic complications            Review of Systems / Medical History  Patient summary reviewed, nursing notes reviewed and pertinent labs reviewed    Pulmonary  Within defined limits                 Neuro/Psych   Within defined limits           Cardiovascular    Hypertension              Exercise tolerance: >4 METS     GI/Hepatic/Renal  Within defined limits              Endo/Other    Diabetes    Obesity and arthritis     Other Findings              Physical Exam    Airway  Mallampati: IV  TM Distance: 4 - 6 cm  Neck ROM: normal range of motion   Mouth opening: Normal     Cardiovascular  Regular rate and rhythm,  S1 and S2 normal,  no murmur, click, rub, or gallop             Dental  No notable dental hx       Pulmonary  Breath sounds clear to auscultation               Abdominal  GI exam deferred       Other Findings            Anesthetic Plan    ASA: 2  Anesthesia type: general    Monitoring Plan: BIS      Induction: Intravenous  Anesthetic plan and risks discussed with: Patient

## 2019-01-22 VITALS
OXYGEN SATURATION: 100 % | WEIGHT: 194.45 LBS | HEART RATE: 57 BPM | DIASTOLIC BLOOD PRESSURE: 62 MMHG | BODY MASS INDEX: 33.2 KG/M2 | RESPIRATION RATE: 16 BRPM | SYSTOLIC BLOOD PRESSURE: 116 MMHG | HEIGHT: 64 IN | TEMPERATURE: 98.3 F

## 2019-01-22 LAB
ANION GAP SERPL CALC-SCNC: 9 MMOL/L (ref 5–15)
BUN SERPL-MCNC: 33 MG/DL (ref 6–20)
BUN/CREAT SERPL: 28 (ref 12–20)
CALCIUM SERPL-MCNC: 7.9 MG/DL (ref 8.5–10.1)
CHLORIDE SERPL-SCNC: 105 MMOL/L (ref 97–108)
CO2 SERPL-SCNC: 22 MMOL/L (ref 21–32)
CREAT SERPL-MCNC: 1.19 MG/DL (ref 0.55–1.02)
GLUCOSE BLD STRIP.AUTO-MCNC: 127 MG/DL (ref 65–100)
GLUCOSE BLD STRIP.AUTO-MCNC: 178 MG/DL (ref 65–100)
GLUCOSE SERPL-MCNC: 203 MG/DL (ref 65–100)
HGB BLD-MCNC: 9.5 G/DL (ref 11.5–16)
POTASSIUM SERPL-SCNC: 3.9 MMOL/L (ref 3.5–5.1)
SERVICE CMNT-IMP: ABNORMAL
SERVICE CMNT-IMP: ABNORMAL
SODIUM SERPL-SCNC: 136 MMOL/L (ref 136–145)

## 2019-01-22 PROCEDURE — 74011636637 HC RX REV CODE- 636/637: Performed by: ORTHOPAEDIC SURGERY

## 2019-01-22 PROCEDURE — 80048 BASIC METABOLIC PNL TOTAL CA: CPT

## 2019-01-22 PROCEDURE — 85018 HEMOGLOBIN: CPT

## 2019-01-22 PROCEDURE — 74011250637 HC RX REV CODE- 250/637: Performed by: ORTHOPAEDIC SURGERY

## 2019-01-22 PROCEDURE — 97116 GAIT TRAINING THERAPY: CPT

## 2019-01-22 PROCEDURE — 97161 PT EVAL LOW COMPLEX 20 MIN: CPT

## 2019-01-22 PROCEDURE — 97110 THERAPEUTIC EXERCISES: CPT

## 2019-01-22 PROCEDURE — 74011250636 HC RX REV CODE- 250/636: Performed by: ORTHOPAEDIC SURGERY

## 2019-01-22 PROCEDURE — 82962 GLUCOSE BLOOD TEST: CPT

## 2019-01-22 PROCEDURE — 36415 COLL VENOUS BLD VENIPUNCTURE: CPT

## 2019-01-22 RX ORDER — ASPIRIN 325 MG
325 TABLET ORAL 2 TIMES DAILY
Qty: 60 TAB | Refills: 0 | Status: SHIPPED | OUTPATIENT
Start: 2019-01-22 | End: 2019-02-21

## 2019-01-22 RX ORDER — FAMOTIDINE 20 MG/1
20 TABLET, FILM COATED ORAL 2 TIMES DAILY
Qty: 60 TAB | Refills: 0 | Status: SHIPPED | OUTPATIENT
Start: 2019-01-22 | End: 2019-02-21

## 2019-01-22 RX ORDER — OXYCODONE HYDROCHLORIDE 5 MG/1
5-10 TABLET ORAL
Qty: 80 TAB | Refills: 0 | Status: SHIPPED | OUTPATIENT
Start: 2019-01-22 | End: 2020-12-17 | Stop reason: ALTCHOICE

## 2019-01-22 RX ORDER — ACETAMINOPHEN 325 MG/1
650 TABLET ORAL EVERY 6 HOURS
Qty: 112 TAB | Refills: 0 | Status: SHIPPED | OUTPATIENT
Start: 2019-01-22 | End: 2019-02-05

## 2019-01-22 RX ORDER — AMOXICILLIN 250 MG
1 CAPSULE ORAL DAILY
Qty: 30 TAB | Refills: 0 | Status: SHIPPED | OUTPATIENT
Start: 2019-01-22 | End: 2020-12-17 | Stop reason: ALTCHOICE

## 2019-01-22 RX ORDER — POLYETHYLENE GLYCOL 3350 17 G/17G
17 POWDER, FOR SOLUTION ORAL
Qty: 15 PACKET | Refills: 0 | Status: SHIPPED | OUTPATIENT
Start: 2019-01-22 | End: 2019-02-06

## 2019-01-22 RX ADMIN — GLYBURIDE 1.25 MG: 2.5 TABLET ORAL at 08:43

## 2019-01-22 RX ADMIN — HUMAN INSULIN 58 UNITS: 100 INJECTION, SUSPENSION SUBCUTANEOUS at 09:00

## 2019-01-22 RX ADMIN — ALLOPURINOL 300 MG: 300 TABLET ORAL at 08:44

## 2019-01-22 RX ADMIN — OXYCODONE HYDROCHLORIDE 10 MG: 5 TABLET ORAL at 08:40

## 2019-01-22 RX ADMIN — FAMOTIDINE 20 MG: 20 TABLET, FILM COATED ORAL at 08:42

## 2019-01-22 RX ADMIN — Medication 2 G: at 05:30

## 2019-01-22 RX ADMIN — ACETAMINOPHEN 650 MG: 325 TABLET ORAL at 11:46

## 2019-01-22 RX ADMIN — METFORMIN HYDROCHLORIDE 500 MG: 500 TABLET, EXTENDED RELEASE ORAL at 08:43

## 2019-01-22 RX ADMIN — INSULIN LISPRO 2 UNITS: 100 INJECTION, SOLUTION INTRAVENOUS; SUBCUTANEOUS at 08:47

## 2019-01-22 RX ADMIN — ACETAMINOPHEN 650 MG: 325 TABLET ORAL at 05:29

## 2019-01-22 RX ADMIN — METOPROLOL SUCCINATE 100 MG: 50 TABLET, EXTENDED RELEASE ORAL at 08:41

## 2019-01-22 RX ADMIN — POLYETHYLENE GLYCOL 3350 17 G: 17 POWDER, FOR SOLUTION ORAL at 08:39

## 2019-01-22 RX ADMIN — OXYCODONE HYDROCHLORIDE 10 MG: 5 TABLET ORAL at 11:46

## 2019-01-22 RX ADMIN — ASPIRIN 325 MG: 325 TABLET ORAL at 08:41

## 2019-01-22 RX ADMIN — OXYCODONE HYDROCHLORIDE 5 MG: 5 TABLET ORAL at 05:29

## 2019-01-22 RX ADMIN — OXYCODONE HYDROCHLORIDE 10 MG: 5 TABLET ORAL at 16:09

## 2019-01-22 RX ADMIN — STANDARDIZED SENNA CONCENTRATE AND DOCUSATE SODIUM 1 TABLET: 8.6; 5 TABLET, FILM COATED ORAL at 08:44

## 2019-01-22 RX ADMIN — POTASSIUM CHLORIDE 10 MEQ: 750 TABLET, EXTENDED RELEASE ORAL at 08:41

## 2019-01-22 NOTE — PROGRESS NOTES
Pt to discharge home today with a RW provided by [place] and HH to be provided by [facility]. Info entered on pt AVS.  Pt to be transported by family at discharge. Floor RN made aware of pt ready for discharge from CM standpoint. Clinical info faxed to [facility] per their review     Care Management Interventions  PCP Verified by CM:  Yes  Mode of Transport at Discharge: Self  Transition of Care Consult (CM Consult): Discharge Planning  MyChart Signup: No  Discharge Durable Medical Equipment: Yes  Health Maintenance Reviewed: Yes  Physical Therapy Consult: Yes  Occupational Therapy Consult: No  Speech Therapy Consult: No  Current Support Network: Relative's Home  Confirm Follow Up Transport: Family  Plan discussed with Pt/Family/Caregiver: Yes  Discharge Location  Discharge Placement: Home with home health    SAUD Bui  Ext [phone]

## 2019-01-22 NOTE — DISCHARGE INSTRUCTIONS
Luther Ellis  Surgery: Total Knee Replacement  Surgeon:   Octavia Torres MD  Surgery Date:  1/21/2019     To relieve pain:   Use ice/gel packs.  Put the ice pack directly over the wound, or anywhere you are hurting or swollen.  To control pain and swelling, keep ice on regularly, especially after physical activity.  The packs should stay cold for 3-4 hours. When it is not cold anymore, rotate with the packs in the freezer.  Elevate your leg. This will also keep swelling down.  Rest for at least 20 minutes between activity or exercises.  To keep track of your pain medications, write down what you take and when you take it.  The last dose of pain medication you got in the hospital was:       Medication    Dose    Date & Time         Choose your medications based on the pain scale below:     To keep your pain under control, take Tylenol every 6 hours for 14 days - even if you feel like you dont need it.  For mild to moderate pain (4-6 on pain scale), take one pain pill every 3-4 hours as needed.  For severe pain (7-10 on pain scale), take two pain pills every 3-4 hours as needed.  To prevent nausea, take your pain medications with food. Pain Scale                 As your pain lessens:     Slowly start taking less pain medication. You may do this by waiting longer between doses or by taking smaller doses.  Stop using the pain medications as soon as you no longer need it, usually in 2-3 weeks.  Aspirin   To prevent blood clots, you will need to take Aspirin 325 mg twice a day for 30 days.  To prevent stomach upset or bleeding:   Do not take non-steroidal anti-inflammatory medications (Ibuprofen, Advil, Motrin, Naproxen, etc.)    Take Pepcid 20 mg twice a day, or a similar home medication, while you are taking a blood thinner.             OPSITE (Honeycomb dressing)     Keep your clear, waterproof dressing in place for 5-7 days after your leave the hospital.     If you are still having drainage, you will need to change your dressing in 5-7 days. You will be given one extra dressing to use at home.  If there is no more drainage from the wound, you may leave it open to the air. OPSITE DRESSING INSTRUCTIONS                                          DO NOTs:   Do not rub your surgical wound   Do not put lotion or oils on your wound.  Do not take a tub bath or go swimming until your doctor says it is ok.  You may shower with this dressing over your wound.  After showering pat the dressing dry.  If you have staples a home health nurse will remove them in about 10 days.  To increase and promote healing:   Stop Smoking (or at least cut back on       Smoking).  Eat a well-balanced diet (high in protein       and vitamin C).  If you have a poor appetite, drink Ensure, Glucerna, or         Stephens Instant Breakfast for the next 30 days.  If you are diabetic, you should control your blood         sugars to prevent infection and help your wound         to heal.                         To prevent constipation, stay active and drink plenty of fluid.  While using pain medications, you should also take stool softeners and laxatives, such as Pericolace       and Miralax.  If you are having too many bowel       Movements, then you may need       to stop taking the laxatives.  You should have a bowel movement 3-4 days        after surgery and then at least every other day while       on pain medication.  To improve your recovery, you must be active!  Use your walker and take short walks (in your home)         about every 2 hours during the day.  Try to increase how far you walk each day.  You can put as much weight on your leg as you can tolerate while walking.           To avoid getting a stiff knee, work on getting your knee bent and straight as soon as possible.  Home health physical therapy will come to your       home the day after you leave the hospital.  The       therapist will visit about 4 times over the next        couple of weeks to teach you how to get out of        bed, to safely walk in your home, and to do your        exercises.  NO DRIVING until your surgeon tells you it is ok.  You can return to work when cleared by a physician.  Please call your physician immediately if you have:     Constant bleeding from your wound.  Increasing redness or swelling around your wound (Some warmth, bruising and swelling is normal).  Change in wound drainage (increase in amount, color, or bad smell).  Change in mental status (unusual behavior   Temperature over 101.5 degrees Fahrenheit      Pain or redness in the calf (back of your lower leg - see picture)     Increased swelling of the thigh, ankle, calf, or foot.  Emergency: CALL 911 if you have:     Shortness of breath     Chest pain when you cough or taking a deep breath     Please call your surgeons office at [phone]  for a follow up appointment. _   You should call as soon as you get home or the next day after discharge. Ask to make an appointment in 2 weeks.  If you have questions or concerns during normal business hours, you may reach Dr. Kendall Myrick' Team at [phone].

## 2019-01-22 NOTE — PROGRESS NOTES
Reason for Admission:  RIGHT KNEE; OA; Osteoarthroiss, localized, primary, knee, right                    RRAT Score:  10 LOW                   Plan for utilizing home health: Per recommendation                     Likelihood of Readmission:  Low per acuity. Pt has a strong support system. No problems financially or accessing medications. Pt has no concerns for discharge at this time. Transition of Care Plan:                    Pt is a 79year old,  [place] female admitted with RIGHT KNEE; OA; Osteoarthroiss, localized, primary, knee, right. Pt was alert and oriented when meeting with CM, confirming address, emergency contact and PCP. Pt states she lives in a one level home with her daughter, 3/4 steps to enter. Pt has no DME and drives at baseline. Pt has not had HH or been to a SNF in the past. Pt's preferred pharmacy is [facility]. Pt sates no problems affording or accessing medications. At time of discharge pt friend will drive her home. Pt friends and family will drive pt as needed until pt is cleared to drive. Pt has no advanced directives and is not intersted in arranging any at this time. CM consult noted-   Pt will need HH PT at time of discharge. Pt is requesting [address]. Referral sent waiting on response. Pt will also need a RW. Referral sent to [facility], waiting on response. 1:31 PM- CM informed [address] cannot accept due to staffing. CM sent referral to [facility], waiting on response. Pt approved for RW, pt provided RW. CM notified pt nurse navigator of pt admission and d/c.     2:15 PM- CM followed up with Dereje Harper who is unable to accept pt due to pt insurance. CM informed by Dereje Harper to follow up with [facility]. Referral sent. Waiting on response. Care Management Interventions  PCP Verified by CM:  Yes  Mode of Transport at Discharge: Self  Transition of Care Consult (CM Consult): Discharge Planning  MyChart Signup: No  Discharge Durable Medical Equipment: Yes  Health Maintenance Reviewed: Yes  Physical Therapy Consult: Yes  Occupational Therapy Consult: No  Speech Therapy Consult: No  Current Support Network: Relative's Home  Confirm Follow Up Transport: Family  Plan discussed with Pt/Family/Caregiver: Yes  Discharge Location  Discharge Placement: Home with home health    [facility] [place], [address] [facility]    [phone]

## 2019-01-22 NOTE — PROGRESS NOTES
Ortho / Neurosurgery NP Note    POD# 1  s/p RIGHT TOTAL KNEE REPLACEMENT (surgical pain sol)   Pt seen with Dr. Yareli Lee    Pt resting in bed. No complaints. States she has numbness and tingling in lateral right foot  Tingling in toes. She discussed with Dr. Yareli Lee who told her this is likely from numbing medication intra-articular injection    VSS Afebrile. Voiding status: +void per patient report she feels like she is emptying her bladder. Labs  Lab Results   Component Value Date/Time    HGB 9.5 (L) 01/22/2019 05:20 AM      Lab Results   Component Value Date/Time    INR 1.1 01/07/2019 10:01 AM        Body mass index is 33.38 kg/m². : A BMI > 30 is classified as obesity and > 40 is classified as morbid obesity. Glucose 175 this am, 211 last night - pt receiving home medications and SSI Lispro. Dressing c.d.i; HVAC with some bloody drainage - will need to be changed at discharge. Cryotherapy in place over incision  Drain removed earlier this morning  Calves soft and supple; No pain with passive stretch  Sensation some numbness to anteriorlateral right foot.   Motor intact + DF/PF/EHL  SCDs for mechanical DVT proph while in bed     PLAN:  1) PT BID  2) Aspirin 325 mg PO BID for DVT Prophylaxis   3) GI Prophylaxis - Pepcid  4) Readniess for discharge:     [x] Vital Signs stable    [x] Hgb stable    [x] + Voiding    [x] Wound intact, drainage minimal    [x] Tolerating PO intake     [x] Cleared by PT (OT if applicable)     [x] Stair training completed (if applicable)    [x] Independent / Contact Guard Assist (household distance)     [x] Bed mobility     [x] Car transfers     [x] ADLs    [x] Adequate pain control on oral medication alone     Plan home with family today    Sandee Fabry, NP  DNP, ACNP-BC, ONP-C

## 2019-01-22 NOTE — PROGRESS NOTES
Problem: Mobility Impaired (Adult and Pediatric)  Goal: *Acute Goals and Plan of Care (Insert Text)  Physical Therapy Goals  Initiated 1/22/2019    1. Patient will move from supine to sit and sit to supine  in bed with independence within 4 days. 2. Patient will perform sit to stand with modified independence within 4 days. 3. Patient will ambulate with modified independence for 300 feet with the least restrictive device within 4 days. 4. Patient will ascend/descend 4 stairs with B handrail(s) with modified independence within 4 days. 5. Patient will perform home exercise program per protocol with independence within 4 days. 6. Patient will demonstrate AROM 0-90 degrees in operative joint within 4 days. physical Therapy knee EVALUATION  Patient: Luther Ellis (80 y.o. female)  Date: 1/22/2019  Primary Diagnosis: RIGHT KNEE OA  Osteoarthrosis, localized, primary, knee, right  Procedure(s) (LRB):  RIGHT TOTAL KNEE REPLACEMENT (surgical pain sol) (Right) 1 Day Post-Op   Precautions:   WBAT, Fall    ASSESSMENT :  Based on the objective data described below, the patient presents with increased pain, genearlized weakness, decreased activity tolerance, limited ROM, impaired balance and altered gait. Pt was received in supine and cleared by nursing to mobilize. VSS. Bed mobility was performed from flat surface. Ambulated into the bathroom where she only needed verbal cues for toilet transfers. Ambulated into the jaramillo and transported to ortho gym via wheelchair. Negotiated steps with step to pattern and performed car transfer with min A. Ambulated back toward the room and utilized wheelchair for the rest of the distance. She was returned to sitting up in the chair and performed LE exercises. AROM 5-80 degrees. Ice applied at the end of the session. Cleared by PT. Patient will benefit from skilled intervention to address the above impairments.   Patients rehabilitation potential is considered to be Good  Factors which may influence rehabilitation potential include:   [x]         None noted  []         Mental ability/status  []         Medical condition  []         Home/family situation and support systems  []         Safety awareness  []         Pain tolerance/management  []         Other:      PLAN :  Recommendations and Planned Interventions:  [x]           Bed Mobility Training             []    Neuromuscular Re-Education  [x]           Transfer Training                   []    Orthotic/Prosthetic Training  [x]           Gait Training                         []    Modalities  [x]           Therapeutic Exercises           []    Edema Management/Control  [x]           Therapeutic Activities            [x]    Patient and Family Training/Education  [x]           Other (comment):AROM    Frequency/Duration: Patient will be followed by physical therapy twice daily to address goals. Discharge Recommendations: Home Health  Further Equipment Recommendations for Discharge: rolling walker     SUBJECTIVE:   Patient stated I am going to give myself a pat on the back.     OBJECTIVE DATA SUMMARY:   HISTORY:    Past Medical History:   Diagnosis Date    Arthritis     Congestive heart failure, unspecified     Diabetes ([facility] [address]     Hypercholesterolemia     Hypertension      Past Surgical History:   Procedure Laterality Date    HX ORTHOPAEDIC      Arthroscopy right knee     Prior Level of Function/Home Situation: pt using SPC at home.    Personal factors and/or comorbidities impacting plan of care:     Home Situation  Home Environment: Private residence  # Steps to Enter: 4  Rails to Enter: Yes  Hand Rails : Bilateral  One/Two Story Residence: One story  Living Alone: No  Support Systems: Spouse/Significant Other/Partner  Patient Expects to be Discharged to[de-identified] Private residence  Current DME Used/Available at Home: Cane, straight, Glucometer, Raised toilet seat    EXAMINATION/PRESENTATION/DECISION MAKING:   Critical Behavior:  Neurologic State: Alert  Orientation Level: Oriented X4  Cognition: Follows commands     Hearing:     Skin:  Dressing intact  Range Of Motion:  AROM: Generally decreased, functional           PROM: Generally decreased, functional           Strength:    Strength: Generally decreased, functional                    Tone & Sensation:   Tone: Normal              Sensation: Intact               Coordination:  Coordination: Within functional limits  Vision:      Functional Mobility:  Bed Mobility:  Rolling: Supervision  Supine to Sit: Supervision     Scooting: Supervision  Transfers:  Sit to Stand: Stand-by assistance  Stand to Sit: Stand-by assistance                       Balance:   Sitting: Intact  Standing: Intact; With support  Ambulation/Gait Training:  Distance (ft): 250 Feet (ft)  Assistive Device: Gait belt;Walker, rolling  Ambulation - Level of Assistance: Stand-by assistance        Gait Abnormalities: Decreased step clearance; Antalgic        Base of Support: Widened     Speed/Blanca: Slow  Step Length: Left shortened;Right shortened                     Stairs:  Number of Stairs Trained: 4  Stairs - Level of Assistance: Contact guard assistance   Rail Use: Both    Therapeutic Exercises:     EXERCISE   Sets   Reps   Active Active Assist   Passive   Comments   Ankle pumps 1 10 [x]                        []                        []                           Seated marches 1 10 [x]                        []                        []                           LAQ 1 10 [x]                        []                        []                           Heel sldies 1 10 [x]                        []                        []                                 Functional Measure:  Barthel Index:    Bathin  Bladder: 10  Bowels: 10  Groomin  Dressin  Feeding: 10  Mobility: 10  Stairs: 5  Toilet Use: 10  Transfer (Bed to Chair and Back): 10  Total: 75         The Barthel ADL Index: Guidelines  1.  The index should be used as a record of what a patient does, not as a record of what a patient could do. 2. The main aim is to establish degree of independence from any help, physical or verbal, however minor and for whatever reason. 3. The need for supervision renders the patient not independent. 4. A patient's performance should be established using the best available evidence. Asking the patient, friends/relatives and nurses are the usual sources, but direct observation and common sense are also important. However direct testing is not needed. 5. Usually the patient's performance over the preceding 24-48 hours is important, but occasionally longer periods will be relevant. 6. Middle categories imply that the patient supplies over 50 per cent of the effort. 7. Use of aids to be independent is allowed. Jackeline Thomas., Barthel, DAshwinW. (1024). Functional evaluation: the Barthel Index. [address] [facility] (14)2. Rowan Phillips linda RJ Babb, Chante Bautista., Gem Townsend., [place], [address] [place] [address] (1999). Measuring the change indisability after inpatient rehabilitation; comparison of the responsiveness of the Barthel Index and Functional [place] Measure. Journal of Neurology, Neurosurgery, and Psychiatry, 66(4), 742-863. Aliyah Cooper, N.J.A, OPAL [place], & Leonard Valadez MAshwinA. (2004.) Assessment of post-stroke quality of life in cost-effectiveness studies: The usefulness of the Barthel Index and the EuroQoL-5D.  Quality of Life Research, 15, 216-14          Physical Therapy Evaluation Charge Determination   History Examination Presentation Decision-Making   MEDIUM  Complexity : 1-2 comorbidities / personal factors will impact the outcome/ POC  LOW Complexity : 1-2 Standardized tests and measures addressing body structure, function, activity limitation and / or participation in recreation  LOW Complexity : Stable, uncomplicated  Other outcome measures barthel  LOW       Based on the above components, the patient evaluation is determined to be of the following complexity level: LOW     Pain:  Pain Scale 1: Numeric (0 - 10)  Pain Intensity 1: 6  Pain Location 1: Knee  Pain Orientation 1: Right  Pain Description 1: Aching  Pain Intervention(s) 1: Medication (see MAR)  Activity Tolerance:   WFL  Please refer to the flowsheet for vital signs taken during this treatment. After treatment:   [x]         Patient left in no apparent distress sitting up in chair  []         Patient left in no apparent distress in bed  [x]         Call bell left within reach  [x]         Nursing notified  []         Caregiver present  []         Bed alarm activated    COMMUNICATION/EDUCATION:   The patients plan of care was discussed with: Registered Nurse and NP. [x]         Fall prevention education was provided and the patient/caregiver indicated understanding. []         Patient/family have participated as able in goal setting and plan of care. [x]         Patient/family agree to work toward stated goals and plan of care. []         Patient understands intent and goals of therapy, but is neutral about his/her participation. []         Patient is unable to participate in goal setting and plan of care.     Thank you for this referral.  Magy Form, PT, DPT   Time Calculation: 45 mins

## 2019-01-22 NOTE — DISCHARGE SUMMARY
Ortho Discharge Summary    Patient ID:  Ac Colon  [id number]  female  79 y.o.  1950    Admit date: 1/21/2019    Discharge date: 1/22/2019    Admitting Physician: Alfonso Oneill MD     Consulting Physician(s):   Treatment Team: Attending Provider: Davey Kirby MD    Date of Surgery:   1/21/2019     Preoperative Diagnosis:  RIGHT KNEE OA    Postoperative Diagnosis:   RIGHT KNEEM OA    Procedure(s):     RIGHT TOTAL KNEE REPLACEMENT (surgical pain sol)     Anesthesia Type:   General     Surgeon: Davey Kirby MD                            HPI:  Pt is a 79 y.o. female who has a history of RIGHT KNEE OA  with pain and limitations of activities of daily living who presents at this time for a right TKA following the failure of conservative management. PMH:   Past Medical History:   Diagnosis Date    Arthritis     Congestive heart failure, unspecified     Diabetes ([address]     Hypercholesterolemia     Hypertension        Body mass index is 33.38 kg/m². : A BMI > 30 is classified as obesity and > 40 is classified as morbid obesity. Medications upon admission :   Prior to Admission Medications   Prescriptions Last Dose Informant Patient Reported? Taking? BD INSULIN PEN NEEDLE UF SHORT 31 gauge x 5/16\" ndle   No No   Sig: USE WITH INSULIN PENS TWICE DAILY AS DIRECTED   Blood-Glucose Meter monitoring kit   No No   Sig: Testing BID-DX:250.00   Lancets (ONE TOUCH ULTRASOFT LANCETS) misc   No No   Sig: TESTING BID   NURIA PEN NEEDLE 32 x 5/32 \" ndle   Yes No   ONE TOUCH DELICA 33 gauge misc   Yes No   alcohol swabs (BD SINGLE USE SWABS REGULAR) padm   No No   Sig: USE TO TEST BLOOD SUGAR.dx.e11.9   allopurinol (ZYLOPRIM) 300 mg tablet 1/21/2019 at 0700  No Yes   Sig: Take 1 Tab by mouth daily.    furosemide (LASIX) 40 mg tablet 1/21/2019 at 0700  No Yes   Sig: TAKE 3 TABLETS BY MOUTH EVERY MORNING   glucose blood VI test strips (ASCENSIA AUTODISC VI, ONE TOUCH ULTRA TEST VI) strip   No No   Sig: TESTING BID   glucose blood VI test strips (FREESTYLE LITE STRIPS) strip   No No   Sig: Use to test blood sugar three times daily. Dx.e11.9   glyBURIDE (DIABETA) 1.25 mg tablet 2019 at 0830  Yes Yes   Sig: Take 1.25 mg by mouth Daily (before breakfast). insulin NPH (HUMULIN N) 100 unit/mL (3 mL) inpn 2019 at 0800  No Yes   Sig: Inject 58 units daily. dx.e11.9   lisinopril (PRINIVIL, ZESTRIL) 20 mg tablet 2019 at 0830  No Yes   Sig: TAKE 1 TABLET BY MOUTH ONCE DAILY IN THE MORNING   metFORMIN ER (GLUCOPHAGE XR) 500 mg tablet 2019 at 1730  No Yes   Sig: TAKE 1 TABLET BY MOUTH THREE TIMES DAILY WITH MEALS   metoprolol succinate (TOPROL-XL) 100 mg tablet 2019 at 0700  No Yes   Sig: TAKE 1 TABLET BY MOUTH DAILY   mupirocin calcium (BACTROBAN) 2 % nasal ointment 2019  Yes No   Sig: by Both Nostrils route two (2) times a day. potassium chloride SR (KLOR-CON 10) 10 mEq tablet 2019 at 0830  No Yes   Si QAM   traMADol (ULTRAM) 50 mg tablet 2019 at 0830  No Yes   Sig: Take 1 Tab by mouth two (2) times daily as needed for Pain. Max Daily Amount: 100 mg. Facility-Administered Medications: None        Allergies:  No Known Allergies     Hospital Course: The patient underwent surgery. Complications:  None; patient tolerated the procedure well. Was taken to the PACU in stable condition and then transferred to the ortho floor. Perioperative Antibiotics:  Ancef     Postoperative Pain Management:  Oxycodone      DVT Prophylaxis: Aspirin 325    Postoperative transfusions:    Number of units banked PRBCs =   none     Post Op complications: none    Hemoglobin at discharge:    Lab Results   Component Value Date/Time    HGB 9.5 (L) 2019 05:20 AM    INR 1.1 2019 10:01 AM       Dressing was changed on POD # 1. Incision - clean, dry and intact. No significant erythema or swelling. Neurovascular exam found to be within normal limits.  Wound appears to be healing without any evidence of infection. Pt had a HVAC drain that was removed on POD# 1. Physical Therapy started on the day following surgery and participated in bed mobility, transfers and ambulation. Gait:  Gait  Base of Support: Widened  Speed/Blanca: Slow  Step Length: Left shortened, Right shortened  Gait Abnormalities: Decreased step clearance, Antalgic  Ambulation - Level of Assistance: Stand-by assistance  Distance (ft): 250 Feet (ft)  Assistive Device: Gait belt, Walker, rolling  Rail Use: Both  Stairs - Level of Assistance: Contact guard assistance  Number of Stairs Trained: 4                   Discharged to: Home with Home Health. Condition on Discharge:   stable    Discharge instructions:  - Anticoagulate with Aspirin 325 BID  - Take pain medications as prescribed  - Resume pre hospital diet      - Discharge activity: activity as tolerated  - Ambulate with assistive device as needed. - Weight bearing status WBAT  - Wound Care Keep wound clean and dry. See discharge instruction sheet. -DISCHARGE MEDICATION LIST     Current Discharge Medication List      START taking these medications    Details   acetaminophen (TYLENOL) 325 mg tablet Take 2 Tabs by mouth every six (6) hours for 14 days. Qty: 112 Tab, Refills: 0      aspirin (ASPIRIN) 325 mg tablet Take 1 Tab by mouth two (2) times a day for 30 days. Qty: 60 Tab, Refills: 0      famotidine (PEPCID) 20 mg tablet Take 1 Tab by mouth two (2) times a day for 30 days. Qty: 60 Tab, Refills: 0      oxyCODONE IR (ROXICODONE) 5 mg immediate release tablet Take 1-2 Tabs by mouth every four (4) hours as needed. Max Daily Amount: 60 mg. If insurance prior auth./quantity restrictions apply, refer to and look up diagnosis code one prescription. Partial fill as needed is permitted. Please do not contact prescriber.   Qty: 80 Tab, Refills: 0    Associated Diagnoses: S/P total knee arthroplasty, right      senna-docusate (PERICOLACE) 8.6-50 mg per tablet Take 1 Tab by mouth daily. Qty: 30 Tab, Refills: 0      polyethylene glycol (MIRALAX) 17 gram packet Take 1 Packet by mouth daily as needed (constipation) for up to 15 days. Qty: 15 Packet, Refills: 0         CONTINUE these medications which have NOT CHANGED    Details   glyBURIDE (DIABETA) 1.25 mg tablet Take 1.25 mg by mouth Daily (before breakfast). allopurinol (ZYLOPRIM) 300 mg tablet Take 1 Tab by mouth daily. Qty: 90 Tab, Refills: 3      potassium chloride SR (KLOR-CON 10) 10 mEq tablet 2 QAM  Qty: 180 Tab, Refills: 3      insulin NPH (HUMULIN N) 100 unit/mL (3 mL) inpn Inject 58 units daily. dx.e11.9  Qty: 20 mL, Refills: 11      furosemide (LASIX) 40 mg tablet TAKE 3 TABLETS BY MOUTH EVERY MORNING  Qty: 90 Tab, Refills: 11      lisinopril (PRINIVIL, ZESTRIL) 20 mg tablet TAKE 1 TABLET BY MOUTH ONCE DAILY IN THE MORNING  Qty: 90 Tab, Refills: 3      metoprolol succinate (TOPROL-XL) 100 mg tablet TAKE 1 TABLET BY MOUTH DAILY  Qty: 90 Tab, Refills: 3      metFORMIN ER (GLUCOPHAGE XR) 500 mg tablet TAKE 1 TABLET BY MOUTH THREE TIMES DAILY WITH MEALS  Qty: 90 Tab, Refills: 11      alcohol swabs (BD SINGLE USE SWABS REGULAR) padm USE TO TEST BLOOD SUGAR.dx.e11.9  Qty: 100 Pad, Refills: 11      !! glucose blood VI test strips (FREESTYLE LITE STRIPS) strip Use to test blood sugar three times daily. Dx.e11.9  Qty: 100 Strip, Refills: 11      BD INSULIN PEN NEEDLE UF SHORT 31 gauge x 5/16\" ndle USE WITH INSULIN PENS TWICE DAILY AS DIRECTED  Qty: 100 Pen Needle, Refills: 11      !! ONE TOUCH DELICA 33 gauge misc Refills: 11      NURIA PEN NEEDLE 32 x 5/32 \" ndle       Blood-Glucose Meter monitoring kit Testing BID-DX:250.00  Qty: 1 kit, Refills: 0    Comments: One touch ultra glucose meter      !!  Lancets (ONE TOUCH ULTRASOFT LANCETS) misc TESTING BID  Qty: 1 Package, Refills: 11      !! glucose blood VI test strips (ASCENSIA AUTODISC VI, ONE TOUCH ULTRA TEST VI) strip TESTING BID  Qty: 1 Package, Refills: 11 !! - Potential duplicate medications found. Please discuss with provider. STOP taking these medications       traMADol (ULTRAM) 50 mg tablet Comments:   Reason for Stopping:         mupirocin calcium (BACTROBAN) 2 % nasal ointment Comments:   Reason for Stopping:            per medical continuation form      -Follow up in office in 2 weeks      Signed:  Anastasiya Mullen.  Susanna Mayer, HILARYP-BC, ONP-C  Orthopaedic Nurse Practitioner    1/22/2019  11:41 AM

## 2019-01-22 NOTE — PROGRESS NOTES
Reviewed discharge instructions, prescriptions, and side effects with patient and her . Reviewed wound care, follow-up instructions, and medication instructions. Answered all questions and provided contact information for future questions. Took IV out per policy, Catheter tip intact. Going home in a car with home health.

## 2019-01-22 NOTE — DIABETES MGMT
DTC Consult Note    Recommendations/ Comments: If appropriate, please consider :   1. Discontinuing glyburde and starting glipizide 5 mg in the morning (given pt's renal status)    Current hospital DM medication: NPH 58 units, lispro correction - normal sensitivity , metformin  mg tid    Consult received for:  [x]             Assessment of home management                []      Medication Recommendations                []             Meter/monitoring     []             Insulin instruction     []             New diagnosis     []             Outpatient education     []             Insulin pump patient     []             Insulin infusion     []             DKA/HHS    Chart reviewed and initial evaluation complete on Gisela Ellie. Patient is a 79 y.o. female with known DM on NPH 58 units after breakfast, glyburide 1.25 mg daily and metformin  mg tid at home. Pt checks BG 1-2x/weekly fasting (reported fasting BG of 150 mg/dL). Pt uses vial and syringe, rotates injection sites in abdomen, but reported lumpy hard areas where she injects. Pt shared that she has had difficulty checking BG one her One Touch Ultra meter as an error code appears. Pt reports good appetite. Breakfast may include egg with toast, apple sauce, fruit  And juice. Lunch and dinner may include meat with greets or beats. Pt may snack on pound cake at night after diner. Assessed and instructed patient on the following:   ·  blood sugar goals, complications of diabetes mellitus, insulin adjustments, illness management, nutrition, referred to Diabetes Educator and site rotation  · Discussed target BG and importance in achieving these values while healing from surgery.  Discussed that pt may benefit from night-time dose of NPH and to discuss with PCP (given pt reported fasting BG of 150 - however, pt not checking BG consistently)  · Discussed importance in discontinuing sugary beverages and importance in consuming balanced meals, discussed importance in adequate CHO intake/ moderate CHO intake to help with BG control    Encouraged the following:   · dietary modifications: Use myPlate method to help build meals, discontinue sugary beverages, regular blood sugar monitorinx/daily, before meals    Provided patient with the following: [x]             Survival skills education materials               []             Insulin education materials               []             CHO counting education materials               [x]             Outpatient DTC contact number               []             Glucometer                 Discussed with patient and/or family need for follow up appointment for diabetes management after discharge. Pt agreeable to be seen outpt. Schedule survival skills class for 02/15/19. A1c:   Lab Results   Component Value Date/Time    Hemoglobin A1c 7.2 (H) 2019 10:01 AM       Recent Glucose Results:   Lab Results   Component Value Date/Time     (H) 2019 05:20 AM    GLUCPOC 178 (H) 2019 07:55 AM    GLUCPOC 211 (H) 2019 09:19 PM    GLUCPOC 131 (H) 2019 04:14 PM        Lab Results   Component Value Date/Time    Creatinine 1.19 (H) 2019 05:20 AM     Estimated Creatinine Clearance: 50 mL/min (A) (based on SCr of 1.19 mg/dL (H)). Active Orders   Diet    DIET DIABETIC CONSISTENT CARB Regular        PO intake: No data found. Will continue to follow as needed. Thank you.     Norah Heaton, [address]    Office: [phone]      Time spent: 15 minutes

## 2019-01-23 ENCOUNTER — PATIENT OUTREACH (OUTPATIENT)
Dept: INTERNAL MEDICINE CLINIC | Age: 69
End: 2019-01-23

## 2019-01-23 NOTE — PROGRESS NOTES
Hospital Discharge Follow-Up  Date/Time:  2019 2:50 PM    Patient was admitted to [facility] on 1019 and discharged on 2019 for S/P Total Knee Arthroplasty Rt. . The physician discharge summary was available at the time of outreach. Patient was contacted within 2 business days of discharge. Top Challenges reviewed with the provider   Pain Med not released by [facility]-stated too strong. Patient has to go to [facility] to get pain med filled. Method of communication with provider :  Face to face  Inpatient RRAT score: 8  Was this a readmission? no   Patient stated reason for the readmission: n/a    Nurse Navigator (NN) contacted the patient by telephone to perform post hospital discharge assessment. Verified name and  with patient as identifiers. Provided introduction to self, and explanation of the Nurse Navigator role. Reviewed discharge instructions and red flags with patient who verbalized understanding. Patient given an opportunity to ask questions and does not have any further questions or concerns at this time. The patient agrees to contact the PCP office for questions related to their healthcare. NN provided contact information for future reference. Disease Specific:   none    Summary of patient's top problems:  1. Pain during the night; resolved now picked up pain med today. 2. Fall potential--teachings done on prevention. 3.  Diabetic Meter--NN provided patient with Spinal Modulation BS meter. Home Health orders at discharge:  [address]: At [address]  Date of initial visit: 2019    Durable Medical Equipment ordered/company: none  Durable Medical Equipment received: n/a    Barriers to care?  Medication management-resolved pain    Advance Care Planning:   Does patient have an Advance Directive:  not on file; education provided     Medication(s):   New Medications at Discharge:   acetaminophen (TYLENOL) 325 mg tablet Take 2 Tabs by mouth every six (6) hours for 14 days. Qty: 112 Tab, Refills: 0       aspirin (ASPIRIN) 325 mg tablet Take 1 Tab by mouth two (2) times a day for 30 days. Qty: 60 Tab, Refills: 0       famotidine (PEPCID) 20 mg tablet Take 1 Tab by mouth two (2) times a day for 30 days. Qty: 60 Tab, Refills: 0       oxyCODONE IR (ROXICODONE) 5 mg immediate release tablet Take 1-2 Tabs by mouth every four (4) hours as needed. Max Daily Amount: 60 mg. If insurance prior auth./quantity restrictions apply, refer to and look up diagnosis code one prescription. Partial fill as needed is permitted. Please do not contact prescriber. Qty: 80 Tab, Refills: 0     Associated Diagnoses: S/P total knee arthroplasty, right       senna-docusate (PERICOLACE) 8.6-50 mg per tablet Take 1 Tab by mouth daily. Qty: 30 Tab, Refills: 0       polyethylene glycol (MIRALAX) 17 gram packet Take 1 Packet by mouth daily as needed (constipation) for up to 15 days. Qty: 15 Packet, Refills: 0       Changed Medications at Discharge: none  Discontinued Medications at Discharge: Tramadol. Bactroban 2% nasal ointment  Medication reconciliation was performed with patient, who verbalizes understanding of administration of home medications. There were barriers to obtaining medications identified at this time. Patient stated [facility] denied pain medication filling on last evening. States she went all night without pain medication but tylenol. Med filled this AM [facility].      Referral to Pharm D needed: no     Current Outpatient Medications   Medication Sig    acetaminophen (TYLENOL) 325 mg tablet Take 2 Tabs by mouth every six (6) hours for 14 days.  aspirin (ASPIRIN) 325 mg tablet Take 1 Tab by mouth two (2) times a day for 30 days.  famotidine (PEPCID) 20 mg tablet Take 1 Tab by mouth two (2) times a day for 30 days.  oxyCODONE IR (ROXICODONE) 5 mg immediate release tablet Take 1-2 Tabs by mouth every four (4) hours as needed.  Max Daily Amount: 60 mg. If insurance prior auth./quantity restrictions apply, refer to and look up diagnosis code one prescription. Partial fill as needed is permitted. Please do not contact prescriber.  senna-docusate (PERICOLACE) 8.6-50 mg per tablet Take 1 Tab by mouth daily.  polyethylene glycol (MIRALAX) 17 gram packet Take 1 Packet by mouth daily as needed (constipation) for up to 15 days.  glyBURIDE (DIABETA) 1.25 mg tablet Take 1.25 mg by mouth Daily (before breakfast).  allopurinol (ZYLOPRIM) 300 mg tablet Take 1 Tab by mouth daily.  potassium chloride SR (KLOR-CON 10) 10 mEq tablet 2 QAM    insulin NPH (HUMULIN N) 100 unit/mL (3 mL) inpn Inject 58 units daily. dx.e11.9    furosemide (LASIX) 40 mg tablet TAKE 3 TABLETS BY MOUTH EVERY MORNING    lisinopril (PRINIVIL, ZESTRIL) 20 mg tablet TAKE 1 TABLET BY MOUTH ONCE DAILY IN THE MORNING    metoprolol succinate (TOPROL-XL) 100 mg tablet TAKE 1 TABLET BY MOUTH DAILY    alcohol swabs (BD SINGLE USE SWABS REGULAR) padm USE TO TEST BLOOD SUGAR.dx.e11.9    glucose blood VI test strips (FREESTYLE LITE STRIPS) strip Use to test blood sugar three times daily. Dx.e11.9    metFORMIN ER (GLUCOPHAGE XR) 500 mg tablet TAKE 1 TABLET BY MOUTH THREE TIMES DAILY WITH MEALS    BD INSULIN PEN NEEDLE UF SHORT 31 gauge x 5/16\" ndle USE WITH INSULIN PENS TWICE DAILY AS DIRECTED    ONE TOUCH DELICA 33 gauge misc     NURIA PEN NEEDLE 32 x 5/32 \" ndle     Blood-Glucose Meter monitoring kit Testing BID-DX:250.00    Lancets (ONE TOUCH ULTRASOFT LANCETS) misc TESTING BID    glucose blood VI test strips (ASCENSIA AUTODISC VI, ONE TOUCH ULTRA TEST VI) strip TESTING BID     No current facility-administered medications for this visit. There are no discontinued medications.     [facility] follow up appointment(s):   Future Appointments   Date Time Provider John Escalante   2/15/2019  1:00 PM SURVIVAL SKILLS CLASS [facility]   4/18/2019  9:45 AM Delia Sawant Eleuterio Jerez MD [facility] [address]   6/18/2019  9:00 AM Avila Ramos MD [address]      Non-[facility] follow up appointment(s): none  [facility]:  n/a   Goals Addressed                 This Visit's Progress     Patient/Family verbalizes understanding of self-management of chronic disease. On track     1/23/2019:  Patient stated BS not taken today. Patient c/o BS meter malfunction. NN provided FreeStyle meter pickup at office for patient. Patient agreed to have supplies filled.    EW           Goal completion:

## 2019-01-24 DIAGNOSIS — E11.9 DIABETES MELLITUS WITHOUT COMPLICATION (HCC): Primary | ICD-10-CM

## 2019-01-25 RX ORDER — INSULIN PUMP SYRINGE, 3 ML
EACH MISCELLANEOUS
Qty: 1 KIT | Refills: 0 | Status: ON HOLD | OUTPATIENT
Start: 2019-01-25 | End: 2021-03-09

## 2019-02-05 ENCOUNTER — OFFICE VISIT (OUTPATIENT)
Dept: INTERNAL MEDICINE CLINIC | Age: 69
End: 2019-02-05

## 2019-02-05 VITALS
DIASTOLIC BLOOD PRESSURE: 73 MMHG | BODY MASS INDEX: 32.39 KG/M2 | SYSTOLIC BLOOD PRESSURE: 134 MMHG | HEIGHT: 64 IN | TEMPERATURE: 98.1 F | WEIGHT: 189.7 LBS | OXYGEN SATURATION: 91 % | RESPIRATION RATE: 16 BRPM | HEART RATE: 75 BPM

## 2019-02-05 DIAGNOSIS — E66.9 OBESITY (BMI 30.0-34.9): ICD-10-CM

## 2019-02-05 DIAGNOSIS — I42.9 CARDIOMYOPATHY, UNSPECIFIED TYPE (HCC): ICD-10-CM

## 2019-02-05 DIAGNOSIS — N31.9 NEUROGENIC BLADDER: ICD-10-CM

## 2019-02-05 DIAGNOSIS — I10 ESSENTIAL HYPERTENSION: ICD-10-CM

## 2019-02-05 DIAGNOSIS — Z96.651 STATUS POST TOTAL RIGHT KNEE REPLACEMENT: Primary | ICD-10-CM

## 2019-02-05 DIAGNOSIS — E78.5 DYSLIPIDEMIA: ICD-10-CM

## 2019-02-05 NOTE — PROGRESS NOTES
[address] [place] [address] and [facility]  [place] [address]  [address] [place]  [address]  Phone:  [phone]  Fax: [phone]       Chief Complaint   Patient presents with    Surgical Follow-up     Patient had right knee replacement on 1/21/19. .      SUBJECTIVE:    Kay Bourgeois is a 79 y.o. female Comes in for return visit stating that he is doing reasonably well. She had a right total knee replacement done approximately a week ago and is doing quite well. This was done by Dr. Dhara Khan. Her postoperative course has been uneventful. She has diabetes and has not been checking her sugar postoperatively. I explained to her that this is mandatory that she do this to facilitate healing. She apparently does have a glucometer, but it is not working and she needs a new one. She denies any orthopnea, PND or dyspnea on exertion. She does have a history of nonischemic cardiomyopathy, which has been reasonably stable. She has a past history of primary hypertension and dyslipidemia, along with obesity. Finally, she needs to have something for her frequent urination. Current Outpatient Medications   Medication Sig Dispense Refill    mirabegron ER (MYRBETRIQ) 50 mg ER tablet Take 1 Tab by mouth daily. 30 Tab 11    glucose blood VI test strips (FREESTYLE LITE STRIPS) strip Use to test blood sugar three times daily. Dx.e11.9 100 Strip 11    Blood-Glucose Meter (FREESTYLE LITE METER) monitoring kit Test three times a day dx. e119 1 Kit 0    acetaminophen (TYLENOL) 325 mg tablet Take 2 Tabs by mouth every six (6) hours for 14 days. 112 Tab 0    aspirin (ASPIRIN) 325 mg tablet Take 1 Tab by mouth two (2) times a day for 30 days. 60 Tab 0    famotidine (PEPCID) 20 mg tablet Take 1 Tab by mouth two (2) times a day for 30 days. 60 Tab 0    oxyCODONE IR (ROXICODONE) 5 mg immediate release tablet Take 1-2 Tabs by mouth every four (4) hours as needed. Max Daily Amount: 60 mg.  If insurance prior auth./quantity restrictions apply, refer to and look up diagnosis code one prescription. Partial fill as needed is permitted. Please do not contact prescriber. 80 Tab 0    senna-docusate (PERICOLACE) 8.6-50 mg per tablet Take 1 Tab by mouth daily. 30 Tab 0    polyethylene glycol (MIRALAX) 17 gram packet Take 1 Packet by mouth daily as needed (constipation) for up to 15 days. 15 Packet 0    glyBURIDE (DIABETA) 1.25 mg tablet Take 1.25 mg by mouth Daily (before breakfast).  allopurinol (ZYLOPRIM) 300 mg tablet Take 1 Tab by mouth daily. 90 Tab 3    potassium chloride SR (KLOR-CON 10) 10 mEq tablet 2  Tab 3    insulin NPH (HUMULIN N) 100 unit/mL (3 mL) inpn Inject 58 units daily. dx.e11.9 20 mL 11    furosemide (LASIX) 40 mg tablet TAKE 3 TABLETS BY MOUTH EVERY MORNING 90 Tab 11    lisinopril (PRINIVIL, ZESTRIL) 20 mg tablet TAKE 1 TABLET BY MOUTH ONCE DAILY IN THE MORNING 90 Tab 3    metoprolol succinate (TOPROL-XL) 100 mg tablet TAKE 1 TABLET BY MOUTH DAILY 90 Tab 3    alcohol swabs (BD SINGLE USE SWABS REGULAR) padm USE TO TEST BLOOD SUGAR.dx.e11.9 100 Pad 11    metFORMIN ER (GLUCOPHAGE XR) 500 mg tablet TAKE 1 TABLET BY MOUTH THREE TIMES DAILY WITH MEALS 90 Tab 11    BD INSULIN PEN NEEDLE UF SHORT 31 gauge x 5/16\" ndle USE WITH INSULIN PENS TWICE DAILY AS DIRECTED 100 Pen Needle 11    ONE TOUCH DELICA 33 gauge misc   11    NURIA PEN NEEDLE 32 x 5/32 \" ndle       Blood-Glucose Meter monitoring kit Testing BID-DX:250.00 1 kit 0    Lancets (ONE TOUCH ULTRASOFT LANCETS) misc TESTING BID 1 Package 11    glucose blood VI test strips (ASCENSIA AUTODISC VI, ONE TOUCH ULTRA TEST VI) strip TESTING BID 1 Package 11     Past Medical History:   Diagnosis Date    Arthritis     Congestive heart failure, unspecified     Diabetes ([facility] [address]     Hypercholesterolemia     Hypertension      Past Surgical History:   Procedure Laterality Date    HX ORTHOPAEDIC      Arthroscopy right knee No Known Allergies      REVIEW OF SYSTEMS:  General: negative for - chills or fever  ENT: negative for - headaches, nasal congestion or tinnitus  Respiratory: negative for - cough, hemoptysis, shortness of breath or wheezing  Cardiovascular : negative for - chest pain, edema, palpitations or shortness of breath  Gastrointestinal: negative for - abdominal pain, blood in stools, heartburn or nausea/vomiting  Genito-Urinary: no dysuria, trouble voiding, or hematuria  Musculoskeletal: negative for - gait disturbance, joint pain, joint stiffness or joint swelling  Neurological: no TIA or stroke symptoms  Hematologic: no bruises, no bleeding, no swollen glands  Integument: no lumps, mole changes, nail changes or rash  Endocrine: no malaise/lethargy or unexpected weight changes      Social History     Socioeconomic History    Marital status:      Spouse name: Not on file    Number of children: 3    Years of education: 15    Highest education level: Not on file   Occupational History    Occupation: retired   Tobacco Use    Smoking status: Former Smoker     Packs/day: 0.25     Years: 20.00     Pack years: 5.00     Last attempt to quit: 2014     Years since quittin.1    Smokeless tobacco: Never Used   Substance and Sexual Activity    Alcohol use: Yes     Alcohol/week: 0.0 oz     Comment: 21-24 Beers per week    Drug use: No    Sexual activity: Yes     Partners: Male     Family History   Problem Relation Age of Onset    Diabetes Mother        OBJECTIVE:    Visit Vitals  /73   Pulse 75   Temp 98.1 °F (36.7 °C) (Oral)   Resp 16   Ht 5' 4\" (1.626 m)   Wt 189 lb 11.2 oz (86 kg)   SpO2 91%   BMI 32.56 kg/m²     CONSTITUTIONAL: well , well nourished, appears age appropriate  EYES: perrla, eom intact  ENMT:moist mucous membranes, pharynx clear  NECK: supple.  Thyroid normal  RESPIRATORY: Chest: clear to ascultation and percussion   CARDIOVASCULAR: Heart: regular rate and rhythm  GASTROINTESTINAL: Abdomen: soft, bowel sounds active  HEMATOLOGIC: no pathological lymph nodes palpated  MUSCULOSKELETAL: Extremities: no edema, pulse 1+, surgical scar right anterior knee  INTEGUMENT: No unusual rashes or suspicious skin lesions noted. Nails appear normal.  NEUROLOGIC: non-focal exam   MENTAL STATUS: alert and oriented, appropriate affect      ASSESSMENT:  1. Status post total right knee replacement    2. Cardiomyopathy, unspecified type ([address]    3. Essential hypertension    4. Insulin dependent diabetes mellitus ([address]    5. Dyslipidemia    6. Obesity (BMI 30.0-34.9)    7. Neurogenic bladder        PLAN:    1. She is recuperating nicely from her right total knee replacement. She will follow up with her orthopedic physician in the next couple weeks. 2. From a diabetic perspective, it is important that she check blood sugars. Her last hemoglobin A1c was excellent. I see no reason to check that today. 3. Her blood pressure is excellent, no adjustments are made. 4. She is well compensated from a cardiac perspective and no overt signs of congestive heart failure. 5. For her bladder, I will give her Myrbetriq and hopefully her insurance will pay for this. Follow-up Disposition:  Return in about 4 weeks (around 3/5/2019). Trisha Griffiths MD    Discussed the patient's BMI with her. The BMI follow up plan is as follows:     dietary management education, guidance, and counseling  encourage exercise  monitor weight  prescribed dietary intake    An After Visit Summary was printed and given to the patient.

## 2019-02-05 NOTE — PROGRESS NOTES
Chief Complaint   Patient presents with    Surgical Follow-up     Patient had right knee replacement on 1/21/19. 1. Have you been to the ER, urgent care clinic since your last visit? Hospitalized since your last visit? Yes When: 1/21/19 Where: [facility]  Reason for visit: knee surgery    2. Have you seen or consulted any other health care providers outside of the [address] [place] [address] since your last visit? Include any pap smears or colon screening.  No

## 2019-02-05 NOTE — PATIENT INSTRUCTIONS
Body Mass Index: Care Instructions  Your Care Instructions    Body mass index (BMI) can help you see if your weight is raising your risk for health problems. It uses a formula to compare how much you weigh with how tall you are. · A BMI lower than 18.5 is considered underweight. · A BMI between 18.5 and 24.9 is considered healthy. · A BMI between 25 and 29.9 is considered overweight. A BMI of 30 or higher is considered obese. If your BMI is in the normal range, it means that you have a lower risk for weight-related health problems. If your BMI is in the overweight or obese range, you may be at increased risk for weight-related health problems, such as high blood pressure, heart disease, stroke, arthritis or joint pain, and diabetes. If your BMI is in the underweight range, you may be at increased risk for health problems such as fatigue, lower protection (immunity) against illness, muscle loss, bone loss, hair loss, and hormone problems. BMI is just one measure of your risk for weight-related health problems. You may be at higher risk for health problems if you are not active, you eat an unhealthy diet, or you drink too much alcohol or use tobacco products. Follow-up care is a key part of your treatment and safety. Be sure to make and go to all appointments, and call your doctor if you are having problems. It's also a good idea to know your test results and keep a list of the medicines you take. How can you care for yourself at home? · Practice healthy eating habits. This includes eating plenty of fruits, vegetables, whole grains, lean protein, and low-fat dairy. · If your doctor recommends it, get more exercise. Walking is a good choice. Bit by bit, increase the amount you walk every day. Try for at least 30 minutes on most days of the week. · Do not smoke. Smoking can increase your risk for health problems. If you need help quitting, talk to your doctor about stop-smoking programs and medicines. These can increase your chances of quitting for good. · Limit alcohol to 2 drinks a day for men and 1 drink a day for women. Too much alcohol can cause health problems. If you have a BMI higher than 25  · Your doctor may do other tests to check your risk for weight-related health problems. This may include measuring the distance around your waist. A waist measurement of more than 40 inches in men or 35 inches in women can increase the risk of weight-related health problems. · Talk with your doctor about steps you can take to stay healthy or improve your health. You may need to make lifestyle changes to lose weight and stay healthy, such as changing your diet and getting regular exercise. If you have a BMI lower than 18.5  · Your doctor may do other tests to check your risk for health problems. · Talk with your doctor about steps you can take to stay healthy or improve your health. You may need to make lifestyle changes to gain or maintain weight and stay healthy, such as getting more healthy foods in your diet and doing exercises to build muscle. Where can you learn more? Go to http://mitch-north.info/. Enter S176 in the search box to learn more about \"Body Mass Index: Care Instructions. \"  Current as of: October 13, 2016  Content Version: 11.4  © 9807-0393 [employer]. Care instructions adapted under license by [employer] (which disclaims liability or warranty for this information). If you have questions about a medical condition or this instruction, always ask your healthcare professional. [address] any warranty or liability for your use of this information.

## 2019-02-12 DIAGNOSIS — Z96.651 STATUS POST TOTAL RIGHT KNEE REPLACEMENT: Primary | ICD-10-CM

## 2019-07-16 ENCOUNTER — OFFICE VISIT (OUTPATIENT)
Dept: CARDIOLOGY CLINIC | Age: 69
End: 2019-07-16

## 2019-07-16 VITALS
DIASTOLIC BLOOD PRESSURE: 80 MMHG | OXYGEN SATURATION: 95 % | BODY MASS INDEX: 33.63 KG/M2 | HEIGHT: 64 IN | RESPIRATION RATE: 16 BRPM | SYSTOLIC BLOOD PRESSURE: 142 MMHG | WEIGHT: 197 LBS | HEART RATE: 87 BPM

## 2019-07-16 DIAGNOSIS — I10 ESSENTIAL HYPERTENSION: Primary | ICD-10-CM

## 2019-07-16 DIAGNOSIS — E78.5 DYSLIPIDEMIA: ICD-10-CM

## 2019-07-16 DIAGNOSIS — Z87.891 FORMER TOBACCO USE: ICD-10-CM

## 2019-07-16 DIAGNOSIS — E66.9 OBESITY (BMI 30.0-34.9): ICD-10-CM

## 2019-07-16 DIAGNOSIS — N18.30 CKD (CHRONIC KIDNEY DISEASE) STAGE 3, GFR 30-59 ML/MIN (HCC): ICD-10-CM

## 2019-07-16 RX ORDER — ASPIRIN 325 MG
TABLET, DELAYED RELEASE (ENTERIC COATED) ORAL
COMMUNITY
Start: 2019-01-23 | End: 2020-12-17 | Stop reason: ALTCHOICE

## 2019-07-16 RX ORDER — GLIMEPIRIDE 4 MG/1
TABLET ORAL
COMMUNITY
Start: 2018-04-15 | End: 2021-03-09 | Stop reason: ALTCHOICE

## 2019-07-16 NOTE — PROGRESS NOTES
[facility]   [address] [facility], [address] [place] [address]                                          NEW PATIENT HPI/FOLLOW-UP      NAME:  Sandoval Allen   :   1950   MRN:   [id number]   PCP:  Tati Duarte MD           Subjective: The patient is a 81y.o. year old female  who returns for a routine follow-up. Since the last visit, patient reports no new symptoms. Here with Granddaughter. Denies change in exercise tolerance, chest pain, edema, medication intolerance, palpitations, shortness of breath, PND/orthopnea wheezing, sputum, syncope, dizziness or light headedness. Doing satisfactorily. Review of Systems  General: Pt denies excessive weight gain or loss. Pt is able to conduct ADL's. Respiratory: Denies shortness of breath, PARDO, wheezing or stridor.   Cardiovascular: Denies precordial pain, palpitations, edema or PND  Gastrointestinal: Denies poor appetite, indigestion, abdominal pain or blood in stool  Peripheral vascular: Denies claudication, leg cramps  Neuropsychiatric: Denies paresthesias,tingling,numbness,anxiety,depression,fatigue  Musculoskeletal: Denies pain,tenderness, soreness,swelling      Past Medical History:   Diagnosis Date    Arthritis     Congestive heart failure, unspecified     Diabetes ([facility] [address]     Hypercholesterolemia     Hypertension      Patient Active Problem List    Diagnosis Date Noted    Status post total right knee replacement 2019    Osteoarthrosis, localized, primary, knee, right 2019    Preoperative clearance 2018    PARDO (dyspnea on exertion) 05/10/2018    Chest pain, exertional 2018    Decreased libido 10/03/2017    Epistaxis 2017    Former tobacco use--stopped  after >40 yrs smoking 11/15/2016    Obesity (BMI 30.0-34.9) 11/15/2016    Reactive airway disease 2016    Carpal tunnel syndrome of right wrist 2015    Primary osteoarthritis of right knee 2015    Cardiomyopathy ([facility] 2014    Insulin dependent diabetes mellitus ([facility] [address] 2014    Dyslipidemia 2014    Gout 2014    Alcohol abuse 2014    Hypertension 2014    Dermatitis 2014      Past Surgical History:   Procedure Laterality Date    HX ORTHOPAEDIC      Arthroscopy right knee     No Known Allergies   Family History   Problem Relation Age of Onset    Diabetes Mother       Social History     Socioeconomic History    Marital status:      Spouse name: Not on file    Number of children: 3    Years of education: 15    Highest education level: Not on file   Occupational History    Occupation: retired   Social Needs    Financial resource strain: Not on file    Food insecurity:     Worry: Not on file     Inability: Not on file   [employer] needs:     Medical: Not on file     Non-medical: Not on file   Tobacco Use    Smoking status: Former Smoker     Packs/day: 0.25     Years: 20.00     Pack years: 5.00     Last attempt to quit: 2014     Years since quittin.5    Smokeless tobacco: Never Used   Substance and Sexual Activity    Alcohol use:  Yes     Alcohol/week: 0.0 oz     Comment: 21-24 Beers per week    Drug use: No    Sexual activity: Yes     Partners: Male   Lifestyle    Physical activity:     Days per week: Not on file     Minutes per session: Not on file    Stress: Not on file   Relationships    Social connections:     Talks on phone: Not on file     Gets together: Not on file     Attends [employer] service: Not on file     Active member of club or organization: Not on file     Attends meetings of clubs or organizations: Not on file     Relationship status: Not on file    Intimate partner violence:     Fear of current or ex partner: Not on file     Emotionally abused: Not on file     Physically abused: Not on file     Forced sexual activity: Not on file   Other Topics Concern    Not on file   Social History Narrative    Not on file      Current Outpatient Medications   Medication Sig    aspirin delayed-release 325 mg tablet TAKE 1 TABLET BY MOUTH TWO TIMES A DAY FOR 30 DAYS    glimepiride (AMARYL) 4 mg tablet   TAKE 1 TABLET BY MOUTH TWICE DAILY BEFORE A MEAL    mirabegron ER (MYRBETRIQ) 50 mg ER tablet Take 1 Tab by mouth daily.  glucose blood VI test strips (FREESTYLE LITE STRIPS) strip Use to test blood sugar three times daily. Dx.e11.9    Blood-Glucose Meter (FREESTYLE LITE METER) monitoring kit Test three times a day dx. e119    senna-docusate (PERICOLACE) 8.6-50 mg per tablet Take 1 Tab by mouth daily.  allopurinol (ZYLOPRIM) 300 mg tablet Take 1 Tab by mouth daily.  potassium chloride SR (KLOR-CON 10) 10 mEq tablet 2 QAM    insulin NPH (HUMULIN N) 100 unit/mL (3 mL) inpn Inject 58 units daily. dx.e11.9    furosemide (LASIX) 40 mg tablet TAKE 3 TABLETS BY MOUTH EVERY MORNING    lisinopril (PRINIVIL, ZESTRIL) 20 mg tablet TAKE 1 TABLET BY MOUTH ONCE DAILY IN THE MORNING    metoprolol succinate (TOPROL-XL) 100 mg tablet TAKE 1 TABLET BY MOUTH DAILY    alcohol swabs (BD SINGLE USE SWABS REGULAR) padm USE TO TEST BLOOD SUGAR.dx.e11.9    metFORMIN ER (GLUCOPHAGE XR) 500 mg tablet TAKE 1 TABLET BY MOUTH THREE TIMES DAILY WITH MEALS    BD INSULIN PEN NEEDLE UF SHORT 31 gauge x 5/16\" ndle USE WITH INSULIN PENS TWICE DAILY AS DIRECTED    ONE TOUCH DELICA 33 gauge misc     NURIA PEN NEEDLE 32 x 5/32 \" ndle     Blood-Glucose Meter monitoring kit Testing BID-DX:250.00    Lancets (ONE TOUCH ULTRASOFT LANCETS) misc TESTING BID    glucose blood VI test strips (ASCENSIA AUTODISC VI, ONE TOUCH ULTRA TEST VI) strip TESTING BID    oxyCODONE IR (ROXICODONE) 5 mg immediate release tablet Take 1-2 Tabs by mouth every four (4) hours as needed. Max Daily Amount: 60 mg. If insurance prior auth./quantity restrictions apply, refer to and look up diagnosis code one prescription. Partial fill as needed is permitted. Please do not contact prescriber. (Patient not taking: Reported on 7/16/2019)    glyBURIDE (DIABETA) 1.25 mg tablet Take 1.25 mg by mouth Daily (before breakfast). No current facility-administered medications for this visit. I have reviewed the nurses notes, vitals, problem list, allergy list, medical history, family medical, social history and medications. Objective:     Physical Exam:     Vitals:    07/16/19 1050   BP: 142/80   Pulse: 87   Resp: 16   SpO2: 95%   Weight: 197 lb (89.4 kg)   Height: 5' 4\" (1.626 m)    Body mass index is 33.81 kg/m². General: Well developed, in no acute distress. HEENT: No carotid bruits, no JVD, trach is midline. Heart:  Normal S1/S2 negative S3 or S4. Regular, no murmur, gallop or rub.   Respiratory: Clear bilaterally, no wheezing or rales  Abdomen:   Soft, non-tender, bowel sounds are active.   Extremities:  No edema, normal cap refill, no cyanosis. Neuro: A&Ox3, speech clear, gait stable. Skin: Skin color is normal. No rashes or lesions. No diaphoresis.   Vascular: 2+ pulses symmetric in all extremities        Data Review:       Cardiographics:    EKG: NSR,LVH with strain, APC,LAE    Cardiology Labs:    Results for orders placed or performed during the hospital encounter of 10/24/16   EKG, 12 LEAD, INITIAL   Result Value Ref Range    Ventricular Rate 78 BPM    Atrial Rate 78 BPM    P-R Interval 210 ms    QRS Duration 92 ms    Q-T Interval 390 ms    QTC Calculation (Bezet) 444 ms    Calculated P Axis 61 degrees    Calculated R Axis 7 degrees    Calculated T Axis 21 degrees    Diagnosis       Sinus rhythm with 1st degree AV block with occasional premature ventricular   complexes  Left atrial enlargement  Left ventricular hypertrophy  Nonspecific T wave abnormality  Abnormal ECG    Confirmed by Maryanne Grayson MD, Candance Shih ([id number]) on 10/24/2016 11:11:39 AM         Lab Results   Component Value Date/Time    Cholesterol, total 175 04/10/2018 01:19 PM    HDL Cholesterol 56 04/10/2018 01:19 PM    LDL, calculated 75 04/10/2018 01:19 PM    Triglyceride 221 (H) 04/10/2018 01:19 PM       Lab Results   Component Value Date/Time    Sodium 136 01/22/2019 05:20 AM    Potassium 3.9 01/22/2019 05:20 AM    Chloride 105 01/22/2019 05:20 AM    CO2 22 01/22/2019 05:20 AM    Anion gap 9 01/22/2019 05:20 AM    Glucose 203 (H) 01/22/2019 05:20 AM    BUN 33 (H) 01/22/2019 05:20 AM    Creatinine 1.19 (H) 01/22/2019 05:20 AM    BUN/Creatinine ratio 28 (H) 01/22/2019 05:20 AM    GFR est AA 55 (L) 01/22/2019 05:20 AM    GFR est non-AA 45 (L) 01/22/2019 05:20 AM    Calcium 7.9 (L) 01/22/2019 05:20 AM    Bilirubin, total 0.6 01/07/2019 10:01 AM    AST (SGOT) 17 01/07/2019 10:01 AM    Alk. phosphatase 64 01/07/2019 10:01 AM    Protein, total 8.6 (H) 01/07/2019 10:01 AM    Albumin 3.7 01/07/2019 10:01 AM    Globulin 4.9 (H) 01/07/2019 10:01 AM    A-G Ratio 0.8 (L) 01/07/2019 10:01 AM    ALT (SGPT) 27 01/07/2019 10:01 AM          Assessment:       ICD-10-CM ICD-9-CM    1. Essential hypertension I10 401.9 AMB POC EKG ROUTINE W/ 12 LEADS, INTER & REP   2. Insulin dependent diabetes mellitus (HCC) E11.9 250.00     Z79.4 V58.67    3. Dyslipidemia E78.5 272.4    4. Former tobacco use--stopped 2014 after >40 yrs smoking Z87. 891 V15.82    5. Obesity (BMI 30.0-34. 9) E66.9 278.00    6. CKD (chronic kidney disease) stage 3, GFR 30-59 ml/min (HCC) N18.3 585. 3          Discussion: Patient presents at this time stable from a cardiac perspective. BP high normal. No chest pain or bothersome PARDO. Pleased with present cardiac status. No need for ASA from cardiac perspective. Plan: 1. Continue same meds. Lipid profile and labs followed by PCP. 2.Encouraged to exercise to tolerance, lose weight, and follow low fat, low carb,low cholesterol, low sodium predominantly Plant-based (consider Mediterranean) diet. Call with questions or concerns. Will follow up any test results by phone and/or f/u here in office if needed. Socorro Nix       3.Follow up: 1 year    I have discussed the diagnosis with the patient and the intended plan as seen in the above orders. The patient has received an after-visit summary and questions were answered concerning future plans. I have discussed any concerning medication side effects and warnings with the patient as well.     Pavithra Gleason MD  7/16/2019

## 2019-07-16 NOTE — PROGRESS NOTES
Chief Complaint   Patient presents with    Follow-up    Chest Pain     1. Have you been to the ER, urgent care clinic since your last visit? Hospitalized since your last visit? No    2. Have you seen or consulted any other health care providers outside of the [address] [place] [address] since your last visit? Include any pap smears or colon screening.  No

## 2019-07-25 ENCOUNTER — APPOINTMENT (OUTPATIENT)
Dept: GENERAL RADIOLOGY | Age: 69
End: 2019-07-25
Attending: PHYSICIAN ASSISTANT
Payer: MEDICARE

## 2019-07-25 ENCOUNTER — HOSPITAL ENCOUNTER (EMERGENCY)
Age: 69
Discharge: HOME | End: 2019-07-25
Attending: EMERGENCY MEDICINE
Payer: MEDICARE

## 2019-07-25 VITALS
DIASTOLIC BLOOD PRESSURE: 70 MMHG | SYSTOLIC BLOOD PRESSURE: 148 MMHG | HEIGHT: 64 IN | TEMPERATURE: 97.7 F | HEART RATE: 67 BPM | BODY MASS INDEX: 32.95 KG/M2 | RESPIRATION RATE: 16 BRPM | OXYGEN SATURATION: 100 % | WEIGHT: 193 LBS

## 2019-07-25 DIAGNOSIS — M25.461 KNEE EFFUSION, RIGHT: Primary | ICD-10-CM

## 2019-07-25 PROCEDURE — 74011250637 HC RX REV CODE- 250/637: Performed by: PHYSICIAN ASSISTANT

## 2019-07-25 PROCEDURE — L1830 KO IMMOB CANVAS LONG PRE OTS: HCPCS

## 2019-07-25 PROCEDURE — 73564 X-RAY EXAM KNEE 4 OR MORE: CPT

## 2019-07-25 PROCEDURE — 99283 EMERGENCY DEPT VISIT LOW MDM: CPT

## 2019-07-25 RX ORDER — ACETAMINOPHEN 325 MG/1
650 TABLET ORAL
Status: COMPLETED | OUTPATIENT
Start: 2019-07-25 | End: 2019-07-25

## 2019-07-25 RX ORDER — TRAMADOL HYDROCHLORIDE 50 MG/1
50 TABLET ORAL
Qty: 5 TAB | Refills: 0 | Status: SHIPPED | OUTPATIENT
Start: 2019-07-25 | End: 2019-07-28

## 2019-07-25 RX ADMIN — ACETAMINOPHEN 650 MG: 325 TABLET ORAL at 10:28

## 2019-07-25 NOTE — ED NOTES
Crutches and knee immobolizer applied by PA    Discharge instructions provided to patient by PA/MD. VSS. Patient refuses wheelchair and ambulatroy to discharge with steady gait.
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
## 2019-08-08 ENCOUNTER — OFFICE VISIT (OUTPATIENT)
Dept: INTERNAL MEDICINE CLINIC | Age: 69
End: 2019-08-08

## 2019-08-08 VITALS
DIASTOLIC BLOOD PRESSURE: 76 MMHG | HEIGHT: 64 IN | TEMPERATURE: 97.5 F | WEIGHT: 196.1 LBS | OXYGEN SATURATION: 97 % | HEART RATE: 72 BPM | RESPIRATION RATE: 16 BRPM | SYSTOLIC BLOOD PRESSURE: 136 MMHG | BODY MASS INDEX: 33.48 KG/M2

## 2019-08-08 DIAGNOSIS — E66.9 OBESITY (BMI 30.0-34.9): ICD-10-CM

## 2019-08-08 DIAGNOSIS — I10 ESSENTIAL HYPERTENSION: ICD-10-CM

## 2019-08-08 DIAGNOSIS — E78.5 DYSLIPIDEMIA: ICD-10-CM

## 2019-08-08 DIAGNOSIS — R07.89 CHEST WALL PAIN: Primary | ICD-10-CM

## 2019-08-08 DIAGNOSIS — S80.01XA CONTUSION OF RIGHT KNEE, INITIAL ENCOUNTER: ICD-10-CM

## 2019-08-08 RX ORDER — ACETAMINOPHEN AND CODEINE PHOSPHATE 300; 30 MG/1; MG/1
1 TABLET ORAL
COMMUNITY
End: 2020-12-17 | Stop reason: ALTCHOICE

## 2019-08-08 NOTE — PROGRESS NOTES
Chief Complaint   Patient presents with   [address] [place] [address] Annual Wellness Visit     1. Have you been to the ER, urgent care clinic since your last visit? Hospitalized since your last visit? Yes When: 7/25/19 [facility] [place] [facility] Reason for visit: knee pain    2. Have you seen or consulted any other health care providers outside of the [address] [place] [address] since your last visit? Include any pap smears or colon screening.  No

## 2019-08-09 LAB
ALBUMIN SERPL-MCNC: 4.4 G/DL (ref 3.6–4.8)
ALBUMIN/CREAT UR: 57.4 MG/G CREAT (ref 0–30)
ALBUMIN/GLOB SERPL: 1.2 {RATIO} (ref 1.2–2.2)
ALP SERPL-CCNC: 61 IU/L (ref 39–117)
ALT SERPL-CCNC: 17 IU/L (ref 0–32)
APO B SERPL-MCNC: 130 MG/DL
APPEARANCE UR: ABNORMAL
AST SERPL-CCNC: 16 IU/L (ref 0–40)
BACTERIA #/AREA URNS HPF: ABNORMAL /[HPF]
BASOPHILS # BLD AUTO: 0 X10E3/UL (ref 0–0.2)
BASOPHILS NFR BLD AUTO: 0 %
BILIRUB SERPL-MCNC: 0.4 MG/DL (ref 0–1.2)
BILIRUB UR QL STRIP: NEGATIVE
BUN SERPL-MCNC: 24 MG/DL (ref 8–27)
BUN/CREAT SERPL: 26 (ref 12–28)
CALCIUM SERPL-MCNC: 9.6 MG/DL (ref 8.7–10.3)
CASTS URNS MICRO: ABNORMAL
CASTS URNS QL MICRO: PRESENT /LPF
CHLORIDE SERPL-SCNC: 102 MMOL/L (ref 96–106)
CHOLEST SERPL-MCNC: 207 MG/DL (ref 100–199)
CO2 SERPL-SCNC: 25 MMOL/L (ref 20–29)
COLOR UR: YELLOW
CREAT SERPL-MCNC: 0.92 MG/DL (ref 0.57–1)
CREAT UR-MCNC: 54.9 MG/DL
EOSINOPHIL # BLD AUTO: 0.1 X10E3/UL (ref 0–0.4)
EOSINOPHIL NFR BLD AUTO: 2 %
EPI CELLS #/AREA URNS HPF: >10 /HPF (ref 0–10)
ERYTHROCYTE [DISTWIDTH] IN BLOOD BY AUTOMATED COUNT: 14.6 % (ref 12.3–15.4)
EST. AVERAGE GLUCOSE BLD GHB EST-MCNC: 140 MG/DL
GLOBULIN SER CALC-MCNC: 3.7 G/DL (ref 1.5–4.5)
GLUCOSE SERPL-MCNC: 183 MG/DL (ref 65–99)
GLUCOSE UR QL: NEGATIVE
HBA1C MFR BLD: 6.5 % (ref 4.8–5.6)
HCT VFR BLD AUTO: 38.5 % (ref 34–46.6)
HDLC SERPL-MCNC: 46 MG/DL
HGB BLD-MCNC: 12.3 G/DL (ref 11.1–15.9)
HGB UR QL STRIP: NEGATIVE
IMM GRANULOCYTES # BLD AUTO: 0 X10E3/UL (ref 0–0.1)
IMM GRANULOCYTES NFR BLD AUTO: 0 %
KETONES UR QL STRIP: NEGATIVE
LDLC SERPL CALC-MCNC: 105 MG/DL (ref 0–99)
LEUKOCYTE ESTERASE UR QL STRIP: ABNORMAL
LYMPHOCYTES # BLD AUTO: 1.2 X10E3/UL (ref 0.7–3.1)
LYMPHOCYTES NFR BLD AUTO: 26 %
MCH RBC QN AUTO: 28.1 PG (ref 26.6–33)
MCHC RBC AUTO-ENTMCNC: 31.9 G/DL (ref 31.5–35.7)
MCV RBC AUTO: 88 FL (ref 79–97)
MICRO URNS: ABNORMAL
MICROALBUMIN UR-MCNC: 31.5 UG/ML
MONOCYTES # BLD AUTO: 0.3 X10E3/UL (ref 0.1–0.9)
MONOCYTES NFR BLD AUTO: 6 %
MUCOUS THREADS URNS QL MICRO: PRESENT
NEUTROPHILS # BLD AUTO: 3.1 X10E3/UL (ref 1.4–7)
NEUTROPHILS NFR BLD AUTO: 66 %
NITRITE UR QL STRIP: NEGATIVE
PH UR STRIP: 5.5 [PH] (ref 5–7.5)
PLATELET # BLD AUTO: 210 X10E3/UL (ref 150–450)
POTASSIUM SERPL-SCNC: 4 MMOL/L (ref 3.5–5.2)
PROT SERPL-MCNC: 8.1 G/DL (ref 6–8.5)
PROT UR QL STRIP: NEGATIVE
RBC # BLD AUTO: 4.37 X10E6/UL (ref 3.77–5.28)
RBC #/AREA URNS HPF: ABNORMAL /HPF (ref 0–2)
SODIUM SERPL-SCNC: 144 MMOL/L (ref 134–144)
SP GR UR: 1.01 (ref 1–1.03)
TRIGL SERPL-MCNC: 280 MG/DL (ref 0–149)
TSH SERPL DL<=0.005 MIU/L-ACNC: 1.2 UIU/ML (ref 0.45–4.5)
UROBILINOGEN UR STRIP-MCNC: 0.2 MG/DL (ref 0.2–1)
VLDLC SERPL CALC-MCNC: 56 MG/DL (ref 5–40)
WBC # BLD AUTO: 4.7 X10E3/UL (ref 3.4–10.8)
WBC #/AREA URNS HPF: >30 /HPF (ref 0–5)
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
## 2019-08-14 ENCOUNTER — HOSPITAL ENCOUNTER (OUTPATIENT)
Dept: GENERAL RADIOLOGY | Age: 69
Discharge: HOME | End: 2019-08-14
Payer: MEDICARE

## 2019-08-14 DIAGNOSIS — M54.9 BACK PAIN: ICD-10-CM

## 2019-08-14 PROCEDURE — 72100 X-RAY EXAM L-S SPINE 2/3 VWS: CPT

## 2019-08-25 RX ORDER — CIPROFLOXACIN 500 MG/1
500 TABLET ORAL 2 TIMES DAILY
Qty: 14 TAB | Refills: 0 | Status: SHIPPED | OUTPATIENT
Start: 2019-08-25 | End: 2019-09-01

## 2019-08-25 NOTE — PROGRESS NOTES
Tell patient she has a bladder infection and medication will be called in for her. Also remind her to take your cholesterol medication daily.

## 2019-09-12 RX ORDER — METOPROLOL SUCCINATE 100 MG/1
TABLET, EXTENDED RELEASE ORAL
Qty: 90 TAB | Refills: 3 | Status: SHIPPED | OUTPATIENT
Start: 2019-09-12 | End: 2020-09-12

## 2019-09-12 RX ORDER — METFORMIN HYDROCHLORIDE 500 MG/1
TABLET, EXTENDED RELEASE ORAL
Qty: 270 TAB | Refills: 3 | Status: SHIPPED | OUTPATIENT
Start: 2019-09-12 | End: 2020-09-12

## 2019-09-12 RX ORDER — ATORVASTATIN CALCIUM 40 MG/1
40 TABLET, FILM COATED ORAL DAILY
Qty: 90 TAB | Refills: 3 | Status: SHIPPED | OUTPATIENT
Start: 2019-09-12 | End: 2020-09-12

## 2019-09-23 PROBLEM — Z01.818 PREOPERATIVE CLEARANCE: Status: RESOLVED | Noted: 2018-12-11 | Resolved: 2019-09-23

## 2019-10-20 ENCOUNTER — HOSPITAL ENCOUNTER (EMERGENCY)
Age: 69
Discharge: HOME | End: 2019-10-20
Attending: EMERGENCY MEDICINE
Payer: MEDICARE

## 2019-10-20 ENCOUNTER — APPOINTMENT (OUTPATIENT)
Dept: GENERAL RADIOLOGY | Age: 69
End: 2019-10-20
Attending: EMERGENCY MEDICINE
Payer: MEDICARE

## 2019-10-20 VITALS
RESPIRATION RATE: 26 BRPM | SYSTOLIC BLOOD PRESSURE: 105 MMHG | BODY MASS INDEX: 33.42 KG/M2 | WEIGHT: 195.77 LBS | TEMPERATURE: 98.3 F | OXYGEN SATURATION: 97 % | HEART RATE: 68 BPM | HEIGHT: 64 IN | DIASTOLIC BLOOD PRESSURE: 62 MMHG

## 2019-10-20 DIAGNOSIS — I50.9 ACUTE ON CHRONIC CONGESTIVE HEART FAILURE, UNSPECIFIED HEART FAILURE TYPE (HCC): Primary | ICD-10-CM

## 2019-10-20 LAB
ALBUMIN SERPL-MCNC: 3.5 G/DL (ref 3.5–5)
ALBUMIN/GLOB SERPL: 0.8 {RATIO} (ref 1.1–2.2)
ALP SERPL-CCNC: 62 U/L (ref 45–117)
ALT SERPL-CCNC: 44 U/L (ref 12–78)
ANION GAP SERPL CALC-SCNC: 7 MMOL/L (ref 5–15)
AST SERPL-CCNC: 38 U/L (ref 15–37)
BASOPHILS # BLD: 0 K/UL (ref 0–0.1)
BASOPHILS NFR BLD: 1 % (ref 0–1)
BILIRUB SERPL-MCNC: 1 MG/DL (ref 0.2–1)
BNP SERPL-MCNC: 940 PG/ML
BUN SERPL-MCNC: 17 MG/DL (ref 6–20)
BUN/CREAT SERPL: 17 (ref 12–20)
CALCIUM SERPL-MCNC: 8.8 MG/DL (ref 8.5–10.1)
CHLORIDE SERPL-SCNC: 106 MMOL/L (ref 97–108)
CK SERPL-CCNC: 116 U/L (ref 26–192)
CO2 SERPL-SCNC: 26 MMOL/L (ref 21–32)
CREAT SERPL-MCNC: 0.99 MG/DL (ref 0.55–1.02)
DIFFERENTIAL METHOD BLD: NORMAL
EOSINOPHIL # BLD: 0.1 K/UL (ref 0–0.4)
EOSINOPHIL NFR BLD: 3 % (ref 0–7)
ERYTHROCYTE [DISTWIDTH] IN BLOOD BY AUTOMATED COUNT: 13.9 % (ref 11.5–14.5)
GLOBULIN SER CALC-MCNC: 4.6 G/DL (ref 2–4)
GLUCOSE SERPL-MCNC: 130 MG/DL (ref 65–100)
HCT VFR BLD AUTO: 36.7 % (ref 35–47)
HGB BLD-MCNC: 11.5 G/DL (ref 11.5–16)
IMM GRANULOCYTES # BLD AUTO: 0 K/UL (ref 0–0.04)
IMM GRANULOCYTES NFR BLD AUTO: 0 % (ref 0–0.5)
LYMPHOCYTES # BLD: 0.8 K/UL (ref 0.8–3.5)
LYMPHOCYTES NFR BLD: 18 % (ref 12–49)
MCH RBC QN AUTO: 28.1 PG (ref 26–34)
MCHC RBC AUTO-ENTMCNC: 31.3 G/DL (ref 30–36.5)
MCV RBC AUTO: 89.7 FL (ref 80–99)
MONOCYTES # BLD: 0.2 K/UL (ref 0–1)
MONOCYTES NFR BLD: 6 % (ref 5–13)
NEUTS SEG # BLD: 3.2 K/UL (ref 1.8–8)
NEUTS SEG NFR BLD: 72 % (ref 32–75)
NRBC # BLD: 0 K/UL (ref 0–0.01)
NRBC BLD-RTO: 0 PER 100 WBC
PLATELET # BLD AUTO: 176 K/UL (ref 150–400)
PMV BLD AUTO: 10.7 FL (ref 8.9–12.9)
POTASSIUM SERPL-SCNC: 3.8 MMOL/L (ref 3.5–5.1)
PROT SERPL-MCNC: 8.1 G/DL (ref 6.4–8.2)
RBC # BLD AUTO: 4.09 M/UL (ref 3.8–5.2)
SODIUM SERPL-SCNC: 139 MMOL/L (ref 136–145)
TROPONIN I SERPL-MCNC: <0.05 NG/ML
WBC # BLD AUTO: 4.4 K/UL (ref 3.6–11)

## 2019-10-20 PROCEDURE — 74011250636 HC RX REV CODE- 250/636: Performed by: EMERGENCY MEDICINE

## 2019-10-20 PROCEDURE — 96375 TX/PRO/DX INJ NEW DRUG ADDON: CPT

## 2019-10-20 PROCEDURE — 84484 ASSAY OF TROPONIN QUANT: CPT

## 2019-10-20 PROCEDURE — 71045 X-RAY EXAM CHEST 1 VIEW: CPT

## 2019-10-20 PROCEDURE — 93005 ELECTROCARDIOGRAM TRACING: CPT

## 2019-10-20 PROCEDURE — 99284 EMERGENCY DEPT VISIT MOD MDM: CPT

## 2019-10-20 PROCEDURE — 94640 AIRWAY INHALATION TREATMENT: CPT

## 2019-10-20 PROCEDURE — 80053 COMPREHEN METABOLIC PANEL: CPT

## 2019-10-20 PROCEDURE — 83880 ASSAY OF NATRIURETIC PEPTIDE: CPT

## 2019-10-20 PROCEDURE — 77030029684 HC NEB SM VOL KT MONA -A

## 2019-10-20 PROCEDURE — 96374 THER/PROPH/DIAG INJ IV PUSH: CPT

## 2019-10-20 PROCEDURE — 85025 COMPLETE CBC W/AUTO DIFF WBC: CPT

## 2019-10-20 PROCEDURE — 82550 ASSAY OF CK (CPK): CPT

## 2019-10-20 PROCEDURE — 36415 COLL VENOUS BLD VENIPUNCTURE: CPT

## 2019-10-20 PROCEDURE — 74011000250 HC RX REV CODE- 250: Performed by: EMERGENCY MEDICINE

## 2019-10-20 RX ORDER — IPRATROPIUM BROMIDE AND ALBUTEROL SULFATE 2.5; .5 MG/3ML; MG/3ML
3 SOLUTION RESPIRATORY (INHALATION)
Status: COMPLETED | OUTPATIENT
Start: 2019-10-20 | End: 2019-10-20

## 2019-10-20 RX ORDER — FUROSEMIDE 10 MG/ML
40 INJECTION INTRAMUSCULAR; INTRAVENOUS
Status: DISCONTINUED | OUTPATIENT
Start: 2019-10-20 | End: 2019-10-20

## 2019-10-20 RX ORDER — FUROSEMIDE 40 MG/1
TABLET ORAL
Qty: 90 TAB | Refills: 11 | Status: ON HOLD | OUTPATIENT
Start: 2019-10-20 | End: 2021-03-09

## 2019-10-20 RX ORDER — FUROSEMIDE 10 MG/ML
80 INJECTION INTRAMUSCULAR; INTRAVENOUS
Status: COMPLETED | OUTPATIENT
Start: 2019-10-20 | End: 2019-10-20

## 2019-10-20 RX ADMIN — IPRATROPIUM BROMIDE AND ALBUTEROL SULFATE 3 ML: .5; 3 SOLUTION RESPIRATORY (INHALATION) at 16:37

## 2019-10-20 RX ADMIN — FUROSEMIDE 80 MG: 10 INJECTION, SOLUTION INTRAMUSCULAR; INTRAVENOUS at 17:39

## 2019-10-20 RX ADMIN — METHYLPREDNISOLONE SODIUM SUCCINATE 125 MG: 125 INJECTION, POWDER, FOR SOLUTION INTRAMUSCULAR; INTRAVENOUS at 16:38

## 2019-10-20 NOTE — ED PROVIDER NOTES
EMERGENCY DEPARTMENT HISTORY AND PHYSICAL EXAM      Date: 10/20/2019  Patient Name: Sandeep Suárez  Patient Age and Sex: 81 y.o. female     History of Presenting Illness     Chief Complaint   Patient presents with    Shortness of Breath     x 2 days; reports hx of CHF; has been using her inhaler without relief; pt also reports pain to BL LE       History Provided By: Patient    HPI: Sandeep Suárez is a 53-year-old female with a history of diabetes, asthma,'s congestive heart failure presenting with shortness of breath. Patient states that for the past few days has been having shortness of breath, worse on exertion associated with a slight chest pressure and chest tightness as well as nonproductive cough. Denies any fevers, shortness of breath worse on laying down, leg swelling. States that she is on 3 fluid pills for her history of congestive heart failure. Has been taking nebulizers without any relief. There are no other complaints, changes, or physical findings at this time. PCP: Anna Skinner MD    No current facility-administered medications on file prior to encounter. Current Outpatient Medications on File Prior to Encounter   Medication Sig Dispense Refill    allopurinol (ZYLOPRIM) 300 mg tablet TAKE 1 TABLET BY MOUTH DAILY 90 Tab 3    metFORMIN ER (GLUCOPHAGE XR) 500 mg tablet TAKE 1 TABLET BY MOUTH THREE TIMES DAILY WITH MEALS 270 Tab 3    metoprolol succinate (TOPROL-XL) 100 mg tablet TAKE 1 TABLET BY MOUTH DAILY 90 Tab 3    atorvastatin (LIPITOR) 40 mg tablet Take 1 Tab by mouth daily. 90 Tab 3    acetaminophen-codeine (TYLENOL-CODEINE #3) 300-30 mg per tablet Take 1 Tab by mouth every four (4) hours as needed for Pain.  aspirin delayed-release 325 mg tablet TAKE 1 TABLET BY MOUTH TWO TIMES A DAY FOR 30 DAYS      glimepiride (AMARYL) 4 mg tablet   TAKE 1 TABLET BY MOUTH TWICE DAILY BEFORE A MEAL      mirabegron ER (MYRBETRIQ) 50 mg ER tablet Take 1 Tab by mouth daily.  30 Tab 11    glucose blood VI test strips (FREESTYLE LITE STRIPS) strip Use to test blood sugar three times daily. Dx.e11.9 100 Strip 11    Blood-Glucose Meter (FREESTYLE LITE METER) monitoring kit Test three times a day dx. e119 1 Kit 0    oxyCODONE IR (ROXICODONE) 5 mg immediate release tablet Take 1-2 Tabs by mouth every four (4) hours as needed. Max Daily Amount: 60 mg. If insurance prior auth./quantity restrictions apply, refer to and look up diagnosis code one prescription. Partial fill as needed is permitted. Please do not contact prescriber. 80 Tab 0    senna-docusate (PERICOLACE) 8.6-50 mg per tablet Take 1 Tab by mouth daily. 30 Tab 0    glyBURIDE (DIABETA) 1.25 mg tablet Take 1.25 mg by mouth Daily (before breakfast).  potassium chloride SR (KLOR-CON 10) 10 mEq tablet 2  Tab 3    insulin NPH (HUMULIN N) 100 unit/mL (3 mL) inpn Inject 58 units daily. dx.e11.9 20 mL 11    [DISCONTINUED] furosemide (LASIX) 40 mg tablet TAKE 3 TABLETS BY MOUTH EVERY MORNING 90 Tab 11    lisinopril (PRINIVIL, ZESTRIL) 20 mg tablet TAKE 1 TABLET BY MOUTH ONCE DAILY IN THE MORNING 90 Tab 3    alcohol swabs (BD SINGLE USE SWABS REGULAR) padm USE TO TEST BLOOD SUGAR.dx.e11.9 100 Pad 11    BD INSULIN PEN NEEDLE UF SHORT 31 gauge x 5/16\" ndle USE WITH INSULIN PENS TWICE DAILY AS DIRECTED 100 Pen Needle 11    ONE TOUCH DELICA 33 gauge misc   11    NURIA PEN NEEDLE 32 x 5/32 \" ndle       Blood-Glucose Meter monitoring kit Testing BID-DX:250.00 1 kit 0    Lancets (ONE TOUCH ULTRASOFT LANCETS) misc TESTING BID 1 Package 11    glucose blood VI test strips (ASCENSIA AUTODISC VI, ONE TOUCH ULTRA TEST VI) strip TESTING BID 1 Package 11       Past History     Past Medical History:  Past Medical History:   Diagnosis Date    Arthritis     Congestive heart failure, unspecified     Diabetes (HCC)     Hypercholesterolemia     Hypertension        Past Surgical History:  Past Surgical History:   Procedure Laterality Date  HX ORTHOPAEDIC      Arthroscopy right knee       Family History:  Family History   Problem Relation Age of Onset    Diabetes Mother        Social History:  Social History     Tobacco Use    Smoking status: Former Smoker     Packs/day: 0.25     Years: 20.00     Pack years: 5.00     Last attempt to quit: 2014     Years since quittin.8    Smokeless tobacco: Never Used   Substance Use Topics    Alcohol use: Yes     Alcohol/week: 0.0 standard drinks     Comment: 21-24 Beers per week    Drug use: No       Allergies:  No Known Allergies      Review of Systems   Review of Systems   Constitutional: Negative for chills and fever. Respiratory: Positive for cough, chest tightness and shortness of breath. Cardiovascular: Positive for chest pain. Gastrointestinal: Negative for abdominal pain, constipation, diarrhea, nausea and vomiting. Neurological: Negative for weakness and numbness. All other systems reviewed and are negative. Physical Exam   Physical Exam   Constitutional: She is oriented to person, place, and time. She appears well-developed and well-nourished. HENT:   Head: Normocephalic and atraumatic. Eyes: Conjunctivae and EOM are normal.   Neck: Normal range of motion. Neck supple. Cardiovascular: Normal rate and regular rhythm. Pulmonary/Chest: Effort normal. No respiratory distress. She has wheezes. Patient unable to take deep breaths in. Has soft wheezes diffusely   Abdominal: Soft. She exhibits no distension. There is no tenderness. Musculoskeletal: Normal range of motion. Neurological: She is alert and oriented to person, place, and time. Skin: Skin is warm and dry. Psychiatric: She has a normal mood and affect. Nursing note and vitals reviewed.        Diagnostic Study Results     Labs -     Recent Results (from the past 12 hour(s))   EKG, 12 LEAD, INITIAL    Collection Time: 10/20/19  4:14 PM   Result Value Ref Range    Ventricular Rate 71 BPM    Atrial Rate 71 BPM    P-R Interval 210 ms    QRS Duration 90 ms    Q-T Interval 416 ms    QTC Calculation (Bezet) 452 ms    Calculated P Axis 57 degrees    Calculated R Axis 18 degrees    Calculated T Axis -23 degrees    Diagnosis       Sinus rhythm with 1st degree AV block  Possible Left atrial enlargement  Left ventricular hypertrophy  T wave abnormality, consider anterolateral ischemia  When compared with ECG of 24-OCT-2016 02:35,  premature ventricular complexes are no longer present  T wave inversion now evident in Anterior leads     CBC WITH AUTOMATED DIFF    Collection Time: 10/20/19  4:27 PM   Result Value Ref Range    WBC 4.4 3.6 - 11.0 K/uL    RBC 4.09 3.80 - 5.20 M/uL    HGB 11.5 11.5 - 16.0 g/dL    HCT 36.7 35.0 - 47.0 %    MCV 89.7 80.0 - 99.0 FL    MCH 28.1 26.0 - 34.0 PG    MCHC 31.3 30.0 - 36.5 g/dL    RDW 13.9 11.5 - 14.5 %    PLATELET 365 526 - 269 K/uL    MPV 10.7 8.9 - 12.9 FL    NRBC 0.0 0  WBC    ABSOLUTE NRBC 0.00 0.00 - 0.01 K/uL    NEUTROPHILS 72 32 - 75 %    LYMPHOCYTES 18 12 - 49 %    MONOCYTES 6 5 - 13 %    EOSINOPHILS 3 0 - 7 %    BASOPHILS 1 0 - 1 %    IMMATURE GRANULOCYTES 0 0.0 - 0.5 %    ABS. NEUTROPHILS 3.2 1.8 - 8.0 K/UL    ABS. LYMPHOCYTES 0.8 0.8 - 3.5 K/UL    ABS. MONOCYTES 0.2 0.0 - 1.0 K/UL    ABS. EOSINOPHILS 0.1 0.0 - 0.4 K/UL    ABS. BASOPHILS 0.0 0.0 - 0.1 K/UL    ABS. IMM.  GRANS. 0.0 0.00 - 0.04 K/UL    DF AUTOMATED     METABOLIC PANEL, COMPREHENSIVE    Collection Time: 10/20/19  4:27 PM   Result Value Ref Range    Sodium 139 136 - 145 mmol/L    Potassium 3.8 3.5 - 5.1 mmol/L    Chloride 106 97 - 108 mmol/L    CO2 26 21 - 32 mmol/L    Anion gap 7 5 - 15 mmol/L    Glucose 130 (H) 65 - 100 mg/dL    BUN 17 6 - 20 MG/DL    Creatinine 0.99 0.55 - 1.02 MG/DL    BUN/Creatinine ratio 17 12 - 20      GFR est AA >60 >60 ml/min/1.73m2    GFR est non-AA 56 (L) >60 ml/min/1.73m2    Calcium 8.8 8.5 - 10.1 MG/DL    Bilirubin, total 1.0 0.2 - 1.0 MG/DL    ALT (SGPT) 44 12 - 78 U/L    AST (SGOT) 38 (H) 15 - 37 U/L    Alk. phosphatase 62 45 - 117 U/L    Protein, total 8.1 6.4 - 8.2 g/dL    Albumin 3.5 3.5 - 5.0 g/dL    Globulin 4.6 (H) 2.0 - 4.0 g/dL    A-G Ratio 0.8 (L) 1.1 - 2.2     CK W/ REFLX CKMB    Collection Time: 10/20/19  4:27 PM   Result Value Ref Range     26 - 192 U/L   TROPONIN I    Collection Time: 10/20/19  4:27 PM   Result Value Ref Range    Troponin-I, Qt. <0.05 <0.05 ng/mL   NT-PRO BNP    Collection Time: 10/20/19  4:27 PM   Result Value Ref Range    NT pro- (H) <125 PG/ML       Radiologic Studies -   XR CHEST PORT   Final Result   IMPRESSION: Pulmonary vascular congestive changes. CT Results  (Last 48 hours)    None        CXR Results  (Last 48 hours)               10/20/19 1720  XR CHEST PORT Final result    Impression:  IMPRESSION: Pulmonary vascular congestive changes. Narrative:  INDICATION: Shortness of breath. Portable AP semiupright view of the chest.       Direct comparison made to prior chest x-ray dated October 2005. Cardiomediastinal silhouette is mildly enlarged. There is pulmonary vascular   prominence and mild bilateral interstitial edema. No pleural fluid is   visualized. There is no pneumothorax. Medical Decision Making   I am the first provider for this patient. I reviewed the vital signs, available nursing notes, past medical history, past surgical history, family history and social history. Vital Signs-Reviewed the patient's vital signs. Patient Vitals for the past 12 hrs:   Temp Pulse Resp BP SpO2   10/20/19 1715 -- 68 26 105/62 97 %   10/20/19 1641 -- -- -- -- 96 %   10/20/19 1630 -- 68 21 121/68 94 %   10/20/19 1602 98.3 °F (36.8 °C) 73 24 109/59 96 %       Records Reviewed: Nursing Notes and Old Medical Records    Provider Notes (Medical Decision Making):   Patient presenting with shortness of breath worse on exertion. Differential includes ACS though EKG shows no signs of ischemia.   Differential also includes asthma exacerbation versus CHF exacerbation. Acute bronchitis is also on differential    ED Course:   Initial assessment performed. The patients presenting problems have been discussed, and they are in agreement with the care plan formulated and outlined with them. I have encouraged them to ask questions as they arise throughout their visit. Critical Care Time:   0    Disposition:  Discharge Note:  The patient has been re-evaluated and is ready for discharge. Reviewed available results with patient. Counseled patient on diagnosis and care plan. Patient has expressed understanding, and all questions have been answered. Patient agrees with plan and agrees to follow up as recommended, or to return to the ED if their symptoms worsen. Discharge instructions have been provided and explained to the patient, along with reasons to return to the ED. PLAN:  Discharge Medication List as of 10/20/2019  6:08 PM      CONTINUE these medications which have CHANGED    Details   furosemide (LASIX) 40 mg tablet TAKE 3 TABLETS BY MOUTH EVERY MORNING, Normal, Disp-90 Tab, R-11         CONTINUE these medications which have NOT CHANGED    Details   allopurinol (ZYLOPRIM) 300 mg tablet TAKE 1 TABLET BY MOUTH DAILY, Normal, Disp-90 Tab, R-3      metFORMIN ER (GLUCOPHAGE XR) 500 mg tablet TAKE 1 TABLET BY MOUTH THREE TIMES DAILY WITH MEALS, Normal, Disp-270 Tab, R-3      metoprolol succinate (TOPROL-XL) 100 mg tablet TAKE 1 TABLET BY MOUTH DAILY, Normal, Disp-90 Tab, R-3      atorvastatin (LIPITOR) 40 mg tablet Take 1 Tab by mouth daily. , Normal, Disp-90 Tab, R-3      acetaminophen-codeine (TYLENOL-CODEINE #3) 300-30 mg per tablet Take 1 Tab by mouth every four (4) hours as needed for Pain., Historical Med      aspirin delayed-release 325 mg tablet TAKE 1 TABLET BY MOUTH TWO TIMES A DAY FOR 30 DAYS, Historical Med      glimepiride (AMARYL) 4 mg tablet   TAKE 1 TABLET BY MOUTH TWICE DAILY BEFORE A MEAL, Historical Med      mirabegron ER (MYRBETRIQ) 50 mg ER tablet Take 1 Tab by mouth daily. , Normal, Disp-30 Tab, R-11      !! glucose blood VI test strips (FREESTYLE LITE STRIPS) strip Use to test blood sugar three times daily. Dx.e11.9, Normal, Disp-100 Strip, R-11      !! Blood-Glucose Meter (FREESTYLE LITE METER) monitoring kit Test three times a day dx. e119, Normal, Disp-1 Kit, R-0      oxyCODONE IR (ROXICODONE) 5 mg immediate release tablet Take 1-2 Tabs by mouth every four (4) hours as needed. Max Daily Amount: 60 mg. If insurance prior auth./quantity restrictions apply, refer to and look up diagnosis code one prescription. Partial fill as needed is permitted. Please do not contact [facility]. , Print, Disp-80 Tab, R-0      senna-docusate (PERICOLACE) 8.6-50 mg per tablet Take 1 Tab by mouth daily. , Print, Disp-30 Tab, R-0      glyBURIDE (DIABETA) 1.25 mg tablet Take 1.25 mg by mouth Daily (before breakfast). , Historical Med      potassium chloride SR (KLOR-CON 10) 10 mEq tablet 2 QAM, Normal, Disp-180 Tab, R-3      insulin NPH (HUMULIN N) 100 unit/mL (3 mL) inpn Inject 58 units daily. dx.e11.9, Normal, Disp-20 mL, R-11      lisinopril (PRINIVIL, ZESTRIL) 20 mg tablet TAKE 1 TABLET BY MOUTH ONCE DAILY IN THE MORNING, Normal, Disp-90 Tab, R-3      alcohol swabs (BD SINGLE USE SWABS REGULAR) padm USE TO TEST BLOOD SUGAR.dx.e11.9, Normal, Disp-100 Pad, R-11      BD INSULIN PEN NEEDLE UF SHORT 31 gauge x 5/16\" ndle USE WITH INSULIN PENS TWICE DAILY AS DIRECTED, Normal, Disp-100 Pen Needle, R-11      !! ONE TOUCH DELICA 33 gauge [facility] Historical Med, R-11      NURIA PEN NEEDLE 32 x 5/32 \" ndle Historical Med      !! Blood-Glucose Meter monitoring kit Testing BID-DX:250.00, Normal, Disp-1 kit, R-0      !!  Lancets (ONE TOUCH ULTRASOFT LANCETS) [facility] TESTING BID, Normal, Disp-1 Package, R-11      !! glucose blood VI test strips (ASCENSIA AUTODISC VI, ONE TOUCH ULTRA TEST VI) strip TESTING BID, Normal, Disp-1 Package, R-11       !! - Potential duplicate medications found. Please discuss with provider. 2.   Follow-up Information     Follow up With Specialties Details Why Contact Info    Valeria Luke MD Cardiology, Formerly named [facility] Monse Chavarria [facility] Vascular Surgery, Internal Medicine Schedule an appointment as soon as possible for a visit  [address] [place] [address]  [phone]          3. Return to ED if worse     Diagnosis     Clinical Impression:   1. Acute on chronic congestive heart failure, unspecified heart failure type (HCC)        Attestations:    Nate Miller M.D. Please note that this dictation was completed with [employer], the [employer] voice recognition software. Quite often unanticipated grammatical, syntax, homophones, and other interpretive errors are inadvertently transcribed by the computer software. Please disregard these errors. Please excuse any errors that have escaped final proofreading. Thank you.

## 2019-10-20 NOTE — ED NOTES
Pt ambulatory in hallway without hypoxia or tachycardia. Discharge instructions given. Saline lock removed. Pt discharged via wheelchair. Accompanied by family.

## 2019-10-21 LAB
ATRIAL RATE: 71 BPM
CALCULATED P AXIS, ECG09: 57 DEGREES
CALCULATED R AXIS, ECG10: 18 DEGREES
CALCULATED T AXIS, ECG11: -23 DEGREES
DIAGNOSIS, 93000: NORMAL
P-R INTERVAL, ECG05: 210 MS
Q-T INTERVAL, ECG07: 416 MS
QRS DURATION, ECG06: 90 MS
QTC CALCULATION (BEZET), ECG08: 452 MS
VENTRICULAR RATE, ECG03: 71 BPM

## 2019-11-11 ENCOUNTER — OFFICE VISIT (OUTPATIENT)
Dept: INTERNAL MEDICINE CLINIC | Age: 69
End: 2019-11-11

## 2019-11-11 VITALS
BODY MASS INDEX: 33.92 KG/M2 | SYSTOLIC BLOOD PRESSURE: 138 MMHG | HEART RATE: 64 BPM | WEIGHT: 198.7 LBS | TEMPERATURE: 98.4 F | RESPIRATION RATE: 14 BRPM | DIASTOLIC BLOOD PRESSURE: 72 MMHG | HEIGHT: 64 IN | OXYGEN SATURATION: 93 %

## 2019-11-11 DIAGNOSIS — E66.9 OBESITY (BMI 30.0-34.9): ICD-10-CM

## 2019-11-11 DIAGNOSIS — I42.9 CARDIOMYOPATHY, UNSPECIFIED TYPE (HCC): Primary | ICD-10-CM

## 2019-11-11 DIAGNOSIS — E78.5 DYSLIPIDEMIA: ICD-10-CM

## 2019-11-11 DIAGNOSIS — I10 ESSENTIAL HYPERTENSION: ICD-10-CM

## 2019-11-11 RX ORDER — METOLAZONE 5 MG/1
5 TABLET ORAL DAILY
Qty: 30 TAB | Refills: 0 | Status: SHIPPED | OUTPATIENT
Start: 2019-11-11 | End: 2021-03-09 | Stop reason: ALTCHOICE

## 2019-11-11 NOTE — PROGRESS NOTES
Chief Complaint   Patient presents with   [facility] Annual Wellness Visit     1. Have you been to the ER, urgent care clinic since your last visit? Hospitalized since your last visit? No    2. Have you seen or consulted any other health care providers outside of the [address] [place] [address] since your last visit? Include any pap smears or colon screening.  No

## 2019-11-11 NOTE — PROGRESS NOTES
[facility]  [facility]  [address] [place] [address]  Phone:  [phone]  Fax: [phone]       Chief Complaint   Patient presents with   [facility] Wellness Visit   . SUBJECTIVE:    Kari Monsalve is a 81 y.o. female Comes in for return visit stating that she has had progressive dyspnea on exertion of late. It has been present now for the last two to three weeks. When she went to the emergency room her proBNP was in the 800 range. Technically this is normal for someone her age, but is probably abnormal.  Her last echocardiogram was done in December and it was well preserved with a systolic ejection fraction of 55%. She has been taking her Furosemide 120 mg every day, which has been stable up until most recently. After going to the emergency room, another 40 mg was added in the evening, but she remains dyspneic. There has been no meaningful weight loss. She states that her diabetes has been doing reasonably well, although quite vague about blood sugars. She has not had any interventional hypoglycemia. She remains on her Humulin N at 58 units daily. Unfortunately, she continues to smoke cigarettes. She has a past history of primary hypertension and dyslipidemia. Current Outpatient Medications   Medication Sig Dispense Refill    metOLazone (ZAROXOLYN) 5 mg tablet Take 1 Tab by mouth daily. 30 Tab 0    furosemide (LASIX) 40 mg tablet TAKE 3 TABLETS BY MOUTH EVERY MORNING (Patient taking differently: TAKE 3 TABLETS BY MOUTH EVERY MORNING AND 1 TABLET IN THE EVENING) 90 Tab 11    allopurinol (ZYLOPRIM) 300 mg tablet TAKE 1 TABLET BY MOUTH DAILY 90 Tab 3    metFORMIN ER (GLUCOPHAGE XR) 500 mg tablet TAKE 1 TABLET BY MOUTH THREE TIMES DAILY WITH MEALS 270 Tab 3    metoprolol succinate (TOPROL-XL) 100 mg tablet TAKE 1 TABLET BY MOUTH DAILY 90 Tab 3    atorvastatin (LIPITOR) 40 mg tablet Take 1 Tab by mouth daily.  90 Tab 3    acetaminophen-codeine (TYLENOL-CODEINE #3) 300-30 mg per tablet Take 1 Tab by mouth every four (4) hours as needed for Pain.  aspirin delayed-release 325 mg tablet TAKE 1 TABLET BY MOUTH TWO TIMES A DAY FOR 30 DAYS      glimepiride (AMARYL) 4 mg tablet   TAKE 1 TABLET BY MOUTH TWICE DAILY BEFORE A MEAL      mirabegron ER (MYRBETRIQ) 50 mg ER tablet Take 1 Tab by mouth daily. 30 Tab 11    glucose blood VI test strips (FREESTYLE LITE STRIPS) strip Use to test blood sugar three times daily. Dx.e11.9 100 Strip 11    Blood-Glucose Meter (FREESTYLE LITE METER) monitoring kit Test three times a day dx. e119 1 Kit 0    oxyCODONE IR (ROXICODONE) 5 mg immediate release tablet Take 1-2 Tabs by mouth every four (4) hours as needed. Max Daily Amount: 60 mg. If insurance prior auth./quantity restrictions apply, refer to and look up diagnosis code one prescription. Partial fill as needed is permitted. Please do not contact prescriber. 80 Tab 0    senna-docusate (PERICOLACE) 8.6-50 mg per tablet Take 1 Tab by mouth daily. 30 Tab 0    glyBURIDE (DIABETA) 1.25 mg tablet Take 1.25 mg by mouth Daily (before breakfast).  potassium chloride SR (KLOR-CON 10) 10 mEq tablet 2  Tab 3    insulin NPH (HUMULIN N) 100 unit/mL (3 mL) inpn Inject 58 units daily. dx.e11.9 20 mL 11    lisinopril (PRINIVIL, ZESTRIL) 20 mg tablet TAKE 1 TABLET BY MOUTH ONCE DAILY IN THE MORNING 90 Tab 3    alcohol swabs (BD SINGLE USE SWABS REGULAR) padm USE TO TEST BLOOD SUGAR.dx.e11.9 100 Pad 11    BD INSULIN PEN NEEDLE UF SHORT 31 gauge x 5/16\" ndle USE WITH INSULIN PENS TWICE DAILY AS DIRECTED 100 Pen Needle 11    ONE TOUCH DELICA 33 gauge misc   11    NURIA PEN NEEDLE 32 x 5/32 \" ndle       Blood-Glucose Meter monitoring kit Testing BID-DX:250.00 1 kit 0    Lancets (ONE TOUCH ULTRASOFT LANCETS) misc TESTING BID 1 Package 11    glucose blood VI test strips (ASCENSIA AUTODISC VI, ONE TOUCH ULTRA TEST VI) strip TESTING BID 1 Package 11     Past Medical History:   Diagnosis Date    Arthritis     Congestive heart failure, unspecified     Diabetes ([facility] [address]     Hypercholesterolemia     Hypertension      Past Surgical History:   Procedure Laterality Date    HX ORTHOPAEDIC      Arthroscopy right knee     No Known Allergies      REVIEW OF SYSTEMS:  General: negative for - chills or fever  ENT: negative for - headaches, nasal congestion or tinnitus  Respiratory: negative for - cough, hemoptysis, shortness of breath or wheezing  Cardiovascular : negative for - chest pain, edema, palpitations or shortness of breath  Gastrointestinal: negative for - abdominal pain, blood in stools, heartburn or nausea/vomiting  Genito-Urinary: no dysuria, trouble voiding, or hematuria  Musculoskeletal: negative for - gait disturbance, joint pain, joint stiffness or joint swelling  Neurological: no TIA or stroke symptoms  Hematologic: no bruises, no bleeding, no swollen glands  Integument: no lumps, mole changes, nail changes or rash  Endocrine: no malaise/lethargy or unexpected weight changes      Social History     Socioeconomic History    Marital status:      Spouse name: Not on file    Number of children: 3    Years of education: 15    Highest education level: Not on file   Occupational History    Occupation: retired   Tobacco Use    Smoking status: Former Smoker     Packs/day: 0.25     Years: 20.00     Pack years: 5.00     Last attempt to quit: 2014     Years since quittin.9    Smokeless tobacco: Never Used   Substance and Sexual Activity    Alcohol use:  Yes     Alcohol/week: 0.0 standard drinks     Comment: 21-24 Beers per week    Drug use: No    Sexual activity: Yes     Partners: Male     Family History   Problem Relation Age of Onset    Diabetes Mother        OBJECTIVE:    Visit Vitals  /72   Pulse 64   Temp 98.4 °F (36.9 °C) (Oral)   Resp 14   Ht 5' 4\" (1.626 m)   Wt 198 lb 11.2 oz (90.1 kg)   SpO2 93%   BMI 34.11 kg/m² CONSTITUTIONAL: well , well nourished, appears age appropriate  EYES: perrla, eom intact  ENMT:moist mucous membranes, pharynx clear  NECK: supple. Thyroid normal,no JVD  RESPIRATORY: Chest: clear to ascultation and percussion   CARDIOVASCULAR: Heart: regular rate and rhythm  GASTROINTESTINAL: Abdomen: soft, bowel sounds active  HEMATOLOGIC: no pathological lymph nodes palpated  MUSCULOSKELETAL: Extremities: no edema, pulse 1+   INTEGUMENT: No unusual rashes or suspicious skin lesions noted. Nails appear normal.  NEUROLOGIC: non-focal exam   MENTAL STATUS: alert and oriented, appropriate affect      ASSESSMENT:  1. Cardiomyopathy, unspecified type ([address]    2. Essential hypertension    3. Insulin dependent diabetes mellitus ([facility] [address]    4. Dyslipidemia    5. Obesity (BMI 30.0-34. 9)        PLAN:  1. I suspect she probably has a mild heart failure. She needs to have an echocardiogram done for further clarification. I will empirically add Metolazone 5 mg in the evening, continue the 120 mg of Furosemide in the morning. I told her to take Furosemide on an empty stomach. 2. Blood pressure is excellent at 140/70. 3. From a diabetic perspective, I will await the results of her hemoglobin A1c. I remind her of the importance of eating at the same time every day. 4. She will continue statin as prescribed and efficacy and compliance will be assessed. 5. She really needs to lose weight. This can be accomplished by eating meals, eliminating snacks and avoiding the consumption of processed carbohydrates. .  Orders Placed This Encounter    HEMOGLOBIN A1C WITH EAG    APOLIPOPROTEIN B    METABOLIC PANEL, BASIC    metOLazone (ZAROXOLYN) 5 mg tablet         Follow-up and Dispositions    · Return in about 1 week (around 11/18/2019).            Esthela Hernandez MD

## 2019-11-12 LAB
APO B SERPL-MCNC: 86 MG/DL
BUN SERPL-MCNC: 14 MG/DL (ref 8–27)
BUN/CREAT SERPL: 22 (ref 12–28)
CALCIUM SERPL-MCNC: 9.2 MG/DL (ref 8.7–10.3)
CHLORIDE SERPL-SCNC: 105 MMOL/L (ref 96–106)
CO2 SERPL-SCNC: 25 MMOL/L (ref 20–29)
CREAT SERPL-MCNC: 0.63 MG/DL (ref 0.57–1)
EST. AVERAGE GLUCOSE BLD GHB EST-MCNC: 148 MG/DL
GLUCOSE SERPL-MCNC: 139 MG/DL (ref 65–99)
HBA1C MFR BLD: 6.8 % (ref 4.8–5.6)
POTASSIUM SERPL-SCNC: 3.4 MMOL/L (ref 3.5–5.2)
SODIUM SERPL-SCNC: 145 MMOL/L (ref 134–144)

## 2019-11-13 DIAGNOSIS — E87.6 HYPOKALEMIA: Primary | ICD-10-CM

## 2019-11-13 RX ORDER — POTASSIUM CHLORIDE 750 MG/1
TABLET, FILM COATED, EXTENDED RELEASE ORAL
Qty: 360 TAB | Refills: 3 | Status: ON HOLD | OUTPATIENT
Start: 2019-11-13 | End: 2021-03-09

## 2019-11-18 ENCOUNTER — OFFICE VISIT (OUTPATIENT)
Dept: INTERNAL MEDICINE CLINIC | Age: 69
End: 2019-11-18

## 2019-11-18 VITALS
RESPIRATION RATE: 16 BRPM | WEIGHT: 196.2 LBS | BODY MASS INDEX: 33.49 KG/M2 | DIASTOLIC BLOOD PRESSURE: 58 MMHG | SYSTOLIC BLOOD PRESSURE: 115 MMHG | OXYGEN SATURATION: 95 % | HEART RATE: 61 BPM | TEMPERATURE: 97.5 F | HEIGHT: 64 IN

## 2019-11-18 DIAGNOSIS — E78.5 DYSLIPIDEMIA: ICD-10-CM

## 2019-11-18 DIAGNOSIS — R06.09 DOE (DYSPNEA ON EXERTION): ICD-10-CM

## 2019-11-18 DIAGNOSIS — I50.42 CHRONIC COMBINED SYSTOLIC AND DIASTOLIC CONGESTIVE HEART FAILURE (HCC): Primary | ICD-10-CM

## 2019-11-18 DIAGNOSIS — I10 ESSENTIAL HYPERTENSION: ICD-10-CM

## 2019-11-18 DIAGNOSIS — I42.9 CARDIOMYOPATHY, UNSPECIFIED TYPE (HCC): ICD-10-CM

## 2019-11-18 NOTE — PROGRESS NOTES
Chief Complaint   Patient presents with    Shortness of Breath     1 week follow up      1. Have you been to the ER, urgent care clinic since your last visit? Hospitalized since your last visit? No    2. Have you seen or consulted any other health care providers outside of the [address] [place] [address] since your last visit? Include any pap smears or colon screening.  No

## 2019-11-20 ENCOUNTER — HOSPITAL ENCOUNTER (OUTPATIENT)
Dept: NON INVASIVE DIAGNOSTICS | Age: 69
Discharge: HOME | End: 2019-11-20
Attending: INTERNAL MEDICINE
Payer: MEDICARE

## 2019-11-20 DIAGNOSIS — I42.9 CARDIOMYOPATHY, UNSPECIFIED TYPE (HCC): ICD-10-CM

## 2019-11-20 LAB
AV VELOCITY RATIO: 0.38
ECHO AO ROOT DIAM: 3.06 CM
ECHO AV AREA PEAK VELOCITY: 0.9 CM2
ECHO AV AREA/BSA PEAK VELOCITY: 0.5 CM2/M2
ECHO AV PEAK GRADIENT: 14 MMHG
ECHO AV PEAK VELOCITY: 186.93 CM/S
ECHO EST RA PRESSURE: 10 MMHG
ECHO LA AREA 4C: 15.6 CM2
ECHO LA MAJOR AXIS: 3.43 CM
ECHO LA TO AORTIC ROOT RATIO: 1.12
ECHO LA VOL 4C: 37.87 ML (ref 22–52)
ECHO LV EDV TEICHHOLZ: 0.93 ML
ECHO LV ESV TEICHHOLZ: 0.68 ML
ECHO LV INTERNAL DIMENSION DIASTOLIC: 5.56 CM (ref 3.9–5.3)
ECHO LV INTERNAL DIMENSION SYSTOLIC: 4.86 CM
ECHO LV IVSD: 1.22 CM (ref 0.6–0.9)
ECHO LV MASS 2D: 328.3 G (ref 67–162)
ECHO LV POSTERIOR WALL DIASTOLIC: 1.16 CM (ref 0.6–0.9)
ECHO LVOT DIAM: 1.75 CM
ECHO LVOT PEAK GRADIENT: 2 MMHG
ECHO LVOT PEAK VELOCITY: 71.57 CM/S
ECHO LVOT SV: 37.6 ML
ECHO LVOT VTI: 15.63 CM
ECHO MV A VELOCITY: 61.6 CM/S
ECHO MV AREA VTI: 0.7 CM2
ECHO MV E DECELERATION TIME (DT): 253.2 MS
ECHO MV E VELOCITY: 45.7 CM/S
ECHO MV E/A RATIO: 0.74
ECHO MV MAX VELOCITY: 129.72 CM/S
ECHO MV MEAN GRADIENT: 2.5 MMHG
ECHO MV MEAN INFLOW VELOCITY: 0.73 M/S
ECHO MV PEAK GRADIENT: 6.7 MMHG
ECHO MV REGURGITANT PEAK GRADIENT: 10.7 MMHG
ECHO MV REGURGITANT PEAK VELOCITY: 163.9 CM/S
ECHO MV VTI: 57.25 CM
ECHO PULMONARY ARTERY SYSTOLIC PRESSURE (PASP): 35 MMHG
ECHO RA AREA 4C: 11.71 CM2
ECHO RIGHT VENTRICULAR SYSTOLIC PRESSURE (RVSP): 37.5 MMHG
ECHO TV REGURGITANT MAX VELOCITY: 262.12 CM/S
ECHO TV REGURGITANT PEAK GRADIENT: 27.5 MMHG
LVFS 2D: 12.59 %
LVOT MG: 1.02 MMHG
LVOT MV: 0.48 CM/S
LVSV (TEICH): 20.17 ML
MV DEC SLOPE: 3.33

## 2019-11-20 PROCEDURE — 93306 TTE W/DOPPLER COMPLETE: CPT

## 2019-11-23 LAB
BUN SERPL-MCNC: 41 MG/DL (ref 8–27)
BUN/CREAT SERPL: 42 (ref 12–28)
CALCIUM SERPL-MCNC: 9.8 MG/DL (ref 8.7–10.3)
CHLORIDE SERPL-SCNC: 96 MMOL/L (ref 96–106)
CO2 SERPL-SCNC: 29 MMOL/L (ref 20–29)
CREAT SERPL-MCNC: 0.98 MG/DL (ref 0.57–1)
GLUCOSE SERPL-MCNC: 115 MG/DL (ref 65–99)
POTASSIUM SERPL-SCNC: 3.5 MMOL/L (ref 3.5–5.2)
SODIUM SERPL-SCNC: 144 MMOL/L (ref 134–144)

## 2019-11-24 NOTE — PROGRESS NOTES
[facility]  [facility]  [address] [place] [address]  Phone:  [phone]  Fax: [phone]       Chief Complaint   Patient presents with    Shortness of Breath     1 week follow up    . SUBJECTIVE:    Kathya Chase is a 81 y.o. female Comes in stating that she feels better. On her last visit I thought she was in worsening heart failure and I added Metolazone in the evening, 5 mg, along with her continued Furosemide 120 mg in the morning. With this she has lost weight and states that her breathing is significantly better. Unfortunately she has not had her echocardiogram.    She has a past history of primary hypertension, dyslipidemia and diabetes mellitus. Current Outpatient Medications   Medication Sig Dispense Refill    potassium chloride SR (KLOR-CON 10) 10 mEq tablet 2  Tabs 2 times a day 360 Tab 3    metOLazone (ZAROXOLYN) 5 mg tablet Take 1 Tab by mouth daily. 30 Tab 0    furosemide (LASIX) 40 mg tablet TAKE 3 TABLETS BY MOUTH EVERY MORNING (Patient taking differently: TAKE 3 TABLETS BY MOUTH EVERY MORNING AND 1 TABLET IN THE EVENING) 90 Tab 11    allopurinol (ZYLOPRIM) 300 mg tablet TAKE 1 TABLET BY MOUTH DAILY 90 Tab 3    metFORMIN ER (GLUCOPHAGE XR) 500 mg tablet TAKE 1 TABLET BY MOUTH THREE TIMES DAILY WITH MEALS 270 Tab 3    metoprolol succinate (TOPROL-XL) 100 mg tablet TAKE 1 TABLET BY MOUTH DAILY 90 Tab 3    atorvastatin (LIPITOR) 40 mg tablet Take 1 Tab by mouth daily. 90 Tab 3    acetaminophen-codeine (TYLENOL-CODEINE #3) 300-30 mg per tablet Take 1 Tab by mouth every four (4) hours as needed for Pain.  aspirin delayed-release 325 mg tablet TAKE 1 TABLET BY MOUTH TWO TIMES A DAY FOR 30 DAYS      glimepiride (AMARYL) 4 mg tablet   TAKE 1 TABLET BY MOUTH TWICE DAILY BEFORE A MEAL      mirabegron ER (MYRBETRIQ) 50 mg ER tablet Take 1 Tab by mouth daily.  30 Tab 11    glucose blood VI test strips (FREESTYLE LITE STRIPS) strip Use to test blood sugar three times daily. Dx.e11.9 100 Strip 11    Blood-Glucose Meter (FREESTYLE LITE METER) monitoring kit Test three times a day dx. e119 1 Kit 0    oxyCODONE IR (ROXICODONE) 5 mg immediate release tablet Take 1-2 Tabs by mouth every four (4) hours as needed. Max Daily Amount: 60 mg. If insurance prior auth./quantity restrictions apply, refer to and look up diagnosis code one prescription. Partial fill as needed is permitted. Please do not contact prescriber. 80 Tab 0    senna-docusate (PERICOLACE) 8.6-50 mg per tablet Take 1 Tab by mouth daily. 30 Tab 0    glyBURIDE (DIABETA) 1.25 mg tablet Take 1.25 mg by mouth Daily (before breakfast).  insulin NPH (HUMULIN N) 100 unit/mL (3 mL) inpn Inject 58 units daily. dx.e11.9 20 mL 11    lisinopril (PRINIVIL, ZESTRIL) 20 mg tablet TAKE 1 TABLET BY MOUTH ONCE DAILY IN THE MORNING 90 Tab 3    alcohol swabs (BD SINGLE USE SWABS REGULAR) padm USE TO TEST BLOOD SUGAR.dx.e11.9 100 Pad 11    BD INSULIN PEN NEEDLE UF SHORT 31 gauge x 5/16\" ndle USE WITH INSULIN PENS TWICE DAILY AS DIRECTED 100 Pen Needle 11    ONE TOUCH DELICA 33 gauge misc   11    NURIA PEN NEEDLE 32 x 5/32 \" ndle       Blood-Glucose Meter monitoring kit Testing BID-DX:250.00 1 kit 0    Lancets (ONE TOUCH ULTRASOFT LANCETS) misc TESTING BID 1 Package 11    glucose blood VI test strips (ASCENSIA AUTODISC VI, ONE TOUCH ULTRA TEST VI) strip TESTING BID 1 Package 11     Past Medical History:   Diagnosis Date    Arthritis     Congestive heart failure, unspecified     Diabetes ([facility] [address]     Hypercholesterolemia     Hypertension      Past Surgical History:   Procedure Laterality Date    HX ORTHOPAEDIC      Arthroscopy right knee     No Known Allergies      REVIEW OF SYSTEMS:  General: negative for - chills or fever  ENT: negative for - headaches, nasal congestion or tinnitus  Respiratory: negative for - cough, hemoptysis, shortness of breath or wheezing  Cardiovascular : negative for - chest pain, edema, palpitations or shortness of breath  Gastrointestinal: negative for - abdominal pain, blood in stools, heartburn or nausea/vomiting  Genito-Urinary: no dysuria, trouble voiding, or hematuria  Musculoskeletal: negative for - gait disturbance, joint pain, joint stiffness or joint swelling  Neurological: no TIA or stroke symptoms  Hematologic: no bruises, no bleeding, no swollen glands  Integument: no lumps, mole changes, nail changes or rash  Endocrine: no malaise/lethargy or unexpected weight changes      Social History     Socioeconomic History    Marital status:      Spouse name: Not on file    Number of children: 3    Years of education: 15    Highest education level: Not on file   Occupational History    Occupation: retired   Tobacco Use    Smoking status: Former Smoker     Packs/day: 0.25     Years: 20.00     Pack years: 5.00     Last attempt to quit: 2014     Years since quittin.9    Smokeless tobacco: Never Used   Substance and Sexual Activity    Alcohol use: Yes     Alcohol/week: 0.0 standard drinks     Comment: 21-24 Beers per week    Drug use: No    Sexual activity: Yes     Partners: Male     Family History   Problem Relation Age of Onset    Diabetes Mother        OBJECTIVE:    Visit Vitals  /58   Pulse 61   Temp 97.5 °F (36.4 °C) (Oral)   Resp 16   Ht 5' 4\" (1.626 m)   Wt 196 lb 3.2 oz (89 kg)   SpO2 95%   BMI 33.68 kg/m²     CONSTITUTIONAL: well , well nourished, appears age appropriate  EYES: perrla, eom intact  ENMT:moist mucous membranes, pharynx clear  NECK: supple. Thyroid normal, no JVD  RESPIRATORY: Chest: clear to ascultation and percussion   CARDIOVASCULAR: Heart: regular rate and rhythm  GASTROINTESTINAL: Abdomen: soft, bowel sounds active  HEMATOLOGIC: no pathological lymph nodes palpated  MUSCULOSKELETAL: Extremities: no edema, pulse 1+   INTEGUMENT: No unusual rashes or suspicious skin lesions noted.  Nails appear normal.  NEUROLOGIC: non-focal exam   MENTAL STATUS: alert and oriented, appropriate affect      ASSESSMENT:  1. Chronic combined systolic and diastolic congestive heart failure (HCC)    2. Cardiomyopathy, unspecified type ([facility] [address]    3. PARDO (dyspnea on exertion)    4. Essential hypertension    5. Insulin dependent diabetes mellitus ([facility]    6. Dyslipidemia        PLAN:    1. She appears to be well compensated. There are no overt signs of CHF currently. BMP will be checked to ensure she is not developing a worsening prerenal azotemia. I should mention that the patient's cardiomyopathy is non ischemic. 2. BP is excellent today. 3. Her diabetes historically has been doing quite well with her 56 units of Humulin-N.  4. She will continue statin in view of her primary cardiovascular risk prevention status. .  Orders Placed This Encounter    METABOLIC PANEL, BASIC         Follow-up and Dispositions    · Return in about 4 weeks (around 12/16/2019).            Joey Mares MD

## 2019-11-25 NOTE — PROGRESS NOTES
Call made with patient, advised to stop metolazone per Dr. Darling Dee. Pt verbalized understanding.

## 2020-01-20 ENCOUNTER — OFFICE VISIT (OUTPATIENT)
Dept: INTERNAL MEDICINE CLINIC | Age: 70
End: 2020-01-20

## 2020-01-20 VITALS
OXYGEN SATURATION: 93 % | DIASTOLIC BLOOD PRESSURE: 72 MMHG | HEART RATE: 56 BPM | TEMPERATURE: 98.3 F | HEIGHT: 64 IN | RESPIRATION RATE: 16 BRPM | WEIGHT: 192 LBS | BODY MASS INDEX: 32.78 KG/M2 | SYSTOLIC BLOOD PRESSURE: 137 MMHG

## 2020-01-20 DIAGNOSIS — I10 ESSENTIAL HYPERTENSION: ICD-10-CM

## 2020-01-20 DIAGNOSIS — E11.65 UNCONTROLLED TYPE 2 DIABETES MELLITUS WITH HYPERGLYCEMIA (HCC): Primary | ICD-10-CM

## 2020-01-20 DIAGNOSIS — I42.9 CARDIOMYOPATHY, UNSPECIFIED TYPE (HCC): ICD-10-CM

## 2020-01-20 DIAGNOSIS — N18.30 CKD (CHRONIC KIDNEY DISEASE) STAGE 3, GFR 30-59 ML/MIN (HCC): ICD-10-CM

## 2020-01-20 NOTE — PROGRESS NOTES
Chief Complaint   Patient presents with    High Blood Sugar     Patient states that blood pressure has been elevated for the past two days. She states that her reading was this mornin.        1. Have you been to the ER, urgent care clinic since your last visit? Hospitalized since your last visit? No    2. Have you seen or consulted any other health care providers outside of the [address] [place] [address] since your last visit? Include any pap smears or colon screening.  No

## 2020-01-21 LAB
BUN SERPL-MCNC: 22 MG/DL (ref 8–27)
BUN/CREAT SERPL: 26 (ref 12–28)
CALCIUM SERPL-MCNC: 9.5 MG/DL (ref 8.7–10.3)
CHLORIDE SERPL-SCNC: 96 MMOL/L (ref 96–106)
CO2 SERPL-SCNC: 25 MMOL/L (ref 20–29)
CREAT SERPL-MCNC: 0.84 MG/DL (ref 0.57–1)
FRUCTOSAMINE SERPL-SCNC: 413 UMOL/L (ref 0–285)
GLUCOSE SERPL-MCNC: 232 MG/DL (ref 65–99)
POTASSIUM SERPL-SCNC: 4 MMOL/L (ref 3.5–5.2)
SODIUM SERPL-SCNC: 138 MMOL/L (ref 134–144)

## 2020-01-26 PROBLEM — E11.65 UNCONTROLLED TYPE 2 DIABETES MELLITUS WITH HYPERGLYCEMIA (HCC): Status: ACTIVE | Noted: 2020-01-26

## 2020-01-26 NOTE — PROGRESS NOTES
[facility]  [place] [address]  [address] [facility] [address]  Phone:  [phone]  Fax: [phone]       Chief Complaint   Patient presents with    High Blood Sugar     Patient states that blood pressure has been elevated for the past two days. She states that her reading was this mornin. .      SUBJECTIVE:    Ben Iglesias is a 81 y.o. female Comes in for follow up. Her blood sugar has indeed been elevated. She states it started approximately a week ago. No obvious explanation, but on further questioning, the patient eats processed carbs and drinks a moderate amount of beer, at least four cans a day, which provides at least 500 extra calories of carbs. This has been a chronic ongoing problem. She has been taking her antihypertensive medication as prescribed. From a cardiac perspective she appears to be well compensated, denying PARDO, orthopnea or PND. She remains on her diuretic, for which she is taking three tablets a day, and I discontinued Metolazone because it caused her to get dehydrated. Current Outpatient Medications   Medication Sig Dispense Refill    potassium chloride SR (KLOR-CON 10) 10 mEq tablet 2  Tabs 2 times a day 360 Tab 3    metOLazone (ZAROXOLYN) 5 mg tablet Take 1 Tab by mouth daily. 30 Tab 0    furosemide (LASIX) 40 mg tablet TAKE 3 TABLETS BY MOUTH EVERY MORNING (Patient taking differently: TAKE 3 TABLETS BY MOUTH EVERY MORNING AND 1 TABLET IN THE EVENING) 90 Tab 11    allopurinol (ZYLOPRIM) 300 mg tablet TAKE 1 TABLET BY MOUTH DAILY 90 Tab 3    metFORMIN ER (GLUCOPHAGE XR) 500 mg tablet TAKE 1 TABLET BY MOUTH THREE TIMES DAILY WITH MEALS 270 Tab 3    metoprolol succinate (TOPROL-XL) 100 mg tablet TAKE 1 TABLET BY MOUTH DAILY 90 Tab 3    atorvastatin (LIPITOR) 40 mg tablet Take 1 Tab by mouth daily.  90 Tab 3    acetaminophen-codeine (TYLENOL-CODEINE #3) 300-30 mg per tablet Take 1 Tab by mouth every four (4) hours as needed for Pain.  aspirin delayed-release 325 mg tablet TAKE 1 TABLET BY MOUTH TWO TIMES A DAY FOR 30 DAYS      glimepiride (AMARYL) 4 mg tablet   TAKE 1 TABLET BY MOUTH TWICE DAILY BEFORE A MEAL      mirabegron ER (MYRBETRIQ) 50 mg ER tablet Take 1 Tab by mouth daily. 30 Tab 11    glucose blood VI test strips (FREESTYLE LITE STRIPS) strip Use to test blood sugar three times daily. Dx.e11.9 100 Strip 11    Blood-Glucose Meter (FREESTYLE LITE METER) monitoring kit Test three times a day dx. e119 1 Kit 0    oxyCODONE IR (ROXICODONE) 5 mg immediate release tablet Take 1-2 Tabs by mouth every four (4) hours as needed. Max Daily Amount: 60 mg. If insurance prior auth./quantity restrictions apply, refer to and look up diagnosis code one prescription. Partial fill as needed is permitted. Please do not contact prescriber. 80 Tab 0    senna-docusate (PERICOLACE) 8.6-50 mg per tablet Take 1 Tab by mouth daily. 30 Tab 0    glyBURIDE (DIABETA) 1.25 mg tablet Take 1.25 mg by mouth Daily (before breakfast).  insulin NPH (HUMULIN N) 100 unit/mL (3 mL) inpn Inject 58 units daily. dx.e11.9 20 mL 11    lisinopril (PRINIVIL, ZESTRIL) 20 mg tablet TAKE 1 TABLET BY MOUTH ONCE DAILY IN THE MORNING 90 Tab 3    alcohol swabs (BD SINGLE USE SWABS REGULAR) padm USE TO TEST BLOOD SUGAR.dx.e11.9 100 Pad 11    BD INSULIN PEN NEEDLE UF SHORT 31 gauge x 5/16\" ndle USE WITH INSULIN PENS TWICE DAILY AS DIRECTED 100 Pen Needle 11    ONE TOUCH DELICA 33 gauge misc   11    NURIA PEN NEEDLE 32 x 5/32 \" ndle       Blood-Glucose Meter monitoring kit Testing BID-DX:250.00 1 kit 0    Lancets (ONE TOUCH ULTRASOFT LANCETS) misc TESTING BID 1 Package 11    glucose blood VI test strips (ASCENSIA AUTODISC VI, ONE TOUCH ULTRA TEST VI) strip TESTING BID 1 Package 11     Past Medical History:   Diagnosis Date    Arthritis     Congestive heart failure, unspecified     Diabetes (HCC)     Hypercholesterolemia     Hypertension      Past Surgical History:   Procedure Laterality Date    HX ORTHOPAEDIC      Arthroscopy right knee     No Known Allergies      REVIEW OF SYSTEMS:  General: negative for - chills or fever  ENT: negative for - headaches, nasal congestion or tinnitus  Respiratory: negative for - cough, hemoptysis, shortness of breath or wheezing  Cardiovascular : negative for - chest pain, edema, palpitations or shortness of breath  Gastrointestinal: negative for - abdominal pain, blood in stools, heartburn or nausea/vomiting  Genito-Urinary: no dysuria, trouble voiding, or hematuria  Musculoskeletal: negative for - gait disturbance, joint pain, joint stiffness or joint swelling  Neurological: no TIA or stroke symptoms  Hematologic: no bruises, no bleeding, no swollen glands  Integument: no lumps, mole changes, nail changes or rash  Endocrine: no malaise/lethargy or unexpected weight changes      Social History     Socioeconomic History    Marital status:      Spouse name: Not on file    Number of children: 3    Years of education: 15    Highest education level: Not on file   Occupational History    Occupation: retired   Tobacco Use    Smoking status: Former Smoker     Packs/day: 0.25     Years: 20.00     Pack years: 5.00     Last attempt to quit: 2014     Years since quittin.1    Smokeless tobacco: Never Used   Substance and Sexual Activity    Alcohol use: Yes     Alcohol/week: 0.0 standard drinks     Comment: 21-24 Beers per week    Drug use: No    Sexual activity: Yes     Partners: Male     Family History   Problem Relation Age of Onset    Diabetes Mother        OBJECTIVE:    Visit Vitals  /72   Pulse (!) 56   Temp 98.3 °F (36.8 °C) (Oral)   Resp 16   Ht 5' 4\" (1.626 m)   Wt 192 lb (87.1 kg)   SpO2 93%   BMI 32.96 kg/m²     CONSTITUTIONAL: well , well nourished, appears age appropriate  EYES: perrla, eom intact  ENMT:moist mucous membranes, pharynx clear  NECK: supple.  Thyroid normal,no JVD  RESPIRATORY: Chest: clear to ascultation and percussion   CARDIOVASCULAR: Heart: regular rate and rhythm  GASTROINTESTINAL: Abdomen: soft, bowel sounds active  HEMATOLOGIC: no pathological lymph nodes palpated  MUSCULOSKELETAL: Extremities: no edema, pulse 1+   INTEGUMENT: No unusual rashes or suspicious skin lesions noted. Nails appear normal.  NEUROLOGIC: non-focal exam   MENTAL STATUS: alert and oriented, appropriate affect      ASSESSMENT:  1. Uncontrolled type 2 diabetes mellitus with hyperglycemia ([facility] [address]    2. Essential hypertension    3. Cardiomyopathy, unspecified type ([facility] [address]    4. CKD (chronic kidney disease) stage 3, GFR 30-59 ml/min ([facility])        PLAN:    1. Her diabetes is not well controlled. This is related to her dietary indiscretion. I warn her about things she needs to do to improve her diabetes. I will increase her insulin by 4 units to 52 units for now. I remind her of the importance of eating at the same time every day. She also has to reduce her beer consumption. 2. BP is excellent today, no adjustments are made. 3. There are no overt signs of cardiac decompensation. 4. Patient actually has no papo kidney disease and it is all prerenal.     .  Orders Placed This Encounter    METABOLIC PANEL, BASIC    FRUCTOSAMINE         Follow-up and Dispositions    · Return in about 4 weeks (around 2/17/2020).            Nenita Brown MD

## 2020-02-03 ENCOUNTER — OFFICE VISIT (OUTPATIENT)
Dept: INTERNAL MEDICINE CLINIC | Age: 70
End: 2020-02-03

## 2020-02-03 VITALS
BODY MASS INDEX: 33.38 KG/M2 | TEMPERATURE: 97.7 F | OXYGEN SATURATION: 94 % | HEIGHT: 64 IN | SYSTOLIC BLOOD PRESSURE: 133 MMHG | DIASTOLIC BLOOD PRESSURE: 64 MMHG | WEIGHT: 195.5 LBS | HEART RATE: 64 BPM | RESPIRATION RATE: 16 BRPM

## 2020-02-03 DIAGNOSIS — N18.30 CKD (CHRONIC KIDNEY DISEASE) STAGE 3, GFR 30-59 ML/MIN (HCC): ICD-10-CM

## 2020-02-03 DIAGNOSIS — I10 ESSENTIAL HYPERTENSION: ICD-10-CM

## 2020-02-03 DIAGNOSIS — I42.9 CARDIOMYOPATHY, UNSPECIFIED TYPE (HCC): ICD-10-CM

## 2020-02-03 DIAGNOSIS — E11.65 UNCONTROLLED TYPE 2 DIABETES MELLITUS WITH HYPERGLYCEMIA (HCC): Primary | ICD-10-CM

## 2020-02-03 NOTE — PROGRESS NOTES
Chief Complaint   Patient presents with    Diabetes     4 week follow up    . SUBECTIVE:    Mode Pisano is a 81 y.o. female Comes in for return visit stating that her diabetes is significantly better. She is now reducing her carbohydrate intake, including her beer. She is eating at the same time every day. The end result is most of her blood sugars are now less than 120. She has not had any interventional hypoglycemia. She is taking her Humulin-N 60 units a.c. breakfast and 10 units a.c. dinner. Unfortunately, the 10 units is taken a.c. lunch inappropriately. Fortunately, she had no adverse effects from this. She denies any dyspnea on exertion, orthopnea or PND. There has been no meaningful weight loss since I last saw her. She has a past history of primary hypertension and dyslipidemia. Current Outpatient Medications   Medication Sig Dispense Refill    potassium chloride SR (KLOR-CON 10) 10 mEq tablet 2  Tabs 2 times a day 360 Tab 3    metOLazone (ZAROXOLYN) 5 mg tablet Take 1 Tab by mouth daily. 30 Tab 0    furosemide (LASIX) 40 mg tablet TAKE 3 TABLETS BY MOUTH EVERY MORNING (Patient taking differently: TAKE 3 TABLETS BY MOUTH EVERY MORNING AND 1 TABLET IN THE EVENING) 90 Tab 11    allopurinol (ZYLOPRIM) 300 mg tablet TAKE 1 TABLET BY MOUTH DAILY 90 Tab 3    metFORMIN ER (GLUCOPHAGE XR) 500 mg tablet TAKE 1 TABLET BY MOUTH THREE TIMES DAILY WITH MEALS 270 Tab 3    metoprolol succinate (TOPROL-XL) 100 mg tablet TAKE 1 TABLET BY MOUTH DAILY 90 Tab 3    atorvastatin (LIPITOR) 40 mg tablet Take 1 Tab by mouth daily. 90 Tab 3    acetaminophen-codeine (TYLENOL-CODEINE #3) 300-30 mg per tablet Take 1 Tab by mouth every four (4) hours as needed for Pain.       aspirin delayed-release 325 mg tablet TAKE 1 TABLET BY MOUTH TWO TIMES A DAY FOR 30 DAYS      glimepiride (AMARYL) 4 mg tablet   TAKE 1 TABLET BY MOUTH TWICE DAILY BEFORE A MEAL      mirabegron ER (MYRBETRIQ) 50 mg ER tablet Take 1 Tab by mouth daily. 30 Tab 11    glucose blood VI test strips (FREESTYLE LITE STRIPS) strip Use to test blood sugar three times daily. Dx.e11.9 100 Strip 11    Blood-Glucose Meter (FREESTYLE LITE METER) monitoring kit Test three times a day dx. e119 1 Kit 0    oxyCODONE IR (ROXICODONE) 5 mg immediate release tablet Take 1-2 Tabs by mouth every four (4) hours as needed. Max Daily Amount: 60 mg. If insurance prior auth./quantity restrictions apply, refer to and look up diagnosis code one prescription. Partial fill as needed is permitted. Please do not contact prescriber. 80 Tab 0    senna-docusate (PERICOLACE) 8.6-50 mg per tablet Take 1 Tab by mouth daily. 30 Tab 0    glyBURIDE (DIABETA) 1.25 mg tablet Take 1.25 mg by mouth Daily (before breakfast).  insulin NPH (HUMULIN N) 100 unit/mL (3 mL) inpn Inject 58 units daily. dx.e11.9 20 mL 11    lisinopril (PRINIVIL, ZESTRIL) 20 mg tablet TAKE 1 TABLET BY MOUTH ONCE DAILY IN THE MORNING 90 Tab 3    alcohol swabs (BD SINGLE USE SWABS REGULAR) padm USE TO TEST BLOOD SUGAR.dx.e11.9 100 Pad 11    BD INSULIN PEN NEEDLE UF SHORT 31 gauge x 5/16\" ndle USE WITH INSULIN PENS TWICE DAILY AS DIRECTED 100 Pen Needle 11    ONE TOUCH DELICA 33 gauge misc   11    NURIA PEN NEEDLE 32 x 5/32 \" ndle       Blood-Glucose Meter monitoring kit Testing BID-DX:250.00 1 kit 0    Lancets (ONE TOUCH ULTRASOFT LANCETS) misc TESTING BID 1 Package 11    glucose blood VI test strips (ASCENSIA AUTODISC VI, ONE TOUCH ULTRA TEST VI) strip TESTING BID 1 Package 11     Past Medical History:   Diagnosis Date    Arthritis     Congestive heart failure, unspecified     Diabetes ([address]     Hypercholesterolemia     Hypertension      Past Surgical History:   Procedure Laterality Date    HX ORTHOPAEDIC      Arthroscopy right knee     No Known Allergies    REVIEW OF SYSTEMS:  Review of Systems - Negative except   ENT ROS: negative for - headaches, hearing change, nasal congestion, oral lesions, tinnitus, visual changes or   Respiratory ROS: no cough, shortness of breath, or wheezing  Cardiovascular ROS: no chest pain or dyspnea on exertion  Gastrointestinal ROS: no abdominal pain, change in bowel habits, or black or blood  Genito-Urinary ROS: no dysuria, trouble voiding, or hematuria  Musculoskeletal ROS: negative  Neurological ROS: no TIA or stroke symptoms      Social History     Socioeconomic History    Marital status:      Spouse name: Not on file    Number of children: 3    Years of education: 15    Highest education level: Not on file   Occupational History    Occupation: retired   Tobacco Use    Smoking status: Former Smoker     Packs/day: 0.25     Years: 20.00     Pack years: 5.00     Last attempt to quit: 2014     Years since quittin.1    Smokeless tobacco: Never Used   Substance and Sexual Activity    Alcohol use: Yes     Alcohol/week: 0.0 standard drinks     Comment: 21-24 Beers per week    Drug use: No    Sexual activity: Yes     Partners: Male   r  Family History   Problem Relation Age of Onset    Diabetes Mother        OBJECTIVE:  Visit Vitals  /64   Pulse 64   Temp 97.7 °F (36.5 °C) (Oral)   Resp 16   Ht 5' 4\" (1.626 m)   Wt 195 lb 8 oz (88.7 kg)   SpO2 94%   BMI 33.56 kg/m²     ENT: perrla,  eom intact  NECK: supple. Thyroid normal, no JVD  CHEST: clear to ascultation and percussion   HEART: regular rate and rhythm  ABD: soft, bowel sounds active,   EXTREMITIES: no edema, pulse 1+  INTEGUMENT: clear      ASSESSMENT:  1. Uncontrolled type 2 diabetes mellitus with hyperglycemia ([facility])    2. Cardiomyopathy, unspecified type ([facility] [address]    3. Essential hypertension    4. CKD (chronic kidney disease) stage 3, GFR 30-59 ml/min ([facility])        PLAN:    1. Her diabetes appears to be doing better. I will not make any adjustments. She will continue the Humulin-N 60 units a.c. breakfast and 10 units a.c. dinner  2.  She is well compensated today with no overt signs of congestive heart failure. She remains on Furosemide 120 mg q.a.m.  3. Blood pressure is excellent today, no adjustments are made. 4. She does have mild CKD, but this is predominantly a prerenal component. BMP will be checked on her return visit. Follow-up and Dispositions    · Return in about 2 months (around 4/3/2020).            Blake Bae MD

## 2020-02-03 NOTE — PROGRESS NOTES
Chief Complaint   Patient presents with    Diabetes     4 week follow up      1. Have you been to the ER, urgent care clinic since your last visit? Hospitalized since your last visit? No    2. Have you seen or consulted any other health care providers outside of the [address] [place] [address] since your last visit? Include any pap smears or colon screening.  No

## 2020-02-18 RX ORDER — ISOPROPYL ALCOHOL 70 ML/100ML
SWAB TOPICAL
Qty: 100 PAD | Refills: 11 | Status: ON HOLD | OUTPATIENT
Start: 2020-02-18 | End: 2021-03-09

## 2020-02-18 RX ORDER — PEN NEEDLE, DIABETIC 30 GX3/16"
NEEDLE, DISPOSABLE MISCELLANEOUS
Qty: 100 PEN NEEDLE | Refills: 11 | Status: ON HOLD | OUTPATIENT
Start: 2020-02-18 | End: 2021-03-09

## 2020-09-12 RX ORDER — ATORVASTATIN CALCIUM 40 MG/1
TABLET, FILM COATED ORAL
Qty: 90 TAB | Refills: 3 | Status: ON HOLD | OUTPATIENT
Start: 2020-09-12 | End: 2021-03-09

## 2020-09-12 RX ORDER — METOPROLOL SUCCINATE 100 MG/1
TABLET, EXTENDED RELEASE ORAL
Qty: 90 TAB | Refills: 3 | Status: SHIPPED | OUTPATIENT
Start: 2020-09-12 | End: 2020-10-08 | Stop reason: CLARIF

## 2020-09-12 RX ORDER — METFORMIN HYDROCHLORIDE 500 MG/1
TABLET, EXTENDED RELEASE ORAL
Qty: 270 TAB | Refills: 3 | Status: ON HOLD | OUTPATIENT
Start: 2020-09-12 | End: 2021-03-09

## 2020-09-17 ENCOUNTER — OFFICE VISIT (OUTPATIENT)
Dept: CARDIOLOGY CLINIC | Age: 70
End: 2020-09-17
Payer: MEDICARE

## 2020-09-17 VITALS
RESPIRATION RATE: 16 BRPM | SYSTOLIC BLOOD PRESSURE: 123 MMHG | HEART RATE: 74 BPM | WEIGHT: 195.2 LBS | BODY MASS INDEX: 33.32 KG/M2 | TEMPERATURE: 98.3 F | HEIGHT: 64 IN | OXYGEN SATURATION: 99 % | DIASTOLIC BLOOD PRESSURE: 62 MMHG

## 2020-09-17 DIAGNOSIS — E78.5 DYSLIPIDEMIA: ICD-10-CM

## 2020-09-17 DIAGNOSIS — N18.30 CKD (CHRONIC KIDNEY DISEASE) STAGE 3, GFR 30-59 ML/MIN (HCC): ICD-10-CM

## 2020-09-17 DIAGNOSIS — I10 ESSENTIAL HYPERTENSION: Primary | ICD-10-CM

## 2020-09-17 PROCEDURE — G8754 DIAS BP LESS 90: HCPCS | Performed by: SPECIALIST

## 2020-09-17 PROCEDURE — 1090F PRES/ABSN URINE INCON ASSESS: CPT | Performed by: SPECIALIST

## 2020-09-17 PROCEDURE — G8752 SYS BP LESS 140: HCPCS | Performed by: SPECIALIST

## 2020-09-17 PROCEDURE — G8400 PT W/DXA NO RESULTS DOC: HCPCS | Performed by: SPECIALIST

## 2020-09-17 PROCEDURE — G8427 DOCREV CUR MEDS BY ELIG CLIN: HCPCS | Performed by: SPECIALIST

## 2020-09-17 PROCEDURE — 1101F PT FALLS ASSESS-DOCD LE1/YR: CPT | Performed by: SPECIALIST

## 2020-09-17 PROCEDURE — 99213 OFFICE O/P EST LOW 20 MIN: CPT | Performed by: SPECIALIST

## 2020-09-17 PROCEDURE — 3017F COLORECTAL CA SCREEN DOC REV: CPT | Performed by: SPECIALIST

## 2020-09-17 PROCEDURE — G8417 CALC BMI ABV UP PARAM F/U: HCPCS | Performed by: SPECIALIST

## 2020-09-17 PROCEDURE — G8536 NO DOC ELDER MAL SCRN: HCPCS | Performed by: SPECIALIST

## 2020-09-17 PROCEDURE — G8432 DEP SCR NOT DOC, RNG: HCPCS | Performed by: SPECIALIST

## 2020-09-17 NOTE — PROGRESS NOTES
HISTORY OF PRESENT ILLNESS  Yuridia Dallas is a 78 y.o. female     SUMMARY:   Problem List  Date Reviewed: 9/17/2020          Codes Class Noted    Uncontrolled type 2 diabetes mellitus with hyperglycemia ([facility] ICD-10-CM: E11.65  ICD-9-CM: 250.02  1/26/2020        CKD (chronic kidney disease) stage 3, GFR 30-59 ml/min ([facility]) ICD-10-CM: N18.3  ICD-9-CM: 585.3  7/16/2019    Overview Addendum 9/17/2020  8:33 AM by Judy Vogt MD     5/18 negative lexiscan  11/19 lvh, lae, otherwise normal echo             Status post total right knee replacement ICD-10-CM: Z96.651  ICD-9-CM: V43.65  2/5/2019        Osteoarthrosis, localized, primary, knee, right ICD-10-CM: M17.11  ICD-9-CM: 715.16  1/21/2019        PARDO (dyspnea on exertion) ICD-10-CM: R06.00  ICD-9-CM: 786.09  5/10/2018        Chest pain, exertional ICD-10-CM: R07.9  ICD-9-CM: 786.50  5/9/2018        Decreased libido ICD-10-CM: R68.82  ICD-9-CM: 799.81  10/3/2017        Epistaxis ICD-10-CM: R04.0  ICD-9-CM: 784.7  8/22/2017        Former tobacco use--stopped 2014 after >40 yrs smoking ICD-10-CM: Z87.891  ICD-9-CM: V15.82  11/15/2016        Obesity (BMI 30.0-34.9) ICD-10-CM: E66.9  ICD-9-CM: 278.00  11/15/2016        Reactive airway disease ICD-10-CM: J45.909  ICD-9-CM: 493.90  1/12/2016        Carpal tunnel syndrome of right wrist ICD-10-CM: G56.01  ICD-9-CM: 354.0  12/6/2015        Primary osteoarthritis of right knee ICD-10-CM: M17.11  ICD-9-CM: 715.16  7/12/2015        Cardiomyopathy ([address] ICD-10-CM: I42.9  ICD-9-CM: 425.4  9/9/2014    Overview Signed 11/24/2019  5:40 PM by Anjelica Johnson MD     Nonischemic             Dyslipidemia ICD-10-CM: E78.5  ICD-9-CM: 272.4  9/9/2014        Gout ICD-10-CM: M10.9  ICD-9-CM: 274.9  9/9/2014        Alcohol abuse ICD-10-CM: F10.10  ICD-9-CM: 305.00  9/9/2014        Hypertension ICD-10-CM: I10  ICD-9-CM: 401.9  9/9/2014        Dermatitis ICD-10-CM: L30.9  ICD-9-CM: 692.9  9/9/2014              Current Outpatient Medications on File Prior to Visit   Medication Sig    atorvastatin (LIPITOR) 40 mg tablet TAKE 1 TABLET BY MOUTH EVERY DAY    metoprolol succinate (TOPROL-XL) 100 mg tablet TAKE 1 TABLET BY MOUTH EVERY DAY    metFORMIN ER (GLUCOPHAGE XR) 500 mg tablet TAKE 1 TABLET BY MOUTH THREE TIMES DAILY WITH MEALS    Insulin Needles, Disposable, (BD ULTRA-FINE SHORT PEN NEEDLE) 31 gauge x 5/16\" ndle USE WITH INSULIN PENS TWICE DAILY AS DIRECTED    alcohol swabs (BD SINGLE USE SWABS REGULAR) padm USE TO TEST BLOOD SUGAR.dx.e11.9    insulin NPH (HUMULIN N) 100 unit/mL (3 mL) inpn Inject 58 units daily. dx.e11.9 (Patient taking differently: Inject 60 units daily. dx.e11.9)    potassium chloride SR (KLOR-CON 10) 10 mEq tablet 2  Tabs 2 times a day    metOLazone (ZAROXOLYN) 5 mg tablet Take 1 Tab by mouth daily.  furosemide (LASIX) 40 mg tablet TAKE 3 TABLETS BY MOUTH EVERY MORNING (Patient taking differently: TAKE 3 TABLETS BY MOUTH EVERY MORNING AND 1 TABLET IN THE EVENING)    allopurinol (ZYLOPRIM) 300 mg tablet TAKE 1 TABLET BY MOUTH DAILY    acetaminophen-codeine (TYLENOL-CODEINE #3) 300-30 mg per tablet Take 1 Tab by mouth every four (4) hours as needed for Pain.  aspirin delayed-release 325 mg tablet TAKE 1 TABLET BY MOUTH TWO TIMES A DAY FOR 30 DAYS    glimepiride (AMARYL) 4 mg tablet   TAKE 1 TABLET BY MOUTH TWICE DAILY BEFORE A MEAL    mirabegron ER (MYRBETRIQ) 50 mg ER tablet Take 1 Tab by mouth daily.  glucose blood VI test strips (FREESTYLE LITE STRIPS) strip Use to test blood sugar three times daily. Dx.e11.9    Blood-Glucose Meter (FREESTYLE LITE METER) monitoring kit Test three times a day dx. e119    oxyCODONE IR (ROXICODONE) 5 mg immediate release tablet Take 1-2 Tabs by mouth every four (4) hours as needed. Max Daily Amount: 60 mg. If insurance prior auth./quantity restrictions apply, refer to and look up diagnosis code one prescription. Partial fill as needed is permitted. Please do not contact prescriber.  senna-docusate (PERICOLACE) 8.6-50 mg per tablet Take 1 Tab by mouth daily.  lisinopril (PRINIVIL, ZESTRIL) 20 mg tablet TAKE 1 TABLET BY MOUTH ONCE DAILY IN THE MORNING    ONE TOUCH DELICA 33 gauge misc     NURIA PEN NEEDLE 32 x 5/32 \" ndle     Blood-Glucose Meter monitoring kit Testing BID-DX:250.00    Lancets (ONE TOUCH ULTRASOFT LANCETS) misc TESTING BID    glucose blood VI test strips (ASCENSIA AUTODISC VI, ONE TOUCH ULTRA TEST VI) strip TESTING BID    glyBURIDE (DIABETA) 1.25 mg tablet Take 1.25 mg by mouth Daily (before breakfast). No current facility-administered medications on file prior to visit. CARDIOLOGY STUDIES TO DATE:  5/18 negative lexiscan  11/20/19  ECHO ADULT COMPLETE 11/20/2019 11/20/2019  Narrative  · Left Ventricle: Normal wall thickness. Mildly dilated left ventricle. Low normal systolic dysfunction. Estimated left ventricular ejection fraction is 51 - 55%. Visually measured ejection fraction. Inconclusive left ventricular diastolic function. · Mitral Valve: Mitral valve non-specific thickening. Trace mitral valve regurgitation is present. · Tricuspid Valve: Mild tricuspid valve regurgitation is present. · Pulmonary Artery: Pulmonary hypertension. · Left Atrium: Mildly dilated left atrium. Signed by: Ciara Mendes MD      Chief Complaint   Patient presents with    Follow-up     HPI :  She has been followed in the past by Dr. Charan Chanel for a mild cardiomyopathy. I reviewed her most recent echo and summarized it above. She is taking large doses of diuretic in the morning and has been for some time. She is complaining of nocturia as well and there is some question about how well her sugars have been controlled. Other than maintaining her own home she gets no regular exercise. Dr. Lashanda Hsu is following her lipids and her diabetes. She has mild chronic dyspnea on exertion.   CARDIAC ROS:   negative for chest pain, palpitations, syncope, orthopnea, paroxysmal nocturnal dyspnea, exertional chest pressure/discomfort, claudication, lower extremity edema    Family History   Problem Relation Age of Onset    Diabetes Mother        Past Medical History:   Diagnosis Date    Arthritis     Congestive heart failure, unspecified     Diabetes ([facility]     Hypercholesterolemia     Hypertension        GENERAL ROS:  A comprehensive review of systems was negative except for that written in the HPI.     Visit Vitals  /62 (BP 1 Location: Left arm, BP Patient Position: Sitting)   Pulse 74   Temp 98.3 °F (36.8 °C) (Oral)   Resp 16   Ht 5' 4\" (1.626 m)   Wt 195 lb 3.2 oz (88.5 kg)   SpO2 99%   BMI 33.51 kg/m²       Wt Readings from Last 3 Encounters:   09/17/20 195 lb 3.2 oz (88.5 kg)   02/03/20 195 lb 8 oz (88.7 kg)   01/20/20 192 lb (87.1 kg)            BP Readings from Last 3 Encounters:   09/17/20 123/62   02/03/20 133/64   01/20/20 137/72       PHYSICAL EXAM  General appearance: alert, cooperative, no distress, appears stated age  Neurologic: Alert and oriented X 3  Neck: supple, symmetrical, trachea midline, no adenopathy, no carotid bruit and no JVD  Lungs: clear to auscultation bilaterally  Heart: regular rate and rhythm, S1, S2 normal, no murmur, click, rub or gallop  Extremities: extremities normal, atraumatic, no cyanosis or edema    Lab Results   Component Value Date/Time    Cholesterol, total 207 (H) 08/08/2019 02:31 PM    Cholesterol, total 175 04/10/2018 01:19 PM    Cholesterol, total 141 08/26/2016 11:48 AM    HDL Cholesterol 46 08/08/2019 02:31 PM    HDL Cholesterol 56 04/10/2018 01:19 PM    HDL Cholesterol 43 08/26/2016 11:48 AM    LDL, calculated 105 (H) 08/08/2019 02:31 PM    LDL, calculated 75 04/10/2018 01:19 PM    LDL, calculated 25 08/26/2016 11:48 AM    Triglyceride 280 (H) 08/08/2019 02:31 PM    Triglyceride 221 (H) 04/10/2018 01:19 PM    Triglyceride 364 (H) 08/26/2016 11:48 AM     ASSESSMENT :      She appears to be stable and very well compensated at this point on a reasonable medical regimen but I do not think she needs all that diuretic. Working to stop her metolazone. When she sees Dr. Carlos Barajas if she is doing okay might cut back on her Lasix as well. Her nocturia is unlikely to be related to her diuretics so she is going to discuss that further with Dr. Carlos Barajas as well. current treatment plan is effective, no change in therapy  lab results and schedule of future lab studies reviewed with patient  reviewed diet, exercise and weight control    Encounter Diagnoses   Name Primary?  Essential hypertension Yes    CKD (chronic kidney disease) stage 3, GFR 30-59 ml/min (HCC)     Dyslipidemia      No orders of the defined types were placed in this encounter. Follow-up and Dispositions    · Return in about 6 months (around 3/17/2021). Glenna Hatch MD  9/17/2020  Please note that this dictation was completed with [employer], the computer voice recognition software. Quite often unanticipated grammatical, syntax, homophones, and other interpretive errors are inadvertently transcribed by the computer software. Please disregard these errors. Please excuse any errors that have escaped final proofreading. Thank you.

## 2020-09-17 NOTE — PROGRESS NOTES
Identified pt with two pt identifiers(name and ). Reviewed record in preparation for visit and have obtained necessary documentation. Chief Complaint   Patient presents with    Follow-up           Visit Vitals  /62 (BP 1 Location: Left arm, BP Patient Position: Sitting)   Pulse 74   Temp 98.3 °F (36.8 °C) (Oral)   Resp 16   Ht 5' 4\" (1.626 m)   Wt 195 lb 3.2 oz (88.5 kg)   SpO2 99%   BMI 33.51 kg/m²     Pain Scale: 0 - No pain/10    Coordination of Care Questionnaire:  :   1. Have you been to the ER, urgent care clinic since your last visit? Hospitalized since your last visit? No    2. Have you seen or consulted any other health care providers outside of the [address] [place] [address] since your last visit? Include any pap smears or colon screening.  No

## 2020-10-08 ENCOUNTER — OFFICE VISIT (OUTPATIENT)
Dept: INTERNAL MEDICINE CLINIC | Age: 70
End: 2020-10-08
Payer: MEDICARE

## 2020-10-08 VITALS
WEIGHT: 193.2 LBS | OXYGEN SATURATION: 91 % | DIASTOLIC BLOOD PRESSURE: 70 MMHG | BODY MASS INDEX: 32.98 KG/M2 | RESPIRATION RATE: 14 BRPM | SYSTOLIC BLOOD PRESSURE: 120 MMHG | HEART RATE: 83 BPM | TEMPERATURE: 98 F | HEIGHT: 64 IN

## 2020-10-08 DIAGNOSIS — E78.5 DYSLIPIDEMIA: ICD-10-CM

## 2020-10-08 DIAGNOSIS — E66.9 OBESITY (BMI 30.0-34.9): ICD-10-CM

## 2020-10-08 DIAGNOSIS — R35.1 NOCTURIA: ICD-10-CM

## 2020-10-08 DIAGNOSIS — N18.31 STAGE 3A CHRONIC KIDNEY DISEASE (HCC): ICD-10-CM

## 2020-10-08 DIAGNOSIS — Z12.31 SCREENING MAMMOGRAM, ENCOUNTER FOR: Primary | ICD-10-CM

## 2020-10-08 DIAGNOSIS — I42.9 CARDIOMYOPATHY, UNSPECIFIED TYPE (HCC): ICD-10-CM

## 2020-10-08 DIAGNOSIS — E11.65 UNCONTROLLED TYPE 2 DIABETES MELLITUS WITH HYPERGLYCEMIA (HCC): ICD-10-CM

## 2020-10-08 DIAGNOSIS — I10 ESSENTIAL HYPERTENSION: ICD-10-CM

## 2020-10-08 PROCEDURE — 1090F PRES/ABSN URINE INCON ASSESS: CPT | Performed by: INTERNAL MEDICINE

## 2020-10-08 PROCEDURE — G8536 NO DOC ELDER MAL SCRN: HCPCS | Performed by: INTERNAL MEDICINE

## 2020-10-08 PROCEDURE — G8417 CALC BMI ABV UP PARAM F/U: HCPCS | Performed by: INTERNAL MEDICINE

## 2020-10-08 PROCEDURE — G8752 SYS BP LESS 140: HCPCS | Performed by: INTERNAL MEDICINE

## 2020-10-08 PROCEDURE — G8510 SCR DEP NEG, NO PLAN REQD: HCPCS | Performed by: INTERNAL MEDICINE

## 2020-10-08 PROCEDURE — 99215 OFFICE O/P EST HI 40 MIN: CPT | Performed by: INTERNAL MEDICINE

## 2020-10-08 PROCEDURE — 1101F PT FALLS ASSESS-DOCD LE1/YR: CPT | Performed by: INTERNAL MEDICINE

## 2020-10-08 PROCEDURE — G8400 PT W/DXA NO RESULTS DOC: HCPCS | Performed by: INTERNAL MEDICINE

## 2020-10-08 PROCEDURE — G8427 DOCREV CUR MEDS BY ELIG CLIN: HCPCS | Performed by: INTERNAL MEDICINE

## 2020-10-08 PROCEDURE — 36415 COLL VENOUS BLD VENIPUNCTURE: CPT | Performed by: INTERNAL MEDICINE

## 2020-10-08 PROCEDURE — G8754 DIAS BP LESS 90: HCPCS | Performed by: INTERNAL MEDICINE

## 2020-10-08 PROCEDURE — G9899 SCRN MAM PERF RSLTS DOC: HCPCS | Performed by: INTERNAL MEDICINE

## 2020-10-08 PROCEDURE — 2022F DILAT RTA XM EVC RTNOPTHY: CPT | Performed by: INTERNAL MEDICINE

## 2020-10-08 PROCEDURE — 3046F HEMOGLOBIN A1C LEVEL >9.0%: CPT | Performed by: INTERNAL MEDICINE

## 2020-10-08 PROCEDURE — 3017F COLORECTAL CA SCREEN DOC REV: CPT | Performed by: INTERNAL MEDICINE

## 2020-10-08 RX ORDER — OXYBUTYNIN CHLORIDE 5 MG/1
5 TABLET ORAL 3 TIMES DAILY
Qty: 90 TAB | Refills: 11 | Status: ON HOLD | OUTPATIENT
Start: 2020-10-08 | End: 2021-03-19 | Stop reason: SDUPTHER

## 2020-10-08 NOTE — PROGRESS NOTES
Chief Complaint   Patient presents with    Hypertension     routine follow up       1. Have you been to the ER, urgent care clinic since your last visit? Hospitalized since your last visit? No    2. Have you seen or consulted any other health care providers outside of the [address] [place] [address] since your last visit? Include any pap smears or colon screening.  No

## 2020-10-08 NOTE — PROGRESS NOTES
[facility]  [place] [address]  [address] [place] [address]  Phone:  [phone]  Fax: [phone]       Chief Complaint   Patient presents with    Hypertension     routine follow up   . SUBJECTIVE:    Ja Rivera is a 78 y.o. female Comes in for return visit, stating that she has done reasonably well. Her biggest complaint is that of increased urinary frequency, particularly worse nocturnally. Her control is quite poor also. She wishes to have Depends and would like this, if at all possible, paid for by her insurance company. Her diabetes is doing well. She is taking Humulin-N 60 units daily. This is obviously not optimal, but apparently for insurance reasons this is the only thing that is at a reasonable cost for her. She follows up with the cardiologist.  Her beta blocker was discontinued, apparently because of increased urinary frequency, and there may have been another reason. She remains on all of her other cardioactive medications. She does have mild dyspnea on exertion, but this is no different than her usual.  She denies any orthopnea or PND. She is drinking approximately three beers a day, which is an improvement for her. Her arthritis is doing reasonably well. Current Outpatient Medications   Medication Sig Dispense Refill    oxybutynin (DITROPAN) 5 mg tablet Take 1 Tab by mouth three (3) times daily. 90 Tab 11    atorvastatin (LIPITOR) 40 mg tablet TAKE 1 TABLET BY MOUTH EVERY DAY 90 Tab 3    metFORMIN ER (GLUCOPHAGE XR) 500 mg tablet TAKE 1 TABLET BY MOUTH THREE TIMES DAILY WITH MEALS 270 Tab 3    Insulin Needles, Disposable, (BD ULTRA-FINE SHORT PEN NEEDLE) 31 gauge x 5/16\" ndle USE WITH INSULIN PENS TWICE DAILY AS DIRECTED 100 Pen Needle 11    alcohol swabs (BD SINGLE USE SWABS REGULAR) padm USE TO TEST BLOOD SUGAR.dx.e11.9 100 Pad 11    insulin NPH (HUMULIN N) 100 unit/mL (3 mL) inpn Inject 58 units daily. dx.e11.9 (Patient taking differently: Inject 60 units daily. dx.e11.9) 20 mL 11    potassium chloride SR (KLOR-CON 10) 10 mEq tablet 2  Tabs 2 times a day 360 Tab 3    metOLazone (ZAROXOLYN) 5 mg tablet Take 1 Tab by mouth daily. 30 Tab 0    furosemide (LASIX) 40 mg tablet TAKE 3 TABLETS BY MOUTH EVERY MORNING (Patient taking differently: TAKE 3 TABLETS BY MOUTH EVERY MORNING AND 1 TABLET IN THE EVENING) 90 Tab 11    allopurinol (ZYLOPRIM) 300 mg tablet TAKE 1 TABLET BY MOUTH DAILY 90 Tab 3    acetaminophen-codeine (TYLENOL-CODEINE #3) 300-30 mg per tablet Take 1 Tab by mouth every four (4) hours as needed for Pain.  aspirin delayed-release 325 mg tablet TAKE 1 TABLET BY MOUTH TWO TIMES A DAY FOR 30 DAYS      glimepiride (AMARYL) 4 mg tablet   TAKE 1 TABLET BY MOUTH TWICE DAILY BEFORE A MEAL      mirabegron ER (MYRBETRIQ) 50 mg ER tablet Take 1 Tab by mouth daily. 30 Tab 11    glucose blood VI test strips (FREESTYLE LITE STRIPS) strip Use to test blood sugar three times daily. Dx.e11.9 100 Strip 11    Blood-Glucose Meter (FREESTYLE LITE METER) monitoring kit Test three times a day dx. e119 1 Kit 0    oxyCODONE IR (ROXICODONE) 5 mg immediate release tablet Take 1-2 Tabs by mouth every four (4) hours as needed. Max Daily Amount: 60 mg. If insurance prior auth./quantity restrictions apply, refer to and look up diagnosis code one prescription. Partial fill as needed is permitted. Please do not contact prescriber. 80 Tab 0    senna-docusate (PERICOLACE) 8.6-50 mg per tablet Take 1 Tab by mouth daily. 30 Tab 0    glyBURIDE (DIABETA) 1.25 mg tablet Take 1.25 mg by mouth Daily (before breakfast).       lisinopril (PRINIVIL, ZESTRIL) 20 mg tablet TAKE 1 TABLET BY MOUTH ONCE DAILY IN THE MORNING 90 Tab 3    ONE TOUCH DELICA 33 gauge misc   11    NURIA PEN NEEDLE 32 x 5/32 \" ndle       Blood-Glucose Meter monitoring kit Testing BID-DX:250.00 1 kit 0    Lancets (ONE TOUCH ULTRASOFT LANCETS) [facility] TESTING BID 1 Package 11    glucose blood VI test strips (ASCENSIA AUTODISC VI, ONE TOUCH ULTRA TEST VI) strip TESTING BID 1 Package 11     Past Medical History:   Diagnosis Date    Arthritis     Congestive heart failure, unspecified     Diabetes ([address]     Hypercholesterolemia     Hypertension      Past Surgical History:   Procedure Laterality Date    HX ORTHOPAEDIC      Arthroscopy right knee     No Known Allergies      REVIEW OF SYSTEMS:  General: negative for - chills or fever  ENT: negative for - headaches, nasal congestion or tinnitus  Respiratory: negative for - cough, hemoptysis, shortness of breath or wheezing  Cardiovascular : negative for - chest pain, edema, palpitations or shortness of breath  Gastrointestinal: negative for - abdominal pain, blood in stools, heartburn or nausea/vomiting  Genito-Urinary: no dysuria, trouble voiding, or hematuria  Musculoskeletal: negative for - gait disturbance, joint pain, joint stiffness or joint swelling  Neurological: no TIA or stroke symptoms  Hematologic: no bruises, no bleeding, no swollen glands  Integument: no lumps, mole changes, nail changes or rash  Endocrine: no malaise/lethargy or unexpected weight changes      Social History     Socioeconomic History    Marital status:      Spouse name: Not on file    Number of children: 3    Years of education: 15    Highest education level: Not on file   Occupational History    Occupation: retired   Tobacco Use    Smoking status: Former Smoker     Packs/day: 0.25     Years: 20.00     Pack years: 5.00     Last attempt to quit: 2014     Years since quittin.8    Smokeless tobacco: Never Used   Substance and Sexual Activity    Alcohol use:  Yes     Alcohol/week: 0.0 standard drinks     Comment: 21-24 Beers per week    Drug use: No    Sexual activity: Yes     Partners: Male     Family History   Problem Relation Age of Onset    Diabetes Mother        OBJECTIVE:    Visit Vitals  /70   Pulse 83   Temp 98 °F (36.7 °C) (Oral)   Resp 14   Ht 5' 4\" (1.626 m)   Wt 193 lb 3.2 oz (87.6 kg)   SpO2 91%   BMI 33.16 kg/m²     CONSTITUTIONAL: well , well nourished, appears age appropriate  EYES: perrla, eom intact  ENMT:moist mucous membranes, pharynx clear  NECK: supple. Thyroid normal  RESPIRATORY: Chest: clear to ascultation and percussion   CARDIOVASCULAR: Heart: regular rate and rhythm  GASTROINTESTINAL: Abdomen: soft, bowel sounds active  HEMATOLOGIC: no pathological lymph nodes palpated  MUSCULOSKELETAL: Extremities: no edema, pulse 1+   INTEGUMENT: No unusual rashes or suspicious skin lesions noted. Nails appear normal.  NEUROLOGIC: non-focal exam   MENTAL STATUS: alert and oriented, appropriate affect      ASSESSMENT:  1. Screening mammogram, encounter for    2. Cardiomyopathy, unspecified type ([address]    3. Essential hypertension    4. Uncontrolled type 2 diabetes mellitus with hyperglycemia (HCC)    5. Stage 3a chronic kidney disease    6. Dyslipidemia    7. Obesity (BMI 30.0-34.9)    8. Nocturia        PLAN:    1. The patient's cardiomyopathy is reasonably stable at this point. There are no overt signs of congestive heart failure. 2. Blood pressure is excellent, no adjustments are made. 3. I have no idea how her diabetes is doing, but I will await the results of her hemoglobin A1c.  4. She has a prerenal azotemia related to a reduction in her GFR. BMP will be obtained. 5. She has an increased cardiovascular risk and remains on a statin. Efficacy and compliance will be assessed. 6. She needs to lose weight. This can be accomplished by eating meals, eliminating snacks and avoiding the consumption of processed carbohydrates. 7. As far as her frequent urination is concerned, I suspect this is related to her neurogenic bladder. Empiric treatment with Oxybutynin. I will also rule out a UTI, although I doubt this is the case.     .  Orders Placed This Encounter    MAMMOGRAM SCREENING - BILATERAL    MICROALBUMIN, UR, RAND    HEMOGLOBIN A1C WITH EAG    APOLIPOPROTEIN B    CBC WITH AUTOMATED DIFF    LIPID PANEL    METABOLIC PANEL, COMPREHENSIVE    TSH 3RD GENERATION    URINALYSIS W/ RFLX MICROSCOPIC    oxybutynin (DITROPAN) 5 mg tablet         Follow-up and Dispositions    · Return in about 3 months (around 1/8/2021).            Tracy Covarrubias MD

## 2020-10-12 LAB
ALBUMIN SERPL-MCNC: 4.2 G/DL (ref 3.8–4.8)
ALBUMIN/CREAT UR: 123 MG/G CREAT (ref 0–29)
ALBUMIN/GLOB SERPL: 1.3 {RATIO} (ref 1.2–2.2)
ALP SERPL-CCNC: 57 IU/L (ref 39–117)
ALT SERPL-CCNC: 17 IU/L (ref 0–32)
APO B SERPL-MCNC: NORMAL MG/DL
APPEARANCE UR: CLEAR
AST SERPL-CCNC: 20 IU/L (ref 0–40)
BACTERIA #/AREA URNS HPF: ABNORMAL /[HPF]
BASOPHILS # BLD AUTO: 0 X10E3/UL (ref 0–0.2)
BASOPHILS NFR BLD AUTO: 1 %
BILIRUB SERPL-MCNC: 0.7 MG/DL (ref 0–1.2)
BILIRUB UR QL STRIP: NEGATIVE
BUN SERPL-MCNC: 17 MG/DL (ref 8–27)
BUN/CREAT SERPL: 22 (ref 12–28)
CALCIUM SERPL-MCNC: 9.1 MG/DL (ref 8.7–10.3)
CASTS URNS QL MICRO: ABNORMAL /LPF
CHLORIDE SERPL-SCNC: 102 MMOL/L (ref 96–106)
CHOLEST SERPL-MCNC: 143 MG/DL (ref 100–199)
CO2 SERPL-SCNC: 24 MMOL/L (ref 20–29)
COLOR UR: YELLOW
CREAT SERPL-MCNC: 0.76 MG/DL (ref 0.57–1)
CREAT UR-MCNC: 215 MG/DL
EOSINOPHIL # BLD AUTO: 0.2 X10E3/UL (ref 0–0.4)
EOSINOPHIL NFR BLD AUTO: 5 %
EPI CELLS #/AREA URNS HPF: >10 /HPF (ref 0–10)
ERYTHROCYTE [DISTWIDTH] IN BLOOD BY AUTOMATED COUNT: 13.4 % (ref 11.7–15.4)
EST. AVERAGE GLUCOSE BLD GHB EST-MCNC: 197 MG/DL
GLOBULIN SER CALC-MCNC: 3.2 G/DL (ref 1.5–4.5)
GLUCOSE SERPL-MCNC: 93 MG/DL (ref 65–99)
GLUCOSE UR QL: NEGATIVE
HBA1C MFR BLD: 8.5 % (ref 4.8–5.6)
HCT VFR BLD AUTO: 32 % (ref 34–46.6)
HDLC SERPL-MCNC: 46 MG/DL
HGB BLD-MCNC: 10.5 G/DL (ref 11.1–15.9)
HGB UR QL STRIP: NEGATIVE
IMM GRANULOCYTES # BLD AUTO: 0 X10E3/UL (ref 0–0.1)
IMM GRANULOCYTES NFR BLD AUTO: 0 %
KETONES UR QL STRIP: ABNORMAL
LDLC SERPL CALC-MCNC: 73 MG/DL (ref 0–99)
LEUKOCYTE ESTERASE UR QL STRIP: ABNORMAL
LYMPHOCYTES # BLD AUTO: 0.9 X10E3/UL (ref 0.7–3.1)
LYMPHOCYTES NFR BLD AUTO: 19 %
MCH RBC QN AUTO: 28.5 PG (ref 26.6–33)
MCHC RBC AUTO-ENTMCNC: 32.8 G/DL (ref 31.5–35.7)
MCV RBC AUTO: 87 FL (ref 79–97)
MICRO URNS: ABNORMAL
MICROALBUMIN UR-MCNC: 265.4 UG/ML
MONOCYTES # BLD AUTO: 0.4 X10E3/UL (ref 0.1–0.9)
MONOCYTES NFR BLD AUTO: 8 %
MUCOUS THREADS URNS QL MICRO: PRESENT
NEUTROPHILS # BLD AUTO: 3.2 X10E3/UL (ref 1.4–7)
NEUTROPHILS NFR BLD AUTO: 67 %
NITRITE UR QL STRIP: NEGATIVE
PH UR STRIP: 5.5 [PH] (ref 5–7.5)
PLATELET # BLD AUTO: 187 X10E3/UL (ref 150–450)
POTASSIUM SERPL-SCNC: 3.6 MMOL/L (ref 3.5–5.2)
PROT SERPL-MCNC: 7.4 G/DL (ref 6–8.5)
PROT UR QL STRIP: ABNORMAL
RBC # BLD AUTO: 3.68 X10E6/UL (ref 3.77–5.28)
RBC #/AREA URNS HPF: ABNORMAL /HPF (ref 0–2)
SODIUM SERPL-SCNC: 142 MMOL/L (ref 134–144)
SP GR UR: 1.02 (ref 1–1.03)
TRIGL SERPL-MCNC: 137 MG/DL (ref 0–149)
TSH SERPL DL<=0.005 MIU/L-ACNC: 1.05 UIU/ML (ref 0.45–4.5)
UROBILINOGEN UR STRIP-MCNC: 1 MG/DL (ref 0.2–1)
VLDLC SERPL CALC-MCNC: 24 MG/DL (ref 5–40)
WBC # BLD AUTO: 4.8 X10E3/UL (ref 3.4–10.8)
WBC #/AREA URNS HPF: ABNORMAL /HPF (ref 0–5)

## 2020-10-12 RX ORDER — INSULIN GLARGINE 100 [IU]/ML
INJECTION, SOLUTION SUBCUTANEOUS
Qty: 6 ADJUSTABLE DOSE PRE-FILLED PEN SYRINGE | Refills: 11 | Status: ON HOLD | OUTPATIENT
Start: 2020-10-12 | End: 2021-11-30 | Stop reason: SDUPTHER

## 2020-10-13 NOTE — PROGRESS NOTES
Tell patient diabetes is poorly controlled. Her current insulin is the incorrect insulin and was given to her at [facility]. Insulin will be changed to Humulin 70/30 at same dose of 54 units daily.   She has to eat at the same time every day

## 2020-10-29 RX ORDER — ALLOPURINOL 300 MG/1
TABLET ORAL
Qty: 90 TAB | Refills: 3 | Status: SHIPPED | OUTPATIENT
Start: 2020-10-29 | End: 2021-06-01

## 2020-11-30 ENCOUNTER — NURSE TRIAGE (OUTPATIENT)
Dept: OTHER | Facility: CLINIC | Age: 70
End: 2020-11-30

## 2020-11-30 NOTE — TELEPHONE ENCOUNTER
Pt calls in COVID line to make appointment with pcp    Warm transfer back to [facility]        Reason for Disposition   Patient wants to be seen    Answer Assessment - Initial Assessment Questions  1. RESPIRATORY STATUS: \"Describe your breathing? \" (e.g., wheezing, shortness of breath, unable to speak, severe coughing)      - some wheezing at times, able to speak in complete sentence     2. ONSET: \"When did this breathing problem begin? \"      2 days ago    3. PATTERN \"Does the difficult breathing come and go, or has it been constant since it started? \"       Comes and goes    4. SEVERITY: \"How bad is your breathing? \" (e.g., mild, moderate, severe)     - MILD: No SOB at rest, mild SOB with walking, speaks normally in sentences, can lay down, no retractions, pulse < 100.     - MODERATE: SOB at rest, SOB with minimal exertion and prefers to sit, cannot lie down flat, speaks in phrases, mild retractions, audible wheezing, pulse 100-120.     - SEVERE: Very SOB at rest, speaks in single words, struggling to breathe, sitting hunched forward, retractions, pulse > 120       Sob when walking    5. RECURRENT SYMPTOM: \"Have you had difficulty breathing before? \" If so, ask: \"When was the last time? \" and \"What happened that time? \"      No    6. CARDIAC HISTORY: \"Do you have any history of heart disease? \" (e.g., heart attack, angina, bypass surgery, angioplasty)      Takes a fluid pill, no swelling to ble    7. LUNG HISTORY: \"Do you have any history of lung disease? \"  (e.g., pulmonary embolus, asthma, emphysema)     No    8. CAUSE: \"What do you think is causing the breathing problem? \"      Unsure    9. OTHER SYMPTOMS: \"Do you have any other symptoms? (e.g., dizziness, runny nose, cough, chest pain, fever)      No    10. PREGNANCY: \"Is there any chance you are pregnant? \" \"When was your last menstrual period? \"       N/a    11. TRAVEL: \"Have you traveled out of the country in the last month? \" (e.g., travel history, exposures) No    Protocols used: BREATHING DIFFICULTY-ADULT-[place]

## 2020-12-13 ENCOUNTER — HOSPITAL ENCOUNTER (EMERGENCY)
Age: 70
Discharge: HOME | End: 2020-12-13
Attending: EMERGENCY MEDICINE
Payer: MEDICARE

## 2020-12-13 ENCOUNTER — APPOINTMENT (OUTPATIENT)
Dept: GENERAL RADIOLOGY | Age: 70
End: 2020-12-13
Attending: EMERGENCY MEDICINE
Payer: MEDICARE

## 2020-12-13 VITALS
HEART RATE: 87 BPM | HEIGHT: 64 IN | SYSTOLIC BLOOD PRESSURE: 129 MMHG | WEIGHT: 192.9 LBS | TEMPERATURE: 97.9 F | RESPIRATION RATE: 19 BRPM | BODY MASS INDEX: 32.93 KG/M2 | OXYGEN SATURATION: 96 % | DIASTOLIC BLOOD PRESSURE: 70 MMHG

## 2020-12-13 DIAGNOSIS — J18.9 COMMUNITY ACQUIRED PNEUMONIA, UNSPECIFIED LATERALITY: Primary | ICD-10-CM

## 2020-12-13 DIAGNOSIS — R06.2 WHEEZING: ICD-10-CM

## 2020-12-13 DIAGNOSIS — R04.0 NOSEBLEED: ICD-10-CM

## 2020-12-13 LAB
ALBUMIN SERPL-MCNC: 3.6 G/DL (ref 3.5–5)
ALBUMIN/GLOB SERPL: 0.8 {RATIO} (ref 1.1–2.2)
ALP SERPL-CCNC: 60 U/L (ref 45–117)
ALT SERPL-CCNC: 39 U/L (ref 12–78)
ANION GAP SERPL CALC-SCNC: 3 MMOL/L (ref 5–15)
AST SERPL-CCNC: 30 U/L (ref 15–37)
ATRIAL RATE: 77 BPM
BASOPHILS # BLD: 0 K/UL (ref 0–0.1)
BASOPHILS NFR BLD: 1 % (ref 0–1)
BILIRUB SERPL-MCNC: 0.6 MG/DL (ref 0.2–1)
BNP SERPL-MCNC: 505 PG/ML
BUN SERPL-MCNC: 17 MG/DL (ref 6–20)
BUN/CREAT SERPL: 27 (ref 12–20)
CALCIUM SERPL-MCNC: 8.8 MG/DL (ref 8.5–10.1)
CALCULATED P AXIS, ECG09: 61 DEGREES
CALCULATED R AXIS, ECG10: 12 DEGREES
CALCULATED T AXIS, ECG11: 34 DEGREES
CHLORIDE SERPL-SCNC: 109 MMOL/L (ref 97–108)
CO2 SERPL-SCNC: 29 MMOL/L (ref 21–32)
CREAT SERPL-MCNC: 0.62 MG/DL (ref 0.55–1.02)
DIAGNOSIS, 93000: NORMAL
DIFFERENTIAL METHOD BLD: ABNORMAL
EOSINOPHIL # BLD: 0.3 K/UL (ref 0–0.4)
EOSINOPHIL NFR BLD: 9 % (ref 0–7)
ERYTHROCYTE [DISTWIDTH] IN BLOOD BY AUTOMATED COUNT: 14.6 % (ref 11.5–14.5)
GLOBULIN SER CALC-MCNC: 4.5 G/DL (ref 2–4)
GLUCOSE SERPL-MCNC: 130 MG/DL (ref 65–100)
HCT VFR BLD AUTO: 32 % (ref 35–47)
HGB BLD-MCNC: 9.8 G/DL (ref 11.5–16)
IMM GRANULOCYTES # BLD AUTO: 0 K/UL (ref 0–0.04)
IMM GRANULOCYTES NFR BLD AUTO: 0 % (ref 0–0.5)
LYMPHOCYTES # BLD: 0.9 K/UL (ref 0.8–3.5)
LYMPHOCYTES NFR BLD: 30 % (ref 12–49)
MCH RBC QN AUTO: 27.6 PG (ref 26–34)
MCHC RBC AUTO-ENTMCNC: 30.6 G/DL (ref 30–36.5)
MCV RBC AUTO: 90.1 FL (ref 80–99)
MONOCYTES # BLD: 0.2 K/UL (ref 0–1)
MONOCYTES NFR BLD: 8 % (ref 5–13)
NEUTS SEG # BLD: 1.6 K/UL (ref 1.8–8)
NEUTS SEG NFR BLD: 52 % (ref 32–75)
NRBC # BLD: 0 K/UL (ref 0–0.01)
NRBC BLD-RTO: 0 PER 100 WBC
P-R INTERVAL, ECG05: 202 MS
PLATELET # BLD AUTO: 181 K/UL (ref 150–400)
PMV BLD AUTO: 10.3 FL (ref 8.9–12.9)
POTASSIUM SERPL-SCNC: 3.6 MMOL/L (ref 3.5–5.1)
PROT SERPL-MCNC: 8.1 G/DL (ref 6.4–8.2)
Q-T INTERVAL, ECG07: 432 MS
QRS DURATION, ECG06: 96 MS
QTC CALCULATION (BEZET), ECG08: 488 MS
RBC # BLD AUTO: 3.55 M/UL (ref 3.8–5.2)
SODIUM SERPL-SCNC: 141 MMOL/L (ref 136–145)
TROPONIN I SERPL-MCNC: <0.05 NG/ML
VENTRICULAR RATE, ECG03: 77 BPM
WBC # BLD AUTO: 3 K/UL (ref 3.6–11)

## 2020-12-13 PROCEDURE — 77030029684 HC NEB SM VOL KT MONA -A

## 2020-12-13 PROCEDURE — 36415 COLL VENOUS BLD VENIPUNCTURE: CPT

## 2020-12-13 PROCEDURE — 84484 ASSAY OF TROPONIN QUANT: CPT

## 2020-12-13 PROCEDURE — 77030012341 HC CHMB SPCR OPTC MDI VYRM -A

## 2020-12-13 PROCEDURE — 71046 X-RAY EXAM CHEST 2 VIEWS: CPT

## 2020-12-13 PROCEDURE — 87635 SARS-COV-2 COVID-19 AMP PRB: CPT

## 2020-12-13 PROCEDURE — 94640 AIRWAY INHALATION TREATMENT: CPT

## 2020-12-13 PROCEDURE — 99284 EMERGENCY DEPT VISIT MOD MDM: CPT

## 2020-12-13 PROCEDURE — 74011000250 HC RX REV CODE- 250: Performed by: EMERGENCY MEDICINE

## 2020-12-13 PROCEDURE — 74011250637 HC RX REV CODE- 250/637: Performed by: EMERGENCY MEDICINE

## 2020-12-13 PROCEDURE — 80053 COMPREHEN METABOLIC PANEL: CPT

## 2020-12-13 PROCEDURE — 93005 ELECTROCARDIOGRAM TRACING: CPT

## 2020-12-13 PROCEDURE — 83880 ASSAY OF NATRIURETIC PEPTIDE: CPT

## 2020-12-13 PROCEDURE — 85025 COMPLETE CBC W/AUTO DIFF WBC: CPT

## 2020-12-13 PROCEDURE — 2709999900 HC NON-CHARGEABLE SUPPLY

## 2020-12-13 RX ORDER — OXYMETAZOLINE HCL 0.05 %
2 SPRAY, NON-AEROSOL (ML) NASAL
Status: DISCONTINUED | OUTPATIENT
Start: 2020-12-13 | End: 2020-12-13 | Stop reason: HOSPADM

## 2020-12-13 RX ORDER — SODIUM CHLORIDE 0.9 % (FLUSH) 0.9 %
5-40 SYRINGE (ML) INJECTION EVERY 8 HOURS
Status: DISCONTINUED | OUTPATIENT
Start: 2020-12-13 | End: 2020-12-13 | Stop reason: HOSPADM

## 2020-12-13 RX ORDER — ALBUTEROL SULFATE 90 UG/1
1 AEROSOL, METERED RESPIRATORY (INHALATION)
Status: COMPLETED | OUTPATIENT
Start: 2020-12-13 | End: 2020-12-13

## 2020-12-13 RX ORDER — FLUTICASONE PROPIONATE 50 MCG
2 SPRAY, SUSPENSION (ML) NASAL DAILY
Qty: 1 BOTTLE | Refills: 0 | Status: SHIPPED | OUTPATIENT
Start: 2020-12-13

## 2020-12-13 RX ORDER — ALBUTEROL SULFATE 0.83 MG/ML
5 SOLUTION RESPIRATORY (INHALATION)
Status: COMPLETED | OUTPATIENT
Start: 2020-12-13 | End: 2020-12-13

## 2020-12-13 RX ORDER — DOXYCYCLINE 100 MG/1
100 CAPSULE ORAL 2 TIMES DAILY
Qty: 14 CAP | Refills: 0 | Status: SHIPPED | OUTPATIENT
Start: 2020-12-13 | End: 2021-03-09 | Stop reason: ALTCHOICE

## 2020-12-13 RX ORDER — DOXYCYCLINE HYCLATE 100 MG
100 TABLET ORAL
Status: COMPLETED | OUTPATIENT
Start: 2020-12-13 | End: 2020-12-13

## 2020-12-13 RX ORDER — SODIUM CHLORIDE 0.9 % (FLUSH) 0.9 %
5-40 SYRINGE (ML) INJECTION AS NEEDED
Status: DISCONTINUED | OUTPATIENT
Start: 2020-12-13 | End: 2020-12-13 | Stop reason: HOSPADM

## 2020-12-13 RX ORDER — PROMETHAZINE HYDROCHLORIDE AND DEXTROMETHORPHAN HYDROBROMIDE 6.25; 15 MG/5ML; MG/5ML
5 SYRUP ORAL
Qty: 118 ML | Refills: 0 | Status: SHIPPED | OUTPATIENT
Start: 2020-12-13 | End: 2020-12-20

## 2020-12-13 RX ADMIN — ALBUTEROL SULFATE 1 PUFF: 90 AEROSOL, METERED RESPIRATORY (INHALATION) at 07:02

## 2020-12-13 RX ADMIN — ALBUTEROL SULFATE 5 MG: 2.5 SOLUTION RESPIRATORY (INHALATION) at 05:32

## 2020-12-13 RX ADMIN — NASAL DECONGESTANT 2 SPRAY: 0.05 SPRAY NASAL at 06:39

## 2020-12-13 RX ADMIN — DOXYCYCLINE HYCLATE 100 MG: 100 TABLET, COATED ORAL at 07:02

## 2020-12-13 NOTE — ED NOTES
Pt states that she started having sob and cough about a week ago. This evening pt started having a nose bleed that wouldn't subside. Pt states that right before arrival nose bleed subsided. Pt has hx of nosebleeds. Pt denies cp, n/v/d, fevers, chills. Pt resting comfortably in bed with call bell within reach.

## 2020-12-13 NOTE — DISCHARGE INSTRUCTIONS
Patient Education        Nosebleeds: Care Instructions  Your Care Instructions     Nosebleeds are common, especially if you have colds or allergies. Many things can cause a nosebleed. Some nosebleeds stop on their own with pressure. Others need packing. Some get cauterized (sealed). If you have gauze or other packing materials in your nose, you will need to follow up with your doctor to have the packing removed. You may need more treatment if you get nosebleeds a lot. The doctor has checked you carefully, but problems can develop later. If you notice any problems or new symptoms, get medical treatment right away. Follow-up care is a key part of your treatment and safety. Be sure to make and go to all appointments, and call your doctor if you are having problems. It's also a good idea to know your test results and keep a list of the medicines you take. How can you care for yourself at home? · If you get another nosebleed:  ? Sit up and tilt your head slightly forward. This keeps blood from going down your throat. ? Use your thumb and index finger to pinch your nose shut for 10 minutes. Use a clock. Do not check to see if the bleeding has stopped before the 10 minutes are up. If the bleeding has not stopped, pinch your nose shut for another 10 minutes. ? When the bleeding has stopped, try not to pick, rub, or blow your nose for 12 hours. Avoiding these things helps keep your nose from bleeding again. · If your doctor prescribed antibiotics, take them as directed. Do not stop taking them just because you feel better. You need to take the full course of antibiotics. To prevent nosebleeds  · Do not blow your nose too hard. · Try not to lift or strain after a nosebleed. · Raise your head on a pillow while you sleep. · Put a thin layer of a saline- or water-based nasal gel, such as NasoGel, inside your nose. Put it on the septum, which divides your nostrils.  This will prevent dryness that can cause nosebleeds. · Use a vaporizer or humidifier to add moisture to your bedroom. Follow the directions for cleaning the machine. · Do not use aspirin, ibuprofen (Advil, Motrin), or naproxen (Aleve) for 36 to 48 hours after a nosebleed unless your doctor tells you to. You can use acetaminophen (Tylenol) for pain relief. · Talk to your doctor about stopping any other medicines you are taking. Some medicines may make you more likely to get a nosebleed. · Do not use cold medicines or nasal sprays without first talking to your doctor. They can make your nose dry. When should you call for help? Call 911 anytime you think you may need emergency care. For example, call if:    · You passed out (lost consciousness). Call your doctor now or seek immediate medical care if:    · You get another nosebleed and your nose is still bleeding after you have applied pressure 3 times for 10 minutes each time (30 minutes total).     · There is a lot of blood running down the back of your throat even after you pinch your nose and tilt your head forward.     · You have a fever.     · You have sinus pain. Watch closely for changes in your health, and be sure to contact your doctor if:    · You get nosebleeds often, even if they stop.     · You do not get better as expected. Where can you learn more? Go to http://www.rubio.com/  Enter S156 in the search box to learn more about \"Nosebleeds: Care Instructions. \"  Current as of: June 26, 2019               Content Version: 12.6  © 8260-2784 [employer], Incorporated. Care instructions adapted under license by [employer] (which disclaims liability or warranty for this information). If you have questions about a medical condition or this instruction, always ask your healthcare professional. [place] [address] any warranty or liability for your use of this information.          Patient Education        Pneumonia: Care Instructions  Your Care Instructions     Pneumonia is an infection of the lungs. Most cases are caused by infections from bacteria or viruses. Pneumonia may be mild or very severe. If it is caused by bacteria, you will be treated with antibiotics. It may take a few weeks to a few months to recover fully from pneumonia, depending on how sick you were and whether your overall health is good. Follow-up care is a key part of your treatment and safety. Be sure to make and go to all appointments, and call your doctor if you are having problems. It's also a good idea to know your test results and keep a list of the medicines you take. How can you care for yourself at home? · Take your antibiotics exactly as directed. Do not stop taking the medicine just because you are feeling better. You need to take the full course of antibiotics. · Take your medicines exactly as prescribed. Call your doctor if you think you are having a problem with your medicine. · Get plenty of rest and sleep. You may feel weak and tired for a while, but your energy level will improve with time. · To prevent dehydration, drink plenty of fluids, enough so that your urine is light yellow or clear like water. Choose water and other caffeine-free clear liquids until you feel better. If you have kidney, heart, or liver disease and have to limit fluids, talk with your doctor before you increase the amount of fluids you drink. · Take care of your cough so you can rest. A cough that brings up mucus from your lungs is common with pneumonia. It is one way your body gets rid of the infection. But if coughing keeps you from resting or causes severe fatigue and chest-wall pain, talk to your doctor. He or she may suggest that you take a medicine to reduce the cough. · Use a vaporizer or humidifier to add moisture to your bedroom. Follow the directions for cleaning the machine. · Do not smoke or allow others to smoke around you. Smoke will make your cough last longer.  If you need help quitting, talk to your doctor about stop-smoking programs and medicines. These can increase your chances of quitting for good. · Take an over-the-counter pain medicine, such as acetaminophen (Tylenol), ibuprofen (Advil, Motrin), or naproxen (Aleve). Read and follow all instructions on the label. · Do not take two or more pain medicines at the same time unless the doctor told you to. Many pain medicines have acetaminophen, which is Tylenol. Too much acetaminophen (Tylenol) can be harmful. · If you were given a spirometer to measure how well your lungs are working, use it as instructed. This can help your doctor tell how your recovery is going. · To prevent pneumonia in the future, talk to your doctor about getting a flu vaccine (once a year) and a pneumococcal vaccine (one time only for most people). When should you call for help? Call 911 anytime you think you may need emergency care. For example, call if:    · You have severe trouble breathing. Call your doctor now or seek immediate medical care if:    · You cough up dark brown or bloody mucus (sputum).     · You have new or worse trouble breathing.     · You are dizzy or lightheaded, or you feel like you may faint. Watch closely for changes in your health, and be sure to contact your doctor if:    · You have a new or higher fever.     · You are coughing more deeply or more often.     · You are not getting better after 2 days (48 hours).     · You do not get better as expected. Where can you learn more? Go to http://www.[employer].com/  Enter D336 in the search box to learn more about \"Pneumonia: Care Instructions. \"  Current as of: February 24, 2020               Content Version: 12.6  © 8672-1208 [employer]. Care instructions adapted under license by [employer] (which disclaims liability or warranty for this information).  If you have questions about a medical condition or this instruction, always ask your healthcare professional. [facility] [address] any warranty or liability for your use of this information.        USE ALBUTEROL INHALER 2 PUFFS EVERY 4 HOURS WHILE AWAKE FOR THE NEXT 2 DAYS THEN EVERY 4 HOURS AS NEEDED     USE AFRIN TWICE A DAY FOR TWO DAYS THEN STOP    IF YOU HAVE A NOSEBLEED, YOU SQUIRT AFRIN IN EACH NOSTRIL AND THEN HOLD PRESSURE FOR 10 MINUTES    I HAVE SENT A PRESCRIPTION FOR FLONASE, YOUR INSURANCE DOES NOT COVER YOU CAN PICK THIS UP OVER THE COUNTER, I WANT YOU TO USE TWICE A DAY FOR 7 DAYS THEN ONCE A DAY    A COOL MIST HUMIDIFIER MAY HELP IN THE BEDROOM TO KEEP YOUR NASAL PASSAGES MOIST, IT IS COMMON DURING  THE WINTER MONTHS WITH THE DRY HEAT TO CAUSE SOME BLEEDING     YOU CAN USE VICKS VAP-O-[id number] TO THE BOTTOM OF YOUR FEET WITH SOCKS AT BEDTIME AS THIS MAY HELP WITH YOUR COUGHING

## 2020-12-13 NOTE — ED TRIAGE NOTES
Pt states she has had cough, congestion and sob for 2 wks and tonight had a nose bleed. No active bleeding noted at this time.

## 2020-12-13 NOTE — ED PROVIDER NOTES
EMERGENCY DEPARTMENT HISTORY AND PHYSICAL EXAM      Date: 12/13/2020  Patient Name: Austin Kent    History of Presenting Illness     Chief Complaint   Patient presents with    Shortness of Breath    Epistaxis       History Provided By: Patient    HPI: Austin Kent, 78 y.o. female presents to the ED with cc of shortness of breath and nosebleeds. Patient states she began with symptoms approximately week ago with nasal congestion postnasal drip in addition to some cough. Over the last couple days she has had increase in shortness of breath with a productive cough of bloody mucus at times. She is also reported several different nosebleeds however they have stopped with pressure or spontaneously. She denies any recent fever or chills. She denies any headache. She denies any chest pain. He denies any abdominal pain, nausea, vomiting or diarrhea. There have been no urinary symptoms. Patient with no significant lung history but does have a history of cardiac issues in addition to congestive heart failure. Her shortness of breath has been mild in severity at rest however she does have increase in work of breathing with ambulation. There are no other complaints, changes, or physical findings at this time. PCP: Margaret Quinonez MD    No current facility-administered medications on file prior to encounter. Current Outpatient Medications on File Prior to Encounter   Medication Sig Dispense Refill    allopurinoL (ZYLOPRIM) 300 mg tablet TAKE 1 TABLET BY MOUTH DAILY 90 Tab 3    insulin glargine (LANTUS,BASAGLAR) 100 unit/mL (3 mL) inpn 60 units every morning 6 Adjustable Dose Pre-filled Pen Syringe 11    oxybutynin (DITROPAN) 5 mg tablet Take 1 Tab by mouth three (3) times daily.  90 Tab 11    atorvastatin (LIPITOR) 40 mg tablet TAKE 1 TABLET BY MOUTH EVERY DAY 90 Tab 3    metFORMIN ER (GLUCOPHAGE XR) 500 mg tablet TAKE 1 TABLET BY MOUTH THREE TIMES DAILY WITH MEALS 270 Tab 3    Insulin Needles, Disposable, (BD ULTRA-FINE SHORT PEN NEEDLE) 31 gauge x 5/16\" ndle USE WITH INSULIN PENS TWICE DAILY AS DIRECTED 100 Pen Needle 11    alcohol swabs (BD SINGLE USE SWABS REGULAR) padm USE TO TEST BLOOD SUGAR.dx.e11.9 100 Pad 11    potassium chloride SR (KLOR-CON 10) 10 mEq tablet 2  Tabs 2 times a day 360 Tab 3    metOLazone (ZAROXOLYN) 5 mg tablet Take 1 Tab by mouth daily. 30 Tab 0    furosemide (LASIX) 40 mg tablet TAKE 3 TABLETS BY MOUTH EVERY MORNING (Patient taking differently: TAKE 3 TABLETS BY MOUTH EVERY MORNING AND 1 TABLET IN THE EVENING) 90 Tab 11    acetaminophen-codeine (TYLENOL-CODEINE #3) 300-30 mg per tablet Take 1 Tab by mouth every four (4) hours as needed for Pain.  aspirin delayed-release 325 mg tablet TAKE 1 TABLET BY MOUTH TWO TIMES A DAY FOR 30 DAYS      glimepiride (AMARYL) 4 mg tablet   TAKE 1 TABLET BY MOUTH TWICE DAILY BEFORE A MEAL      mirabegron ER (MYRBETRIQ) 50 mg ER tablet Take 1 Tab by mouth daily. 30 Tab 11    glucose blood VI test strips (FREESTYLE LITE STRIPS) strip Use to test blood sugar three times daily. Dx.e11.9 100 Strip 11    Blood-Glucose Meter (FREESTYLE LITE METER) monitoring kit Test three times a day dx. e119 1 Kit 0    oxyCODONE IR (ROXICODONE) 5 mg immediate release tablet Take 1-2 Tabs by mouth every four (4) hours as needed. Max Daily Amount: 60 mg. If insurance prior auth./quantity restrictions apply, refer to and look up diagnosis code one prescription. Partial fill as needed is permitted. Please do not contact prescriber. 80 Tab 0    senna-docusate (PERICOLACE) 8.6-50 mg per tablet Take 1 Tab by mouth daily. 30 Tab 0    glyBURIDE (DIABETA) 1.25 mg tablet Take 1.25 mg by mouth Daily (before breakfast).       lisinopril (PRINIVIL, ZESTRIL) 20 mg tablet TAKE 1 TABLET BY MOUTH ONCE DAILY IN THE MORNING 90 Tab 3    ONE TOUCH DELICA 33 gauge misc   11    NURIA PEN NEEDLE 32 x 5/32 \" ndle       Blood-Glucose Meter monitoring kit Testing BID-DX:250.00 1 kit 0    Lancets (ONE TOUCH ULTRASOFT LANCETS) misc TESTING BID 1 Package 11    glucose blood VI test strips (ASCENSIA AUTODISC VI, ONE TOUCH ULTRA TEST VI) strip TESTING BID 1 Package 11       Past History     Past Medical History:  Past Medical History:   Diagnosis Date    Arthritis     Congestive heart failure, unspecified     Diabetes ([address]     Hypercholesterolemia     Hypertension        Past Surgical History:  Past Surgical History:   Procedure Laterality Date    HX ORTHOPAEDIC      Arthroscopy right knee       Family History:  Family History   Problem Relation Age of Onset    Diabetes Mother        Social History:  Social History     Tobacco Use    Smoking status: Former Smoker     Packs/day: 0.25     Years: 20.00     Pack years: 5.00     Last attempt to quit: 2014     Years since quittin.0    Smokeless tobacco: Never Used   Substance Use Topics    Alcohol use: Yes     Alcohol/week: 0.0 standard drinks     Comment: 21-24 Beers per week    Drug use: No       Allergies:  No Known Allergies      Review of Systems   Review of Systems   Constitutional: Negative. Negative for appetite change, chills, fatigue and fever. HENT: Positive for nosebleeds and postnasal drip. Negative for congestion, rhinorrhea, sinus pressure and sore throat. Eyes: Negative. Respiratory: Positive for cough, shortness of breath and wheezing. Negative for choking and chest tightness. Cardiovascular: Negative. Negative for chest pain, palpitations and leg swelling. Gastrointestinal: Negative for abdominal pain, constipation, diarrhea, nausea and vomiting. Endocrine: Negative. Genitourinary: Negative. Negative for difficulty urinating, dysuria, flank pain and urgency. Musculoskeletal: Negative. Skin: Negative. Neurological: Negative. Negative for dizziness, speech difficulty, weakness, light-headedness, numbness and headaches. Psychiatric/Behavioral: Negative.     All other systems reviewed and are negative. Physical Exam   Physical Exam  Vitals signs and nursing note reviewed. Constitutional:       General: She is not in acute distress. Appearance: She is well-developed. She is obese. She is not diaphoretic. HENT:      Head: Normocephalic and atraumatic. Nose: Congestion present. Comments: Mucosa boggy, erythematous with bloody mucous, no active bleeding       Mouth/Throat:      Mouth: Mucous membranes are moist.      Pharynx: No oropharyngeal exudate. Eyes:      Extraocular Movements: Extraocular movements intact. Conjunctiva/sclera: Conjunctivae normal.      Pupils: Pupils are equal, round, and reactive to light. Neck:      Musculoskeletal: Normal range of motion and neck supple. Vascular: No JVD. Trachea: No tracheal deviation. Cardiovascular:      Rate and Rhythm: Normal rate and regular rhythm. Heart sounds: Normal heart sounds. No murmur. Pulmonary:      Effort: Pulmonary effort is normal. No respiratory distress. Breath sounds: No stridor. Examination of the right-upper field reveals wheezing. Examination of the left-upper field reveals wheezing. Examination of the left-middle field reveals wheezing. Wheezing present. No rales. Chest:      Chest wall: No tenderness. Abdominal:      General: There is no distension. Palpations: Abdomen is soft. Tenderness: There is no abdominal tenderness. There is no guarding or rebound. Musculoskeletal: Normal range of motion. General: No tenderness. Right lower leg: No edema. Left lower leg: No edema. Skin:     General: Skin is warm and dry. Capillary Refill: Capillary refill takes less than 2 seconds. Neurological:      Mental Status: She is alert and oriented to person, place, and time. Cranial Nerves: No cranial nerve deficit.       Comments: No gross motor or sensory deficits    Psychiatric:         Mood and Affect: Mood normal. Behavior: Behavior normal.         Diagnostic Study Results     Labs -        SARS-COV-2 (Final result)   Component (Lab Inquiry)  Collection Time Result Time FCOV2M COVRS XMCV1T XMCV2T XGCOVT   12/13/20 07:16:00 12/14/20 15:52:56 Nasopharyngeal Nasopharyngeal NP Swab Symptomatic Testing Not Detected   (NOTE)   SARS-CoV-2 (th. ..         Final result                          Contains abnormal data CBC WITH AUTOMATED DIFF (Final result)   Component (Lab Inquiry)  Collection Time Result Time WBC RBC HGB HCT MCV   12/13/20 05:47:00 12/13/20 06:13:42 3.0Low  3.55Low  9.8Low  32. 0Low  90.1       Collection Time Result Time MCH MCHC RDW PLT MPLV   12/13/20 05:47:00 12/13/20 06:13:42 27.6 30.6 14.6High  181 10.3       Collection Time Result Time NRBC ANRBC GRANS LYMPH MONOS   12/13/20 05:47:00 12/13/20 06:13:42 0.0 0.00 52 30 8       Collection Time Result Time EOS BASOS IG ABG ABL   12/13/20 05:47:00 12/13/20 06:13:42 9High  1 0 1.6Low  0.9       Collection Time Result Time ABM ILSA ABB AIG DF   12/13/20 05:47:00 12/13/20 06:13:42 0.2 0.3 0.0 0.0 AUTOMATED       Final result                        Contains abnormal data METABOLIC PANEL, COMPREHENSIVE (Final result)   Component (Lab Inquiry)  Collection Time Result Time NA K CL CO2 AGAP   12/13/20 05:47:00 12/13/20 06:32:09 141 3.6 109High  29 3Low        Collection Time Result Time GLU BUN CREA BUCR GFRAA   12/13/20 05:47:00 12/13/20 06:32:09 130High  17 0.62 27High  >60       Collection Time Result Time GFRNA CA TBIL GPT SGOT   12/13/20 05:47:00 12/13/20 06:32:09 >60   Estimated GFR is calcu. .. 8.8 0.6 39 30       Collection Time Result Time AP TP ALB GLOB AGRAT   12/13/20 05:47:00 12/13/20 06:32:09 60 8.1 3.6 4.5High  0.8Low        Final result                        Contains abnormal data NT-PRO BNP (Final result)   Component (Lab Inquiry)  Collection Time Result Time PBNP   12/13/20 05:47:00 12/13/20 06:32:09 505     NT-proBNP is high. [place] Boop [place] Boop High        Final result         Recent Results (from the past 12 hour(s))   EKG, 12 LEAD, INITIAL    Collection Time: 12/13/20  4:19 AM   Result Value Ref Range    Ventricular Rate 77 BPM    Atrial Rate 77 BPM    P-R Interval 202 ms    QRS Duration 96 ms    Q-T Interval 432 ms    QTC Calculation (Bezet) 488 ms    Calculated P Axis 61 degrees    Calculated R Axis 12 degrees    Calculated T Axis 34 degrees    Diagnosis       Normal sinus rhythm  Possible Left atrial enlargement  Left ventricular hypertrophy  T wave abnormality, consider lateral ischemia  Prolonged QT  When compared with ECG of 20-OCT-2019 16:14,  No significant change was found         Radiologic Studies -   XR CHEST PA LAT   Final Result   IMPRESSION:      Right more than left nonspecific pneumonia, new since 2019. Pulmonary edema is   less likely. Recommend followup PA and lateral chest views in 8-10 weeks to ensure   resolution. CT Results  (Last 48 hours)    None        CXR Results  (Last 48 hours)               12/13/20 0459  XR CHEST PA LAT Final result    Impression:  IMPRESSION:       Right more than left nonspecific pneumonia, new since 2019. Pulmonary edema is   less likely. Recommend followup PA and lateral chest views in 8-10 weeks to ensure   resolution. Narrative:  EXAM: XR CHEST PA LAT       INDICATION: Cough, shortness of breath, and congestion. COMPARISON: Chest views on 10/20/2019 and 10/11/2005       TECHNIQUE: PA and lateral chest views       FINDINGS: Cardiomegaly is unchanged. Aortic arch is not enlarged. The pulmonary   vasculature is within normal limits. Bilateral patchy airspace opacities are greater on the right and predominately   peripheral. Bones are osteopenic. Medical Decision Making   I am the first provider for this patient. I reviewed the vital signs, available nursing notes, past medical history, past surgical history, family history and social history.     Vital Signs-Reviewed the patient's vital signs. Patient Vitals for the past 12 hrs:   Temp Pulse Resp BP SpO2   12/13/20 0410 97.9 °F (36.6 °C) 87 19 (!) 154/96 97 %       EKG interpretation: (Preliminary)  NSR, rate 77, LVH, normal pr/qrs, T wave inversion, lateral, Maci Tirado DO      Records Reviewed: Nursing Notes, Old Medical Records, Previous Radiology Studies and Previous Laboratory Studies    Provider Notes (Medical Decision Making):   DDx- Bronchitis, pneumonia, sinusitis, epistaxis, pulmonary edema, pleural effusion    ED Course:   Initial assessment performed. The patients presenting problems have been discussed, and they are in agreement with the care plan formulated and outlined with them. I have encouraged them to ask questions as they arise throughout their visit. ED Course as of Dec 17 0619   Sun Dec 13, 2020   0629 Pt SOA has improved, wheezing resolved. Awaiting CMP and cardiac labs. [JH]      ED Course User Index  [JH] Maximiliano Barrios DO     Given pt's co-morbidities and  with stoma will send COVID test, Maci Tirado DO      Disposition:  Pt dc home, discussed Afrin x 2 days, Discussed cool mist humidifier to help with congestion. . Albuterol MDI 2 puffs, q4. Discussed return to the ED with any worsening shortness of breath. DISCHARGE PLAN:  1. Current Discharge Medication List        2. Follow-up Information     Follow up With Specialties Details Why Contact Info    Jarad Restrepo MD Internal Medicine  As needed [address]  [place] [address] [phone]      [facility] EMERGENCY DEPT Emergency Medicine  If symptoms worsen [address] [facility] [address]   [address] [address] [phone]        3. Return to ED if worse     Diagnosis     Clinical Impression:   1. Community acquired pneumonia, unspecified laterality    2. Nosebleed    3.  Wheezing        Attestations:    Maci Tirado DO    Please note that this dictation was completed with [employer], the [email or web address] voice recognition software. Quite often unanticipated grammatical, syntax, homophones, and other interpretive errors are inadvertently transcribed by the computer software. Please disregard these errors. Please excuse any errors that have escaped final proofreading. Thank you.

## 2020-12-14 ENCOUNTER — PATIENT OUTREACH (OUTPATIENT)
Dept: CASE MANAGEMENT | Age: 70
End: 2020-12-14

## 2020-12-14 LAB
COVID-19, XGCOVT: NOT DETECTED
HEALTH STATUS, XMCV2T: NORMAL
SOURCE, COVRS: NORMAL
SPECIMEN SOURCE, FCOV2M: NORMAL
SPECIMEN TYPE, XMCV1T: NORMAL

## 2020-12-14 NOTE — PROGRESS NOTES
Patient contacted regarding recent discharge and COVID-19 risk. Discussed COVID-19 related testing which was pending at this time. Test results were pending. Patient informed of results, if available? no    Outreach made within 2 business days of discharge: Yes    Care Transition Nurse/ Ambulatory Care Manager/ LPN Care Coordinator contacted the patient by telephone to perform post discharge assessment. Verified name and  with patient as identifiers. Patient has following risk factors of: pneumonia and diabetes. CTN/ACM/LPN reviewed discharge instructions, medical action plan and red flags related to discharge diagnosis. Reviewed and educated them on any new and changed medications related to discharge diagnosis. Advised obtaining a 90-day supply of all daily and as-needed medications. Advance Care Planning:   Does patient have an Advance Directive: health care decision makers updated    Education provided regarding infection prevention, and signs and symptoms of COVID-19 and when to seek medical attention with patient who verbalized understanding. Discussed exposure protocols and quarantine from [address] you at higher risk for severe illness  and given an opportunity for questions and concerns. The patient agrees to contact the COVID-19 hotline [phone] or PCP office for questions related to their healthcare. CTN/ACM/LPN provided contact information for future reference. From CDC: Are you at higher risk for severe illness?  Wash your hands often.  Avoid close contact (6 feet, which is about two arm lengths) with people who are sick.  Put distance between yourself and other people if COVID-19 is spreading in your community.  Clean and disinfect frequently touched surfaces.  Avoid all cruise travel and non-essential air travel.  Call your healthcare professional if you have concerns about COVID-19 and your underlying condition or if you are sick.     For more information on steps you can take to protect yourself, see CDC's How to Protect Yourself      Patient/family/caregiver given information for GetWell Loop and agrees to enroll yes  Patient's preferred e-mail:  [email or web address]   Patient's preferred phone number: [phone]  Based on Loop alert triggers, patient will be contacted by nurse care manager for worsening symptoms. Pt will be further monitored by COVID Loop Team based on severity of symptoms and risk factors.

## 2020-12-17 ENCOUNTER — OFFICE VISIT (OUTPATIENT)
Dept: CARDIOLOGY CLINIC | Age: 70
End: 2020-12-17
Payer: MEDICARE

## 2020-12-17 VITALS
HEART RATE: 78 BPM | DIASTOLIC BLOOD PRESSURE: 65 MMHG | WEIGHT: 187 LBS | OXYGEN SATURATION: 97 % | SYSTOLIC BLOOD PRESSURE: 115 MMHG | TEMPERATURE: 97.3 F | BODY MASS INDEX: 31.92 KG/M2 | HEIGHT: 64 IN | RESPIRATION RATE: 16 BRPM

## 2020-12-17 DIAGNOSIS — E66.9 OBESITY (BMI 30.0-34.9): ICD-10-CM

## 2020-12-17 DIAGNOSIS — E78.5 DYSLIPIDEMIA: ICD-10-CM

## 2020-12-17 DIAGNOSIS — I10 ESSENTIAL HYPERTENSION: Primary | ICD-10-CM

## 2020-12-17 PROCEDURE — G9899 SCRN MAM PERF RSLTS DOC: HCPCS | Performed by: SPECIALIST

## 2020-12-17 PROCEDURE — 3017F COLORECTAL CA SCREEN DOC REV: CPT | Performed by: SPECIALIST

## 2020-12-17 PROCEDURE — 1090F PRES/ABSN URINE INCON ASSESS: CPT | Performed by: SPECIALIST

## 2020-12-17 PROCEDURE — G8536 NO DOC ELDER MAL SCRN: HCPCS | Performed by: SPECIALIST

## 2020-12-17 PROCEDURE — G8752 SYS BP LESS 140: HCPCS | Performed by: SPECIALIST

## 2020-12-17 PROCEDURE — G8754 DIAS BP LESS 90: HCPCS | Performed by: SPECIALIST

## 2020-12-17 PROCEDURE — G8432 DEP SCR NOT DOC, RNG: HCPCS | Performed by: SPECIALIST

## 2020-12-17 PROCEDURE — G8427 DOCREV CUR MEDS BY ELIG CLIN: HCPCS | Performed by: SPECIALIST

## 2020-12-17 PROCEDURE — G8400 PT W/DXA NO RESULTS DOC: HCPCS | Performed by: SPECIALIST

## 2020-12-17 PROCEDURE — G8417 CALC BMI ABV UP PARAM F/U: HCPCS | Performed by: SPECIALIST

## 2020-12-17 PROCEDURE — 99213 OFFICE O/P EST LOW 20 MIN: CPT | Performed by: SPECIALIST

## 2020-12-17 PROCEDURE — 1101F PT FALLS ASSESS-DOCD LE1/YR: CPT | Performed by: SPECIALIST

## 2020-12-17 RX ORDER — DICLOFENAC SODIUM 10 MG/G
GEL TOPICAL
COMMUNITY
Start: 2020-11-01 | End: 2021-03-22 | Stop reason: DRUGHIGH

## 2020-12-17 RX ORDER — NEBULIZER
EACH MISCELLANEOUS
Status: ON HOLD | COMMUNITY
Start: 2020-09-09 | End: 2021-03-09

## 2020-12-17 RX ORDER — FLUTICASONE PROPIONATE AND SALMETEROL 250; 50 UG/1; UG/1
2 POWDER RESPIRATORY (INHALATION) 2 TIMES DAILY
COMMUNITY
Start: 2020-11-25

## 2020-12-17 RX ORDER — INSULIN HUMAN 100 [IU]/ML
INJECTION, SUSPENSION SUBCUTANEOUS
COMMUNITY
Start: 2020-12-14 | End: 2020-12-17 | Stop reason: ALTCHOICE

## 2020-12-17 NOTE — PROGRESS NOTES
Identified pt with two pt identifiers(name and ). Reviewed record in preparation for visit and have obtained necessary documentation. Chief Complaint   Patient presents with    Follow-up           Visit Vitals  /65 (BP 1 Location: Right arm, BP Patient Position: Sitting)   Pulse 78   Temp 97.3 °F (36.3 °C) (Temporal)   Resp 16   Ht 5' 4\" (1.626 m)   Wt 187 lb (84.8 kg)   SpO2 97%   BMI 32.10 kg/m²     Pain Scale: /10    Coordination of Care Questionnaire:  :   1. Have you been to the ER, urgent care clinic since your last visit? Hospitalized since your last visit? Yes When: 2020 ED [facility] ER for Nosebleed and SOB    2. Have you seen or consulted any other health care providers outside of the [address] [place] [address] since your last visit? Include any pap smears or colon screening.  No

## 2020-12-17 NOTE — PROGRESS NOTES
HISTORY OF PRESENT ILLNESS  Lori Baker is a 78 y.o. female     SUMMARY:   Problem List  Date Reviewed: 12/17/2020          Codes Class Noted    Uncontrolled type 2 diabetes mellitus with hyperglycemia ([facility] ICD-10-CM: E11.65  ICD-9-CM: 250.02  1/26/2020        CKD (chronic kidney disease) stage 3, GFR 30-59 ml/min ICD-10-CM: N18.30  ICD-9-CM: 585.3  7/16/2019    Overview Addendum 9/17/2020  8:33 AM by Tye Tidwell MD     5/18 negative lexiscan  11/19 lvh, lae, otherwise normal echo             Status post total right knee replacement ICD-10-CM: Z96.651  ICD-9-CM: V43.65  2/5/2019        Osteoarthrosis, localized, primary, knee, right ICD-10-CM: M17.11  ICD-9-CM: 715.16  1/21/2019        PARDO (dyspnea on exertion) ICD-10-CM: R06.00  ICD-9-CM: 786.09  5/10/2018        Chest pain, exertional ICD-10-CM: R07.9  ICD-9-CM: 786.50  5/9/2018        Decreased libido ICD-10-CM: R68.82  ICD-9-CM: 799.81  10/3/2017        Epistaxis ICD-10-CM: R04.0  ICD-9-CM: 784.7  8/22/2017        Former tobacco use--stopped 2014 after >40 yrs smoking ICD-10-CM: Z87.891  ICD-9-CM: V15.82  11/15/2016        Obesity (BMI 30.0-34.9) ICD-10-CM: E66.9  ICD-9-CM: 278.00  11/15/2016        Reactive airway disease ICD-10-CM: J45.909  ICD-9-CM: 493.90  1/12/2016        Carpal tunnel syndrome of right wrist ICD-10-CM: G56.01  ICD-9-CM: 354.0  12/6/2015        Primary osteoarthritis of right knee ICD-10-CM: M17.11  ICD-9-CM: 715.16  7/12/2015        Cardiomyopathy ([facility] ICD-10-CM: I42.9  ICD-9-CM: 425.4  9/9/2014    Overview Signed 11/24/2019  5:40 PM by Thomas You MD     Nonischemic             Dyslipidemia ICD-10-CM: E78.5  ICD-9-CM: 272.4  9/9/2014        Gout ICD-10-CM: M10.9  ICD-9-CM: 274.9  9/9/2014        Alcohol abuse ICD-10-CM: F10.10  ICD-9-CM: 305.00  9/9/2014        Hypertension ICD-10-CM: I10  ICD-9-CM: 401.9  9/9/2014        Dermatitis ICD-10-CM: L30.9  ICD-9-CM: 692.9  9/9/2014              Current Outpatient Medications on File Prior to Visit   Medication Sig    diclofenac (VOLTAREN) 1 % gel APPLY TO AFFECTED AREA TWICE A DAY AS NEEDED    fluticasone propion-salmeteroL (ADVAIR/WIXELA) 250-50 mcg/dose diskus inhaler     doxycycline (MONODOX) 100 mg capsule Take 1 Cap by mouth two (2) times a day.  fluticasone propionate (FLONASE) 50 mcg/actuation nasal spray 2 Sprays by Both Nostrils route daily.  promethazine-dextromethorphan (PROMETHAZINE-DM) 6.25-15 mg/5 mL syrup Take 5 mL by mouth every four (4) hours as needed for Cough for up to 7 days.  allopurinoL (ZYLOPRIM) 300 mg tablet TAKE 1 TABLET BY MOUTH DAILY    insulin glargine (LANTUS,BASAGLAR) 100 unit/mL (3 mL) inpn 60 units every morning    oxybutynin (DITROPAN) 5 mg tablet Take 1 Tab by mouth three (3) times daily.  atorvastatin (LIPITOR) 40 mg tablet TAKE 1 TABLET BY MOUTH EVERY DAY    metFORMIN ER (GLUCOPHAGE XR) 500 mg tablet TAKE 1 TABLET BY MOUTH THREE TIMES DAILY WITH MEALS    Insulin Needles, Disposable, (BD ULTRA-FINE SHORT PEN NEEDLE) 31 gauge x 5/16\" ndle USE WITH INSULIN PENS TWICE DAILY AS DIRECTED    alcohol swabs (BD SINGLE USE SWABS REGULAR) padm USE TO TEST BLOOD SUGAR.dx.e11.9    potassium chloride SR (KLOR-CON 10) 10 mEq tablet 2  Tabs 2 times a day    metOLazone (ZAROXOLYN) 5 mg tablet Take 1 Tab by mouth daily.  furosemide (LASIX) 40 mg tablet TAKE 3 TABLETS BY MOUTH EVERY MORNING (Patient taking differently: TAKE 3 TABLETS BY MOUTH EVERY MORNING AND 1 TABLET IN THE EVENING)    glimepiride (AMARYL) 4 mg tablet   TAKE 1 TABLET BY MOUTH TWICE DAILY BEFORE A MEAL    glucose blood VI test strips (FREESTYLE LITE STRIPS) strip Use to test blood sugar three times daily. Dx.e11.9    Blood-Glucose Meter (FREESTYLE LITE METER) monitoring kit Test three times a day dx. e119    glyBURIDE (DIABETA) 1.25 mg tablet Take 1.25 mg by mouth Daily (before breakfast).     lisinopril (PRINIVIL, ZESTRIL) 20 mg tablet TAKE 1 TABLET BY MOUTH ONCE DAILY IN THE MORNING    ONE TOUCH DELICA 33 gauge misc     NURIA PEN NEEDLE 32 x 5/32 \" ndle     Blood-Glucose Meter monitoring kit Testing BID-DX:250.00    Lancets (ONE TOUCH ULTRASOFT LANCETS) misc TESTING BID    glucose blood VI test strips (ASCENSIA AUTODISC VI, ONE TOUCH ULTRA TEST VI) strip TESTING BID    Sidestream misc     mirabegron ER (MYRBETRIQ) 50 mg ER tablet Take 1 Tab by mouth daily. No current facility-administered medications on file prior to visit. CARDIOLOGY STUDIES TO DATE:  5/18 negative lexiscan  11/20/19  ECHO ADULT COMPLETE 11/20/2019 11/20/2019  Narrative  · Left Ventricle: Normal wall thickness. Mildly dilated left ventricle. Low normal systolic dysfunction. Estimated left ventricular ejection fraction is 51 - 55%. Visually measured ejection fraction. Inconclusive left ventricular diastolic function. · Mitral Valve: Mitral valve non-specific thickening. Trace mitral valve regurgitation is present. · Tricuspid Valve: Mild tricuspid valve regurgitation is present. · Pulmonary Artery: Pulmonary hypertension. · Left Atrium: Mildly dilated left atrium. Signed by: Violet Nelson MD    Chief Complaint   Patient presents with    Follow-up     HPI :  Since we last met she did stop the metolazone and she has seen Dr. Logan Turpin who put her on a medication for her frequent urination and it is helping. She was recently in the ER with cough wheeze and shortness of breath and was diagnosed with pneumonia may be bronchitis. She is currently on antibiotics and inhalers and things are getting better. Blood pressure is well controlled and she is having no problems with her medications. Her medication list says that she is taking 3 Lasix in the morning and 1 in the evening.   She is only taking 3 in the morning and nothing in the evening at this point  CARDIAC ROS:   negative for chest pain, palpitations, syncope, orthopnea, paroxysmal nocturnal dyspnea, exertional chest pressure/discomfort, claudication, lower extremity edema    Family History   Problem Relation Age of Onset    Diabetes Mother        Past Medical History:   Diagnosis Date    Arthritis     Congestive heart failure, unspecified     Diabetes ([address]     Hypercholesterolemia     Hypertension        GENERAL ROS:  A comprehensive review of systems was negative except for that written in the HPI.     Visit Vitals  /65 (BP 1 Location: Right arm, BP Patient Position: Sitting)   Pulse 78   Temp 97.3 °F (36.3 °C) (Temporal)   Resp 16   Ht 5' 4\" (1.626 m)   Wt 187 lb (84.8 kg)   SpO2 97%   BMI 32.10 kg/m²       Wt Readings from Last 3 Encounters:   12/17/20 187 lb (84.8 kg)   12/13/20 192 lb 14.4 oz (87.5 kg)   10/08/20 193 lb 3.2 oz (87.6 kg)            BP Readings from Last 3 Encounters:   12/17/20 115/65   12/13/20 129/70   10/08/20 120/70       PHYSICAL EXAM  General appearance: alert, cooperative, no distress, appears stated age  Neurologic: Alert and oriented X 3  Neck: supple, symmetrical, trachea midline, no adenopathy, no carotid bruit and no JVD  Lungs: clear to auscultation bilaterally  Heart: regular rate and rhythm, S1, S2 normal, no murmur, click, rub or gallop  Extremities: extremities normal, atraumatic, no cyanosis or edema    Lab Results   Component Value Date/Time    Cholesterol, total 143 10/08/2020 12:01 PM    Cholesterol, total 207 (H) 08/08/2019 02:31 PM    Cholesterol, total 175 04/10/2018 01:19 PM    Cholesterol, total 141 08/26/2016 11:48 AM    HDL Cholesterol 46 10/08/2020 12:01 PM    HDL Cholesterol 46 08/08/2019 02:31 PM    HDL Cholesterol 56 04/10/2018 01:19 PM    HDL Cholesterol 43 08/26/2016 11:48 AM    LDL, calculated 105 (H) 08/08/2019 02:31 PM    LDL, calculated 75 04/10/2018 01:19 PM    LDL, calculated 25 08/26/2016 11:48 AM    LDL Chol Calc (NIH) 73 10/08/2020 12:01 PM    Triglyceride 137 10/08/2020 12:01 PM    Triglyceride 280 (H) 08/08/2019 02:31 PM    Triglyceride 221 (H) 04/10/2018 01:19 PM    Triglyceride 364 (H) 08/26/2016 11:48 AM     ASSESSMENT :      She is stable and I think asymptomatic from a cardiac perspective on a reasonable medical regimen. I still do not think she needs all this diuretic should she is going to cut back to to 40 mg Lasix in the morning. She had a negative stress test just 2 years ago and a unremarkable echo just a year ago so I doubt her current symptoms are cardiac related. current treatment plan is effective, no change in therapy  lab results and schedule of future lab studies reviewed with patient  reviewed diet, exercise and weight control    Encounter Diagnoses   Name Primary?  Essential hypertension Yes    Obesity (BMI 30.0-34. 9)     Dyslipidemia      Orders Placed This Encounter    diclofenac (VOLTAREN) 1 % gel    DISCONTD: HumuLIN N NPH Insulin KwikPen 100 unit/mL (3 mL) inpn    fluticasone propion-salmeteroL (ADVAIR/WIXELA) 250-50 mcg/dose diskus inhaler    Sidestream misc       Follow-up and Dispositions    · Return in about 3 months (around 3/17/2021). Kaykay Talamantes MD  12/17/2020  Please note that this dictation was completed with [employer], the computer voice recognition software. Quite often unanticipated grammatical, syntax, homophones, and other interpretive errors are inadvertently transcribed by the computer software. Please disregard these errors. Please excuse any errors that have escaped final proofreading. Thank you.

## 2021-01-12 ENCOUNTER — OFFICE VISIT (OUTPATIENT)
Dept: INTERNAL MEDICINE CLINIC | Age: 71
End: 2021-01-12
Payer: MEDICARE

## 2021-01-12 VITALS
RESPIRATION RATE: 16 BRPM | WEIGHT: 189.2 LBS | HEIGHT: 64 IN | BODY MASS INDEX: 32.3 KG/M2 | HEART RATE: 90 BPM | TEMPERATURE: 98.5 F | DIASTOLIC BLOOD PRESSURE: 68 MMHG | SYSTOLIC BLOOD PRESSURE: 116 MMHG | OXYGEN SATURATION: 94 %

## 2021-01-12 DIAGNOSIS — E11.65 UNCONTROLLED TYPE 2 DIABETES MELLITUS WITH HYPERGLYCEMIA (HCC): ICD-10-CM

## 2021-01-12 DIAGNOSIS — I42.9 CARDIOMYOPATHY, UNSPECIFIED TYPE (HCC): Primary | ICD-10-CM

## 2021-01-12 DIAGNOSIS — N18.31 STAGE 3A CHRONIC KIDNEY DISEASE (HCC): ICD-10-CM

## 2021-01-12 DIAGNOSIS — Z00.00 WELLNESS EXAMINATION: ICD-10-CM

## 2021-01-12 DIAGNOSIS — R04.0 EPISTAXIS: ICD-10-CM

## 2021-01-12 DIAGNOSIS — J45.40 MODERATE PERSISTENT REACTIVE AIRWAY DISEASE WITHOUT COMPLICATION: ICD-10-CM

## 2021-01-12 DIAGNOSIS — Z13.31 SCREENING FOR DEPRESSION: ICD-10-CM

## 2021-01-12 DIAGNOSIS — E78.5 DYSLIPIDEMIA: ICD-10-CM

## 2021-01-12 DIAGNOSIS — E66.9 OBESITY (BMI 30.0-34.9): ICD-10-CM

## 2021-01-12 DIAGNOSIS — I10 ESSENTIAL HYPERTENSION: ICD-10-CM

## 2021-01-12 DIAGNOSIS — I50.20 SYSTOLIC CONGESTIVE HEART FAILURE, UNSPECIFIED HF CHRONICITY (HCC): ICD-10-CM

## 2021-01-12 PROCEDURE — G8427 DOCREV CUR MEDS BY ELIG CLIN: HCPCS | Performed by: INTERNAL MEDICINE

## 2021-01-12 PROCEDURE — G8752 SYS BP LESS 140: HCPCS | Performed by: INTERNAL MEDICINE

## 2021-01-12 PROCEDURE — G8536 NO DOC ELDER MAL SCRN: HCPCS | Performed by: INTERNAL MEDICINE

## 2021-01-12 PROCEDURE — 1090F PRES/ABSN URINE INCON ASSESS: CPT | Performed by: INTERNAL MEDICINE

## 2021-01-12 PROCEDURE — G8510 SCR DEP NEG, NO PLAN REQD: HCPCS | Performed by: INTERNAL MEDICINE

## 2021-01-12 PROCEDURE — 2022F DILAT RTA XM EVC RTNOPTHY: CPT | Performed by: INTERNAL MEDICINE

## 2021-01-12 PROCEDURE — G0439 PPPS, SUBSEQ VISIT: HCPCS | Performed by: INTERNAL MEDICINE

## 2021-01-12 PROCEDURE — 3046F HEMOGLOBIN A1C LEVEL >9.0%: CPT | Performed by: INTERNAL MEDICINE

## 2021-01-12 PROCEDURE — G9899 SCRN MAM PERF RSLTS DOC: HCPCS | Performed by: INTERNAL MEDICINE

## 2021-01-12 PROCEDURE — G8400 PT W/DXA NO RESULTS DOC: HCPCS | Performed by: INTERNAL MEDICINE

## 2021-01-12 PROCEDURE — 1101F PT FALLS ASSESS-DOCD LE1/YR: CPT | Performed by: INTERNAL MEDICINE

## 2021-01-12 PROCEDURE — G8754 DIAS BP LESS 90: HCPCS | Performed by: INTERNAL MEDICINE

## 2021-01-12 PROCEDURE — 3017F COLORECTAL CA SCREEN DOC REV: CPT | Performed by: INTERNAL MEDICINE

## 2021-01-12 PROCEDURE — 99215 OFFICE O/P EST HI 40 MIN: CPT | Performed by: INTERNAL MEDICINE

## 2021-01-12 PROCEDURE — G8417 CALC BMI ABV UP PARAM F/U: HCPCS | Performed by: INTERNAL MEDICINE

## 2021-01-12 NOTE — PROGRESS NOTES
Chief Complaint   Patient presents with   [facility] Follow-up     Patient seen at [facility] ED on 12/13/2020 for shortness of breath, wheezing, and epistasis.  Annual Wellness Visit         1. Have you been to the ER, urgent care clinic since your last visit? Hospitalized since your last visit? Yes When: 12/13/2020 Where: [facility] Reason for visit: shortness of breath, wheezing, and epistasis    2. Have you seen or consulted any other health care providers outside of the [address] [place] [address] since your last visit? Include any pap smears or colon screening.  No

## 2021-01-12 NOTE — PROGRESS NOTES
[facility]  [place] [address]  [address] [place] [address]  Phone:  [phone]  Fax: [phone]       Chief Complaint   Patient presents with   Jules Alvarez ED Follow-up     Patient seen at [facility] ED on 12/13/2020 for shortness of breath, wheezing, and epistasis.  Annual Wellness Visit   . SUBJECTIVE:    Chayito Kim is a 78 y.o. female Comes in for return visit stating that she has done reasonably well. She complains of shortness of breath and audible wheezing. She went to the emergency room and was given a rescue inhaler only. This has been ineffective in alleviating her bronchospasm. She no longer smokes cigarettes, but does have mild existing COPD related to her years of cigarette smoking. She saw her cardiologist most recently. She has a nonischemic cardiomyopathy and no adjustments were made in her medication. For that matter she was seen in the emergency room and a proBNP was only 505. Chest x-ray was essentially negative, so this does not appear to be related to cardiac decompensation. Her diabetes has historically been doing quite well with no symptomatic hypoglycemia. She has a past history of primary hypertension, dyslipidemia, as well as obesity. Current Outpatient Medications   Medication Sig Dispense Refill    diclofenac (VOLTAREN) 1 % gel APPLY TO AFFECTED AREA TWICE A DAY AS NEEDED      fluticasone propion-salmeteroL (ADVAIR/WIXELA) 250-50 mcg/dose diskus inhaler       Sidestream misc       doxycycline (MONODOX) 100 mg capsule Take 1 Cap by mouth two (2) times a day. 14 Cap 0    fluticasone propionate (FLONASE) 50 mcg/actuation nasal spray 2 Sprays by Both Nostrils route daily.  1 Bottle 0    allopurinoL (ZYLOPRIM) 300 mg tablet TAKE 1 TABLET BY MOUTH DAILY 90 Tab 3    insulin glargine (LANTUS,BASAGLAR) 100 unit/mL (3 mL) inpn 60 units every morning 6 Adjustable Dose Pre-filled Pen Syringe 11    oxybutynin (DITROPAN) 5 mg tablet Take 1 Tab by mouth three (3) times daily. 90 Tab 11    atorvastatin (LIPITOR) 40 mg tablet TAKE 1 TABLET BY MOUTH EVERY DAY 90 Tab 3    metFORMIN ER (GLUCOPHAGE XR) 500 mg tablet TAKE 1 TABLET BY MOUTH THREE TIMES DAILY WITH MEALS 270 Tab 3    Insulin Needles, Disposable, (BD ULTRA-FINE SHORT PEN NEEDLE) 31 gauge x 5/16\" ndle USE WITH INSULIN PENS TWICE DAILY AS DIRECTED 100 Pen Needle 11    alcohol swabs (BD SINGLE USE SWABS REGULAR) padm USE TO TEST BLOOD SUGAR.dx.e11.9 100 Pad 11    potassium chloride SR (KLOR-CON 10) 10 mEq tablet 2  Tabs 2 times a day 360 Tab 3    metOLazone (ZAROXOLYN) 5 mg tablet Take 1 Tab by mouth daily. 30 Tab 0    furosemide (LASIX) 40 mg tablet TAKE 3 TABLETS BY MOUTH EVERY MORNING (Patient taking differently: TAKE 3 TABLETS BY MOUTH EVERY MORNING AND 1 TABLET IN THE EVENING) 90 Tab 11    glimepiride (AMARYL) 4 mg tablet   TAKE 1 TABLET BY MOUTH TWICE DAILY BEFORE A MEAL      mirabegron ER (MYRBETRIQ) 50 mg ER tablet Take 1 Tab by mouth daily. 30 Tab 11    glucose blood VI test strips (FREESTYLE LITE STRIPS) strip Use to test blood sugar three times daily. Dx.e11.9 100 Strip 11    Blood-Glucose Meter (FREESTYLE LITE METER) monitoring kit Test three times a day dx. e119 1 Kit 0    glyBURIDE (DIABETA) 1.25 mg tablet Take 1.25 mg by mouth Daily (before breakfast).       lisinopril (PRINIVIL, ZESTRIL) 20 mg tablet TAKE 1 TABLET BY MOUTH ONCE DAILY IN THE MORNING 90 Tab 3    ONE TOUCH DELICA 33 gauge misc   11    NURIA PEN NEEDLE 32 x 5/32 \" ndle       Blood-Glucose Meter monitoring kit Testing BID-DX:250.00 1 kit 0    Lancets (ONE TOUCH ULTRASOFT LANCETS) misc TESTING BID 1 Package 11    glucose blood VI test strips (ASCENSIA AUTODISC VI, ONE TOUCH ULTRA TEST VI) strip TESTING BID 1 Package 11     Past Medical History:   Diagnosis Date    Arthritis     Congestive heart failure, unspecified     Diabetes (HCC)     Hypercholesterolemia     Hypertension      Past Surgical History:   Procedure Laterality Date    HX ORTHOPAEDIC      Arthroscopy right knee     No Known Allergies      REVIEW OF SYSTEMS:  General: negative for - chills or fever  ENT: negative for - headaches, nasal congestion or tinnitus  Respiratory: negative for - cough, hemoptysis, shortness of breath or wheezing  Cardiovascular : negative for - chest pain, edema, palpitations or shortness of breath  Gastrointestinal: negative for - abdominal pain, blood in stools, heartburn or nausea/vomiting  Genito-Urinary: no dysuria, trouble voiding, or hematuria  Musculoskeletal: negative for - gait disturbance, joint pain, joint stiffness or joint swelling  Neurological: no TIA or stroke symptoms  Hematologic: no bruises, no bleeding, no swollen glands  Integument: no lumps, mole changes, nail changes or rash  Endocrine: no malaise/lethargy or unexpected weight changes      Social History     Socioeconomic History    Marital status:      Spouse name: Not on file    Number of children: 3    Years of education: 15    Highest education level: Not on file   Occupational History    Occupation: retired   Tobacco Use    Smoking status: Former Smoker     Packs/day: 0.25     Years: 20.00     Pack years: 5.00     Quit date: 2014     Years since quittin.0    Smokeless tobacco: Never Used   Substance and Sexual Activity    Alcohol use: Yes     Alcohol/week: 0.0 standard drinks     Comment: 21-24 Beers per week    Drug use: No    Sexual activity: Yes     Partners: Male     Family History   Problem Relation Age of Onset    Diabetes Mother        OBJECTIVE:    Visit Vitals  /68   Pulse 90   Temp 98.5 °F (36.9 °C) (Oral)   Resp 16   Ht 5' 4\" (1.626 m)   Wt 189 lb 3.2 oz (85.8 kg)   SpO2 94%   BMI 32.48 kg/m²     CONSTITUTIONAL: well , well nourished, appears age appropriate  EYES: perrla, eom intact  ENMT:moist mucous membranes, pharynx clear  NECK: supple.  Thyroid normal,no JVD  RESPIRATORY: Chest: Bilateral fine expiratory rhonchi  CARDIOVASCULAR: Heart: regular rate and rhythm  GASTROINTESTINAL: Abdomen: soft, bowel sounds active  HEMATOLOGIC: no pathological lymph nodes palpated  MUSCULOSKELETAL: Extremities: no edema, pulse 1+   INTEGUMENT: No unusual rashes or suspicious skin lesions noted. Nails appear normal.  NEUROLOGIC: non-focal exam   MENTAL STATUS: alert and oriented, appropriate affect      ASSESSMENT:  1. Cardiomyopathy, unspecified type ([address]    2. Essential hypertension    3. Uncontrolled type 2 diabetes mellitus with hyperglycemia ([address]    4. Moderate persistent reactive airway disease without complication    5. Stage 3a chronic kidney disease    6. Dyslipidemia    7. Obesity (BMI 30.0-34.9)    8. Epistaxis    9. Systolic congestive heart failure, unspecified HF chronicity ([address]    10. Screening for depression    11. Wellness examination        PLAN:    1. The patient appears to be well compensated from a cardiac perspective. There are no overt signs of congestive heart failure today. 2. She is wheezing moderately. I think this is the principle source of her dyspnea on exertion. Fortunately, she does not smoke cigarettes and there is no smoking in the home at this point. She needs a maintenance inhaler and has this at home in [facility], but has not been using it on a consistent basis. I suggest one inhalation twice a day, every day, regardless of her perceived need for it or not. I explained to her that the rescue inhaler will help, but will not eliminate the bronchospasm completely. 3. She does have mild renal insufficiency, but this is on the basis of a prerenal azotemia. 4. She will continue statin as prescribed. She has increased cardiovascular risk. 5. She needs to lose weight. This can be accomplished by eating meals, eliminating snacks and avoiding the consumption of processed carbohydrates. 6. She does have epistaxis. She was given Flonase and generic Afrin.   I explained to her that the Afrin will have to be used quite sparingly, one inhalation in each nostril at bedtime only. She needs to use saline nose drops and discontinue use of the Fluticasone nasal spray, which is not indicated currently. .  Orders Placed This Encounter    ANNUAL DEPRESSION SCREEN 8-15 MIN    HEMOGLOBIN A1C WITH EAG    NT-PRO BNP         Follow-up and Dispositions    · Return in about 2 weeks (around 1/26/2021). Juan Castle MD  This is the Subsequent Medicare Annual Wellness Exam, performed 12 months or more after the Initial AWV or the last Subsequent AWV    I have reviewed the patient's medical history in detail and updated the computerized patient record. Depression Risk Factor Screening:     3 most recent PHQ Screens 1/12/2021   Little interest or pleasure in doing things Not at all   Feeling down, depressed, irritable, or hopeless Not at all   Total Score PHQ 2 0       Alcohol Risk Screen    Do you average more than 1 drink per night or more than 7 drinks a week:  No    On any one occasion in the past three months have you have had more than 3 drinks containing alcohol:  No        Functional Ability and Level of Safety:    Hearing: Hearing is good. Activities of Daily Living: The home contains: no safety equipment. Patient does total self care      Ambulation: with no difficulty     Fall Risk:  Fall Risk Assessment, last 12 mths 1/12/2021   Able to walk? Yes   Fall in past 12 months? 0   Do you feel unsteady? 0   Are you worried about falling 0      Abuse Screen:  Patient is not abused       Cognitive Screening    Has your family/caregiver stated any concerns about your memory: no     Cognitive Screening: Not applicable    Assessment/Plan   Education and counseling provided:  Are appropriate based on today's review and evaluation    Diagnoses and all orders for this visit:    1. Cardiomyopathy, unspecified type ([address]    2. Essential hypertension    3.  Uncontrolled type 2 diabetes mellitus with hyperglycemia ([facility]  -     HEMOGLOBIN A1C WITH EAG; Future    4. Moderate persistent reactive airway disease without complication    5. Stage 3a chronic kidney disease    6. Dyslipidemia    7. Obesity (BMI 30.0-34.9)    8. Epistaxis    9. Systolic congestive heart failure, unspecified HF chronicity (HCC)  -     NT-PRO BNP    10. Screening for depression  -     DEPRESSION SCREEN ANNUAL    11. Wellness examination        Health Maintenance Due     Health Maintenance Due   Topic Date Due    GLAUCOMA SCREENING Q2Y  03/04/2017    Colorectal Cancer Screening Combo  04/29/2017    Breast Cancer Screen Mammogram  03/03/2018       Patient Care Team   Patient Care Team:  Lo Nicole MD as PCP - General (Internal Medicine)  Lo Nicole MD as PCP - [facility]  Raulito Wilkins MD as Physician (Cardiology)  Og Krishna MD as Surgeon (Orthopedic Surgery)    History     Patient Active Problem List   Diagnosis Code    Cardiomyopathy [facility]) I42.9    Dyslipidemia E78.5    Gout M10.9    Alcohol abuse F10.10    Hypertension I10    Dermatitis L30.9    Primary osteoarthritis of right knee M17.11    Carpal tunnel syndrome of right wrist G56.01    Reactive airway disease J45.909    Former tobacco use--stopped 2014 after >40 yrs smoking Z87.891    Obesity (BMI 30.0-34. 9) E66.9    Epistaxis R04.0    Decreased libido R68.82    Chest pain, exertional R07.9    PARDO (dyspnea on exertion) R06.00    Osteoarthrosis, localized, primary, knee, right M17.11    Status post total right knee replacement Z96.651    CKD (chronic kidney disease) stage 3, GFR 30-59 ml/min N18.30    Uncontrolled type 2 diabetes mellitus with hyperglycemia (HCC) E11.65     Past Medical History:   Diagnosis Date    Arthritis     Congestive heart failure, unspecified     Diabetes ([facility]     Hypercholesterolemia     Hypertension       Past Surgical History:   Procedure Laterality Date    HX ORTHOPAEDIC Arthroscopy right knee     Current Outpatient Medications   Medication Sig Dispense Refill    diclofenac (VOLTAREN) 1 % gel APPLY TO AFFECTED AREA TWICE A DAY AS NEEDED      fluticasone propion-salmeteroL (ADVAIR/WIXELA) 250-50 mcg/dose diskus inhaler       Sidestream misc       doxycycline (MONODOX) 100 mg capsule Take 1 Cap by mouth two (2) times a day. 14 Cap 0    fluticasone propionate (FLONASE) 50 mcg/actuation nasal spray 2 Sprays by Both Nostrils route daily. 1 Bottle 0    allopurinoL (ZYLOPRIM) 300 mg tablet TAKE 1 TABLET BY MOUTH DAILY 90 Tab 3    insulin glargine (LANTUS,BASAGLAR) 100 unit/mL (3 mL) inpn 60 units every morning 6 Adjustable Dose Pre-filled Pen Syringe 11    oxybutynin (DITROPAN) 5 mg tablet Take 1 Tab by mouth three (3) times daily. 90 Tab 11    atorvastatin (LIPITOR) 40 mg tablet TAKE 1 TABLET BY MOUTH EVERY DAY 90 Tab 3    metFORMIN ER (GLUCOPHAGE XR) 500 mg tablet TAKE 1 TABLET BY MOUTH THREE TIMES DAILY WITH MEALS 270 Tab 3    Insulin Needles, Disposable, (BD ULTRA-FINE SHORT PEN NEEDLE) 31 gauge x 5/16\" ndle USE WITH INSULIN PENS TWICE DAILY AS DIRECTED 100 Pen Needle 11    alcohol swabs (BD SINGLE USE SWABS REGULAR) padm USE TO TEST BLOOD SUGAR.dx.e11.9 100 Pad 11    potassium chloride SR (KLOR-CON 10) 10 mEq tablet 2  Tabs 2 times a day 360 Tab 3    metOLazone (ZAROXOLYN) 5 mg tablet Take 1 Tab by mouth daily. 30 Tab 0    furosemide (LASIX) 40 mg tablet TAKE 3 TABLETS BY MOUTH EVERY MORNING (Patient taking differently: TAKE 3 TABLETS BY MOUTH EVERY MORNING AND 1 TABLET IN THE EVENING) 90 Tab 11    glimepiride (AMARYL) 4 mg tablet   TAKE 1 TABLET BY MOUTH TWICE DAILY BEFORE A MEAL      mirabegron ER (MYRBETRIQ) 50 mg ER tablet Take 1 Tab by mouth daily. 30 Tab 11    glucose blood VI test strips (FREESTYLE LITE STRIPS) strip Use to test blood sugar three times daily.  Dx.e11.9 100 Strip 11    Blood-Glucose Meter (FREESTYLE LITE METER) monitoring kit Test three times a day dx. e119 1 Kit 0    glyBURIDE (DIABETA) 1.25 mg tablet Take 1.25 mg by mouth Daily (before breakfast).  lisinopril (PRINIVIL, ZESTRIL) 20 mg tablet TAKE 1 TABLET BY MOUTH ONCE DAILY IN THE MORNING 90 Tab 3    ONE TOUCH DELICA 33 gauge misc   11    NURIA PEN NEEDLE 32 x 5/32 \" ndle       Blood-Glucose Meter monitoring kit Testing BID-DX:250.00 1 kit 0    Lancets (ONE TOUCH ULTRASOFT LANCETS) misc TESTING BID 1 Package 11    glucose blood VI test strips (ASCENSIA AUTODISC VI, ONE TOUCH ULTRA TEST VI) strip TESTING BID 1 Package 11     No Known Allergies    Family History   Problem Relation Age of Onset    Diabetes Mother      Social History     Tobacco Use    Smoking status: Former Smoker     Packs/day: 0.25     Years: 20.00     Pack years: 5.00     Quit date: 2014     Years since quittin.0    Smokeless tobacco: Never Used   Substance Use Topics    Alcohol use:  Yes     Alcohol/week: 0.0 standard drinks     Comment: 21-24 Beers per week

## 2021-01-13 NOTE — PATIENT INSTRUCTIONS

## 2021-01-14 LAB — NT-PROBNP SERPL-MCNC: 863 PG/ML (ref 0–301)

## 2021-01-25 ENCOUNTER — OFFICE VISIT (OUTPATIENT)
Dept: INTERNAL MEDICINE CLINIC | Age: 71
End: 2021-01-25
Payer: MEDICARE

## 2021-01-25 VITALS
HEART RATE: 97 BPM | OXYGEN SATURATION: 93 % | DIASTOLIC BLOOD PRESSURE: 89 MMHG | SYSTOLIC BLOOD PRESSURE: 142 MMHG | HEIGHT: 64 IN | RESPIRATION RATE: 14 BRPM | BODY MASS INDEX: 32.95 KG/M2 | TEMPERATURE: 98 F | WEIGHT: 193 LBS

## 2021-01-25 DIAGNOSIS — I50.9 CHF (CONGESTIVE HEART FAILURE) (HCC): Primary | ICD-10-CM

## 2021-01-25 DIAGNOSIS — E11.65 UNCONTROLLED TYPE 2 DIABETES MELLITUS WITH HYPERGLYCEMIA (HCC): ICD-10-CM

## 2021-01-25 DIAGNOSIS — R06.00 DYSPNEA, UNSPECIFIED TYPE: ICD-10-CM

## 2021-01-25 DIAGNOSIS — E66.9 OBESITY (BMI 30.0-34.9): ICD-10-CM

## 2021-01-25 DIAGNOSIS — I10 ESSENTIAL HYPERTENSION: ICD-10-CM

## 2021-01-25 DIAGNOSIS — I42.9 CARDIOMYOPATHY, UNSPECIFIED TYPE (HCC): ICD-10-CM

## 2021-01-25 PROCEDURE — 1101F PT FALLS ASSESS-DOCD LE1/YR: CPT | Performed by: INTERNAL MEDICINE

## 2021-01-25 PROCEDURE — G8427 DOCREV CUR MEDS BY ELIG CLIN: HCPCS | Performed by: INTERNAL MEDICINE

## 2021-01-25 PROCEDURE — G8417 CALC BMI ABV UP PARAM F/U: HCPCS | Performed by: INTERNAL MEDICINE

## 2021-01-25 PROCEDURE — 99215 OFFICE O/P EST HI 40 MIN: CPT | Performed by: INTERNAL MEDICINE

## 2021-01-25 PROCEDURE — 3046F HEMOGLOBIN A1C LEVEL >9.0%: CPT | Performed by: INTERNAL MEDICINE

## 2021-01-25 PROCEDURE — 1090F PRES/ABSN URINE INCON ASSESS: CPT | Performed by: INTERNAL MEDICINE

## 2021-01-25 PROCEDURE — G8536 NO DOC ELDER MAL SCRN: HCPCS | Performed by: INTERNAL MEDICINE

## 2021-01-25 PROCEDURE — G8754 DIAS BP LESS 90: HCPCS | Performed by: INTERNAL MEDICINE

## 2021-01-25 PROCEDURE — G8753 SYS BP > OR = 140: HCPCS | Performed by: INTERNAL MEDICINE

## 2021-01-25 PROCEDURE — G8400 PT W/DXA NO RESULTS DOC: HCPCS | Performed by: INTERNAL MEDICINE

## 2021-01-25 PROCEDURE — G8432 DEP SCR NOT DOC, RNG: HCPCS | Performed by: INTERNAL MEDICINE

## 2021-01-25 PROCEDURE — G9899 SCRN MAM PERF RSLTS DOC: HCPCS | Performed by: INTERNAL MEDICINE

## 2021-01-25 PROCEDURE — 2022F DILAT RTA XM EVC RTNOPTHY: CPT | Performed by: INTERNAL MEDICINE

## 2021-01-25 PROCEDURE — 3017F COLORECTAL CA SCREEN DOC REV: CPT | Performed by: INTERNAL MEDICINE

## 2021-01-25 NOTE — PROGRESS NOTES
Chief Complaint   Patient presents with    Breathing Problem     chest tightness           1. Have you been to the ER, urgent care clinic since your last visit? Hospitalized since your last visit? No    2. Have you seen or consulted any other health care providers outside of the [address] [place] [address] since your last visit? Include any pap smears or colon screening.  No

## 2021-01-27 LAB
D DIMER PPP FEU-MCNC: 3.03 MG/L FEU (ref 0–0.49)
EST. AVERAGE GLUCOSE BLD GHB EST-MCNC: 148 MG/DL
HBA1C MFR BLD: 6.8 % (ref 4.8–5.6)

## 2021-01-29 ENCOUNTER — HOSPITAL ENCOUNTER (OUTPATIENT)
Dept: NON INVASIVE DIAGNOSTICS | Age: 71
Discharge: HOME | End: 2021-01-29
Attending: INTERNAL MEDICINE
Payer: MEDICARE

## 2021-01-29 DIAGNOSIS — I42.9 CARDIOMYOPATHY, UNSPECIFIED TYPE (HCC): ICD-10-CM

## 2021-01-29 LAB
ECHO AO ROOT DIAM: 2.41 CM
ECHO AV AREA PEAK VELOCITY: 1.43 CM2
ECHO AV AREA PLAN: 2.54 CM2
ECHO AV MEAN GRADIENT: 2.58 MMHG
ECHO AV PEAK GRADIENT: 3.93 MMHG
ECHO AV VTI: 17.73 CM
ECHO EST RA PRESSURE: 5 MMHG
ECHO LA AREA 4C: 22.61 CM2
ECHO LA MAJOR AXIS: 4.43 CM
ECHO LA VOL 4C: 68.23 ML (ref 22–52)
ECHO LV EDV A4C: 194.96 ML
ECHO LV ESV A4C: 133.38 ML
ECHO LV INTERNAL DIMENSION DIASTOLIC: 5.57 CM (ref 3.9–5.3)
ECHO LV INTERNAL DIMENSION SYSTOLIC: 4.45 CM
ECHO LV IVSD: 0.77 CM (ref 0.6–0.9)
ECHO LV MASS 2D: 220.6 G (ref 67–162)
ECHO LV POSTERIOR WALL DIASTOLIC: 1.25 CM (ref 0.6–0.9)
ECHO LVOT DIAM: 1.79 CM
ECHO LVOT PEAK GRADIENT: 1.26 MMHG
ECHO LVOT PEAK VELOCITY: 56.07 CM/S
ECHO MV A VELOCITY: 17.28 CM/S
ECHO MV AREA PHT: 15.25 CM2
ECHO MV AREA PHT: 8.16 CM2
ECHO MV AREA PLAN: 3.95 CM2
ECHO MV E DECELERATION TIME (DT): 49.76 MS
ECHO MV E VELOCITY: 31.54 CM/S
ECHO MV E/A RATIO: 1.83
ECHO MV MAX VELOCITY: 104.95 CM/S
ECHO MV MEAN GRADIENT: 1.3 MMHG
ECHO MV PEAK GRADIENT: 4.41 MMHG
ECHO MV PRESSURE HALF TIME (PHT): 14.43 MS
ECHO MV PRESSURE HALF TIME (PHT): 26.98 MS
ECHO MV VTI: 13.28 CM
ECHO PV PEAK INSTANTANEOUS GRADIENT SYSTOLIC: 2.2 MMHG
ECHO PV REGURGITANT MAX VELOCITY: 74.07 CM/S
ECHO PV REGURGITANT MAX VELOCITY: 98.95 CM/S
ECHO RA AREA 4C: 20.15 CM2
ECHO RIGHT VENTRICULAR SYSTOLIC PRESSURE (RVSP): 46 MMHG
ECHO TV REGURGITANT MAX VELOCITY: 155.55 CM/S
ECHO TV REGURGITANT MAX VELOCITY: 224.1 CM/S
ECHO TV REGURGITANT MAX VELOCITY: 241.31 CM/S

## 2021-01-29 PROCEDURE — 93306 TTE W/DOPPLER COMPLETE: CPT

## 2021-02-02 ENCOUNTER — OFFICE VISIT (OUTPATIENT)
Dept: INTERNAL MEDICINE CLINIC | Age: 71
End: 2021-02-02
Payer: MEDICARE

## 2021-02-02 VITALS
HEART RATE: 100 BPM | BODY MASS INDEX: 33.02 KG/M2 | HEIGHT: 64 IN | OXYGEN SATURATION: 96 % | SYSTOLIC BLOOD PRESSURE: 149 MMHG | RESPIRATION RATE: 14 BRPM | TEMPERATURE: 98.1 F | DIASTOLIC BLOOD PRESSURE: 93 MMHG | WEIGHT: 193.4 LBS

## 2021-02-02 DIAGNOSIS — I26.94 MULTIPLE SUBSEGMENTAL PULMONARY EMBOLI WITHOUT ACUTE COR PULMONALE (HCC): Primary | ICD-10-CM

## 2021-02-02 DIAGNOSIS — E11.65 UNCONTROLLED TYPE 2 DIABETES MELLITUS WITH HYPERGLYCEMIA (HCC): ICD-10-CM

## 2021-02-02 DIAGNOSIS — I42.9 CARDIOMYOPATHY, UNSPECIFIED TYPE (HCC): Primary | ICD-10-CM

## 2021-02-02 DIAGNOSIS — E66.9 OBESITY (BMI 30.0-34.9): ICD-10-CM

## 2021-02-02 DIAGNOSIS — E78.5 DYSLIPIDEMIA: ICD-10-CM

## 2021-02-02 DIAGNOSIS — R06.09 DOE (DYSPNEA ON EXERTION): ICD-10-CM

## 2021-02-02 DIAGNOSIS — I26.94 MULTIPLE SUBSEGMENTAL PULMONARY EMBOLI WITHOUT ACUTE COR PULMONALE (HCC): ICD-10-CM

## 2021-02-02 DIAGNOSIS — I10 ESSENTIAL HYPERTENSION: ICD-10-CM

## 2021-02-02 PROCEDURE — G8427 DOCREV CUR MEDS BY ELIG CLIN: HCPCS | Performed by: INTERNAL MEDICINE

## 2021-02-02 PROCEDURE — 2022F DILAT RTA XM EVC RTNOPTHY: CPT | Performed by: INTERNAL MEDICINE

## 2021-02-02 PROCEDURE — G8417 CALC BMI ABV UP PARAM F/U: HCPCS | Performed by: INTERNAL MEDICINE

## 2021-02-02 PROCEDURE — G8755 DIAS BP > OR = 90: HCPCS | Performed by: INTERNAL MEDICINE

## 2021-02-02 PROCEDURE — G9899 SCRN MAM PERF RSLTS DOC: HCPCS | Performed by: INTERNAL MEDICINE

## 2021-02-02 PROCEDURE — 1101F PT FALLS ASSESS-DOCD LE1/YR: CPT | Performed by: INTERNAL MEDICINE

## 2021-02-02 PROCEDURE — G8432 DEP SCR NOT DOC, RNG: HCPCS | Performed by: INTERNAL MEDICINE

## 2021-02-02 PROCEDURE — G8753 SYS BP > OR = 140: HCPCS | Performed by: INTERNAL MEDICINE

## 2021-02-02 PROCEDURE — 1090F PRES/ABSN URINE INCON ASSESS: CPT | Performed by: INTERNAL MEDICINE

## 2021-02-02 PROCEDURE — G8400 PT W/DXA NO RESULTS DOC: HCPCS | Performed by: INTERNAL MEDICINE

## 2021-02-02 PROCEDURE — 99215 OFFICE O/P EST HI 40 MIN: CPT | Performed by: INTERNAL MEDICINE

## 2021-02-02 PROCEDURE — G8536 NO DOC ELDER MAL SCRN: HCPCS | Performed by: INTERNAL MEDICINE

## 2021-02-02 PROCEDURE — 3044F HG A1C LEVEL LT 7.0%: CPT | Performed by: INTERNAL MEDICINE

## 2021-02-02 PROCEDURE — 3017F COLORECTAL CA SCREEN DOC REV: CPT | Performed by: INTERNAL MEDICINE

## 2021-02-02 NOTE — PROGRESS NOTES
Chief Complaint   Patient presents with    CHF     1 week follow up          1. Have you been to the ER, urgent care clinic since your last visit? Hospitalized since your last visit? No    2. Have you seen or consulted any other health care providers outside of the [address] [place] [address] since your last visit? Include any pap smears or colon screening.  No

## 2021-02-04 LAB
ANION GAP SERPL CALC-SCNC: 10 MMOL/L (ref 5–15)
BUN SERPL-MCNC: 21 MG/DL (ref 6–20)
BUN/CREAT SERPL: 30 (ref 12–20)
CALCIUM SERPL-MCNC: 9.1 MG/DL (ref 8.5–10.1)
CHLORIDE SERPL-SCNC: 107 MMOL/L (ref 97–108)
CO2 SERPL-SCNC: 27 MMOL/L (ref 21–32)
CREAT SERPL-MCNC: 0.69 MG/DL (ref 0.55–1.02)
GLUCOSE SERPL-MCNC: 135 MG/DL (ref 65–100)
POTASSIUM SERPL-SCNC: 3.8 MMOL/L (ref 3.5–5.1)
SODIUM SERPL-SCNC: 144 MMOL/L (ref 136–145)

## 2021-02-07 NOTE — PROGRESS NOTES
[facility]  [place] [address]  [address] [place] [address]  Phone:  [phone]  Fax: [phone]       Chief Complaint   Patient presents with    CHF     1 week follow up    . SUBJECTIVE:    Jonas Lu is a 78 y.o. female comes in for followup. She states that her shortness of breath has improved somewhat. I increased her Lasix back to the 120 mg in divided doses. She has been taking 80 mg in the morning and 40 mg in the early afternoon. She has noted some improvement. Echocardiogram revealed an abysmal ejection fraction between 20% and 25%. With this ejection fraction, the patient needs to have a defibrillator. I am not entirely sure why Cardiology has not pursued this. I asked the patient about it and she states that she was not approached with this of late. There has been no meaningful weight loss since I last saw her. She states that her diabetes has indeed been doing quite well. She has a past history of primary hypertension, dyslipidemia, and was a former smoker, but not in the last several years. Current Outpatient Medications   Medication Sig Dispense Refill    diclofenac (VOLTAREN) 1 % gel APPLY TO AFFECTED AREA TWICE A DAY AS NEEDED      fluticasone propion-salmeteroL (ADVAIR/WIXELA) 250-50 mcg/dose diskus inhaler       Sidestream misc       doxycycline (MONODOX) 100 mg capsule Take 1 Cap by mouth two (2) times a day. 14 Cap 0    fluticasone propionate (FLONASE) 50 mcg/actuation nasal spray 2 Sprays by Both Nostrils route daily. 1 Bottle 0    allopurinoL (ZYLOPRIM) 300 mg tablet TAKE 1 TABLET BY MOUTH DAILY 90 Tab 3    insulin glargine (LANTUS,BASAGLAR) 100 unit/mL (3 mL) inpn 60 units every morning 6 Adjustable Dose Pre-filled Pen Syringe 11    oxybutynin (DITROPAN) 5 mg tablet Take 1 Tab by mouth three (3) times daily.  90 Tab 11    atorvastatin (LIPITOR) 40 mg tablet TAKE 1 TABLET BY MOUTH EVERY DAY 90 Tab 3    metFORMIN ER (GLUCOPHAGE XR) 500 mg tablet TAKE 1 TABLET BY MOUTH THREE TIMES DAILY WITH MEALS 270 Tab 3    Insulin Needles, Disposable, (BD ULTRA-FINE SHORT PEN NEEDLE) 31 gauge x 5/16\" ndle USE WITH INSULIN PENS TWICE DAILY AS DIRECTED 100 Pen Needle 11    alcohol swabs (BD SINGLE USE SWABS REGULAR) padm USE TO TEST BLOOD SUGAR.dx.e11.9 100 Pad 11    potassium chloride SR (KLOR-CON 10) 10 mEq tablet 2  Tabs 2 times a day 360 Tab 3    metOLazone (ZAROXOLYN) 5 mg tablet Take 1 Tab by mouth daily. 30 Tab 0    furosemide (LASIX) 40 mg tablet TAKE 3 TABLETS BY MOUTH EVERY MORNING (Patient taking differently: TAKE 3 TABLETS BY MOUTH EVERY MORNING AND 1 TABLET IN THE EVENING) 90 Tab 11    glimepiride (AMARYL) 4 mg tablet   TAKE 1 TABLET BY MOUTH TWICE DAILY BEFORE A MEAL      mirabegron ER (MYRBETRIQ) 50 mg ER tablet Take 1 Tab by mouth daily. 30 Tab 11    glucose blood VI test strips (FREESTYLE LITE STRIPS) strip Use to test blood sugar three times daily. Dx.e11.9 100 Strip 11    Blood-Glucose Meter (FREESTYLE LITE METER) monitoring kit Test three times a day dx. e119 1 Kit 0    glyBURIDE (DIABETA) 1.25 mg tablet Take 1.25 mg by mouth Daily (before breakfast).       lisinopril (PRINIVIL, ZESTRIL) 20 mg tablet TAKE 1 TABLET BY MOUTH ONCE DAILY IN THE MORNING 90 Tab 3    ONE TOUCH DELICA 33 gauge misc   11    NURIA PEN NEEDLE 32 x 5/32 \" ndle       Blood-Glucose Meter monitoring kit Testing BID-DX:250.00 1 kit 0    Lancets (ONE TOUCH ULTRASOFT LANCETS) misc TESTING BID 1 Package 11    glucose blood VI test strips (ASCENSIA AUTODISC VI, ONE TOUCH ULTRA TEST VI) strip TESTING BID 1 Package 11     Past Medical History:   Diagnosis Date    Arthritis     Congestive heart failure, unspecified     Diabetes ([address]     Hypercholesterolemia     Hypertension      Past Surgical History:   Procedure Laterality Date    HX ORTHOPAEDIC      Arthroscopy right knee     No Known Allergies      REVIEW OF SYSTEMS:  General: negative for - chills or fever  ENT: negative for - headaches, nasal congestion or tinnitus  Respiratory: negative for - cough, hemoptysis, shortness of breath or wheezing  Cardiovascular : negative for - chest pain, edema, palpitations or shortness of breath  Gastrointestinal: negative for - abdominal pain, blood in stools, heartburn or nausea/vomiting  Genito-Urinary: no dysuria, trouble voiding, or hematuria  Musculoskeletal: negative for - gait disturbance, joint pain, joint stiffness or joint swelling  Neurological: no TIA or stroke symptoms  Hematologic: no bruises, no bleeding, no swollen glands  Integument: no lumps, mole changes, nail changes or rash  Endocrine: no malaise/lethargy or unexpected weight changes      Social History     Socioeconomic History    Marital status:      Spouse name: Not on file    Number of children: 3    Years of education: 15    Highest education level: Not on file   Occupational History    Occupation: retired   Tobacco Use    Smoking status: Former Smoker     Packs/day: 0.25     Years: 20.00     Pack years: 5.00     Quit date: 2014     Years since quittin.1    Smokeless tobacco: Never Used   Substance and Sexual Activity    Alcohol use: Yes     Alcohol/week: 0.0 standard drinks     Comment: 21-24 Beers per week    Drug use: No    Sexual activity: Yes     Partners: Male     Family History   Problem Relation Age of Onset    Diabetes Mother        OBJECTIVE:    Visit Vitals  BP (!) 149/93   Pulse 100   Temp 98.1 °F (36.7 °C) (Oral)   Resp 14   Ht 5' 4\" (1.626 m)   Wt 193 lb 6.4 oz (87.7 kg)   SpO2 96%   BMI 33.20 kg/m²     CONSTITUTIONAL: well , well nourished, appears age appropriate  EYES: perrla, eom intact  ENMT:moist mucous membranes, pharynx clear  NECK: supple.  Thyroid normal,increased JVD  RESPIRATORY: Chest: clear to ascultation and percussion   CARDIOVASCULAR: Heart: regular rate and rhythm  GASTROINTESTINAL: Abdomen: soft, bowel sounds active  HEMATOLOGIC: no pathological lymph nodes palpated  MUSCULOSKELETAL: Extremities: no edema, pulse 1+   INTEGUMENT: No unusual rashes or suspicious skin lesions noted. Nails appear normal.  NEUROLOGIC: non-focal exam   MENTAL STATUS: alert and oriented, appropriate affect      ASSESSMENT:  1. Cardiomyopathy, unspecified type ([address]    2. PARDO (dyspnea on exertion)    3. Essential hypertension    4. Uncontrolled type 2 diabetes mellitus with hyperglycemia ([address]    5. Dyslipidemia    6. Obesity (BMI 30.0-34.9)    7. Multiple subsegmental pulmonary emboli without acute cor pulmonale (HCC)        PLAN:  1. The patient has a cardiomyopathy with a low ejection fraction. This could be the source of her dyspnea on exertion. The increase in diuretic made a difference. She needs to see her cardiologist again, particularly in view of the need for a defibrillator given her low ejection fraction, which has actually been chronic. 2. Her D-dimer was elevated, so I     to rule out a PE. I am awaiting the results of her ventilation/perfusion lung scan. 3. Blood pressure is reasonable. 4. Hopefully, her diabetes is doing well. Her last hemoglobin A1c was reasonable. I reminded her of the importance of eliminating her consumption of processed carbohydrates. 5. She will continue a statin in view of her increased cardiovascular risk. 6. Again, weight reduction is indicated. This can be accomplished by eating meals, eliminating snacks, and avoiding sweets, white breads, and white potatoes and their derivatives. .  Orders Placed This Encounter    METABOLIC PANEL, BASIC         Follow-up and Dispositions    · Return in about 4 weeks (around 3/2/2021).            Rajiv Vu MD

## 2021-02-10 ENCOUNTER — HOSPITAL ENCOUNTER (OUTPATIENT)
Dept: NUCLEAR MEDICINE | Age: 71
Discharge: HOME | End: 2021-02-10
Attending: INTERNAL MEDICINE
Payer: MEDICARE

## 2021-02-10 DIAGNOSIS — I26.94 MULTIPLE SUBSEGMENTAL PULMONARY EMBOLI WITHOUT ACUTE COR PULMONALE (HCC): ICD-10-CM

## 2021-02-10 PROCEDURE — 78580 LUNG PERFUSION IMAGING: CPT

## 2021-02-17 ENCOUNTER — OFFICE VISIT (OUTPATIENT)
Dept: INTERNAL MEDICINE CLINIC | Age: 71
End: 2021-02-17
Payer: MEDICARE

## 2021-02-17 ENCOUNTER — HOSPITAL ENCOUNTER (EMERGENCY)
Age: 71
Discharge: HOME | End: 2021-02-18
Attending: EMERGENCY MEDICINE
Payer: MEDICARE

## 2021-02-17 ENCOUNTER — APPOINTMENT (OUTPATIENT)
Dept: GENERAL RADIOLOGY | Age: 71
End: 2021-02-17
Attending: EMERGENCY MEDICINE
Payer: MEDICARE

## 2021-02-17 VITALS
SYSTOLIC BLOOD PRESSURE: 148 MMHG | HEART RATE: 100 BPM | RESPIRATION RATE: 14 BRPM | TEMPERATURE: 97.7 F | OXYGEN SATURATION: 94 % | BODY MASS INDEX: 32.98 KG/M2 | WEIGHT: 193.2 LBS | HEIGHT: 64 IN | DIASTOLIC BLOOD PRESSURE: 92 MMHG

## 2021-02-17 DIAGNOSIS — I10 ESSENTIAL HYPERTENSION: ICD-10-CM

## 2021-02-17 DIAGNOSIS — I50.82 BIVENTRICULAR CONGESTIVE HEART FAILURE (HCC): Primary | ICD-10-CM

## 2021-02-17 DIAGNOSIS — I50.9 ACUTE CONGESTIVE HEART FAILURE, UNSPECIFIED HEART FAILURE TYPE (HCC): Primary | ICD-10-CM

## 2021-02-17 DIAGNOSIS — I42.9 CARDIOMYOPATHY, UNSPECIFIED TYPE (HCC): ICD-10-CM

## 2021-02-17 DIAGNOSIS — E11.65 UNCONTROLLED TYPE 2 DIABETES MELLITUS WITH HYPERGLYCEMIA (HCC): ICD-10-CM

## 2021-02-17 DIAGNOSIS — E78.5 DYSLIPIDEMIA: ICD-10-CM

## 2021-02-17 DIAGNOSIS — E66.9 OBESITY (BMI 30.0-34.9): ICD-10-CM

## 2021-02-17 LAB
ALBUMIN SERPL-MCNC: 3.3 G/DL (ref 3.5–5)
ALBUMIN/GLOB SERPL: 0.7 {RATIO} (ref 1.1–2.2)
ALP SERPL-CCNC: 62 U/L (ref 45–117)
ALT SERPL-CCNC: 49 U/L (ref 12–78)
ANION GAP SERPL CALC-SCNC: 6 MMOL/L (ref 5–15)
AST SERPL-CCNC: 39 U/L (ref 15–37)
BASOPHILS # BLD: 0.1 K/UL (ref 0–0.1)
BASOPHILS NFR BLD: 1 % (ref 0–1)
BILIRUB SERPL-MCNC: 1.6 MG/DL (ref 0.2–1)
BNP SERPL-MCNC: 2529 PG/ML
BUN SERPL-MCNC: 26 MG/DL (ref 6–20)
BUN/CREAT SERPL: 25 (ref 12–20)
CALCIUM SERPL-MCNC: 8.3 MG/DL (ref 8.5–10.1)
CHLORIDE SERPL-SCNC: 103 MMOL/L (ref 97–108)
CK SERPL-CCNC: 117 U/L (ref 26–192)
CO2 SERPL-SCNC: 30 MMOL/L (ref 21–32)
CREAT SERPL-MCNC: 1.05 MG/DL (ref 0.55–1.02)
DIFFERENTIAL METHOD BLD: ABNORMAL
EOSINOPHIL # BLD: 0.1 K/UL (ref 0–0.4)
EOSINOPHIL NFR BLD: 2 % (ref 0–7)
ERYTHROCYTE [DISTWIDTH] IN BLOOD BY AUTOMATED COUNT: 17.2 % (ref 11.5–14.5)
GLOBULIN SER CALC-MCNC: 4.7 G/DL (ref 2–4)
GLUCOSE SERPL-MCNC: 185 MG/DL (ref 65–100)
HCT VFR BLD AUTO: 34.1 % (ref 35–47)
HGB BLD-MCNC: 10.4 G/DL (ref 11.5–16)
IMM GRANULOCYTES # BLD AUTO: 0.1 K/UL (ref 0–0.04)
IMM GRANULOCYTES NFR BLD AUTO: 1 % (ref 0–0.5)
LYMPHOCYTES # BLD: 0.7 K/UL (ref 0.8–3.5)
LYMPHOCYTES NFR BLD: 14 % (ref 12–49)
MCH RBC QN AUTO: 27.1 PG (ref 26–34)
MCHC RBC AUTO-ENTMCNC: 30.5 G/DL (ref 30–36.5)
MCV RBC AUTO: 88.8 FL (ref 80–99)
MONOCYTES # BLD: 0.3 K/UL (ref 0–1)
MONOCYTES NFR BLD: 5 % (ref 5–13)
NEUTS SEG # BLD: 4 K/UL (ref 1.8–8)
NEUTS SEG NFR BLD: 77 % (ref 32–75)
NRBC # BLD: 0 K/UL (ref 0–0.01)
NRBC BLD-RTO: 0 PER 100 WBC
PLATELET # BLD AUTO: 196 K/UL (ref 150–400)
PMV BLD AUTO: 9.7 FL (ref 8.9–12.9)
POTASSIUM SERPL-SCNC: 3.1 MMOL/L (ref 3.5–5.1)
PROT SERPL-MCNC: 8 G/DL (ref 6.4–8.2)
RBC # BLD AUTO: 3.84 M/UL (ref 3.8–5.2)
RBC MORPH BLD: ABNORMAL
RBC MORPH BLD: ABNORMAL
SODIUM SERPL-SCNC: 139 MMOL/L (ref 136–145)
TROPONIN I SERPL-MCNC: <0.05 NG/ML
WBC # BLD AUTO: 5.3 K/UL (ref 3.6–11)

## 2021-02-17 PROCEDURE — 93005 ELECTROCARDIOGRAM TRACING: CPT

## 2021-02-17 PROCEDURE — 82550 ASSAY OF CK (CPK): CPT

## 2021-02-17 PROCEDURE — 96374 THER/PROPH/DIAG INJ IV PUSH: CPT

## 2021-02-17 PROCEDURE — 99215 OFFICE O/P EST HI 40 MIN: CPT | Performed by: INTERNAL MEDICINE

## 2021-02-17 PROCEDURE — 2022F DILAT RTA XM EVC RTNOPTHY: CPT | Performed by: INTERNAL MEDICINE

## 2021-02-17 PROCEDURE — 1101F PT FALLS ASSESS-DOCD LE1/YR: CPT | Performed by: INTERNAL MEDICINE

## 2021-02-17 PROCEDURE — 1090F PRES/ABSN URINE INCON ASSESS: CPT | Performed by: INTERNAL MEDICINE

## 2021-02-17 PROCEDURE — 3044F HG A1C LEVEL LT 7.0%: CPT | Performed by: INTERNAL MEDICINE

## 2021-02-17 PROCEDURE — G8432 DEP SCR NOT DOC, RNG: HCPCS | Performed by: INTERNAL MEDICINE

## 2021-02-17 PROCEDURE — 84484 ASSAY OF TROPONIN QUANT: CPT

## 2021-02-17 PROCEDURE — 99284 EMERGENCY DEPT VISIT MOD MDM: CPT

## 2021-02-17 PROCEDURE — 74011250636 HC RX REV CODE- 250/636: Performed by: EMERGENCY MEDICINE

## 2021-02-17 PROCEDURE — G8417 CALC BMI ABV UP PARAM F/U: HCPCS | Performed by: INTERNAL MEDICINE

## 2021-02-17 PROCEDURE — 80053 COMPREHEN METABOLIC PANEL: CPT

## 2021-02-17 PROCEDURE — 71046 X-RAY EXAM CHEST 2 VIEWS: CPT

## 2021-02-17 PROCEDURE — 3017F COLORECTAL CA SCREEN DOC REV: CPT | Performed by: INTERNAL MEDICINE

## 2021-02-17 PROCEDURE — 36415 COLL VENOUS BLD VENIPUNCTURE: CPT

## 2021-02-17 PROCEDURE — G9899 SCRN MAM PERF RSLTS DOC: HCPCS | Performed by: INTERNAL MEDICINE

## 2021-02-17 PROCEDURE — G8427 DOCREV CUR MEDS BY ELIG CLIN: HCPCS | Performed by: INTERNAL MEDICINE

## 2021-02-17 PROCEDURE — 83880 ASSAY OF NATRIURETIC PEPTIDE: CPT

## 2021-02-17 PROCEDURE — G8536 NO DOC ELDER MAL SCRN: HCPCS | Performed by: INTERNAL MEDICINE

## 2021-02-17 PROCEDURE — G8400 PT W/DXA NO RESULTS DOC: HCPCS | Performed by: INTERNAL MEDICINE

## 2021-02-17 PROCEDURE — 85025 COMPLETE CBC W/AUTO DIFF WBC: CPT

## 2021-02-17 PROCEDURE — G8755 DIAS BP > OR = 90: HCPCS | Performed by: INTERNAL MEDICINE

## 2021-02-17 PROCEDURE — G8752 SYS BP LESS 140: HCPCS | Performed by: INTERNAL MEDICINE

## 2021-02-17 RX ORDER — FUROSEMIDE 10 MG/ML
80 INJECTION INTRAMUSCULAR; INTRAVENOUS
Status: COMPLETED | OUTPATIENT
Start: 2021-02-17 | End: 2021-02-17

## 2021-02-17 RX ADMIN — FUROSEMIDE 80 MG: 10 INJECTION, SOLUTION INTRAVENOUS at 20:39

## 2021-02-17 NOTE — PROGRESS NOTES
Chief Complaint   Patient presents with   • Shortness of Breath   • Dizziness   • Fatigue           1. Have you been to the ER, urgent care clinic since your last visit?  Hospitalized since your last visit?No    2. Have you seen or consulted any other health care providers outside of the [facility] since your last visit?  Include any pap smears or colon screening. No

## 2021-02-18 VITALS
OXYGEN SATURATION: 98 % | TEMPERATURE: 98.1 F | WEIGHT: 193 LBS | HEART RATE: 92 BPM | BODY MASS INDEX: 32.95 KG/M2 | HEIGHT: 64 IN | DIASTOLIC BLOOD PRESSURE: 99 MMHG | RESPIRATION RATE: 20 BRPM | SYSTOLIC BLOOD PRESSURE: 146 MMHG

## 2021-02-18 LAB
ATRIAL RATE: 97 BPM
CALCULATED P AXIS, ECG09: 67 DEGREES
CALCULATED R AXIS, ECG10: 51 DEGREES
CALCULATED T AXIS, ECG11: -153 DEGREES
DIAGNOSIS, 93000: NORMAL
P-R INTERVAL, ECG05: 198 MS
Q-T INTERVAL, ECG07: 390 MS
QRS DURATION, ECG06: 86 MS
QTC CALCULATION (BEZET), ECG08: 495 MS
VENTRICULAR RATE, ECG03: 97 BPM

## 2021-02-18 NOTE — ED NOTES
Patient was road tested per MD request. Patient was able to ambulate independently without difficulty. Her oxygen stayed around 96% and her heart rate stayed in the 80's. MD notified of the results.

## 2021-02-18 NOTE — ED NOTES
Patient was provided with discharge instructions. Instructions and any medications were reviewed with the patient &/or family by Dr. Tarah Wilson. Questions and concerns addressed by the provider. Patient discharged in stable condition via wheelchair and was escorted by her fiance.

## 2021-02-18 NOTE — ED PROVIDER NOTES
EMERGENCY DEPARTMENT HISTORY AND PHYSICAL EXAM      Date: 2/17/2021  Patient Name: Gregory Yu    History of Presenting Illness     Chief Complaint   Patient presents with   [facility] Referral / Consult     Patient arrives wheeled into triage with CC of referral by PCP for CHF. Patient reports SOB starting 3-4 weeks ago. History Provided By: Patient    HPI: Gregory Yu, 78 y.o. female with PMHx significant for diabetes, hypertension, hyperlipidemia, CHF who presents with a chief complaint of shortness of breath for the last month with associated fatigue. Reports her symptoms are worse with exertion and she does sleep propped up at night, however does not wake up in the middle of the night short of breath. Has been seen by her PCP who increased her dose of Lasix recently but she was told to come to the ER for evaluation. She has not called her cardiologist since her symptoms began. Denies any productive cough, chest pain, abdominal pain, nausea, vomiting. PCP: Kait Valdez MD    There are no other complaints, changes, or physical findings at this time. Current Outpatient Medications   Medication Sig Dispense Refill    diclofenac (VOLTAREN) 1 % gel APPLY TO AFFECTED AREA TWICE A DAY AS NEEDED      fluticasone propion-salmeteroL (ADVAIR/WIXELA) 250-50 mcg/dose diskus inhaler       Sidestream misc       doxycycline (MONODOX) 100 mg capsule Take 1 Cap by mouth two (2) times a day. 14 Cap 0    fluticasone propionate (FLONASE) 50 mcg/actuation nasal spray 2 Sprays by Both Nostrils route daily. 1 Bottle 0    allopurinoL (ZYLOPRIM) 300 mg tablet TAKE 1 TABLET BY MOUTH DAILY 90 Tab 3    insulin glargine (LANTUS,BASAGLAR) 100 unit/mL (3 mL) inpn 60 units every morning 6 Adjustable Dose Pre-filled Pen Syringe 11    oxybutynin (DITROPAN) 5 mg tablet Take 1 Tab by mouth three (3) times daily.  90 Tab 11    atorvastatin (LIPITOR) 40 mg tablet TAKE 1 TABLET BY MOUTH EVERY DAY 90 Tab 3    metFORMIN ER (GLUCOPHAGE XR) 500 mg tablet TAKE 1 TABLET BY MOUTH THREE TIMES DAILY WITH MEALS 270 Tab 3    Insulin Needles, Disposable, (BD ULTRA-FINE SHORT PEN NEEDLE) 31 gauge x 5/16\" ndle USE WITH INSULIN PENS TWICE DAILY AS DIRECTED 100 Pen Needle 11    alcohol swabs (BD SINGLE USE SWABS REGULAR) padm USE TO TEST BLOOD SUGAR.dx.e11.9 100 Pad 11    potassium chloride SR (KLOR-CON 10) 10 mEq tablet 2  Tabs 2 times a day 360 Tab 3    metOLazone (ZAROXOLYN) 5 mg tablet Take 1 Tab by mouth daily. 30 Tab 0    furosemide (LASIX) 40 mg tablet TAKE 3 TABLETS BY MOUTH EVERY MORNING (Patient taking differently: TAKE 3 TABLETS BY MOUTH EVERY MORNING AND 1 TABLET IN THE EVENING) 90 Tab 11    glimepiride (AMARYL) 4 mg tablet   TAKE 1 TABLET BY MOUTH TWICE DAILY BEFORE A MEAL      mirabegron ER (MYRBETRIQ) 50 mg ER tablet Take 1 Tab by mouth daily. 30 Tab 11    glucose blood VI test strips (FREESTYLE LITE STRIPS) strip Use to test blood sugar three times daily. Dx.e11.9 100 Strip 11    Blood-Glucose Meter (FREESTYLE LITE METER) monitoring kit Test three times a day dx. e119 1 Kit 0    glyBURIDE (DIABETA) 1.25 mg tablet Take 1.25 mg by mouth Daily (before breakfast).       lisinopril (PRINIVIL, ZESTRIL) 20 mg tablet TAKE 1 TABLET BY MOUTH ONCE DAILY IN THE MORNING 90 Tab 3    ONE TOUCH DELICA 33 gauge misc   11    NURIA PEN NEEDLE 32 x 5/32 \" ndle       Blood-Glucose Meter monitoring kit Testing BID-DX:250.00 1 kit 0    Lancets (ONE TOUCH ULTRASOFT LANCETS) misc TESTING BID 1 Package 11    glucose blood VI test strips (ASCENSIA AUTODISC VI, ONE TOUCH ULTRA TEST VI) strip TESTING BID 1 Package 11     Past History     Past Medical History:  Past Medical History:   Diagnosis Date    Arthritis     Congestive heart failure, unspecified     Diabetes ([address]     Hypercholesterolemia     Hypertension      Past Surgical History:  Past Surgical History:   Procedure Laterality Date    HX ORTHOPAEDIC      Arthroscopy right knee Family History:  Family History   Problem Relation Age of Onset    Diabetes Mother      Social History:  Social History     Tobacco Use    Smoking status: Former Smoker     Packs/day: 0.25     Years: 20.00     Pack years: 5.00     Quit date: 2014     Years since quittin.1    Smokeless tobacco: Never Used   Substance Use Topics    Alcohol use: Yes     Alcohol/week: 0.0 standard drinks     Comment: 21-24 Beers per week    Drug use: No     Allergies:  No Known Allergies  Review of Systems   Review of Systems   Constitutional: Negative for chills and fever. HENT: Negative for congestion, rhinorrhea and sore throat. Respiratory: Positive for shortness of breath. Negative for cough. Cardiovascular: Negative for chest pain. Gastrointestinal: Negative for abdominal pain, nausea and vomiting. Genitourinary: Negative for dysuria and urgency. Skin: Negative for rash. Neurological: Negative for dizziness, light-headedness and headaches. All other systems reviewed and are negative. Physical Exam   Physical Exam  Vitals signs and nursing note reviewed. Constitutional:       General: She is not in acute distress. Appearance: She is well-developed. HENT:      Head: Normocephalic and atraumatic. Eyes:      Conjunctiva/sclera: Conjunctivae normal.      Pupils: Pupils are equal, round, and reactive to light. Neck:      Musculoskeletal: Normal range of motion. Cardiovascular:      Rate and Rhythm: Normal rate and regular rhythm. Comments: Trace pitting edema b/l  Pulmonary:      Effort: Pulmonary effort is normal. No respiratory distress. Breath sounds: Normal breath sounds. No stridor. No rales. Comments: Speaking in complete sentences  Abdominal:      General: There is no distension. Palpations: Abdomen is soft. Tenderness: There is no abdominal tenderness. Musculoskeletal: Normal range of motion. Skin:     General: Skin is warm and dry. Neurological:      Mental Status: She is alert and oriented to person, place, and time. Diagnostic Study Results   Labs -     Recent Results (from the past 12 hour(s))   EKG, 12 LEAD, INITIAL    Collection Time: 02/17/21  6:00 PM   Result Value Ref Range    Ventricular Rate 97 BPM    Atrial Rate 97 BPM    P-R Interval 198 ms    QRS Duration 86 ms    Q-T Interval 390 ms    QTC Calculation (Bezet) 495 ms    Calculated P Axis 67 degrees    Calculated R Axis 51 degrees    Calculated T Axis -153 degrees    Diagnosis       Normal sinus rhythm  Possible Left atrial enlargement  T wave abnormality, consider lateral ischemia  Prolonged QT  When compared with ECG of 13-DEC-2020 04:19,  T wave inversion no longer evident in Anterior leads     CBC WITH AUTOMATED DIFF    Collection Time: 02/17/21  6:06 PM   Result Value Ref Range    WBC 5.3 3.6 - 11.0 K/uL    RBC 3.84 3.80 - 5.20 M/uL    HGB 10.4 (L) 11.5 - 16.0 g/dL    HCT 34.1 (L) 35.0 - 47.0 %    MCV 88.8 80.0 - 99.0 FL    MCH 27.1 26.0 - 34.0 PG    MCHC 30.5 30.0 - 36.5 g/dL    RDW 17.2 (H) 11.5 - 14.5 %    PLATELET 292 049 - 282 K/uL    MPV 9.7 8.9 - 12.9 FL    NRBC 0.0 0  WBC    ABSOLUTE NRBC 0.00 0.00 - 0.01 K/uL    NEUTROPHILS 77 (H) 32 - 75 %    LYMPHOCYTES 14 12 - 49 %    MONOCYTES 5 5 - 13 %    EOSINOPHILS 2 0 - 7 %    BASOPHILS 1 0 - 1 %    IMMATURE GRANULOCYTES 1 (H) 0.0 - 0.5 %    ABS. NEUTROPHILS 4.0 1.8 - 8.0 K/UL    ABS. LYMPHOCYTES 0.7 (L) 0.8 - 3.5 K/UL    ABS. MONOCYTES 0.3 0.0 - 1.0 K/UL    ABS. EOSINOPHILS 0.1 0.0 - 0.4 K/UL    ABS. BASOPHILS 0.1 0.0 - 0.1 K/UL    ABS. IMM.  GRANS. 0.1 (H) 0.00 - 0.04 K/UL    DF SMEAR SCANNED      RBC COMMENTS ANISOCYTOSIS  1+        RBC COMMENTS MACROCYTOSIS  PRESENT       METABOLIC PANEL, COMPREHENSIVE    Collection Time: 02/17/21  6:06 PM   Result Value Ref Range    Sodium 139 136 - 145 mmol/L    Potassium 3.1 (L) 3.5 - 5.1 mmol/L    Chloride 103 97 - 108 mmol/L    CO2 30 21 - 32 mmol/L    Anion gap 6 5 - 15 mmol/L    Glucose 185 (H) 65 - 100 mg/dL    BUN 26 (H) 6 - 20 MG/DL    Creatinine 1.05 (H) 0.55 - 1.02 MG/DL    BUN/Creatinine ratio 25 (H) 12 - 20      GFR est AA >60 >60 ml/min/1.73m2    GFR est non-AA 52 (L) >60 ml/min/1.73m2    Calcium 8.3 (L) 8.5 - 10.1 MG/DL    Bilirubin, total 1.6 (H) 0.2 - 1.0 MG/DL    ALT (SGPT) 49 12 - 78 U/L    AST (SGOT) 39 (H) 15 - 37 U/L    Alk. phosphatase 62 45 - 117 U/L    Protein, total 8.0 6.4 - 8.2 g/dL    Albumin 3.3 (L) 3.5 - 5.0 g/dL    Globulin 4.7 (H) 2.0 - 4.0 g/dL    A-G Ratio 0.7 (L) 1.1 - 2.2     CK W/ REFLX CKMB    Collection Time: 02/17/21  6:06 PM   Result Value Ref Range     26 - 192 U/L   TROPONIN I    Collection Time: 02/17/21  6:06 PM   Result Value Ref Range    Troponin-I, Qt. <0.05 <0.05 ng/mL   NT-PRO BNP    Collection Time: 02/17/21  6:06 PM   Result Value Ref Range    NT pro-BNP 2,529 (H) <125 PG/ML       Radiologic Studies -   XR CHEST PA LAT   Final Result   1. Unchanged cardiomegaly. Pulmonary vascular congestion without overt pulmonary   edema. Xr Chest Pa Lat    Result Date: 2/17/2021  1. Unchanged cardiomegaly. Pulmonary vascular congestion without overt pulmonary edema. Medical Decision Making   I am the first provider for this patient. I reviewed the vital signs, available nursing notes, past medical history, past surgical history, family history and social history. Vital Signs-Reviewed the patient's vital signs. Patient Vitals for the past 12 hrs:   Temp Pulse Resp BP SpO2   02/17/21 2036 98 °F (36.7 °C) 96 20 (!) 136/96 97 %   02/17/21 1756 98.1 °F (36.7 °C) (!) 101 20 (!) 145/92 93 %       Pulse Oximetry Analysis - 98% on ra    Cardiac Monitor:   Rate: 96 bpm  Rhythm: Normal Sinus Rhythm      ED EKG interpretation:  Rhythm: normal sinus rhythm; and regular . Rate (approx.): 97; Axis: normal; P wave: normal; QRS interval: normal ; ST/T wave: T wave inverted; Other findings: borderline ekg.  This EKG was interpreted by NAVEED Rica Treadwell MD,ED Provider. Records Reviewed: Nursing Notes and Old Medical Records    Provider Notes (Medical Decision Making):   Patient presents with a chief complaint of shortness of breath ongoing for last month. My evaluation she is overall well-appearing with stable vital signs. Not hypoxic on room air and speaking in full and complete sentences. Trace pitting edema noted bilaterally. Patient has no infectious symptoms so doubt pneumonia but will check chest x-ray to rule out focal airspace disease. Will also check basic lab work, troponin, BNP. Will give dose of IV diuretic and reevaluate. ED Course:   Initial assessment performed. The patients presenting problems have been discussed, and they are in agreement with the care plan formulated and outlined with them. I have encouraged them to ask questions as they arise throughout their visit. ED Course as of Feb 17 2332   Wed Feb 17, 2021 2242 Patient with significant diuresis after Lasix. Was able to ambulate with a steady gait without becoming hypoxic. Stable for discharge. [AMOR]      ED Course User Index  Akil Alvarez MD       Procedures:  Procedures    Critical Care:  none    Disposition:  Discharge Note:  The patient has been re-evaluated and is ready for discharge. Reviewed available results with patient. Counseled patient on diagnosis and care plan. Patient has expressed understanding, and all questions have been answered. Patient agrees with plan and agrees to follow up as recommended, or to return to the ED if their symptoms worsen. Discharge instructions have been provided and explained to the patient, along with reasons to return to the ED. PLAN:  1. Current Discharge Medication List        2.    Follow-up Information     Follow up With Specialties Details Why Contact Info    Hosea Glasgow MD Internal Medicine Schedule an appointment as soon as possible for a visit   [address] [facility] [address] Sulaiman Cohn MD Cardiology Schedule an appointment as soon as possible for a visit   [facility] [phone]      [facility] EMERGENCY DEPT Emergency Medicine  As needed, If symptoms worsen [address]  [address] [facility]  [phone]        Return to ED if worse     Diagnosis     Clinical Impression:   1. Acute congestive heart failure, unspecified heart failure type [facility])            Please note that this dictation was completed with [email or web address], the computer voice recognition software. Quite often unanticipated grammatical, syntax, homophones, and other interpretive errors are inadvertently transcribed by the computer software. Please disregard these errors.   Please excuse any errors that have escaped final proofreading

## 2021-02-19 ENCOUNTER — PATIENT OUTREACH (OUTPATIENT)
Dept: CASE MANAGEMENT | Age: 71
End: 2021-02-19

## 2021-02-19 NOTE — ACP (ADVANCE CARE PLANNING)
Pt states she has ACP. Urged pt to present copy at physicians office to be scanned into medical record.

## 2021-02-19 NOTE — PROGRESS NOTES
Patient contacted regarding Franklin Luna. Discussed COVID-19 related testing which was not done at this time. Test results were not done. Care Transition Nurse/ Ambulatory Care Manager contacted the patient by telephone to perform post discharge assessment. Call within 2 business days of discharge: Yes Verified name and  with patient as identifiers. Provided introduction to self, and explanation of the CTN/ACM role, and reason for call due to risk factors for infection and/or exposure to COVID-19. Symptoms reviewed with patient who verbalized the following symptoms: no worsening symptoms      Due to no new or worsening symptoms encounter was not routed to provider for escalation. Discussed follow-up appointments. If no appointment was previously scheduled, appointment scheduling offered:  No  [address] follow up appointment(s):   Future Appointments   Date Time Provider John Escalante   3/18/2021  9:20 AM Zenaida Guevara MD [facility] [address] [facility] BS AMB   3/18/2021 10:45 AM Conor Thomas MD [facility] MAIN BS AMB     Non-[facility] follow up appointment(s): none- pt attempting to get earlier appointment with cardiologist   Advance Care Planning:   Does patient have an Advance Directive:  see ACP notes. Patient has following risk factors of: heart failure. CTN/ACM reviewed discharge instructions, medical action plan and red flags such as increased shortness of breath, increasing fever and signs of decompensation with patient who verbalized understanding. Discussed exposure protocols and quarantine with CDC Guidelines What to do if you are sick with coronavirus disease .  Patient was given an opportunity for questions and concerns. The patient agrees to contact the [facility] exposure line [phone], local health department [address]  [phone] and PCP office for questions related to their healthcare. CTN/ACM provided contact information for future needs.     Reviewed and educated patient on any new and changed medications related to discharge diagnosis     Patient/family/caregiver given information for [employer] and agrees to enroll no  14 day call based on severity of symptoms and risk factors.

## 2021-02-21 NOTE — PROGRESS NOTES
[address] [facility]  [place] [address]  [address] [address] [facility] [address]  Phone:  [phone]  Fax: [phone]       Chief Complaint   Patient presents with    Shortness of Breath    Dizziness    Fatigue   . SUBJECTIVE:    Joy Brown is a 78 y.o. female comes in complaining of increasing shortness of breath. She has significant dyspnea, not currently. She has definite orthopnea and PND. There has been no diuresis since being on the diuretic. She has a past history of diabetes, primary hypertension, dyslipidemia, as well as obesity and COPD. Current Outpatient Medications   Medication Sig Dispense Refill    diclofenac (VOLTAREN) 1 % gel APPLY TO AFFECTED AREA TWICE A DAY AS NEEDED      fluticasone propion-salmeteroL (ADVAIR/WIXELA) 250-50 mcg/dose diskus inhaler       Sidestream misc       doxycycline (MONODOX) 100 mg capsule Take 1 Cap by mouth two (2) times a day. 14 Cap 0    fluticasone propionate (FLONASE) 50 mcg/actuation nasal spray 2 Sprays by Both Nostrils route daily. 1 Bottle 0    allopurinoL (ZYLOPRIM) 300 mg tablet TAKE 1 TABLET BY MOUTH DAILY 90 Tab 3    insulin glargine (LANTUS,BASAGLAR) 100 unit/mL (3 mL) inpn 60 units every morning 6 Adjustable Dose Pre-filled Pen Syringe 11    oxybutynin (DITROPAN) 5 mg tablet Take 1 Tab by mouth three (3) times daily.  90 Tab 11    atorvastatin (LIPITOR) 40 mg tablet TAKE 1 TABLET BY MOUTH EVERY DAY 90 Tab 3    metFORMIN ER (GLUCOPHAGE XR) 500 mg tablet TAKE 1 TABLET BY MOUTH THREE TIMES DAILY WITH MEALS 270 Tab 3    Insulin Needles, Disposable, (BD ULTRA-FINE SHORT PEN NEEDLE) 31 gauge x 5/16\" ndle USE WITH INSULIN PENS TWICE DAILY AS DIRECTED 100 Pen Needle 11    alcohol swabs (BD SINGLE USE SWABS REGULAR) padm USE TO TEST BLOOD SUGAR.dx.e11.9 100 Pad 11    potassium chloride SR (KLOR-CON 10) 10 mEq tablet 2  Tabs 2 times a day 360 Tab 3    metOLazone (ZAROXOLYN) 5 mg tablet Take 1 Tab by mouth daily. 30 Tab 0    furosemide (LASIX) 40 mg tablet TAKE 3 TABLETS BY MOUTH EVERY MORNING (Patient taking differently: TAKE 3 TABLETS BY MOUTH EVERY MORNING AND 1 TABLET IN THE EVENING) 90 Tab 11    glimepiride (AMARYL) 4 mg tablet   TAKE 1 TABLET BY MOUTH TWICE DAILY BEFORE A MEAL      mirabegron ER (MYRBETRIQ) 50 mg ER tablet Take 1 Tab by mouth daily. 30 Tab 11    glucose blood VI test strips (FREESTYLE LITE STRIPS) strip Use to test blood sugar three times daily. Dx.e11.9 100 Strip 11    Blood-Glucose Meter (FREESTYLE LITE METER) monitoring kit Test three times a day dx. e119 1 Kit 0    glyBURIDE (DIABETA) 1.25 mg tablet Take 1.25 mg by mouth Daily (before breakfast).       lisinopril (PRINIVIL, ZESTRIL) 20 mg tablet TAKE 1 TABLET BY MOUTH ONCE DAILY IN THE MORNING 90 Tab 3    ONE TOUCH DELICA 33 gauge misc   11    NURIA PEN NEEDLE 32 x 5/32 \" ndle       Blood-Glucose Meter monitoring kit Testing BID-DX:250.00 1 kit 0    Lancets (ONE TOUCH ULTRASOFT LANCETS) misc TESTING BID 1 Package 11    glucose blood VI test strips (ASCENSIA AUTODISC VI, ONE TOUCH ULTRA TEST VI) strip TESTING BID 1 Package 11     Past Medical History:   Diagnosis Date    Arthritis     Diabetes ([facility] [address]     Hypercholesterolemia     Hypertension      Past Surgical History:   Procedure Laterality Date    HX ORTHOPAEDIC      Arthroscopy right knee     No Known Allergies      REVIEW OF SYSTEMS:  General: negative for - chills or fever  ENT: negative for - headaches, nasal congestion or tinnitus  Respiratory: negative for - cough, hemoptysis, shortness of breath or wheezing  Cardiovascular : negative for - chest pain, edema, palpitations or shortness of breath  Gastrointestinal: negative for - abdominal pain, blood in stools, heartburn or nausea/vomiting  Genito-Urinary: no dysuria, trouble voiding, or hematuria  Musculoskeletal: negative for - gait disturbance, joint pain, joint stiffness or joint swelling  Neurological: no TIA or stroke symptoms  Hematologic: no bruises, no bleeding, no swollen glands  Integument: no lumps, mole changes, nail changes or rash  Endocrine: no malaise/lethargy or unexpected weight changes      Social History     Socioeconomic History    Marital status:      Spouse name: Not on file    Number of children: 3    Years of education: 15    Highest education level: Not on file   Occupational History    Occupation: retired   Tobacco Use    Smoking status: Former Smoker     Packs/day: 0.25     Years: 20.00     Pack years: 5.00     Quit date: 2014     Years since quittin.2    Smokeless tobacco: Never Used   Substance and Sexual Activity    Alcohol use: Yes     Alcohol/week: 0.0 standard drinks     Comment: 21-24 Beers per week    Drug use: No    Sexual activity: Yes     Partners: Male     Family History   Problem Relation Age of Onset    Diabetes Mother        OBJECTIVE:    Visit Vitals  BP (!) 148/92   Pulse 100   Temp 97.7 °F (36.5 °C) (Oral)   Resp 14   Ht 5' 4\" (1.626 m)   Wt 193 lb 3.2 oz (87.6 kg)   SpO2 94%   BMI 33.16 kg/m²     CONSTITUTIONAL: well , well nourished, appears age appropriate  EYES: perrla, eom intact  ENMT:moist mucous membranes, pharynx clear  NECK: supple. Thyroid normal,JVD to angle of jaw  RESPIRATORY: Chest: clear to ascultation and percussion   CARDIOVASCULAR: Heart: regular rate and rhythm  GASTROINTESTINAL: Abdomen: soft, bowel sounds active  HEMATOLOGIC: no pathological lymph nodes palpated  MUSCULOSKELETAL: Extremities: no edema, pulse 1+   INTEGUMENT: No unusual rashes or suspicious skin lesions noted. Nails appear normal.  NEUROLOGIC: non-focal exam   MENTAL STATUS: alert and oriented, appropriate affect      ASSESSMENT:  1. Biventricular congestive heart failure ([address]    2. Cardiomyopathy, unspecified type ([address]    3. Essential hypertension    4. Uncontrolled type 2 diabetes mellitus with hyperglycemia ([address]    5. Dyslipidemia    6. Obesity (BMI 30.0-34. 9) PLAN:  1. The patient has progressive biventricular congestive heart failure. I suggest she increase her furosemide to 80 mg b.i.d., but more importantly I suggest going to the emergency room because she probably needs to be admitted given the recalcitrant symptoms present. She also needs to be evaluated again by her cardiologist.  The information that the cardiologist had when he saw her was not accurate in that the ejection fraction could not be evaluated or was normal, but is obviously not the case currently. She has a progressive cardiomyopathy. Given the degree of LV dysfunction present, she also needs to have a defibrillator placed. 2. Blood pressure is reasonable today. No adjustments are made. 3. Diabetes historically has been doing reasonably well. 4. She will continue her statin as prescribed in view of her increased cardiovascular risk. 5. She also needs to lose weight. This can be accomplished by eating meals, eliminating snacks, and avoiding the consumption of processed carbohydrates. Follow-up and Dispositions    · Return in about 4 weeks (around 3/17/2021).            Angela Black MD

## 2021-03-04 ENCOUNTER — PATIENT OUTREACH (OUTPATIENT)
Dept: CASE MANAGEMENT | Age: 71
End: 2021-03-04

## 2021-03-04 NOTE — PROGRESS NOTES
Patient resolved from Transition of Care episode on 2/18  Patient/family has been provided the following resources and education related to COVID-19:                         Signs, symptoms and red flags related to COVID-19            CDC exposure and quarantine guidelines            [facility] exposure contact - [phone]            Contact for their local Department of Health                 Patient currently reports that the following symptoms have improved:  pt states no covid symptoms but still does not \"feel well\". States has appointment 3/8 with her PCP. Urged pt to attempt to get earlier appointment     No further outreach scheduled with this CTN/ACM. Episode of Care resolved. Patient has this CTN/ACM contact information if future needs arise.

## 2021-03-09 ENCOUNTER — APPOINTMENT (OUTPATIENT)
Dept: NON INVASIVE DIAGNOSTICS | Age: 71
DRG: 291 | End: 2021-03-09
Attending: STUDENT IN AN ORGANIZED HEALTH CARE EDUCATION/TRAINING PROGRAM
Payer: MEDICARE

## 2021-03-09 ENCOUNTER — HOSPITAL ENCOUNTER (INPATIENT)
Age: 71
LOS: 1 days | Discharge: ANOTHER HOSPITAL | DRG: 291 | End: 2021-03-10
Attending: EMERGENCY MEDICINE | Admitting: STUDENT IN AN ORGANIZED HEALTH CARE EDUCATION/TRAINING PROGRAM
Payer: MEDICARE

## 2021-03-09 ENCOUNTER — APPOINTMENT (OUTPATIENT)
Dept: GENERAL RADIOLOGY | Age: 71
DRG: 291 | End: 2021-03-09
Attending: EMERGENCY MEDICINE
Payer: MEDICARE

## 2021-03-09 ENCOUNTER — APPOINTMENT (OUTPATIENT)
Dept: VASCULAR SURGERY | Age: 71
DRG: 291 | End: 2021-03-09
Attending: STUDENT IN AN ORGANIZED HEALTH CARE EDUCATION/TRAINING PROGRAM
Payer: MEDICARE

## 2021-03-09 ENCOUNTER — OFFICE VISIT (OUTPATIENT)
Dept: CARDIOLOGY CLINIC | Age: 71
End: 2021-03-09

## 2021-03-09 VITALS
OXYGEN SATURATION: 99 % | BODY MASS INDEX: 32.44 KG/M2 | WEIGHT: 190 LBS | HEIGHT: 64 IN | HEART RATE: 96 BPM | SYSTOLIC BLOOD PRESSURE: 128 MMHG | TEMPERATURE: 96.9 F | DIASTOLIC BLOOD PRESSURE: 79 MMHG | RESPIRATION RATE: 20 BRPM

## 2021-03-09 DIAGNOSIS — R06.09 DOE (DYSPNEA ON EXERTION): Primary | ICD-10-CM

## 2021-03-09 DIAGNOSIS — I10 ESSENTIAL HYPERTENSION: ICD-10-CM

## 2021-03-09 DIAGNOSIS — E83.42 HYPOMAGNESEMIA: ICD-10-CM

## 2021-03-09 DIAGNOSIS — I42.0 DILATED CARDIOMYOPATHY (HCC): Primary | ICD-10-CM

## 2021-03-09 DIAGNOSIS — E87.6 HYPOKALEMIA: ICD-10-CM

## 2021-03-09 DIAGNOSIS — E78.5 DYSLIPIDEMIA: ICD-10-CM

## 2021-03-09 PROBLEM — I50.9 CHF EXACERBATION (HCC): Status: ACTIVE | Noted: 2021-03-09

## 2021-03-09 LAB
ALBUMIN SERPL-MCNC: 3.1 G/DL (ref 3.5–5)
ALBUMIN/GLOB SERPL: 0.6 {RATIO} (ref 1.1–2.2)
ALP SERPL-CCNC: 85 U/L (ref 45–117)
ALT SERPL-CCNC: 39 U/L (ref 12–78)
ANION GAP SERPL CALC-SCNC: 9 MMOL/L (ref 5–15)
APPEARANCE UR: CLEAR
AST SERPL-CCNC: 30 U/L (ref 15–37)
BACTERIA URNS QL MICRO: NEGATIVE /HPF
BASOPHILS # BLD: 0.1 K/UL (ref 0–0.1)
BASOPHILS NFR BLD: 1 % (ref 0–1)
BILIRUB SERPL-MCNC: 1.3 MG/DL (ref 0.2–1)
BILIRUB UR QL: NEGATIVE
BNP SERPL-MCNC: 2211 PG/ML (ref 0–125)
BUN SERPL-MCNC: 20 MG/DL (ref 6–20)
BUN/CREAT SERPL: 22 (ref 12–20)
CALCIUM SERPL-MCNC: 9 MG/DL (ref 8.5–10.1)
CHLORIDE SERPL-SCNC: 105 MMOL/L (ref 97–108)
CO2 SERPL-SCNC: 30 MMOL/L (ref 21–32)
COLOR UR: ABNORMAL
COVID-19 RAPID TEST, COVR: NOT DETECTED
CREAT SERPL-MCNC: 0.91 MG/DL (ref 0.55–1.02)
D DIMER PPP FEU-MCNC: 0.94 MG/L FEU (ref 0–0.65)
DIFFERENTIAL METHOD BLD: ABNORMAL
ECHO AV CUSP MM: 1.85 CM
ECHO AV MEAN GRADIENT: 2.37 MMHG
ECHO AV PEAK GRADIENT: 3.5 MMHG
ECHO AV PEAK VELOCITY: 93.5 CM/S
ECHO AV VTI: 17.4 CM
ECHO LA AREA 4C: 23.45 CM2
ECHO LA MAJOR AXIS: 4.37 CM
ECHO LA MINOR AXIS: 2.28 CM
ECHO LA VOL 2C: 80.68 ML (ref 22–52)
ECHO LA VOL 4C: 79.28 ML (ref 22–52)
ECHO LA VOL BP: 91.07 ML (ref 22–52)
ECHO LA VOL/BSA BIPLANE: 47.59 ML/M2 (ref 16–28)
ECHO LA VOLUME INDEX A2C: 42.16 ML/M2 (ref 16–28)
ECHO LA VOLUME INDEX A4C: 41.42 ML/M2 (ref 16–28)
ECHO LV INTERNAL DIMENSION DIASTOLIC: 5.77 CM (ref 3.9–5.3)
ECHO LV INTERNAL DIMENSION SYSTOLIC: 5.17 CM
ECHO LV IVSD: 1.04 CM (ref 0.6–0.9)
ECHO LV MASS 2D: 240.2 G (ref 67–162)
ECHO LV MASS INDEX 2D: 125.5 G/M2 (ref 43–95)
ECHO LV POSTERIOR WALL DIASTOLIC: 1.02 CM (ref 0.6–0.9)
ECHO LVOT PEAK GRADIENT: 1.43 MMHG
ECHO LVOT PEAK VELOCITY: 59.75 CM/S
ECHO LVOT VTI: 10.39 CM
ECHO PV MAX VELOCITY: 75.3 CM/S
ECHO PV PEAK INSTANTANEOUS GRADIENT SYSTOLIC: 2.27 MMHG
ECHO RV INTERNAL DIMENSION: 4.14 CM
ECHO TV REGURGITANT MAX VELOCITY: 275.3 CM/S
ECHO TV REGURGITANT PEAK GRADIENT: 30.66 MMHG
EOSINOPHIL # BLD: 0.2 K/UL (ref 0–0.4)
EOSINOPHIL NFR BLD: 4 % (ref 0–7)
EPITH CASTS URNS QL MICRO: ABNORMAL /LPF
ERYTHROCYTE [DISTWIDTH] IN BLOOD BY AUTOMATED COUNT: 17.9 % (ref 11.5–14.5)
GLOBULIN SER CALC-MCNC: 5.1 G/DL (ref 2–4)
GLUCOSE BLD STRIP.AUTO-MCNC: 171 MG/DL (ref 65–100)
GLUCOSE BLD STRIP.AUTO-MCNC: 191 MG/DL (ref 65–100)
GLUCOSE SERPL-MCNC: 143 MG/DL (ref 65–100)
GLUCOSE UR STRIP.AUTO-MCNC: NEGATIVE MG/DL
HCT VFR BLD AUTO: 37.6 % (ref 35–47)
HGB BLD-MCNC: 11.7 G/DL (ref 11.5–16)
HGB UR QL STRIP: ABNORMAL
IMM GRANULOCYTES # BLD AUTO: 0 K/UL (ref 0–0.04)
IMM GRANULOCYTES NFR BLD AUTO: 0 % (ref 0–0.5)
KETONES UR QL STRIP.AUTO: NEGATIVE MG/DL
LA VOL DISK BP: 81.97 ML (ref 22–52)
LEUKOCYTE ESTERASE UR QL STRIP.AUTO: NEGATIVE
LVOT MG: 0.7 MMHG
LYMPHOCYTES # BLD: 0.7 K/UL (ref 0.8–3.5)
LYMPHOCYTES NFR BLD: 13 % (ref 12–49)
MAGNESIUM SERPL-MCNC: 1.3 MG/DL (ref 1.6–2.4)
MCH RBC QN AUTO: 27.5 PG (ref 26–34)
MCHC RBC AUTO-ENTMCNC: 31.1 G/DL (ref 30–36.5)
MCV RBC AUTO: 88.5 FL (ref 80–99)
MONOCYTES # BLD: 0.2 K/UL (ref 0–1)
MONOCYTES NFR BLD: 3 % (ref 5–13)
NEUTS SEG # BLD: 4.4 K/UL (ref 1.8–8)
NEUTS SEG NFR BLD: 79 % (ref 32–75)
NITRITE UR QL STRIP.AUTO: NEGATIVE
NRBC # BLD: 0 K/UL (ref 0–0.01)
NRBC BLD-RTO: 0 PER 100 WBC
PH UR STRIP: 7 [PH] (ref 5–8)
PLATELET # BLD AUTO: 292 K/UL (ref 150–400)
PMV BLD AUTO: 10.6 FL (ref 8.9–12.9)
POTASSIUM SERPL-SCNC: 3.3 MMOL/L (ref 3.5–5.1)
PROT SERPL-MCNC: 8.2 G/DL (ref 6.4–8.2)
PROT UR STRIP-MCNC: 100 MG/DL
RBC # BLD AUTO: 4.25 M/UL (ref 3.8–5.2)
RBC #/AREA URNS HPF: ABNORMAL /HPF (ref 0–5)
RBC MORPH BLD: ABNORMAL
SARS-COV-2, COV2: NORMAL
SERVICE CMNT-IMP: ABNORMAL
SERVICE CMNT-IMP: ABNORMAL
SODIUM SERPL-SCNC: 144 MMOL/L (ref 136–145)
SOURCE, COVRS: NORMAL
SP GR UR REFRACTOMETRY: 1.01 (ref 1–1.03)
TROPONIN I SERPL-MCNC: 0.05 NG/ML
TROPONIN I SERPL-MCNC: 0.08 NG/ML
UA: UC IF INDICATED,UAUC: ABNORMAL
UROBILINOGEN UR QL STRIP.AUTO: 2 EU/DL (ref 0.2–1)
WBC # BLD AUTO: 5.6 K/UL (ref 3.6–11)
WBC URNS QL MICRO: ABNORMAL /HPF (ref 0–4)

## 2021-03-09 PROCEDURE — G8417 CALC BMI ABV UP PARAM F/U: HCPCS | Performed by: SPECIALIST

## 2021-03-09 PROCEDURE — G8400 PT W/DXA NO RESULTS DOC: HCPCS | Performed by: SPECIALIST

## 2021-03-09 PROCEDURE — 82962 GLUCOSE BLOOD TEST: CPT

## 2021-03-09 PROCEDURE — 74011250637 HC RX REV CODE- 250/637: Performed by: STUDENT IN AN ORGANIZED HEALTH CARE EDUCATION/TRAINING PROGRAM

## 2021-03-09 PROCEDURE — 74011000250 HC RX REV CODE- 250: Performed by: STUDENT IN AN ORGANIZED HEALTH CARE EDUCATION/TRAINING PROGRAM

## 2021-03-09 PROCEDURE — G8752 SYS BP LESS 140: HCPCS | Performed by: SPECIALIST

## 2021-03-09 PROCEDURE — 99285 EMERGENCY DEPT VISIT HI MDM: CPT

## 2021-03-09 PROCEDURE — 93005 ELECTROCARDIOGRAM TRACING: CPT

## 2021-03-09 PROCEDURE — 93970 EXTREMITY STUDY: CPT | Performed by: INTERNAL MEDICINE

## 2021-03-09 PROCEDURE — G8536 NO DOC ELDER MAL SCRN: HCPCS | Performed by: SPECIALIST

## 2021-03-09 PROCEDURE — 93306 TTE W/DOPPLER COMPLETE: CPT | Performed by: SPECIALIST

## 2021-03-09 PROCEDURE — 65270000032 HC RM SEMIPRIVATE

## 2021-03-09 PROCEDURE — 3017F COLORECTAL CA SCREEN DOC REV: CPT | Performed by: SPECIALIST

## 2021-03-09 PROCEDURE — 1101F PT FALLS ASSESS-DOCD LE1/YR: CPT | Performed by: SPECIALIST

## 2021-03-09 PROCEDURE — 36415 COLL VENOUS BLD VENIPUNCTURE: CPT

## 2021-03-09 PROCEDURE — 1090F PRES/ABSN URINE INCON ASSESS: CPT | Performed by: SPECIALIST

## 2021-03-09 PROCEDURE — 81001 URINALYSIS AUTO W/SCOPE: CPT

## 2021-03-09 PROCEDURE — 85025 COMPLETE CBC W/AUTO DIFF WBC: CPT

## 2021-03-09 PROCEDURE — 83880 ASSAY OF NATRIURETIC PEPTIDE: CPT

## 2021-03-09 PROCEDURE — 94640 AIRWAY INHALATION TREATMENT: CPT

## 2021-03-09 PROCEDURE — 71045 X-RAY EXAM CHEST 1 VIEW: CPT

## 2021-03-09 PROCEDURE — 80053 COMPREHEN METABOLIC PANEL: CPT

## 2021-03-09 PROCEDURE — 93306 TTE W/DOPPLER COMPLETE: CPT

## 2021-03-09 PROCEDURE — G8754 DIAS BP LESS 90: HCPCS | Performed by: SPECIALIST

## 2021-03-09 PROCEDURE — 74011250636 HC RX REV CODE- 250/636: Performed by: EMERGENCY MEDICINE

## 2021-03-09 PROCEDURE — 83735 ASSAY OF MAGNESIUM: CPT

## 2021-03-09 PROCEDURE — G8432 DEP SCR NOT DOC, RNG: HCPCS | Performed by: SPECIALIST

## 2021-03-09 PROCEDURE — 74011636637 HC RX REV CODE- 636/637: Performed by: STUDENT IN AN ORGANIZED HEALTH CARE EDUCATION/TRAINING PROGRAM

## 2021-03-09 PROCEDURE — 96374 THER/PROPH/DIAG INJ IV PUSH: CPT

## 2021-03-09 PROCEDURE — 74011250636 HC RX REV CODE- 250/636: Performed by: STUDENT IN AN ORGANIZED HEALTH CARE EDUCATION/TRAINING PROGRAM

## 2021-03-09 PROCEDURE — 93970 EXTREMITY STUDY: CPT

## 2021-03-09 PROCEDURE — G8427 DOCREV CUR MEDS BY ELIG CLIN: HCPCS | Performed by: SPECIALIST

## 2021-03-09 PROCEDURE — 87635 SARS-COV-2 COVID-19 AMP PRB: CPT

## 2021-03-09 PROCEDURE — 99214 OFFICE O/P EST MOD 30 MIN: CPT | Performed by: SPECIALIST

## 2021-03-09 PROCEDURE — 85379 FIBRIN DEGRADATION QUANT: CPT

## 2021-03-09 PROCEDURE — G9899 SCRN MAM PERF RSLTS DOC: HCPCS | Performed by: SPECIALIST

## 2021-03-09 PROCEDURE — 77030025612 HC NEB KT VYRM -A

## 2021-03-09 PROCEDURE — 84484 ASSAY OF TROPONIN QUANT: CPT

## 2021-03-09 RX ORDER — FUROSEMIDE 10 MG/ML
40 INJECTION INTRAMUSCULAR; INTRAVENOUS 2 TIMES DAILY
Status: DISCONTINUED | OUTPATIENT
Start: 2021-03-09 | End: 2021-03-10 | Stop reason: HOSPADM

## 2021-03-09 RX ORDER — METFORMIN HYDROCHLORIDE 500 MG/1
1 TABLET, FILM COATED, EXTENDED RELEASE ORAL 3 TIMES DAILY
COMMUNITY
End: 2021-03-22 | Stop reason: SDUPTHER

## 2021-03-09 RX ORDER — MAGNESIUM SULFATE HEPTAHYDRATE 40 MG/ML
2 INJECTION, SOLUTION INTRAVENOUS
Status: COMPLETED | OUTPATIENT
Start: 2021-03-09 | End: 2021-03-09

## 2021-03-09 RX ORDER — POTASSIUM CHLORIDE 750 MG/1
10 TABLET, FILM COATED, EXTENDED RELEASE ORAL EVERY MORNING
COMMUNITY
End: 2021-04-05 | Stop reason: ALTCHOICE

## 2021-03-09 RX ORDER — POLYETHYLENE GLYCOL 3350 17 G/17G
17 POWDER, FOR SOLUTION ORAL DAILY PRN
Status: DISCONTINUED | OUTPATIENT
Start: 2021-03-09 | End: 2021-03-10 | Stop reason: HOSPADM

## 2021-03-09 RX ORDER — ENOXAPARIN SODIUM 100 MG/ML
40 INJECTION SUBCUTANEOUS DAILY
Status: DISCONTINUED | OUTPATIENT
Start: 2021-03-10 | End: 2021-03-10 | Stop reason: HOSPADM

## 2021-03-09 RX ORDER — SODIUM CHLORIDE 0.9 % (FLUSH) 0.9 %
5-40 SYRINGE (ML) INJECTION AS NEEDED
Status: DISCONTINUED | OUTPATIENT
Start: 2021-03-09 | End: 2021-03-10 | Stop reason: HOSPADM

## 2021-03-09 RX ORDER — PROMETHAZINE HYDROCHLORIDE 25 MG/1
12.5 TABLET ORAL
Status: DISCONTINUED | OUTPATIENT
Start: 2021-03-09 | End: 2021-03-10 | Stop reason: HOSPADM

## 2021-03-09 RX ORDER — GUAIFENESIN 600 MG/1
600 TABLET, EXTENDED RELEASE ORAL EVERY 12 HOURS
Status: DISCONTINUED | OUTPATIENT
Start: 2021-03-09 | End: 2021-03-10 | Stop reason: HOSPADM

## 2021-03-09 RX ORDER — METOPROLOL TARTRATE 25 MG/1
12.5 TABLET, FILM COATED ORAL EVERY 12 HOURS
Status: DISCONTINUED | OUTPATIENT
Start: 2021-03-09 | End: 2021-03-09

## 2021-03-09 RX ORDER — PROMETHAZINE HYDROCHLORIDE 25 MG/1
12.5 TABLET ORAL
Status: DISCONTINUED | OUTPATIENT
Start: 2021-03-09 | End: 2021-03-09

## 2021-03-09 RX ORDER — ONDANSETRON 2 MG/ML
4 INJECTION INTRAMUSCULAR; INTRAVENOUS
Status: DISCONTINUED | OUTPATIENT
Start: 2021-03-09 | End: 2021-03-09

## 2021-03-09 RX ORDER — ATORVASTATIN CALCIUM 40 MG/1
40 TABLET, FILM COATED ORAL
Status: DISCONTINUED | OUTPATIENT
Start: 2021-03-09 | End: 2021-03-10 | Stop reason: HOSPADM

## 2021-03-09 RX ORDER — OXYBUTYNIN CHLORIDE 5 MG/1
5 TABLET ORAL 3 TIMES DAILY
Status: DISCONTINUED | OUTPATIENT
Start: 2021-03-09 | End: 2021-03-10 | Stop reason: HOSPADM

## 2021-03-09 RX ORDER — SODIUM CHLORIDE 0.9 % (FLUSH) 0.9 %
5-40 SYRINGE (ML) INJECTION EVERY 8 HOURS
Status: DISCONTINUED | OUTPATIENT
Start: 2021-03-09 | End: 2021-03-10 | Stop reason: HOSPADM

## 2021-03-09 RX ORDER — IPRATROPIUM BROMIDE AND ALBUTEROL SULFATE 2.5; .5 MG/3ML; MG/3ML
3 SOLUTION RESPIRATORY (INHALATION)
Status: DISCONTINUED | OUTPATIENT
Start: 2021-03-09 | End: 2021-03-10

## 2021-03-09 RX ORDER — AZITHROMYCIN 250 MG/1
250 TABLET, FILM COATED ORAL DAILY
Status: DISCONTINUED | OUTPATIENT
Start: 2021-03-10 | End: 2021-03-09

## 2021-03-09 RX ORDER — IPRATROPIUM BROMIDE AND ALBUTEROL SULFATE 2.5; .5 MG/3ML; MG/3ML
3 SOLUTION RESPIRATORY (INHALATION)
Status: DISCONTINUED | OUTPATIENT
Start: 2021-03-09 | End: 2021-03-09

## 2021-03-09 RX ORDER — AZITHROMYCIN 250 MG/1
250 TABLET, FILM COATED ORAL DAILY
Status: DISCONTINUED | OUTPATIENT
Start: 2021-03-09 | End: 2021-03-10

## 2021-03-09 RX ORDER — MAGNESIUM SULFATE 100 %
4 CRYSTALS MISCELLANEOUS AS NEEDED
Status: DISCONTINUED | OUTPATIENT
Start: 2021-03-09 | End: 2021-03-10 | Stop reason: HOSPADM

## 2021-03-09 RX ORDER — POTASSIUM CHLORIDE 7.45 MG/ML
10 INJECTION INTRAVENOUS
Status: COMPLETED | OUTPATIENT
Start: 2021-03-09 | End: 2021-03-09

## 2021-03-09 RX ORDER — BUDESONIDE 0.5 MG/2ML
500 INHALANT ORAL
Status: DISCONTINUED | OUTPATIENT
Start: 2021-03-09 | End: 2021-03-10 | Stop reason: HOSPADM

## 2021-03-09 RX ORDER — ACETAMINOPHEN 325 MG/1
650 TABLET ORAL
Status: DISCONTINUED | OUTPATIENT
Start: 2021-03-09 | End: 2021-03-10 | Stop reason: HOSPADM

## 2021-03-09 RX ORDER — ARFORMOTEROL TARTRATE 15 UG/2ML
15 SOLUTION RESPIRATORY (INHALATION)
Status: DISCONTINUED | OUTPATIENT
Start: 2021-03-09 | End: 2021-03-10 | Stop reason: HOSPADM

## 2021-03-09 RX ORDER — LANOLIN ALCOHOL/MO/W.PET/CERES
100 CREAM (GRAM) TOPICAL DAILY
Status: DISCONTINUED | OUTPATIENT
Start: 2021-03-09 | End: 2021-03-10 | Stop reason: HOSPADM

## 2021-03-09 RX ORDER — INSULIN GLARGINE 100 [IU]/ML
30 INJECTION, SOLUTION SUBCUTANEOUS DAILY
Status: DISCONTINUED | OUTPATIENT
Start: 2021-03-10 | End: 2021-03-10 | Stop reason: HOSPADM

## 2021-03-09 RX ORDER — ACETAMINOPHEN 650 MG/1
650 SUPPOSITORY RECTAL
Status: DISCONTINUED | OUTPATIENT
Start: 2021-03-09 | End: 2021-03-10 | Stop reason: HOSPADM

## 2021-03-09 RX ORDER — INSULIN LISPRO 100 [IU]/ML
1-10 INJECTION, SOLUTION INTRAVENOUS; SUBCUTANEOUS
Status: DISCONTINUED | OUTPATIENT
Start: 2021-03-09 | End: 2021-03-10 | Stop reason: HOSPADM

## 2021-03-09 RX ORDER — ONDANSETRON 2 MG/ML
4 INJECTION INTRAMUSCULAR; INTRAVENOUS
Status: DISCONTINUED | OUTPATIENT
Start: 2021-03-09 | End: 2021-03-10 | Stop reason: HOSPADM

## 2021-03-09 RX ORDER — POLYETHYLENE GLYCOL 3350 17 G/17G
17 POWDER, FOR SOLUTION ORAL DAILY PRN
Status: DISCONTINUED | OUTPATIENT
Start: 2021-03-09 | End: 2021-03-09

## 2021-03-09 RX ORDER — IPRATROPIUM BROMIDE AND ALBUTEROL SULFATE 2.5; .5 MG/3ML; MG/3ML
3 SOLUTION RESPIRATORY (INHALATION)
Status: DISCONTINUED | OUTPATIENT
Start: 2021-03-09 | End: 2021-03-10 | Stop reason: HOSPADM

## 2021-03-09 RX ORDER — DEXTROSE 50 % IN WATER (D50W) INTRAVENOUS SYRINGE
25-50 AS NEEDED
Status: DISCONTINUED | OUTPATIENT
Start: 2021-03-09 | End: 2021-03-10 | Stop reason: HOSPADM

## 2021-03-09 RX ORDER — METOPROLOL SUCCINATE 25 MG/1
25 TABLET, EXTENDED RELEASE ORAL DAILY
Status: DISCONTINUED | OUTPATIENT
Start: 2021-03-09 | End: 2021-03-10 | Stop reason: HOSPADM

## 2021-03-09 RX ORDER — FUROSEMIDE 40 MG/1
80 TABLET ORAL 2 TIMES DAILY
COMMUNITY
End: 2021-03-20

## 2021-03-09 RX ADMIN — BUDESONIDE 500 MCG: 0.5 SUSPENSION RESPIRATORY (INHALATION) at 21:37

## 2021-03-09 RX ADMIN — Medication 10 ML: at 15:25

## 2021-03-09 RX ADMIN — ATORVASTATIN CALCIUM 40 MG: 40 TABLET, FILM COATED ORAL at 22:00

## 2021-03-09 RX ADMIN — GUAIFENESIN 600 MG: 600 TABLET, EXTENDED RELEASE ORAL at 23:01

## 2021-03-09 RX ADMIN — IPRATROPIUM BROMIDE AND ALBUTEROL SULFATE 3 ML: .5; 3 SOLUTION RESPIRATORY (INHALATION) at 20:47

## 2021-03-09 RX ADMIN — INSULIN LISPRO 2 UNITS: 100 INJECTION, SOLUTION INTRAVENOUS; SUBCUTANEOUS at 16:30

## 2021-03-09 RX ADMIN — POTASSIUM CHLORIDE 10 MEQ: 7.46 INJECTION, SOLUTION INTRAVENOUS at 14:20

## 2021-03-09 RX ADMIN — Medication 10 ML: at 23:06

## 2021-03-09 RX ADMIN — ARFORMOTEROL TARTRATE 15 MCG: 15 SOLUTION RESPIRATORY (INHALATION) at 21:36

## 2021-03-09 RX ADMIN — GUAIFENESIN 600 MG: 600 TABLET, EXTENDED RELEASE ORAL at 15:29

## 2021-03-09 RX ADMIN — FUROSEMIDE 40 MG: 10 INJECTION, SOLUTION INTRAMUSCULAR; INTRAVENOUS at 17:46

## 2021-03-09 RX ADMIN — Medication 100 MG: at 18:17

## 2021-03-09 RX ADMIN — AZITHROMYCIN 250 MG: 250 TABLET, FILM COATED ORAL at 17:45

## 2021-03-09 RX ADMIN — MAGNESIUM SULFATE HEPTAHYDRATE 2 G: 40 INJECTION, SOLUTION INTRAVENOUS at 13:25

## 2021-03-09 RX ADMIN — OXYBUTYNIN CHLORIDE 5 MG: 5 TABLET ORAL at 17:46

## 2021-03-09 RX ADMIN — METOPROLOL SUCCINATE 25 MG: 25 TABLET, EXTENDED RELEASE ORAL at 17:45

## 2021-03-09 RX ADMIN — FUROSEMIDE 40 MG: 10 INJECTION, SOLUTION INTRAMUSCULAR; INTRAVENOUS at 15:29

## 2021-03-09 RX ADMIN — PROMETHAZINE HYDROCHLORIDE 12.5 MG: 25 TABLET ORAL at 23:05

## 2021-03-09 RX ADMIN — OXYBUTYNIN CHLORIDE 5 MG: 5 TABLET ORAL at 23:01

## 2021-03-09 NOTE — ED NOTES
Pt arrives to the ED AAOX4 with a c/c of SOB onset one month ago. Pt has a hx of COPD and CHF. Pt was seen by Cardiology today who referred her to the ED for admission. Pt is noted in stable condition, now in ED room with side rail up, bed to lowest position and call light within reach. Will continue to monitor and wait for ED provider evaluation. Emergency Department Nursing Plan of Care       The Nursing Plan of Care is developed from the Nursing assessment and Emergency Department Attending provider initial evaluation. The plan of care may be reviewed in the ED Provider note.     The Plan of Care was developed with the following considerations:   Patient / Family readiness to learn indicated by:verbalized understanding  Persons(s) to be included in education: patient  Barriers to Learning/Limitations:No    Signed     Bo Large    3/9/2021   10:52 AM

## 2021-03-09 NOTE — PROGRESS NOTES
1500- Pt admitted to room 213, Vs stable. BP slightly elevated. Pt denies any pain    1540- doppler being done at bedside    1600-Admission assessment completed. Skin assessment done with Dez Isaac RN, no skin breakdown, Pt has a scar on R knee and some hypopigmentation suprapubic area and a blood sugar monitor device attached on R arm    1750-  2 units on insulin coverage given    1800-urine and blood sample taken to lab and MRSA swab. Pt is eating dinner    1920-Bedside shift change report given to [address] (oncoming nurse) by Gloria Bryan RN (offgoing nurse). Report included the following information SBAR, Intake/Output, MAR and Recent Results.

## 2021-03-09 NOTE — H&P
Hospitalist Admission Note    NAME: Frandy Couch   :  1950   MRN:  [id number]   Room Number: ER06/06  @ [facility]     Date/Time:  3/9/2021 1:53 PM    Patient PCP: Kristin Amaro MD  ______________________________________________________________________  Given the patient's current clinical presentation, I have a high level of concern for decompensation if discharged from the emergency department. Complex decision making was performed, which includes reviewing the patient's available past medical records, laboratory results, and x-ray films. My assessment of this patient's clinical condition and my plan of care is as follows. Assessment / Plan: Active Problems:    CHF exacerbation ([address] (3/9/2021)        #Acute on chronic systolic heart failure exacerbation (biventricular failure)   -TTE 2021 with EF of 20-25%. Moderate to severely reduced systolic function no wall motion normalities. Mild dilated left atrium. Moderate to severely reduced right ventricular function. Moderate TR MR. PAP 46 mmHg  -TTE 2019 with EF 51 to 55%  -Currently at home only on Lasix 40 mg lisinopril 20 mg  -Chest x-ray reviewed and apparently no signs of pulmonary edema  -proBNP 2211 unchanged from 2529 1 month ago  -Troponin 0.05  -EKG reviewed independently sinus tachycardia with T wave abnormality unchanged from last EKG, QRS 84    Patient is not on guideline medical therapy for heart failure.        -IV Lasix 40 mg twice daily  -Start low-dose metoprolol XL   -Hold lisinopril for 36 hours so entresto can be started   -Might need Aldactone and may need conversion of lisinopril to [place]  -Cardiology consult  -Ins and outs  -Daily weight  -Fluid restriction  -Low-salt diet  -Repeat echo    # suspected COPD exacerbation   - increase in cough and SOB x 10 days   - DUO NEB Q6 and Q4 PRN   - arformeterol and pulmicort   - Azithromycin 250 mg x 5 days   - 6 MWT when stable   - home regimen includue: advair     # Hypokalemia and hypomagnesemia  - Mag 1.3 and K 3.3   - replete       #Pulmonary hypertension type II  -TTE with PAP 46    #Systemic hypertension  -On lisinopril 20 mg, Lasix 40 mg BID  at home      #Insulin-dependent diabetes mellitus  -A1c 6.8 1/25/2021  -On Lantus 60 units in the morning andMetformin  mg 3 times daily with meals at home  -Resume Lantus half dose   - Lispro correctional scale, FSG AC HS  - Consistent carb diet, hypoglycemia protocol. #HLD  -On atorvastatin    # Overactive bladder   -Oxybutynin 5 mg 3 times daily and mirabegron ER 50 mg daily  - resume oxybutynin 5 mg TID             Body mass index is 32.61 kg/m². Code Status: Full   Surrogate Decision Maker:    DVT Prophylaxis: Lovenox  GI Prophylaxis: not indicated        Subjective:   CHIEF COMPLAINT: Progressive SOB and cough     HISTORY OF PRESENT ILLNESS:     Josiah Alejo is a 73 y.o.  female with PMH of above mentioned problems who presents to ED from her cardiologist office with progressive shortness of breath and cough. Patient states that she has been having progressive shortness of breath for past couple of weeks and for the past 10 days it has gotten to a point that she cannot walk more than 10 today before catching her breath. She also endorsed that she is having more cough than usual.  Patient also endorsed PND and orthopnea. Denies any swelling of her legs. Patient states that she has been taking her medication as prescribed but she denied using any beta-blocker or Aldactone for heart failure. Patient is unsure whether she had cardiac angiogram done in the past but she said she been diagnosed with heart failure since 2005. Patient was a heavy smoker in the past and smoked pack a day for more than 15 years but she has quit smoking 6 years ago. Occasional drink alcohol and denies any recent drug use.   Patient denies any fever chills diarrhea belly pain chest pain. We were asked to admit for work up and evaluation of the above problems. Past Medical History:   Diagnosis Date    Arthritis     Diabetes ([address]     Hypercholesterolemia     Hypertension         Past Surgical History:   Procedure Laterality Date    HX ORTHOPAEDIC      Arthroscopy right knee       Social History     Tobacco Use    Smoking status: Former Smoker     Packs/day: 0.25     Years: 20.00     Pack years: 5.00     Quit date: 2014     Years since quittin.2    Smokeless tobacco: Never Used   Substance Use Topics    Alcohol use: Yes     Alcohol/week: 0.0 standard drinks     Comment: 21-24 Beers per week        Family History   Problem Relation Age of Onset    Diabetes Mother      No Known Allergies     Prior to Admission medications    Medication Sig Start Date End Date Taking? Authorizing Provider   diclofenac (VOLTAREN) 1 % gel APPLY TO AFFECTED AREA TWICE A DAY AS NEEDED 20   Provider, Historical   fluticasone propion-salmeteroL (ADVAIR/WIXELA) 250-50 mcg/dose diskus inhaler  20   Provider, Historical   Sidestream misc  20   Provider, Historical   fluticasone propionate (FLONASE) 50 mcg/actuation nasal spray 2 Sprays by Both Nostrils route daily. 20   Marcela Way DO   allopurinoL (ZYLOPRIM) 300 mg tablet TAKE 1 TABLET BY MOUTH DAILY 10/29/20   Sunita Ordaz MD   insulin glargine (LANTUS,BASAGLAR) 100 unit/mL (3 mL) inpn 60 units every morning 10/12/20   Sunita Ordaz MD   oxybutynin (DITROPAN) 5 mg tablet Take 1 Tab by mouth three (3) times daily.  10/8/20   Sunita Ordaz MD   atorvastatin (LIPITOR) 40 mg tablet TAKE 1 TABLET BY MOUTH EVERY DAY 20   Sunita Ordaz MD   metFORMIN ER (GLUCOPHAGE XR) 500 mg tablet TAKE 1 TABLET BY MOUTH THREE TIMES DAILY WITH MEALS 20   Sunita Ordaz MD   Insulin Needles, Disposable, (BD ULTRA-FINE SHORT PEN NEEDLE) 31 gauge x 5/16\" ndle USE WITH INSULIN PENS TWICE DAILY AS DIRECTED 20 Marcela Worrell MD   alcohol swabs (BD SINGLE USE SWABS REGULAR) padm USE TO TEST BLOOD SUGAR.dx.e11.9 2/18/20   Marcela Worrell MD   potassium chloride SR (KLOR-CON 10) 10 mEq tablet 2  Tabs 2 times a day 11/13/19   Marcela Worrell MD   furosemide (LASIX) 40 mg tablet TAKE 3 TABLETS BY MOUTH EVERY MORNING  Patient taking differently: TAKE 2 TABLETS BY MOUTH EVERY MORNING AND 2 TABLET IN THE EVENING 10/20/19   Prakash Atwood MD   mirabegron ER (MYRBETRIQ) 50 mg ER tablet Take 1 Tab by mouth daily. 2/5/19   Marcela Worrell MD   glucose blood VI test strips (FREESTYLE LITE STRIPS) strip Use to test blood sugar three times daily. Dx.e11.9 1/25/19   Marcela Worrell MD   Blood-Glucose Meter (FREESTYLE LITE METER) monitoring kit Test three times a day dx. e119 1/25/19   Marcela Worrell MD   lisinopril (PRINIVIL, ZESTRIL) 20 mg tablet TAKE 1 TABLET BY MOUTH ONCE DAILY IN THE MORNING 6/21/18   Marcela Worrell MD   [facility]. 33 gauge misc  47/78/71   Provider, Historical   NURIA PEN NEEDLE 32 x 5/32 \" ndle  12/15/14   Provider, Historical   Blood-Glucose Meter monitoring kit Testing BID-DX:250.00 12/18/14   Marcela Worrell MD   Lancets (ONE TOUCH ULTRASOFT LANCETS) misc TESTING BID 12/18/14   Marcela Worrell MD   glucose blood VI test strips (ASCENSIA AUTODISC VI, ONE TOUCH ULTRA TEST VI) strip TESTING BID 12/18/14   Marcela Worrell MD       REVIEW OF SYSTEMS:     I am not able to complete the review of systems because:    The patient is intubated and sedated    The patient has altered mental status due to his acute medical problems    The patient has baseline aphasia from prior stroke(s)    The patient has baseline dementia and is not reliable historian    The patient is in acute medical distress and unable to provide information           Total of 12 systems reviewed as follows:       POSITIVE= underlined text  Negative = text not underlined  General:  fever, chills, sweats, generalized weakness, weight loss/gain,      loss of appetite   Eyes:    blurred vision, eye pain, loss of vision, double vision  ENT:    rhinorrhea, pharyngitis   Respiratory:   cough, sputum production, SOB, PARDO, wheezing, pleuritic pain   Cardiology:   chest pain, palpitations, orthopnea, PND, edema, syncope   Gastrointestinal:  abdominal pain , N/V, diarrhea, dysphagia, constipation, bleeding   Genitourinary:  frequency, urgency, dysuria, hematuria, incontinence   Muskuloskeletal :  arthralgia, myalgia, back pain  Hematology:  easy bruising, nose or gum bleeding, lymphadenopathy   Dermatological: rash, ulceration, pruritis, color change / jaundice  Endocrine:   hot flashes or polydipsia   Neurological:  headache, dizziness, confusion, focal weakness, paresthesia,     Speech difficulties, memory loss, gait difficulty  Psychological: Feelings of anxiety, depression, agitation    Objective:   VITALS:    Visit Vitals  /85   Pulse 96   Temp 97.5 °F (36.4 °C)   Resp 25   Ht 5' 4\" (1.626 m)   Wt 86.2 kg (190 lb)   SpO2 97%   BMI 32.61 kg/m²       PHYSICAL EXAM:    General:    Alert, cooperative, no distress, appears stated age. HEENT: Atraumatic, anicteric sclerae, pink conjunctivae     No oral ulcers, mucosa moist, throat clear, dentition fair  Neck:  Supple, symmetrical,  thyroid: non tender  Lungs:   Coarse breathing bilaterally  Chest wall:  No tenderness  No Accessory muscle use. Heart:   Regular  rhythm,  No  murmur   No edema, JVD elevated  Abdomen:   Soft, non-tender. Not distended. Bowel sounds normal  Extremities: No cyanosis. No clubbing,      Skin turgor normal, Capillary refill normal, Radial dial pulse 2+  Skin:     Not pale. Not Jaundiced  No rashes   Psych:  Good insight. Not depressed. Not anxious or agitated. Neurologic: EOMs intact. No facial asymmetry. No aphasia or slurred speech. Symmetrical strength, Sensation grossly intact. Alert and oriented X 4. ______________________________________________________________________    Care Plan discussed with:  Patient/Family    Expected  Disposition:  Home w/Family  ________________________________________________________________________  TOTAL TIME:  39 Minutes    Critical Care Provided     Minutes non procedure based      Comments    x Reviewed previous records   >50% of visit spent in counseling and coordination of care x Discussion with patient and/or family and questions answered       ________________________________________________________________________  Signed: Kiet Barrera MD    Procedures: see electronic medical records for all procedures/Xrays and details which were not copied into this note but were reviewed prior to creation of Plan.     LAB DATA REVIEWED:    Recent Results (from the past 24 hour(s))   EKG, 12 LEAD, INITIAL    Collection Time: 03/09/21 11:18 AM   Result Value Ref Range    Ventricular Rate 103 BPM    Atrial Rate 103 BPM    P-R Interval 190 ms    QRS Duration 84 ms    Q-T Interval 368 ms    QTC Calculation (Bezet) 482 ms    Calculated P Axis 64 degrees    Calculated R Axis 13 degrees    Calculated T Axis 91 degrees    Diagnosis       Sinus tachycardia  Possible Left atrial enlargement  T wave abnormality, consider lateral ischemia  Abnormal ECG  When compared with ECG of 17-FEB-2021 18:00,  No significant change was found     CBC WITH AUTOMATED DIFF    Collection Time: 03/09/21 11:31 AM   Result Value Ref Range    WBC 5.6 3.6 - 11.0 K/uL    RBC 4.25 3.80 - 5.20 M/uL    HGB 11.7 11.5 - 16.0 g/dL    HCT 37.6 35.0 - 47.0 %    MCV 88.5 80.0 - 99.0 FL    MCH 27.5 26.0 - 34.0 PG    MCHC 31.1 30.0 - 36.5 g/dL    RDW 17.9 (H) 11.5 - 14.5 %    PLATELET 155 601 - 458 K/uL    MPV 10.6 8.9 - 12.9 FL    NRBC 0.0 0  WBC    ABSOLUTE NRBC 0.00 0.00 - 0.01 K/uL    NEUTROPHILS 79 (H) 32 - 75 %    LYMPHOCYTES 13 12 - 49 %    MONOCYTES 3 (L) 5 - 13 %    EOSINOPHILS 4 0 - 7 %    BASOPHILS 1 0 - 1 % IMMATURE GRANULOCYTES 0 0.0 - 0.5 %    ABS. NEUTROPHILS 4.4 1.8 - 8.0 K/UL    ABS. LYMPHOCYTES 0.7 (L) 0.8 - 3.5 K/UL    ABS. MONOCYTES 0.2 0.0 - 1.0 K/UL    ABS. EOSINOPHILS 0.2 0.0 - 0.4 K/UL    ABS. BASOPHILS 0.1 0.0 - 0.1 K/UL    ABS. IMM. GRANS. 0.0 0.00 - 0.04 K/UL    DF SMEAR SCANNED      RBC COMMENTS ANISOCYTOSIS  1+       METABOLIC PANEL, COMPREHENSIVE    Collection Time: 03/09/21 11:59 AM   Result Value Ref Range    Sodium 144 136 - 145 mmol/L    Potassium 3.3 (L) 3.5 - 5.1 mmol/L    Chloride 105 97 - 108 mmol/L    CO2 30 21 - 32 mmol/L    Anion gap 9 5 - 15 mmol/L    Glucose 143 (H) 65 - 100 mg/dL    BUN 20 6 - 20 MG/DL    Creatinine 0.91 0.55 - 1.02 MG/DL    BUN/Creatinine ratio 22 (H) 12 - 20      GFR est AA >60 >60 ml/min/1.73m2    GFR est non-AA >60 >60 ml/min/1.73m2    Calcium 9.0 8.5 - 10.1 MG/DL    Bilirubin, total 1.3 (H) 0.2 - 1.0 MG/DL    ALT (SGPT) 39 12 - 78 U/L    AST (SGOT) 30 15 - 37 U/L    Alk.  phosphatase 85 45 - 117 U/L    Protein, total 8.2 6.4 - 8.2 g/dL    Albumin 3.1 (L) 3.5 - 5.0 g/dL    Globulin 5.1 (H) 2.0 - 4.0 g/dL    A-G Ratio 0.6 (L) 1.1 - 2.2     MAGNESIUM    Collection Time: 03/09/21 11:59 AM   Result Value Ref Range    Magnesium 1.3 (L) 1.6 - 2.4 mg/dL   NT-PRO BNP    Collection Time: 03/09/21 11:59 AM   Result Value Ref Range    NT pro-BNP 2,211 (H) 0 - 125 PG/ML   TROPONIN I    Collection Time: 03/09/21 11:59 AM   Result Value Ref Range    Troponin-I, Qt. 0.05 (H) <0.05 ng/mL   D DIMER    Collection Time: 03/09/21 12:57 PM   Result Value Ref Range    D-dimer 0.94 (H) 0.00 - 0.65 mg/L FEU

## 2021-03-09 NOTE — ED NOTES
RN spoke with ROSELINE, Pharmacist who states ordered Magnesium and Potassium are compatible. Will administer at this time.

## 2021-03-09 NOTE — PROGRESS NOTES
· LV: Estimated LVEF is 15 - 20%. Visually measured ejection fraction. Mildly dilated left ventricle. Upper normal wall thickness. Moderate-to-severely reduced systolic function. Wall motion: normal. Left ventricular diastolic dysfunction. · LA: Moderately dilated left atrium. · RV: Mildly dilated right ventricle. Mildly reduced systolic function. · RA: Mildly dilated right atrium. · MV: Mitral valve thickening. Mild to moderate mitral valve regurgitation is present. · TV: Moderate tricuspid valve regurgitation is present. · IVC: Severely elevated central venous pressure (15 mmHg); IVC diameter is larger than 21 mm and collapses less than 50% with respiration.           Will d/w Cardiology , patient might need advance heart failure and José Miguel Engle MD        Trop 0.05 > 0.08 cont to trend       Hali Ribera MD

## 2021-03-09 NOTE — PROGRESS NOTES
Advance Care Planning (ACP) Provider Note - Comprehensive     Date of ACP Conversation: 03/09/21  Persons included in Conversation:  patient  Length of ACP Conversation in minutes:  16 minutes              General ACP for ALL Patients with Decision Making Capacity:   Importance of advance care planning, including choosing a healthcare agent to communicate patient's healthcare decisions if patient lost the ability to make decisions, such as after a sudden illness or accident  Understanding of the healthcare agent role was assessed and information provided  Exploration of values, goals, and preferences if recovery is not expected, even with continued medical treatment in the event of: Imminent death  Severe, permanent brain injury  Opportunity offered to explore how cultural, Rastafarian, spiritual, or personal beliefs would affect decisions for future care         For Serious or Chronic Illness:  Understanding of medical condition    Understanding of CPR, goals and expected outcomes, benefits and burdens discussed.   Explored fears and concerns regarding CPR or possible outcomes    Interventions Provided:  Recommended completion of Advance Directive form after review of ACP materials and conversation with prospective healthcare agent

## 2021-03-09 NOTE — PROGRESS NOTES
Identified pt with two pt identifiers(name and ). Reviewed record in preparation for visit and have obtained necessary documentation. Chief Complaint   Patient presents with    Follow-up    Hypertension           Visit Vitals  /79 (BP 1 Location: Left upper arm, BP Patient Position: Sitting, BP Cuff Size: Large adult)   Pulse 96   Temp 96.9 °F (36.1 °C) (Temporal)   Resp 20   Ht 5' 4\" (1.626 m)   Wt 190 lb (86.2 kg)   SpO2 99%   BMI 32.61 kg/m²     Pain Scale: /10    Coordination of Care Questionnaire:  :   1. Have you been to the ER, urgent care clinic since your last visit? Hospitalized since your last visit?2021 for CHF at [facility]    2. Have you seen or consulted any other health care providers outside of the [address] [place] [address] since your last visit? Include any pap smears or colon screening.  No

## 2021-03-09 NOTE — ED NOTES
TRANSFER - OUT REPORT:    Verbal report given to -[facility] (name) on Jonas Lu  being transferred to Med Surg/Tele Room 213(unit) for routine progression of care       Report consisted of patients Situation, Background, Assessment and   Recommendations(SBAR). Information from the following report(s) SBAR, Kardex, ED Summary, Intake/Output, MAR, Recent Results and Cardiac Rhythm . was reviewed with the receiving nurse. Lines:   Peripheral IV 03/09/21 Right Hand (Active)   Site Assessment Clean, dry, & intact 03/09/21 1200   Phlebitis Assessment 0 03/09/21 1200   Infiltration Assessment 0 03/09/21 1200   Dressing Status Clean, dry, & intact 03/09/21 1200   Dressing Type Transparent 03/09/21 1200   Hub Color/Line Status Blue 03/09/21 1200   Action Taken Blood drawn 03/09/21 1200   Alcohol Cap Used No 03/09/21 1200        Opportunity for questions and clarification was provided. Patient transported with:   Monitor  Registered Nurse     14:50 - Pt is now leaving ED in stable condition.

## 2021-03-09 NOTE — PROGRESS NOTES
[facility] Admission Pharmacy Medication Reconciliation     Information obtained from: Patient  RxQuery data available1:Yes     Comments/recommendations:    Medication reconciliation performed with patient via telephone. Patient was a fair historian stating name,directions, and last doses of her home medications. Medication strengths were confirmed by [facility]. Patient states she takes 60 units of Lantus every morning and Metformin ER 500mg 1 tablet bid (AM & PM). Patient states her last dose of Lantus was this morning 3/9/21 and last dose of metformin was yesterday 3/8/21. Patient states she takes lisinopril 20mg every morning for blood pressure with a last dose this morning 3/9/21. Patient states she takes furosemide 40mg (2 tablets in AM and 2 tablets in PM). Per [facility], she was prescribed 3 tablets every morning which she last picked up on 8/4/20. Patient denies taking atorvastatin. She states she does not take any medications for cholesterol. Per [facility], patient picked up a 90 days supply of atorvastatin 40mg on 12/11/20. 2) Medication changes (since last review): Added  None  Removed  Atorvastatin  Mirabegron  Adjusted  Metformin- adjusted from tid to bid  Potassium- adjusted from 2 tab bid to 1 tab daily      3) [employer] [facility] () was accessed to determine fill history of any controlled medications. No dispense record found for the past 6 months. 1RxQuery pharmacy benefit data reflects medications filled and processed through the patient's insurance, however                this data does NOT capture whether the medication was picked up or is currently being taken by the patient. Patient allergies: Allergies as of 03/09/2021    (No Known Allergies)     Prior to Admission Medications   Prescriptions Last Dose Informant Patient Reported?  Taking?   allopurinoL (ZYLOPRIM) 300 mg tablet 3/9/2021 at Unknown time Self No Yes   Sig: TAKE 1 TABLET BY MOUTH DAILY diclofenac (VOLTAREN) 1 % gel 3/9/2021 at Unknown time Self Yes Yes   Sig: APPLY TO AFFECTED AREA TWICE A DAY AS NEEDED   fluticasone propion-salmeteroL (ADVAIR/WIXELA) 250-50 mcg/dose diskus inhaler 3/8/2021 at Unknown time Self Yes Yes   Sig: Take 2 Puffs by inhalation two (2) times a day. fluticasone propionate (FLONASE) 50 mcg/actuation nasal spray 3/8/2021 at Unknown time Self No Yes   Si Sprays by Both Nostrils route daily. furosemide (LASIX) 40 mg tablet  Self Yes Yes   Sig: Take 80 mg by mouth two (2) times a day. insulin glargine (LANTUS,BASAGLAR) 100 unit/mL (3 mL) inpn 3/9/2021 at Unknown time Self No Yes   Si units every morning   lisinopril (PRINIVIL, ZESTRIL) 20 mg tablet 3/9/2021 at Unknown time Self No Yes   Sig: TAKE 1 TABLET BY MOUTH ONCE DAILY IN THE MORNING   metFORMIN (GLUMETZA ER) 500 mg TG24 24 hour tablet  Self Yes Yes   Sig: Take 1 Tab by mouth Every morning. oxybutynin (DITROPAN) 5 mg tablet 3/8/2021 at Unknown time Self No Yes   Sig: Take 1 Tab by mouth three (3) times daily. potassium chloride SR (KLOR-CON 10) 10 mEq tablet  Self Yes Yes   Sig: Take 10 mEq by mouth Every morning.       Facility-Administered Medications: None     Thank you,     Roverto Campos, PharmD   Contact: [phone]

## 2021-03-09 NOTE — PROGRESS NOTES
HISTORY OF PRESENT ILLNESS  Joy Brown is a 73 y.o. female     SUMMARY:   Problem List  Date Reviewed: 3/9/2021          Codes Class Noted    Congestive heart failure ([facility] ICD-10-CM: I50.9  ICD-9-CM: 428.0  Unknown        Uncontrolled type 2 diabetes mellitus with hyperglycemia ([facility] ICD-10-CM: E11.65  ICD-9-CM: 250.02  1/26/2020        CKD (chronic kidney disease) stage 3, GFR 30-59 ml/min ICD-10-CM: N18.30  ICD-9-CM: 585.3  7/16/2019    Overview Addendum 9/17/2020  8:33 AM by Daniel Schrader MD     5/18 negative lexiscan  11/19 lvh, lae, otherwise normal echo             Status post total right knee replacement ICD-10-CM: Z96.651  ICD-9-CM: V43.65  2/5/2019        Osteoarthrosis, localized, primary, knee, right ICD-10-CM: M17.11  ICD-9-CM: 715.16  1/21/2019        PARDO (dyspnea on exertion) ICD-10-CM: R06.00  ICD-9-CM: 786.09  5/10/2018        Chest pain, exertional ICD-10-CM: R07.9  ICD-9-CM: 786.50  5/9/2018        Decreased libido ICD-10-CM: R68.82  ICD-9-CM: 799.81  10/3/2017        Epistaxis ICD-10-CM: R04.0  ICD-9-CM: 784.7  8/22/2017        Former tobacco use--stopped 2014 after >40 yrs smoking ICD-10-CM: Z87.891  ICD-9-CM: V15.82  11/15/2016        Obesity (BMI 30.0-34.9) ICD-10-CM: E66.9  ICD-9-CM: 278.00  11/15/2016        Reactive airway disease ICD-10-CM: J45.909  ICD-9-CM: 493.90  1/12/2016        Carpal tunnel syndrome of right wrist ICD-10-CM: G56.01  ICD-9-CM: 354.0  12/6/2015        Primary osteoarthritis of right knee ICD-10-CM: M17.11  ICD-9-CM: 715.16  7/12/2015        Cardiomyopathy ([address] ICD-10-CM: I42.9  ICD-9-CM: 425.4  9/9/2014    Overview Signed 11/24/2019  5:40 PM by Audra Peralta MD     Nonischemic             Dyslipidemia ICD-10-CM: E78.5  ICD-9-CM: 272.4  9/9/2014        Gout ICD-10-CM: M10.9  ICD-9-CM: 274.9  9/9/2014        Alcohol abuse ICD-10-CM: F10.10  ICD-9-CM: 305.00  9/9/2014        Hypertension ICD-10-CM: I10  ICD-9-CM: 401.9  9/9/2014 Dermatitis ICD-10-CM: L30.9  ICD-9-CM: 692.9  9/9/2014              Current Outpatient Medications on File Prior to Visit   Medication Sig    diclofenac (VOLTAREN) 1 % gel APPLY TO AFFECTED AREA TWICE A DAY AS NEEDED    fluticasone propion-salmeteroL (ADVAIR/WIXELA) 250-50 mcg/dose diskus inhaler     fluticasone propionate (FLONASE) 50 mcg/actuation nasal spray 2 Sprays by Both Nostrils route daily.  allopurinoL (ZYLOPRIM) 300 mg tablet TAKE 1 TABLET BY MOUTH DAILY    insulin glargine (LANTUS,BASAGLAR) 100 unit/mL (3 mL) inpn 60 units every morning    atorvastatin (LIPITOR) 40 mg tablet TAKE 1 TABLET BY MOUTH EVERY DAY    metFORMIN ER (GLUCOPHAGE XR) 500 mg tablet TAKE 1 TABLET BY MOUTH THREE TIMES DAILY WITH MEALS    Insulin Needles, Disposable, (BD ULTRA-FINE SHORT PEN NEEDLE) 31 gauge x 5/16\" ndle USE WITH INSULIN PENS TWICE DAILY AS DIRECTED    alcohol swabs (BD SINGLE USE SWABS REGULAR) padm USE TO TEST BLOOD SUGAR.dx.e11.9    potassium chloride SR (KLOR-CON 10) 10 mEq tablet 2  Tabs 2 times a day    furosemide (LASIX) 40 mg tablet TAKE 3 TABLETS BY MOUTH EVERY MORNING (Patient taking differently: TAKE 2 TABLETS BY MOUTH EVERY MORNING AND 2 TABLET IN THE EVENING)    mirabegron ER (MYRBETRIQ) 50 mg ER tablet Take 1 Tab by mouth daily.  glucose blood VI test strips (FREESTYLE LITE STRIPS) strip Use to test blood sugar three times daily.  Dx.e11.9    Blood-Glucose Meter (FREESTYLE LITE METER) monitoring kit Test three times a day dx. e119    lisinopril (PRINIVIL, ZESTRIL) 20 mg tablet TAKE 1 TABLET BY MOUTH ONCE DAILY IN THE MORNING    ONE TOUCH DELICA 33 gauge misc     NURIA PEN NEEDLE 32 x 5/32 \" ndle     Blood-Glucose Meter monitoring kit Testing BID-DX:250.00    Lancets (ONE TOUCH ULTRASOFT LANCETS) misc TESTING BID    glucose blood VI test strips (ASCENSIA AUTODISC VI, ONE TOUCH ULTRA TEST VI) strip TESTING BID    Sidestream misc     oxybutynin (DITROPAN) 5 mg tablet Take 1 Tab by mouth three (3) times daily. No current facility-administered medications on file prior to visit. CARDIOLOGY STUDIES TO DATE:  5/18 negative lexiscan   11/20/19   ECHO ADULT COMPLETE 11/20/2019 11/20/2019   Narrative   · Left Ventricle: Normal wall thickness. Mildly dilated left ventricle. Low normal systolic dysfunction. Estimated left ventricular ejection fraction is 51 - 55%. Visually measured ejection fraction. Inconclusive left ventricular diastolic function. · Mitral Valve: Mitral valve non-specific thickening. Trace mitral valve regurgitation is present. · Tricuspid Valve: Mild tricuspid valve regurgitation is present. · Pulmonary Artery: Pulmonary hypertension. · Left Atrium: Mildly dilated left atrium. Signed by: Kevin Nash MD    01/29/21  echo lvef 20-25%, lae, mod decreased rvef, mild to mod mr, mild to mod tr, pa pressure 46mm    Chief Complaint   Patient presents with    Follow-up    Hypertension     HPI :  When we last saw her in mid December she was doing great. She was on a lot of diuretic at that time and I advised her to cut back on that. Sometime in the middle of January she began to have trouble with wheezing and shortness of breath. She saw Dr. Caleb Mccarthy a couple of times her diuretics were increased and an echo was obtained that showed a dramatic fall in her ejection fraction when compared to November 2019. Her latest ejection fraction is now around 25%. She continues to have wheeze congestion and orthopnea. She has gained a few pounds but she really has no significant edema. She has quit smoking and rarely drinks alcohol at this point. I asked about symptoms of sleep apnea and other than some light snoring she does not seem to have any. Dr. Caleb Mccarthy sent her to the ER recently and they discharged her.   CARDIAC ROS:   negative for chest pain, palpitations, syncope, orthopnea, paroxysmal nocturnal dyspnea, exertional chest pressure/discomfort, claudication, lower extremity edema    Family History   Problem Relation Age of Onset    Diabetes Mother        Past Medical History:   Diagnosis Date    Arthritis     Diabetes ([address]     Hypercholesterolemia     Hypertension        GENERAL ROS:  A comprehensive review of systems was negative except for that written in the HPI.     Visit Vitals  /79 (BP 1 Location: Left upper arm, BP Patient Position: Sitting, BP Cuff Size: Large adult)   Pulse 96   Temp 96.9 °F (36.1 °C) (Temporal)   Resp 20   Ht 5' 4\" (1.626 m)   Wt 190 lb (86.2 kg)   SpO2 99%   BMI 32.61 kg/m²       Wt Readings from Last 3 Encounters:   03/09/21 190 lb (86.2 kg)   03/09/21 190 lb (86.2 kg)   02/17/21 193 lb (87.5 kg)            BP Readings from Last 3 Encounters:   03/09/21 (!) 142/79   03/09/21 128/79   02/18/21 (!) 146/99       PHYSICAL EXAM  General appearance: alert, cooperative, no distress, appears stated age  Neurologic: Alert and oriented X 3  Neck: supple, symmetrical, trachea midline, no adenopathy, no carotid bruit and no JVD  Lungs: coarse wheezes and rhonchi throughout  Heart: regular rate and rhythm, S1, S2 normal, no murmur, click, rub or gallop  Extremities: extremities normal, atraumatic, no cyanosis or edema    Lab Results   Component Value Date/Time    Cholesterol, total 143 10/08/2020 12:01 PM    Cholesterol, total 207 (H) 08/08/2019 02:31 PM    Cholesterol, total 175 04/10/2018 01:19 PM    Cholesterol, total 141 08/26/2016 11:48 AM    HDL Cholesterol 46 10/08/2020 12:01 PM    HDL Cholesterol 46 08/08/2019 02:31 PM    HDL Cholesterol 56 04/10/2018 01:19 PM    HDL Cholesterol 43 08/26/2016 11:48 AM    LDL, calculated 73 10/08/2020 12:01 PM    LDL, calculated 105 (H) 08/08/2019 02:31 PM    LDL, calculated 75 04/10/2018 01:19 PM    LDL, calculated 25 08/26/2016 11:48 AM    Triglyceride 137 10/08/2020 12:01 PM    Triglyceride 280 (H) 08/08/2019 02:31 PM    Triglyceride 221 (H) 04/10/2018 01:19 PM    Triglyceride 364 (H) 08/26/2016 11:48 AM ASSESSMENT :      I am not sure why she has had a dramatic fall in her ejection fraction such short period of time with no obvious precipitant. She is not doing well with outpatient management and I think we can get her feeling much better with intravenous diuretics and adjustments of her medications. She may need some intensification of her pulmonary medications as well and maybe even a course of steroids. We can try that to get her off lisinopril and switch her to [facility] and add a low-dose beta-blocker. Dr. Verner Leather had mentioned to her that she might need a defibrillator so we talked about that and although that is a possibility I think we should try to improve her medical management before we take that step. She is agreeable and will go to the emergency room and I have spoken to the emergency room doctor about her. current treatment plan is effective, no change in therapy  lab results and schedule of future lab studies reviewed with patient  reviewed diet, exercise and weight control    Encounter Diagnoses   Name Primary?  Dilated cardiomyopathy ([facility] Yes    Essential hypertension     Dyslipidemia      No orders of the defined types were placed in this encounter. Follow-up and Dispositions    · Return in about 1 month (around 4/9/2021). Richmond Caldwell MD  3/9/2021  Please note that this dictation was completed with [employer]ove, the computer voice recognition software. Quite often unanticipated grammatical, syntax, homophones, and other interpretive errors are inadvertently transcribed by the computer software. Please disregard these errors. Please excuse any errors that have escaped final proofreading. Thank you.

## 2021-03-09 NOTE — PROGRESS NOTES
Problem: Diabetes Self-Management  Goal: *Disease process and treatment process  Description: Define diabetes and identify own type of diabetes; list 3 options for treating diabetes. Outcome: Progressing Towards Goal  Goal: *Incorporating nutritional management into lifestyle  Description: Describe effect of type, amount and timing of food on blood glucose; list 3 methods for planning meals. Outcome: Progressing Towards Goal  Goal: *Incorporating physical activity into lifestyle  Description: State effect of exercise on blood glucose levels. Outcome: Progressing Towards Goal  Goal: *Developing strategies to promote health/change behavior  Description: Define the ABC's of diabetes; identify appropriate screenings, schedule and personal plan for screenings. Outcome: Progressing Towards Goal  Goal: *Using medications safely  Description: State effect of diabetes medications on diabetes; name diabetes medication taking, action and side effects. Outcome: Progressing Towards Goal  Goal: *Monitoring blood glucose, interpreting and using results  Description: Identify recommended blood glucose targets  and personal targets. Outcome: Progressing Towards Goal  Goal: *Prevention, detection, treatment of acute complications  Description: List symptoms of hyper- and hypoglycemia; describe how to treat low blood sugar and actions for lowering  high blood glucose level. Outcome: Progressing Towards Goal  Goal: *Prevention, detection and treatment of chronic complications  Description: Define the natural course of diabetes and describe the relationship of blood glucose levels to long term complications of diabetes.   Outcome: Progressing Towards Goal  Goal: *Developing strategies to address psychosocial issues  Description: Describe feelings about living with diabetes; identify support needed and support network  Outcome: Progressing Towards Goal  Goal: *Insulin pump training  Outcome: Progressing Towards Goal  Goal: *Sick day guidelines  Outcome: Progressing Towards Goal  Goal: *Patient Specific Goal (EDIT GOAL, INSERT TEXT)  Outcome: Progressing Towards Goal     Problem: Patient Education: Go to Patient Education Activity  Goal: Patient/Family Education  Outcome: Progressing Towards Goal     Problem: Falls - Risk of  Goal: *Absence of Falls  Description: Document Earma Vern Fall Risk and appropriate interventions in the flowsheet.   Outcome: Progressing Towards Goal  Note: Fall Risk Interventions:            Medication Interventions: Evaluate medications/consider consulting pharmacy                   Problem: Patient Education: Go to Patient Education Activity  Goal: Patient/Family Education  Outcome: Progressing Towards Goal     Problem: General Infection Care Plan (Adult and Pediatric)  Goal: Improvement in signs and symptoms of infection  Outcome: Progressing Towards Goal  Goal: *Optimize nutritional status  Outcome: Progressing Towards Goal     Problem: Patient Education: Go to Patient Education Activity  Goal: Patient/Family Education  Outcome: Progressing Towards Goal     Problem: Patient Education: Go to Patient Education Activity  Goal: Patient/Family Education  Outcome: Progressing Towards Goal     Problem: Heart Failure: Day 1  Goal: Off Pathway (Use only if patient is Off Pathway)  Outcome: Progressing Towards Goal  Goal: Activity/Safety  Outcome: Progressing Towards Goal  Goal: Consults, if ordered  Outcome: Progressing Towards Goal  Goal: Diagnostic Test/Procedures  Outcome: Progressing Towards Goal  Goal: Nutrition/Diet  Outcome: Progressing Towards Goal  Goal: Discharge Planning  Outcome: Progressing Towards Goal  Goal: Medications  Outcome: Progressing Towards Goal  Goal: Respiratory  Outcome: Progressing Towards Goal  Goal: Treatments/Interventions/Procedures  Outcome: Progressing Towards Goal  Goal: Psychosocial  Outcome: Progressing Towards Goal  Goal: *Oxygen saturation within defined limits  Outcome: Progressing Towards Goal  Goal: *Hemodynamically stable  Outcome: Progressing Towards Goal  Goal: *Optimal pain control at patient's stated goal  Outcome: Progressing Towards Goal  Goal: *Anxiety reduced or absent  Outcome: Progressing Towards Goal     Problem: Heart Failure: Day 2  Goal: Off Pathway (Use only if patient is Off Pathway)  Outcome: Progressing Towards Goal  Goal: Activity/Safety  Outcome: Progressing Towards Goal  Goal: Consults, if ordered  Outcome: Progressing Towards Goal  Goal: Diagnostic Test/Procedures  Outcome: Progressing Towards Goal  Goal: Nutrition/Diet  Outcome: Progressing Towards Goal  Goal: Discharge Planning  Outcome: Progressing Towards Goal  Goal: Medications  Outcome: Progressing Towards Goal  Goal: Respiratory  Outcome: Progressing Towards Goal  Goal: Treatments/Interventions/Procedures  Outcome: Progressing Towards Goal  Goal: Psychosocial  Outcome: Progressing Towards Goal  Goal: *Oxygen saturation within defined limits  Outcome: Progressing Towards Goal  Goal: *Hemodynamically stable  Outcome: Progressing Towards Goal  Goal: *Optimal pain control at patient's stated goal  Outcome: Progressing Towards Goal  Goal: *Anxiety reduced or absent  Outcome: Progressing Towards Goal  Goal: *Demonstrates progressive activity  Outcome: Progressing Towards Goal     Problem: Heart Failure: Day 3  Goal: Off Pathway (Use only if patient is Off Pathway)  Outcome: Progressing Towards Goal  Goal: Activity/Safety  Outcome: Progressing Towards Goal  Goal: Diagnostic Test/Procedures  Outcome: Progressing Towards Goal  Goal: Nutrition/Diet  Outcome: Progressing Towards Goal  Goal: Discharge Planning  Outcome: Progressing Towards Goal  Goal: Medications  Outcome: Progressing Towards Goal  Goal: Respiratory  Outcome: Progressing Towards Goal  Goal: Treatments/Interventions/Procedures  Outcome: Progressing Towards Goal  Goal: Psychosocial  Outcome: Progressing Towards Goal  Goal: *Oxygen saturation within defined limits  Outcome: Progressing Towards Goal  Goal: *Hemodynamically stable  Outcome: Progressing Towards Goal  Goal: *Optimal pain control at patient's stated goal  Outcome: Progressing Towards Goal  Goal: *Anxiety reduced or absent  Outcome: Progressing Towards Goal  Goal: *Demonstrates progressive activity  Outcome: Progressing Towards Goal     Problem: Heart Failure: Day 4  Goal: Off Pathway (Use only if patient is Off Pathway)  Outcome: Progressing Towards Goal  Goal: Activity/Safety  Outcome: Progressing Towards Goal  Goal: Diagnostic Test/Procedures  Outcome: Progressing Towards Goal  Goal: Nutrition/Diet  Outcome: Progressing Towards Goal  Goal: Discharge Planning  Outcome: Progressing Towards Goal  Goal: Medications  Outcome: Progressing Towards Goal  Goal: Respiratory  Outcome: Progressing Towards Goal  Goal: Treatments/Interventions/Procedures  Outcome: Progressing Towards Goal  Goal: Psychosocial  Outcome: Progressing Towards Goal  Goal: *Oxygen saturation within defined limits  Outcome: Progressing Towards Goal  Goal: *Hemodynamically stable  Outcome: Progressing Towards Goal  Goal: *Optimal pain control at patient's stated goal  Outcome: Progressing Towards Goal  Goal: *Anxiety reduced or absent  Outcome: Progressing Towards Goal  Goal: *Demonstrates progressive activity  Outcome: Progressing Towards Goal     Problem: Heart Failure: Day 5  Goal: Off Pathway (Use only if patient is Off Pathway)  Outcome: Progressing Towards Goal  Goal: Activity/Safety  Outcome: Progressing Towards Goal  Goal: Diagnostic Test/Procedures  Outcome: Progressing Towards Goal  Goal: Nutrition/Diet  Outcome: Progressing Towards Goal  Goal: Discharge Planning  Outcome: Progressing Towards Goal  Goal: Medications  Outcome: Progressing Towards Goal  Goal: Respiratory  Outcome: Progressing Towards Goal  Goal: Treatments/Interventions/Procedures  Outcome: Progressing Towards Goal  Goal: Psychosocial  Outcome: Progressing Towards Goal     Problem: Heart Failure: Discharge Outcomes  Goal: *Demonstrates ability to perform prescribed activity without shortness of breath or discomfort  Outcome: Progressing Towards Goal  Goal: *Left ventricular function assessment completed prior to or during stay, or planned for post-discharge  Outcome: Progressing Towards Goal  Goal: *ACEI prescribed if LVEF less than 40% and no contraindications or ARB prescribed  Outcome: Progressing Towards Goal  Goal: *Verbalizes understanding and describes prescribed diet  Outcome: Progressing Towards Goal  Goal: *Verbalizes understanding/describes prescribed medications  Outcome: Progressing Towards Goal  Goal: *Describes available resources and support systems  Description: (eg: Home Health, Palliative Care, [employer] Directive)  Outcome: Progressing Towards Goal  Goal: *Describes smoking cessation resources  Outcome: Progressing Towards Goal  Goal: *Understands and describes signs and symptoms to report to providers(Stroke Metric)  Outcome: Progressing Towards Goal  Goal: *Describes/verbalizes understanding of follow-up/return appt  Description: (eg: to physicians, diabetes treatment coordinator, and other resources  Outcome: Progressing Towards Goal  Goal: *Describes importance of continuing daily weights and changes to report to physician  Outcome: Progressing Towards Goal

## 2021-03-09 NOTE — PROGRESS NOTES
CM opened care for discharge planning. 72 y/o female admitted with heart failure exacerbation (COPD). Full assessment to follow.   JOSÉ LUIS Bowman/LYNETTE  [phone]

## 2021-03-09 NOTE — ED PROVIDER NOTES
EMERGENCY DEPARTMENT HISTORY AND PHYSICAL EXAM      Date: 3/9/2021  Patient Name: Frandy Couch    History of Presenting Illness     Chief Complaint   Patient presents with    Shortness of Breath     3-4 weeks, hx COPD and HF     History Provided By: Patient and Cardiologist    HPI: Frandy Couch, 73 y.o. female with past medical history significant for diabetes, hypertension, hyperlipidemia, COPD, and CHF who presents via private vehicle to the ED with cc of shortness of breath that has progressively worsened over the past month. Patient describes an orthopnea and a dyspnea on exertion. She states she cannot walk more than 10 to 15 feet without having to stop to catch her breath. She was seen by her cardiologist this morning who recommended she come to the emergency department for admission for IV diuresis and COPD tuneup. She does endorse a mild weight gain. She denies any cough, loss of taste or smell, fevers, chills, nausea, vomiting, or diarrhea. PMHx: Diabetes, hypertension, hyperlipidemia, COPD, CHF  Social Hx: Former smoker, history of alcohol use, denies illegal drug use    PCP: Kristin Amaro MD    There are no other complaints, changes, or physical findings at this time. No current facility-administered medications on file prior to encounter. Current Outpatient Medications on File Prior to Encounter   Medication Sig Dispense Refill    diclofenac (VOLTAREN) 1 % gel APPLY TO AFFECTED AREA TWICE A DAY AS NEEDED      fluticasone propion-salmeteroL (ADVAIR/WIXELA) 250-50 mcg/dose diskus inhaler       Sidestream misc       fluticasone propionate (FLONASE) 50 mcg/actuation nasal spray 2 Sprays by Both Nostrils route daily. 1 Bottle 0    [DISCONTINUED] doxycycline (MONODOX) 100 mg capsule Take 1 Cap by mouth two (2) times a day.  14 Cap 0    allopurinoL (ZYLOPRIM) 300 mg tablet TAKE 1 TABLET BY MOUTH DAILY 90 Tab 3    insulin glargine (LANTUS,BASAGLAR) 100 unit/mL (3 mL) inpn 60 units every morning 6 Adjustable Dose Pre-filled Pen Syringe 11    oxybutynin (DITROPAN) 5 mg tablet Take 1 Tab by mouth three (3) times daily. 90 Tab 11    atorvastatin (LIPITOR) 40 mg tablet TAKE 1 TABLET BY MOUTH EVERY DAY 90 Tab 3    metFORMIN ER (GLUCOPHAGE XR) 500 mg tablet TAKE 1 TABLET BY MOUTH THREE TIMES DAILY WITH MEALS 270 Tab 3    Insulin Needles, Disposable, (BD ULTRA-FINE SHORT PEN NEEDLE) 31 gauge x 5/16\" ndle USE WITH INSULIN PENS TWICE DAILY AS DIRECTED 100 Pen Needle 11    alcohol swabs (BD SINGLE USE SWABS REGULAR) padm USE TO TEST BLOOD SUGAR.dx.e11.9 100 Pad 11    potassium chloride SR (KLOR-CON 10) 10 mEq tablet 2  Tabs 2 times a day 360 Tab 3    [DISCONTINUED] metOLazone (ZAROXOLYN) 5 mg tablet Take 1 Tab by mouth daily. 30 Tab 0    furosemide (LASIX) 40 mg tablet TAKE 3 TABLETS BY MOUTH EVERY MORNING (Patient taking differently: TAKE 2 TABLETS BY MOUTH EVERY MORNING AND 2 TABLET IN THE EVENING) 90 Tab 11    [DISCONTINUED] glimepiride (AMARYL) 4 mg tablet   TAKE 1 TABLET BY MOUTH TWICE DAILY BEFORE A MEAL      mirabegron ER (MYRBETRIQ) 50 mg ER tablet Take 1 Tab by mouth daily. 30 Tab 11    glucose blood VI test strips (FREESTYLE LITE STRIPS) strip Use to test blood sugar three times daily. Dx.e11.9 100 Strip 11    Blood-Glucose Meter (FREESTYLE LITE METER) monitoring kit Test three times a day dx. e119 1 Kit 0    [DISCONTINUED] glyBURIDE (DIABETA) 1.25 mg tablet Take 1.25 mg by mouth Daily (before breakfast).       lisinopril (PRINIVIL, ZESTRIL) 20 mg tablet TAKE 1 TABLET BY MOUTH ONCE DAILY IN THE MORNING 90 Tab 3    ONE TOUCH DELICA 33 gauge misc   11    NURIA PEN NEEDLE 32 x 5/32 \" ndle       Blood-Glucose Meter monitoring kit Testing BID-DX:250.00 1 kit 0    Lancets (ONE TOUCH ULTRASOFT LANCETS) misc TESTING BID 1 Package 11    glucose blood VI test strips (ASCENSIA AUTODISC VI, ONE TOUCH ULTRA TEST VI) strip TESTING BID 1 Package 11     Past History     Past Medical History:  Past Medical History:   Diagnosis Date    Arthritis     Diabetes ([address]     Hypercholesterolemia     Hypertension      Past Surgical History:  Past Surgical History:   Procedure Laterality Date    HX ORTHOPAEDIC      Arthroscopy right knee     Family History:  Family History   Problem Relation Age of Onset    Diabetes Mother      Social History:  Social History     Tobacco Use    Smoking status: Former Smoker     Packs/day: 0.25     Years: 20.00     Pack years: 5.00     Quit date: 2014     Years since quittin.2    Smokeless tobacco: Never Used   Substance Use Topics    Alcohol use: Yes     Alcohol/week: 0.0 standard drinks     Comment: 21-24 Beers per week    Drug use: No     Allergies:  No Known Allergies  Review of Systems   Review of Systems   Constitutional: Negative for chills and fever. HENT: Negative for congestion, rhinorrhea, sneezing and sore throat. Eyes: Negative for redness and visual disturbance. Respiratory: Positive for shortness of breath. Cardiovascular: Negative for leg swelling. Gastrointestinal: Negative for abdominal pain, nausea and vomiting. Genitourinary: Negative for difficulty urinating and frequency. Musculoskeletal: Negative for back pain, myalgias and neck stiffness. Skin: Negative for rash. Neurological: Negative for dizziness, syncope, weakness and headaches. Hematological: Negative for adenopathy. All other systems reviewed and are negative. Physical Exam   Physical Exam  Vitals signs and nursing note reviewed. Constitutional:       Appearance: Normal appearance. She is well-developed. HENT:      Head: Normocephalic and atraumatic. Eyes:      Conjunctiva/sclera: Conjunctivae normal.   Neck:      Musculoskeletal: Full passive range of motion without pain, normal range of motion and neck supple. Cardiovascular:      Rate and Rhythm: Regular rhythm. Tachycardia present. Pulses: Normal pulses.       Heart sounds: Normal heart sounds, S1 normal and S2 normal. No murmur. Pulmonary:      Effort: Pulmonary effort is normal. No respiratory distress. Breath sounds: Examination of the right-lower field reveals rhonchi. Examination of the left-lower field reveals rhonchi. Rhonchi present. Comments: Coarse breath sounds bilaterally, speaking in full sentences  Abdominal:      General: Bowel sounds are normal. There is no distension. Palpations: Abdomen is soft. Tenderness: There is no abdominal tenderness. There is no rebound. Musculoskeletal: Normal range of motion. Right lower leg: Edema (Trace pitting) present. Left lower leg: Edema (Trace pitting) present. Skin:     General: Skin is warm and dry. Findings: No rash. Neurological:      Mental Status: She is alert and oriented to person, place, and time. Psychiatric:         Speech: Speech normal.         Behavior: Behavior normal.         Thought Content:  Thought content normal.         Judgment: Judgment normal.       Diagnostic Study Results   Labs -     Recent Results (from the past 12 hour(s))   EKG, 12 LEAD, INITIAL    Collection Time: 03/09/21 11:18 AM   Result Value Ref Range    Ventricular Rate 103 BPM    Atrial Rate 103 BPM    P-R Interval 190 ms    QRS Duration 84 ms    Q-T Interval 368 ms    QTC Calculation (Bezet) 482 ms    Calculated P Axis 64 degrees    Calculated R Axis 13 degrees    Calculated T Axis 91 degrees    Diagnosis       Sinus tachycardia  Possible Left atrial enlargement  T wave abnormality, consider lateral ischemia  Abnormal ECG  When compared with ECG of 17-FEB-2021 18:00,  No significant change was found     CBC WITH AUTOMATED DIFF    Collection Time: 03/09/21 11:31 AM   Result Value Ref Range    WBC 5.6 3.6 - 11.0 K/uL    RBC 4.25 3.80 - 5.20 M/uL    HGB 11.7 11.5 - 16.0 g/dL    HCT 37.6 35.0 - 47.0 %    MCV 88.5 80.0 - 99.0 FL    MCH 27.5 26.0 - 34.0 PG    MCHC 31.1 30.0 - 36.5 g/dL    RDW 17.9 (H) 11.5 - 14.5 % PLATELET 173 332 - 360 K/uL    MPV 10.6 8.9 - 12.9 FL    NRBC 0.0 0  WBC    ABSOLUTE NRBC 0.00 0.00 - 0.01 K/uL    NEUTROPHILS 79 (H) 32 - 75 %    LYMPHOCYTES 13 12 - 49 %    MONOCYTES 3 (L) 5 - 13 %    EOSINOPHILS 4 0 - 7 %    BASOPHILS 1 0 - 1 %    IMMATURE GRANULOCYTES 0 0.0 - 0.5 %    ABS. NEUTROPHILS 4.4 1.8 - 8.0 K/UL    ABS. LYMPHOCYTES 0.7 (L) 0.8 - 3.5 K/UL    ABS. MONOCYTES 0.2 0.0 - 1.0 K/UL    ABS. EOSINOPHILS 0.2 0.0 - 0.4 K/UL    ABS. BASOPHILS 0.1 0.0 - 0.1 K/UL    ABS. IMM. GRANS. 0.0 0.00 - 0.04 K/UL    DF SMEAR SCANNED      RBC COMMENTS ANISOCYTOSIS  1+       METABOLIC PANEL, COMPREHENSIVE    Collection Time: 03/09/21 11:59 AM   Result Value Ref Range    Sodium 144 136 - 145 mmol/L    Potassium 3.3 (L) 3.5 - 5.1 mmol/L    Chloride 105 97 - 108 mmol/L    CO2 30 21 - 32 mmol/L    Anion gap 9 5 - 15 mmol/L    Glucose 143 (H) 65 - 100 mg/dL    BUN 20 6 - 20 MG/DL    Creatinine 0.91 0.55 - 1.02 MG/DL    BUN/Creatinine ratio 22 (H) 12 - 20      GFR est AA >60 >60 ml/min/1.73m2    GFR est non-AA >60 >60 ml/min/1.73m2    Calcium 9.0 8.5 - 10.1 MG/DL    Bilirubin, total 1.3 (H) 0.2 - 1.0 MG/DL    ALT (SGPT) 39 12 - 78 U/L    AST (SGOT) 30 15 - 37 U/L    Alk. phosphatase 85 45 - 117 U/L    Protein, total 8.2 6.4 - 8.2 g/dL    Albumin 3.1 (L) 3.5 - 5.0 g/dL    Globulin 5.1 (H) 2.0 - 4.0 g/dL    A-G Ratio 0.6 (L) 1.1 - 2.2     MAGNESIUM    Collection Time: 03/09/21 11:59 AM   Result Value Ref Range    Magnesium 1.3 (L) 1.6 - 2.4 mg/dL   NT-PRO BNP    Collection Time: 03/09/21 11:59 AM   Result Value Ref Range    NT pro-BNP 2,211 (H) 0 - 125 PG/ML   TROPONIN I    Collection Time: 03/09/21 11:59 AM   Result Value Ref Range    Troponin-I, Qt. 0.05 (H) <0.05 ng/mL       Radiologic Studies -   XR CHEST PORT   Final Result   Unchanged cardiomegaly. No evidence for overt CHF        Xr Chest Port    Result Date: 3/9/2021  Unchanged cardiomegaly.  No evidence for overt CHF    Medical Decision Making   I am the first provider for this patient. I reviewed the vital signs, available nursing notes, past medical history, past surgical history, family history and social history. Vital Signs-Reviewed the patient's vital signs. Patient Vitals for the past 24 hrs:   Temp Pulse Resp BP SpO2   03/09/21 1100 -- 96 25 125/85 97 %   03/09/21 1033 97.5 °F (36.4 °C) (!) 105 20 (!) 142/79 98 %     Pulse Oximetry Analysis - 98% on RA (normal)    Cardiac Monitor:   Rate: 105 bpm  Rhythm: Sinus Tachycardia     ED EKG interpretation: 11:18  Rhythm: sinus tachycardia; and regular . Rate (approx.): 103; Axis: normal; P wave: normal; QRS interval: normal ; ST/T wave: T wave inverted; Other findings: abnormal ekg. This EKG was interpreted by Leslye Aragon MD,ED Provider. Records Reviewed: Nursing Notes, Old Medical Records, Previous electrocardiograms and Previous Laboratory Studies    Provider Notes (Medical Decision Making):   77-year-old female presents with worsening shortness of breath over the past month sent from the cardiologist office today for admission. Differential includes COPD exacerbation, CHF exacerbation, and possible sleep apnea. Her oxygen saturations are 98% on room air and she is otherwise well-appearing but given her significant dyspnea on exertion over the past week, will check labs, an EKG, and a chest x-ray and discussed with the hospitalist for possible admission. ED Course:   Initial assessment performed. The patients presenting problems have been discussed, and they are in agreement with the care plan formulated and outlined with them. I have encouraged them to ask questions as they arise throughout their visit. Progress Note  12:43 PM  I have re-evaluated pt and her labs are remarkable for a hypokalemia and hypomagnesemia. Her chest x-ray is unremarkable. 12:52 PM  Chris Rose MD spoke with Dr. Tamiko Patel, Consult for Cardiology.  Discussed HPI and PE, available diagnostic tests and clinical findings. He is in agreement with care plans as outlined. He feels that the patient needs admission for IV diuresis and symptomatic improvement of her dyspnea on exertion which he believes is related to her COPD. He is requesting that the hospitalist admit the patient and cardiology will follow and help comanage her cardiac pathology. 1:17 PM  Harman Bustamante MD spoke with Dr. Ann-Marie Rutherford, Consult for Hospitalist. Discussed HPI and PE, available diagnostic tests and clinical findings. He is in agreement with care plans as outlined. He see and evaluate the patient for possible admission. He is requesting a Covid swab prior to admitting her. Progress Note:   Updated pt on all returned results and findings. Discussed the importance of proper follow up as referred below along with return precautions. Pt in agreement with the care plan and expresses agreement with and understanding of all items discussed. Disposition:  ADMIT NOTE:  1:30 PM  The patient is being admitted to the hospital by inpatient medicine. The results of their tests and reasons for their admission have been discussed with the patient and/or available family. They convey agreement and understanding for the need to be admitted and for their admission diagnosis. PLAN:  1. Admit    Diagnosis     Clinical Impression:   1. PARDO (dyspnea on exertion)    2. Hypokalemia    3. Hypomagnesemia            Please note that this dictation was completed with Dragon, computer voice recognition software. Quite often unanticipated grammatical, syntax, homophones, and other interpretive errors are inadvertently transcribed by the computer software. Please disregard these errors. Additionally, please excuse any errors that have escaped final proofreading.

## 2021-03-09 NOTE — ED TRIAGE NOTES
Pt reports to ED with c/o of SOB for 3-4 weeks. Pt has hx of COPD and HF. Per Dr. Kerline Lewis, cardiologist, patient is being admitted for exacerbation.

## 2021-03-10 ENCOUNTER — HOSPITAL ENCOUNTER (INPATIENT)
Age: 71
LOS: 10 days | Discharge: HOME HEALTH | DRG: 286 | End: 2021-03-20
Attending: INTERNAL MEDICINE | Admitting: HOSPITALIST
Payer: MEDICARE

## 2021-03-10 ENCOUNTER — APPOINTMENT (OUTPATIENT)
Dept: CT IMAGING | Age: 71
DRG: 286 | End: 2021-03-10
Attending: HOSPITALIST
Payer: MEDICARE

## 2021-03-10 VITALS
BODY MASS INDEX: 32.33 KG/M2 | DIASTOLIC BLOOD PRESSURE: 80 MMHG | SYSTOLIC BLOOD PRESSURE: 107 MMHG | OXYGEN SATURATION: 100 % | RESPIRATION RATE: 22 BRPM | HEIGHT: 64 IN | HEART RATE: 84 BPM | TEMPERATURE: 97.9 F | WEIGHT: 189.4 LBS

## 2021-03-10 DIAGNOSIS — I42.0 DILATED CARDIOMYOPATHY (HCC): ICD-10-CM

## 2021-03-10 DIAGNOSIS — I50.23 ACUTE ON CHRONIC SYSTOLIC CONGESTIVE HEART FAILURE, NYHA CLASS 3 (HCC): ICD-10-CM

## 2021-03-10 DIAGNOSIS — I50.9 CONGESTIVE HEART FAILURE, UNSPECIFIED HF CHRONICITY, UNSPECIFIED HEART FAILURE TYPE (HCC): ICD-10-CM

## 2021-03-10 DIAGNOSIS — I50.82 BIVENTRICULAR CONGESTIVE HEART FAILURE (HCC): ICD-10-CM

## 2021-03-10 DIAGNOSIS — I50.22 HEART FAILURE, SYSTOLIC, CHRONIC (HCC): ICD-10-CM

## 2021-03-10 DIAGNOSIS — E78.5 DYSLIPIDEMIA: ICD-10-CM

## 2021-03-10 DIAGNOSIS — Z01.818 PREOPERATIVE EVALUATION OF A MEDICAL CONDITION TO RULE OUT SURGICAL CONTRAINDICATIONS (TAR REQUIRED): ICD-10-CM

## 2021-03-10 DIAGNOSIS — Z79.899 RECEIVING INOTROPIC MEDICATION: ICD-10-CM

## 2021-03-10 DIAGNOSIS — N18.31 STAGE 3A CHRONIC KIDNEY DISEASE (HCC): ICD-10-CM

## 2021-03-10 DIAGNOSIS — I10 ESSENTIAL HYPERTENSION: ICD-10-CM

## 2021-03-10 DIAGNOSIS — R06.09 DOE (DYSPNEA ON EXERTION): ICD-10-CM

## 2021-03-10 LAB
ANION GAP SERPL CALC-SCNC: 11 MMOL/L (ref 5–15)
ATRIAL RATE: 103 BPM
BACTERIA SPEC CULT: NORMAL
BACTERIA SPEC CULT: NORMAL
BASOPHILS # BLD: 0 K/UL (ref 0–0.1)
BASOPHILS NFR BLD: 1 % (ref 0–1)
BUN SERPL-MCNC: 24 MG/DL (ref 6–20)
BUN/CREAT SERPL: 28 (ref 12–20)
CALCIUM SERPL-MCNC: 8.4 MG/DL (ref 8.5–10.1)
CALCULATED P AXIS, ECG09: 64 DEGREES
CALCULATED R AXIS, ECG10: 13 DEGREES
CALCULATED T AXIS, ECG11: 91 DEGREES
CHLORIDE SERPL-SCNC: 104 MMOL/L (ref 97–108)
CO2 SERPL-SCNC: 27 MMOL/L (ref 21–32)
CREAT SERPL-MCNC: 0.85 MG/DL (ref 0.55–1.02)
DIAGNOSIS, 93000: NORMAL
DIFFERENTIAL METHOD BLD: ABNORMAL
EOSINOPHIL # BLD: 0.2 K/UL (ref 0–0.4)
EOSINOPHIL NFR BLD: 5 % (ref 0–7)
ERYTHROCYTE [DISTWIDTH] IN BLOOD BY AUTOMATED COUNT: 17.8 % (ref 11.5–14.5)
GLUCOSE BLD STRIP.AUTO-MCNC: 111 MG/DL (ref 65–100)
GLUCOSE BLD STRIP.AUTO-MCNC: 113 MG/DL (ref 65–100)
GLUCOSE BLD STRIP.AUTO-MCNC: 142 MG/DL (ref 65–100)
GLUCOSE BLD STRIP.AUTO-MCNC: 196 MG/DL (ref 65–100)
GLUCOSE SERPL-MCNC: 172 MG/DL (ref 65–100)
HCT VFR BLD AUTO: 33.9 % (ref 35–47)
HGB BLD-MCNC: 10.7 G/DL (ref 11.5–16)
IMM GRANULOCYTES # BLD AUTO: 0 K/UL (ref 0–0.04)
IMM GRANULOCYTES NFR BLD AUTO: 0 % (ref 0–0.5)
LYMPHOCYTES # BLD: 0.8 K/UL (ref 0.8–3.5)
LYMPHOCYTES NFR BLD: 17 % (ref 12–49)
MAGNESIUM SERPL-MCNC: 1.6 MG/DL (ref 1.6–2.4)
MCH RBC QN AUTO: 28 PG (ref 26–34)
MCHC RBC AUTO-ENTMCNC: 31.6 G/DL (ref 30–36.5)
MCV RBC AUTO: 88.7 FL (ref 80–99)
MONOCYTES # BLD: 0.2 K/UL (ref 0–1)
MONOCYTES NFR BLD: 4 % (ref 5–13)
NEUTS SEG # BLD: 3.4 K/UL (ref 1.8–8)
NEUTS SEG NFR BLD: 73 % (ref 32–75)
NRBC # BLD: 0 K/UL (ref 0–0.01)
NRBC BLD-RTO: 0 PER 100 WBC
P-R INTERVAL, ECG05: 190 MS
PLATELET # BLD AUTO: 219 K/UL (ref 150–400)
PMV BLD AUTO: 11.4 FL (ref 8.9–12.9)
POTASSIUM SERPL-SCNC: 3.3 MMOL/L (ref 3.5–5.1)
Q-T INTERVAL, ECG07: 368 MS
QRS DURATION, ECG06: 84 MS
QTC CALCULATION (BEZET), ECG08: 482 MS
RBC # BLD AUTO: 3.82 M/UL (ref 3.8–5.2)
RBC MORPH BLD: ABNORMAL
SERVICE CMNT-IMP: ABNORMAL
SERVICE CMNT-IMP: NORMAL
SODIUM SERPL-SCNC: 142 MMOL/L (ref 136–145)
TROPONIN I SERPL-MCNC: 0.08 NG/ML
VENTRICULAR RATE, ECG03: 103 BPM
WBC # BLD AUTO: 4.6 K/UL (ref 3.6–11)

## 2021-03-10 PROCEDURE — 82962 GLUCOSE BLOOD TEST: CPT

## 2021-03-10 PROCEDURE — 74011250636 HC RX REV CODE- 250/636: Performed by: INTERNAL MEDICINE

## 2021-03-10 PROCEDURE — 74011250637 HC RX REV CODE- 250/637: Performed by: STUDENT IN AN ORGANIZED HEALTH CARE EDUCATION/TRAINING PROGRAM

## 2021-03-10 PROCEDURE — 99222 1ST HOSP IP/OBS MODERATE 55: CPT | Performed by: NURSE PRACTITIONER

## 2021-03-10 PROCEDURE — 36415 COLL VENOUS BLD VENIPUNCTURE: CPT

## 2021-03-10 PROCEDURE — 74011000636 HC RX REV CODE- 636: Performed by: RADIOLOGY

## 2021-03-10 PROCEDURE — 87077 CULTURE AEROBIC IDENTIFY: CPT

## 2021-03-10 PROCEDURE — 74011250636 HC RX REV CODE- 250/636: Performed by: STUDENT IN AN ORGANIZED HEALTH CARE EDUCATION/TRAINING PROGRAM

## 2021-03-10 PROCEDURE — 94640 AIRWAY INHALATION TREATMENT: CPT

## 2021-03-10 PROCEDURE — 84484 ASSAY OF TROPONIN QUANT: CPT

## 2021-03-10 PROCEDURE — 74011250636 HC RX REV CODE- 250/636: Performed by: HOSPITALIST

## 2021-03-10 PROCEDURE — 74011250637 HC RX REV CODE- 250/637: Performed by: INTERNAL MEDICINE

## 2021-03-10 PROCEDURE — 65660000000 HC RM CCU STEPDOWN

## 2021-03-10 PROCEDURE — 77030029684 HC NEB SM VOL KT MONA -A

## 2021-03-10 PROCEDURE — 74011000250 HC RX REV CODE- 250: Performed by: STUDENT IN AN ORGANIZED HEALTH CARE EDUCATION/TRAINING PROGRAM

## 2021-03-10 PROCEDURE — 74011000250 HC RX REV CODE- 250: Performed by: HOSPITALIST

## 2021-03-10 PROCEDURE — 87186 SC STD MICRODIL/AGAR DIL: CPT

## 2021-03-10 PROCEDURE — 74011250637 HC RX REV CODE- 250/637: Performed by: HOSPITALIST

## 2021-03-10 PROCEDURE — 74011636637 HC RX REV CODE- 636/637: Performed by: STUDENT IN AN ORGANIZED HEALTH CARE EDUCATION/TRAINING PROGRAM

## 2021-03-10 PROCEDURE — 85025 COMPLETE CBC W/AUTO DIFF WBC: CPT

## 2021-03-10 PROCEDURE — 80048 BASIC METABOLIC PNL TOTAL CA: CPT

## 2021-03-10 PROCEDURE — 94760 N-INVAS EAR/PLS OXIMETRY 1: CPT

## 2021-03-10 PROCEDURE — 87205 SMEAR GRAM STAIN: CPT

## 2021-03-10 PROCEDURE — 83735 ASSAY OF MAGNESIUM: CPT

## 2021-03-10 PROCEDURE — 71275 CT ANGIOGRAPHY CHEST: CPT

## 2021-03-10 RX ORDER — ACETAMINOPHEN 650 MG/1
650 SUPPOSITORY RECTAL
Status: CANCELLED | OUTPATIENT
Start: 2021-03-10

## 2021-03-10 RX ORDER — ARFORMOTEROL TARTRATE 15 UG/2ML
15 SOLUTION RESPIRATORY (INHALATION)
Status: CANCELLED | OUTPATIENT
Start: 2021-03-10

## 2021-03-10 RX ORDER — LANOLIN ALCOHOL/MO/W.PET/CERES
400 CREAM (GRAM) TOPICAL 2 TIMES DAILY
Status: DISCONTINUED | OUTPATIENT
Start: 2021-03-10 | End: 2021-03-13

## 2021-03-10 RX ORDER — LANOLIN ALCOHOL/MO/W.PET/CERES
100 CREAM (GRAM) TOPICAL DAILY
Status: DISCONTINUED | OUTPATIENT
Start: 2021-03-11 | End: 2021-03-20 | Stop reason: HOSPADM

## 2021-03-10 RX ORDER — ACETAMINOPHEN 325 MG/1
650 TABLET ORAL
Status: DISCONTINUED | OUTPATIENT
Start: 2021-03-10 | End: 2021-03-20 | Stop reason: HOSPADM

## 2021-03-10 RX ORDER — GUAIFENESIN 600 MG/1
600 TABLET, EXTENDED RELEASE ORAL EVERY 12 HOURS
Status: DISCONTINUED | OUTPATIENT
Start: 2021-03-10 | End: 2021-03-17

## 2021-03-10 RX ORDER — SODIUM CHLORIDE 0.9 % (FLUSH) 0.9 %
5-40 SYRINGE (ML) INJECTION EVERY 8 HOURS
Status: DISCONTINUED | OUTPATIENT
Start: 2021-03-10 | End: 2021-03-20 | Stop reason: HOSPADM

## 2021-03-10 RX ORDER — ACETAMINOPHEN 650 MG/1
650 SUPPOSITORY RECTAL
Status: DISCONTINUED | OUTPATIENT
Start: 2021-03-10 | End: 2021-03-10 | Stop reason: SDUPTHER

## 2021-03-10 RX ORDER — LANOLIN ALCOHOL/MO/W.PET/CERES
400 CREAM (GRAM) TOPICAL 2 TIMES DAILY
Status: COMPLETED | OUTPATIENT
Start: 2021-03-10 | End: 2021-03-10

## 2021-03-10 RX ORDER — ARFORMOTEROL TARTRATE 15 UG/2ML
15 SOLUTION RESPIRATORY (INHALATION)
Status: DISCONTINUED | OUTPATIENT
Start: 2021-03-10 | End: 2021-03-20 | Stop reason: HOSPADM

## 2021-03-10 RX ORDER — ATORVASTATIN CALCIUM 40 MG/1
40 TABLET, FILM COATED ORAL
Status: DISCONTINUED | OUTPATIENT
Start: 2021-03-10 | End: 2021-03-12

## 2021-03-10 RX ORDER — PROMETHAZINE HYDROCHLORIDE 25 MG/1
12.5 TABLET ORAL
Status: DISCONTINUED | OUTPATIENT
Start: 2021-03-10 | End: 2021-03-20 | Stop reason: HOSPADM

## 2021-03-10 RX ORDER — PROMETHAZINE HYDROCHLORIDE 25 MG/1
12.5 TABLET ORAL
Status: DISCONTINUED | OUTPATIENT
Start: 2021-03-10 | End: 2021-03-10 | Stop reason: SDUPTHER

## 2021-03-10 RX ORDER — SODIUM CHLORIDE 0.9 % (FLUSH) 0.9 %
5-40 SYRINGE (ML) INJECTION EVERY 8 HOURS
Status: CANCELLED | OUTPATIENT
Start: 2021-03-10

## 2021-03-10 RX ORDER — METOPROLOL SUCCINATE 25 MG/1
25 TABLET, EXTENDED RELEASE ORAL DAILY
Status: DISCONTINUED | OUTPATIENT
Start: 2021-03-11 | End: 2021-03-11

## 2021-03-10 RX ORDER — INSULIN GLARGINE 100 [IU]/ML
30 INJECTION, SOLUTION SUBCUTANEOUS DAILY
Status: DISCONTINUED | OUTPATIENT
Start: 2021-03-11 | End: 2021-03-20 | Stop reason: HOSPADM

## 2021-03-10 RX ORDER — ZOLPIDEM TARTRATE 5 MG/1
5 TABLET ORAL
Status: DISCONTINUED | OUTPATIENT
Start: 2021-03-10 | End: 2021-03-20 | Stop reason: HOSPADM

## 2021-03-10 RX ORDER — PROMETHAZINE HYDROCHLORIDE 25 MG/1
12.5 TABLET ORAL
Status: CANCELLED | OUTPATIENT
Start: 2021-03-10

## 2021-03-10 RX ORDER — FUROSEMIDE 10 MG/ML
40 INJECTION INTRAMUSCULAR; INTRAVENOUS 2 TIMES DAILY
Status: DISCONTINUED | OUTPATIENT
Start: 2021-03-10 | End: 2021-03-11

## 2021-03-10 RX ORDER — INSULIN LISPRO 100 [IU]/ML
1-10 INJECTION, SOLUTION INTRAVENOUS; SUBCUTANEOUS
Status: DISCONTINUED | OUTPATIENT
Start: 2021-03-10 | End: 2021-03-20 | Stop reason: HOSPADM

## 2021-03-10 RX ORDER — POLYETHYLENE GLYCOL 3350 17 G/17G
17 POWDER, FOR SOLUTION ORAL DAILY PRN
Status: DISCONTINUED | OUTPATIENT
Start: 2021-03-10 | End: 2021-03-10 | Stop reason: SDUPTHER

## 2021-03-10 RX ORDER — LANOLIN ALCOHOL/MO/W.PET/CERES
3 CREAM (GRAM) TOPICAL
Status: DISCONTINUED | OUTPATIENT
Start: 2021-03-10 | End: 2021-03-10 | Stop reason: HOSPADM

## 2021-03-10 RX ORDER — ACETAMINOPHEN 325 MG/1
650 TABLET ORAL
Status: DISCONTINUED | OUTPATIENT
Start: 2021-03-10 | End: 2021-03-10 | Stop reason: SDUPTHER

## 2021-03-10 RX ORDER — DEXTROSE 50 % IN WATER (D50W) INTRAVENOUS SYRINGE
25-50 AS NEEDED
Status: DISCONTINUED | OUTPATIENT
Start: 2021-03-10 | End: 2021-03-20 | Stop reason: HOSPADM

## 2021-03-10 RX ORDER — ONDANSETRON 2 MG/ML
4 INJECTION INTRAMUSCULAR; INTRAVENOUS
Status: CANCELLED | OUTPATIENT
Start: 2021-03-10

## 2021-03-10 RX ORDER — LANOLIN ALCOHOL/MO/W.PET/CERES
3 CREAM (GRAM) TOPICAL
Status: CANCELLED | OUTPATIENT
Start: 2021-03-10

## 2021-03-10 RX ORDER — LANOLIN ALCOHOL/MO/W.PET/CERES
3 CREAM (GRAM) TOPICAL
Status: DISCONTINUED | OUTPATIENT
Start: 2021-03-10 | End: 2021-03-20 | Stop reason: HOSPADM

## 2021-03-10 RX ORDER — INSULIN LISPRO 100 [IU]/ML
1-10 INJECTION, SOLUTION INTRAVENOUS; SUBCUTANEOUS
Status: CANCELLED | OUTPATIENT
Start: 2021-03-10

## 2021-03-10 RX ORDER — ATORVASTATIN CALCIUM 40 MG/1
40 TABLET, FILM COATED ORAL
Status: CANCELLED | OUTPATIENT
Start: 2021-03-10

## 2021-03-10 RX ORDER — IPRATROPIUM BROMIDE AND ALBUTEROL SULFATE 2.5; .5 MG/3ML; MG/3ML
3 SOLUTION RESPIRATORY (INHALATION)
Status: DISCONTINUED | OUTPATIENT
Start: 2021-03-10 | End: 2021-03-20 | Stop reason: HOSPADM

## 2021-03-10 RX ORDER — IPRATROPIUM BROMIDE AND ALBUTEROL SULFATE 2.5; .5 MG/3ML; MG/3ML
3 SOLUTION RESPIRATORY (INHALATION)
Status: CANCELLED | OUTPATIENT
Start: 2021-03-10

## 2021-03-10 RX ORDER — POLYETHYLENE GLYCOL 3350 17 G/17G
17 POWDER, FOR SOLUTION ORAL DAILY PRN
Status: CANCELLED | OUTPATIENT
Start: 2021-03-10

## 2021-03-10 RX ORDER — ACETAMINOPHEN 650 MG/1
650 SUPPOSITORY RECTAL
Status: DISCONTINUED | OUTPATIENT
Start: 2021-03-10 | End: 2021-03-20 | Stop reason: HOSPADM

## 2021-03-10 RX ORDER — ACETAMINOPHEN 325 MG/1
650 TABLET ORAL
Status: CANCELLED | OUTPATIENT
Start: 2021-03-10

## 2021-03-10 RX ORDER — BUDESONIDE 0.5 MG/2ML
500 INHALANT ORAL
Status: DISCONTINUED | OUTPATIENT
Start: 2021-03-10 | End: 2021-03-20 | Stop reason: HOSPADM

## 2021-03-10 RX ORDER — MAGNESIUM SULFATE 1 G/100ML
1 INJECTION INTRAVENOUS ONCE
Status: COMPLETED | OUTPATIENT
Start: 2021-03-10 | End: 2021-03-10

## 2021-03-10 RX ORDER — LANOLIN ALCOHOL/MO/W.PET/CERES
100 CREAM (GRAM) TOPICAL DAILY
Status: CANCELLED | OUTPATIENT
Start: 2021-03-11

## 2021-03-10 RX ORDER — OXYBUTYNIN CHLORIDE 5 MG/1
5 TABLET ORAL 3 TIMES DAILY
Status: DISCONTINUED | OUTPATIENT
Start: 2021-03-10 | End: 2021-03-19

## 2021-03-10 RX ORDER — METOPROLOL SUCCINATE 25 MG/1
25 TABLET, EXTENDED RELEASE ORAL DAILY
Status: CANCELLED | OUTPATIENT
Start: 2021-03-11

## 2021-03-10 RX ORDER — LANOLIN ALCOHOL/MO/W.PET/CERES
400 CREAM (GRAM) TOPICAL 2 TIMES DAILY
Status: DISCONTINUED | OUTPATIENT
Start: 2021-03-10 | End: 2021-03-10 | Stop reason: HOSPADM

## 2021-03-10 RX ORDER — POLYETHYLENE GLYCOL 3350 17 G/17G
17 POWDER, FOR SOLUTION ORAL DAILY PRN
Status: DISCONTINUED | OUTPATIENT
Start: 2021-03-10 | End: 2021-03-20 | Stop reason: HOSPADM

## 2021-03-10 RX ORDER — FUROSEMIDE 10 MG/ML
40 INJECTION INTRAMUSCULAR; INTRAVENOUS 2 TIMES DAILY
Status: CANCELLED | OUTPATIENT
Start: 2021-03-10

## 2021-03-10 RX ORDER — MAGNESIUM SULFATE 100 %
4 CRYSTALS MISCELLANEOUS AS NEEDED
Status: CANCELLED | OUTPATIENT
Start: 2021-03-10

## 2021-03-10 RX ORDER — OXYBUTYNIN CHLORIDE 5 MG/1
5 TABLET ORAL 3 TIMES DAILY
Status: CANCELLED | OUTPATIENT
Start: 2021-03-10

## 2021-03-10 RX ORDER — LANOLIN ALCOHOL/MO/W.PET/CERES
400 CREAM (GRAM) TOPICAL 2 TIMES DAILY
Status: CANCELLED | OUTPATIENT
Start: 2021-03-10 | End: 2021-03-11

## 2021-03-10 RX ORDER — ONDANSETRON 2 MG/ML
4 INJECTION INTRAMUSCULAR; INTRAVENOUS
Status: DISCONTINUED | OUTPATIENT
Start: 2021-03-10 | End: 2021-03-10 | Stop reason: SDUPTHER

## 2021-03-10 RX ORDER — INSULIN GLARGINE 100 [IU]/ML
30 INJECTION, SOLUTION SUBCUTANEOUS DAILY
Status: CANCELLED | OUTPATIENT
Start: 2021-03-11

## 2021-03-10 RX ORDER — MAGNESIUM SULFATE 100 %
4 CRYSTALS MISCELLANEOUS AS NEEDED
Status: DISCONTINUED | OUTPATIENT
Start: 2021-03-10 | End: 2021-03-20 | Stop reason: HOSPADM

## 2021-03-10 RX ORDER — BUDESONIDE 0.5 MG/2ML
500 INHALANT ORAL
Status: CANCELLED | OUTPATIENT
Start: 2021-03-10

## 2021-03-10 RX ORDER — ONDANSETRON 2 MG/ML
4 INJECTION INTRAMUSCULAR; INTRAVENOUS
Status: DISCONTINUED | OUTPATIENT
Start: 2021-03-10 | End: 2021-03-20 | Stop reason: HOSPADM

## 2021-03-10 RX ORDER — DEXTROSE 50 % IN WATER (D50W) INTRAVENOUS SYRINGE
25-50 AS NEEDED
Status: CANCELLED | OUTPATIENT
Start: 2021-03-10

## 2021-03-10 RX ORDER — POTASSIUM CHLORIDE 750 MG/1
40 TABLET, FILM COATED, EXTENDED RELEASE ORAL DAILY
Status: DISCONTINUED | OUTPATIENT
Start: 2021-03-10 | End: 2021-03-12

## 2021-03-10 RX ORDER — GUAIFENESIN 600 MG/1
600 TABLET, EXTENDED RELEASE ORAL EVERY 12 HOURS
Status: CANCELLED | OUTPATIENT
Start: 2021-03-10

## 2021-03-10 RX ORDER — SODIUM CHLORIDE 0.9 % (FLUSH) 0.9 %
5-40 SYRINGE (ML) INJECTION AS NEEDED
Status: DISCONTINUED | OUTPATIENT
Start: 2021-03-10 | End: 2021-03-20 | Stop reason: HOSPADM

## 2021-03-10 RX ADMIN — IPRATROPIUM BROMIDE AND ALBUTEROL SULFATE 3 ML: .5; 3 SOLUTION RESPIRATORY (INHALATION) at 08:05

## 2021-03-10 RX ADMIN — ATORVASTATIN CALCIUM 40 MG: 40 TABLET, FILM COATED ORAL at 22:07

## 2021-03-10 RX ADMIN — INSULIN GLARGINE 30 UNITS: 100 INJECTION, SOLUTION SUBCUTANEOUS at 08:37

## 2021-03-10 RX ADMIN — BUDESONIDE 500 MCG: 0.5 INHALANT RESPIRATORY (INHALATION) at 19:56

## 2021-03-10 RX ADMIN — MAGNESIUM SULFATE 1 G: 1 INJECTION INTRAVENOUS at 17:42

## 2021-03-10 RX ADMIN — Medication 10 ML: at 22:07

## 2021-03-10 RX ADMIN — Medication 400 MG: at 09:20

## 2021-03-10 RX ADMIN — Medication 10 ML: at 08:39

## 2021-03-10 RX ADMIN — FUROSEMIDE 40 MG: 10 INJECTION, SOLUTION INTRAMUSCULAR; INTRAVENOUS at 17:41

## 2021-03-10 RX ADMIN — Medication 400 MG: at 17:41

## 2021-03-10 RX ADMIN — Medication 10 ML: at 16:00

## 2021-03-10 RX ADMIN — AZITHROMYCIN 250 MG: 250 TABLET, FILM COATED ORAL at 08:37

## 2021-03-10 RX ADMIN — INSULIN LISPRO 2 UNITS: 100 INJECTION, SOLUTION INTRAVENOUS; SUBCUTANEOUS at 07:30

## 2021-03-10 RX ADMIN — METOPROLOL SUCCINATE 25 MG: 25 TABLET, EXTENDED RELEASE ORAL at 08:38

## 2021-03-10 RX ADMIN — OXYBUTYNIN CHLORIDE 5 MG: 5 TABLET ORAL at 22:07

## 2021-03-10 RX ADMIN — OXYBUTYNIN CHLORIDE 5 MG: 5 TABLET ORAL at 08:38

## 2021-03-10 RX ADMIN — Medication 100 MG: at 08:37

## 2021-03-10 RX ADMIN — POTASSIUM CHLORIDE 40 MEQ: 750 TABLET, FILM COATED, EXTENDED RELEASE ORAL at 17:41

## 2021-03-10 RX ADMIN — OXYBUTYNIN CHLORIDE 5 MG: 5 TABLET ORAL at 17:41

## 2021-03-10 RX ADMIN — ARFORMOTEROL TARTRATE 15 MCG: 15 SOLUTION RESPIRATORY (INHALATION) at 19:56

## 2021-03-10 RX ADMIN — GUAIFENESIN 600 MG: 600 TABLET, EXTENDED RELEASE ORAL at 08:38

## 2021-03-10 RX ADMIN — IPRATROPIUM BROMIDE AND ALBUTEROL SULFATE 3 ML: .5; 3 SOLUTION RESPIRATORY (INHALATION) at 01:32

## 2021-03-10 RX ADMIN — POTASSIUM BICARBONATE 40 MEQ: 782 TABLET, EFFERVESCENT ORAL at 09:19

## 2021-03-10 RX ADMIN — GUAIFENESIN 600 MG: 600 TABLET, EXTENDED RELEASE ORAL at 22:07

## 2021-03-10 RX ADMIN — BUDESONIDE 500 MCG: 0.5 SUSPENSION RESPIRATORY (INHALATION) at 08:05

## 2021-03-10 RX ADMIN — ARFORMOTEROL TARTRATE 15 MCG: 15 SOLUTION RESPIRATORY (INHALATION) at 08:05

## 2021-03-10 RX ADMIN — ENOXAPARIN SODIUM 40 MG: 40 INJECTION SUBCUTANEOUS at 08:36

## 2021-03-10 RX ADMIN — FUROSEMIDE 40 MG: 10 INJECTION, SOLUTION INTRAMUSCULAR; INTRAVENOUS at 08:38

## 2021-03-10 RX ADMIN — Medication 400 MG: at 17:42

## 2021-03-10 RX ADMIN — IOPAMIDOL 75 ML: 755 INJECTION, SOLUTION INTRAVENOUS at 17:00

## 2021-03-10 RX ADMIN — INSULIN LISPRO 2 UNITS: 100 INJECTION, SOLUTION INTRAVENOUS; SUBCUTANEOUS at 12:27

## 2021-03-10 NOTE — DISCHARGE SUMMARY
Hospitalist Discharge Summary     Patient ID:  Ashu Santana  [id number]  13 y.o.  1950    PCP on record: Larissa Aviles MD    Admit date: 3/9/2021  Discharge date and time: 3/10/2021      Admission Diagnoses: CHF exacerbation [facility]) [I50.9]    Discharge Diagnoses: Active Problems:    CHF exacerbation ([address] (3/9/2021)           Hospital Course:     #Acute on chronic systolic heart failure exacerbation (biventricular failure) 2/2 being off from GDMT   - TTE 3/9/21: EF 15-20% Dilated LA, RV, RA, moderate TR . Severely dilated IVC   -TTE 1/19/2021 with EF of 20-25%. Moderate to severely reduced systolic function no wall motion normalities. Mild dilated left atrium. Moderate to severely reduced right ventricular function. Moderate TR MR. PAP 46 mmHg  -TTE 11/20/2019 with EF 51 to 55%  -Currently at home only on Lasix 40 mg lisinopril 20 mg  -Chest x-ray reviewed and apparently no signs of pulmonary edema  -proBNP 2211 unchanged from 2529 1 month ago  -Troponin 0.05 > 0.08 > 0.08 ( flat )   -EKG reviewed independently sinus tachycardia with T wave abnormality unchanged from last EKG, QRS 84     Patient was not on guideline medical therapy for heart failure ( stopped metoprolol on her own)         -IV Lasix 40 mg twice daily  -Started low-dose metoprolol XL   -Hold lisinopril for 36 hours so entresto can be started   -will need Aldactone   -Ins and outs  -Daily weight  -Fluid restriction  -Low-salt diet  -Patient being transferred to advanced heart failure unit at [facility] per cardiology recommendation for.   Patient be is being excepted at hospitalist service Dr. Ray Argueta and Dr. Camryn Hassan from advanced heart failure will consult appreciate help     #  COPD   - DUO NEB Q4 PRN   - arformeterol and pulmicort per hospital policy   - home regimen includue: advair      # Hypokalemia and hypomagnesemia  - Mag 1.3 > 1.6  and K 3.3   - repleted         #Pulmonary hypertension type II  -TTE with PAP 46     #Systemic hypertension  -On lisinopril 20 mg, Lasix 40 mg BID  at home        #Insulin-dependent diabetes mellitus  -A1c 6.8 1/25/2021  -On Lantus 60 units in the morning andMetformin  mg 3 times daily with meals at home  -Resume Lantus half dose   - Lispro correctional scale, FSG AC HS  - Consistent carb diet, hypoglycemia protocol.         #HLD  -On atorvastatin     # Overactive bladder   -Oxybutynin 5 mg 3 times daily and mirabegron ER 50 mg daily  - resume oxybutynin 5 mg TID     # Hx of CKD 2       CONSULTATIONS:  IP CONSULT TO HOSPITALIST  IP CONSULT TO CARDIOLOGY    Excerpted HPI from H&P of Gena Iverson MD:    John Valdez is a 73 y.o.  female with PMH of above mentioned problems who presents to ED from her cardiologist office with progressive shortness of breath and cough. Patient states that she has been having progressive shortness of breath for past couple of weeks and for the past 10 days it has gotten to a point that she cannot walk more than 10 today before catching her breath. She also endorsed that she is having more cough than usual.  Patient also endorsed PND and orthopnea. Denies any swelling of her legs. Patient states that she has been taking her medication as prescribed but she denied using any beta-blocker or Aldactone for heart failure. Patient is unsure whether she had cardiac angiogram done in the past but she said she been diagnosed with heart failure since 2005. Patient was a heavy smoker in the past and smoked pack a day for more than 15 years but she has quit smoking 6 years ago. Occasional drink alcohol and denies any recent drug use. Patient denies any fever chills diarrhea belly pain chest pain.    ______________________________________________________________________  DISCHARGE SUMMARY/HOSPITAL COURSE:  for full details see H&P, daily progress notes, labs, consult notes. _______________________________________________________________________  Patient seen and examined by me on discharge day. Pertinent Findings:  Gen:    Not in distress  Chest: Clear lungs  CVS:   Regular rhythm. 1+  Edema, JVD +   Abd:  Soft, not distended, not tender  Neuro:  Alert with good insight. Oriented to person, place, and time   _______________________________________________________________________  DISCHARGE MEDICATIONS:   Current Discharge Medication List          My Recommended Diet, Activity, Wound Care, and follow-up labs are listed in the patient's Discharge Insturctions which I have personally completed and reviewed.     _______________________________________________________________________  DISPOSITION:     Home with Family:    Home with HH/PT/OT/RN:    SNF/LTC:    RANDY:    OTHER:        Condition at Discharge:  Transfer to [facility]   _______________________________________________________________________  Follow up with:   PCP : Sandra Orantes MD  Follow-up Information     Follow up With Specialties Details Why Contact Info    Sandra Orantes MD Internal Medicine   [place] [address]  [place] [address] [phone]                Total time in minutes spent coordinating this discharge (includes going over instructions, follow-up, prescriptions, and preparing report for sign off to her PCP) :  35 minutes    Signed:  Jhon Dwyer MD

## 2021-03-10 NOTE — PROGRESS NOTES
1851: Pt oxygen saturation dropped to 79% on room air with exertion so 3L via NC was started. Oxygen saturation came back up to 100% after pt sat down and took some deep breaths. Will taper oxygen level back down and monitor pt response. 1957: Decreased oxygen flow to 1L and pt is staying between % oxygen saturation.

## 2021-03-10 NOTE — PROGRESS NOTES
TRANSFER - IN REPORT:    Verbal report received from [facility]) on Annelise Pena  being received from [facility] (unit) for routine progression of care      Report consisted of patients Situation, Background, Assessment and   Recommendations(SBAR). Information from the following report(s) SBAR, Kardex, MAR, Recent Results and Cardiac Rhythm NSR was reviewed with the receiving nurse. Opportunity for questions and clarification was provided. Assessment completed upon patients arrival to unit and care assumed.

## 2021-03-10 NOTE — PROGRESS NOTES
Problem: Falls - Risk of  Goal: *Absence of Falls  Description: Document Earma Vern Fall Risk and appropriate interventions in the flowsheet.   3/10/2021 1841 by Colby Velarde RN  Outcome: Progressing Towards Goal  Note: Fall Risk Interventions:            Medication Interventions: Evaluate medications/consider consulting pharmacy, Patient to call before getting OOB, Teach patient to arise slowly                3/10/2021 1806 by Colby Velarde RN  Outcome: Progressing Towards Goal  Note: Fall Risk Interventions:            Medication Interventions: Evaluate medications/consider consulting pharmacy, Patient to call before getting OOB, Teach patient to arise slowly                   Problem: Heart Failure: Day 1  Goal: Activity/Safety  Outcome: Progressing Towards Goal  Goal: Consults, if ordered  Outcome: Progressing Towards Goal  Goal: Diagnostic Test/Procedures  Outcome: Progressing Towards Goal  Goal: Nutrition/Diet  Outcome: Progressing Towards Goal  Goal: Discharge Planning  Outcome: Progressing Towards Goal  Goal: Medications  Outcome: Progressing Towards Goal  Goal: Respiratory  Outcome: Progressing Towards Goal  Goal: Treatments/Interventions/Procedures  Outcome: Progressing Towards Goal  Goal: Psychosocial  Outcome: Progressing Towards Goal  Goal: *Oxygen saturation within defined limits  Outcome: Progressing Towards Goal  Goal: *Hemodynamically stable  Outcome: Progressing Towards Goal  Goal: *Optimal pain control at patient's stated goal  Outcome: Progressing Towards Goal  Goal: *Anxiety reduced or absent  Outcome: Progressing Towards Goal

## 2021-03-10 NOTE — PROGRESS NOTES
Reason for Admission:  HISTORY OF PRESENT ILLNESS:     Branden Denny is a 73 y.o.  female with PMH of above mentioned problems who presents to ED from her cardiologist office with progressive shortness of breath and cough. CHF exacerbation. Patient has [employer]. Signed 2nd IM letter. Letter placed in chart and noted on Documentation List.                     RUR Score:   20%                  Plan for utilizing home health:  Not at this time       PCP: First and Last name:  Todd Westbrook MD     Name of Practice:    Are you a current patient: Yes/No: Y   Approximate date of last visit: Yesterday   Can you participate in a virtual visit with your PCP: Y                    Current Advanced Directive/Advance Care Plan: Full Code   Patient wants CPR and ventilation. Would want her friend, Prabhjot Pearson [phone] to make medical decisions for her. Healthcare Decision Maker:   Click here to complete [employer] including selection of the Healthcare Decision Maker Relationship (ie \"Primary\")             Primary Decision Maker: Darryle drafts - [phone]                  Transition of Care Plan:                  Met with patient for assessment. Lives with friend in house. Verified address and cell phone number as correct. Emergency contact is her friend, Prabhjot Pearson. Uses cane and a walker at home. No previous HH usage. [facility] on [address] and [address]. IDT met to discuss plan of care. Patient to be transferred to [facility] for a higher level of care.

## 2021-03-10 NOTE — PROGRESS NOTES
The documentation for this period is being entered following the guidelines as defined in the [facility] policy by Cameron Millard RN.

## 2021-03-10 NOTE — PROGRESS NOTES
Patient called out stating having a difficult time breathing. Nurse to bedside. Patient alert and oriented. Vital signs stable. Patient put on 2 Liters via nasal canula for comfort. Patient reports comfort after being assessed. Patient to continue to deep breathe. Will notify primary RN. Patient stable at this time.

## 2021-03-10 NOTE — CONSULTS
1948 [address], [address]   Office Phone [phone]      CARDIOLOGY CONSULTATION       Date of  Admission: 3/9/2021 10:43 AM     Admission type:Emergency   Primary Care Physician:Lamine Ruiz MD     Attending Provider: Everett Martin MD  Cardiology Provider: Lex Avila DNP, ANP-BC    CC/REASON FOR CONSULT: Heart Failure     Subjective: Joy Brown is a 73 y.o. female with a past medical history of nonischemic hypertensive cardiomyopathy, type 2 diabetes, CKD, hypertension, COPD/reactive airway disease, former smoker who presented to clinic yesterday for her routine visit with Dr. Dean Woodward. She was obviously quite dyspneic, moderate distress and subsequently admitted through the ER for decompensated systolic heart failure. Her initial diagnosis of heart failure occurred 2007 she was previously followed by Dr. Susan El at [facility] up until 2015 where she changed to Dr. Danish Dodson due to insurance coverage. Her ejection fraction at [facility] in 2015 was 45-50%, at the time she had been maintained on high-dose diuretics to maintain a euvolemic state, metoprolol 100 mg daily as well as lisinopril 20 mg daily. Ms. Milena Landry had been doing well on guideline directed medical therapy, she was on significant doses of diuretics to include upwards of 120 mg of furosemide along with 5 mg of metolazone daily. It has been recommended since she had maintained a euvolemic state and essentially near normal ejection fraction that reducing and/or eliminating metolazone due to its potential for renal decline; this conversation occurred with patient by Dr. Dean Woodward on 9/17/2020. Patient presented to her primary care office with Dr. Caleb Mccarthy on 10/8/2020 where the notes indicate that metoprolol was to be discontinued by cardiology which was not the case.   Discussed with patient this morning metolazone versus metoprolol and in fact the patient misunderstood and discontinued metoprolol by mistake. Patient presents sitting upright in bed this morning eating breakfast in no respiratory distress. No overnight issues. Patient Active Problem List    Diagnosis Date Noted    CHF exacerbation ([facility] 2021    Congestive heart failure ([facility]     Uncontrolled type 2 diabetes mellitus with hyperglycemia ([facility] 2020    CKD (chronic kidney disease) stage 3, GFR 30-59 ml/min 2019    Status post total right knee replacement 2019    Osteoarthrosis, localized, primary, knee, right 2019    PARDO (dyspnea on exertion) 05/10/2018    Chest pain, exertional 2018    Decreased libido 10/03/2017    Epistaxis 2017    Former tobacco use--stopped  after >40 yrs smoking 11/15/2016    Obesity (BMI 30.0-34.9) 11/15/2016    Reactive airway disease 2016    Carpal tunnel syndrome of right wrist 2015    Primary osteoarthritis of right knee 2015    Cardiomyopathy ([facility] 2014    Dyslipidemia 2014    Gout 2014    Alcohol abuse 2014    Hypertension 2014    Dermatitis 2014      Javon Holcomb MD  Past Medical History:   Diagnosis Date    Arthritis     Diabetes ([facility]     Hypercholesterolemia     Hypertension       Social History     Socioeconomic History    Marital status:      Spouse name: Not on file    Number of children: 1    Years of education: 15    Highest education level: Not on file   Occupational History    Occupation: retired   Tobacco Use    Smoking status: Former Smoker     Packs/day: 0.25     Years: 20.00     Pack years: 5.00     Quit date: 2014     Years since quittin.2    Smokeless tobacco: Never Used   Substance and Sexual Activity    Alcohol use:  Yes     Alcohol/week: 0.0 standard drinks     Comment: 21-24 Beers per week    Drug use: No    Sexual activity: Yes     Partners: Male     No Known Allergies Family History   Problem Relation Age of Onset    Diabetes Mother       Current Facility-Administered Medications   Medication Dose Route Frequency    melatonin tablet 3 mg  3 mg Oral QHS PRN    sodium chloride (NS) flush 5-40 mL  5-40 mL IntraVENous Q8H    sodium chloride (NS) flush 5-40 mL  5-40 mL IntraVENous PRN    acetaminophen (TYLENOL) tablet 650 mg  650 mg Oral Q6H PRN    Or    acetaminophen (TYLENOL) suppository 650 mg  650 mg Rectal Q6H PRN    enoxaparin (LOVENOX) injection 40 mg  40 mg SubCUTAneous DAILY    ondansetron (ZOFRAN) injection 4 mg  4 mg IntraVENous Q6H PRN    polyethylene glycol (MIRALAX) packet 17 g  17 g Oral DAILY PRN    promethazine (PHENERGAN) tablet 12.5 mg  12.5 mg Oral Q6H PRN    furosemide (LASIX) injection 40 mg  40 mg IntraVENous BID    glucose chewable tablet 16 g  4 Tab Oral PRN    dextrose (D50W) injection syrg 12.5-25 g  25-50 mL IntraVENous PRN    glucagon (GLUCAGEN) injection 1 mg  1 mg IntraMUSCular PRN    insulin glargine (LANTUS) injection 30 Units  30 Units SubCUTAneous DAILY    insulin lispro (HUMALOG) injection 1-10 Units  1-10 Units SubCUTAneous AC&HS    atorvastatin (LIPITOR) tablet 40 mg  40 mg Oral QHS    oxybutynin (DITROPAN) tablet 5 mg  5 mg Oral TID    budesonide (PULMICORT) 500 mcg/2 ml nebulizer suspension  500 mcg Nebulization BID RT    arformoteroL (BROVANA) neb solution 15 mcg  15 mcg Nebulization BID RT    albuterol-ipratropium (DUO-NEB) 2.5 MG-0.5 MG/3 ML  3 mL Nebulization Q4H PRN    guaiFENesin ER (MUCINEX) tablet 600 mg  600 mg Oral Q12H    metoprolol succinate (TOPROL-XL) XL tablet 25 mg  25 mg Oral DAILY    albuterol-ipratropium (DUO-NEB) 2.5 MG-0.5 MG/3 ML  3 mL Nebulization Q6H RT    azithromycin (ZITHROMAX) tablet 250 mg  250 mg Oral DAILY    thiamine HCL (B-1) tablet 100 mg  100 mg Oral DAILY        Review of Symptoms:   11 systems reviewed, negative other than as stated in the HPI        Objective:      Visit Vitals  BP (!) 141/84 (BP 1 Location: Left upper arm, BP Patient Position: Sitting)   Pulse 88   Temp 98 °F (36.7 °C)   Resp 20   Ht 5' 4\" (1.626 m)   Wt 190 lb (86.2 kg)   SpO2 96%   Breastfeeding No   BMI 32.61 kg/m²       Physical Exam     General: Well developed, in no acute distress, cooperative and alert  HEENT: No carotid bruits, no JVD, trach is midline. Neck Supple, PERRL, EOM intact. Heart:  Normal S1/S2 negative S3 or S4. Regular, no murmur, gallop or rub. Respiratory: Clear bilaterally x 4, no wheezing or rales  Abdomen:   Soft, non-tender, no masses, bowel sounds are active. Extremities:  No edema, normal cap refill, no cyanosis, atraumatic. Neuro: A&Ox3, speech clear, gait stable. Skin: Skin color is normal. No rashes or lesions. Non diaphoretic  Vascular: 2+ pulses symmetric in all extremities    Data Review:   Recent Labs     03/10/21  0114 03/09/21  1131   WBC 4.6 5.6   HGB 10.7* 11.7   HCT 33.9* 37.6    292     Recent Labs     03/10/21  0114 03/09/21  1159    144   K 3.3* 3.3*    105   CO2 27 30   * 143*   BUN 24* 20   CREA 0.85 0.91   CA 8.4* 9.0   MG 1.6 1.3*   ALB  --  3.1*   TBILI  --  1.3*   ALT  --  39       Recent Labs     03/10/21  0114 03/09/21  1757 03/09/21  1159   TROIQ 0.08* 0.08* 0.05*       No intake or output data in the 24 hours ending 03/10/21 0811     Cardiographics    Telemetry:   ECG: Sinus Tachycardia     Echocardiogram:     ECHO 1/2021  Interpretation Summary    · LV: Estimated LVEF is 20 - 25%. Visually measured ejection fraction. Normal cavity size and wall thickness. Moderate-to-severely reduced systolic function. Wall motion: normal.  · LA: Mildly dilated left atrium. · RV: Moderately to severely reduced systolic function. · MV: Mild to moderate mitral valve regurgitation is present. · TV: Mild to moderate tricuspid valve regurgitation is present. · PA: Mild pulmonary hypertension. Pulmonary arterial systolic pressure is 46 mmHg. ECHO 3/10/2021  Interpretation Summary    · LV: Estimated LVEF is 15 - 20%. Visually measured ejection fraction. Mildly dilated left ventricle. Upper normal wall thickness. Moderate-to-severely reduced systolic function. Wall motion: normal. Left ventricular diastolic dysfunction. · LA: Moderately dilated left atrium. · RV: Mildly dilated right ventricle. Mildly reduced systolic function. · RA: Mildly dilated right atrium. · MV: Mitral valve thickening. Mild to moderate mitral valve regurgitation is present. · TV: Moderate tricuspid valve regurgitation is present. · IVC: Severely elevated central venous pressure (15 mmHg); IVC diameter is larger than 21 mm and collapses less than 50% with respiration. CXRAY:MPRESSION  Unchanged cardiomegaly. No evidence for overt CHF       Assessment:       Active Problems:    CHF exacerbation ([address] (3/9/2021)         Plan:     1. Biventricular heart failure: Ejection fraction at her baseline 45-50%, January 2021 declining 25-30% and now on admission ejection fraction 15-20%. In chart review it appears the patient discontinued metoprolol and not metolazone in September 2020 and subsequently has had a decline in her ejection fraction off of guideline directed medical therapy. She was restarted on low-dose Toprol 25mg daily yesterday, recommend uptitrate today to 50 mg.  Recommend starting spironolactone 25 mg daily as her renal function is normal.  She will need daily renal panel. Continue diuresis with furosemide 40 mg IV twice a day. Lisinopril has been discontinued yesterday her 36-hour washout will be complete tomorrow morning  3/11/21, advanced heart failure to decide on reinitiating GDMT with Entresto or begin LVAD evaluation. Case was discussed with Dr. Carter Garcia of advanced heart failure and she will accept the patient as a consult to be admitted through the hospitalist service.   Left and right heart cath as part of work-up to be performed by Dr. Mandy Zimmerman Parker. 2.  Hypertension: Coming under control. 3.  Type 2 diabetes: On insulin  4. History of CKD: Admitting creatinine 0.85 BUN 24  5. Troponin elevation: Secondary to decompensated systolic heart failure. Unlikely CAD however undergoing cardiac catheterization at some point in the next few days. Thank you for this consultation. Chantel Oreilly DNP, ANP-BC  [facility]   CC: Jaron Plaza MD

## 2021-03-10 NOTE — PROGRESS NOTES
0725-Bedside shift change report given to Tana Moses, [facility] nurse) by Benjamin Vides RN (offgoing nurse). Report included the following information SBAR, Intake/Output, MAR and Recent Results. Pt sleeping in bed    0830- Vs stable, took all morning meds.  2 units of coverage given. Pt eating breakfast and talking to cardiologist    0920- Potassium and Mg given.  at bedside    1130- Pt sittin in chair    1250- VS stable, 2 units of insulin coverage given for     1315- Called received from [facility], they do not have and ETA due to high call volume     1335-Pt sleeping in bed    1420-Pt transferred to [facility] via [facility] by [facility]. Left in stable condition with all of her belongings      [id number]: TRANSFER - OUT REPORT:    Verbal report given to Yamilex on Mera Pale  being transferred to [address] [place] [address]  for continuation of care    Report consisted of patients Situation, Background, Assessment and   Recommendations(SBAR). Information from the following report(s) SBAR was reviewed with the receiving nurse. Opportunity for questions and clarification was provided.       Patient transported with: [facility]

## 2021-03-10 NOTE — H&P
History & Physical    Primary Care Provider: Audra Peralta MD  Source of Information: Patient chart    History of Presenting Illness:   Joy Brown is a 73 y.o. female who presents with CHF and transferred from [address] [place] [address] for evaluation by [facility]. Alexia Aaron is a 73 y.o.   female with PMH of above mentioned problems who presents to ED from her cardiologist office with progressive shortness of breath and cough.  Patient states that she has been having progressive shortness of breath for past couple of weeks and for the past 10 days it has gotten to a point that she cannot walk more than 10 today before catching her breath.  She also endorsed that she is having more cough than usual.  Patient also endorsed PND and orthopnea.  Denies any swelling of her legs.  Patient states that she has been taking her medication as prescribed but she denied using any beta-blocker or Aldactone for heart failure.  Patient is unsure whether she had cardiac angiogram done in the past but she said she been diagnosed with heart failure since 2005.  Patient was a heavy smoker in the past and smoked pack a day for more than 15 years but she has quit smoking 6 years ago. Francis Patel drink alcohol and denies any recent drug use.  Patient denies any fever chills diarrhea belly pain chest pain.       The patient denies any fever, chills, chest pain, cough, congestion, recent illness, palpitations, or dysuria. Review of Systems:  Pertinent items are noted in the History of Present Illness. Past Medical History:   Diagnosis Date    Arthritis     Diabetes ([address]     Hypercholesterolemia     Hypertension       Past Surgical History:   Procedure Laterality Date    HX ORTHOPAEDIC      Arthroscopy right knee     Prior to Admission medications    Medication Sig Start Date End Date Taking? Authorizing Provider   furosemide (LASIX) 40 mg tablet Take 80 mg by mouth two (2) times a day. Yes Provider, Historical   metFORMIN (GLUMETZA ER) 500 mg TG24 24 hour tablet Take 1 Tab by mouth Every morning. Yes Provider, Historical   potassium chloride SR (KLOR-CON 10) 10 mEq tablet Take 10 mEq by mouth Every morning. Yes Provider, Historical   diclofenac (VOLTAREN) 1 % gel APPLY TO AFFECTED AREA TWICE A DAY AS NEEDED 11/1/20  Yes Provider, Historical   fluticasone propion-salmeteroL (ADVAIR/WIXELA) 250-50 mcg/dose diskus inhaler Take 2 Puffs by inhalation two (2) times a day. 11/25/20  Yes Provider, Historical   fluticasone propionate (FLONASE) 50 mcg/actuation nasal spray 2 Sprays by Both Nostrils route daily. 12/13/20  Yes Angele Aschoff, DO   allopurinoL (ZYLOPRIM) 300 mg tablet TAKE 1 TABLET BY MOUTH DAILY 10/29/20  Yes Pamela Green MD   insulin glargine (LANTUS,BASAGLAR) 100 unit/mL (3 mL) inpn 60 units every morning 10/12/20  Yes Pamela Green MD   lisinopril (PRINIVIL, ZESTRIL) 20 mg tablet TAKE 1 TABLET BY MOUTH ONCE DAILY IN THE MORNING 6/21/18  Yes Pamela Green MD   oxybutynin (DITROPAN) 5 mg tablet Take 1 Tab by mouth three (3) times daily. 10/8/20   Pamela Green MD     No Known Allergies   Family History   Problem Relation Age of Onset    Diabetes Mother         SOCIAL HISTORY:  Patient resides:  Independently    Assisted Living    SNF    With family care x      Smoking history:   None x   Former    Chronic      Alcohol history:   None x   Social    Chronic      Ambulates:   Independently x   w/cane    w/walker    w/wc    CODE STATUS:  DNR    Full x   Other      Objective:     Physical Exam:     Visit Vitals  /85 (BP 1 Location: Left upper arm, BP Patient Position: At rest)   Pulse 72   Temp 97.5 °F (36.4 °C)   Resp 20   SpO2 100%           Gen:    Not in distress  Chest:  Clear lungs  CVS:    Regular rhythm. 1+  Edema, JVD +   Abd:     Soft, not distended, not tender  Neuro:  Alert with good insight. Oriented to person, place, and time     No results found.     EKG: Data Review:     Recent Days:  Recent Labs     03/10/21  0114 03/09/21  1131   WBC 4.6 5.6   HGB 10.7* 11.7   HCT 33.9* 37.6    292     Recent Labs     03/10/21  0114 03/09/21  1159    144   K 3.3* 3.3*    105   CO2 27 30   * 143*   BUN 24* 20   CREA 0.85 0.91   CA 8.4* 9.0   MG 1.6 1.3*   ALB  --  3.1*   TBILI  --  1.3*   ALT  --  39     No results for input(s): PH, PCO2, PO2, HCO3, FIO2 in the last 72 hours.     24 Hour Results:  Recent Results (from the past 24 hour(s))   ECHO ADULT COMPLETE    Collection Time: 03/09/21  4:31 PM   Result Value Ref Range    IVSd 1.04 (A) 0.60 - 0.90 cm    LVIDd 5.77 (A) 3.90 - 5.30 cm    LVIDs 5.17 cm    LVPWd 1.02 (A) 0.60 - 0.90 cm    LVOT Peak Gradient 1.43 mmHg    Left Ventricular Outflow Tract Mean Gradient 0.70 mmHg    LVOT Peak Velocity 59.75 cm/s    LVOT VTI 10.39 cm    RVIDd 4.14 cm    Left Atrium Major Axis 4.37 cm    LA Volume 91.07 22.0 - 52.0 mL    LA Area 4C 23.45 cm2    LA Vol 2C 80.68 (A) 22.00 - 52.00 mL    LA Vol 4C 79.28 (A) 22.00 - 52.00 mL    LA Volume DISK BP 81.97 22.0 - 52.0 mL    AV Cusp 1.85 cm    AoV PG 3.50 mmHg    Aortic Valve Systolic Mean Gradient 7.45 mmHg    Aortic Valve Systolic Peak Velocity 01.41 cm/s    AoV VTI 17.40 cm    Pulmonic Valve Systolic Peak Instantaneous Gradient 2.27 mmHg    Pulmonic Valve Max Velocity 75.30 cm/s    Triscuspid Valve Regurgitation Peak Gradient 30.66 mmHg    TR Max Velocity 275.30 cm/s    LV Mass .2 67.0 - 162.0 g    LV Mass AL Index 125.5 43.0 - 95.0 g/m2    Left Atrium Minor Axis 2.28 cm    LA Vol Index 47.59 16.00 - 28.00 ml/m2    LA Vol Index 42.16 16.00 - 28.00 ml/m2    LA Vol Index 41.42 16.00 - 28.00 ml/m2   GLUCOSE, POC    Collection Time: 03/09/21  5:14 PM   Result Value Ref Range    Glucose (POC) 191 (H) 65 - 100 mg/dL    Performed by [address] [place] [address] CULTURE    Collection Time: 03/09/21  5:57 PM    Specimen: Urine   Result Value Ref Range    Color YELLOW/STRAW      Appearance CLEAR CLEAR      Specific gravity 1.010 1.003 - 1.030      pH (UA) 7.0 5.0 - 8.0      Protein 100 (A) NEG mg/dL    Glucose Negative NEG mg/dL    Ketone Negative NEG mg/dL    Bilirubin Negative NEG      Blood TRACE (A) NEG      Urobilinogen 2.0 (H) 0.2 - 1.0 EU/dL    Nitrites Negative NEG      Leukocyte Esterase Negative NEG      WBC 0-4 0 - 4 /hpf    RBC 5-10 0 - 5 /hpf    Epithelial cells MANY (A) FEW /lpf    Bacteria Negative NEG /hpf    UA:UC IF INDICATED CULTURE NOT INDICATED BY UA RESULT CNI     TROPONIN I    Collection Time: 03/09/21  5:57 PM   Result Value Ref Range    Troponin-I, Qt. 0.08 (H) <0.05 ng/mL   CULTURE, MRSA    Collection Time: 03/09/21  5:57 PM    Specimen: Nares; Nasal   Result Value Ref Range    Special Requests: NO SPECIAL REQUESTS      Culture result: MRSA NOT PRESENT AT 13 HOURS     GLUCOSE, POC    Collection Time: 03/09/21  8:24 PM   Result Value Ref Range    Glucose (POC) 171 (H) 65 - 100 mg/dL    Performed by Candy Alpers (PCT)    CULTURE, RESPIRATORY/SPUTUM/BRONCH W GRAM STAIN    Collection Time: 03/10/21  1:14 AM    Specimen: Sputum   Result Value Ref Range    Special Requests: NO SPECIAL REQUESTS      GRAM STAIN OCCASIONAL EPITHELIAL CELLS SEEN      GRAM STAIN 3+ GRAM POSITIVE COCCI      GRAM STAIN 2+ GRAM NEGATIVE RODS      Culture result: PENDING    CBC WITH AUTOMATED DIFF    Collection Time: 03/10/21  1:14 AM   Result Value Ref Range    WBC 4.6 3.6 - 11.0 K/uL    RBC 3.82 3.80 - 5.20 M/uL    HGB 10.7 (L) 11.5 - 16.0 g/dL    HCT 33.9 (L) 35.0 - 47.0 %    MCV 88.7 80.0 - 99.0 FL    MCH 28.0 26.0 - 34.0 PG    MCHC 31.6 30.0 - 36.5 g/dL    RDW 17.8 (H) 11.5 - 14.5 %    PLATELET 434 878 - 196 K/uL    MPV 11.4 8.9 - 12.9 FL    NRBC 0.0 0  WBC    ABSOLUTE NRBC 0.00 0.00 - 0.01 K/uL    NEUTROPHILS 73 32 - 75 %    LYMPHOCYTES 17 12 - 49 %    MONOCYTES 4 (L) 5 - 13 %    EOSINOPHILS 5 0 - 7 %    BASOPHILS 1 0 - 1 %    IMMATURE GRANULOCYTES 0 0.0 - 0.5 %    ABS. NEUTROPHILS 3.4 1.8 - 8.0 K/UL    ABS. LYMPHOCYTES 0.8 0.8 - 3.5 K/UL    ABS. MONOCYTES 0.2 0.0 - 1.0 K/UL    ABS. EOSINOPHILS 0.2 0.0 - 0.4 K/UL    ABS. BASOPHILS 0.0 0.0 - 0.1 K/UL    ABS. IMM. GRANS. 0.0 0.00 - 0.04 K/UL    DF SMEAR SCANNED      RBC COMMENTS ANISOCYTOSIS  1+       METABOLIC PANEL, BASIC    Collection Time: 03/10/21  1:14 AM   Result Value Ref Range    Sodium 142 136 - 145 mmol/L    Potassium 3.3 (L) 3.5 - 5.1 mmol/L    Chloride 104 97 - 108 mmol/L    CO2 27 21 - 32 mmol/L    Anion gap 11 5 - 15 mmol/L    Glucose 172 (H) 65 - 100 mg/dL    BUN 24 (H) 6 - 20 MG/DL    Creatinine 0.85 0.55 - 1.02 MG/DL    BUN/Creatinine ratio 28 (H) 12 - 20      GFR est AA >60 >60 ml/min/1.73m2    GFR est non-AA >60 >60 ml/min/1.73m2    Calcium 8.4 (L) 8.5 - 10.1 MG/DL   MAGNESIUM    Collection Time: 03/10/21  1:14 AM   Result Value Ref Range    Magnesium 1.6 1.6 - 2.4 mg/dL   TROPONIN I    Collection Time: 03/10/21  1:14 AM   Result Value Ref Range    Troponin-I, Qt. 0.08 (H) <0.05 ng/mL   GLUCOSE, POC    Collection Time: 03/10/21  7:46 AM   Result Value Ref Range    Glucose (POC) 142 (H) 65 - 100 mg/dL    Performed by Patito Pan (PCT)    GLUCOSE, POC    Collection Time: 03/10/21 11:06 AM   Result Value Ref Range    Glucose (POC) 196 (H) 65 - 100 mg/dL    Performed by Patito Pan (PCT)          Imaging:     Assessment:     Active Problems:    CHF (congestive heart failure) ([facility] (3/10/2021)           Plan:     #Acute on chronic systolic heart failure exacerbation (biventricular failure) 2/2 being off from GDMT  Due to non compliance NYHA 2-3  - TTE 3/9/21: EF 15-20% Dilated LA, RV, RA, moderate TR . Severely dilated IVC   -TTE 1/19/2021 with EF of 20-25%. Moderate to severely reduced systolic function no wall motion normalities. Mild dilated left atrium. Moderate to severely reduced right ventricular function. Moderate TR MR.   PAP 46 mmHg  -TTE 11/20/2019 with EF 51 to 55%  -Currently at home only on Lasix 40 mg lisinopril 20 mg  -Chest x-ray reviewed and apparently no signs of pulmonary edema  -proBNP 2211 unchanged from 2529 1 month ago  -Troponin 0.05 > 0.08 > 0.08 ( flat )   -EKG reviewed independently sinus tachycardia with T wave abnormality unchanged from last EKG, QRS 84     Cont   -IV Lasix 40 mg twice daily  -Started low-dose metoprolol XL   -Hold lisinopril for 36 hours so entresto can be started   -will need Aldactone   -Ins and outs  -Daily weight  -Fluid restriction  -Low-salt diet  -Patient being transferred to advanced heart failure unit at [facility] per cardiology recommendation for.   Patient be is being excepted at hospitalist service Dr. Ray Argueta and Dr. Camryn Hassan from advanced heart failure will consult appreciate help     #  COPD   - DUO NEB Q4 PRN   - arformeterol and pulmicort per hospital policy   - home regimen includue: advair   - D dimer elevated will r/o PE with CTA also need to eval for PAHTN  - will need RHC      # Hypokalemia and hypomagnesemia  - Mag 1.3 > 1.6  and K 3.3   - repleted         #Pulmonary hypertension type II  -TTE with PAP 46     #Systemic hypertension  -On lisinopril 20 mg, Lasix 40 mg BID  at home     #Insulin-dependent diabetes mellitus  -A1c 6.8 1/25/2021  -On Lantus 60 units in the morning andMetformin  mg 3 times daily with meals at home  -Resume Lantus half dose   - Lispro correctional scale, FSG AC HS  - Consistent carb diet, hypoglycemia protocol.      #HLD  -On atorvastatin     # Overactive bladder   -Oxybutynin 5 mg 3 times daily and mirabegron ER 50 mg daily  - resume oxybutynin 5 mg TID      # Hx of CKD 2   - watch cr     Full Code          Signed By: Juliocesar Camp MD     March 10, 2021

## 2021-03-10 NOTE — PROGRESS NOTES
Care plan reviewed:     Problem: Diabetes Self-Management  Goal: *Disease process and treatment process  Description: Define diabetes and identify own type of diabetes; list 3 options for treating diabetes. Outcome: Progressing Towards Goal  Goal: *Incorporating nutritional management into lifestyle  Description: Describe effect of type, amount and timing of food on blood glucose; list 3 methods for planning meals. Outcome: Progressing Towards Goal  Goal: *Incorporating physical activity into lifestyle  Description: State effect of exercise on blood glucose levels. Outcome: Progressing Towards Goal  Goal: *Developing strategies to promote health/change behavior  Description: Define the ABC's of diabetes; identify appropriate screenings, schedule and personal plan for screenings. Outcome: Progressing Towards Goal  Goal: *Using medications safely  Description: State effect of diabetes medications on diabetes; name diabetes medication taking, action and side effects. Outcome: Progressing Towards Goal  Goal: *Monitoring blood glucose, interpreting and using results  Description: Identify recommended blood glucose targets  and personal targets. Outcome: Progressing Towards Goal  Goal: *Prevention, detection, treatment of acute complications  Description: List symptoms of hyper- and hypoglycemia; describe how to treat low blood sugar and actions for lowering  high blood glucose level. Outcome: Progressing Towards Goal  Goal: *Prevention, detection and treatment of chronic complications  Description: Define the natural course of diabetes and describe the relationship of blood glucose levels to long term complications of diabetes.   Outcome: Progressing Towards Goal  Goal: *Developing strategies to address psychosocial issues  Description: Describe feelings about living with diabetes; identify support needed and support network  Outcome: Progressing Towards Goal  Goal: *Insulin pump training  Outcome: Progressing Towards Goal  Goal: *Sick day guidelines  Outcome: Progressing Towards Goal  Goal: *Patient Specific Goal (EDIT GOAL, INSERT TEXT)  Outcome: Progressing Towards Goal     Problem: Patient Education: Go to Patient Education Activity  Goal: Patient/Family Education  Outcome: Progressing Towards Goal     Problem: Falls - Risk of  Goal: *Absence of Falls  Description: Document Flaco Trejo Fall Risk and appropriate interventions in the flowsheet.   Outcome: Progressing Towards Goal  Note: Fall Risk Interventions:            Medication Interventions: Evaluate medications/consider consulting pharmacy                   Problem: Patient Education: Go to Patient Education Activity  Goal: Patient/Family Education  Outcome: Progressing Towards Goal     Problem: General Infection Care Plan (Adult and Pediatric)  Goal: Improvement in signs and symptoms of infection  Outcome: Progressing Towards Goal  Goal: *Optimize nutritional status  Outcome: Progressing Towards Goal     Problem: Patient Education: Go to Patient Education Activity  Goal: Patient/Family Education  Outcome: Progressing Towards Goal     Problem: Patient Education: Go to Patient Education Activity  Goal: Patient/Family Education  Outcome: Progressing Towards Goal     Problem: Heart Failure: Day 1  Goal: Off Pathway (Use only if patient is Off Pathway)  Outcome: Progressing Towards Goal  Goal: Activity/Safety  Outcome: Progressing Towards Goal  Goal: Consults, if ordered  Outcome: Progressing Towards Goal  Goal: Diagnostic Test/Procedures  Outcome: Progressing Towards Goal  Goal: Nutrition/Diet  Outcome: Progressing Towards Goal  Goal: Discharge Planning  Outcome: Progressing Towards Goal  Goal: Medications  Outcome: Progressing Towards Goal  Goal: Respiratory  Outcome: Progressing Towards Goal  Goal: Treatments/Interventions/Procedures  Outcome: Progressing Towards Goal  Goal: Psychosocial  Outcome: Progressing Towards Goal  Goal: *Oxygen saturation within defined limits  Outcome: Progressing Towards Goal  Goal: *Hemodynamically stable  Outcome: Progressing Towards Goal  Goal: *Optimal pain control at patient's stated goal  Outcome: Progressing Towards Goal  Goal: *Anxiety reduced or absent  Outcome: Progressing Towards Goal     Problem: Heart Failure: Day 2  Goal: Off Pathway (Use only if patient is Off Pathway)  Outcome: Progressing Towards Goal  Goal: Activity/Safety  Outcome: Progressing Towards Goal  Goal: Consults, if ordered  Outcome: Progressing Towards Goal  Goal: Diagnostic Test/Procedures  Outcome: Progressing Towards Goal  Goal: Nutrition/Diet  Outcome: Progressing Towards Goal  Goal: Discharge Planning  Outcome: Progressing Towards Goal  Goal: Medications  Outcome: Progressing Towards Goal  Goal: Respiratory  Outcome: Progressing Towards Goal  Goal: Treatments/Interventions/Procedures  Outcome: Progressing Towards Goal  Goal: Psychosocial  Outcome: Progressing Towards Goal  Goal: *Oxygen saturation within defined limits  Outcome: Progressing Towards Goal  Goal: *Hemodynamically stable  Outcome: Progressing Towards Goal  Goal: *Optimal pain control at patient's stated goal  Outcome: Progressing Towards Goal  Goal: *Anxiety reduced or absent  Outcome: Progressing Towards Goal  Goal: *Demonstrates progressive activity  Outcome: Progressing Towards Goal     Problem: Heart Failure: Day 3  Goal: Off Pathway (Use only if patient is Off Pathway)  Outcome: Progressing Towards Goal  Goal: Activity/Safety  Outcome: Progressing Towards Goal  Goal: Diagnostic Test/Procedures  Outcome: Progressing Towards Goal  Goal: Nutrition/Diet  Outcome: Progressing Towards Goal  Goal: Discharge Planning  Outcome: Progressing Towards Goal  Goal: Medications  Outcome: Progressing Towards Goal  Goal: Respiratory  Outcome: Progressing Towards Goal  Goal: Treatments/Interventions/Procedures  Outcome: Progressing Towards Goal  Goal: Psychosocial  Outcome: Progressing Towards Goal  Goal: *Oxygen saturation within defined limits  Outcome: Progressing Towards Goal  Goal: *Hemodynamically stable  Outcome: Progressing Towards Goal  Goal: *Optimal pain control at patient's stated goal  Outcome: Progressing Towards Goal  Goal: *Anxiety reduced or absent  Outcome: Progressing Towards Goal  Goal: *Demonstrates progressive activity  Outcome: Progressing Towards Goal     Problem: Heart Failure: Day 4  Goal: Off Pathway (Use only if patient is Off Pathway)  Outcome: Progressing Towards Goal  Goal: Activity/Safety  Outcome: Progressing Towards Goal  Goal: Diagnostic Test/Procedures  Outcome: Progressing Towards Goal  Goal: Nutrition/Diet  Outcome: Progressing Towards Goal  Goal: Discharge Planning  Outcome: Progressing Towards Goal  Goal: Medications  Outcome: Progressing Towards Goal  Goal: Respiratory  Outcome: Progressing Towards Goal  Goal: Treatments/Interventions/Procedures  Outcome: Progressing Towards Goal  Goal: Psychosocial  Outcome: Progressing Towards Goal  Goal: *Oxygen saturation within defined limits  Outcome: Progressing Towards Goal  Goal: *Hemodynamically stable  Outcome: Progressing Towards Goal  Goal: *Optimal pain control at patient's stated goal  Outcome: Progressing Towards Goal  Goal: *Anxiety reduced or absent  Outcome: Progressing Towards Goal  Goal: *Demonstrates progressive activity  Outcome: Progressing Towards Goal     Problem: Heart Failure: Day 5  Goal: Off Pathway (Use only if patient is Off Pathway)  Outcome: Progressing Towards Goal  Goal: Activity/Safety  Outcome: Progressing Towards Goal  Goal: Diagnostic Test/Procedures  Outcome: Progressing Towards Goal  Goal: Nutrition/Diet  Outcome: Progressing Towards Goal  Goal: Discharge Planning  Outcome: Progressing Towards Goal  Goal: Medications  Outcome: Progressing Towards Goal  Goal: Respiratory  Outcome: Progressing Towards Goal  Goal: Treatments/Interventions/Procedures  Outcome: Progressing Towards Goal  Goal: Psychosocial  Outcome: Progressing Towards Goal     Problem: Heart Failure: Discharge Outcomes  Goal: *Demonstrates ability to perform prescribed activity without shortness of breath or discomfort  Outcome: Progressing Towards Goal  Goal: *Left ventricular function assessment completed prior to or during stay, or planned for post-discharge  Outcome: Progressing Towards Goal  Goal: *ACEI prescribed if LVEF less than 40% and no contraindications or ARB prescribed  Outcome: Progressing Towards Goal  Goal: *Verbalizes understanding and describes prescribed diet  Outcome: Progressing Towards Goal  Goal: *Verbalizes understanding/describes prescribed medications  Outcome: Progressing Towards Goal  Goal: *Describes available resources and support systems  Description: (eg: Home Health, Palliative Care, Advanced Medical Directive)  Outcome: Progressing Towards Goal  Goal: *Describes smoking cessation resources  Outcome: Progressing Towards Goal  Goal: *Understands and describes signs and symptoms to report to providers(Stroke Metric)  Outcome: Progressing Towards Goal  Goal: *Describes/verbalizes understanding of follow-up/return appt  Description: (eg: to physicians, diabetes treatment coordinator, and other resources  Outcome: Progressing Towards Goal  Goal: *Describes importance of continuing daily weights and changes to report to physician  Outcome: Progressing Towards Goal

## 2021-03-10 NOTE — PROGRESS NOTES
Physical therapy:     Chart reviewed; spoke with nursing who reports patient is having difficulty breathing. Possible transfer to [facility] today. Will hold therapy at this time.      Radha Hernandez, PT

## 2021-03-10 NOTE — ACP (ADVANCE CARE PLANNING)
requested for Advance Directive (AMD) consult. Patient requests information concerning AMD.  Provided blank copy of AMD, a copy of the booklet, \"Your Right to Decide,\" and information card explaining availability of  and pastoral care services including contact information. Patient states she would like to review this material prior to making a decision. Asked patient to have her nurse contact the  should she wish to complete AMD during this hospitalization and she agreed. Rev.  Rebecca Rios MDiv, [facility], [facility]   paging service: [phone])

## 2021-03-10 NOTE — PROGRESS NOTES
Physician Progress Note      Tadeo Ruggiero  CSN #:                  [id number]  :                       1950  ADMIT DATE:       3/9/2021 10:43 AM  DISCH DATE:  RESPONDING  PROVIDER #:        Sanjiv Rosenberg MD          QUERY TEXT:    Patient admitted with acute on chronic systolic heart failure, noted to have history of CKD per cardiologist.  If possible, please document in progress notes and discharge summary if you are evaluating and/or treating any of the following: The medical record reflects the following:  Risk Factors: 71 BF w/hx: systolic heart failure, HTN, DM  Clinical Indicators: 3/9: BUN/Cr: 20/0.91  GFR: >60,  3/10: BUN/Cr; 24/0.85  >60  with history 21; BUN/Cr; 26/1.05  GFR; >60,  per cardiology \"history of CKD\"  Treatment: Lab monitoring, currently volume overload so receiving Lasix, strict I&O, daily weight    Please call with questions. Thank you. [email or web address]  [phone]  Options provided:  -- CKD Stage 1 GFR>90  -- CKD Stage 2 GFR 60-90  -- CKD Stage 3a GFR 45-59  -- CKD Stage 3b GFR 30-44  -- CKD Stage 4 GFR 15-29  -- CKD Stage 5 GFR<15  -- ESRD  -- Other - I will add my own diagnosis  -- Disagree - Not applicable / Not valid  -- Disagree - Clinically unable to determine / Unknown  -- Refer to Clinical Documentation Reviewer    PROVIDER RESPONSE TEXT:    This patient has CKD Stage 2.     Query created by: Sheron Lopez on 3/10/2021 9:45 AM      Electronically signed by:  Sanjiv Rosenberg MD 3/10/2021 10:00 AM

## 2021-03-10 NOTE — PROGRESS NOTES
Spiritual Care Assessment/Progress Note  [facility]      NAME: Cesar Melo      MRN: [id number]  AGE: 73 y.o.  SEX: female  Faith Affiliation: Evangelical   Language: English     3/10/2021     Total Time (in minutes): 20     Spiritual Assessment begun in [facility] [place] [facility] through conversation with:         [x]Patient        [] Family    [] Friend(s)        Reason for Consult: Initial/Spiritual assessment, patient floor, Advance medical directive consult     Spiritual beliefs: (Please include comment if needed)     [x] Identifies with a shant tradition: Evangelical         [] Supported by a shant community:            [] Claims no spiritual orientation:           [] Seeking spiritual identity:                [] Adheres to an individual form of spirituality:           [] Not able to assess:                           Identified resources for coping:      [] Prayer                               [] Music                  [] Guided Imagery     [x] Family/friends                 [] Pet visits     [] Devotional reading                         [] Unknown     [] Other:                                               Interventions offered during this visit: (See comments for more details)    Patient Interventions: Affirmation of emotions/emotional suffering, Catharsis/review of pertinent events in supportive environment, Coping skills reviewed/reinforced, Iconic (affirming the presence of God/Higher Power), Advance medical directive consult           Plan of Care:     [] Support spiritual and/or cultural needs    [x] Support AMD and/or advance care planning process      [] Support grieving process   [] Coordinate Rites and/or Rituals    [] Coordination with community clergy   [] No spiritual needs identified at this time   [] Detailed Plan of Care below (See Comments)  [] Make referral to Music Therapy  [] Make referral to Pet Therapy     [] Make referral to Addiction services  [] Make referral to [facility]  [] Make referral to Spiritual Care Partner  [] No future visits requested        [x] Follow up upon further referrals     Comments:  visit for initial spiritual assessment and Advance Directive (AMD) consult. Patient reclining in bed resting, awakens to knock at door and verbal greeting. Patient says she is coming along when asked how she os feeling. Provided spiritual presence and listening as she spoke of her present thoughts, feelings, and concerns. Appears a little tired and says she is feeling tired and wishes to continue resting.  requested for Advance Directive (AMD) consult. Patient requests information concerning AMD.  Provided blank copy of AMD, a copy of the booklet, \"Your Right to Decide,\" and information card explaining availability of  and pastoral care services including contact information. Patient states she would like to review this material prior to making a decision. Asked patient to have her nurse contact the  should she wish to complete AMD during this hospitalization and she agreed. Patient appeared comforted as a result of this visit and expressed gratitude for this visit. Rev.  Jax Gonzalez MDiv, [facility], [facility]   paging service: [phone])

## 2021-03-10 NOTE — PROGRESS NOTES
[facility] in [place], [place]  Inpatient Consult Progress Note      Patient name: Ev Cooper  Patient : 1950  Patient MRN: [id number]  Consulting MD: Jerry Stewart MD  Primary general cardiologist:  Dr. Shola Ortega NP    Date of service: 03/10/21    REASON FOR CONSULT:  Acute on chronic systolic heart failure     PLAN:  · Severe decompensated dilated cardiomyopathy, LVEF < 15%  · Optimization of GDMT and diuresis for now; check HF labs  · Schedule LHC and RHC by Dr. Audelia Guerrero current medical therapy for heart failure  Continue current dose of toprol XL 25mg daily  Hold ACEi/ARB/ARNi/MRA in anticipation of surgery  Continue current dose of lasix 40mg IV twice daily  Schedule potassium 40meq PO twice daily  Magnesium oxide 400mg twice daily  Keep K>4 and Mg>2  Not on allopurinol or uloric, check uric acid level  Consider baby ASA  Continue current dose of statin  Check genetic testing for cardiomyopathy  Labs: CBC, BMP, LFT, pro-NT-BNP, iron profile with ferritin, thyroid profile, vitamin D level, lipid profile, CPK, gammopathy profile  Nutritionist consult for heart failure diet and weight loss  Provide heart failure education  Review advanced care plan  Recommend at discharge:   · Life[facility] if patient full code  · Vaccinations for flu, pneumonia and covid  · Referral to sleep medicine for sleep study  Plan d/w Hilary Holliday NP     IMPRESSION:  Fatigue  Shortness of breath  Volume overload  Acute on chronic systolic heart failure  · Stage C, NYHA class IIIA symptoms  · Non-ischemic cardiomyopathy, recent deterioration to severe LV dysfunction  · LVEF 15-20% (by echo 3/9/21) from 20-25% (by echo 2021) from 51-55% (by echo 2019)  · RV dysfunction, moderate-severe  Anemia  Hypokalemia/hypomagensemia  Cardiac risk factors  · Morbid obesity (BMI 32)  · High risk for SHARLENE  · DM2 (hgba1c 6.8)  · HL  · HTN  · Former tobacco (stopped 2014 after > 40 years)  · H/o alcohol abuse  OA, right knee  Carpal tunnel syndrome  Gout     CARDIAC IMAGING:  Echo (3/9/21) LVEF 15-20%, mild-moderate MR, severely elevated CVP, IVC > 21mm  Echo (1/29/21) LVEF 20-25%, moderately to severely reduced RV function  Echo (11/20/19) LVEF 51-55%  Echo (12/18/18) LVEF 50-55%  Echo (11/23/16) LVEF 45-55%  EKG (3/9/21) ST 103bpm, QRS 84ms  LHC  NST (5/2018) no scar or ischemia, LVEF 48%  ICD interrogation     HEMODYNAMICS:  RHC not done  CPEST not done  6MW not done     OTHER IMAGING:  CXR  CT chest not done     HISTORY OF PRESENT ILLNESS:  Patrick Citizen a 73 y.o. female with a past medical history of nonischemic hypertensive cardiomyopathy, type 2 diabetes, CKD, hypertension, COPD/reactive airway disease, former smoker who presented to clinic yesterday for her routine visit with Dr. Merlene Hernandez. Nichole Manzanares was obviously quite dyspneic, moderate distress and subsequently admitted through the ER for decompensated systolic heart failure. Omer Dandy initial diagnosis of heart failure occurred 2007 she was previously followed by Dr. Se Matamoros at [facility] up until 2015 where she changed to Dr. Nilesh Gurrola due to insurance coverage. Omer Dandy ejection fraction at [facility] in 2015 was 45-50%, at the time she had been maintained on high-dose diuretics to maintain a euvolemic state, metoprolol 100 mg daily as well as lisinopril 20 mg daily.  Ms. Sloane Jeffries had been doing well on guideline directed medical therapy, she was on significant doses of diuretics to include upwards of 120 mg of furosemide along with 5 mg of metolazone daily. Jackelyn Turpin has been recommended since she had maintained a euvolemic state and essentially near normal ejection fraction that reducing and/or eliminating metolazone due to its potential for renal decline; this conversation occurred with patient by Dr. Merlene Hernadnez on 9/17/2020. Walt Olson presented to her primary care office with Dr. Shannon Cervantes on 10/8/2020 where the notes indicate that metoprolol was to be discontinued by cardiology which was not the case.  Discussed with patient this morning metolazone versus metoprolol and in fact the patient misunderstood and discontinued metoprolol by mistake.      Patient presents sitting upright in bed this morning eating breakfast in no respiratory distress.  No overnight issues. PHYSICAL EXAM:  Visit Vitals  /85 (BP 1 Location: Left upper arm, BP Patient Position: At rest)   Pulse 72   Temp 97.5 °F (36.4 °C)   Resp 20   Wt 190 lb 0.6 oz (86.2 kg)   SpO2 100%   BMI 32.62 kg/m²     General: Patient is well developed, well-nourished in no acute distress  HEENT: Normocephalic and atraumatic. No scleral icterus. Pupils are equal, round and reactive to light and accomodation. No conjunctival injection. Oropharynx is clear. Neck: Supple. No evidence of thyroid enlargements or lymphadenopathy. JVD: Cannot be appreciated   Lungs: Breath sounds are equal and clear bilaterally. No wheezes, rhonchi, or rales. Heart: Regular rate and rhythm with normal S1 and S2. No murmurs, gallops or rubs. Abdomen: Soft, no mass or tenderness. No organomegaly or hernia. Bowel sounds present. Genitourinary and rectal: deferred  Extremities: No cyanosis, clubbing, or edema. Neurologic: No focal sensory or motor deficits are noted. Grossly intact. Psychiatric: Awake, alert an doriented x 3. Appropriate mood and affect. Skin: Warm, dry and well perfused. No lesions, nodules or rashes are noted. REVIEW OF SYSTEMS:  General: Denies fever, night sweats. Ear, nose and throat: Denies difficulty hearing, sinus problems, runny nose, post-nasal drip, ringing in ears, mouth sores, loose teeth, ear pain, nosebleeds, sore throate, facial pain or numbess  Cardiovascular: see above in the interval history  Respiratory: Denies cough, wheezing, sputum production, hemoptysis.   Gastrointestinal: Denies heartburn, constipation, intolerance to certain foods, diarrhea, abdominal pain, nausea, vomiting, difficulty swallowing, blood in stool  Kidney and bladder: Denies painful urination, frequent urination, urgency, prostate problems and impotence  Musculoskeletal: Denies joint pain, muscle weakness  Skin and hair: Denies change in existing skin lesions, hair loss or increase, breast changes    PAST MEDICAL HISTORY:  Past Medical History:   Diagnosis Date    Arthritis     Diabetes ([address]     Hypercholesterolemia     Hypertension        PAST SURGICAL HISTORY:  Past Surgical History:   Procedure Laterality Date    HX ORTHOPAEDIC      Arthroscopy right knee       FAMILY HISTORY:  Family History   Problem Relation Age of Onset    Diabetes Mother        SOCIAL HISTORY:  Social History     Socioeconomic History    Marital status:      Spouse name: Not on file    Number of children: 3    Years of education: 15    Highest education level: Not on file   Occupational History    Occupation: retired   Tobacco Use    Smoking status: Former Smoker     Packs/day: 0.25     Years: 20.00     Pack years: 5.00     Quit date: 2014     Years since quittin.2    Smokeless tobacco: Never Used   Substance and Sexual Activity    Alcohol use: Yes     Alcohol/week: 0.0 standard drinks     Comment: 21-24 Beers per week    Drug use: No    Sexual activity: Yes     Partners: Male       LABORATORY RESULTS:     Labs Latest Ref Rng & Units 3/10/2021 3/9/2021 2021 2/3/2021   WBC 3.6 - 11.0 K/uL 4.6 5.6 5.3 -   RBC 3.80 - 5.20 M/uL 3.82 4.25 3.84 -   Hemoglobin 11.5 - 16.0 g/dL 10. 7(L) 11.7 10. 4(L) -   Hematocrit 35.0 - 47.0 % 33. 9(L) 37.6 34. 1(L) -   MCV 80.0 - 99.0 FL 88.7 88.5 88.8 -   Platelets 578 - 391 K/uL 219 292 196 -   Lymphocytes 12 - 49 % 17 13 14 -   Monocytes 5 - 13 % 4(L) 3(L) 5 -   Eosinophils 0 - 7 % 5 4 2 -   Basophils 0 - 1 % 1 1 1 -   Albumin 3.5 - 5.0 g/dL - 3. 1(L) 3. 3(L) -   Calcium 8.5 - 10.1 MG/DL 8.4(L) 9.0 8.3(L) 9.1   Glucose 65 - 100 mg/dL 172(H) 143(H) 185(H) 135(H)   BUN 6 - 20 MG/DL 24(H) 20 26(H) 21(H)   Creatinine 0.55 - 1.02 MG/DL 0.85 0.91 1.05(H) 0.69   Sodium 136 - 145 mmol/L 142 144 139 144   Potassium 3.5 - 5.1 mmol/L 3. 3(L) 3. 3(L) 3. 1(L) 3.8   Some recent data might be hidden     Lab Results   Component Value Date/Time    TSH 1.050 10/08/2020 12:01 PM    TSH 1.200 08/08/2019 02:31 PM    TSH 1.280 08/26/2016 11:48 AM       ALLERGY:  No Known Allergies     CURRENT MEDICATIONS:    Current Facility-Administered Medications:     sodium chloride (NS) flush 5-40 mL, 5-40 mL, IntraVENous, Q8H, Anshul Baca MD    sodium chloride (NS) flush 5-40 mL, 5-40 mL, IntraVENous, PRN, Debbrah Kayser, MD    acetaminophen (TYLENOL) tablet 650 mg, 650 mg, Oral, Q6H PRN **OR** acetaminophen (TYLENOL) suppository 650 mg, 650 mg, Rectal, Q6H PRN, Debbrah Kayser, MD    polyethylene glycol (MIRALAX) packet 17 g, 17 g, Oral, DAILY PRN, Debbrah Kayser, MD    promethazine (PHENERGAN) tablet 12.5 mg, 12.5 mg, Oral, Q6H PRN **OR** ondansetron (ZOFRAN) injection 4 mg, 4 mg, IntraVENous, Q6H PRN, Anshul Michele MD    sodium chloride (NS) flush 5-40 mL, 5-40 mL, IntraVENous, Q8H, Anshul Baca MD    furosemide (LASIX) injection 40 mg, 40 mg, IntraVENous, BID, Anshul Michele MD    glucose chewable tablet 16 g, 4 Tab, Oral, PRN, Debbrah Kayser, MD    dextrose (D50W) injection syrg 12.5-25 g, 25-50 mL, IntraVENous, PRN, Anshul Michele MD    glucagon (GLUCAGEN) injection 1 mg, 1 mg, IntraMUSCular, PRN, Debbrah Kayser, MD    [START ON 3/11/2021] insulin glargine (LANTUS) injection 30 Units, 30 Units, SubCUTAneous, DAILY, Anshul Baca MD    insulin lispro (HUMALOG) injection 1-10 Units, 1-10 Units, SubCUTAneous, AC&HS, Anshul Michele MD    atorvastatin (LIPITOR) tablet 40 mg, 40 mg, Oral, QHS, Anshul Baca MD    oxybutynin (DITROPAN) tablet 5 mg, 5 mg, Oral, TID, Anshul Michele MD    budesonide (PULMICORT) 500 mcg/2 ml nebulizer suspension, 500 mcg, Nebulization, BID RT, Phuong Heidi Perez MD    arformoteroL (BROVANA) neb solution 15 mcg, 15 mcg, Nebulization, BID RT, Vanessa Warner MD    albuterol-ipratropium (DUO-NEB) 2.5 MG-0.5 MG/3 ML, 3 mL, Nebulization, Q4H PRN, Vanessa Warner MD    guaiFENesin ER (MUCINEX) tablet 600 mg, 600 mg, Oral, Q12H, Vanessa Warner MD    [START ON 3/11/2021] metoprolol succinate (TOPROL-XL) XL tablet 25 mg, 25 mg, Oral, DAILY, Anshul Dior MD    [START ON 3/11/2021] thiamine HCL (B-1) tablet 100 mg, 100 mg, Oral, DAILY, Anshul Baca MD    melatonin tablet 3 mg, 3 mg, Oral, QHS PRN, Vanessa Warner MD    magnesium oxide (MAG-OX) tablet 400 mg, 400 mg, Oral, BID, Anshul iDor MD    zolpidem (AMBIEN) tablet 5 mg, 5 mg, Oral, QHS PRN, Anshul Dior MD    iopamidoL (ISOVUE-370) 76 % injection 100 mL, 100 mL, IntraVENous, RAD ONCE, Jason Abbasi MD    PATIENT CARE TEAM:  Patient Care Team:  Poncho Mas MD as PCP - General (Internal Medicine)  Poncho Mas MD as PCP - [facility]  Katarina Murillo MD as Physician (Cardiology)  Antonio Chavez MD as Surgeon (Orthopedic Surgery)     Thank you for allowing me to participate in this patient's care.     Alejo Valle MD PhD  [address], [address]  Phone: [phone]  Fax: [phone]

## 2021-03-11 ENCOUNTER — APPOINTMENT (OUTPATIENT)
Dept: NUCLEAR MEDICINE | Age: 71
DRG: 286 | End: 2021-03-11
Attending: NURSE PRACTITIONER
Payer: MEDICARE

## 2021-03-11 PROBLEM — I27.21 MODERATE PULMONARY ARTERIAL SYSTOLIC HYPERTENSION (HCC): Status: ACTIVE | Noted: 2021-03-11

## 2021-03-11 PROBLEM — I50.23 ACUTE ON CHRONIC SYSTOLIC CONGESTIVE HEART FAILURE, NYHA CLASS 3 (HCC): Status: ACTIVE | Noted: 2021-03-11

## 2021-03-11 PROBLEM — E83.42 HYPOMAGNESEMIA: Status: ACTIVE | Noted: 2021-03-11

## 2021-03-11 PROBLEM — E87.6 HYPOKALEMIA: Status: ACTIVE | Noted: 2021-03-11

## 2021-03-11 LAB
25(OH)D3 SERPL-MCNC: <9 NG/ML (ref 30–100)
ALBUMIN SERPL-MCNC: 3 G/DL (ref 3.5–5)
ALBUMIN/GLOB SERPL: 0.6 {RATIO} (ref 1.1–2.2)
ALP SERPL-CCNC: 76 U/L (ref 45–117)
ALT SERPL-CCNC: 78 U/L (ref 12–78)
ANION GAP SERPL CALC-SCNC: 10 MMOL/L (ref 5–15)
ANION GAP SERPL CALC-SCNC: 12 MMOL/L (ref 5–15)
AST SERPL-CCNC: 81 U/L (ref 15–37)
BASOPHILS # BLD: 0 K/UL (ref 0–0.1)
BASOPHILS # BLD: 0 K/UL (ref 0–0.1)
BASOPHILS NFR BLD: 1 % (ref 0–1)
BASOPHILS NFR BLD: 1 % (ref 0–1)
BILIRUB SERPL-MCNC: 1.4 MG/DL (ref 0.2–1)
BUN SERPL-MCNC: 41 MG/DL (ref 6–20)
BUN SERPL-MCNC: 42 MG/DL (ref 6–20)
BUN/CREAT SERPL: 32 (ref 12–20)
BUN/CREAT SERPL: 35 (ref 12–20)
CALCIUM SERPL-MCNC: 8.9 MG/DL (ref 8.5–10.1)
CALCIUM SERPL-MCNC: 9.1 MG/DL (ref 8.5–10.1)
CHLORIDE SERPL-SCNC: 102 MMOL/L (ref 97–108)
CHLORIDE SERPL-SCNC: 104 MMOL/L (ref 97–108)
CO2 SERPL-SCNC: 21 MMOL/L (ref 21–32)
CO2 SERPL-SCNC: 24 MMOL/L (ref 21–32)
CREAT SERPL-MCNC: 1.21 MG/DL (ref 0.55–1.02)
CREAT SERPL-MCNC: 1.29 MG/DL (ref 0.55–1.02)
DIFFERENTIAL METHOD BLD: ABNORMAL
DIFFERENTIAL METHOD BLD: ABNORMAL
EOSINOPHIL # BLD: 0 K/UL (ref 0–0.4)
EOSINOPHIL # BLD: 0.1 K/UL (ref 0–0.4)
EOSINOPHIL NFR BLD: 1 % (ref 0–7)
EOSINOPHIL NFR BLD: 2 % (ref 0–7)
ERYTHROCYTE [DISTWIDTH] IN BLOOD BY AUTOMATED COUNT: 18 % (ref 11.5–14.5)
ERYTHROCYTE [DISTWIDTH] IN BLOOD BY AUTOMATED COUNT: 18.4 % (ref 11.5–14.5)
FOLATE SERPL-MCNC: 28.9 NG/ML (ref 5–21)
GLOBULIN SER CALC-MCNC: 4.7 G/DL (ref 2–4)
GLUCOSE BLD STRIP.AUTO-MCNC: 109 MG/DL (ref 65–100)
GLUCOSE BLD STRIP.AUTO-MCNC: 115 MG/DL (ref 65–100)
GLUCOSE BLD STRIP.AUTO-MCNC: 143 MG/DL (ref 65–100)
GLUCOSE BLD STRIP.AUTO-MCNC: 188 MG/DL (ref 65–100)
GLUCOSE BLD STRIP.AUTO-MCNC: 192 MG/DL (ref 65–100)
GLUCOSE BLD STRIP.AUTO-MCNC: 66 MG/DL (ref 65–100)
GLUCOSE BLD STRIP.AUTO-MCNC: 94 MG/DL (ref 65–100)
GLUCOSE SERPL-MCNC: 82 MG/DL (ref 65–100)
GLUCOSE SERPL-MCNC: 84 MG/DL (ref 65–100)
HCT VFR BLD AUTO: 34.5 % (ref 35–47)
HCT VFR BLD AUTO: 37.8 % (ref 35–47)
HGB BLD-MCNC: 10.8 G/DL (ref 11.5–16)
HGB BLD-MCNC: 11.6 G/DL (ref 11.5–16)
HIV 1+2 AB+HIV1 P24 AG SERPL QL IA: NONREACTIVE
HIV12 RESULT COMMENT, HHIVC: NORMAL
IMM GRANULOCYTES # BLD AUTO: 0 K/UL (ref 0–0.04)
IMM GRANULOCYTES # BLD AUTO: 0 K/UL (ref 0–0.04)
IMM GRANULOCYTES NFR BLD AUTO: 0 % (ref 0–0.5)
IMM GRANULOCYTES NFR BLD AUTO: 0 % (ref 0–0.5)
LYMPHOCYTES # BLD: 0.8 K/UL (ref 0.8–3.5)
LYMPHOCYTES # BLD: 1 K/UL (ref 0.8–3.5)
LYMPHOCYTES NFR BLD: 18 % (ref 12–49)
LYMPHOCYTES NFR BLD: 20 % (ref 12–49)
MAGNESIUM SERPL-MCNC: 2.4 MG/DL (ref 1.6–2.4)
MCH RBC QN AUTO: 27.5 PG (ref 26–34)
MCH RBC QN AUTO: 27.6 PG (ref 26–34)
MCHC RBC AUTO-ENTMCNC: 30.7 G/DL (ref 30–36.5)
MCHC RBC AUTO-ENTMCNC: 31.3 G/DL (ref 30–36.5)
MCV RBC AUTO: 87.8 FL (ref 80–99)
MCV RBC AUTO: 89.8 FL (ref 80–99)
MONOCYTES # BLD: 0.2 K/UL (ref 0–1)
MONOCYTES # BLD: 0.3 K/UL (ref 0–1)
MONOCYTES NFR BLD: 5 % (ref 5–13)
MONOCYTES NFR BLD: 6 % (ref 5–13)
NEUTS SEG # BLD: 3 K/UL (ref 1.8–8)
NEUTS SEG # BLD: 3.7 K/UL (ref 1.8–8)
NEUTS SEG NFR BLD: 73 % (ref 32–75)
NEUTS SEG NFR BLD: 73 % (ref 32–75)
NRBC # BLD: 0 K/UL (ref 0–0.01)
NRBC # BLD: 0 K/UL (ref 0–0.01)
NRBC BLD-RTO: 0 PER 100 WBC
NRBC BLD-RTO: 0 PER 100 WBC
PLATELET # BLD AUTO: 251 K/UL (ref 150–400)
PLATELET # BLD AUTO: 256 K/UL (ref 150–400)
PMV BLD AUTO: 10.3 FL (ref 8.9–12.9)
PMV BLD AUTO: 11.5 FL (ref 8.9–12.9)
POTASSIUM SERPL-SCNC: 4.5 MMOL/L (ref 3.5–5.1)
POTASSIUM SERPL-SCNC: 4.6 MMOL/L (ref 3.5–5.1)
PROT SERPL-MCNC: 7.7 G/DL (ref 6.4–8.2)
RBC # BLD AUTO: 3.93 M/UL (ref 3.8–5.2)
RBC # BLD AUTO: 4.21 M/UL (ref 3.8–5.2)
RBC MORPH BLD: ABNORMAL
SERVICE CMNT-IMP: ABNORMAL
SERVICE CMNT-IMP: NORMAL
SERVICE CMNT-IMP: NORMAL
SODIUM SERPL-SCNC: 135 MMOL/L (ref 136–145)
SODIUM SERPL-SCNC: 138 MMOL/L (ref 136–145)
T4 FREE SERPL-MCNC: 1.2 NG/DL (ref 0.8–1.5)
TSH SERPL DL<=0.05 MIU/L-ACNC: 2.75 UIU/ML (ref 0.36–3.74)
URATE SERPL-MCNC: 5.5 MG/DL (ref 2.6–6)
VIT B12 SERPL-MCNC: 1829 PG/ML (ref 193–986)
WBC # BLD AUTO: 4.2 K/UL (ref 3.6–11)
WBC # BLD AUTO: 5 K/UL (ref 3.6–11)

## 2021-03-11 PROCEDURE — 74011250636 HC RX REV CODE- 250/636: Performed by: HOSPITALIST

## 2021-03-11 PROCEDURE — 84443 ASSAY THYROID STIM HORMONE: CPT

## 2021-03-11 PROCEDURE — 99153 MOD SED SAME PHYS/QHP EA: CPT | Performed by: INTERNAL MEDICINE

## 2021-03-11 PROCEDURE — 74011000250 HC RX REV CODE- 250: Performed by: INTERNAL MEDICINE

## 2021-03-11 PROCEDURE — 93460 R&L HRT ART/VENTRICLE ANGIO: CPT | Performed by: INTERNAL MEDICINE

## 2021-03-11 PROCEDURE — 84550 ASSAY OF BLOOD/URIC ACID: CPT

## 2021-03-11 PROCEDURE — 65660000000 HC RM CCU STEPDOWN

## 2021-03-11 PROCEDURE — 77030013406 HC CATH CTRL EDWD -B: Performed by: INTERNAL MEDICINE

## 2021-03-11 PROCEDURE — 94760 N-INVAS EAR/PLS OXIMETRY 1: CPT

## 2021-03-11 PROCEDURE — 84439 ASSAY OF FREE THYROXINE: CPT

## 2021-03-11 PROCEDURE — 74011250637 HC RX REV CODE- 250/637: Performed by: INTERNAL MEDICINE

## 2021-03-11 PROCEDURE — 83735 ASSAY OF MAGNESIUM: CPT

## 2021-03-11 PROCEDURE — 87389 HIV-1 AG W/HIV-1&-2 AB AG IA: CPT

## 2021-03-11 PROCEDURE — 77030004532 HC CATH ANGI DX IMP BSC -A: Performed by: INTERNAL MEDICINE

## 2021-03-11 PROCEDURE — 82607 VITAMIN B-12: CPT

## 2021-03-11 PROCEDURE — 94640 AIRWAY INHALATION TREATMENT: CPT

## 2021-03-11 PROCEDURE — 74011000258 HC RX REV CODE- 258: Performed by: NURSE PRACTITIONER

## 2021-03-11 PROCEDURE — 97161 PT EVAL LOW COMPLEX 20 MIN: CPT

## 2021-03-11 PROCEDURE — 85025 COMPLETE CBC W/AUTO DIFF WBC: CPT

## 2021-03-11 PROCEDURE — C1769 GUIDE WIRE: HCPCS | Performed by: INTERNAL MEDICINE

## 2021-03-11 PROCEDURE — 74011000636 HC RX REV CODE- 636: Performed by: INTERNAL MEDICINE

## 2021-03-11 PROCEDURE — 82962 GLUCOSE BLOOD TEST: CPT

## 2021-03-11 PROCEDURE — 80053 COMPREHEN METABOLIC PANEL: CPT

## 2021-03-11 PROCEDURE — B2111ZZ FLUOROSCOPY OF MULTIPLE CORONARY ARTERIES USING LOW OSMOLAR CONTRAST: ICD-10-PCS | Performed by: INTERNAL MEDICINE

## 2021-03-11 PROCEDURE — 77030013744: Performed by: INTERNAL MEDICINE

## 2021-03-11 PROCEDURE — 74011636637 HC RX REV CODE- 636/637: Performed by: HOSPITALIST

## 2021-03-11 PROCEDURE — C1894 INTRO/SHEATH, NON-LASER: HCPCS | Performed by: INTERNAL MEDICINE

## 2021-03-11 PROCEDURE — 4A023N8 MEASUREMENT OF CARDIAC SAMPLING AND PRESSURE, BILATERAL, PERCUTANEOUS APPROACH: ICD-10-PCS | Performed by: INTERNAL MEDICINE

## 2021-03-11 PROCEDURE — 99223 1ST HOSP IP/OBS HIGH 75: CPT | Performed by: INTERNAL MEDICINE

## 2021-03-11 PROCEDURE — 74011250636 HC RX REV CODE- 250/636: Performed by: INTERNAL MEDICINE

## 2021-03-11 PROCEDURE — 74011000250 HC RX REV CODE- 250: Performed by: HOSPITALIST

## 2021-03-11 PROCEDURE — C1751 CATH, INF, PER/CENT/MIDLINE: HCPCS | Performed by: INTERNAL MEDICINE

## 2021-03-11 PROCEDURE — 74011250636 HC RX REV CODE- 250/636: Performed by: NURSE PRACTITIONER

## 2021-03-11 PROCEDURE — 82746 ASSAY OF FOLIC ACID SERUM: CPT

## 2021-03-11 PROCEDURE — 99152 MOD SED SAME PHYS/QHP 5/>YRS: CPT | Performed by: INTERNAL MEDICINE

## 2021-03-11 PROCEDURE — 77030019569 HC BND COMPR RAD TERU -B: Performed by: INTERNAL MEDICINE

## 2021-03-11 PROCEDURE — 99232 SBSQ HOSP IP/OBS MODERATE 35: CPT | Performed by: INTERNAL MEDICINE

## 2021-03-11 PROCEDURE — 36415 COLL VENOUS BLD VENIPUNCTURE: CPT

## 2021-03-11 PROCEDURE — 82306 VITAMIN D 25 HYDROXY: CPT

## 2021-03-11 PROCEDURE — 97116 GAIT TRAINING THERAPY: CPT

## 2021-03-11 PROCEDURE — 74011250637 HC RX REV CODE- 250/637: Performed by: HOSPITALIST

## 2021-03-11 RX ORDER — HEPARIN SODIUM 200 [USP'U]/100ML
INJECTION, SOLUTION INTRAVENOUS
Status: COMPLETED | OUTPATIENT
Start: 2021-03-11 | End: 2021-03-11

## 2021-03-11 RX ORDER — HEPARIN SODIUM 1000 [USP'U]/ML
INJECTION, SOLUTION INTRAVENOUS; SUBCUTANEOUS AS NEEDED
Status: DISCONTINUED | OUTPATIENT
Start: 2021-03-11 | End: 2021-03-11 | Stop reason: HOSPADM

## 2021-03-11 RX ORDER — DOBUTAMINE HYDROCHLORIDE 200 MG/100ML
2.5 INJECTION INTRAVENOUS
Status: DISCONTINUED | OUTPATIENT
Start: 2021-03-11 | End: 2021-03-11 | Stop reason: SDUPTHER

## 2021-03-11 RX ORDER — FENTANYL CITRATE 50 UG/ML
INJECTION, SOLUTION INTRAMUSCULAR; INTRAVENOUS AS NEEDED
Status: DISCONTINUED | OUTPATIENT
Start: 2021-03-11 | End: 2021-03-11 | Stop reason: HOSPADM

## 2021-03-11 RX ORDER — MIDAZOLAM HYDROCHLORIDE 1 MG/ML
INJECTION, SOLUTION INTRAMUSCULAR; INTRAVENOUS AS NEEDED
Status: DISCONTINUED | OUTPATIENT
Start: 2021-03-11 | End: 2021-03-11 | Stop reason: HOSPADM

## 2021-03-11 RX ORDER — DOBUTAMINE HYDROCHLORIDE 200 MG/100ML
0-10 INJECTION INTRAVENOUS
Status: DISCONTINUED | OUTPATIENT
Start: 2021-03-11 | End: 2021-03-11

## 2021-03-11 RX ORDER — DOBUTAMINE HYDROCHLORIDE 200 MG/100ML
4 INJECTION INTRAVENOUS CONTINUOUS
Status: DISCONTINUED | OUTPATIENT
Start: 2021-03-11 | End: 2021-03-13

## 2021-03-11 RX ORDER — FUROSEMIDE 10 MG/ML
40 INJECTION INTRAMUSCULAR; INTRAVENOUS 2 TIMES DAILY
Status: DISCONTINUED | OUTPATIENT
Start: 2021-03-11 | End: 2021-03-11

## 2021-03-11 RX ADMIN — Medication 400 MG: at 08:54

## 2021-03-11 RX ADMIN — Medication 100 MG: at 08:54

## 2021-03-11 RX ADMIN — POTASSIUM CHLORIDE 40 MEQ: 750 TABLET, FILM COATED, EXTENDED RELEASE ORAL at 08:54

## 2021-03-11 RX ADMIN — METOPROLOL SUCCINATE 25 MG: 25 TABLET, EXTENDED RELEASE ORAL at 08:54

## 2021-03-11 RX ADMIN — DOBUTAMINE HYDROCHLORIDE 2.5 MCG/KG/MIN: 200 INJECTION INTRAVENOUS at 20:37

## 2021-03-11 RX ADMIN — ARFORMOTEROL TARTRATE 15 MCG: 15 SOLUTION RESPIRATORY (INHALATION) at 19:45

## 2021-03-11 RX ADMIN — FUROSEMIDE 40 MG: 10 INJECTION, SOLUTION INTRAMUSCULAR; INTRAVENOUS at 08:55

## 2021-03-11 RX ADMIN — Medication 3 MG: at 02:33

## 2021-03-11 RX ADMIN — OXYBUTYNIN CHLORIDE 5 MG: 5 TABLET ORAL at 08:54

## 2021-03-11 RX ADMIN — DEXTROSE MONOHYDRATE 25 G: 25 INJECTION, SOLUTION INTRAVENOUS at 15:09

## 2021-03-11 RX ADMIN — IPRATROPIUM BROMIDE AND ALBUTEROL SULFATE 3 ML: .5; 3 SOLUTION RESPIRATORY (INHALATION) at 13:26

## 2021-03-11 RX ADMIN — FUROSEMIDE 5 MG/HR: 10 INJECTION, SOLUTION INTRAMUSCULAR; INTRAVENOUS at 20:20

## 2021-03-11 RX ADMIN — GUAIFENESIN 600 MG: 600 TABLET, EXTENDED RELEASE ORAL at 08:54

## 2021-03-11 RX ADMIN — BUDESONIDE 500 MCG: 0.5 INHALANT RESPIRATORY (INHALATION) at 07:32

## 2021-03-11 RX ADMIN — GUAIFENESIN 600 MG: 600 TABLET, EXTENDED RELEASE ORAL at 21:42

## 2021-03-11 RX ADMIN — Medication 10 ML: at 07:18

## 2021-03-11 RX ADMIN — Medication 10 ML: at 22:55

## 2021-03-11 RX ADMIN — OXYBUTYNIN CHLORIDE 5 MG: 5 TABLET ORAL at 21:42

## 2021-03-11 RX ADMIN — Medication 400 MG: at 18:54

## 2021-03-11 RX ADMIN — BUDESONIDE 500 MCG: 0.5 INHALANT RESPIRATORY (INHALATION) at 19:45

## 2021-03-11 RX ADMIN — INSULIN GLARGINE 30 UNITS: 100 INJECTION, SOLUTION SUBCUTANEOUS at 08:55

## 2021-03-11 RX ADMIN — Medication 10 ML: at 07:17

## 2021-03-11 RX ADMIN — ARFORMOTEROL TARTRATE 15 MCG: 15 SOLUTION RESPIRATORY (INHALATION) at 07:32

## 2021-03-11 NOTE — CONSULTS
Dr. Timi Cummins. [phone]            Cardiology Consult/Progress Note      Requesting/referring provider: Misael Mtz MD, Dr. Jael Crow, Dr. Marla Alcala    Reason for Consult: Discussion regarding heart catheterization    Assessment/Plan:  1. Dilated cardiomyopathy with biventricular dysfunction  2. At least moderate functional mitral regurgitation  3. Type 2 diabetes mellitus  4. Hypertension  5. Hypercholesterolemia  6. Status post ICD placement with  7. Acute on chronic systolic congestive heart failure    Ms. Radha Hill was seen at the request of Dr. Jael Crow. She has reported progressive shortness of breath in conjunction with progressive biventricular dysfunction. She has longstanding history of congestive heart failure and now seems to be progressing to advanced ages. She has not had recent evaluation of her coronaries and needs evaluation of her right-sided pressures as well as cardiac output. I discussed the risks/benefits/alternatives of the procedure with the patient. Risks include (but are not limited to) bleeding, infection,stroke,heart attack, need for emergency surgery/pericardiocentesis, need for dialysis or death. The patient understands and agrees to proceed. Based on the hemodynamic data she may require nasal pressors/inotropic therapy    [x]    High complexity decision making was performed  [x]    Patient is at high-risk of decompensation with multiple organ involvement    Investigations personally reviewed and interpreted    Investigations reviewed    HPI: Jonas Lu, a 73y.o. year-old who presents for evaluation of dyspnea. Over the past 2 months she has reported increasing shortness of breath. She has prior known history of cardiomyopathy with prior ICD placement. Ejection fraction about 2 years ago was low normal.    Recently noted increasing shortness of breath with systemic congestion. No chest pain was reported. She did not have any prior history of microinfarction. ICD in place. Risk factors for atherotic vascular disease such as hypertension diabetes and hyperlipidemia are present but are controlled  She  has a past medical history of Arthritis, Diabetes ([address] Hypercholesterolemia, and Hypertension. Review of system:Patient reports no  CP. She reports no cough/fever/focal neurological deficits/abdominal pain. All other systems negative except as above. Family History   Problem Relation Age of Onset    Diabetes Mother       Social History     Socioeconomic History    Marital status:      Spouse name: Not on file    Number of children: 1    Years of education: 15    Highest education level: Not on file   Occupational History    Occupation: retired   Tobacco Use    Smoking status: Former Smoker     Packs/day: 0.25     Years: 20.00     Pack years: 5.00     Quit date: 2014     Years since quittin.2    Smokeless tobacco: Never Used   Substance and Sexual Activity    Alcohol use: Yes     Alcohol/week: 0.0 standard drinks     Comment: 21-24 Beers per week    Drug use: No    Sexual activity: Yes     Partners: Male      PE  Vitals:    21 1130 21 1254 21 1326 21 1440   BP: 111/75   115/72   Pulse: 71   77   Resp: 18   18   Temp: 97.4 °F (36.3 °C)      SpO2: 98%  92% 97%   Weight:       Height:  5' 4\" (1.626 m)      Body mass index is 32.73 kg/m². General:    Alert, cooperative, no distress. JVD is elevated. Psychiatric:    Normal Mood and affect    Eye/ENT:      Pupils equal, No asymmetry, Conjunctival pink. Able to hear voice at normal amplitude   Lungs:      Visibly symmetric chest expansion, few crackles bilaterally. U[facility] Delta Heart[de-identified]    Regular rate and rhythm, S1, S2 normal, LV S3 is heard. No murmur, click, rub or gallop. Normal palpable peripheral pulses. No cyanosis   Abdomen:     Soft, non-tender. Bowel sounds normal. No masses,  No      organomegaly.    Extremities: Extremities normal, atraumatic, mild bilateral edema. Neurologic:   CN II-XII grossly intact.  No gross focal deficits           Recent Labs:  Lab Results   Component Value Date/Time    Cholesterol, total 143 10/08/2020 12:01 PM    HDL Cholesterol 46 10/08/2020 12:01 PM    LDL, calculated 73 10/08/2020 12:01 PM    LDL, calculated 105 (H) 2019 02:31 PM    Triglyceride 137 10/08/2020 12:01 PM     Lab Results   Component Value Date/Time    Creatinine 1.21 (H) 2021 10:31 AM     Lab Results   Component Value Date/Time    BUN 42 (H) 2021 10:31 AM     Lab Results   Component Value Date/Time    Potassium 4.6 2021 10:31 AM     Lab Results   Component Value Date/Time    Hemoglobin A1c 6.8 (H) 2021 12:00 AM     Lab Results   Component Value Date/Time    HGB 11.6 2021 10:31 AM     Lab Results   Component Value Date/Time    PLATELET 745  10:31 AM       Reviewed:  Past Medical History:   Diagnosis Date    Arthritis     Diabetes ([facility] [address]     Hypercholesterolemia     Hypertension      Social History     Tobacco Use   Smoking Status Former Smoker    Packs/day: 0.25    Years: 20.00    Pack years: 5.00    Quit date: 2014    Years since quittin.2   Smokeless Tobacco Never Used     Social History     Substance and Sexual Activity   Alcohol Use Yes    Alcohol/week: 0.0 standard drinks    Comment: 21-24 Beers per week     No Known Allergies  Family History   Problem Relation Age of Onset    Diabetes Mother         Current Facility-Administered Medications   Medication Dose Route Frequency    furosemide (LASIX) injection 40 mg  40 mg IntraVENous BID    fentaNYL citrate (PF) injection    PRN    midazolam (VERSED) injection    PRN    sodium chloride (NS) flush 5-40 mL  5-40 mL IntraVENous Q8H    sodium chloride (NS) flush 5-40 mL  5-40 mL IntraVENous PRN    acetaminophen (TYLENOL) tablet 650 mg  650 mg Oral Q6H PRN    Or    acetaminophen (TYLENOL) suppository 650 mg 650 mg Rectal Q6H PRN    polyethylene glycol (MIRALAX) packet 17 g  17 g Oral DAILY PRN    promethazine (PHENERGAN) tablet 12.5 mg  12.5 mg Oral Q6H PRN    Or    ondansetron (ZOFRAN) injection 4 mg  4 mg IntraVENous Q6H PRN    sodium chloride (NS) flush 5-40 mL  5-40 mL IntraVENous Q8H    glucose chewable tablet 16 g  4 Tab Oral PRN    dextrose (D50W) injection syrg 12.5-25 g  25-50 mL IntraVENous PRN    glucagon (GLUCAGEN) injection 1 mg  1 mg IntraMUSCular PRN    insulin glargine (LANTUS) injection 30 Units  30 Units SubCUTAneous DAILY    insulin lispro (HUMALOG) injection 1-10 Units  1-10 Units SubCUTAneous AC&HS    [Held by provider] atorvastatin (LIPITOR) tablet 40 mg  40 mg Oral QHS    oxybutynin (DITROPAN) tablet 5 mg  5 mg Oral TID    budesonide (PULMICORT) 500 mcg/2 ml nebulizer suspension  500 mcg Nebulization BID RT    arformoteroL (BROVANA) neb solution 15 mcg  15 mcg Nebulization BID RT    albuterol-ipratropium (DUO-NEB) 2.5 MG-0.5 MG/3 ML  3 mL Nebulization Q4H PRN    guaiFENesin ER (MUCINEX) tablet 600 mg  600 mg Oral Q12H    metoprolol succinate (TOPROL-XL) XL tablet 25 mg  25 mg Oral DAILY    thiamine HCL (B-1) tablet 100 mg  100 mg Oral DAILY    melatonin tablet 3 mg  3 mg Oral QHS PRN    zolpidem (AMBIEN) tablet 5 mg  5 mg Oral QHS PRN    [Held by provider] potassium chloride SR (KLOR-CON 10) tablet 40 mEq  40 mEq Oral DAILY    magnesium oxide (MAG-OX) tablet 400 mg  400 mg Oral BID       Leopold Elbe, MD03/11/21     ATTENTION:   This medical record was transcribed using an electronic medical records/speech recognition system. Although proofread, it may and can contain electronic, spelling and other errors. Corrections may be executed at a later time. Please feel free to contact us for any clarifications as needed.     [employer] [facility]  [facility] [place], [place]. [phone]

## 2021-03-11 NOTE — PROGRESS NOTES
TRANSFER - OUT REPORT:    Verbal report given to [facility] RN on Pelon Carrillo being transferred to [facility]  for routine progression of care       Report consisted of patients Situation, Background, Assessment and   Recommendations(SBAR). Information from the following report(s) Kardex and Procedure Summary was reviewed with the receiving nurse. Opportunity for questions and clarification was provided.

## 2021-03-11 NOTE — PROGRESS NOTES
TRANSFER - IN REPORT:    Verbal report received from Goldie Toth RT on Faustina Anjali  being received from Cath lab procedure area  for routine progression of care. Report consisted of patients Situation, Background, Assessment and Recommendations(SBAR). Information from the following report(s) Kardex and Procedure Summary was reviewed with the receiving clinician. Opportunity for questions and clarification was provided. Assessment completed upon patients arrival to [address] [place] [address] and care assumed. Cardiac Cath Lab Recovery Arrival Note:    Faustina Alba arrived to [facility] recovery area. Patient procedure= R/ LHC. Patient on cardiac monitor, non-invasive blood pressure, SPO2 monitor. On RA. IV  of NS on pump at 75 ml/hr. Patient status doing well without problems. Patient is A&Ox 3. Patient reports no pain. PROCEDURE SITE CHECK:    Procedure site:without any bleeding and no hematoma, No pain/discomfort reported at procedure site. No change in patient status. Continue to monitor patient and status.

## 2021-03-11 NOTE — PROGRESS NOTES
Problem: Mobility Impaired (Adult and Pediatric)  Goal: *Acute Goals and Plan of Care (Insert Text)  Description: FUNCTIONAL STATUS PRIOR TO ADMISSION: Patient was independent and active without use of DME.    HOME SUPPORT PRIOR TO ADMISSION: The patient lived with her family but did not require assist.    Physical Therapy Goals  Initiated 3/11/2021  1. Patient will move from supine to sit and sit to supine , scoot up and down, and roll side to side in bed with independence within 7 day(s). 2.  Patient will transfer from bed to chair and chair to bed with independence using the least restrictive device within 7 day(s). 3.  Patient will perform sit to stand with independence within 7 day(s). 4.  Patient will ambulate with independence for 300 feet with the least restrictive device within 7 day(s). 5.  Patient will ascend/descend 4 stairs with one handrail(s) with modified independence within 7 day(s). Outcome: Progressing Towards Goal   PHYSICAL THERAPY EVALUATION  Patient: Glenna Shelton (11 y.o. female)  Date: 3/11/2021  Primary Diagnosis: CHF (congestive heart failure) ([facility]) [I50.9]        Precautions:   (low fall risk per Tinetti)    ASSESSMENT  Based on the objective data described below, the patient presents with good balance and stable vital signs with activity admitted with CHF. She does however have limited tolerance to activity, in terms of fatigue (but 02 sats stable on room air). She was able to amb 80 feet no assistive device and sit up to the chair post session. Anticipate steady gains with mobility and discharge to home. Current Level of Function Impacting Discharge (mobility/balance): at most provided contact guard but no physical assist    Functional Outcome Measure: The patient scored 27/28 on the Tinetti outcome measure which is indicative of low fall risk. .      Other factors to consider for discharge: low EF of < 15%      Vitals:     03/11/21 0939 03/11/21 0942 03/11/21 0945 03/11/21 0950   BP: 129/78 127/80 123/83 122/84   BP 1 Location: Left upper arm Left upper arm Left upper arm Left upper arm   BP Patient Position: Supine Sitting Standing Sitting   Pulse: 80 75 80 85   Resp:           Temp:           SpO2: on room air 95% 97% 96% 96%           Patient will benefit from skilled therapy intervention to address the above noted impairments. PLAN :  Recommendations and Planned Interventions: bed mobility training, transfer training, gait training, therapeutic exercises, patient and family training/education, and therapeutic activities      Frequency/Duration: Patient will be followed by physical therapy:  3 times a week to address goals. Recommendation for discharge: (in order for the patient to meet his/her long term goals)  No skilled physical therapy/ follow up rehabilitation needs identified at this time.     This discharge recommendation:  A follow-up discussion with the attending provider and/or case management is planned    IF patient discharges home will need the following DME: none         SUBJECTIVE:   Patient stated that she easily gets fatigued    OBJECTIVE DATA SUMMARY:   Consult received, chart reviewed, pt cleared by nursing  HISTORY:    Past Medical History:   Diagnosis Date    Arthritis     Diabetes ([facility] [address]     Hypercholesterolemia     Hypertension      Past Surgical History:   Procedure Laterality Date    HX ORTHOPAEDIC      Arthroscopy right knee       Personal factors and/or comorbidities impacting plan of care: DM, low EF of < 15%    Home Situation  Home Environment: Private residence  # Steps to Enter: 4  Rails to Enter: Yes  Hand Rails : Bilateral  One/Two Story Residence: One story  Living Alone: No  Support Systems: Spouse/Significant Other/Partner, Child(melinda)  Patient Expects to be Discharged to[de-identified] Private residence  Current DME Used/Available at Home: None    EXAMINATION/PRESENTATION/DECISION MAKING:   Critical Behavior:  Neurologic State: Alert  Orientation Level: Oriented X4        Hearing: Auditory  Auditory Impairment: None  Skin:  refer to MD and nursing notes  Edema: refer to MD and nursing notes  Range Of Motion:  AROM: Within functional limits                       Strength:    Strength: Within functional limits                    Tone & Sensation:                  Sensation: (at baseline RLE), impaired               Coordination:     Vision:      Functional Mobility:  Bed Mobility:     Supine to Sit: Modified independent        Transfers:  Sit to Stand: Supervision  Stand to Sit: Supervision                       Balance:   Sitting: Intact; Without support  Standing: Impaired; Without support  Standing - Static: Good  Standing - Dynamic : Good  Ambulation/Gait Training:  Distance (ft): 80 Feet (ft), taking two brief standing rest breaks  Assistive Device: Gait belt  Ambulation - Level of Assistance: Contact guard assistance        Gait Abnormalities: Decreased step clearance        Base of Support: Widened     Speed/Blanca: Slow;Pace decreased (<100 feet/min)  Step Length: Left shortened;Right shortened                     Stairs: Therapeutic Exercises:   Pursed lip breathing and ankle pumps    Functional Measure:  Tinetti test:    Sitting Balance: 1  Arises: 2  Attempts to Rise: 2  Immediate Standing Balance: 2  Standing Balance: 2  Nudged: 2  Eyes Closed: 1  Turn 360 Degrees - Continuous/Discontinuous: 1  Turn 360 Degrees - Steady/Unsteady: 1  Sitting Down: 2  Balance Score: 16 Balance total score  Indication of Gait: 1  R Step Length/Height: 1  L Step Length/Height: 1  R Foot Clearance: 1  L Foot Clearance: 1  Step Symmetry: 1  Step Continuity: 1  Path: 2  Trunk: 2  Walking Time: 0  Gait Score: 11 Gait total score  Total Score: 27/28 Overall total score         Tinetti Tool Score Risk of Falls  <19 = High Fall Risk  19-24 = Moderate Fall Risk  25-28 = Low Fall Risk  Tinetti ME.  Performance-Oriented Assessment of Mobility Problems in Elderly Patients. [facility]; [id number]. (Scoring Description: PT Bulletin Feb. 10, 1993)    Older adults: Christ Gunter et al, 2009; n = 1000 [facility] elderly evaluated with ABC, GÉNESIS, ADL, and IADL)  · Mean GÉNESIS score for males aged 69-68 years = 26.21(3.40)  · Mean GÉNESIS score for females age 69-68 years = 25.16(4.30)  · Mean GÉNESIS score for males over 80 years = 23.29(6.02)  · Mean GÉNESIS score for females over 80 years = 17.20(8.32)           Physical Therapy Evaluation Charge Determination   History Examination Presentation Decision-Making   MEDIUM  Complexity : 1-2 comorbidities / personal factors will impact the outcome/ POC  MEDIUM Complexity : 3 Standardized tests and measures addressing body structure, function, activity limitation and / or participation in recreation  LOW Complexity : Stable, uncomplicated  LOW Complexity : FOTO score of       Based on the above components, the patient evaluation is determined to be of the following complexity level: LOW     Pain Rating:  None rated    Activity Tolerance:   Fair and requires rest breaks    After treatment patient left in no apparent distress:   Sitting in chair and Call bell within reach    COMMUNICATION/EDUCATION:   The patients plan of care was discussed with: Registered nurse. Fall prevention education was provided and the patient/caregiver indicated understanding., Patient/family have participated as able in goal setting and plan of care. , and Patient/family agree to work toward stated goals and plan of care.     Thank you for this referral.  Avila Walters   Time Calculation: 26 mins

## 2021-03-11 NOTE — PROGRESS NOTES
[address]  Inpatient Progress Note      Patient name: Loren Osborn  Patient : 1950  Patient MRN: [id number]  Consulting MD: Lobito Jefferson MD  Primary general cardiologist:  Dr. Osmar Swanson NP    Date of service: 21    REASON FOR CONSULT:  Acute on chronic systolic heart failure     PLAN:  · Severe decompensated dilated cardiomyopathy, LVEF < 15%, NYHA Class IV  · Optimization of GDMT and diuresis for now; check HF labs  · Schedule LHC and RHC by Dr. Elif Dillon; consulted     Continue current medical therapy for heart failure  Continue current dose of toprol XL 25mg daily; watch RV function   Hold ACEi/ARB/ARNi/MRA until severity of illness determined   Continue current dose of lasix 40mg IV twice daily; orthostatics today   K+ 4.5; hold Klor Con   Magnesium oxide 400mg twice daily  Keep K>4 and Mg>2  Not on allopurinol or uloric, check uric acid level  Consider baby ASA; will defer to primary cardiology team   Hold statin due to elevated tbili  Check genetic testing for cardiomyopathy  Labs: CBC, BMP, LFT, pro-NT-BNP, iron profile with ferritin, thyroid profile, vitamin D level, B12, folate, lipid profile, CPK, gammopathy profile  Nutritionist consult for heart failure diet and weight loss  Provide heart failure education  Review advanced care plan  Recommend at discharge:   · Lifevest if patient full code  · Vaccinations for flu, pneumonia and covid  · Referral to sleep medicine for sleep study  Plan d/w Chantel Oreilly NP     IMPRESSION:  Fatigue  Shortness of breath  Volume overload  Acute on chronic systolic heart failure  · Stage C, NYHA class IIIA symptoms  · Non-ischemic cardiomyopathy, recent deterioration to severe LV dysfunction  · LVEF 15-20% (by echo 3/9/21) from 20-25% (by echo 2021) from 51-55% (by echo 2019)  · RV dysfunction, moderate-severe  Anemia  Hypokalemia/hypomagensemia  Cardiac risk factors  · Morbid obesity (BMI 32)  · High risk for SHARLENE  · DM2 (hgba1c 6.8)  · HL  · HTN  · Former tobacco (stopped 2014 after > 40 years)  · H/o alcohol abuse  OA, right knee  Carpal tunnel syndrome  Gout     CARDIAC IMAGING:  Echo (3/9/21) LVEF 15-20%, mild-moderate MR, severely elevated CVP, IVC > 21mm  Echo (1/29/21) LVEF 20-25%, moderately to severely reduced RV function  Echo (11/20/19) LVEF 51-55%  Echo (12/18/18) LVEF 50-55%  Echo (11/23/16) LVEF 45-55%  EKG (3/9/21) ST 103bpm, QRS 84ms  LHC  NST (5/2018) no scar or ischemia, LVEF 48%  ICD interrogation     HEMODYNAMICS:  RHC not done  CPEST not done  6MW not done     OTHER IMAGING:  CXR  CT chest not done     HISTORY OF PRESENT ILLNESS:  Malini Cespedes a 73 y.o. female with a past medical history of nonischemic hypertensive cardiomyopathy, type 2 diabetes, CKD, hypertension, COPD/reactive airway disease, former smoker who presented to clinic yesterday for her routine visit with Dr. Royal Willett. Ivelisse Raymond was obviously quite dyspneic, moderate distress and subsequently admitted through the ER for decompensated systolic heart failure.  Her initial diagnosis of heart failure occurred 2007 she was previously followed by Dr. Nathalia Barrios at [facility] up until 2015 where she changed to Dr. Daily Yoder due to insurance coverage. Homecony Najera ejection fraction at [facility] in 2015 was 45-50%, at the time she had been maintained on high-dose diuretics to maintain a euvolemic state, metoprolol 100 mg daily as well as lisinopril 20 mg daily.  Ms. Diego Metcalf had been doing well on guideline directed medical therapy, she was on significant doses of diuretics to include upwards of 120 mg of furosemide along with 5 mg of metolazone daily. Oneta Thee has been recommended since she had maintained a euvolemic state and essentially near normal ejection fraction that reducing and/or eliminating metolazone due to its potential for renal decline; this conversation occurred with patient by Dr. Royal Willett on 9/17/2020. Samantha Sifuentes presented to her primary care office with Dr. Belinda Irene on 10/8/2020 where the notes indicate that metoprolol was to be discontinued by cardiology which was not the case.      She has been followed by Mona Osorio NP at [facility] and was transferred to [facility] on 3/10 for acute HFrEF and additional evaluation. PHYSICAL EXAM:  Visit Vitals  /84 (BP 1 Location: Left upper arm, BP Patient Position: Sitting)   Pulse 85   Temp 97.6 °F (36.4 °C)   Resp 18   Wt 190 lb 11.2 oz (86.5 kg)   SpO2 96%   BMI 32.73 kg/m²     General: Patient is well developed, fatigued. HEENT: Normocephalic and atraumatic. No scleral icterus. Pupils are equal, round and reactive to light and accomodation. No conjunctival injection. Oropharynx is clear. Neck: Supple. No evidence of thyroid enlargements or lymphadenopathy. JVD: Cannot be appreciated   Lungs: Breath sounds are equal and clear bilaterally. No wheezes, rhonchi, or rales. Heart: Regular rate and rhythm with normal S1 and S2. No murmurs, gallops or rubs. Abdomen: Soft, no mass or tenderness. No organomegaly or hernia. Bowel sounds present. Genitourinary and rectal: deferred  Extremities: No cyanosis, clubbing, or edema. Neurologic: No focal sensory or motor deficits are noted. Grossly intact. Psychiatric: Awake, alert an doriented x 3. Appropriate mood and affect. Skin: Cool skin. REVIEW OF SYSTEMS:  General: Fatigue.     Ear, nose and throat: Denies difficulty hearing, sinus problems, runny nose, post-nasal drip, ringing in ears, mouth sores, loose teeth, ear pain, nosebleeds, sore throate, facial pain or numbess  Cardiovascular: see above in the interval history  Respiratory: Dyspnea at rest.   Gastrointestinal: Denies heartburn, constipation, intolerance to certain foods, diarrhea, abdominal pain, nausea, vomiting, difficulty swallowing, blood in stool  Kidney and bladder: Denies painful urination, frequent urination, urgency, prostate problems and impotence  Musculoskeletal: Denies joint pain, muscle weakness  Skin and hair: Denies change in existing skin lesions, hair loss or increase, breast changes    PAST MEDICAL HISTORY:  Past Medical History:   Diagnosis Date    Arthritis     Diabetes ([address]     Hypercholesterolemia     Hypertension        PAST SURGICAL HISTORY:  Past Surgical History:   Procedure Laterality Date    HX ORTHOPAEDIC      Arthroscopy right knee       FAMILY HISTORY:  Family History   Problem Relation Age of Onset    Diabetes Mother        SOCIAL HISTORY:  Social History     Socioeconomic History    Marital status:      Spouse name: Not on file    Number of children: 1    Years of education: 15    Highest education level: Not on file   Occupational History    Occupation: retired   Tobacco Use    Smoking status: Former Smoker     Packs/day: 0.25     Years: 20.00     Pack years: 5.00     Quit date: 2014     Years since quittin.2    Smokeless tobacco: Never Used   Substance and Sexual Activity    Alcohol use: Yes     Alcohol/week: 0.0 standard drinks     Comment: 21-24 Beers per week    Drug use: No    Sexual activity: Yes     Partners: Male       LABORATORY RESULTS:     Labs Latest Ref Rng & Units 3/11/2021 3/10/2021 3/9/2021 2021 2/3/2021   WBC 3.6 - 11.0 K/uL 5.0 4.6 5.6 5.3 -   RBC 3.80 - 5.20 M/uL 3.93 3.82 4.25 3.84 -   Hemoglobin 11.5 - 16.0 g/dL 10. 8(L) 10. 7(L) 11.7 10. 4(L) -   Hematocrit 35.0 - 47.0 % 34. 5(L) 33. 9(L) 37.6 34. 1(L) -   MCV 80.0 - 99.0 FL 87.8 88.7 88.5 88.8 -   Platelets 054 - 666 K/uL 256 219 292 196 -   Lymphocytes 12 - 49 % 20 17 13 14 -   Monocytes 5 - 13 % 5 4(L) 3(L) 5 -   Eosinophils 0 - 7 % 1 5 4 2 -   Basophils 0 - 1 % 1 1 1 1 -   Albumin 3.5 - 5.0 g/dL 3. 0(L) - 3. 1(L) 3. 3(L) -   Calcium 8.5 - 10.1 MG/DL 9.1 8.4(L) 9.0 8.3(L) 9.1   Glucose 65 - 100 mg/dL 84 172(H) 143(H) 185(H) 135(H)   BUN 6 - 20 MG/DL 41(H) 24(H) 20 26(H) 21(H)   Creatinine 0.55 - 1.02 MG/DL 1.29(H) 0.85 0. 91 1.05(H) 0.69   Sodium 136 - 145 mmol/L 138 142 144 139 144   Potassium 3.5 - 5.1 mmol/L 4.5 3. 3(L) 3. 3(L) 3. 1(L) 3.8   Some recent data might be hidden     Lab Results   Component Value Date/Time    TSH 1.050 10/08/2020 12:01 PM    TSH 1.200 08/08/2019 02:31 PM    TSH 1.280 08/26/2016 11:48 AM       ALLERGY:  No Known Allergies     CURRENT MEDICATIONS:    Current Facility-Administered Medications:     furosemide (LASIX) injection 40 mg, 40 mg, IntraVENous, BID, Polliard, Lillia Mandy, NP    sodium chloride (NS) flush 5-40 mL, 5-40 mL, IntraVENous, Q8H, Domenica Amaya MD, 10 mL at 03/11/21 0717    sodium chloride (NS) flush 5-40 mL, 5-40 mL, IntraVENous, PRN, Domenica Amaya MD    acetaminophen (TYLENOL) tablet 650 mg, 650 mg, Oral, Q6H PRN **OR** acetaminophen (TYLENOL) suppository 650 mg, 650 mg, Rectal, Q6H PRN, Domenica Amaya MD    polyethylene glycol (MIRALAX) packet 17 g, 17 g, Oral, DAILY PRN, Domenica Amaya MD    promethazine (PHENERGAN) tablet 12.5 mg, 12.5 mg, Oral, Q6H PRN **OR** ondansetron (ZOFRAN) injection 4 mg, 4 mg, IntraVENous, Q6H PRN, Anshul Olivera MD    sodium chloride (NS) flush 5-40 mL, 5-40 mL, IntraVENous, Q8H, Anshul Baca MD, 10 mL at 03/11/21 0718    glucose chewable tablet 16 g, 4 Tab, Oral, PRN, Anshul Olivera MD    dextrose (D50W) injection syrg 12.5-25 g, 25-50 mL, IntraVENous, PRN, Domenica Amaya MD    glucagon (GLUCAGEN) injection 1 mg, 1 mg, IntraMUSCular, PRN, Domenica Amaya MD    insulin glargine (LANTUS) injection 30 Units, 30 Units, SubCUTAneous, DAILY, Domenica Amaya MD, 30 Units at 03/11/21 0855    insulin lispro (HUMALOG) injection 1-10 Units, 1-10 Units, SubCUTAneous, AC&HS, Domenica Amaya MD, Stopped at 03/10/21 1630    atorvastatin (LIPITOR) tablet 40 mg, 40 mg, Oral, QHS, Domenica Amaya MD, 40 mg at 03/10/21 2207    oxybutynin (DITROPAN) tablet 5 mg, 5 mg, Oral, TID, Domenica Amaya MD, 5 mg at 03/11/21 0854    budesonide (PULMICORT) 500 mcg/2 ml nebulizer suspension, 500 mcg, Nebulization, BID RT, Giovana Mendez MD, 500 mcg at 03/11/21 0732    arformoteroL (BROVANA) neb solution 15 mcg, 15 mcg, Nebulization, BID RT, Giovana Mendez MD, 15 mcg at 03/11/21 0732    albuterol-ipratropium (DUO-NEB) 2.5 MG-0.5 MG/3 ML, 3 mL, Nebulization, Q4H PRN, Giovana Mendez MD    guaiFENesin ER (MUCINEX) tablet 600 mg, 600 mg, Oral, Q12H, Giovana Mendez MD, 600 mg at 03/11/21 0854    metoprolol succinate (TOPROL-XL) XL tablet 25 mg, 25 mg, Oral, DAILY, Giovana Mendez MD, 25 mg at 03/11/21 0854    thiamine HCL (B-1) tablet 100 mg, 100 mg, Oral, DAILY, Giovana Mendez MD, 100 mg at 03/11/21 0854    melatonin tablet 3 mg, 3 mg, Oral, QHS PRN, Giovana Mendez MD, 3 mg at 03/11/21 0233    zolpidem (AMBIEN) tablet 5 mg, 5 mg, Oral, QHS PRN, Giovana Mendez MD    [Held by provider] potassium chloride SR (KLOR-CON 10) tablet 40 mEq, 40 mEq, Oral, DAILY, Dennis Castrejon MD, 40 mEq at 03/11/21 0854    magnesium oxide (MAG-OX) tablet 400 mg, 400 mg, Oral, BID, Dennis Castrejon MD, 400 mg at 03/11/21 2974    PATIENT CARE TEAM:  Patient Care Team:  Julia Kohler MD as PCP - General (Internal Medicine)  Julia Kohler MD as PCP - [address] [place] [address]  [facility] Provider  Lord Zohaib MD as Physician (Cardiology)  Ryanne Salas MD as Surgeon (Orthopedic Surgery)     Thank you for allowing me to participate in this patient's care. Tiff Chris NP  [address] [place] [address], [address]  Phone: [phone]  Fax: [phone]    [facility] ATTENDING ADDENDUM    Patient was seen and examined in person. Data and notes were reviewed. I have discussed and agree with the plan as noted in the NP note above without further additions.     Hans Last MD PhD  Fredo Gibson

## 2021-03-11 NOTE — PROGRESS NOTES
Orders received, chart reviewed and patient evaluated by physical therapy. Pending progression with skilled acute physical therapy, recommend:  No skilled physical therapy/ follow up rehabilitation needs identified at this time. Recommend with nursing OOB to chair 3x/day and walking daily with assist X 1 assist and no assistive device. Thank you for completing as able in order to maintain patient strength, endurance and independence. Full evaluation to follow.  Nuria North, PT    Vitals:    03/11/21 9385 03/11/21 0942 03/11/21 0945 03/11/21 0950   BP: 129/78 127/80 123/83 122/84   BP 1 Location: Left upper arm Left upper arm Left upper arm Left upper arm   BP Patient Position: Supine Sitting Standing Sitting   Pulse: 80 75 80 85   Resp:       Temp:       SpO2: on room air 95% 97% 96% 96%

## 2021-03-11 NOTE — PROGRESS NOTES
Bedside and Verbal shift change report given to [address] [place] [address] (oncoming nurse) by Sue Ashley RN (offgoing nurse). Report included the following information SBAR, Kardex, Intake/Output, MAR, Recent Results and Cardiac Rhythm NSR/ST.

## 2021-03-11 NOTE — DISCHARGE INSTR - DIET
Limit daily sodium/salt intake to less than 2000mg of sodium per day.      Discussed areas for improvement:   - switch cottage cheese for yogurt or low-sodium cottage cheese  - choose Lean Cuisine or Healthy Choice if frozen meals to be purchased (froizen meal should not have more than 600mg sodium)  - low sodium seasonings  - Choose \"lightly\" salted or unsalted chips and nuts

## 2021-03-11 NOTE — PROGRESS NOTES
Bedside shift change report given to [address], RN (oncoming nurse) by Christel Khan RN (offgoing nurse). Report included the following information SBAR, Kardex, Intake/Output, MAR, Accordion, Recent Results and Cardiac Rhythm NSR.

## 2021-03-11 NOTE — CONSULTS
NUTRITION  Reason for Rescreen: Provider Consult        RECOMMENDATIONS:   Resume diet with: \"diabetic with options,\" 1800kcal, 2gm Na  Interventions   - diet education provided       Information obtained from:   patient  Past Medical History:   Diagnosis Date    Arthritis     Diabetes ([address]     Hypercholesterolemia     Hypertension      CHF on admit with Heart Failure Team now following. Plans for RHC and other workup noted. Consult for diet education received. Pt visited and reviewed usual intake at home, has changed some recently since cooking has been more difficult with SOB. B - boiled egg/toast or cereal or cottage cheese/fruit  L - sandwich with cooked meat from previous dinner + jello  D - Frozen meal   S - chips/dip    Discussed areas for improvement with handouts given:   - switch cottage cheese for yogurt or low sodium cottage cheese  - choose Lean Cuisine or Healthy Choice if frozen meals to be purchased  - low sodium seasonings  - options for lower sodium snacks     Pt with good understanding but will benefit from continued reinforcement to help with CHF mgmt.      Diet:  NPO  Supplements: None  Meal intake:   Patient Vitals for the past 168 hrs:   % Diet Eaten   03/10/21 1742 80 %     Weight Changes:   Stable  Wt Readings from Last 10 Encounters:   03/11/21 86.5 kg (190 lb 11.2 oz)   03/09/21 85.9 kg (189 lb 6.4 oz)   03/09/21 86.2 kg (190 lb)   02/17/21 87.5 kg (193 lb)   02/17/21 87.6 kg (193 lb 3.2 oz)   02/02/21 87.7 kg (193 lb 6.4 oz)   01/25/21 87.5 kg (193 lb)   01/12/21 85.8 kg (189 lb 3.2 oz)   12/17/20 84.8 kg (187 lb)   12/13/20 87.5 kg (192 lb 14.4 oz)     Nutrition-Related Concerns Identified:  None    Estimated Nutrition Needs:   Energy: 1645-1809kcal  Wt used: Current(86.5kg)  Protein: 95g - 1.1g/kg  Wt used: Current(86kg)   Fluid: 1645ml     PLAN:   - Follow wt trends  - Follow for additional diet reinforcement     Will revisit per policy    Navid Biggs RD CNSC, Pager #[phone] or via [employer]limi

## 2021-03-11 NOTE — PROGRESS NOTES
TRANSFER - OUT REPORT:    Verbal report given to Chase Bray RN(name) on Faustina Alba  being transferred to Cath Lab(unit) for ordered procedure       Report consisted of patients Situation, Background, Assessment and   Recommendations(SBAR). Information from the following report(s) SBAR, Kardex, ED Summary, MAR, Recent Results and Cardiac Rhythm NSR was reviewed with the receiving nurse. Lines:   Peripheral IV 03/09/21 Right Hand (Active)   Site Assessment Clean, dry, & intact 03/11/21 0825   Phlebitis Assessment 0 03/11/21 0825   Infiltration Assessment 0 03/11/21 0825   Dressing Status Clean, dry, & intact 03/11/21 0825   Dressing Type Transparent 03/11/21 0825   Hub Color/Line Status Blue;Capped 03/11/21 0825   Action Taken Open ports on tubing capped 03/11/21 0825   Alcohol Cap Used Yes 03/11/21 0825       Peripheral IV 03/10/21 Anterior; Left Antecubital (Active)   Site Assessment Clean, dry, & intact 03/11/21 0825   Phlebitis Assessment 0 03/11/21 0825   Infiltration Assessment 0 03/11/21 0825   Dressing Status Clean, dry, & intact 03/11/21 0825   Dressing Type Transparent 03/11/21 0825   Hub Color/Line Status Capped 03/11/21 0825   Action Taken Open ports on tubing capped 03/11/21 0825   Alcohol Cap Used Yes 03/11/21 0825        Opportunity for questions and clarification was provided.       Patient transported with:   [employer]

## 2021-03-11 NOTE — PROGRESS NOTES
Cardiac Cath Lab Procedure Area Arrival Note:    Morales Key arrived to Cardiac Cath Lab, Procedure Area. Patient identifiers verified with NAME and DATE OF BIRTH. Procedure verified with patient. Consent forms verified. Allergies verified. Patient informed of procedure and plan of care. Questions answered with review. Patient voiced understanding of procedure and plan of care. Patient on cardiac monitor, non-invasive blood pressure, SPO2 monitor. On RA or O2 @ 2 lpm via NC.  IV of NS on pump at 100 ml/hr. Patient status doing well without problems. Patient is A&Ox 4. Patient reports no pain. Patient medicated during procedure with orders obtained and verified by Dr. Marc Hussein. Refer to patients Cardiac Cath Lab PROCEDURE REPORT for vital signs, assessment, status, and response during procedure, printed at end of case. Printed report on chart or scanned into chart. [id number] Report to PAPI RT. VSS. Pt taken to cath recovery.

## 2021-03-11 NOTE — PROCEDURES
Findings  1. Normal coronary arteries without any obstructive disease  2. Severely reduced cardiac index/outputs. By thermodilution cardiac output was 3.05 L/min and index was 1.6 L/min/m². By [place] based on Lafarge formula, cardiac output was 2.6 L/min with index of 1.35 L/min/m² meter. Using Maribel formula, cardiac output was 3.4 L/min and index was 1.9 L/min/m². 3.  High SVR of about 20 Wood units. 4.  Moderate pulmonary hypertension mixed pattern with elevated capillary wedge pressure mean of 27. PVR is about 4 Novak units  5. Elevated right-sided filling pressure with mean RA pressure of 25. Access: Right IJ and right radial.  No issues  Contrast 20 cc    Recommendations:  1. Guideline recommended therapy for heart failure with reduced action fraction. 2.  Consideration for inotropic therapy and advanced heart failure therapies .

## 2021-03-12 ENCOUNTER — TELEPHONE (OUTPATIENT)
Dept: CARDIOLOGY CLINIC | Age: 71
End: 2021-03-12

## 2021-03-12 ENCOUNTER — DOCUMENTATION ONLY (OUTPATIENT)
Dept: CARDIOLOGY CLINIC | Age: 71
End: 2021-03-12

## 2021-03-12 LAB
ALBUMIN SERPL-MCNC: 2.7 G/DL (ref 3.5–5)
ALBUMIN/GLOB SERPL: 0.6 {RATIO} (ref 1.1–2.2)
ALP SERPL-CCNC: 72 U/L (ref 45–117)
ALT SERPL-CCNC: 159 U/L (ref 12–78)
ANION GAP SERPL CALC-SCNC: 10 MMOL/L (ref 5–15)
AST SERPL-CCNC: 161 U/L (ref 15–37)
BASOPHILS # BLD: 0 K/UL (ref 0–0.1)
BASOPHILS NFR BLD: 1 % (ref 0–1)
BILIRUB SERPL-MCNC: 0.9 MG/DL (ref 0.2–1)
BNP SERPL-MCNC: 1433 PG/ML
BUN SERPL-MCNC: 52 MG/DL (ref 6–20)
BUN/CREAT SERPL: 42 (ref 12–20)
CALCIUM SERPL-MCNC: 8.6 MG/DL (ref 8.5–10.1)
CHLORIDE SERPL-SCNC: 103 MMOL/L (ref 97–108)
CK SERPL-CCNC: 70 U/L (ref 26–192)
CO2 SERPL-SCNC: 25 MMOL/L (ref 21–32)
CREAT SERPL-MCNC: 1.23 MG/DL (ref 0.55–1.02)
DIFFERENTIAL METHOD BLD: ABNORMAL
EOSINOPHIL # BLD: 0.2 K/UL (ref 0–0.4)
EOSINOPHIL NFR BLD: 3 % (ref 0–7)
ERYTHROCYTE [DISTWIDTH] IN BLOOD BY AUTOMATED COUNT: 17.9 % (ref 11.5–14.5)
GLOBULIN SER CALC-MCNC: 4.5 G/DL (ref 2–4)
GLUCOSE BLD STRIP.AUTO-MCNC: 128 MG/DL (ref 65–100)
GLUCOSE BLD STRIP.AUTO-MCNC: 145 MG/DL (ref 65–100)
GLUCOSE BLD STRIP.AUTO-MCNC: 192 MG/DL (ref 65–100)
GLUCOSE BLD STRIP.AUTO-MCNC: 301 MG/DL (ref 65–100)
GLUCOSE SERPL-MCNC: 160 MG/DL (ref 65–100)
HCT VFR BLD AUTO: 33.2 % (ref 35–47)
HGB BLD-MCNC: 10.4 G/DL (ref 11.5–16)
IMM GRANULOCYTES # BLD AUTO: 0 K/UL (ref 0–0.04)
IMM GRANULOCYTES NFR BLD AUTO: 1 % (ref 0–0.5)
LACTATE SERPL-SCNC: 1.4 MMOL/L (ref 0.4–2)
LYMPHOCYTES # BLD: 0.8 K/UL (ref 0.8–3.5)
LYMPHOCYTES NFR BLD: 15 % (ref 12–49)
MAGNESIUM SERPL-MCNC: 2.3 MG/DL (ref 1.6–2.4)
MCH RBC QN AUTO: 27.3 PG (ref 26–34)
MCHC RBC AUTO-ENTMCNC: 31.3 G/DL (ref 30–36.5)
MCV RBC AUTO: 87.1 FL (ref 80–99)
MONOCYTES # BLD: 0.4 K/UL (ref 0–1)
MONOCYTES NFR BLD: 6 % (ref 5–13)
NEUTS SEG # BLD: 4.1 K/UL (ref 1.8–8)
NEUTS SEG NFR BLD: 74 % (ref 32–75)
NRBC # BLD: 0 K/UL (ref 0–0.01)
NRBC BLD-RTO: 0 PER 100 WBC
PLATELET # BLD AUTO: 259 K/UL (ref 150–400)
PMV BLD AUTO: 11.3 FL (ref 8.9–12.9)
POTASSIUM SERPL-SCNC: 3.9 MMOL/L (ref 3.5–5.1)
PROT SERPL-MCNC: 7.2 G/DL (ref 6.4–8.2)
RBC # BLD AUTO: 3.81 M/UL (ref 3.8–5.2)
SERVICE CMNT-IMP: ABNORMAL
SODIUM SERPL-SCNC: 138 MMOL/L (ref 136–145)
WBC # BLD AUTO: 5.5 K/UL (ref 3.6–11)

## 2021-03-12 PROCEDURE — 82550 ASSAY OF CK (CPK): CPT

## 2021-03-12 PROCEDURE — 83735 ASSAY OF MAGNESIUM: CPT

## 2021-03-12 PROCEDURE — 74011000250 HC RX REV CODE- 250: Performed by: NURSE PRACTITIONER

## 2021-03-12 PROCEDURE — 36415 COLL VENOUS BLD VENIPUNCTURE: CPT

## 2021-03-12 PROCEDURE — 77010033678 HC OXYGEN DAILY

## 2021-03-12 PROCEDURE — 74011250637 HC RX REV CODE- 250/637: Performed by: INTERNAL MEDICINE

## 2021-03-12 PROCEDURE — 82962 GLUCOSE BLOOD TEST: CPT

## 2021-03-12 PROCEDURE — 80053 COMPREHEN METABOLIC PANEL: CPT

## 2021-03-12 PROCEDURE — 85025 COMPLETE CBC W/AUTO DIFF WBC: CPT

## 2021-03-12 PROCEDURE — 83605 ASSAY OF LACTIC ACID: CPT

## 2021-03-12 PROCEDURE — 74011636637 HC RX REV CODE- 636/637: Performed by: HOSPITALIST

## 2021-03-12 PROCEDURE — 74011250637 HC RX REV CODE- 250/637: Performed by: HOSPITALIST

## 2021-03-12 PROCEDURE — 83880 ASSAY OF NATRIURETIC PEPTIDE: CPT

## 2021-03-12 PROCEDURE — 94640 AIRWAY INHALATION TREATMENT: CPT

## 2021-03-12 PROCEDURE — 99233 SBSQ HOSP IP/OBS HIGH 50: CPT | Performed by: INTERNAL MEDICINE

## 2021-03-12 PROCEDURE — 74011000250 HC RX REV CODE- 250: Performed by: HOSPITALIST

## 2021-03-12 PROCEDURE — 65660000000 HC RM CCU STEPDOWN

## 2021-03-12 PROCEDURE — 99232 SBSQ HOSP IP/OBS MODERATE 35: CPT | Performed by: INTERNAL MEDICINE

## 2021-03-12 RX ADMIN — OXYBUTYNIN CHLORIDE 5 MG: 5 TABLET ORAL at 09:20

## 2021-03-12 RX ADMIN — Medication 10 ML: at 21:04

## 2021-03-12 RX ADMIN — ARFORMOTEROL TARTRATE 15 MCG: 15 SOLUTION RESPIRATORY (INHALATION) at 07:42

## 2021-03-12 RX ADMIN — ARFORMOTEROL TARTRATE 15 MCG: 15 SOLUTION RESPIRATORY (INHALATION) at 22:10

## 2021-03-12 RX ADMIN — Medication 400 MG: at 09:20

## 2021-03-12 RX ADMIN — INSULIN GLARGINE 30 UNITS: 100 INJECTION, SOLUTION SUBCUTANEOUS at 09:19

## 2021-03-12 RX ADMIN — OXYBUTYNIN CHLORIDE 5 MG: 5 TABLET ORAL at 21:03

## 2021-03-12 RX ADMIN — BUMETANIDE 1 MG/HR: 0.25 INJECTION INTRAMUSCULAR; INTRAVENOUS at 19:18

## 2021-03-12 RX ADMIN — BUDESONIDE 500 MCG: 0.5 INHALANT RESPIRATORY (INHALATION) at 22:10

## 2021-03-12 RX ADMIN — BUDESONIDE 500 MCG: 0.5 INHALANT RESPIRATORY (INHALATION) at 07:42

## 2021-03-12 RX ADMIN — GUAIFENESIN 600 MG: 600 TABLET, EXTENDED RELEASE ORAL at 21:03

## 2021-03-12 RX ADMIN — INSULIN LISPRO 7 UNITS: 100 INJECTION, SOLUTION INTRAVENOUS; SUBCUTANEOUS at 11:57

## 2021-03-12 RX ADMIN — BUMETANIDE 1 MG/HR: 0.25 INJECTION INTRAMUSCULAR; INTRAVENOUS at 10:09

## 2021-03-12 RX ADMIN — INSULIN LISPRO 2 UNITS: 100 INJECTION, SOLUTION INTRAVENOUS; SUBCUTANEOUS at 09:19

## 2021-03-12 RX ADMIN — Medication 100 MG: at 09:20

## 2021-03-12 RX ADMIN — GUAIFENESIN 600 MG: 600 TABLET, EXTENDED RELEASE ORAL at 09:20

## 2021-03-12 RX ADMIN — OXYBUTYNIN CHLORIDE 5 MG: 5 TABLET ORAL at 17:44

## 2021-03-12 RX ADMIN — Medication 400 MG: at 17:44

## 2021-03-12 NOTE — PROGRESS NOTES
Bedside and Verbal shift change report given to BRENDA Cleary (oncoming nurse) by Samantha Renee RN (offgoing nurse). Report included the following information SBAR, Kardex, Procedure Summary, MAR, Recent Results and Cardiac Rhythm NSR.

## 2021-03-12 NOTE — PROGRESS NOTES
Hospital Progress Note    NAME:  Janett Nance   :   1950   MRN:  [id number]     Date/Time:  3/11/2021 8:07 PM    Plan:   1. Card consults  2.  Cardiac cath  3. Guideline recommended therapy for HF  Risk of Deterioration: Low  []           Moderate  [x]           High  []                 Assessment:   Principal Problem:    Acute on chronic systolic congestive heart failure, NYHA class 3 (HCC) (3/11/2021)    Active Problems:    Dilated cardiomyopathy ([address] (2014)      Overview: Nonischemic      Dyslipidemia (2014)      Hypertension (2014)      Obesity (BMI 30.0-34.9) (11/15/2016)      CKD (chronic kidney disease) stage 3, GFR 30-59 ml/min (2019)      Overview:  negative lexiscan       lvh, lae, otherwise normal echo      Uncontrolled type 2 diabetes mellitus with hyperglycemia ([facility]) (2020)      Moderate pulmonary arterial systolic hypertension ([facility] (3/11/2021)      Hypokalemia (3/11/2021)      Hypomagnesemia (3/11/2021)         Admitting notes:71 y.o. female who presents with CHF and transferred from [address] [place] [address] for evaluation by LINDA.      Alexia Aaron is a 73 y.o.   female with PMH of above mentioned problems who presents to ED from her cardiologist office with progressive shortness of breath and cough.  Patient states that she has been having progressive shortness of breath for past couple of weeks and for the past 10 days it has gotten to a point that she cannot walk more than 10 today before catching her breath.  She also endorsed that she is having more cough than usual.  Patient also endorsed PND and orthopnea.  Denies any swelling of her legs.  Patient states that she has been taking her medication as prescribed but she denied using any beta-blocker or Aldactone for heart failure.  Patient is unsure whether she had cardiac angiogram done in the past but she said she been diagnosed with heart failure since .  Patient was a heavy smoker in the past and smoked pack a day for more than 15 years but she has quit smoking 6 years ago. [place] drink alcohol and denies any recent drug use.  Patient denies any fever chills diarrhea belly pain chest pain.        The patient denies any fever, chills, chest pain, cough, congestion, recent illness, palpitations, or dysuria. Subjective:     Feeling better but continues with SOB  11 Point Review of Systems:   Negative except no cp,swelling    []            Unable to obtain ROS due to:       []            mental status change []            sedated []            intubated     Social History     Tobacco Use    Smoking status: Former Smoker     Packs/day: 0.25     Years: 20.00     Pack years: 5.00     Quit date: 2014     Years since quittin.2    Smokeless tobacco: Never Used   Substance Use Topics    Alcohol use:  Yes     Alcohol/week: 0.0 standard drinks     Comment: 21-24 Beers per week     Medications reviewed:  Current Facility-Administered Medications   Medication Dose Route Frequency    furosemide (LASIX) 200 mg in 0.9% sodium chloride 100 mL infusion  5 mg/hr IntraVENous CONTINUOUS    DOBUTamine (DOBUTREX) 500 mg/250 mL (2,000 mcg/mL) infusion  2.5 mcg/kg/min IntraVENous CONTINUOUS    sodium chloride (NS) flush 5-40 mL  5-40 mL IntraVENous Q8H    sodium chloride (NS) flush 5-40 mL  5-40 mL IntraVENous PRN    acetaminophen (TYLENOL) tablet 650 mg  650 mg Oral Q6H PRN    Or    acetaminophen (TYLENOL) suppository 650 mg  650 mg Rectal Q6H PRN    polyethylene glycol (MIRALAX) packet 17 g  17 g Oral DAILY PRN    promethazine (PHENERGAN) tablet 12.5 mg  12.5 mg Oral Q6H PRN    Or    ondansetron (ZOFRAN) injection 4 mg  4 mg IntraVENous Q6H PRN    sodium chloride (NS) flush 5-40 mL  5-40 mL IntraVENous Q8H    glucose chewable tablet 16 g  4 Tab Oral PRN    dextrose (D50W) injection syrg 12.5-25 g  25-50 mL IntraVENous PRN    glucagon (GLUCAGEN) injection 1 mg  1 mg IntraMUSCular PRN    insulin glargine (LANTUS) injection 30 Units  30 Units SubCUTAneous DAILY    insulin lispro (HUMALOG) injection 1-10 Units  1-10 Units SubCUTAneous AC&HS    [Held by provider] atorvastatin (LIPITOR) tablet 40 mg  40 mg Oral QHS    oxybutynin (DITROPAN) tablet 5 mg  5 mg Oral TID    budesonide (PULMICORT) 500 mcg/2 ml nebulizer suspension  500 mcg Nebulization BID RT    arformoteroL (BROVANA) neb solution 15 mcg  15 mcg Nebulization BID RT    albuterol-ipratropium (DUO-NEB) 2.5 MG-0.5 MG/3 ML  3 mL Nebulization Q4H PRN    guaiFENesin ER (MUCINEX) tablet 600 mg  600 mg Oral Q12H    thiamine HCL (B-1) tablet 100 mg  100 mg Oral DAILY    melatonin tablet 3 mg  3 mg Oral QHS PRN    zolpidem (AMBIEN) tablet 5 mg  5 mg Oral QHS PRN    [Held by provider] potassium chloride SR (KLOR-CON 10) tablet 40 mEq  40 mEq Oral DAILY    magnesium oxide (MAG-OX) tablet 400 mg  400 mg Oral BID        Objective:   Vitals:  Visit Vitals  /80 (BP 1 Location: Left upper arm, BP Patient Position: At rest)   Pulse 78   Temp 97.5 °F (36.4 °C)   Resp 16   Ht 5' 4\" (1.626 m)   Wt 190 lb 11.2 oz (86.5 kg)   SpO2 95%   BMI 32.73 kg/m²     Temp (24hrs), Av.5 °F (36.4 °C), Min:97.4 °F (36.3 °C), Max:97.6 °F (36.4 °C)    O2 Flow Rate (L/min): 1 l/min O2 Device: Nasal cannula    Last 24hr Input/Output:  No intake or output data in the 24 hours ending 21     PHYSICAL EXAM:  General:    Alert, cooperative, no distress, appears stated age. Head:   Normocephalic, without obvious abnormality, atraumatic. Eyes:   Conjunctivae/corneas clear. PERRLA  Nose:  Nares normal. No drainage or sinus tenderness. Throat:    Lips, mucosa, and tongue normal.  No Thrush  Neck:  Supple, symmetrical,  no adenopathy, thyroid: non tender    no carotid bruit and no JVD. Back:    Symmetric,  No CVA tenderness. Lungs:   Clear to auscultation bilaterally. No Wheezing or Rhonchi. No rales. Chest wall:  No tenderness or deformity.  No Accessory muscle use. Heart:   Regular rate and rhythm,  no murmur, rub or gallop. Abdomen:   Soft, non-tender. Not distended. Bowel sounds normal. No masses  Extremities: Extremities normal, atraumatic, No cyanosis. No edema. No clubbing  Skin:     Texture, turgor normal. No rashes or lesions. Not Jaundiced  Lymph nodes: Cervical, supraclavicular normal.  Psych:  Good insight. Not depressed. Not anxious or agitated. Neurologic: Normal strength, Alert and oriented X 3. Lab Data Reviewed:    Recent Labs     03/11/21  1031 03/11/21  0514 03/10/21  0114   WBC 4.2 5.0 4.6   HGB 11.6 10.8* 10.7*   HCT 37.8 34.5* 33.9*    256 219     Recent Labs     03/11/21  1031 03/11/21  0514 03/10/21  0114 03/09/21  1159   * 138 142 144   K 4.6 4.5 3.3* 3.3*    102 104 105   CO2 21 24 27 30   GLU 82 84 172* 143*   BUN 42* 41* 24* 20   CREA 1.21* 1.29* 0.85 0.91   CA 8.9 9.1 8.4* 9.0   MG  --  2.4 1.6 1.3*   ALB  --  3.0*  --  3.1*   TBILI  --  1.4*  --  1.3*   ALT  --  78  --  39     Lab Results   Component Value Date/Time    Glucose (POC) 115 (H) 03/11/2021 05:48 PM    Glucose (POC) 143 (H) 03/11/2021 04:10 PM    Glucose (POC) 192 (H) 03/11/2021 03:27 PM    Glucose (POC) 66 03/11/2021 03:05 PM    Glucose (POC) 94 03/11/2021 11:59 AM     No results for input(s): PH, PCO2, PO2, HCO3, FIO2 in the last 72 hours. No results for input(s): INR, INREXT in the last 72 hours.   ___________________________________________________  ___________________________________________________    Attending Physician: Pascual Bustamante, MD

## 2021-03-12 NOTE — TELEPHONE ENCOUNTER
Genetic testing ordered. Sample and order shipped FedEx to [facility].  Camryn Light RN

## 2021-03-12 NOTE — PROGRESS NOTES
Bedside shift change report given to Felicity Dominguez RN (oncoming nurse) by Stacey Gaitan RN (offgoing nurse). Report included the following information SBAR, Kardex, Intake/Output, MAR, Recent Results, Med Rec Status and Cardiac Rhythm Sinus Rhythm.

## 2021-03-12 NOTE — PROGRESS NOTES
Bedside and Verbal shift change report given to Montserrat Lim RN (oncoming nurse) by Regine Reynoso RN (offgoing nurse). Report included the following information SBAR, Kardex, OR Summary, Procedure Summary, MAR, Accordion, Recent Results, Med Rec Status and Cardiac Rhythm NSR.

## 2021-03-12 NOTE — PROGRESS NOTES
Hospital Progress Note    NAME:  Samanta Damian   :   1950   MRN:  [id number]     Date/Time:  3/12/2021 8:07 PM    Plan:   1.  Management /AHF  Risk of Deterioration: Low  []           Moderate  [x]           High  []                 Assessment:   Principal Problem:    Acute on chronic systolic congestive heart failure, NYHA class 3 (HCC) (3/11/2021)    Active Problems:    Dilated cardiomyopathy ([address] (2014)      Overview: Nonischemic      Dyslipidemia (2014)      Hypertension (2014)      Obesity (BMI 30.0-34.9) (11/15/2016)      CKD (chronic kidney disease) stage 3, GFR 30-59 ml/min (2019)      Overview:  negative lexiscan       lvh, lae, otherwise normal echo      Uncontrolled type 2 diabetes mellitus with hyperglycemia ([facility]) (2020)      Moderate pulmonary arterial systolic hypertension ([address] (3/11/2021)      Hypokalemia (3/11/2021)      Hypomagnesemia (3/11/2021)         Admitting notes:71 y.o. female who presents with CHF and transferred from [facility] for evaluation by COLTON.      Alexia Aaron is a 73 y.o.   female with PMH of above mentioned problems who presents to ED from her cardiologist office with progressive shortness of breath and cough.  Patient states that she has been having progressive shortness of breath for past couple of weeks and for the past 10 days it has gotten to a point that she cannot walk more than 10 today before catching her breath.  She also endorsed that she is having more cough than usual.  Patient also endorsed PND and orthopnea.  Denies any swelling of her legs.  Patient states that she has been taking her medication as prescribed but she denied using any beta-blocker or Aldactone for heart failure.  Patient is unsure whether she had cardiac angiogram done in the past but she said she been diagnosed with heart failure since .  Patient was a heavy smoker in the past and smoked pack a day for more than 15 years but she has quit smoking 6 years ago. [place] drink alcohol and denies any recent drug use.  Patient denies any fever chills diarrhea belly pain chest pain.        The patient denies any fever, chills, chest pain, cough, congestion, recent illness, palpitations, or dysuria. Subjective:     Feeling better   11 Point Review of Systems:   Negative except no cp,swelling    []            Unable to obtain ROS due to:       []            mental status change []            sedated []            intubated     Social History     Tobacco Use    Smoking status: Former Smoker     Packs/day: 0.25     Years: 20.00     Pack years: 5.00     Quit date: 2014     Years since quittin.2    Smokeless tobacco: Never Used   Substance Use Topics    Alcohol use:  Yes     Alcohol/week: 0.0 standard drinks     Comment: 21-24 Beers per week     Medications reviewed:  Current Facility-Administered Medications   Medication Dose Route Frequency    furosemide (LASIX) 200 mg in 0.9% sodium chloride 100 mL infusion  5 mg/hr IntraVENous CONTINUOUS    DOBUTamine (DOBUTREX) 500 mg/250 mL (2,000 mcg/mL) infusion  2.5 mcg/kg/min IntraVENous CONTINUOUS    sodium chloride (NS) flush 5-40 mL  5-40 mL IntraVENous Q8H    sodium chloride (NS) flush 5-40 mL  5-40 mL IntraVENous PRN    acetaminophen (TYLENOL) tablet 650 mg  650 mg Oral Q6H PRN    Or    acetaminophen (TYLENOL) suppository 650 mg  650 mg Rectal Q6H PRN    polyethylene glycol (MIRALAX) packet 17 g  17 g Oral DAILY PRN    promethazine (PHENERGAN) tablet 12.5 mg  12.5 mg Oral Q6H PRN    Or    ondansetron (ZOFRAN) injection 4 mg  4 mg IntraVENous Q6H PRN    sodium chloride (NS) flush 5-40 mL  5-40 mL IntraVENous Q8H    glucose chewable tablet 16 g  4 Tab Oral PRN    dextrose (D50W) injection syrg 12.5-25 g  25-50 mL IntraVENous PRN    glucagon (GLUCAGEN) injection 1 mg  1 mg IntraMUSCular PRN    insulin glargine (LANTUS) injection 30 Units  30 Units SubCUTAneous DAILY    insulin lispro (HUMALOG) injection 1-10 Units  1-10 Units SubCUTAneous AC&HS    [Held by provider] atorvastatin (LIPITOR) tablet 40 mg  40 mg Oral QHS    oxybutynin (DITROPAN) tablet 5 mg  5 mg Oral TID    budesonide (PULMICORT) 500 mcg/2 ml nebulizer suspension  500 mcg Nebulization BID RT    arformoteroL (BROVANA) neb solution 15 mcg  15 mcg Nebulization BID RT    albuterol-ipratropium (DUO-NEB) 2.5 MG-0.5 MG/3 ML  3 mL Nebulization Q4H PRN    guaiFENesin ER (MUCINEX) tablet 600 mg  600 mg Oral Q12H    thiamine HCL (B-1) tablet 100 mg  100 mg Oral DAILY    melatonin tablet 3 mg  3 mg Oral QHS PRN    zolpidem (AMBIEN) tablet 5 mg  5 mg Oral QHS PRN    [Held by provider] potassium chloride SR (KLOR-CON 10) tablet 40 mEq  40 mEq Oral DAILY    magnesium oxide (MAG-OX) tablet 400 mg  400 mg Oral BID        Objective:   Vitals:  Visit Vitals  /63 (BP 1 Location: Left upper arm, BP Patient Position: At rest)   Pulse 72   Temp 97.4 °F (36.3 °C)   Resp 20   Ht 5' 4\" (1.626 m)   Wt 191 lb 2.2 oz (86.7 kg)   SpO2 93%   BMI 32.81 kg/m²     Temp (24hrs), Av.5 °F (36.4 °C), Min:97.3 °F (36.3 °C), Max:97.6 °F (36.4 °C)    O2 Flow Rate (L/min): 1 l/min O2 Device: Nasal cannula    Last 24hr Input/Output:    Intake/Output Summary (Last 24 hours) at 3/12/2021 0820  Last data filed at 3/12/2021 0816  Gross per 24 hour   Intake 240 ml   Output 700 ml   Net -460 ml        PHYSICAL EXAM:  General:    Alert, cooperative, no distress, appears stated age. Head:   Normocephalic, without obvious abnormality, atraumatic. Eyes:   Conjunctivae/corneas clear. PERRLA  Nose:  Nares normal. No drainage or sinus tenderness. Throat:    Lips, mucosa, and tongue normal.  No Thrush  Neck:  Supple, symmetrical,  no adenopathy, thyroid: non tender    no carotid bruit and no JVD. Back:    Symmetric,  No CVA tenderness. Lungs:   Clear to auscultation bilaterally. No Wheezing or Rhonchi. No rales.   Chest wall:  No tenderness or deformity. No Accessory muscle use. Heart:   Regular rate and rhythm,  no murmur, rub or gallop. Abdomen:   Soft, non-tender. Not distended. Bowel sounds normal. No masses  Extremities: Extremities normal, atraumatic, No cyanosis. No edema. No clubbing  Skin:     Texture, turgor normal. No rashes or lesions. Not Jaundiced  Lymph nodes: Cervical, supraclavicular normal.  Psych:  Good insight. Not depressed. Not anxious or agitated. Neurologic: Normal strength, Alert and oriented X 3. Lab Data Reviewed:    Recent Labs     03/12/21 0415 03/11/21  1031 03/11/21  0514   WBC 5.5 4.2 5.0   HGB 10.4* 11.6 10.8*   HCT 33.2* 37.8 34.5*    251 256     Recent Labs     03/12/21 0415 03/11/21  1031 03/11/21  0514 03/10/21  0114 03/09/21  1159    135* 138 142 144   K 3.9 4.6 4.5 3.3* 3.3*    104 102 104 105   CO2 25 21 24 27 30   * 82 84 172* 143*   BUN 52* 42* 41* 24* 20   CREA 1.23* 1.21* 1.29* 0.85 0.91   CA 8.6 8.9 9.1 8.4* 9.0   MG 2.3  --  2.4 1.6 1.3*   ALB 2.7*  --  3.0*  --  3.1*   TBILI 0.9  --  1.4*  --  1.3*   *  --  78  --  39     Lab Results   Component Value Date/Time    Glucose (POC) 188 (H) 03/11/2021 09:51 PM    Glucose (POC) 115 (H) 03/11/2021 05:48 PM    Glucose (POC) 143 (H) 03/11/2021 04:10 PM    Glucose (POC) 192 (H) 03/11/2021 03:27 PM    Glucose (POC) 66 03/11/2021 03:05 PM     No results for input(s): PH, PCO2, PO2, HCO3, FIO2 in the last 72 hours. No results for input(s): INR, INREXT, INREXT in the last 72 hours.   ___________________________________________________  ___________________________________________________    Attending Physician: Angélica Ceballos MD

## 2021-03-12 NOTE — PROGRESS NOTES
Reason for Admission:   CHF-transfer from [facility] for eval with Advance Heart Failure. RUR Score:    20%             PCP: First and Last name:   Ophelia Gutierrez MD     Name of Practice: [address] [place] [address] and Primary Care   Are you a current patient: Yes/No:  Yes   Approximate date of last visit: 2/17/21   Can you participate in a virtual visit if needed: No    Do you (patient/family) have any concerns for transition/discharge? None identified at this time. Plan for utilizing home health:    TBD-pending recommendations    Current Advanced Directive/Advance Care Plan:  Full Code      Healthcare Decision Maker:     Click here to complete [address] including selection of the Healthcare Decision Maker Relationship (ie \"Primary\")            Primary Decision Maker: Tiara Neda - [phone]    Transition of Care Plan:    CM met with patient to inform of CM role and to assess needs. Patient lives at home with her finance and daughter. Patient reports that she is normally independent and does not use any DME-assessment at [facility] states that patient uses a walker and cane at home. Patient has not had HH or needed rehab. Preferred pharmacy is [facility]. Veronique will provide transport home at discharge. Patient is a [employer] member-CM left voice message with [employer] CM Jewell to confirm and inform of hospital admission [phone]. Patient will need Smaart E discharge. CM to monitor for any transitional care planning needs. Daniel Chambers MS    2:09 CM received phone call from Ling Malin with [employer], patient is no longer a member since May 2020.      Daniel Chambers MS

## 2021-03-12 NOTE — PROGRESS NOTES
[facility] in [place], [place]    Met with patient to provide introductory education to LVAD. Reviewed [facility] Living a More Active Life with the HearMate 3 LVAD educational booklet with patient. Contact Information for LVAD coordinators given to patient. Time given to ask questions. Patient had no further questions at this time. Encouraged her to contact coordinators with any questions and will touch base next week. She verbalized understanding and had no further questions. Hayes Salgado RN.

## 2021-03-12 NOTE — CONSULTS
Dr. Canales                                 [facility].  [phone]            Cardiology Consult/Progress Note      Requesting/referring provider: Narendra Ruiz MD, Dr. Valdez, Dr. Mcadams    Reason for Consult: CHF    Subjective: Feeling similar to yesterday.    Assessment/Plan:  1.  Dilated cardiomyopathy with biventricular dysfunction  2.  At least moderate functional mitral regurgitation  3.  Type 2 diabetes mellitus  4.  Hypertension  5.  Hypercholesterolemia  6.  Status post ICD placement with  7.  Acute on chronic systolic congestive heart failure with shock    Ms. Aaron was seen at the request of Dr. Valdez.  She has reported progressive shortness of breath in conjunction with progressive biventricular dysfunction.  She has longstanding history of congestive heart failure and now seems to be progressing to advanced ages.  She has not had recent evaluation of her coronaries and needs evaluation of her right-sided pressures as well as cardiac output.    She is currently on vasopressors with dobutamine drip.  We will reassess her hemodynamics sometime next week.  In the interim we will have heart failure team manage patient.      Discussed with family and patient that she does not have any coronary artery disease and underlying myocardial function will be determinant of her prognosis.  Cardiology will follow the patient on as-needed basis and will see her back probably midweek next week for repeat cardiac catheterization  [x]    High complexity decision making was performed  [x]    Patient is at high-risk of decompensation with multiple organ involvement    Investigations personally reviewed and interpreted    Investigations reviewed    HPI: Alexia Aaron, a 71 y.o. year-old who presents for evaluation of dyspnea.  Over the past 2 months she has reported increasing shortness of breath.  She has prior known history of cardiomyopathy with prior ICD placement.  Ejection fraction about 2  years ago was low normal.    Recently noted increasing shortness of breath with systemic congestion. No chest pain was reported. She did not have any prior history of microinfarction. ICD in place. Risk factors for atherotic vascular disease such as hypertension diabetes and hyperlipidemia are present but are controlled  She  has a past medical history of Arthritis, Diabetes ([address] Hypercholesterolemia, and Hypertension. Review of system:Patient reports no  CP. She reports no cough/fever/focal neurological deficits/abdominal pain. All other systems negative except as above. Family History   Problem Relation Age of Onset    Diabetes Mother       Social History     Socioeconomic History    Marital status:      Spouse name: Not on file    Number of children: 1    Years of education: 15    Highest education level: Not on file   Occupational History    Occupation: retired   Tobacco Use    Smoking status: Former Smoker     Packs/day: 0.25     Years: 20.00     Pack years: 5.00     Quit date: 2014     Years since quittin.2    Smokeless tobacco: Never Used   Substance and Sexual Activity    Alcohol use: Yes     Alcohol/week: 0.0 standard drinks     Comment: 21-24 Beers per week    Drug use: No    Sexual activity: Yes     Partners: Male      PE  Vitals:    21 0437 21 0828 21 1112 21 1534   BP:  115/65 (!) 109/51 (!) 110/54   Pulse:  75 79 79   Resp:  18 18 18   Temp:  98.5 °F (36.9 °C) 98.1 °F (36.7 °C) 97.5 °F (36.4 °C)   SpO2:  93% 94% 93%   Weight: 191 lb 2.2 oz (86.7 kg)      Height:        Body mass index is 32.81 kg/m². General:    Alert, cooperative, no distress. JVD is elevated. Psychiatric:    Normal Mood and affect    Eye/ENT:      Pupils equal, No asymmetry, Conjunctival pink. Able to hear voice at normal amplitude   Lungs:      Visibly symmetric chest expansion, few crackles bilaterally. Néstor Ra     Heart[de-identified]    Regular rate and rhythm, S1, S2 normal, LV S3 is heard.  No murmur, click, rub or gallop. Normal palpable peripheral pulses. No cyanosis   Abdomen:     Soft, non-tender. Bowel sounds normal. No masses,  No      organomegaly. Extremities:   Extremities normal, atraumatic, mild bilateral edema. Neurologic:   CN II-XII grossly intact.  No gross focal deficits           Recent Labs:  Lab Results   Component Value Date/Time    Cholesterol, total 143 10/08/2020 12:01 PM    HDL Cholesterol 46 10/08/2020 12:01 PM    LDL, calculated 73 10/08/2020 12:01 PM    LDL, calculated 105 (H) 2019 02:31 PM    Triglyceride 137 10/08/2020 12:01 PM     Lab Results   Component Value Date/Time    Creatinine 1.23 (H) 2021 04:15 AM     Lab Results   Component Value Date/Time    BUN 52 (H) 2021 04:15 AM     Lab Results   Component Value Date/Time    Potassium 3.9 2021 04:15 AM     Lab Results   Component Value Date/Time    Hemoglobin A1c 6.8 (H) 2021 12:00 AM     Lab Results   Component Value Date/Time    HGB 10.4 (L) 2021 04:15 AM     Lab Results   Component Value Date/Time    PLATELET 701  04:15 AM       Reviewed:  Past Medical History:   Diagnosis Date    Arthritis     [address] [address]     Hypercholesterolemia     Hypertension      Social History     Tobacco Use   Smoking Status Former Smoker    Packs/day: 0.25    Years: 20.00    Pack years: 5.00    Quit date: 2014    Years since quittin.2   Smokeless Tobacco Never Used     Social History     Substance and Sexual Activity   Alcohol Use Yes    Alcohol/week: 0.0 standard drinks    Comment: 21-24 Beers per week     No Known Allergies  Family History   Problem Relation Age of Onset    Diabetes Mother         Current Facility-Administered Medications   Medication Dose Route Frequency    bumetanide (BUMEX) 0.25 mg/mL infusion  1 mg/hr IntraVENous CONTINUOUS    DOBUTamine (DOBUTREX) 500 mg/250 mL (2,000 mcg/mL) infusion  4 mcg/kg/min IntraVENous CONTINUOUS    sodium chloride (NS) flush 5-40 mL  5-40 mL IntraVENous Q8H    sodium chloride (NS) flush 5-40 mL  5-40 mL IntraVENous PRN    acetaminophen (TYLENOL) tablet 650 mg  650 mg Oral Q6H PRN    Or    acetaminophen (TYLENOL) suppository 650 mg  650 mg Rectal Q6H PRN    polyethylene glycol (MIRALAX) packet 17 g  17 g Oral DAILY PRN    promethazine (PHENERGAN) tablet 12.5 mg  12.5 mg Oral Q6H PRN    Or    ondansetron (ZOFRAN) injection 4 mg  4 mg IntraVENous Q6H PRN    sodium chloride (NS) flush 5-40 mL  5-40 mL IntraVENous Q8H    glucose chewable tablet 16 g  4 Tab Oral PRN    dextrose (D50W) injection syrg 12.5-25 g  25-50 mL IntraVENous PRN    glucagon (GLUCAGEN) injection 1 mg  1 mg IntraMUSCular PRN    insulin glargine (LANTUS) injection 30 Units  30 Units SubCUTAneous DAILY    insulin lispro (HUMALOG) injection 1-10 Units  1-10 Units SubCUTAneous AC&HS    oxybutynin (DITROPAN) tablet 5 mg  5 mg Oral TID    budesonide (PULMICORT) 500 mcg/2 ml nebulizer suspension  500 mcg Nebulization BID RT    arformoteroL (BROVANA) neb solution 15 mcg  15 mcg Nebulization BID RT    albuterol-ipratropium (DUO-NEB) 2.5 MG-0.5 MG/3 ML  3 mL Nebulization Q4H PRN    guaiFENesin ER (MUCINEX) tablet 600 mg  600 mg Oral Q12H    thiamine HCL (B-1) tablet 100 mg  100 mg Oral DAILY    melatonin tablet 3 mg  3 mg Oral QHS PRN    zolpidem (AMBIEN) tablet 5 mg  5 mg Oral QHS PRN    magnesium oxide (MAG-OX) tablet 400 mg  400 mg Oral BID       Adal Ramirez MD03/12/21     ATTENTION:   This medical record was transcribed using an electronic medical records/speech recognition system. Although proofread, it may and can contain electronic, spelling and other errors. Corrections may be executed at a later time. Please feel free to contact us for any clarifications as needed.     [facility] heart and [facility] [place]  [facility],  [facility] [address] [place], [place]. [phone]

## 2021-03-13 LAB
ALBUMIN SERPL-MCNC: 3 G/DL (ref 3.5–5)
ALBUMIN/GLOB SERPL: 0.7 {RATIO} (ref 1.1–2.2)
ALP SERPL-CCNC: 93 U/L (ref 45–117)
ALT SERPL-CCNC: 186 U/L (ref 12–78)
ANION GAP SERPL CALC-SCNC: 10 MMOL/L (ref 5–15)
AST SERPL-CCNC: 152 U/L (ref 15–37)
BACTERIA SPEC CULT: ABNORMAL
BACTERIA SPEC CULT: ABNORMAL
BASOPHILS # BLD: 0 K/UL (ref 0–0.1)
BASOPHILS NFR BLD: 0 % (ref 0–1)
BILIRUB SERPL-MCNC: 1.1 MG/DL (ref 0.2–1)
BNP SERPL-MCNC: 596 PG/ML
BUN SERPL-MCNC: 41 MG/DL (ref 6–20)
BUN/CREAT SERPL: 45 (ref 12–20)
CALCIUM SERPL-MCNC: 8.4 MG/DL (ref 8.5–10.1)
CHLORIDE SERPL-SCNC: 101 MMOL/L (ref 97–108)
CO2 SERPL-SCNC: 27 MMOL/L (ref 21–32)
CREAT SERPL-MCNC: 0.92 MG/DL (ref 0.55–1.02)
DIFFERENTIAL METHOD BLD: ABNORMAL
EOSINOPHIL # BLD: 0.2 K/UL (ref 0–0.4)
EOSINOPHIL NFR BLD: 4 % (ref 0–7)
ERYTHROCYTE [DISTWIDTH] IN BLOOD BY AUTOMATED COUNT: 18.1 % (ref 11.5–14.5)
EST. AVERAGE GLUCOSE BLD GHB EST-MCNC: 171 MG/DL
FERRITIN SERPL-MCNC: 347 NG/ML (ref 26–388)
GLOBULIN SER CALC-MCNC: 4.2 G/DL (ref 2–4)
GLUCOSE BLD STRIP.AUTO-MCNC: 124 MG/DL (ref 65–100)
GLUCOSE BLD STRIP.AUTO-MCNC: 126 MG/DL (ref 65–100)
GLUCOSE BLD STRIP.AUTO-MCNC: 259 MG/DL (ref 65–100)
GLUCOSE BLD STRIP.AUTO-MCNC: 260 MG/DL (ref 65–100)
GLUCOSE SERPL-MCNC: 155 MG/DL (ref 65–100)
GRAM STN SPEC: ABNORMAL
HBA1C MFR BLD: 7.6 % (ref 4–5.6)
HCT VFR BLD AUTO: 34.3 % (ref 35–47)
HGB BLD-MCNC: 10.9 G/DL (ref 11.5–16)
IMM GRANULOCYTES # BLD AUTO: 0 K/UL (ref 0–0.04)
IMM GRANULOCYTES NFR BLD AUTO: 0 % (ref 0–0.5)
IRON SATN MFR SERPL: 16 % (ref 20–50)
IRON SERPL-MCNC: 48 UG/DL (ref 35–150)
LACTATE SERPL-SCNC: 1.4 MMOL/L (ref 0.4–2)
LYMPHOCYTES # BLD: 0.6 K/UL (ref 0.8–3.5)
LYMPHOCYTES NFR BLD: 10 % (ref 12–49)
MAGNESIUM SERPL-MCNC: 1.6 MG/DL (ref 1.6–2.4)
MCH RBC QN AUTO: 27.2 PG (ref 26–34)
MCHC RBC AUTO-ENTMCNC: 31.8 G/DL (ref 30–36.5)
MCV RBC AUTO: 85.5 FL (ref 80–99)
MONOCYTES # BLD: 0.4 K/UL (ref 0–1)
MONOCYTES NFR BLD: 7 % (ref 5–13)
NEUTS SEG # BLD: 4.9 K/UL (ref 1.8–8)
NEUTS SEG NFR BLD: 79 % (ref 32–75)
NRBC # BLD: 0 K/UL (ref 0–0.01)
NRBC BLD-RTO: 0 PER 100 WBC
PLATELET # BLD AUTO: 231 K/UL (ref 150–400)
PMV BLD AUTO: 9.9 FL (ref 8.9–12.9)
POTASSIUM SERPL-SCNC: 3.3 MMOL/L (ref 3.5–5.1)
PROT SERPL-MCNC: 7.2 G/DL (ref 6.4–8.2)
RBC # BLD AUTO: 4.01 M/UL (ref 3.8–5.2)
RBC MORPH BLD: ABNORMAL
RBC MORPH BLD: ABNORMAL
SERVICE CMNT-IMP: ABNORMAL
SODIUM SERPL-SCNC: 138 MMOL/L (ref 136–145)
TIBC SERPL-MCNC: 293 UG/DL (ref 250–450)
WBC # BLD AUTO: 6.1 K/UL (ref 3.6–11)

## 2021-03-13 PROCEDURE — 74011636637 HC RX REV CODE- 636/637: Performed by: HOSPITALIST

## 2021-03-13 PROCEDURE — 83540 ASSAY OF IRON: CPT

## 2021-03-13 PROCEDURE — 80053 COMPREHEN METABOLIC PANEL: CPT

## 2021-03-13 PROCEDURE — 74011000250 HC RX REV CODE- 250: Performed by: NURSE PRACTITIONER

## 2021-03-13 PROCEDURE — 74011250637 HC RX REV CODE- 250/637: Performed by: INTERNAL MEDICINE

## 2021-03-13 PROCEDURE — 85025 COMPLETE CBC W/AUTO DIFF WBC: CPT

## 2021-03-13 PROCEDURE — 74011250636 HC RX REV CODE- 250/636: Performed by: NURSE PRACTITIONER

## 2021-03-13 PROCEDURE — 99233 SBSQ HOSP IP/OBS HIGH 50: CPT | Performed by: INTERNAL MEDICINE

## 2021-03-13 PROCEDURE — 36415 COLL VENOUS BLD VENIPUNCTURE: CPT

## 2021-03-13 PROCEDURE — 83605 ASSAY OF LACTIC ACID: CPT

## 2021-03-13 PROCEDURE — 74011250637 HC RX REV CODE- 250/637: Performed by: HOSPITALIST

## 2021-03-13 PROCEDURE — 83735 ASSAY OF MAGNESIUM: CPT

## 2021-03-13 PROCEDURE — 83036 HEMOGLOBIN GLYCOSYLATED A1C: CPT

## 2021-03-13 PROCEDURE — 82728 ASSAY OF FERRITIN: CPT

## 2021-03-13 PROCEDURE — 74011000250 HC RX REV CODE- 250: Performed by: HOSPITALIST

## 2021-03-13 PROCEDURE — 83880 ASSAY OF NATRIURETIC PEPTIDE: CPT

## 2021-03-13 PROCEDURE — 94640 AIRWAY INHALATION TREATMENT: CPT

## 2021-03-13 PROCEDURE — 74011250636 HC RX REV CODE- 250/636: Performed by: INTERNAL MEDICINE

## 2021-03-13 PROCEDURE — 82962 GLUCOSE BLOOD TEST: CPT

## 2021-03-13 PROCEDURE — 65660000000 HC RM CCU STEPDOWN

## 2021-03-13 PROCEDURE — 74011000258 HC RX REV CODE- 258: Performed by: INTERNAL MEDICINE

## 2021-03-13 PROCEDURE — 74011000250 HC RX REV CODE- 250: Performed by: INTERNAL MEDICINE

## 2021-03-13 RX ORDER — HYDRALAZINE HYDROCHLORIDE 10 MG/1
10 TABLET, FILM COATED ORAL 3 TIMES DAILY
Status: DISCONTINUED | OUTPATIENT
Start: 2021-03-13 | End: 2021-03-15

## 2021-03-13 RX ORDER — BUMETANIDE 0.25 MG/ML
2 INJECTION INTRAMUSCULAR; INTRAVENOUS 2 TIMES DAILY
Status: DISCONTINUED | OUTPATIENT
Start: 2021-03-13 | End: 2021-03-14 | Stop reason: SDUPTHER

## 2021-03-13 RX ORDER — MAGNESIUM SULFATE HEPTAHYDRATE 40 MG/ML
2 INJECTION, SOLUTION INTRAVENOUS ONCE
Status: COMPLETED | OUTPATIENT
Start: 2021-03-13 | End: 2021-03-13

## 2021-03-13 RX ORDER — ERGOCALCIFEROL 1.25 MG/1
50000 CAPSULE ORAL
Status: DISCONTINUED | OUTPATIENT
Start: 2021-03-13 | End: 2021-03-20 | Stop reason: HOSPADM

## 2021-03-13 RX ORDER — ISOSORBIDE DINITRATE 10 MG/1
10 TABLET ORAL 3 TIMES DAILY
Status: DISCONTINUED | OUTPATIENT
Start: 2021-03-13 | End: 2021-03-15

## 2021-03-13 RX ORDER — POTASSIUM CHLORIDE 750 MG/1
40 TABLET, FILM COATED, EXTENDED RELEASE ORAL EVERY 4 HOURS
Status: COMPLETED | OUTPATIENT
Start: 2021-03-13 | End: 2021-03-13

## 2021-03-13 RX ORDER — DOBUTAMINE HYDROCHLORIDE 200 MG/100ML
5 INJECTION INTRAVENOUS CONTINUOUS
Status: DISCONTINUED | OUTPATIENT
Start: 2021-03-13 | End: 2021-03-14

## 2021-03-13 RX ORDER — POTASSIUM CHLORIDE 750 MG/1
40 TABLET, FILM COATED, EXTENDED RELEASE ORAL DAILY
Status: DISCONTINUED | OUTPATIENT
Start: 2021-03-14 | End: 2021-03-14

## 2021-03-13 RX ORDER — SPIRONOLACTONE 25 MG/1
12.5 TABLET ORAL DAILY
Status: DISCONTINUED | OUTPATIENT
Start: 2021-03-14 | End: 2021-03-19

## 2021-03-13 RX ORDER — LANOLIN ALCOHOL/MO/W.PET/CERES
800 CREAM (GRAM) TOPICAL 2 TIMES DAILY
Status: DISCONTINUED | OUTPATIENT
Start: 2021-03-13 | End: 2021-03-20 | Stop reason: HOSPADM

## 2021-03-13 RX ADMIN — POTASSIUM CHLORIDE 40 MEQ: 750 TABLET, EXTENDED RELEASE ORAL at 11:57

## 2021-03-13 RX ADMIN — Medication 10 ML: at 21:41

## 2021-03-13 RX ADMIN — IRON SUCROSE 200 MG: 20 INJECTION, SOLUTION INTRAVENOUS at 12:59

## 2021-03-13 RX ADMIN — POTASSIUM CHLORIDE 40 MEQ: 750 TABLET, EXTENDED RELEASE ORAL at 09:31

## 2021-03-13 RX ADMIN — Medication 400 MG: at 09:31

## 2021-03-13 RX ADMIN — ERGOCALCIFEROL 50000 UNITS: 1.25 CAPSULE ORAL at 12:58

## 2021-03-13 RX ADMIN — OXYBUTYNIN CHLORIDE 5 MG: 5 TABLET ORAL at 09:31

## 2021-03-13 RX ADMIN — ISOSORBIDE DINITRATE 10 MG: 10 TABLET ORAL at 17:48

## 2021-03-13 RX ADMIN — Medication 10 ML: at 05:46

## 2021-03-13 RX ADMIN — ZOLPIDEM TARTRATE 5 MG: 5 TABLET ORAL at 23:02

## 2021-03-13 RX ADMIN — INSULIN LISPRO 5 UNITS: 100 INJECTION, SOLUTION INTRAVENOUS; SUBCUTANEOUS at 17:47

## 2021-03-13 RX ADMIN — IPRATROPIUM BROMIDE AND ALBUTEROL SULFATE 3 ML: .5; 3 SOLUTION RESPIRATORY (INHALATION) at 20:39

## 2021-03-13 RX ADMIN — MAGNESIUM SULFATE HEPTAHYDRATE 2 G: 40 INJECTION, SOLUTION INTRAVENOUS at 09:31

## 2021-03-13 RX ADMIN — HYDRALAZINE HYDROCHLORIDE 10 MG: 10 TABLET, FILM COATED ORAL at 21:40

## 2021-03-13 RX ADMIN — GUAIFENESIN 600 MG: 600 TABLET, EXTENDED RELEASE ORAL at 21:40

## 2021-03-13 RX ADMIN — BUDESONIDE 500 MCG: 0.5 INHALANT RESPIRATORY (INHALATION) at 20:39

## 2021-03-13 RX ADMIN — DOBUTAMINE HYDROCHLORIDE 4 MCG/KG/MIN: 200 INJECTION INTRAVENOUS at 02:49

## 2021-03-13 RX ADMIN — Medication 800 MG: at 17:48

## 2021-03-13 RX ADMIN — GUAIFENESIN 600 MG: 600 TABLET, EXTENDED RELEASE ORAL at 09:31

## 2021-03-13 RX ADMIN — OXYBUTYNIN CHLORIDE 5 MG: 5 TABLET ORAL at 21:40

## 2021-03-13 RX ADMIN — ARFORMOTEROL TARTRATE 15 MCG: 15 SOLUTION RESPIRATORY (INHALATION) at 20:39

## 2021-03-13 RX ADMIN — OXYBUTYNIN CHLORIDE 5 MG: 5 TABLET ORAL at 17:47

## 2021-03-13 RX ADMIN — INSULIN GLARGINE 30 UNITS: 100 INJECTION, SOLUTION SUBCUTANEOUS at 09:30

## 2021-03-13 RX ADMIN — ACETAMINOPHEN 650 MG: 325 TABLET ORAL at 23:02

## 2021-03-13 RX ADMIN — Medication 100 MG: at 09:31

## 2021-03-13 RX ADMIN — Medication 10 ML: at 13:04

## 2021-03-13 RX ADMIN — BUMETANIDE 1 MG/HR: 0.25 INJECTION INTRAMUSCULAR; INTRAVENOUS at 05:45

## 2021-03-13 RX ADMIN — ACETAMINOPHEN 650 MG: 325 TABLET ORAL at 01:48

## 2021-03-13 RX ADMIN — ISOSORBIDE DINITRATE 10 MG: 10 TABLET ORAL at 21:40

## 2021-03-13 RX ADMIN — HYDRALAZINE HYDROCHLORIDE 10 MG: 10 TABLET, FILM COATED ORAL at 17:48

## 2021-03-13 RX ADMIN — BUMETANIDE 2 MG: 0.25 INJECTION, SOLUTION INTRAMUSCULAR; INTRAVENOUS at 13:04

## 2021-03-13 RX ADMIN — INSULIN LISPRO 5 UNITS: 100 INJECTION, SOLUTION INTRAVENOUS; SUBCUTANEOUS at 11:53

## 2021-03-13 NOTE — PROGRESS NOTES
Bedside and Verbal shift change report given to BRENAD Jenkins (oncoming nurse) by Sajan Franklin (offgoing nurse). Report included the following information SBAR, Kardex, ED Summary, Procedure Summary, Intake/Output, MAR, Recent Results and Cardiac Rhythm NSR.

## 2021-03-13 NOTE — PROGRESS NOTES
[facility] in [place], [place]  Inpatient Consult Progress Note      Patient name: Donald Ackerman  Patient : 1950  Patient MRN: [id number]  Attending/Consulting MD: Valentin Vazquez MD  Primary general cardiologist:      Date of service: 21    REASON FOR CONSULT:  Acute on chronic systolic heart failure     PLAN:  · Severe decompensated dilated cardiomyopathy, LVEF < 15%, NYHA Class IV  · Increase dobutamine infusion over the weekend to the maximum dose 7.5 mcg/kg/min for initial CI 1.35, if no improvement on repeat RHC early next week after diuresis, then consider LVAD as DT/BTT prior to hospital discharge  · [address] planned for Monday/Tuesday and palliative care +/- CTS consult     Increase dobutamine to 5mcg/kg/min; watch for ectopy  No beta-blockers while on dobutamine gtt  Hold ACEi/ARB/ARNi/MRA in anticipation of surgery  Change bumex to 2mg twice daily; check orthostatics today  Start spironolactone 12.5mg daily  Start potassium 40meq daily, first dose tomorrow  Increase magnesium oxide to 800mg twice daily; keep K>4 and Mg>2  Consider baby ASA; will defer to primary cardiology team   Hold statin due to elevated tbili and transaminitis  Give venofer 200mg x 2 doses, check hemoccult  Start high dose vitamin D weekly, first dose today  Check genetic testing for cardiomyopathy- pending  PYP done; formal read pending and gammopathy pending   Nutritionist consult for heart failure diet and weight loss  Provide heart failure education, start basic LVAD education to help with decisions  Review advanced care plan with SW  Ambulate daily  PT to evaluate and 6MW  Recommend at discharge:   · Lifevest if patient full code  · Vaccinations for flu, pneumonia and covid  · Referral to sleep medicine for sleep study     IMPRESSION:  Fatigue  Shortness of breath at rest  Volume overload  Acute on chronic systolic heart failure  · Stage D, NYHA class IV symptoms, INTERMACS 2  · Non-ischemic cardiomyopathy, recent deterioration to severe LV dysfunction  · Normal cors (by [facility] 3/11/21)  · LVEF 15-20% (by echo 3/9/21) from 20-25% (by echo 1/2021) from 51-55% (by echo 11/2019)  · Severe pulmonary artery hypertension, PVR 4  · RV dysfunction, moderate-severe  · Low cardiac index at rest 1.3  · Inotropic-dependence  Anemia  Hypokalemia/hypomagensemia  Cardiac risk factors  · Morbid obesity (BMI 32)  · High risk for SHARLENE  · DM2 (hgba1c 6.8)  · HL  · HTN  · Former tobacco (stopped 2014 after > 40 years)  · H/o alcohol abuse  OA, right knee  Carpal tunnel syndrome  Gout  Iron deficiency  Vitamin D deficincy     Interval Events  Feels much better  SBP 130s/70s, HR 80-90s  I/O net negative 3.6 liters         CARDIAC IMAGING:  RHC (3/11/21) 25, PA 49/27/37, W 34, Allen CI 1.34  Echo (3/9/21) LVEF 15-20%, mild-moderate MR, severely elevated CVP, IVC > 21mm  Echo (1/29/21) LVEF 20-25%, moderately to severely reduced RV function  Echo (11/20/19) LVEF 51-55%  Echo (12/18/18) LVEF 50-55%  Echo (11/23/16) LVEF 45-55%  EKG (3/9/21) ST 103bpm, QRS 84ms  NST (5/2018) no scar or ischemia, LVEF 48%  Normal cors (by [facility] 3/11/21)    PHYSICAL EXAM:  Visit Vitals  /63 (BP 1 Location: Left upper arm, BP Patient Position: At rest)   Pulse 88   Temp 98.2 °F (36.8 °C)   Resp 16   Ht 5' 4\" (1.626 m)   Wt 195 lb (88.5 kg)   SpO2 95%   BMI 33.47 kg/m²     General: Patient is well developed, well-nourished in no acute distress  HEENT: Normocephalic and atraumatic. No scleral icterus. Pupils are equal, round and reactive to light and accomodation. No conjunctival injection. Oropharynx is clear. Neck: Supple. No evidence of thyroid enlargements or lymphadenopathy. JVD: Cannot be appreciated   Lungs: Breath sounds are equal and clear bilaterally. No wheezes, rhonchi, or rales. Heart: Regular rate and rhythm with normal S1 and S2. No murmurs, gallops or rubs. Abdomen: Soft, no mass or tenderness.  No organomegaly or hernia. Bowel sounds present. Genitourinary and rectal: deferred  Extremities: No cyanosis, clubbing, or edema. Neurologic: No focal sensory or motor deficits are noted. Grossly intact. Psychiatric: Awake, alert an doriented x 3. Appropriate mood and affect. Skin: Warm, dry and well perfused. No lesions, nodules or rashes are noted. REVIEW OF SYSTEMS:  General: Denies fever, night sweats. Ear, nose and throat: Denies difficulty hearing, sinus problems, runny nose, post-nasal drip, ringing in ears, mouth sores, loose teeth, ear pain, nosebleeds, sore throate, facial pain or numbess  Cardiovascular: see above in the interval history  Respiratory: Denies cough, wheezing, sputum production, hemoptysis.   Gastrointestinal: Denies heartburn, constipation, intolerance to certain foods, diarrhea, abdominal pain, nausea, vomiting, difficulty swallowing, blood in stool  Kidney and bladder: Denies painful urination, frequent urination, urgency, prostate problems and impotence  Musculoskeletal: Denies joint pain, muscle weakness  Skin and hair: Denies change in existing skin lesions, hair loss or increase, breast changes    PAST MEDICAL HISTORY:  Past Medical History:   Diagnosis Date    Arthritis     Diabetes ([address]     Hypercholesterolemia     Hypertension        PAST SURGICAL HISTORY:  Past Surgical History:   Procedure Laterality Date    HX ORTHOPAEDIC      Arthroscopy right knee       FAMILY HISTORY:  Family History   Problem Relation Age of Onset    Diabetes Mother        SOCIAL HISTORY:  Social History     Socioeconomic History    Marital status:      Spouse name: Not on file    Number of children: 3    Years of education: 15    Highest education level: Not on file   Occupational History    Occupation: retired   Tobacco Use    Smoking status: Former Smoker     Packs/day: 0.25     Years: 20.00     Pack years: 5.00     Quit date: 2014     Years since quittin.2    Smokeless tobacco: Never Used   Substance and Sexual Activity    Alcohol use: Yes     Alcohol/week: 0.0 standard drinks     Comment: 21-24 Beers per week    Drug use: No    Sexual activity: Yes     Partners: Male       LABORATORY RESULTS:     Labs Latest Ref Rng & Units 3/13/2021 3/12/2021 3/11/2021 3/11/2021 3/10/2021 3/9/2021 2/17/2021   WBC 3.6 - 11.0 K/uL 6.1 5.5 4.2 5.0 4.6 5.6 5.3   RBC 3.80 - 5.20 M/uL 4.01 3.81 4.21 3.93 3.82 4.25 3.84   Hemoglobin 11.5 - 16.0 g/dL 10. 9(L) 10. 4(L) 11.6 10. 8(L) 10. 7(L) 11.7 10. 4(L)   Hematocrit 35.0 - 47.0 % 34. 3(L) 33. 2(L) 37.8 34. 5(L) 33. 9(L) 37.6 34. 1(L)   MCV 80.0 - 99.0 FL 85.5 87.1 89.8 87.8 88.7 88.5 88.8   Platelets 887 - 062 K/uL 231 259 251 256 219 292 196   Lymphocytes 12 - 49 % 10(L) 15 18 20 17 13 14   Monocytes 5 - 13 % 7 6 6 5 4(L) 3(L) 5   Eosinophils 0 - 7 % 4 3 2 1 5 4 2   Basophils 0 - 1 % 0 1 1 1 1 1 1   Albumin 3.5 - 5.0 g/dL 3. 0(L) 2. 7(L) - 3. 0(L) - 3. 1(L) 3. 3(L)   Calcium 8.5 - 10.1 MG/DL 8.4(L) 8.6 8.9 9.1 8.4(L) 9.0 8.3(L)   Glucose 65 - 100 mg/dL 155(H) 160(H) 82 84 172(H) 143(H) 185(H)   BUN 6 - 20 MG/DL 41(H) 52(H) 42(H) 41(H) 24(H) 20 26(H)   Creatinine 0.55 - 1.02 MG/DL 0.92 1.23(H) 1.21(H) 1.29(H) 0.85 0.91 1.05(H)   Sodium 136 - 145 mmol/L 138 138 135(L) 138 142 144 139   Potassium 3.5 - 5.1 mmol/L 3. 3(L) 3.9 4.6 4.5 3. 3(L) 3. 3(L) 3. 1(L)   TSH 0.36 - 3.74 uIU/mL - - 2.75 - - - -   Some recent data might be hidden     Lab Results   Component Value Date/Time    TSH 2.75 03/11/2021 10:31 AM    TSH 1.050 10/08/2020 12:01 PM    TSH 1.200 08/08/2019 02:31 PM    TSH 1.280 08/26/2016 11:48 AM       ALLERGY:  No Known Allergies     CURRENT MEDICATIONS:    Current Facility-Administered Medications:     bumetanide (BUMEX) 0.25 mg/mL infusion, 0.5 mg/hr, IntraVENous, CONTINUOUS, Dennis Castrejon MD, Last Rate: 2 mL/hr at 03/13/21 0928, 0.5 mg/hr at 03/13/21 0928    DOBUTamine (DOBUTREX) 500 mg/250 mL (2,000 mcg/mL) infusion, 4 mcg/kg/min, IntraVENous, CONTINUOUS, Danita Mercedes, NP, Last Rate: 10.4 mL/hr at 03/13/21 0249, 4 mcg/kg/min at 03/13/21 0249    sodium chloride (NS) flush 5-40 mL, 5-40 mL, IntraVENous, Q8H, Anshul Baca MD, 10 mL at 03/13/21 0546    sodium chloride (NS) flush 5-40 mL, 5-40 mL, IntraVENous, PRN, Vita Pugh MD    acetaminophen (TYLENOL) tablet 650 mg, 650 mg, Oral, Q6H PRN, 650 mg at 03/13/21 0148 **OR** acetaminophen (TYLENOL) suppository 650 mg, 650 mg, Rectal, Q6H PRN, Vita Pugh MD    polyethylene glycol (MIRALAX) packet 17 g, 17 g, Oral, DAILY PRN, Vita Pugh MD    promethazine (PHENERGAN) tablet 12.5 mg, 12.5 mg, Oral, Q6H PRN **OR** ondansetron (ZOFRAN) injection 4 mg, 4 mg, IntraVENous, Q6H PRN, Vita Pugh MD    sodium chloride (NS) flush 5-40 mL, 5-40 mL, IntraVENous, Q8H, Anshul Baca MD, 10 mL at 03/13/21 0546    glucose chewable tablet 16 g, 4 Tab, Oral, PRN, Vita Pugh MD    dextrose (D50W) injection syrg 12.5-25 g, 25-50 mL, IntraVENous, PRN, Vita Pugh MD, 25 g at 03/11/21 1509    glucagon (GLUCAGEN) injection 1 mg, 1 mg, IntraMUSCular, PRN, Vita Pugh MD    insulin glargine (LANTUS) injection 30 Units, 30 Units, SubCUTAneous, DAILY, Vita Pugh MD, 30 Units at 03/13/21 0930    insulin lispro (HUMALOG) injection 1-10 Units, 1-10 Units, SubCUTAneous, AC&HS, Vita Pugh MD, 5 Units at 03/13/21 1153    oxybutynin (DITROPAN) tablet 5 mg, 5 mg, Oral, TID, Vita Pugh MD, 5 mg at 03/13/21 0931    budesonide (PULMICORT) 500 mcg/2 ml nebulizer suspension, 500 mcg, Nebulization, BID RT, Vita Pugh MD, 500 mcg at 03/12/21 2210    arformoteroL (BROVANA) neb solution 15 mcg, 15 mcg, Nebulization, BID RT, Vita Pugh MD, 15 mcg at 03/12/21 2210    albuterol-ipratropium (DUO-NEB) 2.5 MG-0.5 MG/3 ML, 3 mL, Nebulization, Q4H PRN, Anshul Barros MD, 3 mL at 03/11/21 1326    guaiFENesin ER (MUCINEX) tablet 600 mg, 600 mg, Oral, Q12H, Navid Jefferson MD, 600 mg at 03/13/21 9685    thiamine HCL (B-1) tablet 100 mg, 100 mg, Oral, DAILY, Navid Jefferson MD, 100 mg at 03/13/21 0931    melatonin tablet 3 mg, 3 mg, Oral, QHS PRN, Navid Jefferson MD, 3 mg at 03/11/21 0233    zolpidem (AMBIEN) tablet 5 mg, 5 mg, Oral, QHS PRN, Navid Jefferson MD    magnesium oxide (MAG-OX) tablet 400 mg, 400 mg, Oral, BID, Mayelin Stone MD, 400 mg at 03/13/21 1274    PATIENT CARE TEAM:  Patient Care Team:  Ophelia Gutierrez MD as PCP - General (Internal Medicine)  Ophelia Gutierrez MD as PCP - [facility]  Chapito Viveros MD as Physician (Cardiology)  Kaitlin Howell MD as Surgeon (Orthopedic Surgery)     Thank you for allowing me to participate in this patient's care.     Kayla Frias MD PhD  [address] [place] [address], [address]  Phone: [phone]  Fax: [phone]

## 2021-03-13 NOTE — PROGRESS NOTES
Bedside and Verbal shift change report given to Rubina Nobles (oncoming nurse) by Emili Bartholomew RN(offgoing nurse).  Report included the following information SBAR, Kardex, Intake/Output, Recent Results and Cardiac Rhythm NSR Inverted T.

## 2021-03-13 NOTE — PROGRESS NOTES
Hospital Progress Note    NAME:  Samanta Damian   :   1950   MRN:  [id number]     Date/Time:  3/13/2021 8:07 PM    Plan:   1. Management /AHF  Risk of Deterioration: Low  []           Moderate  [x]           High  []                 Assessment:   # Acute on chronic severe systolic congestive heart failure, NYHA class 3 ([facility] (3/11/2021). EF <15%, NYHA IV with cardiogenic shock, on lasix and dobutamin gtts  # Dilated cardiomyopathy ([facility] (2014), Nonischemic. S/p BiV AICD  -Cardiology and AHF teams on board  -Continue Lasix gtt  -Continue Dobutamine gtt  -Monitor BMP/CMP closely  -RHC early next week for further eval after diuresis  -Consider LVAD before discharge per AHF/ cardiology teams  -Palliative care consult    # Dyslipidemia (2014)  -LDL 73 on 10/20  -Per cardiology    # Hypertension (2014)  -Currently on Lasix GTT with pressor support from Dobutamine GTT  -ACE/ARB/BB on hold.  Defer to cardiology    # Obesity (BMI 30.0-34.9) (11/15/2016)  -Lifestyle modifications    # CKD (chronic kidney disease) stage 3, GFR 30-59 ml/min (2019)  -Overview:  negative lexiscan  - LVVH, LAE, otherwise normal echo    # Uncontrolled type 2 diabetes mellitus with hyperglycemia ([facility] (2020), better controlled  -A1c 7.6  -Continue lantus 30U daily  -SSU humalog AC-HS    # Moderate pulmonary arterial systolic hypertension ([facility] (3/11/2021)    # Hypokalemia (3/11/2021)  -Replete and monitor as appropriate  -KCl PO 40mEq x 2, Q4h apart today    # Hypomagnesemia (3/11/2021)  -On PO Mag Ox  -Will give IV Magnesium Sulfate 2gm once  -Mg daily monitoring    Case discussed with Pt/ family and nurse  Dispo: >48hrs, pending RHC early next week and possible LVAD before discharge     Admitting notes:71 y.o. female who presents with CHF and transferred from [facility] for evaluation by COLTON.      Alexia Aaron is a 73 y.o.   female with PMH of above mentioned problems who presents to ED from her cardiologist office with progressive shortness of breath and cough.  Patient states that she has been having progressive shortness of breath for past couple of weeks and for the past 10 days it has gotten to a point that she cannot walk more than 10 today before catching her breath.  She also endorsed that she is having more cough than usual.  Patient also endorsed PND and orthopnea.  Denies any swelling of her legs.  Patient states that she has been taking her medication as prescribed but she denied using any beta-blocker or Aldactone for heart failure.  Patient is unsure whether she had cardiac angiogram done in the past but she said she been diagnosed with heart failure since .  Patient was a heavy smoker in the past and smoked pack a day for more than 15 years but she has quit smoking 6 years ago. [place] Innocent drink alcohol and denies any recent drug use.  Patient denies any fever chills diarrhea belly pain chest pain.        The patient denies any fever, chills, chest pain, cough, congestion, recent illness, palpitations, or dysuria. Subjective: Follow up NICM, severe acute on chronic systolic heart failure with fluid overload. Patient seen and examined at the bedside. Labs, images and notes reviewed  Discussed with nursing staff, orders reviewed. Plan discussed with patient/Family. Feeling well, tolerating diuresis well. Denied any chest pain,SOB. No AP/N/V. No cramps. On lasix and Dobutamin GTTs. No other overnight events or concerns. 11 Point Review of Systems:   Negative except no cp,swelling    []            Unable to obtain ROS due to:       []            mental status change []            sedated []            intubated     Social History     Tobacco Use    Smoking status: Former Smoker     Packs/day: 0.25     Years: 20.00     Pack years: 5.00     Quit date: 2014     Years since quittin.2    Smokeless tobacco: Never Used   Substance Use Topics    Alcohol use:  Yes Alcohol/week: 0.0 standard drinks     Comment: 21-24 Beers per week     Medications reviewed:  Current Facility-Administered Medications   Medication Dose Route Frequency    potassium chloride SR (KLOR-CON 10) tablet 40 mEq  40 mEq Oral Q4H    magnesium sulfate 2 g/50 ml IVPB (premix or compounded)  2 g IntraVENous ONCE    bumetanide (BUMEX) 0.25 mg/mL infusion  1 mg/hr IntraVENous CONTINUOUS    DOBUTamine (DOBUTREX) 500 mg/250 mL (2,000 mcg/mL) infusion  4 mcg/kg/min IntraVENous CONTINUOUS    sodium chloride (NS) flush 5-40 mL  5-40 mL IntraVENous Q8H    sodium chloride (NS) flush 5-40 mL  5-40 mL IntraVENous PRN    acetaminophen (TYLENOL) tablet 650 mg  650 mg Oral Q6H PRN    Or    acetaminophen (TYLENOL) suppository 650 mg  650 mg Rectal Q6H PRN    polyethylene glycol (MIRALAX) packet 17 g  17 g Oral DAILY PRN    promethazine (PHENERGAN) tablet 12.5 mg  12.5 mg Oral Q6H PRN    Or    ondansetron (ZOFRAN) injection 4 mg  4 mg IntraVENous Q6H PRN    sodium chloride (NS) flush 5-40 mL  5-40 mL IntraVENous Q8H    glucose chewable tablet 16 g  4 Tab Oral PRN    dextrose (D50W) injection syrg 12.5-25 g  25-50 mL IntraVENous PRN    glucagon (GLUCAGEN) injection 1 mg  1 mg IntraMUSCular PRN    insulin glargine (LANTUS) injection 30 Units  30 Units SubCUTAneous DAILY    insulin lispro (HUMALOG) injection 1-10 Units  1-10 Units SubCUTAneous AC&HS    oxybutynin (DITROPAN) tablet 5 mg  5 mg Oral TID    budesonide (PULMICORT) 500 mcg/2 ml nebulizer suspension  500 mcg Nebulization BID RT    arformoteroL (BROVANA) neb solution 15 mcg  15 mcg Nebulization BID RT    albuterol-ipratropium (DUO-NEB) 2.5 MG-0.5 MG/3 ML  3 mL Nebulization Q4H PRN    guaiFENesin ER (MUCINEX) tablet 600 mg  600 mg Oral Q12H    thiamine HCL (B-1) tablet 100 mg  100 mg Oral DAILY    melatonin tablet 3 mg  3 mg Oral QHS PRN    zolpidem (AMBIEN) tablet 5 mg  5 mg Oral QHS PRN    magnesium oxide (MAG-OX) tablet 400 mg  400 mg Oral BID        Objective:   Vitals:  Visit Vitals  /73 (BP 1 Location: Left upper arm, BP Patient Position: At rest)   Pulse 84   Temp 98.3 °F (36.8 °C)   Resp 18   Ht 5' 4\" (1.626 m)   Wt 88.5 kg (195 lb)   SpO2 93%   BMI 33.47 kg/m²     Temp (24hrs), Av.1 °F (36.7 °C), Min:97.5 °F (36.4 °C), Max:98.5 °F (36.9 °C)    O2 Flow Rate (L/min): 1 l/min O2 Device: Room air    Last 24hr Input/Output:    Intake/Output Summary (Last 24 hours) at 3/13/2021 0748  Last data filed at 3/13/2021 0706  Gross per 24 hour   Intake 1080 ml   Output 6100 ml   Net -5020 ml        PHYSICAL EXAM:  General:    Alert, cooperative, no distress, appears stated age. Head:   Normocephalic, without obvious abnormality, atraumatic. Eyes:   Conjunctivae/corneas clear. PERRLA  Nose:  Nares normal. No drainage or sinus tenderness. Throat:    Lips, mucosa, and tongue normal.  No Thrush  Neck:  Supple, symmetrical,  no adenopathy, thyroid: non tender    no carotid bruit and no JVD. Back:    Symmetric,  No CVA tenderness. Lungs:   Clear to auscultation bilaterally. No Wheezing or Rhonchi. No rales. Chest wall:  No tenderness or deformity. No Accessory muscle use. Heart:   Regular rate and rhythm,  no murmur, rub or gallop. Abdomen:   Soft, non-tender. Not distended. Bowel sounds normal. No masses  Extremities: Extremities normal, atraumatic, No cyanosis. No edema. No clubbing  Skin:     Texture, turgor normal. No rashes or lesions. Not Jaundiced  Lymph nodes: Cervical, supraclavicular normal.  Psych:  Good insight. Not depressed. Not anxious or agitated. Neurologic: Normal strength, Alert and oriented X 3.        Lab Data Reviewed:    Recent Labs     21  0152 21  0415 21  1031   WBC 6.1 5.5 4.2   HGB 10.9* 10.4* 11.6   HCT 34.3* 33.2* 37.8    259 251     Recent Labs     21  0152 21  0415 21  1031 21  0514    138 135* 138   K 3.3* 3.9 4.6 4.5    103 104 102   CO2 27 25 21 24   * 160* 82 84   BUN 41* 52* 42* 41*   CREA 0.92 1.23* 1.21* 1.29*   CA 8.4* 8.6 8.9 9.1   MG 1.6 2.3  --  2.4   ALB 3.0* 2.7*  --  3.0*   TBILI 1.1* 0.9  --  1.4*   * 159*  --  78     Lab Results   Component Value Date/Time    Glucose (POC) 192 (H) 03/12/2021 09:47 PM    Glucose (POC) 128 (H) 03/12/2021 04:47 PM    Glucose (POC) 301 (H) 03/12/2021 11:14 AM    Glucose (POC) 145 (H) 03/12/2021 08:25 AM    Glucose (POC) 188 (H) 03/11/2021 09:51 PM     No results for input(s): PH, PCO2, PO2, HCO3, FIO2 in the last 72 hours. No results for input(s): INR, INREXT, INREXT in the last 72 hours. Radiology  Cta Chest W Or W Wo Cont    Result Date: 3/10/2021  No pulmonary embolus. Mild patchy groundglass lung opacities predominantly dependently likely reflect atelectasis or mild edema.  Dilated cardiomegaly with contrast reflux into prominent inferior vena cava and hepatic veins consistent with elevated right heart pressures and right heart failure.      ___________________________________________________  ___________________________________________________    Attending Physician: John Collins MD

## 2021-03-13 NOTE — PROGRESS NOTES
Physical Therapy  Received new order for therapy evaluation, chart reviewed. Patient was evaluated by physical therapy 3/11/21, scheduled to be seen 3x/wk. Will resume next week. Thank you.

## 2021-03-13 NOTE — PROGRESS NOTES
Problem: Falls - Risk of  Goal: *Absence of Falls  Description: Document Jerry Bain Fall Risk and appropriate interventions in the flowsheet.   Outcome: Progressing Towards Goal  Note: Fall Risk Interventions:  Mobility Interventions: Utilize walker, cane, or other assistive device         Medication Interventions: Evaluate medications/consider consulting pharmacy, Patient to call before getting OOB, Teach patient to arise slowly                   Problem: Heart Failure: Day 2  Goal: Off Pathway (Use only if patient is Off Pathway)  Outcome: Progressing Towards Goal

## 2021-03-14 LAB
ALBUMIN SERPL-MCNC: 2.9 G/DL (ref 3.5–5)
ALBUMIN/GLOB SERPL: 0.6 {RATIO} (ref 1.1–2.2)
ALP SERPL-CCNC: 104 U/L (ref 45–117)
ALT SERPL-CCNC: 130 U/L (ref 12–78)
ANION GAP SERPL CALC-SCNC: 6 MMOL/L (ref 5–15)
AST SERPL-CCNC: 77 U/L (ref 15–37)
BACTERIA SPEC CULT: NORMAL
BACTERIA SPEC CULT: NORMAL
BASOPHILS # BLD: 0 K/UL (ref 0–0.1)
BASOPHILS NFR BLD: 0 % (ref 0–1)
BILIRUB SERPL-MCNC: 1.4 MG/DL (ref 0.2–1)
BNP SERPL-MCNC: 360 PG/ML
BUN SERPL-MCNC: 27 MG/DL (ref 6–20)
BUN/CREAT SERPL: 30 (ref 12–20)
CALCIUM SERPL-MCNC: 8.6 MG/DL (ref 8.5–10.1)
CHLORIDE SERPL-SCNC: 103 MMOL/L (ref 97–108)
CO2 SERPL-SCNC: 27 MMOL/L (ref 21–32)
CREAT SERPL-MCNC: 0.9 MG/DL (ref 0.55–1.02)
DIFFERENTIAL METHOD BLD: ABNORMAL
EOSINOPHIL # BLD: 0.2 K/UL (ref 0–0.4)
EOSINOPHIL NFR BLD: 3 % (ref 0–7)
ERYTHROCYTE [DISTWIDTH] IN BLOOD BY AUTOMATED COUNT: 18.5 % (ref 11.5–14.5)
GLOBULIN SER CALC-MCNC: 5 G/DL (ref 2–4)
GLUCOSE BLD STRIP.AUTO-MCNC: 119 MG/DL (ref 65–100)
GLUCOSE BLD STRIP.AUTO-MCNC: 175 MG/DL (ref 65–100)
GLUCOSE BLD STRIP.AUTO-MCNC: 184 MG/DL (ref 65–100)
GLUCOSE BLD STRIP.AUTO-MCNC: 297 MG/DL (ref 65–100)
GLUCOSE SERPL-MCNC: 164 MG/DL (ref 65–100)
HCT VFR BLD AUTO: 35.9 % (ref 35–47)
HEMOCCULT STL QL: NEGATIVE
HGB BLD-MCNC: 11.2 G/DL (ref 11.5–16)
IMM GRANULOCYTES # BLD AUTO: 0.1 K/UL (ref 0–0.04)
IMM GRANULOCYTES NFR BLD AUTO: 1 % (ref 0–0.5)
LYMPHOCYTES # BLD: 0.5 K/UL (ref 0.8–3.5)
LYMPHOCYTES NFR BLD: 8 % (ref 12–49)
MAGNESIUM SERPL-MCNC: 1.8 MG/DL (ref 1.6–2.4)
MCH RBC QN AUTO: 27.4 PG (ref 26–34)
MCHC RBC AUTO-ENTMCNC: 31.2 G/DL (ref 30–36.5)
MCV RBC AUTO: 87.8 FL (ref 80–99)
MONOCYTES # BLD: 0.4 K/UL (ref 0–1)
MONOCYTES NFR BLD: 7 % (ref 5–13)
NEUTS SEG # BLD: 5 K/UL (ref 1.8–8)
NEUTS SEG NFR BLD: 81 % (ref 32–75)
NRBC # BLD: 0 K/UL (ref 0–0.01)
NRBC BLD-RTO: 0 PER 100 WBC
PLATELET # BLD AUTO: 248 K/UL (ref 150–400)
PMV BLD AUTO: 10.2 FL (ref 8.9–12.9)
POTASSIUM SERPL-SCNC: 3.6 MMOL/L (ref 3.5–5.1)
PROT SERPL-MCNC: 7.9 G/DL (ref 6.4–8.2)
RBC # BLD AUTO: 4.09 M/UL (ref 3.8–5.2)
RBC MORPH BLD: ABNORMAL
SERVICE CMNT-IMP: ABNORMAL
SERVICE CMNT-IMP: NORMAL
SODIUM SERPL-SCNC: 136 MMOL/L (ref 136–145)
WBC # BLD AUTO: 6.2 K/UL (ref 3.6–11)

## 2021-03-14 PROCEDURE — 80053 COMPREHEN METABOLIC PANEL: CPT

## 2021-03-14 PROCEDURE — 82272 OCCULT BLD FECES 1-3 TESTS: CPT

## 2021-03-14 PROCEDURE — 74011636637 HC RX REV CODE- 636/637: Performed by: HOSPITALIST

## 2021-03-14 PROCEDURE — 83735 ASSAY OF MAGNESIUM: CPT

## 2021-03-14 PROCEDURE — 74011250637 HC RX REV CODE- 250/637: Performed by: HOSPITALIST

## 2021-03-14 PROCEDURE — 74011250636 HC RX REV CODE- 250/636: Performed by: INTERNAL MEDICINE

## 2021-03-14 PROCEDURE — 65660000000 HC RM CCU STEPDOWN

## 2021-03-14 PROCEDURE — 82962 GLUCOSE BLOOD TEST: CPT

## 2021-03-14 PROCEDURE — 74011000258 HC RX REV CODE- 258: Performed by: INTERNAL MEDICINE

## 2021-03-14 PROCEDURE — 74011000250 HC RX REV CODE- 250: Performed by: INTERNAL MEDICINE

## 2021-03-14 PROCEDURE — 94640 AIRWAY INHALATION TREATMENT: CPT

## 2021-03-14 PROCEDURE — 36415 COLL VENOUS BLD VENIPUNCTURE: CPT

## 2021-03-14 PROCEDURE — 85025 COMPLETE CBC W/AUTO DIFF WBC: CPT

## 2021-03-14 PROCEDURE — 74011250637 HC RX REV CODE- 250/637: Performed by: INTERNAL MEDICINE

## 2021-03-14 PROCEDURE — 74011000250 HC RX REV CODE- 250: Performed by: HOSPITALIST

## 2021-03-14 PROCEDURE — 83880 ASSAY OF NATRIURETIC PEPTIDE: CPT

## 2021-03-14 RX ORDER — MAGNESIUM SULFATE 1 G/100ML
1 INJECTION INTRAVENOUS ONCE
Status: COMPLETED | OUTPATIENT
Start: 2021-03-14 | End: 2021-03-14

## 2021-03-14 RX ORDER — POTASSIUM CHLORIDE 750 MG/1
40 TABLET, FILM COATED, EXTENDED RELEASE ORAL 2 TIMES DAILY
Status: DISCONTINUED | OUTPATIENT
Start: 2021-03-14 | End: 2021-03-15

## 2021-03-14 RX ORDER — DOBUTAMINE HYDROCHLORIDE 200 MG/100ML
6 INJECTION INTRAVENOUS CONTINUOUS
Status: DISCONTINUED | OUTPATIENT
Start: 2021-03-14 | End: 2021-03-15

## 2021-03-14 RX ORDER — DIGOXIN 125 MCG
0.12 TABLET ORAL DAILY
Status: DISCONTINUED | OUTPATIENT
Start: 2021-03-14 | End: 2021-03-16

## 2021-03-14 RX ORDER — BUMETANIDE 1 MG/1
2 TABLET ORAL 2 TIMES DAILY
Status: DISCONTINUED | OUTPATIENT
Start: 2021-03-14 | End: 2021-03-15

## 2021-03-14 RX ADMIN — IVABRADINE 2.5 MG: 5 TABLET, FILM COATED ORAL at 20:46

## 2021-03-14 RX ADMIN — Medication 100 MG: at 09:02

## 2021-03-14 RX ADMIN — GUAIFENESIN 600 MG: 600 TABLET, EXTENDED RELEASE ORAL at 09:02

## 2021-03-14 RX ADMIN — ZOLPIDEM TARTRATE 5 MG: 5 TABLET ORAL at 23:33

## 2021-03-14 RX ADMIN — INSULIN GLARGINE 30 UNITS: 100 INJECTION, SOLUTION SUBCUTANEOUS at 09:01

## 2021-03-14 RX ADMIN — DIGOXIN 0.12 MG: 125 TABLET ORAL at 09:47

## 2021-03-14 RX ADMIN — HYDRALAZINE HYDROCHLORIDE 10 MG: 10 TABLET, FILM COATED ORAL at 09:02

## 2021-03-14 RX ADMIN — DOBUTAMINE HYDROCHLORIDE 6 MCG/KG/MIN: 200 INJECTION INTRAVENOUS at 18:05

## 2021-03-14 RX ADMIN — Medication 10 ML: at 13:18

## 2021-03-14 RX ADMIN — ISOSORBIDE DINITRATE 10 MG: 10 TABLET ORAL at 09:02

## 2021-03-14 RX ADMIN — OXYBUTYNIN CHLORIDE 5 MG: 5 TABLET ORAL at 15:46

## 2021-03-14 RX ADMIN — GUAIFENESIN 600 MG: 600 TABLET, EXTENDED RELEASE ORAL at 20:46

## 2021-03-14 RX ADMIN — POTASSIUM CHLORIDE 40 MEQ: 750 TABLET, FILM COATED, EXTENDED RELEASE ORAL at 13:16

## 2021-03-14 RX ADMIN — Medication 10 ML: at 21:50

## 2021-03-14 RX ADMIN — IVABRADINE 2.5 MG: 5 TABLET, FILM COATED ORAL at 13:17

## 2021-03-14 RX ADMIN — INSULIN LISPRO 5 UNITS: 100 INJECTION, SOLUTION INTRAVENOUS; SUBCUTANEOUS at 12:00

## 2021-03-14 RX ADMIN — INSULIN LISPRO 2 UNITS: 100 INJECTION, SOLUTION INTRAVENOUS; SUBCUTANEOUS at 17:24

## 2021-03-14 RX ADMIN — DOBUTAMINE HYDROCHLORIDE 5 MCG/KG/MIN: 200 INJECTION INTRAVENOUS at 00:29

## 2021-03-14 RX ADMIN — Medication 800 MG: at 17:24

## 2021-03-14 RX ADMIN — ARFORMOTEROL TARTRATE 15 MCG: 15 SOLUTION RESPIRATORY (INHALATION) at 09:01

## 2021-03-14 RX ADMIN — BUDESONIDE 500 MCG: 0.5 INHALANT RESPIRATORY (INHALATION) at 09:02

## 2021-03-14 RX ADMIN — MAGNESIUM SULFATE 1 G: 1 INJECTION INTRAVENOUS at 09:47

## 2021-03-14 RX ADMIN — Medication 10 ML: at 21:49

## 2021-03-14 RX ADMIN — ARFORMOTEROL TARTRATE 15 MCG: 15 SOLUTION RESPIRATORY (INHALATION) at 21:24

## 2021-03-14 RX ADMIN — IRON SUCROSE 200 MG: 20 INJECTION, SOLUTION INTRAVENOUS at 09:41

## 2021-03-14 RX ADMIN — ISOSORBIDE DINITRATE 10 MG: 10 TABLET ORAL at 15:46

## 2021-03-14 RX ADMIN — BUMETANIDE 2 MG: 0.25 INJECTION, SOLUTION INTRAMUSCULAR; INTRAVENOUS at 09:02

## 2021-03-14 RX ADMIN — BUDESONIDE 500 MCG: 0.5 INHALANT RESPIRATORY (INHALATION) at 21:23

## 2021-03-14 RX ADMIN — ISOSORBIDE DINITRATE 10 MG: 10 TABLET ORAL at 21:47

## 2021-03-14 RX ADMIN — HYDRALAZINE HYDROCHLORIDE 10 MG: 10 TABLET, FILM COATED ORAL at 21:47

## 2021-03-14 RX ADMIN — OXYBUTYNIN CHLORIDE 5 MG: 5 TABLET ORAL at 21:47

## 2021-03-14 RX ADMIN — Medication 800 MG: at 09:02

## 2021-03-14 RX ADMIN — POTASSIUM CHLORIDE 40 MEQ: 750 TABLET, FILM COATED, EXTENDED RELEASE ORAL at 09:02

## 2021-03-14 RX ADMIN — OXYBUTYNIN CHLORIDE 5 MG: 5 TABLET ORAL at 09:03

## 2021-03-14 RX ADMIN — SPIRONOLACTONE 12.5 MG: 25 TABLET ORAL at 09:03

## 2021-03-14 RX ADMIN — HYDRALAZINE HYDROCHLORIDE 10 MG: 10 TABLET, FILM COATED ORAL at 15:46

## 2021-03-14 NOTE — PROGRESS NOTES
Bedside and Verbal shift change report given to BRENDA Jenkins (oncoming nurse) by Matthias Cortes (offgoing nurse). Report included the following information SBAR, Kardex, OR Summary, Procedure Summary, Intake/Output, MAR, Recent Results and Cardiac Rhythm NSR,Inverted T,ST depression,PVC.

## 2021-03-14 NOTE — PROGRESS NOTES
[facility] in [place], [place]  Inpatient Consult Progress Note      Patient name: Ev Cooper  Patient : 1950  Patient MRN: [id number]  Consulting MD: Sara Tamayo MD  Date of service: 21    REASON FOR CONSULT:  Acute on chronic systolic heart failure     PLAN:  · Severe decompensated dilated cardiomyopathy, LVEF < 15%, NYHA Class IV  · Increase dobutamine infusion over the weekend to the maximum dose 7.5 mcg/kg/min for initial CI 1.35, if cardiac index < 2.3 on current dose, then consider LVAD as DT/BTT prior to hospital discharge  · RHC planned for Monday/Tuesday and palliative care +/- CTS consult  · Patient refused 6MW yesterday due to fatigue  · NPO after midnight for RHC     Increase dobutamine to 6 mcg/kg/min; watch HR today, keep < 100 bpm  No beta-blockers while on dobutamine gtt  Start digoxin 0.125mg daily; level daily (goal 0.7-1.2)  Start corlanor 2.5mg every 12 hours, first dose 20:00  Hold ACEi/ARB/ARNi/MRA in anticipation of surgery and Cr much improved off  Change bumex to 2mg PO twice daily; check orthostatics today and hold afternoon and morning dose prior to [address] since patient will be NPO  Continue spironolactone 12.5mg daily  Increase potassium 40meq twice daily  Continue magnesium oxide to 800mg twice daily; give 1gm IV magnesium today  Keep K>4 and Mg>2  Consider baby ASA; will defer to primary cardiology team   Hold statin due to elevated tbili and transaminitis  Give venofer 200mg x 2 doses, recheck iron next week, check hemoccult  Start high dose vitamin D weekly, first dose today  Check genetic testing for cardiomyopathy- pending  PYP done; formal read pending and gammopathy pending   Nutritionist consult for heart failure diet and weight loss  Provide heart failure education, start basic LVAD education to help with decisions  Review advanced care plan with SW  Ambulate daily  PT to evaluate and 6MW  Recommend at discharge:   · Lifevest if patient full code  · Vaccinations for flu, pneumonia and covid  · Referral to sleep medicine for sleep study     IMPRESSION:  Fatigue  Shortness of breath at rest  Volume overload  Acute on chronic systolic heart failure  · Stage D, NYHA class IV symptoms, INTERMACS 2  · Non-ischemic cardiomyopathy, recent deterioration to severe LV dysfunction  · Normal cors (by [address] 3/11/21)  · LVEF 15-20% (by echo 3/9/21) from 20-25% (by echo 1/2021) from 51-55% (by echo 11/2019)  · Severe pulmonary artery hypertension, PVR 4  · RV dysfunction, moderate-severe  · Low cardiac index at rest 1.3  · Inotropic-dependence  Anemia  Hypokalemia/hypomagensemia  Cardiac risk factors  · Morbid obesity (BMI 32)  · High risk for SHARLENE  · DM2 (hgba1c 6.8)  · HL  · HTN  · Former tobacco (stopped 2014 after > 40 years)  · H/o alcohol abuse  OA, right knee  Carpal tunnel syndrome  Gout  Iron deficiency  Vitamin D deficincy     Interval Events  Feels much better  Although still tired  SBP 130s/70s, HR 80-90s; single read 160/70 before meds given  I/O net negative 3.6 liters  Weight 194 lbs from 195lbs  Cr 0.9  K 3.6  Mg 1.8  TBili 1.4  proNTBNP 360  Iron sat 16%      CARDIAC IMAGING:  RHC (3/11/21) 25, PA 49/27/37, W 34, Allen CI 1.34  Echo (3/9/21) LVEF 15-20%, mild-moderate MR, severely elevated CVP, IVC > 21mm  Echo (1/29/21) LVEF 20-25%, moderately to severely reduced RV function  Echo (11/20/19) LVEF 51-55%  Echo (12/18/18) LVEF 50-55%  Echo (11/23/16) LVEF 45-55%  EKG (3/9/21) ST 103bpm, QRS 84ms  NST (5/2018) no scar or ischemia, LVEF 48%  Normal cors (by [address] 3/11/21)     PHYSICAL EXAM:  Visit Vitals  /70 (BP 1 Location: Left upper arm, BP Patient Position: Sitting)   Pulse 100   Temp 97.6 °F (36.4 °C)   Resp 18   Ht 5' 4\" (1.626 m)   Wt 194 lb 14.2 oz (88.4 kg)   SpO2 96%   BMI 33.45 kg/m²     General: Patient is well developed, well-nourished in no acute distress  HEENT: Normocephalic and atraumatic.  No scleral icterus. Pupils are equal, round and reactive to light and accomodation. No conjunctival injection. Oropharynx is clear. Neck: Supple. No evidence of thyroid enlargements or lymphadenopathy. JVD: Cannot be appreciated   Lungs: Breath sounds are equal and clear bilaterally. No wheezes, rhonchi, or rales. Heart: Regular rate and rhythm with normal S1 and S2. No murmurs, gallops or rubs. Abdomen: Soft, no mass or tenderness. No organomegaly or hernia. Bowel sounds present. Genitourinary and rectal: deferred  Extremities: No cyanosis, clubbing, or edema. Neurologic: No focal sensory or motor deficits are noted. Grossly intact. Psychiatric: Awake, alert an doriented x 3. Appropriate mood and affect. Skin: Warm, dry and well perfused. No lesions, nodules or rashes are noted. REVIEW OF SYSTEMS:  General: Denies fever, night sweats. Ear, nose and throat: Denies difficulty hearing, sinus problems, runny nose, post-nasal drip, ringing in ears, mouth sores, loose teeth, ear pain, nosebleeds, sore throate, facial pain or numbess  Cardiovascular: see above in the interval history  Respiratory: Denies cough, wheezing, sputum production, hemoptysis.   Gastrointestinal: Denies heartburn, constipation, intolerance to certain foods, diarrhea, abdominal pain, nausea, vomiting, difficulty swallowing, blood in stool  Kidney and bladder: Denies painful urination, frequent urination, urgency, prostate problems and impotence  Musculoskeletal: Denies joint pain, muscle weakness  Skin and hair: Denies change in existing skin lesions, hair loss or increase, breast changes    PAST MEDICAL HISTORY:  Past Medical History:   Diagnosis Date    Arthritis     Diabetes ([address]     Hypercholesterolemia     Hypertension        PAST SURGICAL HISTORY:  Past Surgical History:   Procedure Laterality Date    HX ORTHOPAEDIC      Arthroscopy right knee       FAMILY HISTORY:  Family History   Problem Relation Age of Onset    Diabetes Mother        SOCIAL HISTORY:  Social History     Socioeconomic History    Marital status:      Spouse name: Not on file    Number of children: 3    Years of education: 15    Highest education level: Not on file   Occupational History    Occupation: retired   Tobacco Use    Smoking status: Former Smoker     Packs/day: 0.25     Years: 20.00     Pack years: 5.00     Quit date: 2014     Years since quittin.2    Smokeless tobacco: Never Used   Substance and Sexual Activity    Alcohol use: Yes     Alcohol/week: 0.0 standard drinks     Comment: 21-24 Beers per week    Drug use: No    Sexual activity: Yes     Partners: Male       LABORATORY RESULTS:     Labs Latest Ref Rng & Units 3/14/2021 3/13/2021 3/12/2021 3/11/2021 3/11/2021 3/10/2021 3/9/2021   WBC 3.6 - 11.0 K/uL 6.2 6.1 5.5 4.2 5.0 4.6 5.6   RBC 3.80 - 5.20 M/uL 4.09 4.01 3.81 4.21 3.93 3.82 4.25   Hemoglobin 11.5 - 16.0 g/dL 11. 2(L) 10. 9(L) 10. 4(L) 11.6 10. 8(L) 10. 7(L) 11.7   Hematocrit 35.0 - 47.0 % 35.9 34. 3(L) 33. 2(L) 37.8 34. 5(L) 33. 9(L) 37.6   MCV 80.0 - 99.0 FL 87.8 85.5 87.1 89.8 87.8 88.7 88.5   Platelets 408 - 071 K/uL 248 231 259 251 256 219 292   Lymphocytes 12 - 49 % 8(L) 10(L) 15 18 20 17 13   Monocytes 5 - 13 % 7 7 6 6 5 4(L) 3(L)   Eosinophils 0 - 7 % 3 4 3 2 1 5 4   Basophils 0 - 1 % 0 0 1 1 1 1 1   Albumin 3.5 - 5.0 g/dL 2. 9(L) 3.0(L) 2. 7(L) - 3. 0(L) - 3. 1(L)   Calcium 8.5 - 10.1 MG/DL 8.6 8.4(L) 8.6 8.9 9.1 8.4(L) 9.0   Glucose 65 - 100 mg/dL 164(H) 155(H) 160(H) 82 84 172(H) 143(H)   BUN 6 - 20 MG/DL 27(H) 41(H) 52(H) 42(H) 41(H) 24(H) 20   Creatinine 0.55 - 1.02 MG/DL 0.90 0.92 1.23(H) 1.21(H) 1.29(H) 0.85 0.91   Sodium 136 - 145 mmol/L 136 138 138 135(L) 138 142 144   Potassium 3.5 - 5.1 mmol/L 3.6 3.3(L) 3.9 4.6 4.5 3. 3(L) 3. 3(L)   TSH 0.36 - 3.74 uIU/mL - - - 2.75 - - -   Some recent data might be hidden     Lab Results   Component Value Date/Time    TSH 2.75 2021 10:31 AM    TSH 1.050 10/08/2020 12:01 PM TSH 1.200 08/08/2019 02:31 PM    TSH 1.280 08/26/2016 11:48 AM       ALLERGY:  No Known Allergies     CURRENT MEDICATIONS:    Current Facility-Administered Medications:     iron sucrose (VENOFER) 200 mg in 0.9% sodium chloride 100 mL IVPB, 200 mg, IntraVENous, DAILY, Jono Griffith MD, Last Rate: 440 mL/hr at 03/13/21 1259, 200 mg at 03/13/21 1259    ergocalciferol capsule 50,000 Units, 50,000 Units, Oral, Q7D, Jono Griffith MD, 50,000 Units at 03/13/21 1258    bumetanide (BUMEX) injection 2 mg, 2 mg, IntraVENous, BID, Jono Griffith MD, 2 mg at 03/14/21 0902    spironolactone (ALDACTONE) tablet 12.5 mg, 12.5 mg, Oral, DAILY, Jono Griffith MD, 12.5 mg at 03/14/21 6399    hydrALAZINE (APRESOLINE) tablet 10 mg, 10 mg, Oral, TID, Jono Griffith MD, 10 mg at 03/14/21 0902    isosorbide dinitrate (ISORDIL) tablet 10 mg, 10 mg, Oral, TID, Jono Griffith MD, 10 mg at 03/14/21 0902    DOBUTamine (DOBUTREX) 500 mg/250 mL (2,000 mcg/mL) infusion, 5 mcg/kg/min, IntraVENous, CONTINUOUS, Jono Griffith MD, Last Rate: 13.3 mL/hr at 03/14/21 0029, 5 mcg/kg/min at 03/14/21 0029    potassium chloride SR (KLOR-CON 10) tablet 40 mEq, 40 mEq, Oral, DAILY, Jono Griffith MD, 40 mEq at 03/14/21 0902    magnesium oxide (MAG-OX) tablet 800 mg, 800 mg, Oral, BID, Jono Griffith MD, 800 mg at 03/14/21 0902    sodium chloride (NS) flush 5-40 mL, 5-40 mL, IntraVENous, Q8H, Anshul Baca MD, 10 mL at 03/13/21 2141    sodium chloride (NS) flush 5-40 mL, 5-40 mL, IntraVENous, PRN, Jacobo Wilhelm MD    acetaminophen (TYLENOL) tablet 650 mg, 650 mg, Oral, Q6H PRN, 650 mg at 03/13/21 2302 **OR** acetaminophen (TYLENOL) suppository 650 mg, 650 mg, Rectal, Q6H PRN, Jacobo Wilhelm MD    polyethylene glycol (MIRALAX) packet 17 g, 17 g, Oral, DAILY PRN, Jacobo Wilhelm MD    promethazine (PHENERGAN) tablet 12.5 mg, 12.5 mg, Oral, Q6H PRN **OR** ondansetron (ZOFRAN) injection 4 mg, 4 mg, IntraVENous, Q6H PRN, Therese Campos MD    sodium chloride (NS) flush 5-40 mL, 5-40 mL, IntraVENous, Q8H, Anshul Baca MD, 10 mL at 03/13/21 2141    glucose chewable tablet 16 g, 4 Tab, Oral, PRN, Therese Campos MD    dextrose (D50W) injection syrg 12.5-25 g, 25-50 mL, IntraVENous, PRN, Therese Campos MD, 25 g at 03/11/21 1509    glucagon (GLUCAGEN) injection 1 mg, 1 mg, IntraMUSCular, PRN, Therese Campos MD    insulin glargine (LANTUS) injection 30 Units, 30 Units, SubCUTAneous, DAILY, Therese Campos MD, 30 Units at 03/14/21 0901    insulin lispro (HUMALOG) injection 1-10 Units, 1-10 Units, SubCUTAneous, AC&HS, Therese Campos MD, Stopped at 03/13/21 2200    oxybutynin (DITROPAN) tablet 5 mg, 5 mg, Oral, TID, Therese Campos MD, 5 mg at 03/14/21 0903    budesonide (PULMICORT) 500 mcg/2 ml nebulizer suspension, 500 mcg, Nebulization, BID RT, Therese Campos MD, 500 mcg at 03/14/21 0902    arformoteroL (BROVANA) neb solution 15 mcg, 15 mcg, Nebulization, BID RT, Therese Campos MD, 15 mcg at 03/14/21 0901    albuterol-ipratropium (DUO-NEB) 2.5 MG-0.5 MG/3 ML, 3 mL, Nebulization, Q4H PRN, Therese Campos MD, 3 mL at 03/13/21 2039    guaiFENesin ER (MUCINEX) tablet 600 mg, 600 mg, Oral, Q12H, Anshul Eastman MD, 600 mg at 03/14/21 0902    thiamine HCL (B-1) tablet 100 mg, 100 mg, Oral, DAILY, Therese Campos MD, 100 mg at 03/14/21 0902    melatonin tablet 3 mg, 3 mg, Oral, QHS PRN, Therese Campos MD, 3 mg at 03/11/21 0233    zolpidem (AMBIEN) tablet 5 mg, 5 mg, Oral, QHS PRN, Therese Campos MD, 5 mg at 03/13/21 2302    PATIENT CARE TEAM:  Patient Care Team:  Shazia Grant MD as PCP - General (Internal Medicine)  Shazia Grant MD as PCP - [address] [place] [address] [facility]  Juan Barker MD as Physician (Cardiology)  Deysi Ramirez MD as Surgeon (Orthopedic Surgery)     Thank you for allowing me to participate in this patient's care.     Marlena Severs, MD PhD  Advanced Heart Failure [address]  [address] [facility], [address]  Phone: [phone]  Fax: [phone]

## 2021-03-14 NOTE — PROGRESS NOTES
Bedside and Verbal shift change report given to [address] (oncoming nurse) by Justina Eubanks (offgoing nurse). Report included the following information SBAR, Kardex, ED Summary, Procedure Summary, Intake/Output, MAR, Recent Results and Cardiac Rhythm NSR,Inverted T, St depression.

## 2021-03-14 NOTE — PROGRESS NOTES
Bedside and Verbal shift change report given to [address] (oncoming nurse) by My Johnston (offgoing nurse). Report included the following information SBAR, Kardex, MAR and Accordion.

## 2021-03-15 ENCOUNTER — APPOINTMENT (OUTPATIENT)
Dept: NUCLEAR MEDICINE | Age: 71
DRG: 286 | End: 2021-03-15
Attending: NURSE PRACTITIONER
Payer: MEDICARE

## 2021-03-15 ENCOUNTER — DOCUMENTATION ONLY (OUTPATIENT)
Dept: CARDIOLOGY CLINIC | Age: 71
End: 2021-03-15

## 2021-03-15 LAB
ALBUMIN SERPL-MCNC: 2.8 G/DL (ref 3.5–5)
ALBUMIN/GLOB SERPL: 0.5 {RATIO} (ref 1.1–2.2)
ALP SERPL-CCNC: 97 U/L (ref 45–117)
ALT SERPL-CCNC: 101 U/L (ref 12–78)
ANION GAP SERPL CALC-SCNC: 5 MMOL/L (ref 5–15)
AST SERPL-CCNC: 54 U/L (ref 15–37)
BILIRUB SERPL-MCNC: 1.5 MG/DL (ref 0.2–1)
BNP SERPL-MCNC: 329 PG/ML
BUN SERPL-MCNC: 16 MG/DL (ref 6–20)
BUN/CREAT SERPL: 20 (ref 12–20)
CALCIUM SERPL-MCNC: 9.1 MG/DL (ref 8.5–10.1)
CHLORIDE SERPL-SCNC: 105 MMOL/L (ref 97–108)
CO2 SERPL-SCNC: 26 MMOL/L (ref 21–32)
CREAT SERPL-MCNC: 0.81 MG/DL (ref 0.55–1.02)
DIGOXIN SERPL-MCNC: 0.3 NG/ML (ref 0.9–2)
GLOBULIN SER CALC-MCNC: 5.3 G/DL (ref 2–4)
GLUCOSE BLD STRIP.AUTO-MCNC: 112 MG/DL (ref 65–100)
GLUCOSE BLD STRIP.AUTO-MCNC: 125 MG/DL (ref 65–100)
GLUCOSE BLD STRIP.AUTO-MCNC: 205 MG/DL (ref 65–100)
GLUCOSE BLD STRIP.AUTO-MCNC: 211 MG/DL (ref 65–100)
GLUCOSE SERPL-MCNC: 107 MG/DL (ref 65–100)
LACTATE SERPL-SCNC: 1 MMOL/L (ref 0.4–2)
MAGNESIUM SERPL-MCNC: 2 MG/DL (ref 1.6–2.4)
POTASSIUM SERPL-SCNC: 4.2 MMOL/L (ref 3.5–5.1)
PROT SERPL-MCNC: 8.1 G/DL (ref 6.4–8.2)
SERVICE CMNT-IMP: ABNORMAL
SODIUM SERPL-SCNC: 136 MMOL/L (ref 136–145)
STRESS TARGET HR: 151 BPM

## 2021-03-15 PROCEDURE — 82962 GLUCOSE BLOOD TEST: CPT

## 2021-03-15 PROCEDURE — 83735 ASSAY OF MAGNESIUM: CPT

## 2021-03-15 PROCEDURE — 74011250637 HC RX REV CODE- 250/637: Performed by: NURSE PRACTITIONER

## 2021-03-15 PROCEDURE — 74011000250 HC RX REV CODE- 250: Performed by: HOSPITALIST

## 2021-03-15 PROCEDURE — 94640 AIRWAY INHALATION TREATMENT: CPT

## 2021-03-15 PROCEDURE — 80053 COMPREHEN METABOLIC PANEL: CPT

## 2021-03-15 PROCEDURE — 36415 COLL VENOUS BLD VENIPUNCTURE: CPT

## 2021-03-15 PROCEDURE — 97116 GAIT TRAINING THERAPY: CPT

## 2021-03-15 PROCEDURE — 74011250637 HC RX REV CODE- 250/637: Performed by: INTERNAL MEDICINE

## 2021-03-15 PROCEDURE — 74011250636 HC RX REV CODE- 250/636: Performed by: NURSE PRACTITIONER

## 2021-03-15 PROCEDURE — 65660000000 HC RM CCU STEPDOWN

## 2021-03-15 PROCEDURE — 80162 ASSAY OF DIGOXIN TOTAL: CPT

## 2021-03-15 PROCEDURE — 78803 RP LOCLZJ TUM SPECT 1 AREA: CPT

## 2021-03-15 PROCEDURE — 99232 SBSQ HOSP IP/OBS MODERATE 35: CPT | Performed by: NURSE PRACTITIONER

## 2021-03-15 PROCEDURE — 94621 CARDIOPULM EXERCISE TESTING: CPT

## 2021-03-15 PROCEDURE — 99232 SBSQ HOSP IP/OBS MODERATE 35: CPT | Performed by: INTERNAL MEDICINE

## 2021-03-15 PROCEDURE — 74011636637 HC RX REV CODE- 636/637: Performed by: HOSPITALIST

## 2021-03-15 PROCEDURE — 74011250637 HC RX REV CODE- 250/637: Performed by: HOSPITALIST

## 2021-03-15 PROCEDURE — 83880 ASSAY OF NATRIURETIC PEPTIDE: CPT

## 2021-03-15 PROCEDURE — 83605 ASSAY OF LACTIC ACID: CPT

## 2021-03-15 RX ORDER — HYDRALAZINE HYDROCHLORIDE 25 MG/1
25 TABLET, FILM COATED ORAL 3 TIMES DAILY
Status: DISCONTINUED | OUTPATIENT
Start: 2021-03-15 | End: 2021-03-16

## 2021-03-15 RX ORDER — DOBUTAMINE HYDROCHLORIDE 200 MG/100ML
7.5 INJECTION INTRAVENOUS CONTINUOUS
Status: DISCONTINUED | OUTPATIENT
Start: 2021-03-15 | End: 2021-03-16

## 2021-03-15 RX ORDER — BUMETANIDE 1 MG/1
2 TABLET ORAL 2 TIMES DAILY
Status: DISCONTINUED | OUTPATIENT
Start: 2021-03-15 | End: 2021-03-15

## 2021-03-15 RX ORDER — BUMETANIDE 1 MG/1
2 TABLET ORAL DAILY
Status: DISCONTINUED | OUTPATIENT
Start: 2021-03-15 | End: 2021-03-20 | Stop reason: HOSPADM

## 2021-03-15 RX ORDER — POTASSIUM CHLORIDE 750 MG/1
40 TABLET, FILM COATED, EXTENDED RELEASE ORAL DAILY
Status: DISCONTINUED | OUTPATIENT
Start: 2021-03-16 | End: 2021-03-16

## 2021-03-15 RX ORDER — ISOSORBIDE DINITRATE 10 MG/1
20 TABLET ORAL 3 TIMES DAILY
Status: DISCONTINUED | OUTPATIENT
Start: 2021-03-15 | End: 2021-03-16

## 2021-03-15 RX ADMIN — OXYBUTYNIN CHLORIDE 5 MG: 5 TABLET ORAL at 21:50

## 2021-03-15 RX ADMIN — Medication 10 ML: at 05:44

## 2021-03-15 RX ADMIN — ISOSORBIDE DINITRATE 20 MG: 10 TABLET ORAL at 21:50

## 2021-03-15 RX ADMIN — INSULIN LISPRO 2 UNITS: 100 INJECTION, SOLUTION INTRAVENOUS; SUBCUTANEOUS at 22:03

## 2021-03-15 RX ADMIN — POTASSIUM CHLORIDE 40 MEQ: 750 TABLET, FILM COATED, EXTENDED RELEASE ORAL at 09:06

## 2021-03-15 RX ADMIN — BUMETANIDE 2 MG: 1 TABLET ORAL at 12:20

## 2021-03-15 RX ADMIN — ARFORMOTEROL TARTRATE 15 MCG: 15 SOLUTION RESPIRATORY (INHALATION) at 19:59

## 2021-03-15 RX ADMIN — OXYBUTYNIN CHLORIDE 5 MG: 5 TABLET ORAL at 09:07

## 2021-03-15 RX ADMIN — OXYBUTYNIN CHLORIDE 5 MG: 5 TABLET ORAL at 17:02

## 2021-03-15 RX ADMIN — HYDRALAZINE HYDROCHLORIDE 10 MG: 10 TABLET, FILM COATED ORAL at 09:07

## 2021-03-15 RX ADMIN — Medication 10 ML: at 21:51

## 2021-03-15 RX ADMIN — Medication 800 MG: at 09:07

## 2021-03-15 RX ADMIN — Medication 100 MG: at 09:07

## 2021-03-15 RX ADMIN — BUDESONIDE 500 MCG: 0.5 INHALANT RESPIRATORY (INHALATION) at 19:59

## 2021-03-15 RX ADMIN — INSULIN LISPRO 3 UNITS: 100 INJECTION, SOLUTION INTRAVENOUS; SUBCUTANEOUS at 12:20

## 2021-03-15 RX ADMIN — DIGOXIN 0.12 MG: 125 TABLET ORAL at 09:07

## 2021-03-15 RX ADMIN — DOBUTAMINE HYDROCHLORIDE 7.5 MCG/KG/MIN: 200 INJECTION INTRAVENOUS at 11:46

## 2021-03-15 RX ADMIN — ISOSORBIDE DINITRATE 20 MG: 10 TABLET ORAL at 17:02

## 2021-03-15 RX ADMIN — ARFORMOTEROL TARTRATE 15 MCG: 15 SOLUTION RESPIRATORY (INHALATION) at 10:45

## 2021-03-15 RX ADMIN — Medication 10 ML: at 17:04

## 2021-03-15 RX ADMIN — GUAIFENESIN 600 MG: 600 TABLET, EXTENDED RELEASE ORAL at 09:07

## 2021-03-15 RX ADMIN — SPIRONOLACTONE 12.5 MG: 25 TABLET ORAL at 09:07

## 2021-03-15 RX ADMIN — Medication 800 MG: at 17:03

## 2021-03-15 RX ADMIN — IVABRADINE 2.5 MG: 5 TABLET, FILM COATED ORAL at 09:15

## 2021-03-15 RX ADMIN — HYDRALAZINE HYDROCHLORIDE 25 MG: 25 TABLET, FILM COATED ORAL at 17:03

## 2021-03-15 RX ADMIN — HYDRALAZINE HYDROCHLORIDE 25 MG: 25 TABLET, FILM COATED ORAL at 21:50

## 2021-03-15 RX ADMIN — IVABRADINE 2.5 MG: 5 TABLET, FILM COATED ORAL at 20:45

## 2021-03-15 RX ADMIN — ZOLPIDEM TARTRATE 5 MG: 5 TABLET ORAL at 22:28

## 2021-03-15 RX ADMIN — ISOSORBIDE DINITRATE 10 MG: 10 TABLET ORAL at 09:07

## 2021-03-15 RX ADMIN — GUAIFENESIN 600 MG: 600 TABLET, EXTENDED RELEASE ORAL at 20:44

## 2021-03-15 RX ADMIN — BUDESONIDE 500 MCG: 0.5 INHALANT RESPIRATORY (INHALATION) at 10:45

## 2021-03-15 NOTE — PROGRESS NOTES
..    [facility]  Social Work LVAD/Heart Transplant/ Heart Failure Evaluation      Date: 3/15/2021                                                                       NAME: Jon Da Silva   : 1950   ADDRESS: [address] [place] [id number]   PHONE NUMBERS:  [phone]     Congregation:[employer]    COUNTRY OF BIRTH: [facility]  CITIZENSHIP: [place] citizen  MARITAL STATUS: Single       PRIMARYINSURANCE: [employer]  SECONDARY INSURANCE: none   STATUS: not a     Family Information:  :   Where were you born? : [place], [place]  Who raised you?mother and father  Where were you raised? [place], [place]  Does your family have a HX of heart problems? yes  Are your parents living or ?       Do you have any siblings? Yes  Do you have a good relationship with your siblings? Yes  Will they be able to offer support before, during, and after LVAD/Transplant surgery? Yes    Do you live with anyone? Yes  If so, are the people you live with in good health? Yes                        Are you involved in a significant relationship? (If not ) yes, has been in a relationship with Claudia Harman () for 21 years    Do you have any children? Yes, 4 daughters, Inder Bales (48), Caleb (52), Mary (39), and Molly (6) Juan Zaldivar  Do you have a good relationship with your children? Yes   Will they be able to offer support before, during, and after LVAD/Transplant surgery? Yes    Do you have any pets? yes  Are you currently a caregiver? Yes for 10year old daughter, Umang Reed    Educational History:   What is the highest level of education you have obtained? high school diploma/ged  Do you have any problems with reading or writing? no  How do you obtain information best? visual and combination  Financial/Work History:   Are you employed? No    Are you or do you plan on using STD/LTD/FMLA? No  What is your source of income?  SSD and Mr. Reeves Abel income  How do you plan to cover your expenses while off work? N/A  Have you applied for disability and Medicaid? Yes began receiving SSD in 2005  Do you have difficulty meeting your current monthly expenses? No  Do you currently have any financial concerns? no    Budget: $1200     Mortgage/Rent: $1200 Electricity: $150 Gas/Water:  $50   Phone:N/A Cell Phone: $52 Cable: $153   Credit Cards:  $800 Medical Bills:  0 Car Note:  $451.18   Care insurance:  0       Pharmacy / Medication History:   Where do you fill your medications? [facility] [address], [address] phone number: [phone]  Are you compliant with your medications? sometimes forgets and will take the medication when she remembers or sometimes will take 2 together   Do you have any difficulties in obtaining or taking your medications? no   Dentist name and number? Dr Domenica Contreras in [place]  PCP name and number? Bettina Domínguez  [phone]  Substance Abuse History:   Do you smoke? No  Does anyone else in your household smoke? no  Do you drink? No  Does anyone else in your household drink? no  Do you use illegal drugs? No  Does anyone else in your household use illegal drugs? no  Have you even been involved in a drug or alcohol treatment program? no    Psychiatric History:   Have you ever been under the care of a mental health professional? no  Have you ever been hospitalized for psychiatric reasons? no  Current/Past suicidal ideations?: No  Current/Past homicidal ideations?: No  Are you currently on any medications for mental health issues? no  Is there history of mental illness in your family? no  Have you ever left the hospital AMA? no  Do you have a mental health history? No      Mental Status Exam:   1. Presenting problem has affected:  Health    2. Client manner of dress:   Appropriate    3. Patient Hygiene:  Good    4. Level of Responsiveness: Alert and oriented to all spheres  5. Client motor behavior: Normal    6.   Evaluation of client level of distress:  mild    7. Signs of client distress during interview:  None    8. Affect:   Appropriate    9. Thought Process:  Logical    10. Thought Content / Preoccupations: No evidence of impairment    11. Unusual Perceptual Experiences:  none    12. Attention / concentration:  intact    13. Orientation for:  Oriented in all spheres    14. Memory Functions:  Intact    15. Insight (as age appropriate):  good    12.  Judgement (as age appropriate):    good   Legal Concerns:   Are you currently or have you ever been on parole, probation, or involved in litigation? no  Have you had any substance related legal problems? no  Have you ever been incarcerated? no  Do you have a valid s license? yes    Lifestyle/Functional Ability / Personal Care:   What is your diagnosis and when were you diagnosed? Congestive Heart Failure, 2005  Has your illness affected your life? yes  What are your daily activities? Household chores and caring for her 10year old daughter, Aster Valerio  How do you handle stressful situations? \"walk away\", \"block it from her mind\"  What are your coping mechanisms? Visiting with family and traveling [place] and cecilia  What is your living situation? single family home  Do you use any DME? No, has a  toilet riser  Are you open to home health or personal care? yes  Transportation- Do you drive? yes   Household/personal tasks- Are you independent in cooking, cleaning/laundry, yardwork, shopping, dressing, and bathing? yes  [address] name and account number? [employer]- patient could not remember account number  Support System:   Who will be your primary support person before, during, and after your LVAD/Transplant surgery? Clementine Arriola  Will anyone else be providing assistance and support? Yes, daughters, Bhanu Garcia  Who will bring you to clinic appointments before and after LVAD implementation?family  Do they have a reliable automobile?  yes  Do they have a valid s license? yes  Are you active in community agencies, [employer], [employer], or any other shant based community? Yes union [facility] in [place]  Who do you usually count on for emotional support? Mr. Melo Pradhan  Have you read material about the LVAD/Heart transplant surgery? no  Are you interested in meeting someone with a LVAD/Heart transplant? yes  How does your spouse, significant other, family feel about LVAD/Transplant? She's \"gotta do what she has to do\" \"whatever is best for her\"    Concerns and Questions:   Do you have any fears or concerns regarding LVAD/Transplant surgery? \"demetrice scary\", \"hope everything goes alright\", [with surgery]  How do you think you will prepare yourself for the LVAD/Transplant surgery? Afsaneh Watson shant\"  Do you believe that you understand what challenges and changes you will experience with LVAD/Transplant surgery? \"just gotta give it a try\" and do \" anything I can do\"  [to stay alive]  Do you have a living will or an advance directive? No, patient deferred completing AD at this time 3/12/21, when spoke about completing AD 3/15/21 with patient she was more receptive to completing the AD. She will discuss it further this afternoon when her daughters come to visit    Impressions/Barriers:       SW met with Danielito Villa and her life partner, Stan Mathis in her hospital bed to complete this psychosocial assessment for LVAD candidacy. Ms. Lamont Carver states her three daughters, Alice Oneill will be her primary support persons post discharge, in addition to Mr. Pierce Mathis the patient's partner of 21 years. Unfortunately, only Mr. Kika Brito has been available to confirm his participation in Mrs. Aaron's post LVAD patient care. Ms. Lamont Carver and Mr. Melo Pradhan are caregivers for their 10year old daughter, Maggie Carver. Ms. Lamont Carver conversed appropriately and verbalized understanding of the necessity for LVAD implantation.  She denies a history of psychiatric care and legal issues. However, Ms. Vanda Crawley does report a history of tobacco and alcohol use, she smoked . 50 - 1.0 ppd of cigarettes and drank beer and/or vodka \"mostly on the weekends\" for approximately 40 years. Both she and Mr. Ana Goyal deny any major financial concerns at this time. Ms. Vanda Crawley acknowledges she \"walks away\" from stressful situations, but uses prayer and her shant along with her hobbies of traveling and gardening as coping mechanisms. Overall, from a social work standpoint, Ms. Vanda Crawley appears to be an appropriate candidate for LVAD implantation. However, confirmation from the patient's family as primary support persons after implant is required, the patient verbalized understanding. Additionally, the SW has provided the patient with clarification of what an Advance Directive is and discussed the benefits of completing this document. Ms. Vanda Crawley intends to review and discuss the Advance Directive with her daughters today. Thank you for the opportunity to work with Ms. Vanda Crawley on her LVAD candidacy.      JOSÉ LUIS Blanchard

## 2021-03-15 NOTE — PROGRESS NOTES
Transition of Care  1. RUR 18%  2. Disposition TBD, anticipate discharge 24-48 hours   3. DME Walker and cane- recommending life vest if patient remains full code  4. Transportation [employer] [phone], [facility]  5.  Follow Up PCP, Cardiology (Advance Heart Failure)     Marbella Qureshi MS

## 2021-03-15 NOTE — PROGRESS NOTES
Hospital Progress Note    NAME:  Faustina Alba   :   1950   MRN:  [id number]     Date/Time:  3/15/2021 8:07 PM    Plan:   1. Management /AHF  2. RHC today  Risk of Deterioration: Low  []           Moderate  [x]           High  []                 Assessment:   # Acute on chronic severe systolic congestive heart failure, NYHA class 3 ([facility] (3/11/2021). EF <15%, NYHA IV with cardiogenic shock, dobutamin gtt  # Dilated cardiomyopathy ([facility] (2014), Nonischemic. S/p BiV AICD  -Cardiology and AHF teams on board  -Lasix gtt transitioned to IV Bumex 2mg BID per AHF team 3/13  -I/O -3760  -Continue Dobutamine gtt  -Monitor BMP/CMP closely, stable  -RHC early next week for further eval after diuresis  -Consider LVAD before discharge per AHF/ cardiology teams  -PFTs per AHF team  -Palliative care consult  -No BB while on Dobutamine GTT  -Holding ACE/ARB/ARNi/MRA per AHF team in anticipation of surgery  -Started on Spironolactone 12.5mg daily per AHF team 3/13  -KCl and MgOx supplementation uptitration per AHF team  -ASA defered to primary cardiology team  -IV Venofer infusions 200mg x 2 doses  -Hold statin for elevated LFTs  -High dose Vit D weekly  -6MWT per PT    # Dyslipidemia (2014)  -LDL 73 on 10/20  -Per cardiology    # Hypertension (2014)  -Currently on Lasix GTT with pressor support from Dobutamine GTT  -ACE/ARB/BB on hold.  Defer to cardiology    # Obesity (BMI 30.0-34.9) (11/15/2016)  -Lifestyle modifications    # CKD (chronic kidney disease) stage 3, GFR 30-59 ml/min (2019)  -Overview:  negative lexiscan  - LVVH, LAE, otherwise normal echo    # Uncontrolled type 2 diabetes mellitus with hyperglycemia ([facility] (2020), better controlled  -A1c 7.6  -Continue lantus 30U daily  -SSU humalog AC-HS    # Moderate pulmonary arterial systolic hypertension ([facility] (3/11/2021)    # Hypokalemia (3/11/2021)  -Replete and monitor as appropriate  -KCl PO 40mEq x 2, Q4h apart today    # Hypomagnesemia (3/11/2021)  -On PO Mag Ox  -Will give IV Magnesium Sulfate 2gm once  -Mg daily monitoring    Case discussed with Pt/ family and nurse  Dispo: >48hrs, pending RHC early next week and possible LVAD before discharge    Per AHF team, Recommend at discharge:   · Lifevest if patient full code  · Vaccinations for flu, pneumonia and covid  · Referral to sleep medicine for sleep study     Admitting notes:71 y.o. female who presents with CHF and transferred from [facility] for evaluation by [facility].      Alexia Aaron is a 73 y.o.   female with PMH of above mentioned problems who presents to ED from her cardiologist office with progressive shortness of breath and cough.  Patient states that she has been having progressive shortness of breath for past couple of weeks and for the past 10 days it has gotten to a point that she cannot walk more than 10 today before catching her breath.  She also endorsed that she is having more cough than usual.  Patient also endorsed PND and orthopnea.  Denies any swelling of her legs.  Patient states that she has been taking her medication as prescribed but she denied using any beta-blocker or Aldactone for heart failure.  Patient is unsure whether she had cardiac angiogram done in the past but she said she been diagnosed with heart failure since 2005.  Patient was a heavy smoker in the past and smoked pack a day for more than 15 years but she has quit smoking 6 years ago. Belinda Roberts drink alcohol and denies any recent drug use.  Patient denies any fever chills diarrhea belly pain chest pain.        The patient denies any fever, chills, chest pain, cough, congestion, recent illness, palpitations, or dysuria. Subjective:  \"I didn't know my heart was so bad\" - discussed findings and plans    For rhc today and moving forward.   She agrees          6 Point Review of Systems:   Negative except no cp,swelling    []            Unable to obtain ROS due to:       [] mental status change []            sedated []            intubated     Social History     Tobacco Use    Smoking status: Former Smoker     Packs/day: 0.25     Years: 20.00     Pack years: 5.00     Quit date: 2014     Years since quittin.2    Smokeless tobacco: Never Used   Substance Use Topics    Alcohol use:  Yes     Alcohol/week: 0.0 standard drinks     Comment: 21-24 Beers per week     Medications reviewed:  Current Facility-Administered Medications   Medication Dose Route Frequency    [Held by provider] bumetanide (BUMEX) tablet 2 mg  2 mg Oral BID    potassium chloride SR (KLOR-CON 10) tablet 40 mEq  40 mEq Oral BID    digoxin (LANOXIN) tablet 0.125 mg  0.125 mg Oral DAILY    ivabradine (CORLANOR) tablet 2.5 mg  2.5 mg Oral Q12H    DOBUTamine (DOBUTREX) 500 mg/250 mL (2,000 mcg/mL) infusion  6 mcg/kg/min IntraVENous CONTINUOUS    ergocalciferol capsule 50,000 Units  50,000 Units Oral Q7D    spironolactone (ALDACTONE) tablet 12.5 mg  12.5 mg Oral DAILY    hydrALAZINE (APRESOLINE) tablet 10 mg  10 mg Oral TID    isosorbide dinitrate (ISORDIL) tablet 10 mg  10 mg Oral TID    magnesium oxide (MAG-OX) tablet 800 mg  800 mg Oral BID    sodium chloride (NS) flush 5-40 mL  5-40 mL IntraVENous Q8H    sodium chloride (NS) flush 5-40 mL  5-40 mL IntraVENous PRN    acetaminophen (TYLENOL) tablet 650 mg  650 mg Oral Q6H PRN    Or    acetaminophen (TYLENOL) suppository 650 mg  650 mg Rectal Q6H PRN    polyethylene glycol (MIRALAX) packet 17 g  17 g Oral DAILY PRN    promethazine (PHENERGAN) tablet 12.5 mg  12.5 mg Oral Q6H PRN    Or    ondansetron (ZOFRAN) injection 4 mg  4 mg IntraVENous Q6H PRN    sodium chloride (NS) flush 5-40 mL  5-40 mL IntraVENous Q8H    glucose chewable tablet 16 g  4 Tab Oral PRN    dextrose (D50W) injection syrg 12.5-25 g  25-50 mL IntraVENous PRN    glucagon (GLUCAGEN) injection 1 mg  1 mg IntraMUSCular PRN    insulin glargine (LANTUS) injection 30 Units  30 Units SubCUTAneous DAILY    insulin lispro (HUMALOG) injection 1-10 Units  1-10 Units SubCUTAneous AC&HS    oxybutynin (DITROPAN) tablet 5 mg  5 mg Oral TID    budesonide (PULMICORT) 500 mcg/2 ml nebulizer suspension  500 mcg Nebulization BID RT    arformoteroL (BROVANA) neb solution 15 mcg  15 mcg Nebulization BID RT    albuterol-ipratropium (DUO-NEB) 2.5 MG-0.5 MG/3 ML  3 mL Nebulization Q4H PRN    guaiFENesin ER (MUCINEX) tablet 600 mg  600 mg Oral Q12H    thiamine HCL (B-1) tablet 100 mg  100 mg Oral DAILY    melatonin tablet 3 mg  3 mg Oral QHS PRN    zolpidem (AMBIEN) tablet 5 mg  5 mg Oral QHS PRN        Objective:   Vitals:  Visit Vitals  /79 (BP 1 Location: Left upper arm)   Pulse 82   Temp 98.2 °F (36.8 °C)   Resp 20   Ht 5' 4\" (1.626 m)   Wt 184 lb 11.9 oz (83.8 kg)   SpO2 97%   BMI 31.71 kg/m²     Temp (24hrs), Av.1 °F (36.7 °C), Min:97.4 °F (36.3 °C), Max:98.7 °F (37.1 °C)    O2 Flow Rate (L/min): 1 l/min O2 Device: Room air    Last 24hr Input/Output:    Intake/Output Summary (Last 24 hours) at 3/15/2021 9806  Last data filed at 3/14/2021 1952  Gross per 24 hour   Intake 680 ml   Output 1500 ml   Net -820 ml        PHYSICAL EXAM:  General:          Alert, cooperative, no distress, appears stated age. HEENT:           Atraumatic, anicteric sclerae, pink conjunctivae                          No oral ulcers, mucosa moist, throat clear, dentition fair  Neck:               Supple, symmetrical  Lungs:             Clear to auscultation bilaterally. No Wheezing or Rhonchi. No rales. Chest wall:      No tenderness  No Accessory muscle use. Heart:              Regular  rhythm,  No  murmur   No edema  Abdomen:        Soft, non-tender. Not distended. Bowel sounds normal  Extremities:     No cyanosis. No clubbing,                            Skin turgor normal, Capillary refill normal  Skin:                Not pale.   Not Jaundiced  No rashes   Psych:             Not anxious or agitated. Neurologic:      Alert, moves all extremities, answers questions appropriately and responds to commands         Lab Data Reviewed:    Recent Labs     03/14/21  0346 03/13/21  0152   WBC 6.2 6.1   HGB 11.2* 10.9*   HCT 35.9 34.3*    231     Recent Labs     03/15/21  0324 03/14/21  0346 03/13/21 0152    136 138   K 4.2 3.6 3.3*    103 101   CO2 26 27 27   * 164* 155*   BUN 16 27* 41*   CREA 0.81 0.90 0.92   CA 9.1 8.6 8.4*   MG 2.0 1.8 1.6   ALB 2.8* 2.9* 3.0*   TBILI 1.5* 1.4* 1.1*   * 130* 186*     Lab Results   Component Value Date/Time    Glucose (POC) 175 (H) 03/14/2021 09:33 PM    Glucose (POC) 184 (H) 03/14/2021 04:32 PM    Glucose (POC) 297 (H) 03/14/2021 11:34 AM    Glucose (POC) 119 (H) 03/14/2021 08:26 AM    Glucose (POC) 124 (H) 03/13/2021 09:28 PM     No results for input(s): PH, PCO2, PO2, HCO3, FIO2 in the last 72 hours. No results for input(s): INR, INREXT, INREXT in the last 72 hours. Radiology  Cta Chest W Or W Wo Cont    Result Date: 3/10/2021  No pulmonary embolus. Mild patchy groundglass lung opacities predominantly dependently likely reflect atelectasis or mild edema.  Dilated cardiomegaly with contrast reflux into prominent inferior vena cava and hepatic veins consistent with elevated right heart pressures and right heart failure.      ___________________________________________________  ___________________________________________________    Attending Physician: Cary Cartwright MD

## 2021-03-15 NOTE — NURSE NAVIGATOR
Heart Failure Nurse Navigator Note: Living with Heart Failure Book, signs and symptoms of heart failure magnet, daily weight calendar and heart failure support group information given to patient. Educated using teach back method. Discussed diagnosis definition and assessed patient understanding Reviewed daily weights and weight calendar. Patient states she does not have a scale and is unsure if she will be able to get one. Scale provided. Reviewed when to call the physician. Reviewed signs and symptoms of heart failure. Reviewed low salt diet.

## 2021-03-15 NOTE — PROGRESS NOTES
Problem: Mobility Impaired (Adult and Pediatric)  Goal: *Acute Goals and Plan of Care (Insert Text)  Description: FUNCTIONAL STATUS PRIOR TO ADMISSION: Patient was independent and active without use of DME.    HOME SUPPORT PRIOR TO ADMISSION: The patient lived with her family but did not require assist.    Physical Therapy Goals  Initiated 3/11/2021  1. Patient will move from supine to sit and sit to supine , scoot up and down, and roll side to side in bed with independence within 7 day(s). 2.  Patient will transfer from bed to chair and chair to bed with independence using the least restrictive device within 7 day(s). 3.  Patient will perform sit to stand with independence within 7 day(s). 4.  Patient will ambulate with independence for 300 feet with the least restrictive device within 7 day(s). 5.  Patient will ascend/descend 4 stairs with one handrail(s) with modified independence within 7 day(s). Outcome: Resolved/Met   PHYSICAL THERAPY TREATMENT/DISCHARGE  Patient: Nela Davalos (22 y.o. female)  Date: 3/15/2021  Diagnosis: CHF (congestive heart failure) ([facility]) [I50.9] Acute on chronic systolic congestive heart failure, NYHA class 3 ([facility])  Procedure(s) (LRB):  LEFT AND RIGHT HEART CATH / CORONARY ANGIOGRAPHY (N/A) 4 Days Post-Op  Precautions: (low fall risk per Tinetti)  Chart, physical therapy assessment, plan of care and goals were reviewed. ASSESSMENT  Patient continues with skilled PT services and has progressed towards goals. Patient ambulated around unit without LOB or need for DME. Pt able to negotiate 4 steps with step to pattern and no LOB noted. Pt appears to be at baseline for functional mobility, continue OOB to chair and ambulation x 1 assist 3x/day to prevent deconditioning. Other factors to consider for discharge: lives with family          PLAN :  Patient will be discharged from acute skilled physical therapy at this time.     Rationale for discharge:  Goals achieved    Recommendation for discharge: (in order for the patient to meet his/her long term goals)  No skilled physical therapy/ follow up rehabilitation needs identified at this time. This discharge recommendation:  Has been made in collaboration with the attending provider and/or case management    IF patient discharges home will need the following DME: none       SUBJECTIVE:   Patient stated I have a procedure tomorrow.     OBJECTIVE DATA SUMMARY:   Critical Behavior:  Neurologic State: Alert  Orientation Level: Oriented X4        Functional Mobility Training:  Bed Mobility:                    Transfers:  Sit to Stand: Modified independent  Stand to Sit: Modified independent                             Balance:  Sitting: Intact  Standing: Intact; Without support  Ambulation/Gait Training:  Distance (ft): 150 Feet (ft)  Assistive Device: Gait belt  Ambulation - Level of Assistance: Modified independent        Gait Abnormalities: Decreased step clearance        Base of Support: Widened     Speed/Blanca: Slow       Stairs:  Number of Stairs Trained: 3  Stairs - Level of Assistance: Modified independent   Rail Use: Right       Activity Tolerance:   Good    After treatment patient left in no apparent distress:   Sitting in chair and Call bell within reach    COMMUNICATION/COLLABORATION:   The patients plan of care was discussed with: Registered nurse.      Cherie Hoffman, PT, DPT   Time Calculation: 10 mins

## 2021-03-15 NOTE — PROGRESS NOTES
[facility] in [place], [place]  Inpatient Consult Progress Note      Patient name: Loren Osborn  Patient : 1950  Patient MRN: [id number]  Consulting MD: Lobito Jefferson MD  Date of service: 03/15/21    REASON FOR CONSULT:  Acute on chronic systolic heart failure     PLAN:  · Severe decompensated dilated cardiomyopathy, LVEF < 15%, NYHA Class IV  · Increase dobutamine to the maximum dose 7.5 mcg/kg/min for initial CI 1.35, if cardiac index < 2.3 on current dose, then consider LVAD as DT/BTT prior to hospital discharge  · [address] planned for Tuesday at noon  · Palliative care consult +/- CTS consult based on outcome of cath   · Patient refused 6MW yesterday due to fatigue     Increase dobutamine to 7.5 mcg/kg/min; watch HR today, keep < 100 bpm  No beta-blockers while on dobutamine gtt  Continue digoxin 0.125mg daily; level daily (goal 0.7-1.2)  Continue corlanor 2.5mg every 12 hours  Hold ACEi/ARB/ARNi/MRA in anticipation of surgery and Cr much improved off  Decrease bumex to 2mg PO daily  Continue spironolactone 12.5mg daily  Decrease potassium 40meq daily  Continue magnesium oxide to 800mg twice daily  Keep K>4 and Mg>2  Consider baby ASA; will defer to primary cardiology team   Hold statin due to elevated tbili and transaminitis  Give venofer 200mg x 2 doses, recheck iron later this week, check hemoccult  Continue high dose vitamin D weekly  Check genetic testing for cardiomyopathy- pending  PYP done; formal read pending and gammopathy pending   Nutritionist consult for heart failure diet and weight loss  Provide heart failure education, start basic LVAD education to help with decisions  Review advanced care plan with SW  Ambulate daily  PT to evaluate and 6MW  Recommend at discharge:   · Lifevest if patient full code  · Vaccinations for flu, pneumonia and covid  · Referral to sleep medicine for sleep study     IMPRESSION:  Fatigue  Shortness of breath at rest  Volume overload  Acute on chronic systolic heart failure  · Stage D, NYHA class IV symptoms, INTERMACS 2  · Non-ischemic cardiomyopathy, recent deterioration to severe LV dysfunction  · Normal cors (by [address] [employer] 3/11/21)  · LVEF 15-20% (by echo 3/9/21) from 20-25% (by echo 1/2021) from 51-55% (by echo 11/2019)  · Severe pulmonary artery hypertension, PVR 4  · RV dysfunction, moderate-severe  · Low cardiac index at rest 1.3  · Inotropic-dependence  Anemia  Hypokalemia/hypomagensemia  Cardiac risk factors  · Morbid obesity (BMI 32)  · High risk for SHARLENE  · DM2 (hgba1c 6.8)  · HL  · HTN  · Former tobacco (stopped 2014 after > 40 years)  · H/o alcohol abuse  OA, right knee  Carpal tunnel syndrome  Gout  Iron deficiency  Vitamin D deficincy     Interval Events  Feels much better, but still limited with ambulation  SBP 130s/70s, HR 80-90s  I/O net negative 1.3L   Weight 184 from 194 lbs? Cr 0.81  K 4.2  Mg 2.0  TBili 1.5  proNTBNP 329      CARDIAC IMAGING:  RHC (3/11/21) 25, PA 49/27/37, W 34, Allen CI 1.34  Echo (3/9/21) LVEF 15-20%, mild-moderate MR, severely elevated CVP, IVC > 21mm  Echo (1/29/21) LVEF 20-25%, moderately to severely reduced RV function  Echo (11/20/19) LVEF 51-55%  Echo (12/18/18) LVEF 50-55%  Echo (11/23/16) LVEF 45-55%  EKG (3/9/21) ST 103bpm, QRS 84ms  NST (5/2018) no scar or ischemia, LVEF 48%  Normal cors (by [address] [employer] 3/11/21)     PHYSICAL EXAM:  Visit Vitals  BP (!) 145/77 (BP 1 Location: Left upper arm, BP Patient Position: At rest)   Pulse 81   Temp 97.6 °F (36.4 °C)   Resp 18   Ht 5' 4\" (1.626 m)   Wt 184 lb 11.9 oz (83.8 kg)   SpO2 94%   BMI 31.71 kg/m²     General: Patient is well developed, well-nourished in no acute distress  HEENT: Normocephalic and atraumatic. No scleral icterus. Pupils are equal, round and reactive to light and accomodation. No conjunctival injection. Oropharynx is clear. Neck: Supple. No evidence of thyroid enlargements or lymphadenopathy.   JVD: Cannot be appreciated   Lungs: Breath sounds are equal and clear bilaterally. No wheezes, rhonchi, or rales. Heart: Regular rate and rhythm with normal S1 and S2. No murmurs, gallops or rubs. Abdomen: Soft, no mass or tenderness. No organomegaly or hernia. Bowel sounds present. Genitourinary and rectal: deferred  Extremities: No cyanosis, clubbing, or edema. Neurologic: No focal sensory or motor deficits are noted. Grossly intact. Psychiatric: Awake, alert an doriented x 3. Appropriate mood and affect. Skin: Warm, dry and well perfused. No lesions, nodules or rashes are noted. REVIEW OF SYSTEMS:  General: Denies fever, night sweats. Ear, nose and throat: Denies difficulty hearing, sinus problems, runny nose, post-nasal drip, ringing in ears, mouth sores, loose teeth, ear pain, nosebleeds, sore throate, facial pain or numbess  Cardiovascular: see above in the interval history  Respiratory: Denies cough, wheezing, sputum production, hemoptysis.   Gastrointestinal: Denies heartburn, constipation, intolerance to certain foods, diarrhea, abdominal pain, nausea, vomiting, difficulty swallowing, blood in stool  Kidney and bladder: Denies painful urination, frequent urination, urgency, prostate problems and impotence  Musculoskeletal: Denies joint pain, muscle weakness  Skin and hair: Denies change in existing skin lesions, hair loss or increase, breast changes    PAST MEDICAL HISTORY:  Past Medical History:   Diagnosis Date    Arthritis     Diabetes ([facility] [address]     Hypercholesterolemia     Hypertension        PAST SURGICAL HISTORY:  Past Surgical History:   Procedure Laterality Date    HX ORTHOPAEDIC      Arthroscopy right knee       FAMILY HISTORY:  Family History   Problem Relation Age of Onset    Diabetes Mother        SOCIAL HISTORY:  Social History     Socioeconomic History    Marital status:      Spouse name: Not on file    Number of children: 3    Years of education: 12    Highest education level: Not on file   Occupational History    Occupation: retired   Tobacco Use    Smoking status: Former Smoker     Packs/day: 0.25     Years: 20.00     Pack years: 5.00     Quit date: 2014     Years since quittin.2    Smokeless tobacco: Never Used   Substance and Sexual Activity    Alcohol use: Yes     Alcohol/week: 0.0 standard drinks     Comment: 21-24 Beers per week    Drug use: No    Sexual activity: Yes     Partners: Male       LABORATORY RESULTS:     Labs Latest Ref Rng & Units 3/15/2021 3/14/2021 3/13/2021 3/12/2021 3/11/2021 3/11/2021 3/10/2021   WBC 3.6 - 11.0 K/uL - 6.2 6.1 5.5 4.2 5.0 4.6   RBC 3.80 - 5.20 M/uL - 4.09 4.01 3.81 4.21 3.93 3.82   Hemoglobin 11.5 - 16.0 g/dL - 11. 2(L) 10. 9(L) 10. 4(L) 11.6 10. 8(L) 10. 7(L)   Hematocrit 35.0 - 47.0 % - 35.9 34. 3(L) 33. 2(L) 37.8 34. 5(L) 33. 9(L)   MCV 80.0 - 99.0 FL - 87.8 85.5 87.1 89.8 87.8 88.7   Platelets 425 - 442 K/uL - 248 231 259 251 256 219   Lymphocytes 12 - 49 % - 8(L) 10(L) 15 18 20 17   Monocytes 5 - 13 % - 7 7 6 6 5 4(L)   Eosinophils 0 - 7 % - 3 4 3 2 1 5   Basophils 0 - 1 % - 0 0 1 1 1 1   Albumin 3.5 - 5.0 g/dL 2. 8(L) 2. 9(L) 3.0(L) 2. 7(L) - 3. 0(L) -   Calcium 8.5 - 10.1 MG/DL 9.1 8.6 8.4(L) 8.6 8.9 9.1 8.4(L)   Glucose 65 - 100 mg/dL 107(H) 164(H) 155(H) 160(H) 82 84 172(H)   BUN 6 - 20 MG/DL 16 27(H) 41(H) 52(H) 42(H) 41(H) 24(H)   Creatinine 0.55 - 1.02 MG/DL 0.81 0.90 0.92 1.23(H) 1.21(H) 1.29(H) 0.85   Sodium 136 - 145 mmol/L 136 136 138 138 135(L) 138 142   Potassium 3.5 - 5.1 mmol/L 4.2 3.6 3.3(L) 3.9 4.6 4.5 3. 3(L)   TSH 0.36 - 3.74 uIU/mL - - - - 2.75 - -   Some recent data might be hidden     Lab Results   Component Value Date/Time    TSH 2.75 2021 10:31 AM    TSH 1.050 10/08/2020 12:01 PM    TSH 1.200 2019 02:31 PM    TSH 1.280 2016 11:48 AM       ALLERGY:  No Known Allergies     CURRENT MEDICATIONS:    Current Facility-Administered Medications:     DOBUTamine (DOBUTREX) 500 mg/250 mL (2,000 mcg/mL) infusion, 7.5 mcg/kg/min, IntraVENous, CONTINUOUS, [place], Efrain Bettencourt NP    hydrALAZINE (APRESOLINE) tablet 25 mg, 25 mg, Oral, TID, Efrain Mayberry NP    isosorbide dinitrate (ISORDIL) tablet 20 mg, 20 mg, Oral, TID, Efrain Mayberry NP    [Held by provider] bumetanide (BUMEX) tablet 2 mg, 2 mg, Oral, BID, Jessica Dunne MD    potassium chloride SR (KLOR-CON 10) tablet 40 mEq, 40 mEq, Oral, BID, Jessica Dunne MD, 40 mEq at 03/15/21 8325    digoxin (LANOXIN) tablet 0.125 mg, 0.125 mg, Oral, DAILY, Jessica Dunne MD, 0.125 mg at 03/15/21 0907    ivabradine (CORLANOR) tablet 2.5 mg, 2.5 mg, Oral, Q12H, Jessica Dunne MD, 2.5 mg at 03/15/21 0915    ergocalciferol capsule 50,000 Units, 50,000 Units, Oral, Q7D, Jessica Dunne MD, 50,000 Units at 03/13/21 1258    spironolactone (ALDACTONE) tablet 12.5 mg, 12.5 mg, Oral, DAILY, Jessica Dunne MD, 12.5 mg at 03/15/21 0907    magnesium oxide (MAG-OX) tablet 800 mg, 800 mg, Oral, BID, Jessica Dunne MD, 800 mg at 03/15/21 0907    sodium chloride (NS) flush 5-40 mL, 5-40 mL, IntraVENous, Q8H, Anshul Baca MD, 10 mL at 03/15/21 0544    sodium chloride (NS) flush 5-40 mL, 5-40 mL, IntraVENous, PRN, Maddie Bettencourt MD    acetaminophen (TYLENOL) tablet 650 mg, 650 mg, Oral, Q6H PRN, 650 mg at 03/13/21 2302 **OR** acetaminophen (TYLENOL) suppository 650 mg, 650 mg, Rectal, Q6H PRN, Maddie Bettencourt MD    polyethylene glycol (MIRALAX) packet 17 g, 17 g, Oral, DAILY PRN, Maddie Bettencourt MD    promethazine (PHENERGAN) tablet 12.5 mg, 12.5 mg, Oral, Q6H PRN **OR** ondansetron (ZOFRAN) injection 4 mg, 4 mg, IntraVENous, Q6H PRN, Anshul Nunn MD    sodium chloride (NS) flush 5-40 mL, 5-40 mL, IntraVENous, Q8H, Anshul Baca MD, 10 mL at 03/15/21 0585    glucose chewable tablet 16 g, 4 Tab, Oral, PRN, Maddie Bettencourt MD    dextrose (D50W) injection syrg 12.5-25 g, 25-50 mL, IntraVENous, PRN, Maddie Bettencourt MD, 25 g at 03/11/21 7662   glucagon (GLUCAGEN) injection 1 mg, 1 mg, IntraMUSCular, PRN, Fay Moctezuma MD    insulin glargine (LANTUS) injection 30 Units, 30 Units, SubCUTAneous, DAILY, Fay Moctezuma MD, 30 Units at 03/14/21 0901    insulin lispro (HUMALOG) injection 1-10 Units, 1-10 Units, SubCUTAneous, AC&HS, Fay Moctezuma MD, Stopped at 03/14/21 2200    oxybutynin (DITROPAN) tablet 5 mg, 5 mg, Oral, TID, Fay Moctezuma MD, 5 mg at 03/15/21 0907    budesonide (PULMICORT) 500 mcg/2 ml nebulizer suspension, 500 mcg, Nebulization, BID RT, Fay Moctezuma MD, 500 mcg at 03/14/21 2123    arformoteroL (BROVANA) neb solution 15 mcg, 15 mcg, Nebulization, BID RT, Fay Moctezuma MD, 15 mcg at 03/14/21 2124    albuterol-ipratropium (DUO-NEB) 2.5 MG-0.5 MG/3 ML, 3 mL, Nebulization, Q4H PRN, Fay Moctezuma MD, 3 mL at 03/13/21 2039    guaiFENesin ER (MUCINEX) tablet 600 mg, 600 mg, Oral, Q12H, Fay Moctezuma MD, 600 mg at 03/15/21 0907    thiamine HCL (B-1) tablet 100 mg, 100 mg, Oral, DAILY, Fay Moctezuma MD, 100 mg at 03/15/21 5607    melatonin tablet 3 mg, 3 mg, Oral, QHS PRN, Fay Moctezuma MD, 3 mg at 03/11/21 0233    zolpidem (AMBIEN) tablet 5 mg, 5 mg, Oral, QHS PRN, Fay Moctezuma MD, 5 mg at 03/14/21 2333    PATIENT CARE TEAM:  Patient Care Team:  Leisa Joy MD as PCP - General (Internal Medicine)  Leisa Joy MD as PCP - REHABILITATION [facility] Provider  Jo Trevizo MD as Physician (Cardiology)  Chavo Jean MD as Surgeon (Orthopedic Surgery)     Thank you for allowing me to participate in this patient's care.     Kwadwo Curling, NP  [address] [place] [address], [address]  Phone: [phone]  Fax: [phone]

## 2021-03-15 NOTE — PROGRESS NOTES
Problem: Falls - Risk of  Goal: *Absence of Falls  Description: Document Aubree Blood Fall Risk and appropriate interventions in the flowsheet.   Outcome: Progressing Towards Goal  Note: Fall Risk Interventions:  Mobility Interventions: Communicate number of staff needed for ambulation/transfer         Medication Interventions: Patient to call before getting OOB, Teach patient to arise slowly         History of Falls Interventions: Evaluate medications/consider consulting pharmacy, Door open when patient unattended         Problem: Heart Failure: Day 5  Goal: Off Pathway (Use only if patient is Off Pathway)  Outcome: Progressing Towards Goal  Goal: Diagnostic Test/Procedures  Outcome: Progressing Towards Goal  Goal: Medications  Outcome: Progressing Towards Goal

## 2021-03-15 NOTE — PROGRESS NOTES
Bedside and Verbal shift change report given to Vianeny RN (oncoming nurse) by Zeenat Bean RN (offgoing nurse). Report included the following information SBAR, Kardex, Intake/Output, MAR, Recent Results and Cardiac Rhythm NSR. Inverted T ST depressed.

## 2021-03-15 NOTE — PROGRESS NOTES
COLLINS met with patient and lily Montoya in hospital room. Patient and her daughters have not completed an Advance Directive, or pre LVAD evaluation documents left with the patient over the weekend. Patient stated her daughter(s) should be visiting this afternoon, she will complete documents with them later today. COLLINS will follow up tomorrow prior to her 3/16/21 noon RHC.     3/16/21 COLLINS met with patient and Mr. Hao Conner to complete LVAD evaluation documents and AMD. All documentation, including AMD has been completed and to be scanned into patient's chart. COLLINS will continue to follow.

## 2021-03-15 NOTE — PROGRESS NOTES
03/15/21 1500   Six Minute Walk Report (PRE)   Heart Rate 88   O2 Saturation 100   Six Minute Walk Report (POST)   Heart Rate 87   O2 Saturation 98   Distance Walked in Feet (ft) 330.5 ft.    Distance in Meters 100.74 meters

## 2021-03-16 LAB
ALBUMIN SERPL-MCNC: 2.9 G/DL (ref 3.5–5)
ALBUMIN/GLOB SERPL: 0.5 {RATIO} (ref 1.1–2.2)
ALP SERPL-CCNC: 99 U/L (ref 45–117)
ALT SERPL-CCNC: 82 U/L (ref 12–78)
ANION GAP SERPL CALC-SCNC: 9 MMOL/L (ref 5–15)
AST SERPL-CCNC: 44 U/L (ref 15–37)
BILIRUB SERPL-MCNC: 1.3 MG/DL (ref 0.2–1)
BNP SERPL-MCNC: 380 PG/ML
BUN SERPL-MCNC: 17 MG/DL (ref 6–20)
BUN/CREAT SERPL: 20 (ref 12–20)
CALCIUM SERPL-MCNC: 9.5 MG/DL (ref 8.5–10.1)
CHLORIDE SERPL-SCNC: 101 MMOL/L (ref 97–108)
CO2 SERPL-SCNC: 24 MMOL/L (ref 21–32)
CREAT SERPL-MCNC: 0.85 MG/DL (ref 0.55–1.02)
DIGOXIN SERPL-MCNC: 0.4 NG/ML (ref 0.9–2)
GLOBULIN SER CALC-MCNC: 5.3 G/DL (ref 2–4)
GLUCOSE BLD STRIP.AUTO-MCNC: 134 MG/DL (ref 65–100)
GLUCOSE BLD STRIP.AUTO-MCNC: 160 MG/DL (ref 65–100)
GLUCOSE BLD STRIP.AUTO-MCNC: 173 MG/DL (ref 65–100)
GLUCOSE BLD STRIP.AUTO-MCNC: 177 MG/DL (ref 65–100)
GLUCOSE SERPL-MCNC: 115 MG/DL (ref 65–100)
MAGNESIUM SERPL-MCNC: 2 MG/DL (ref 1.6–2.4)
POTASSIUM SERPL-SCNC: 4.8 MMOL/L (ref 3.5–5.1)
PROT SERPL-MCNC: 8.2 G/DL (ref 6.4–8.2)
SERVICE CMNT-IMP: ABNORMAL
SODIUM SERPL-SCNC: 134 MMOL/L (ref 136–145)

## 2021-03-16 PROCEDURE — 74011000250 HC RX REV CODE- 250: Performed by: HOSPITALIST

## 2021-03-16 PROCEDURE — 65660000000 HC RM CCU STEPDOWN

## 2021-03-16 PROCEDURE — 74011250637 HC RX REV CODE- 250/637: Performed by: INTERNAL MEDICINE

## 2021-03-16 PROCEDURE — 82962 GLUCOSE BLOOD TEST: CPT

## 2021-03-16 PROCEDURE — 74011636637 HC RX REV CODE- 636/637: Performed by: HOSPITALIST

## 2021-03-16 PROCEDURE — 80162 ASSAY OF DIGOXIN TOTAL: CPT

## 2021-03-16 PROCEDURE — 36415 COLL VENOUS BLD VENIPUNCTURE: CPT

## 2021-03-16 PROCEDURE — 74011250637 HC RX REV CODE- 250/637: Performed by: NURSE PRACTITIONER

## 2021-03-16 PROCEDURE — 83735 ASSAY OF MAGNESIUM: CPT

## 2021-03-16 PROCEDURE — 74011250636 HC RX REV CODE- 250/636: Performed by: NURSE PRACTITIONER

## 2021-03-16 PROCEDURE — 99232 SBSQ HOSP IP/OBS MODERATE 35: CPT | Performed by: INTERNAL MEDICINE

## 2021-03-16 PROCEDURE — 80053 COMPREHEN METABOLIC PANEL: CPT

## 2021-03-16 PROCEDURE — 74011250637 HC RX REV CODE- 250/637: Performed by: HOSPITALIST

## 2021-03-16 PROCEDURE — 94640 AIRWAY INHALATION TREATMENT: CPT

## 2021-03-16 PROCEDURE — 83880 ASSAY OF NATRIURETIC PEPTIDE: CPT

## 2021-03-16 RX ORDER — HYDRALAZINE HYDROCHLORIDE 25 MG/1
25 TABLET, FILM COATED ORAL ONCE
Status: COMPLETED | OUTPATIENT
Start: 2021-03-16 | End: 2021-03-16

## 2021-03-16 RX ORDER — DIGOXIN 125 MCG
0.25 TABLET ORAL DAILY
Status: DISCONTINUED | OUTPATIENT
Start: 2021-03-17 | End: 2021-03-20 | Stop reason: HOSPADM

## 2021-03-16 RX ORDER — ISOSORBIDE DINITRATE 10 MG/1
30 TABLET ORAL 3 TIMES DAILY
Status: DISCONTINUED | OUTPATIENT
Start: 2021-03-16 | End: 2021-03-17

## 2021-03-16 RX ORDER — ISOSORBIDE DINITRATE 10 MG/1
10 TABLET ORAL ONCE
Status: COMPLETED | OUTPATIENT
Start: 2021-03-16 | End: 2021-03-16

## 2021-03-16 RX ORDER — DOBUTAMINE HYDROCHLORIDE 200 MG/100ML
7.5 INJECTION INTRAVENOUS CONTINUOUS
Status: DISCONTINUED | OUTPATIENT
Start: 2021-03-16 | End: 2021-03-18

## 2021-03-16 RX ORDER — HYDRALAZINE HYDROCHLORIDE 50 MG/1
50 TABLET, FILM COATED ORAL 3 TIMES DAILY
Status: DISCONTINUED | OUTPATIENT
Start: 2021-03-16 | End: 2021-03-17

## 2021-03-16 RX ORDER — DIGOXIN 125 MCG
0.12 TABLET ORAL ONCE
Status: COMPLETED | OUTPATIENT
Start: 2021-03-16 | End: 2021-03-16

## 2021-03-16 RX ADMIN — ISOSORBIDE DINITRATE 30 MG: 10 TABLET ORAL at 16:11

## 2021-03-16 RX ADMIN — GUAIFENESIN 600 MG: 600 TABLET, EXTENDED RELEASE ORAL at 21:19

## 2021-03-16 RX ADMIN — BUDESONIDE 500 MCG: 0.5 INHALANT RESPIRATORY (INHALATION) at 07:10

## 2021-03-16 RX ADMIN — INSULIN LISPRO 2 UNITS: 100 INJECTION, SOLUTION INTRAVENOUS; SUBCUTANEOUS at 12:16

## 2021-03-16 RX ADMIN — ISOSORBIDE DINITRATE 10 MG: 10 TABLET ORAL at 11:30

## 2021-03-16 RX ADMIN — HYDRALAZINE HYDROCHLORIDE 25 MG: 25 TABLET, FILM COATED ORAL at 08:55

## 2021-03-16 RX ADMIN — Medication 100 MG: at 08:54

## 2021-03-16 RX ADMIN — ARFORMOTEROL TARTRATE 15 MCG: 15 SOLUTION RESPIRATORY (INHALATION) at 20:25

## 2021-03-16 RX ADMIN — Medication 20 ML: at 06:46

## 2021-03-16 RX ADMIN — ARFORMOTEROL TARTRATE 15 MCG: 15 SOLUTION RESPIRATORY (INHALATION) at 07:10

## 2021-03-16 RX ADMIN — INSULIN LISPRO 2 UNITS: 100 INJECTION, SOLUTION INTRAVENOUS; SUBCUTANEOUS at 17:17

## 2021-03-16 RX ADMIN — Medication 10 ML: at 06:54

## 2021-03-16 RX ADMIN — OXYBUTYNIN CHLORIDE 5 MG: 5 TABLET ORAL at 16:11

## 2021-03-16 RX ADMIN — BUMETANIDE 2 MG: 1 TABLET ORAL at 08:54

## 2021-03-16 RX ADMIN — DIGOXIN 0.12 MG: 125 TABLET ORAL at 08:55

## 2021-03-16 RX ADMIN — GUAIFENESIN 600 MG: 600 TABLET, EXTENDED RELEASE ORAL at 08:55

## 2021-03-16 RX ADMIN — IVABRADINE 2.5 MG: 5 TABLET, FILM COATED ORAL at 09:06

## 2021-03-16 RX ADMIN — HYDRALAZINE HYDROCHLORIDE 50 MG: 50 TABLET, FILM COATED ORAL at 22:14

## 2021-03-16 RX ADMIN — DIGOXIN 0.12 MG: 125 TABLET ORAL at 12:16

## 2021-03-16 RX ADMIN — ISOSORBIDE DINITRATE 20 MG: 10 TABLET ORAL at 08:54

## 2021-03-16 RX ADMIN — Medication 800 MG: at 08:54

## 2021-03-16 RX ADMIN — DOBUTAMINE HYDROCHLORIDE 7.5 MCG/KG/MIN: 200 INJECTION INTRAVENOUS at 11:03

## 2021-03-16 RX ADMIN — ISOSORBIDE DINITRATE 30 MG: 10 TABLET ORAL at 22:14

## 2021-03-16 RX ADMIN — ZOLPIDEM TARTRATE 5 MG: 5 TABLET ORAL at 22:25

## 2021-03-16 RX ADMIN — DOBUTAMINE HYDROCHLORIDE 7.5 MCG/KG/MIN: 200 INJECTION INTRAVENOUS at 14:46

## 2021-03-16 RX ADMIN — HYDRALAZINE HYDROCHLORIDE 25 MG: 25 TABLET, FILM COATED ORAL at 11:29

## 2021-03-16 RX ADMIN — OXYBUTYNIN CHLORIDE 5 MG: 5 TABLET ORAL at 08:55

## 2021-03-16 RX ADMIN — Medication 10 ML: at 14:00

## 2021-03-16 RX ADMIN — Medication 800 MG: at 17:17

## 2021-03-16 RX ADMIN — OXYBUTYNIN CHLORIDE 5 MG: 5 TABLET ORAL at 22:14

## 2021-03-16 RX ADMIN — DOBUTAMINE HYDROCHLORIDE 7.5 MCG/KG/MIN: 200 INJECTION INTRAVENOUS at 01:20

## 2021-03-16 RX ADMIN — Medication 10 ML: at 22:15

## 2021-03-16 RX ADMIN — SPIRONOLACTONE 12.5 MG: 25 TABLET ORAL at 08:54

## 2021-03-16 RX ADMIN — POTASSIUM CHLORIDE 40 MEQ: 750 TABLET, EXTENDED RELEASE ORAL at 08:54

## 2021-03-16 RX ADMIN — BUDESONIDE 500 MCG: 0.5 INHALANT RESPIRATORY (INHALATION) at 20:25

## 2021-03-16 NOTE — PROGRESS NOTES
Hospital Progress Note    NAME:  Rashida Qiu   :   1950   MRN:  [id number]     Date/Time:  3/16/2021 8:07 PM    Plan:   1. Management /AHF  2. RHC today  Risk of Deterioration: Low  []           Moderate  [x]           High  []                 Assessment:   # Acute on chronic severe systolic congestive heart failure, NYHA class 3 ([facility] [address] (3/11/2021). EF <15%, NYHA IV with cardiogenic shock, dobutamin gtt  # Dilated cardiomyopathy ([facility] [address] (2014), Nonischemic. S/p BiV AICD  -Cardiology and AHF teams on board  -Lasix gtt transitioned to IV Bumex 2mg BID per AHF team 3/13  -I/O -3760  -Continue Dobutamine gtt  -Monitor BMP/CMP closely, stable  -RHC early next week for further eval after diuresis  -Consider LVAD before discharge per AHF/ cardiology teams  -PFTs per AHF team  -Palliative care consult  -No BB while on Dobutamine GTT  -Holding ACE/ARB/ARNi/MRA per AHF team in anticipation of surgery  -Started on Spironolactone 12.5mg daily per AHF team 3/13  -KCl and MgOx supplementation uptitration per AHF team  -ASA defered to primary cardiology team  -IV Venofer infusions 200mg x 2 doses  -Hold statin for elevated LFTs  -High dose Vit D weekly  -6MWT per PT -refused    # Dyslipidemia (2014)  -LDL 73 on 10/20  -Per cardiology    # Hypertension (2014)  -Currently on Lasix GTT with pressor support from Dobutamine GTT  -ACE/ARB/BB on hold.  Defer to cardiology    # Obesity (BMI 30.0-34.9) (11/15/2016)  -Lifestyle modifications    # CKD (chronic kidney disease) stage 3, GFR 30-59 ml/min (2019)  -Overview:  negative lexiscan  - LVVH, LAE, otherwise normal echo    # Uncontrolled type 2 diabetes mellitus with hyperglycemia ([facility] [address] (2020), better controlled  -A1c 7.6  -Continue lantus 30U daily  -SSU humalog AC-HS    # Moderate pulmonary arterial systolic [facility] (3/11/2021)    # Hypokalemia (3/11/2021)  -Replete and monitor as appropriate  -KCl PO 40mEq x 2, Q4h apart today    # Hypomagnesemia (3/11/2021)  -On PO Mag Ox  -Will give IV Magnesium Sulfate 2gm once  -Mg daily monitoring    Case discussed with Pt/ family and nurse  Dispo: >48hrs, pending RHC early next week and possible LVAD before discharge    Per AHF team, Recommend at discharge:   · Lifevest if patient full code  · Vaccinations for flu, pneumonia and covid  · Referral to sleep medicine for sleep study     Admitting notes:71 y.o. female who presents with CHF and transferred from [facility] for evaluation by COLTON.      Alexia Aaron is a 73 y.o.   female with PMH of above mentioned problems who presents to ED from her cardiologist office with progressive shortness of breath and cough.  Patient states that she has been having progressive shortness of breath for past couple of weeks and for the past 10 days it has gotten to a point that she cannot walk more than 10 today before catching her breath.  She also endorsed that she is having more cough than usual.  Patient also endorsed PND and orthopnea.  Denies any swelling of her legs.  Patient states that she has been taking her medication as prescribed but she denied using any beta-blocker or Aldactone for heart failure.  Patient is unsure whether she had cardiac angiogram done in the past but she said she been diagnosed with heart failure since 2005.  Patient was a heavy smoker in the past and smoked pack a day for more than 15 years but she has quit smoking 6 years ago. [facility] drink alcohol and denies any recent drug use.  Patient denies any fever chills diarrhea belly pain chest pain.        The patient denies any fever, chills, chest pain, cough, congestion, recent illness, palpitations, or dysuria.       Subjective:    Feeling better this am          11 Point Review of Systems:   Negative except no cp,swelling    []            Unable to obtain ROS due to:       []            mental status change []            sedated []            intubated     Social History Tobacco Use    Smoking status: Former Smoker     Packs/day: 0.25     Years: 20.00     Pack years: 5.00     Quit date: 2014     Years since quittin.2    Smokeless tobacco: Never Used   Substance Use Topics    Alcohol use:  Yes     Alcohol/week: 0.0 standard drinks     Comment: 21-24 Beers per week     Medications reviewed:  Current Facility-Administered Medications   Medication Dose Route Frequency    DOBUTamine (DOBUTREX) 500 mg/250 mL (2,000 mcg/mL) infusion  7.5 mcg/kg/min IntraVENous CONTINUOUS    hydrALAZINE (APRESOLINE) tablet 25 mg  25 mg Oral TID    isosorbide dinitrate (ISORDIL) tablet 20 mg  20 mg Oral TID    bumetanide (BUMEX) tablet 2 mg  2 mg Oral DAILY    potassium chloride SR (KLOR-CON 10) tablet 40 mEq  40 mEq Oral DAILY    digoxin (LANOXIN) tablet 0.125 mg  0.125 mg Oral DAILY    ivabradine (CORLANOR) tablet 2.5 mg  2.5 mg Oral Q12H    ergocalciferol capsule 50,000 Units  50,000 Units Oral Q7D    spironolactone (ALDACTONE) tablet 12.5 mg  12.5 mg Oral DAILY    magnesium oxide (MAG-OX) tablet 800 mg  800 mg Oral BID    sodium chloride (NS) flush 5-40 mL  5-40 mL IntraVENous Q8H    sodium chloride (NS) flush 5-40 mL  5-40 mL IntraVENous PRN    acetaminophen (TYLENOL) tablet 650 mg  650 mg Oral Q6H PRN    Or    acetaminophen (TYLENOL) suppository 650 mg  650 mg Rectal Q6H PRN    polyethylene glycol (MIRALAX) packet 17 g  17 g Oral DAILY PRN    promethazine (PHENERGAN) tablet 12.5 mg  12.5 mg Oral Q6H PRN    Or    ondansetron (ZOFRAN) injection 4 mg  4 mg IntraVENous Q6H PRN    sodium chloride (NS) flush 5-40 mL  5-40 mL IntraVENous Q8H    glucose chewable tablet 16 g  4 Tab Oral PRN    dextrose (D50W) injection syrg 12.5-25 g  25-50 mL IntraVENous PRN    glucagon (GLUCAGEN) injection 1 mg  1 mg IntraMUSCular PRN    insulin glargine (LANTUS) injection 30 Units  30 Units SubCUTAneous DAILY    insulin lispro (HUMALOG) injection 1-10 Units  1-10 Units SubCUTAneous AC&HS    oxybutynin (DITROPAN) tablet 5 mg  5 mg Oral TID    budesonide (PULMICORT) 500 mcg/2 ml nebulizer suspension  500 mcg Nebulization BID RT    arformoteroL (BROVANA) neb solution 15 mcg  15 mcg Nebulization BID RT    albuterol-ipratropium (DUO-NEB) 2.5 MG-0.5 MG/3 ML  3 mL Nebulization Q4H PRN    guaiFENesin ER (MUCINEX) tablet 600 mg  600 mg Oral Q12H    thiamine HCL (B-1) tablet 100 mg  100 mg Oral DAILY    melatonin tablet 3 mg  3 mg Oral QHS PRN    zolpidem (AMBIEN) tablet 5 mg  5 mg Oral QHS PRN        Objective:   Vitals:  Visit Vitals  /83 (BP 1 Location: Left upper arm, BP Patient Position: At rest)   Pulse 80   Temp 97.6 °F (36.4 °C)   Resp 15   Ht 5' 4\" (1.626 m)   Wt 190 lb 14.4 oz (86.6 kg)   SpO2 95%   BMI 32.77 kg/m²     Temp (24hrs), Av °F (36.7 °C), Min:97.5 °F (36.4 °C), Max:98.6 °F (37 °C)    O2 Flow Rate (L/min): 1 l/min O2 Device: Room air    Last 24hr Input/Output:    Intake/Output Summary (Last 24 hours) at 3/16/2021 0842  Last data filed at 3/16/2021 4749  Gross per 24 hour   Intake --   Output 1600 ml   Net -1600 ml        PHYSICAL EXAM:  General:          Alert, cooperative, no distress, appears stated age. HEENT:           Atraumatic, anicteric sclerae, pink conjunctivae                          No oral ulcers, mucosa moist, throat clear, dentition fair  Neck:               Supple, symmetrical  Lungs:             Clear to auscultation bilaterally. No Wheezing or Rhonchi. No rales. Chest wall:      No tenderness  No Accessory muscle use. Heart:              Regular  rhythm,  No  murmur   No edema  Abdomen:        Soft, non-tender. Not distended. Bowel sounds normal  Extremities:     No cyanosis. No clubbing,                            Skin turgor normal, Capillary refill normal  Skin:                Not pale. Not Jaundiced  No rashes   Psych:             Not anxious or agitated.   Neurologic:      Alert, moves all extremities, answers questions appropriately and responds to commands         Lab Data Reviewed:    Recent Labs     03/14/21  0346   WBC 6.2   HGB 11.2*   HCT 35.9        Recent Labs     03/16/21  0328 03/15/21  0324 03/14/21  0346   * 136 136   K 4.8 4.2 3.6    105 103   CO2 24 26 27   * 107* 164*   BUN 17 16 27*   CREA 0.85 0.81 0.90   CA 9.5 9.1 8.6   MG 2.0 2.0 1.8   ALB 2.9* 2.8* 2.9*   TBILI 1.3* 1.5* 1.4*   ALT 82* 101* 130*     Lab Results   Component Value Date/Time    Glucose (POC) 134 (H) 03/16/2021 07:48 AM    Glucose (POC) 211 (H) 03/15/2021 09:56 PM    Glucose (POC) 125 (H) 03/15/2021 04:26 PM    Glucose (POC) 205 (H) 03/15/2021 11:36 AM    Glucose (POC) 112 (H) 03/15/2021 08:32 AM     No results for input(s): PH, PCO2, PO2, HCO3, FIO2 in the last 72 hours. No results for input(s): INR, INREXT, INREXT in the last 72 hours. Radiology  Cta Chest W Or W Wo Cont    Result Date: 3/10/2021  No pulmonary embolus. Mild patchy groundglass lung opacities predominantly dependently likely reflect atelectasis or mild edema. Dilated cardiomegaly with contrast reflux into prominent inferior vena cava and hepatic veins consistent with elevated right heart pressures and right heart failure.     Nuclear Cardiac Amyloid Spect    Result Date: 3/15/2021  : PYP scan is equivocal for ATTR cardiac amyloidosis based on the H:CL ratio of 1.144 and semiquantitative score of 1.       ___________________________________________________  ___________________________________________________    Attending Physician: Angélica Ceballos MD

## 2021-03-16 NOTE — PROGRESS NOTES
[facility] in [place], [place]  Inpatient Consult Progress Note      Patient name: Tamika Crump  Patient : 1950  Patient MRN: [id number]  Consulting MD: Hraini Meek MD  Date of service: 21    REASON FOR CONSULT:  Acute on chronic systolic heart failure     PLAN:  · Severe decompensated dilated cardiomyopathy, LVEF < 15%, NYHA Class IV; initiate LVAD evaluation   · Continue dobutamine at the maximum dose 7.5 mcg/kg/min for initial CI 1.35, if cardiac index < 2.3 on current dose, then consider LVAD as DT/BTT prior to hospital discharge  · If CI > 2.3, will complete VAD evaluation prior to discharge   · [address] planned for Wednesday at noon  · Palliative care consult +/- CTS consult based on outcome of cath   · Patient refused 6MW yesterday due to fatigue     Continue dobutamine 7.5 mcg/kg/min; watch HR today, keep < 100 bpm  No beta-blockers while on dobutamine gtt  Increase digoxin to 0.25mg daily; level daily (goal 0.7-1.2)  Discontinue ivabradine   Hold ACEi/ARB/ARNi/MRA in anticipation of surgery and Cr much improved off  Increase hydralazine to 50 mg PO TID  Increase isosorbide to 40 mg PO TID   Continue bumex 2mg PO daily  Continue spironolactone 12.5mg daily  Discontinue potassium chloride   Continue magnesium oxide 800mg twice daily  Keep K>4 and Mg>2  Consider baby ASA; will defer to primary cardiology team   Hold statin due to elevated tbili and transaminitis  Give venofer 200mg x 2 doses, recheck iron later this week, check hemoccult  Continue high dose vitamin D weekly  Check genetic testing for cardiomyopathy- pending  PYP done; formal read pending and gammopathy pending   Nutritionist consult for heart failure diet and weight loss  Provide heart failure education, start basic LVAD education to help with decisions  Palliative Care consultation   Review advanced care plan with SW; to complete AMD today   Ambulate daily  PT to evaluate and 6MW  Recommend at discharge:   · Lifevest if patient full code  · Vaccinations for flu, pneumonia and covid  · Referral to sleep medicine for sleep study     IMPRESSION:  Fatigue  Shortness of breath at rest  Volume overload  Acute on chronic systolic heart failure  · Stage D, NYHA class IV symptoms, INTERMACS 2  · Non-ischemic cardiomyopathy, recent deterioration to severe LV dysfunction  · Normal cors (by [facility] 3/11/21)  · LVEF 15-20% (by echo 3/9/21) from 20-25% (by echo 1/2021) from 51-55% (by echo 11/2019)  · Severe pulmonary artery hypertension, PVR 4  · RV dysfunction, moderate-severe  · Low cardiac index at rest 1.3  · Inotropic-dependence  Anemia  Hypokalemia/hypomagensemia  Cardiac risk factors  · Morbid obesity (BMI 32)  · High risk for SHARLENE  · DM2 (hgba1c 6.8)  · HL  · HTN  · Former tobacco (stopped 2014 after > 40 years)  · H/o alcohol abuse  OA, right knee  Carpal tunnel syndrome  Gout  Iron deficiency  Vitamin D deficincy     Interval Events  Feels much better, able to ambulate around nursing station   SBP 130s/70s, HR 80-90s  I/O and weight discordant   Cr 0.85  K 4.8  Mg 2.0  TBili 1.3  proNTBNP 380      CARDIAC IMAGING:  [facility] (3/11/21) 25, PA 49/27/37, W 34, Allen CI 1.34  Echo (3/9/21) LVEF 15-20%, mild-moderate MR, severely elevated CVP, IVC > 21mm  Echo (1/29/21) LVEF 20-25%, moderately to severely reduced RV function  Echo (11/20/19) LVEF 51-55%  Echo (12/18/18) LVEF 50-55%  Echo (11/23/16) LVEF 45-55%  EKG (3/9/21) ST 103bpm, QRS 84ms  NST (5/2018) no scar or ischemia, LVEF 48%  Normal cors (by [facility] 3/11/21)     PHYSICAL EXAM:  Visit Vitals  /83 (BP 1 Location: Left upper arm, BP Patient Position: At rest)   Pulse 80   Temp 97.6 °F (36.4 °C)   Resp 15   Ht 5' 4\" (1.626 m)   Wt 190 lb 14.4 oz (86.6 kg)   SpO2 95%   BMI 32.77 kg/m²     General: Patient is well developed, well-nourished in no acute distress  HEENT: Normocephalic and atraumatic. No scleral icterus.  Pupils are equal, round and reactive to light and accomodation. No conjunctival injection. Oropharynx is clear. Neck: Supple. No evidence of thyroid enlargements or lymphadenopathy. JVD: Cannot be appreciated   Lungs: Breath sounds are equal and clear bilaterally. No wheezes, rhonchi, or rales. Heart: Regular rate and rhythm with normal S1 and S2. No murmurs, gallops or rubs. Abdomen: Soft, no mass or tenderness. No organomegaly or hernia. Bowel sounds present. Genitourinary and rectal: deferred  Extremities: No cyanosis, clubbing, or edema. Neurologic: No focal sensory or motor deficits are noted. Grossly intact. Psychiatric: Awake, alert an doriented x 3. Appropriate mood and affect. Skin: Warm, dry and well perfused. No lesions, nodules or rashes are noted. REVIEW OF SYSTEMS:  General: Denies fever, night sweats. Ear, nose and throat: Denies difficulty hearing, sinus problems, runny nose, post-nasal drip, ringing in ears, mouth sores, loose teeth, ear pain, nosebleeds, sore throate, facial pain or numbess  Cardiovascular: see above in the interval history  Respiratory: Denies cough, wheezing, sputum production, hemoptysis.   Gastrointestinal: Denies heartburn, constipation, intolerance to certain foods, diarrhea, abdominal pain, nausea, vomiting, difficulty swallowing, blood in stool  Kidney and bladder: Denies painful urination, frequent urination, urgency, prostate problems and impotence  Musculoskeletal: Denies joint pain, muscle weakness  Skin and hair: Denies change in existing skin lesions, hair loss or increase, breast changes    PAST MEDICAL HISTORY:  Past Medical History:   Diagnosis Date    Arthritis     Diabetes ([address]     Hypercholesterolemia     Hypertension        PAST SURGICAL HISTORY:  Past Surgical History:   Procedure Laterality Date    HX ORTHOPAEDIC      Arthroscopy right knee       FAMILY HISTORY:  Family History   Problem Relation Age of Onset    Diabetes Mother        SOCIAL HISTORY:  Social History Socioeconomic History    Marital status:      Spouse name: Not on file    Number of children: 3    Years of education: 15    Highest education level: Not on file   Occupational History    Occupation: retired   Tobacco Use    Smoking status: Former Smoker     Packs/day: 0.25     Years: 20.00     Pack years: 5.00     Quit date: 2014     Years since quittin.2    Smokeless tobacco: Never Used   Substance and Sexual Activity    Alcohol use: Yes     Alcohol/week: 0.0 standard drinks     Comment: 21-24 Beers per week    Drug use: No    Sexual activity: Yes     Partners: Male       LABORATORY RESULTS:     Labs Latest Ref Rng & Units 3/16/2021 3/15/2021 3/14/2021 3/13/2021 3/12/2021 3/11/2021 3/11/2021   WBC 3.6 - 11.0 K/uL - - 6.2 6.1 5.5 4.2 5.0   RBC 3.80 - 5.20 M/uL - - 4.09 4.01 3.81 4.21 3.93   Hemoglobin 11.5 - 16.0 g/dL - - 11. 2(L) 10. 9(L) 10. 4(L) 11.6 10. 8(L)   Hematocrit 35.0 - 47.0 % - - 35.9 34. 3(L) 33. 2(L) 37.8 34. 5(L)   MCV 80.0 - 99.0 FL - - 87.8 85.5 87.1 89.8 87.8   Platelets 131 - 273 K/uL - - 248 231 259 251 256   Lymphocytes 12 - 49 % - - 8(L) 10(L) 15 18 20   Monocytes 5 - 13 % - - 7 7 6 6 5   Eosinophils 0 - 7 % - - 3 4 3 2 1   Basophils 0 - 1 % - - 0 0 1 1 1   Albumin 3.5 - 5.0 g/dL 2. 9(L) 2. 8(L) 2. 9(L) 3.0(L) 2. 7(L) - 3. 0(L)   Calcium 8.5 - 10.1 MG/DL 9.5 9.1 8.6 8.4(L) 8.6 8.9 9.1   Glucose 65 - 100 mg/dL 115(H) 107(H) 164(H) 155(H) 160(H) 82 84   BUN 6 - 20 MG/DL 17 16 27(H) 41(H) 52(H) 42(H) 41(H)   Creatinine 0.55 - 1.02 MG/DL 0.85 0.81 0.90 0.92 1.23(H) 1.21(H) 1.29(H)   Sodium 136 - 145 mmol/L 134(L) 136 136 138 138 135(L) 138   Potassium 3.5 - 5.1 mmol/L 4.8 4.2 3.6 3.3(L) 3.9 4.6 4.5   TSH 0.36 - 3.74 uIU/mL - - - - - 2.75 -   Some recent data might be hidden     Lab Results   Component Value Date/Time    TSH 2.75 2021 10:31 AM    TSH 1.050 10/08/2020 12:01 PM    TSH 1.200 2019 02:31 PM    TSH 1.280 2016 11:48 AM       ALLERGY:  No Known Allergies     CURRENT MEDICATIONS:    Current Facility-Administered Medications:     hydrALAZINE (APRESOLINE) tablet 50 mg, 50 mg, Oral, TID, Tamra Mayberry NP    isosorbide dinitrate (ISORDIL) tablet 30 mg, 30 mg, Oral, TID, Tamra Mayberry, NP    hydrALAZINE (APRESOLINE) tablet 25 mg, 25 mg, Oral, ONCE, Tamra Mayberry, NP    isosorbide dinitrate (ISORDIL) tablet 10 mg, 10 mg, Oral, ONCE, Tamra Mayberry NP    DOBUTamine (DOBUTREX) 500 mg/250 mL (2,000 mcg/mL) infusion, 7.5 mcg/kg/min, IntraVENous, CONTINUOUS, Tamra Mayberry NP    bumetanide (BUMEX) tablet 2 mg, 2 mg, Oral, DAILY, Alison Mayberry NP, 2 mg at 03/16/21 0854    digoxin (LANOXIN) tablet 0.125 mg, 0.125 mg, Oral, DAILY, Андрей Qureshi MD, 0.125 mg at 03/16/21 0855    ivabradine (CORLANOR) tablet 2.5 mg, 2.5 mg, Oral, Q12H, Андрей Qureshi MD, 2.5 mg at 03/16/21 5075    ergocalciferol capsule 50,000 Units, 50,000 Units, Oral, Q7D, Андрей Qureshi MD, 50,000 Units at 03/13/21 1258    spironolactone (ALDACTONE) tablet 12.5 mg, 12.5 mg, Oral, DAILY, Андрей Qureshi MD, 12.5 mg at 03/16/21 0854    magnesium oxide (MAG-OX) tablet 800 mg, 800 mg, Oral, BID, Андрей Qureshi MD, 800 mg at 03/16/21 0854    sodium chloride (NS) flush 5-40 mL, 5-40 mL, IntraVENous, Q8H, Anshul Baca MD, 10 mL at 03/16/21 0654    sodium chloride (NS) flush 5-40 mL, 5-40 mL, IntraVENous, PRN, Shaylee Ferrara MD    acetaminophen (TYLENOL) tablet 650 mg, 650 mg, Oral, Q6H PRN, 650 mg at 03/13/21 2302 **OR** acetaminophen (TYLENOL) suppository 650 mg, 650 mg, Rectal, Q6H PRN, Shaylee Ferrara MD    polyethylene glycol (MIRALAX) packet 17 g, 17 g, Oral, DAILY PRN, Shaylee Ferrara MD    promethazine (PHENERGAN) tablet 12.5 mg, 12.5 mg, Oral, Q6H PRN **OR** ondansetron (ZOFRAN) injection 4 mg, 4 mg, IntraVENous, Q6H PRN, Anshul Yost MD    sodium chloride (NS) flush 5-40 mL, 5-40 mL, IntraVENous, Q8H, Anshul Baca MD, 20 mL at 03/16/21 0646    glucose chewable tablet 16 g, 4 Tab, Oral, PRN, Luciano Gonzales MD    dextrose (D50W) injection syrg 12.5-25 g, 25-50 mL, IntraVENous, PRN, Luciano Gonzales MD, 25 g at 03/11/21 1509    glucagon (GLUCAGEN) injection 1 mg, 1 mg, IntraMUSCular, PRN, Luciano Gonzales MD    insulin glargine (LANTUS) injection 30 Units, 30 Units, SubCUTAneous, DAILY, Luciano Gonzales MD, Stopped at 03/15/21 0900    insulin lispro (HUMALOG) injection 1-10 Units, 1-10 Units, SubCUTAneous, AC&HS, Luciano Gonzales MD, Stopped at 03/16/21 0730    oxybutynin (DITROPAN) tablet 5 mg, 5 mg, Oral, TID, Luciano Gonzales MD, 5 mg at 03/16/21 0855    budesonide (PULMICORT) 500 mcg/2 ml nebulizer suspension, 500 mcg, Nebulization, BID RT, Luciano Gonzales MD, 500 mcg at 03/16/21 0710    arformoteroL (BROVANA) neb solution 15 mcg, 15 mcg, Nebulization, BID RT, Luciano Gonzales MD, 15 mcg at 03/16/21 0710    albuterol-ipratropium (DUO-NEB) 2.5 MG-0.5 MG/3 ML, 3 mL, Nebulization, Q4H PRN, Luciano Gonzales MD, 3 mL at 03/13/21 2039    guaiFENesin ER (MUCINEX) tablet 600 mg, 600 mg, Oral, Q12H, Luciano Gonzales MD, 600 mg at 03/16/21 0855    thiamine HCL (B-1) tablet 100 mg, 100 mg, Oral, DAILY, Lucaino Gonzales MD, 100 mg at 03/16/21 0854    melatonin tablet 3 mg, 3 mg, Oral, QHS PRN, Luciano Gonzales MD, 3 mg at 03/11/21 0233    zolpidem (AMBIEN) tablet 5 mg, 5 mg, Oral, QHS PRN, Luciano Gonzales MD, 5 mg at 03/15/21 2228    PATIENT CARE TEAM:  Patient Care Team:  Rob Mac MD as PCP - General (Internal Medicine)  Rob Mac MD as PCP - [facility]  Shashi Delaney MD as Physician (Cardiology)  Shaunna Posadas MD as Surgeon (Orthopedic Surgery)     Thank you for allowing me to participate in this patient's care.     Mark Duong NP  [address] [place] [address], [address]  Phone: [phone]  Fax: [phone]

## 2021-03-16 NOTE — PROGRESS NOTES
Problem: Falls - Risk of  Goal: *Absence of Falls  Description: Document Raine Sy Fall Risk and appropriate interventions in the flowsheet. Outcome: Progressing Towards Goal  Note: Fall Risk Interventions:  Mobility Interventions: Communicate number of staff needed for ambulation/transfer         Medication Interventions: Patient to call before getting OOB, Teach patient to arise slowly         History of Falls Interventions: Door open when patient unattended         Problem: Diabetes Self-Management  Goal: *Disease process and treatment process  Description: Define diabetes and identify own type of diabetes; list 3 options for treating diabetes.   Outcome: Progressing Towards Goal

## 2021-03-16 NOTE — PROGRESS NOTES
Bedside shift change report given to Romero RN by [address] Thomas RN. Report included the following information SBAR, Kardex, Intake/Output, MAR, Med Rec Status and Cardiac Rhythm NSR; ST depression, Inv. T-wave.

## 2021-03-16 NOTE — PROGRESS NOTES
Bedside and Verbal shift change report given to Vianney RN(oncoming nurse) by Sergio Denny RN (offgoing nurse). Report included the following information SBAR, Kardex, Intake/Output, MAR, Recent Results and Cardiac Rhythm NSR, Inverted T wave, ST depressed.

## 2021-03-17 ENCOUNTER — DOCUMENTATION ONLY (OUTPATIENT)
Dept: CARDIOLOGY CLINIC | Age: 71
End: 2021-03-17

## 2021-03-17 LAB
ALBUMIN SERPL-MCNC: 2.9 G/DL (ref 3.5–5)
ALBUMIN/GLOB SERPL: 0.5 {RATIO} (ref 1.1–2.2)
ALP SERPL-CCNC: 102 U/L (ref 45–117)
ALT SERPL-CCNC: 68 U/L (ref 12–78)
ANION GAP SERPL CALC-SCNC: 7 MMOL/L (ref 5–15)
AST SERPL-CCNC: 36 U/L (ref 15–37)
BILIRUB SERPL-MCNC: 1 MG/DL (ref 0.2–1)
BNP SERPL-MCNC: 324 PG/ML
BUN SERPL-MCNC: 22 MG/DL (ref 6–20)
BUN/CREAT SERPL: 24 (ref 12–20)
CALCIUM SERPL-MCNC: 9.3 MG/DL (ref 8.5–10.1)
CHLORIDE SERPL-SCNC: 102 MMOL/L (ref 97–108)
CO2 SERPL-SCNC: 22 MMOL/L (ref 21–32)
COMMENT, HOLDF: NORMAL
CREAT SERPL-MCNC: 0.9 MG/DL (ref 0.55–1.02)
DIGOXIN SERPL-MCNC: 0.6 NG/ML (ref 0.9–2)
GLOBULIN SER CALC-MCNC: 5.5 G/DL (ref 2–4)
GLUCOSE BLD STRIP.AUTO-MCNC: 120 MG/DL (ref 65–100)
GLUCOSE BLD STRIP.AUTO-MCNC: 156 MG/DL (ref 65–100)
GLUCOSE BLD STRIP.AUTO-MCNC: 157 MG/DL (ref 65–100)
GLUCOSE BLD STRIP.AUTO-MCNC: 213 MG/DL (ref 65–100)
GLUCOSE SERPL-MCNC: 129 MG/DL (ref 65–100)
MAGNESIUM SERPL-MCNC: 2.1 MG/DL (ref 1.6–2.4)
POTASSIUM SERPL-SCNC: 4.8 MMOL/L (ref 3.5–5.1)
PROT SERPL-MCNC: 8.4 G/DL (ref 6.4–8.2)
SAMPLES BEING HELD,HOLD: NORMAL
SERVICE CMNT-IMP: ABNORMAL
SODIUM SERPL-SCNC: 131 MMOL/L (ref 136–145)

## 2021-03-17 PROCEDURE — 86870 RBC ANTIBODY IDENTIFICATION: CPT

## 2021-03-17 PROCEDURE — 65660000000 HC RM CCU STEPDOWN

## 2021-03-17 PROCEDURE — 4A023N6 MEASUREMENT OF CARDIAC SAMPLING AND PRESSURE, RIGHT HEART, PERCUTANEOUS APPROACH: ICD-10-PCS | Performed by: INTERNAL MEDICINE

## 2021-03-17 PROCEDURE — 87040 BLOOD CULTURE FOR BACTERIA: CPT

## 2021-03-17 PROCEDURE — 83735 ASSAY OF MAGNESIUM: CPT

## 2021-03-17 PROCEDURE — 99232 SBSQ HOSP IP/OBS MODERATE 35: CPT | Performed by: INTERNAL MEDICINE

## 2021-03-17 PROCEDURE — 74011250637 HC RX REV CODE- 250/637: Performed by: HOSPITALIST

## 2021-03-17 PROCEDURE — 94640 AIRWAY INHALATION TREATMENT: CPT

## 2021-03-17 PROCEDURE — 74011250636 HC RX REV CODE- 250/636: Performed by: NURSE PRACTITIONER

## 2021-03-17 PROCEDURE — 99233 SBSQ HOSP IP/OBS HIGH 50: CPT | Performed by: INTERNAL MEDICINE

## 2021-03-17 PROCEDURE — 77030013406 HC CATH CTRL EDWD -B: Performed by: INTERNAL MEDICINE

## 2021-03-17 PROCEDURE — 86921 COMPATIBILITY TEST INCUBATE: CPT

## 2021-03-17 PROCEDURE — 74011000250 HC RX REV CODE- 250: Performed by: INTERNAL MEDICINE

## 2021-03-17 PROCEDURE — 74011000250 HC RX REV CODE- 250: Performed by: HOSPITALIST

## 2021-03-17 PROCEDURE — 93451 RIGHT HEART CATH: CPT | Performed by: INTERNAL MEDICINE

## 2021-03-17 PROCEDURE — 86920 COMPATIBILITY TEST SPIN: CPT

## 2021-03-17 PROCEDURE — C1751 CATH, INF, PER/CENT/MIDLINE: HCPCS | Performed by: INTERNAL MEDICINE

## 2021-03-17 PROCEDURE — 86922 COMPATIBILITY TEST ANTIGLOB: CPT

## 2021-03-17 PROCEDURE — 99152 MOD SED SAME PHYS/QHP 5/>YRS: CPT | Performed by: INTERNAL MEDICINE

## 2021-03-17 PROCEDURE — C1894 INTRO/SHEATH, NON-LASER: HCPCS | Performed by: INTERNAL MEDICINE

## 2021-03-17 PROCEDURE — 77030013797 HC KT TRNSDUC PRSSR EDWD -A: Performed by: INTERNAL MEDICINE

## 2021-03-17 PROCEDURE — 81240 F2 GENE: CPT

## 2021-03-17 PROCEDURE — 74011250637 HC RX REV CODE- 250/637: Performed by: INTERNAL MEDICINE

## 2021-03-17 PROCEDURE — 80162 ASSAY OF DIGOXIN TOTAL: CPT

## 2021-03-17 PROCEDURE — 74011636637 HC RX REV CODE- 636/637: Performed by: HOSPITALIST

## 2021-03-17 PROCEDURE — 83880 ASSAY OF NATRIURETIC PEPTIDE: CPT

## 2021-03-17 PROCEDURE — 74011250636 HC RX REV CODE- 250/636: Performed by: INTERNAL MEDICINE

## 2021-03-17 PROCEDURE — 99153 MOD SED SAME PHYS/QHP EA: CPT | Performed by: INTERNAL MEDICINE

## 2021-03-17 PROCEDURE — 82962 GLUCOSE BLOOD TEST: CPT

## 2021-03-17 PROCEDURE — 77030013744: Performed by: INTERNAL MEDICINE

## 2021-03-17 PROCEDURE — 80053 COMPREHEN METABOLIC PANEL: CPT

## 2021-03-17 PROCEDURE — 86022 PLATELET ANTIBODIES: CPT

## 2021-03-17 PROCEDURE — 74011250637 HC RX REV CODE- 250/637: Performed by: NURSE PRACTITIONER

## 2021-03-17 PROCEDURE — 36573 INSJ PICC RS&I 5 YR+: CPT | Performed by: NURSE PRACTITIONER

## 2021-03-17 PROCEDURE — 36415 COLL VENOUS BLD VENIPUNCTURE: CPT

## 2021-03-17 PROCEDURE — 86850 RBC ANTIBODY SCREEN: CPT

## 2021-03-17 RX ORDER — ISOSORBIDE MONONITRATE 30 MG/1
30 TABLET, EXTENDED RELEASE ORAL 2 TIMES DAILY
Status: DISCONTINUED | OUTPATIENT
Start: 2021-03-17 | End: 2021-03-20 | Stop reason: HOSPADM

## 2021-03-17 RX ORDER — FENTANYL CITRATE 50 UG/ML
INJECTION, SOLUTION INTRAMUSCULAR; INTRAVENOUS AS NEEDED
Status: DISCONTINUED | OUTPATIENT
Start: 2021-03-17 | End: 2021-03-17 | Stop reason: HOSPADM

## 2021-03-17 RX ORDER — MIDAZOLAM HYDROCHLORIDE 1 MG/ML
INJECTION, SOLUTION INTRAMUSCULAR; INTRAVENOUS AS NEEDED
Status: DISCONTINUED | OUTPATIENT
Start: 2021-03-17 | End: 2021-03-17 | Stop reason: HOSPADM

## 2021-03-17 RX ORDER — HEPARIN SODIUM 200 [USP'U]/100ML
INJECTION, SOLUTION INTRAVENOUS
Status: COMPLETED | OUTPATIENT
Start: 2021-03-17 | End: 2021-03-17

## 2021-03-17 RX ORDER — LIDOCAINE HYDROCHLORIDE 10 MG/ML
INJECTION INFILTRATION; PERINEURAL AS NEEDED
Status: DISCONTINUED | OUTPATIENT
Start: 2021-03-17 | End: 2021-03-17 | Stop reason: HOSPADM

## 2021-03-17 RX ORDER — POLYETHYLENE GLYCOL 3350, SODIUM SULFATE, SODIUM CHLORIDE, POTASSIUM CHLORIDE, SODIUM ASCORBATE, AND ASCORBIC ACID 7.5-2.691G
2 KIT ORAL ONCE
Status: COMPLETED | OUTPATIENT
Start: 2021-03-18 | End: 2021-03-18

## 2021-03-17 RX ADMIN — DOBUTAMINE HYDROCHLORIDE 7.5 MCG/KG/MIN: 200 INJECTION INTRAVENOUS at 02:29

## 2021-03-17 RX ADMIN — Medication 100 MG: at 09:23

## 2021-03-17 RX ADMIN — ZOLPIDEM TARTRATE 5 MG: 5 TABLET ORAL at 23:15

## 2021-03-17 RX ADMIN — ARFORMOTEROL TARTRATE 15 MCG: 15 SOLUTION RESPIRATORY (INHALATION) at 19:19

## 2021-03-17 RX ADMIN — INSULIN GLARGINE 30 UNITS: 100 INJECTION, SOLUTION SUBCUTANEOUS at 09:27

## 2021-03-17 RX ADMIN — DIGOXIN 0.25 MG: 125 TABLET ORAL at 09:23

## 2021-03-17 RX ADMIN — OXYBUTYNIN CHLORIDE 5 MG: 5 TABLET ORAL at 18:06

## 2021-03-17 RX ADMIN — ISOSORBIDE DINITRATE 30 MG: 10 TABLET ORAL at 09:23

## 2021-03-17 RX ADMIN — DOBUTAMINE HYDROCHLORIDE 7.5 MCG/KG/MIN: 200 INJECTION INTRAVENOUS at 17:34

## 2021-03-17 RX ADMIN — Medication 800 MG: at 18:06

## 2021-03-17 RX ADMIN — INSULIN LISPRO 2 UNITS: 100 INJECTION, SOLUTION INTRAVENOUS; SUBCUTANEOUS at 09:24

## 2021-03-17 RX ADMIN — OXYBUTYNIN CHLORIDE 5 MG: 5 TABLET ORAL at 22:57

## 2021-03-17 RX ADMIN — BUDESONIDE 500 MCG: 0.5 INHALANT RESPIRATORY (INHALATION) at 19:20

## 2021-03-17 RX ADMIN — ISOSORBIDE MONONITRATE 30 MG: 30 TABLET, EXTENDED RELEASE ORAL at 18:06

## 2021-03-17 RX ADMIN — BUMETANIDE 2 MG: 1 TABLET ORAL at 09:23

## 2021-03-17 RX ADMIN — Medication 10 ML: at 05:45

## 2021-03-17 RX ADMIN — Medication 10 ML: at 18:12

## 2021-03-17 RX ADMIN — HYDRALAZINE HYDROCHLORIDE 75 MG: 50 TABLET, FILM COATED ORAL at 18:06

## 2021-03-17 RX ADMIN — Medication 10 ML: at 18:11

## 2021-03-17 RX ADMIN — ARFORMOTEROL TARTRATE 15 MCG: 15 SOLUTION RESPIRATORY (INHALATION) at 07:29

## 2021-03-17 RX ADMIN — HYDRALAZINE HYDROCHLORIDE 50 MG: 50 TABLET, FILM COATED ORAL at 09:23

## 2021-03-17 RX ADMIN — INSULIN LISPRO 3 UNITS: 100 INJECTION, SOLUTION INTRAVENOUS; SUBCUTANEOUS at 18:06

## 2021-03-17 RX ADMIN — Medication 800 MG: at 09:23

## 2021-03-17 RX ADMIN — HYDRALAZINE HYDROCHLORIDE 75 MG: 50 TABLET, FILM COATED ORAL at 22:57

## 2021-03-17 RX ADMIN — BUDESONIDE 500 MCG: 0.5 INHALANT RESPIRATORY (INHALATION) at 07:29

## 2021-03-17 RX ADMIN — GUAIFENESIN 600 MG: 600 TABLET, EXTENDED RELEASE ORAL at 09:23

## 2021-03-17 RX ADMIN — OXYBUTYNIN CHLORIDE 5 MG: 5 TABLET ORAL at 09:23

## 2021-03-17 RX ADMIN — SPIRONOLACTONE 12.5 MG: 25 TABLET ORAL at 09:24

## 2021-03-17 NOTE — PROGRESS NOTES
Dr. Johana Worthington. [phone]            Cardiology Consult/Progress Note      Requesting/referring provider: Jaron Plaza MD, Dr. Sol Flores, Dr. Avinash Toscano    Reason for Consult: CHF    Subjective: Feeling much better after his initiation of inotropes. Assessment/Plan:  1. Dilated cardiomyopathy with biventricular dysfunction  2. At least moderate functional mitral regurgitation  3. Type 2 diabetes mellitus  4. Hypertension  5. Hypercholesterolemia  6. Status post ICD placement with  7. Acute on chronic systolic congestive heart failure with shock    Ms. Jules Gaffney was seen at the request of Dr. Sol Flores. She has reported progressive shortness of breath in conjunction with progressive biventricular dysfunction. She has longstanding history of congestive heart failure and now seems to be progressing to advanced ages. She has not had recent evaluation of her coronaries and needs evaluation of her right-sided pressures as well as cardiac output. She is currently on vasopressors with dobutamine drip. We will reassess her hemodynamics sometime next week. In the interim we will have heart failure team manage patient. Underwent cardiac catheterization today which demonstrates significantly improved hemodynamics. Will allow heart failure team to manage the patient. Cardiology will sign off. [x]    High complexity decision making was performed  [x]    Patient is at high-risk of decompensation with multiple organ involvement    Investigations personally reviewed and interpreted    Investigations reviewed    HPI: Loren Osborn, a 73y.o. year-old who presents for evaluation of dyspnea. Over the past 2 months she has reported increasing shortness of breath. She has prior known history of cardiomyopathy with prior ICD placement.   Ejection fraction about 2 years ago was low normal.    Recently noted increasing shortness of breath with systemic congestion. No chest pain was reported. She did not have any prior history of microinfarction. ICD in place. Risk factors for atherotic vascular disease such as hypertension diabetes and hyperlipidemia are present but are controlled  She  has a past medical history of Arthritis, Diabetes ([address] Hypercholesterolemia, and Hypertension. Review of system:Patient reports no  CP. She reports no cough/fever/focal neurological deficits/abdominal pain. All other systems negative except as above. Family History   Problem Relation Age of Onset    Diabetes Mother       Social History     Socioeconomic History    Marital status:      Spouse name: Not on file    Number of children: 1    Years of education: 15    Highest education level: Not on file   Occupational History    Occupation: retired   Tobacco Use    Smoking status: Former Smoker     Packs/day: 0.25     Years: 20.00     Pack years: 5.00     Quit date: 2014     Years since quittin.2    Smokeless tobacco: Never Used   Substance and Sexual Activity    Alcohol use: Yes     Alcohol/week: 0.0 standard drinks     Comment: 21-24 Beers per week    Drug use: No    Sexual activity: Yes     Partners: Male      PE  Vitals:    21 1345 21 1400 21 1415 21 1430   BP: (!) 148/90 (!) 141/79 137/74 (!) 150/88   Pulse: 93 92 83 89   Resp: 18 23 12 17   Temp:       SpO2: 93% 93% 93% 94%   Weight:       Height:        Body mass index is 32.56 kg/m². General:    Alert, cooperative, no distress. JVD is elevated. Psychiatric:    Normal Mood and affect    Eye/ENT:      Pupils equal, No asymmetry, Conjunctival pink. Able to hear voice at normal amplitude   Lungs:      Visibly symmetric chest expansion, few crackles bilaterally. Benedetta O[facility] Heart[de-identified]    Regular rate and rhythm, S1, S2 normal, LV S3 is heard. No murmur, click, rub or gallop. Normal palpable peripheral pulses. No cyanosis   Abdomen:     Soft, non-tender.  Bowel sounds normal. No masses,  No      organomegaly. Extremities:   Extremities normal, atraumatic, mild bilateral edema. Neurologic:   CN II-XII grossly intact.  No gross focal deficits           Recent Labs:  Lab Results   Component Value Date/Time    Cholesterol, total 143 10/08/2020 12:01 PM    HDL Cholesterol 46 10/08/2020 12:01 PM    LDL, calculated 73 10/08/2020 12:01 PM    LDL, calculated 105 (H) 2019 02:31 PM    Triglyceride 137 10/08/2020 12:01 PM     Lab Results   Component Value Date/Time    Creatinine 0.90 2021 04:29 AM     Lab Results   Component Value Date/Time    BUN 22 (H) 2021 04:29 AM     Lab Results   Component Value Date/Time    Potassium 4.8 2021 04:29 AM     Lab Results   Component Value Date/Time    Hemoglobin A1c 7.6 (H) 2021 01:52 AM     Lab Results   Component Value Date/Time    HGB 11.2 (L) 2021 03:46 AM     Lab Results   Component Value Date/Time    PLATELET 545  03:46 AM       Reviewed:  Past Medical History:   Diagnosis Date    Arthritis     [address] [address]     Hypercholesterolemia     Hypertension      Social History     Tobacco Use   Smoking Status Former Smoker    Packs/day: 0.25    Years: 20.00    Pack years: 5.00    Quit date: 2014    Years since quittin.2   Smokeless Tobacco Never Used     Social History     Substance and Sexual Activity   Alcohol Use Yes    Alcohol/week: 0.0 standard drinks    Comment: 21-24 Beers per week     No Known Allergies  Family History   Problem Relation Age of Onset    Diabetes Mother         Current Facility-Administered Medications   Medication Dose Route Frequency    hydrALAZINE (APRESOLINE) tablet 75 mg  75 mg Oral TID    isosorbide mononitrate ER (IMDUR) tablet 30 mg  30 mg Oral BID    DOBUTamine (DOBUTREX) 500 mg/250 mL (2,000 mcg/mL) infusion  7.5 mcg/kg/min IntraVENous CONTINUOUS    digoxin (LANOXIN) tablet 0.25 mg  0.25 mg Oral DAILY    bumetanide (BUMEX) tablet 2 mg  2 mg Oral DAILY    ergocalciferol capsule 50,000 Units  50,000 Units Oral Q7D    spironolactone (ALDACTONE) tablet 12.5 mg  12.5 mg Oral DAILY    magnesium oxide (MAG-OX) tablet 800 mg  800 mg Oral BID    sodium chloride (NS) flush 5-40 mL  5-40 mL IntraVENous Q8H    sodium chloride (NS) flush 5-40 mL  5-40 mL IntraVENous PRN    acetaminophen (TYLENOL) tablet 650 mg  650 mg Oral Q6H PRN    Or    acetaminophen (TYLENOL) suppository 650 mg  650 mg Rectal Q6H PRN    polyethylene glycol (MIRALAX) packet 17 g  17 g Oral DAILY PRN    promethazine (PHENERGAN) tablet 12.5 mg  12.5 mg Oral Q6H PRN    Or    ondansetron (ZOFRAN) injection 4 mg  4 mg IntraVENous Q6H PRN    sodium chloride (NS) flush 5-40 mL  5-40 mL IntraVENous Q8H    glucose chewable tablet 16 g  4 Tab Oral PRN    dextrose (D50W) injection syrg 12.5-25 g  25-50 mL IntraVENous PRN    glucagon (GLUCAGEN) injection 1 mg  1 mg IntraMUSCular PRN    insulin glargine (LANTUS) injection 30 Units  30 Units SubCUTAneous DAILY    insulin lispro (HUMALOG) injection 1-10 Units  1-10 Units SubCUTAneous AC&HS    oxybutynin (DITROPAN) tablet 5 mg  5 mg Oral TID    budesonide (PULMICORT) 500 mcg/2 ml nebulizer suspension  500 mcg Nebulization BID RT    arformoteroL (BROVANA) neb solution 15 mcg  15 mcg Nebulization BID RT    albuterol-ipratropium (DUO-NEB) 2.5 MG-0.5 MG/3 ML  3 mL Nebulization Q4H PRN    thiamine HCL (B-1) tablet 100 mg  100 mg Oral DAILY    melatonin tablet 3 mg  3 mg Oral QHS PRN    zolpidem (AMBIEN) tablet 5 mg  5 mg Oral QHS PRN       Akbar Taylor MD03/17/21     ATTENTION:   This medical record was transcribed using an electronic medical records/speech recognition system. Although proofread, it may and can contain electronic, spelling and other errors. Corrections may be executed at a later time. Please feel free to contact us for any clarifications as needed.     [facility] [place]  [facility] [place]. [phone]

## 2021-03-17 NOTE — PROGRESS NOTES
TRANSFER - OUT REPORT:    Verbal report given to Gerber De La Paz RN on Sturgeon Ao being transferred to [facility]  for routine progression of care       Report consisted of patients Situation, Background, Assessment and   Recommendations(SBAR). Information from the following report(s) Procedure Summary, MAR and Recent Results was reviewed with the receiving nurse. Opportunity for questions and clarification was provided.

## 2021-03-17 NOTE — PROGRESS NOTES
Hospital Progress Note    NAME:  Goran Porras   :   1950   MRN:  [id number]     Date/Time:  3/17/2021 8:07 PM    Plan:   1. Management /AHF  2. RHC today  Risk of Deterioration: Low  []           Moderate  [x]           High  []                 Assessment:   # Acute on chronic severe systolic congestive heart failure, NYHA class 3 ([facility] [facility] (3/11/2021). EF <15%, NYHA IV with cardiogenic shock, dobutamin gtt  # Dilated cardiomyopathy ([facility] [address] (2014), Nonischemic. S/p BiV AICD  -Cardiology and AHF teams on board  -I/O -376  -Continue Dobutamine gtt  -Monitor BMP/CMP closely, stable  -RHC today  -Consider LVAD before discharge per AHF/ cardiology teams  -PFTs per AHF team  -Palliative care consult  -No BB while on Dobutamine GTT  -Holding ACE/ARB/ARNi/MRA per AHF team in anticipation of surgery  -Started on Spironolactone 12.5mg daily per AHF team 3/13  -KCl and MgOx supplementation uptitration per AHF team  -ASA defered to primary cardiology team  -IV Venofer infusions 200mg x 2 doses  -Hold statin for elevated LFTs  -High dose Vit D weekly  -6MWT per PT -refused    # Dyslipidemia (2014)  -LDL 73 on 10/20  -Per cardiology    # Hypertension (2014)  -Currently on po bumex with pressor support from Dobutamine GTT  -ACE/ARB/BB on hold.  Defer to cardiology    # Obesity (BMI 30.0-34.9) (11/15/2016)  -Lifestyle modifications    # CKD (chronic kidney disease) stage 3, GFR 30-59 ml/min (2019)  -Overview:  negative lexiscan  - LVVH, LAE, otherwise normal echo    # Uncontrolled type 2 diabetes mellitus with hyperglycemia ([facility] [address] (2020), better controlled  -A1c 7.6  -Continue lantus 30U daily  -SSU humalog AC-HS    # Moderate pulmonary arterial systolic hypertension ([facility] [address] (3/11/2021)    # Hypokalemia (3/11/2021)  -Replete and monitor as appropriate  -KCl PO 40mEq x 2, Q4h apart today    # Hypomagnesemia (3/11/2021)  -On PO Mag Ox  -Will give IV Magnesium Sulfate 2gm once  -Mg daily monitoring    Case discussed with Pt/ family and nurse  Dispo: >48hrs, pending RHC early next week and possible LVAD before discharge    Per AHF team, Recommend at discharge:   · Lifevest if patient full code  · Vaccinations for flu, pneumonia and covid  · Referral to sleep medicine for sleep study     Admitting notes:71 y.o. female who presents with CHF and transferred from [facility] for evaluation by COLTON.      Alexia Aaron is a 73 y.o.   female with PMH of above mentioned problems who presents to ED from her cardiologist office with progressive shortness of breath and cough.  Patient states that she has been having progressive shortness of breath for past couple of weeks and for the past 10 days it has gotten to a point that she cannot walk more than 10 today before catching her breath.  She also endorsed that she is having more cough than usual.  Patient also endorsed PND and orthopnea.  Denies any swelling of her legs.  Patient states that she has been taking her medication as prescribed but she denied using any beta-blocker or Aldactone for heart failure.  Patient is unsure whether she had cardiac angiogram done in the past but she said she been diagnosed with heart failure since 2005.  Patient was a heavy smoker in the past and smoked pack a day for more than 15 years but she has quit smoking 6 years ago. [facility] drink alcohol and denies any recent drug use.  Patient denies any fever chills diarrhea belly pain chest pain.        The patient denies any fever, chills, chest pain, cough, congestion, recent illness, palpitations, or dysuria.       Subjective:    Feeling better this am          11 Point Review of Systems:   Negative except no cp,swelling    []            Unable to obtain ROS due to:       []            mental status change []            sedated []            intubated     Social History     Tobacco Use    Smoking status: Former Smoker     Packs/day: 0.25     Years: 20.00     Pack years: 5.00     Quit date: 2014     Years since quittin.2    Smokeless tobacco: Never Used   Substance Use Topics    Alcohol use:  Yes     Alcohol/week: 0.0 standard drinks     Comment: 21-24 Beers per week     Medications reviewed:  Current Facility-Administered Medications   Medication Dose Route Frequency    hydrALAZINE (APRESOLINE) tablet 50 mg  50 mg Oral TID    isosorbide dinitrate (ISORDIL) tablet 30 mg  30 mg Oral TID    DOBUTamine (DOBUTREX) 500 mg/250 mL (2,000 mcg/mL) infusion  7.5 mcg/kg/min IntraVENous CONTINUOUS    digoxin (LANOXIN) tablet 0.25 mg  0.25 mg Oral DAILY    bumetanide (BUMEX) tablet 2 mg  2 mg Oral DAILY    ergocalciferol capsule 50,000 Units  50,000 Units Oral Q7D    spironolactone (ALDACTONE) tablet 12.5 mg  12.5 mg Oral DAILY    magnesium oxide (MAG-OX) tablet 800 mg  800 mg Oral BID    sodium chloride (NS) flush 5-40 mL  5-40 mL IntraVENous Q8H    sodium chloride (NS) flush 5-40 mL  5-40 mL IntraVENous PRN    acetaminophen (TYLENOL) tablet 650 mg  650 mg Oral Q6H PRN    Or    acetaminophen (TYLENOL) suppository 650 mg  650 mg Rectal Q6H PRN    polyethylene glycol (MIRALAX) packet 17 g  17 g Oral DAILY PRN    promethazine (PHENERGAN) tablet 12.5 mg  12.5 mg Oral Q6H PRN    Or    ondansetron (ZOFRAN) injection 4 mg  4 mg IntraVENous Q6H PRN    sodium chloride (NS) flush 5-40 mL  5-40 mL IntraVENous Q8H    glucose chewable tablet 16 g  4 Tab Oral PRN    dextrose (D50W) injection syrg 12.5-25 g  25-50 mL IntraVENous PRN    glucagon (GLUCAGEN) injection 1 mg  1 mg IntraMUSCular PRN    insulin glargine (LANTUS) injection 30 Units  30 Units SubCUTAneous DAILY    insulin lispro (HUMALOG) injection 1-10 Units  1-10 Units SubCUTAneous AC&HS    oxybutynin (DITROPAN) tablet 5 mg  5 mg Oral TID    budesonide (PULMICORT) 500 mcg/2 ml nebulizer suspension  500 mcg Nebulization BID RT    arformoteroL (BROVANA) neb solution 15 mcg  15 mcg Nebulization BID RT    albuterol-ipratropium (DUO-NEB) 2.5 MG-0.5 MG/3 ML  3 mL Nebulization Q4H PRN    guaiFENesin ER (MUCINEX) tablet 600 mg  600 mg Oral Q12H    thiamine HCL (B-1) tablet 100 mg  100 mg Oral DAILY    melatonin tablet 3 mg  3 mg Oral QHS PRN    zolpidem (AMBIEN) tablet 5 mg  5 mg Oral QHS PRN        Objective:   Vitals:  Visit Vitals  /76 (BP 1 Location: Left upper arm, BP Patient Position: At rest)   Pulse (!) 103   Temp 97.6 °F (36.4 °C)   Resp 18   Ht 5' 4\" (1.626 m)   Wt 189 lb 11.2 oz (86 kg)   SpO2 98%   BMI 32.56 kg/m²     Temp (24hrs), Av.6 °F (36.4 °C), Min:97.3 °F (36.3 °C), Max:98.3 °F (36.8 °C)    O2 Flow Rate (L/min): 1 l/min O2 Device: Room air    Last 24hr Input/Output:    Intake/Output Summary (Last 24 hours) at 3/17/2021 0804  Last data filed at 3/17/2021 0606  Gross per 24 hour   Intake --   Output 900 ml   Net -900 ml        PHYSICAL EXAM:  General:          Alert, cooperative, no distress, appears stated age. HEENT:           Atraumatic, anicteric sclerae, pink conjunctivae                          No oral ulcers, mucosa moist, throat clear, dentition fair  Neck:               Supple, symmetrical  Lungs:             Clear to auscultation bilaterally. No Wheezing or Rhonchi. No rales. Chest wall:      No tenderness  No Accessory muscle use. Heart:              Regular  rhythm,  No  murmur   No edema  Abdomen:        Soft, non-tender. Not distended. Bowel sounds normal  Extremities:     No cyanosis. No clubbing,                            Skin turgor normal, Capillary refill normal  Skin:                Not pale. Not Jaundiced  No rashes   Psych:             Not anxious or agitated. Neurologic:      Alert, moves all extremities, answers questions appropriately and responds to commands         Lab Data Reviewed:    No results for input(s): WBC, HGB, HCT, PLT, HGBEXT, HCTEXT, PLTEXT, HGBEXT, HCTEXT, PLTEXT in the last 72 hours.   Recent Labs     21  042 21  3367 03/15/21  0324   * 134* 136   K 4.8 4.8 4.2    101 105   CO2 22 24 26   * 115* 107*   BUN 22* 17 16   CREA 0.90 0.85 0.81   CA 9.3 9.5 9.1   MG 2.1 2.0 2.0   ALB 2.9* 2.9* 2.8*   TBILI 1.0 1.3* 1.5*   ALT 68 82* 101*     Lab Results   Component Value Date/Time    Glucose (POC) 156 (H) 03/17/2021 08:03 AM    Glucose (POC) 173 (H) 03/16/2021 09:49 PM    Glucose (POC) 177 (H) 03/16/2021 04:05 PM    Glucose (POC) 160 (H) 03/16/2021 11:34 AM    Glucose (POC) 134 (H) 03/16/2021 07:48 AM     No results for input(s): PH, PCO2, PO2, HCO3, FIO2 in the last 72 hours. No results for input(s): INR, INREXT, INREXT in the last 72 hours. Radiology  Cta Chest W Or W Wo Cont    Result Date: 3/10/2021  No pulmonary embolus. Mild patchy groundglass lung opacities predominantly dependently likely reflect atelectasis or mild edema. Dilated cardiomegaly with contrast reflux into prominent inferior vena cava and hepatic veins consistent with elevated right heart pressures and right heart failure.     Nuclear Cardiac Amyloid Spect    Result Date: 3/15/2021  : PYP scan is equivocal for ATTR cardiac amyloidosis based on the H:CL ratio of 1.144 and semiquantitative score of 1.       ___________________________________________________  ___________________________________________________    Attending Physician: Pascual Bustamante MD

## 2021-03-17 NOTE — PROGRESS NOTES
SW met with patient and her significant other, patient stated she was going to have a RHC today at 12:00 pm, SW asked for clarification as the [address] has been postponed each day since Monday 3/15/21. COLLINS confirmed with patient's RN Cath Lab is expecting patient today. COLLINS left caregiver assessment document for ashlie Benito to complete this afternoon. SW will continue to follow.

## 2021-03-17 NOTE — PROCEDURES
Findings  1. Normal right-sided filling pressures  2. Normal cardiac output on dobutamine drip  3. Normal PVR and SVR  4. Normal cardiac output and index both by thermodilution and by Allen.       Overall hemodynamics much improved on inotrope support    Access: Right internal jugular vein no issues

## 2021-03-17 NOTE — PROGRESS NOTES
Problem: Falls - Risk of  Goal: *Absence of Falls  Description: Document Aubree Blood Fall Risk and appropriate interventions in the flowsheet. Outcome: Progressing Towards Goal  Note: Fall Risk Interventions:  Mobility Interventions: Communicate number of staff needed for ambulation/transfer         Medication Interventions: Patient to call before getting OOB, Teach patient to arise slowly         History of Falls Interventions: Door open when patient unattended         Problem: Diabetes Self-Management  Goal: *Disease process and treatment process  Description: Define diabetes and identify own type of diabetes; list 3 options for treating diabetes.   Outcome: Progressing Towards Goal

## 2021-03-17 NOTE — PROGRESS NOTES
Bedside and Verbal shift change report given to [address] [place] [address] [address] (oncoming nurse) by Sondra Meléndez RN (offgoing nurse). Report included the following information SBAR, Kardex, ED Summary, Procedure Summary, Intake/Output, MAR, Recent Results, Med Rec Status and Cardiac Rhythm NSR inverted T St depressed.

## 2021-03-17 NOTE — PROGRESS NOTES
Cardiac Cath Lab Procedure Area Arrival Note:    Leyla Zheng arrived to Cardiac Cath Lab, Procedure Area. Patient identifiers verified with NAME and DATE OF BIRTH. Procedure verified with patient. Consent forms verified. Allergies verified. Patient informed of procedure and plan of care. Questions answered with review. Patient voiced understanding of procedure and plan of care. Patient on cardiac monitor, non-invasive blood pressure, SPO2 monitor. On room air. IV of normal saline started on pump at 10 ml/hr, ,Dobutamine at 19.5ml/hr. Patient status doing well without problems. Patient is A&Ox 4. Patient reports no pain. Patient medicated during procedure with orders obtained and verified by Dr. Tor Vieira. Refer to patients Cardiac Cath Lab PROCEDURE REPORT for vital signs, assessment, status, and response during procedure, printed at end of case. Printed report on chart or scanned into chart.

## 2021-03-17 NOTE — PROGRESS NOTES
Order for PICC line noted for home IV. Reviewed risks/benefits with patient, she is reluctant to have line placed today, will plan to do in am. Has PIV access at this time.

## 2021-03-17 NOTE — PROGRESS NOTES
Bedside shift change report given to Romero RN by [facility], RN. Report included the following information SBAR, Kardex, Intake/Output, MAR, Recent Results, Med Rec Status and Cardiac Rhythm NSR; ST dep; Inv. T-wave.

## 2021-03-17 NOTE — CONSULTS
[facility].   [address] [facility] [address] [facility] NOTE  Will John Willard  [phone] office  [phone] NP/PA in-hospital cell phone M-F until 4:30PM  After 5PM or on weekends, please call  for physician on call        NAME:  Pelon Carrillo   :   1950   MRN:   [id number]       Referring Physician: Wilian Bloom NP    Consult Date: 3/17/2021 3:31 PM    Chief Complaint: none     History of Present Illness:  Patient is a 73 y.o. who is seen in consultation at the request of Wilian Bloom NP, for LVAD/HT evaluation. Patient has a past medical history significant for hypertension, hyperlipidemia, diabetes mellitus, chronic kidney disease, COPD, and congestive heart failure. Patient was admitted to the hospital on 3/10/21. Patient is being followed by the heart failure team and undergoing evaluation for possible LVAD/heart transplant. She is on a dobutamine drip. Patient has no GI complaints. No nausea, reflux, vomiting, or abdominal pain. No dysphagia or odynophagia. No change in bowel habits, melena, or hematochezia. No fevers, chills, or unexplained weight loss. No NSAID use prior to admission. No anticoagulation.  + Alcohol use (1-2 beers/day). Former smoker (quit 6 years ago). No history of abdominal surgeries. No history of EGD or colonoscopy. No family history of colon cancer. I have reviewed the emergency room note, hospital admission note, notes by all other clinicians who have seen the patient during this hospitalization to date. I have reviewed the problem list and the reason for this hospitalization. I have reviewed the allergies and the medications the patient was taking at home prior to this hospitalization.       PMH:  Past Medical History:   Diagnosis Date    Arthritis     Diabetes ([address]     Hypercholesterolemia     Hypertension        PSH:  Past Surgical History:   Procedure Laterality Date    HX ORTHOPAEDIC      Arthroscopy right knee       Allergies:  No Known Allergies    Home Medications:  Prior to Admission Medications   Prescriptions Last Dose Informant Patient Reported? Taking?   allopurinoL (ZYLOPRIM) 300 mg tablet 3/10/2021 at Unknown time Self No Yes   Sig: TAKE 1 TABLET BY MOUTH DAILY   diclofenac (VOLTAREN) 1 % gel 3/3/2021 at Unknown time Self Yes Yes   Sig: APPLY TO AFFECTED AREA TWICE A DAY AS NEEDED   fluticasone propion-salmeteroL (ADVAIR/WIXELA) 250-50 mcg/dose diskus inhaler 3/3/2021 at Unknown time Self Yes Yes   Sig: Take 2 Puffs by inhalation two (2) times a day. fluticasone propionate (FLONASE) 50 mcg/actuation nasal spray 3/3/2021 at Unknown time Self No Yes   Si Sprays by Both Nostrils route daily. furosemide (LASIX) 40 mg tablet 3/10/2021 at Unknown time Self Yes Yes   Sig: Take 80 mg by mouth two (2) times a day. insulin glargine (LANTUS,BASAGLAR) 100 unit/mL (3 mL) inpn 3/10/2021 at Unknown time Self No Yes   Si units every morning   lisinopril (PRINIVIL, ZESTRIL) 20 mg tablet 3/10/2021 at Unknown time Self No Yes   Sig: TAKE 1 TABLET BY MOUTH ONCE DAILY IN THE MORNING   metFORMIN (GLUMETZA ER) 500 mg TG24 24 hour tablet 3/10/2021 at Unknown time Self Yes Yes   Sig: Take 1 Tab by mouth Every morning. oxybutynin (DITROPAN) 5 mg tablet 3/3/2021 at Unknown time Self No Yes   Sig: Take 1 Tab by mouth three (3) times daily. potassium chloride SR (KLOR-CON 10) 10 mEq tablet 3/10/2021 at Unknown time Self Yes Yes   Sig: Take 10 mEq by mouth Every morning.       Facility-Administered Medications: None       Hospital Medications:  Current Facility-Administered Medications   Medication Dose Route Frequency    hydrALAZINE (APRESOLINE) tablet 75 mg  75 mg Oral TID    isosorbide mononitrate ER (IMDUR) tablet 30 mg  30 mg Oral BID    DOBUTamine (DOBUTREX) 500 mg/250 mL (2,000 mcg/mL) infusion  7.5 mcg/kg/min IntraVENous CONTINUOUS    digoxin (LANOXIN) tablet 0.25 mg  0.25 mg Oral DAILY    bumetanide (BUMEX) tablet 2 mg  2 mg Oral DAILY    ergocalciferol capsule 50,000 Units  50,000 Units Oral Q7D    spironolactone (ALDACTONE) tablet 12.5 mg  12.5 mg Oral DAILY    magnesium oxide (MAG-OX) tablet 800 mg  800 mg Oral BID    sodium chloride (NS) flush 5-40 mL  5-40 mL IntraVENous Q8H    sodium chloride (NS) flush 5-40 mL  5-40 mL IntraVENous PRN    acetaminophen (TYLENOL) tablet 650 mg  650 mg Oral Q6H PRN    Or    acetaminophen (TYLENOL) suppository 650 mg  650 mg Rectal Q6H PRN    polyethylene glycol (MIRALAX) packet 17 g  17 g Oral DAILY PRN    promethazine (PHENERGAN) tablet 12.5 mg  12.5 mg Oral Q6H PRN    Or    ondansetron (ZOFRAN) injection 4 mg  4 mg IntraVENous Q6H PRN    sodium chloride (NS) flush 5-40 mL  5-40 mL IntraVENous Q8H    glucose chewable tablet 16 g  4 Tab Oral PRN    dextrose (D50W) injection syrg 12.5-25 g  25-50 mL IntraVENous PRN    glucagon (GLUCAGEN) injection 1 mg  1 mg IntraMUSCular PRN    insulin glargine (LANTUS) injection 30 Units  30 Units SubCUTAneous DAILY    insulin lispro (HUMALOG) injection 1-10 Units  1-10 Units SubCUTAneous AC&HS    oxybutynin (DITROPAN) tablet 5 mg  5 mg Oral TID    budesonide (PULMICORT) 500 mcg/2 ml nebulizer suspension  500 mcg Nebulization BID RT    arformoteroL (BROVANA) neb solution 15 mcg  15 mcg Nebulization BID RT    albuterol-ipratropium (DUO-NEB) 2.5 MG-0.5 MG/3 ML  3 mL Nebulization Q4H PRN    thiamine HCL (B-1) tablet 100 mg  100 mg Oral DAILY    melatonin tablet 3 mg  3 mg Oral QHS PRN    zolpidem (AMBIEN) tablet 5 mg  5 mg Oral QHS PRN       Social History:  Social History     Tobacco Use    Smoking status: Former Smoker     Packs/day: 0.25     Years: 20.00     Pack years: 5.00     Quit date: 2014     Years since quittin.2    Smokeless tobacco: Never Used   Substance Use Topics    Alcohol use:  Yes     Alcohol/week: 0.0 standard drinks     Comment: 21-24 Beers per week Family History:  Family History   Problem Relation Age of Onset    Diabetes Mother        Review of Systems:  Patient has no complaints at this time  Constitutional: negative fever, negative chills, negative weight loss  Eyes:   negative visual changes  ENT:   negative sore throat, tongue or lip swelling  Respiratory:  negative cough, negative dyspnea  Cards:  negative for chest pain, palpitations, lower extremity edema  GI:   See HPI  :  negative for frequency, dysuria  Integument:  negative for rash and pruritus  Heme:  negative for easy bruising and gum/nose bleeding  Musculoskeletal:negative for myalgias, back pain and muscle weakness  Neuro:    negative for headaches, dizziness  Psych: negative for feelings of anxiety, depression       Objective:     Patient Vitals for the past 8 hrs:   BP Temp Pulse Resp SpO2   03/17/21 1430 (!) 150/88 -- 89 17 94 %   03/17/21 1415 137/74 -- 83 12 93 %   03/17/21 1400 (!) 141/79 -- 92 23 93 %   03/17/21 1345 (!) 148/90 -- 93 18 93 %   03/17/21 1338 135/70 -- 91 16 94 %   03/17/21 0920 138/77 -- 92 -- --   03/17/21 0800 (!) 142/63 98 °F (36.7 °C) 91 18 93 %     No intake/output data recorded. 03/15 1901 - 03/17 0700  In: -   Out: 2500 [Urine:2500]    EXAM:     CONST:  Pleasant female lying in bed, no acute distress   NEURO:  Alert and oriented   HEENT: EOMI, no scleral icterus   LUNGS: No acute respiratory distress   CARD:  S1 S2   ABD:  Soft, non distended, no tenderness, no rebound, no guarding. + Bowel sounds. EXT:  Warm   PSYCH: Not anxious or agitated       Data Review     No results for input(s): WBC, HGB, HCT, PLT, HGBEXT, HCTEXT, PLTEXT in the last 72 hours.   Recent Labs     03/17/21 0429 03/16/21  0328   * 134*   K 4.8 4.8    101   CO2 22 24   BUN 22* 17   CREA 0.90 0.85   * 115*   CA 9.3 9.5     Recent Labs     03/17/21 0429 03/16/21  0328    99   TP 8.4* 8.2   ALB 2.9* 2.9*   GLOB 5.5* 5.3*     No results for input(s): INR, PTP, APTT, INREXT in the last 72 hours. Assessment:   · LVAD evaluation: Hgb 11.2, platelets 282, hemoccult negative, iron 48, iron saturation 16%, TIBC 293, ferritin 347. No signs of active GI bleeding. No history of EGD or colonoscopy. · Congestive heart failure: on dobutamine  · Cardiomyopathy  · Chronic kidney disease  · Hypertension  · Diabetes mellitus type II  · Dyslipidemia     Patient Active Problem List   Diagnosis Code    Dilated cardiomyopathy ([facility] I42.0    Dyslipidemia E78.5    Gout M10.9    Alcohol abuse F10.10    Hypertension I10    Dermatitis L30.9    Primary osteoarthritis of right knee M17.11    Carpal tunnel syndrome of right wrist G56.01    Reactive airway disease J45.909    Former tobacco use--stopped 2014 after >40 yrs smoking Z87.891    Obesity (BMI 30.0-34. 9) E66.9    Epistaxis R04.0    Decreased libido R68.82    Chest pain, exertional R07.9    PARDO (dyspnea on exertion) R06.00    Osteoarthrosis, localized, primary, knee, right M17.11    Status post total right knee replacement Z96.651    CKD (chronic kidney disease) stage 3, GFR 30-59 ml/min N18.30    Uncontrolled type 2 diabetes mellitus with hyperglycemia (HCC) E11.65    Congestive heart failure (HCC) I50.9    CHF exacerbation (HCC) I50.9    Acute on chronic systolic congestive heart failure, NYHA class 3 (HCC) I50.23    Moderate pulmonary arterial systolic hypertension (HCC) I27.21    Hypokalemia E87.6    Hypomagnesemia E83.42     Plan:     · Clear liquids after midnight. Bowel prep tomorrow evening. · Plan for EGD and colonoscopy at 9AM on Friday with Dr. Musa Martinez. Details and risks of the procedures to include (but not limited to) anesthesia, bleeding, infection, and perforation were discussed. Bowel prep was reviewed. Patient understands and is in agreement with the plan.   · Discussed with heart failure NP  · Patient was discussed with and will be seen by Dr. Musa Martinez  · Thank you for allowing me to participate in care of Hari Winter     Signed By: Flor Buitrago     3/17/2021  3:31 PM         Gastroenterology Attending Physician attestation statement and comments. This patient was seen and examined by me in a face-to-face visit today. I reviewed the medical record including lab work, imaging and other provider notes. I confirmed the history as described above. I spoke to the patient, reviewed the medical record including lab work, imaging and other provider notes. I discussed this case in detail with Flor Osborne. I formulated an  assessment of this patient and developed a treatment plan. I agree with the above consultation note. I agree with the history, exam and assessment and plan as outlined in the note. I would like to add the following: Patient seen and examined. As part of LVAD evaluation, she needs EGD and colonoscopy. Will plan for procedures on Friday as above. I have discussed procedure details and risks with her and she is in agreement to proceed. Alfredo Mckee MD

## 2021-03-17 NOTE — PROGRESS NOTES
[facility] in [facility], [address] [facility] patient with Evangelina Gallegos RN LVAD Coordinator to follow up on questions regarding LVAD. Patient and significant other denied any questions at this time. Inquired about previous EGD/Colonoscopy and dental visits. She denied any previous EGD/Colonoscopy. She provided information for her dentist, Dr. Vy Martines in [place]. Patient had no further questions or concerns at this time. Will continue to follow up with patient as part of ongoing LVAD evaluation. Robert Gonzalez RN.

## 2021-03-17 NOTE — PROGRESS NOTES
[address] [facility] in [place], [place]  Inpatient Consult Progress Note      Patient name: Tamika Crump  Patient : 1950  Patient MRN: [id number]  Consulting MD: Harini Meek MD  Date of service: 21    REASON FOR CONSULT:  Acute on chronic systolic heart failure     PLAN:  · Severe decompensated dilated cardiomyopathy, LVEF < 15%, NYHA Class IV; initiate LVAD evaluation; check RHC today on dobutamine 7.5 mcg/kg/min  · If CI > 2.3, will complete VAD evaluation prior to discharge      Continue dobutamine 7.5 mcg/kg/min; watch HR today, keep < 100 bpm  No beta-blockers while on dobutamine gtt  Cont digoxin to 0.25mg daily; level daily 0.6 today  (goal 0.7-1.2)  Hold ACEi/ARB/ARNi/MRA in anticipation of surgery and Cr much improved off  Increase hydralazine to 74 mg PO TID  Change to Imdur 30mg PO BID   Continue bumex 2mg PO daily  Continue spironolactone 12.5mg daily  Continue magnesium oxide 800mg twice daily  Keep K>4 and Mg>2  Consider baby ASA; will defer to primary cardiology team   Hold statin due to elevated tbili and transaminitis  Give venofer 200mg x 2 doses, recheck iron later this week  Fecal occult negative   Continue high dose vitamin D weekly  Check genetic testing for cardiomyopathy- pending  PYP equivocal for amyloid and gammopathy pending   Nutritionist consult for heart failure diet and weight loss  Provide heart failure education, start basic LVAD education to help with decisions  Palliative Care consultation   Review advanced care plan with SW  Ambulate daily  PT to evaluate and 6MW  Recommend at discharge:   · Lifevest if patient full code  · Vaccinations for flu, pneumonia and covid  · Referral to sleep medicine for sleep study     IMPRESSION:  Fatigue  Shortness of breath at rest  Volume overload  Acute on chronic systolic heart failure  · Stage D, NYHA class IV symptoms, INTERMACS 2  · Non-ischemic cardiomyopathy, recent deterioration to severe LV dysfunction  · Normal cors (by [facility] 3/11/21)  · LVEF 15-20% (by echo 3/9/21) from 20-25% (by echo 1/2021) from 51-55% (by echo 11/2019)  · Severe pulmonary artery hypertension, PVR 4  · RV dysfunction, moderate-severe  · Low cardiac index at rest 1.3  · Inotropic-dependence  Anemia  Hypokalemia/hypomagensemia  Cardiac risk factors  · Morbid obesity (BMI 32)  · High risk for SHARLENE  · DM2 (hgba1c 6.8)  · HL  · HTN  · Former tobacco (stopped 2014 after > 40 years)  · H/o alcohol abuse  OA, right knee  Carpal tunnel syndrome  Gout  Iron deficiency  Vitamin D deficincy     Interval Events  Feels much better, able to ambulate around nursing station   Tolerating inotropic support   Creatinine and PBNP stable       CARDIAC IMAGING:  RHC (3/11/21) 25, PA 49/27/37, W 34, Allen CI 1.34  Echo (3/9/21) LVEF 15-20%, mild-moderate MR, severely elevated CVP, IVC > 21mm  Echo (1/29/21) LVEF 20-25%, moderately to severely reduced RV function  Echo (11/20/19) LVEF 51-55%  Echo (12/18/18) LVEF 50-55%  Echo (11/23/16) LVEF 45-55%  EKG (3/9/21) ST 103bpm, QRS 84ms  NST (5/2018) no scar or ischemia, LVEF 48%  Normal cors (by [facility] 3/11/21)     PHYSICAL EXAM:  Visit Vitals  /77   Pulse 92   Temp 98 °F (36.7 °C)   Resp 18   Ht 5' 4\" (1.626 m)   Wt 189 lb 11.2 oz (86 kg)   SpO2 93%   BMI 32.56 kg/m²     General: Patient is well developed, well-nourished in no acute distress  HEENT: Normocephalic and atraumatic. No scleral icterus. Pupils are equal, round and reactive to light and accomodation. No conjunctival injection. Oropharynx is clear. Neck: Supple. No evidence of thyroid enlargements or lymphadenopathy. JVD: Cannot be appreciated   Lungs: Breath sounds are equal and clear bilaterally. No wheezes, rhonchi, or rales. Heart: Regular rate and rhythm with normal S1 and S2. No murmurs, gallops or rubs. Abdomen: Soft, no mass or tenderness. No organomegaly or hernia. Bowel sounds present.   Genitourinary and rectal: deferred  Extremities: No cyanosis, clubbing, or edema. Neurologic: No focal sensory or motor deficits are noted. Grossly intact. Psychiatric: Awake, alert an doriented x 3. Appropriate mood and affect. Skin: Warm, dry and well perfused. No lesions, nodules or rashes are noted. REVIEW OF SYSTEMS:  General: Denies fever, night sweats. Ear, nose and throat: Denies difficulty hearing, sinus problems, runny nose, post-nasal drip, ringing in ears, mouth sores, loose teeth, ear pain, nosebleeds, sore throate, facial pain or numbess  Cardiovascular: see above in the interval history  Respiratory: Denies cough, wheezing, sputum production, hemoptysis.   Gastrointestinal: Denies heartburn, constipation, intolerance to certain foods, diarrhea, abdominal pain, nausea, vomiting, difficulty swallowing, blood in stool  Kidney and bladder: Denies painful urination, frequent urination, urgency, prostate problems and impotence  Musculoskeletal: Denies joint pain, muscle weakness  Skin and hair: Denies change in existing skin lesions, hair loss or increase, breast changes    PAST MEDICAL HISTORY:  Past Medical History:   Diagnosis Date    Arthritis     Diabetes ([facility] [address]     Hypercholesterolemia     Hypertension        PAST SURGICAL HISTORY:  Past Surgical History:   Procedure Laterality Date    HX ORTHOPAEDIC      Arthroscopy right knee       FAMILY HISTORY:  Family History   Problem Relation Age of Onset    Diabetes Mother        SOCIAL HISTORY:  Social History     Socioeconomic History    Marital status:      Spouse name: Not on file    Number of children: 3    Years of education: 15    Highest education level: Not on file   Occupational History    Occupation: retired   Tobacco Use    Smoking status: Former Smoker     Packs/day: 0.25     Years: 20.00     Pack years: 5.00     Quit date: 2014     Years since quittin.2    Smokeless tobacco: Never Used   Substance and Sexual Activity    Alcohol use: Yes     Alcohol/week: 0.0 standard drinks     Comment: 21-24 Beers per week    Drug use: No    Sexual activity: Yes     Partners: Male       LABORATORY RESULTS:     Labs Latest Ref Rng & Units 3/17/2021 3/16/2021 3/15/2021 3/14/2021 3/13/2021 3/12/2021 3/11/2021   WBC 3.6 - 11.0 K/uL - - - 6.2 6.1 5.5 4.2   RBC 3.80 - 5.20 M/uL - - - 4.09 4.01 3.81 4.21   Hemoglobin 11.5 - 16.0 g/dL - - - 11. 2(L) 10. 9(L) 10. 4(L) 11.6   Hematocrit 35.0 - 47.0 % - - - 35.9 34. 3(L) 33. 2(L) 37.8   MCV 80.0 - 99.0 FL - - - 87.8 85.5 87.1 89.8   Platelets 007 - 804 K/uL - - - 248 231 259 251   Lymphocytes 12 - 49 % - - - 8(L) 10(L) 15 18   Monocytes 5 - 13 % - - - 7 7 6 6   Eosinophils 0 - 7 % - - - 3 4 3 2   Basophils 0 - 1 % - - - 0 0 1 1   Albumin 3.5 - 5.0 g/dL 2. 9(L) 2. 9(L) 2. 8(L) 2. 9(L) 3.0(L) 2. 7(L) -   Calcium 8.5 - 10.1 MG/DL 9.3 9.5 9.1 8.6 8.4(L) 8.6 8.9   Glucose 65 - 100 mg/dL 129(H) 115(H) 107(H) 164(H) 155(H) 160(H) 82   BUN 6 - 20 MG/DL 22(H) 17 16 27(H) 41(H) 52(H) 42(H)   Creatinine 0.55 - 1.02 MG/DL 0.90 0.85 0.81 0.90 0.92 1.23(H) 1.21(H)   Sodium 136 - 145 mmol/L 131(L) 134(L) 136 136 138 138 135(L)   Potassium 3.5 - 5.1 mmol/L 4.8 4.8 4.2 3.6 3.3(L) 3.9 4.6   TSH 0.36 - 3.74 uIU/mL - - - - - - 2.75   Some recent data might be hidden     Lab Results   Component Value Date/Time    TSH 2.75 03/11/2021 10:31 AM    TSH 1.050 10/08/2020 12:01 PM    TSH 1.200 08/08/2019 02:31 PM    TSH 1.280 08/26/2016 11:48 AM       ALLERGY:  No Known Allergies     CURRENT MEDICATIONS:    Current Facility-Administered Medications:     hydrALAZINE (APRESOLINE) tablet 75 mg, 75 mg, Oral, TID, Mago, Danita B, NP    isosorbide mononitrate ER (IMDUR) tablet 30 mg, 30 mg, Oral, BID, Mago, Danita B, NP    DOBUTamine (DOBUTREX) 500 mg/250 mL (2,000 mcg/mL) infusion, 7.5 mcg/kg/min, IntraVENous, CONTINUOUS, William Mayberry NP, Last Rate: 19.5 mL/hr at 03/17/21 0229, 7.5 mcg/kg/min at 03/17/21 0229    digoxin (LANOXIN) tablet 0.25 mg, 0.25 mg, Oral, DAILY, Polliard, Carry Rode T, NP, 0.25 mg at 03/17/21 5765    bumetanide (BUMEX) tablet 2 mg, 2 mg, Oral, DAILY, Polliard, Romilind [place], NP, 2 mg at 03/17/21 1431    ergocalciferol capsule 50,000 Units, 50,000 Units, Oral, Q7D, Facundo Mixon MD, 50,000 Units at 03/13/21 1258    spironolactone (ALDACTONE) tablet 12.5 mg, 12.5 mg, Oral, DAILY, Facundo Mixon MD, 12.5 mg at 03/17/21 9372    magnesium oxide (MAG-OX) tablet 800 mg, 800 mg, Oral, BID, Facundo Mixon MD, 800 mg at 03/17/21 3109    sodium chloride (NS) flush 5-40 mL, 5-40 mL, IntraVENous, Q8H, Lyn Paz MD, 10 mL at 03/17/21 0545    sodium chloride (NS) flush 5-40 mL, 5-40 mL, IntraVENous, PRN, Lyn Paz MD    acetaminophen (TYLENOL) tablet 650 mg, 650 mg, Oral, Q6H PRN, 650 mg at 03/13/21 2302 **OR** acetaminophen (TYLENOL) suppository 650 mg, 650 mg, Rectal, Q6H PRN, Lyn Paz MD    polyethylene glycol (MIRALAX) packet 17 g, 17 g, Oral, DAILY PRN, Lyn Paz MD    promethazine (PHENERGAN) tablet 12.5 mg, 12.5 mg, Oral, Q6H PRN **OR** ondansetron (ZOFRAN) injection 4 mg, 4 mg, IntraVENous, Q6H PRN, Lyn Paz MD    sodium chloride (NS) flush 5-40 mL, 5-40 mL, IntraVENous, Q8H, Anshul Baca MD, 10 mL at 03/17/21 0545    glucose chewable tablet 16 g, 4 Tab, Oral, PRN, Lyn Paz MD    dextrose (D50W) injection syrg 12.5-25 g, 25-50 mL, IntraVENous, PRN, Lny Paz MD, 25 g at 03/11/21 1509    glucagon (GLUCAGEN) injection 1 mg, 1 mg, IntraMUSCular, PRN, Lyn Paz MD    insulin glargine (LANTUS) injection 30 Units, 30 Units, SubCUTAneous, DAILY, Lyn Paz MD, 30 Units at 03/17/21 0927    insulin lispro (HUMALOG) injection 1-10 Units, 1-10 Units, SubCUTAneous, AC&HS, Lyn Paz MD, 2 Units at 03/17/21 0924    oxybutynin (DITROPAN) tablet 5 mg, 5 mg, Oral, TID, Lyn Paz MD, 5 mg at 03/17/21 0923    budesonide (PULMICORT) 500 mcg/2 ml nebulizer suspension, 500 mcg, Nebulization, BID RT, Anshul Baca MD, 500 mcg at 03/17/21 0729  •  arformoteroL (BROVANA) neb solution 15 mcg, 15 mcg, Nebulization, BID RT, Anshul Baca MD, 15 mcg at 03/17/21 0729  •  albuterol-ipratropium (DUO-NEB) 2.5 MG-0.5 MG/3 ML, 3 mL, Nebulization, Q4H PRN, Anshul Baca MD, 3 mL at 03/13/21 2039  •  thiamine HCL (B-1) tablet 100 mg, 100 mg, Oral, DAILY, Anshul Baca MD, 100 mg at 03/17/21 0923  •  melatonin tablet 3 mg, 3 mg, Oral, QHS PRN, Anshul Baca MD, 3 mg at 03/11/21 0233  •  zolpidem (AMBIEN) tablet 5 mg, 5 mg, Oral, QHS PRN, Anshul Baca MD, 5 mg at 03/16/21 2225    PATIENT CARE TEAM:  Patient Care Team:  Narendra Ruiz MD as PCP - General (Internal Medicine)  Narendra Ruiz MD as PCP - [facility] Empaneled Provider  Leandro Felton MD as Physician (Cardiology)  Austin Ramirez MD as Surgeon (Orthopedic Surgery)     Thank you for allowing me to participate in this patient's care.    Danita Mercedes NP  [facility]  [facility]  [address] [place] [address], [address]  Phone: [phone]    [facility] ATTENDING ADDENDUM    Patient was seen and examined in person. Data and notes were reviewed. I have discussed and agree with the plan as noted in the NP note above without further additions.    Ledy العلي MD PhD  [facility]  [facility]

## 2021-03-17 NOTE — PROGRESS NOTES
TRANSFER - IN REPORT:    Verbal report received from Ammy Madera RN on Ev Cooper, Procedure: Right cardiac cath , from the Cardiac Cath lab, for routine progression of care. Report consisted of patients Situation, Background, Assessment and Recommendations(SBAR). Information from the following report(s) Procedure Summary, MAR and Recent Results was reviewed with the receiving clinician. Opportunity for questions and clarification was provided. Assessment completed upon patients arrival to [address] [place] [address] and care assumed. Cardiac Cath Lab Recovery Arrival Note:     Ev Cooper arrived to [facility] recovery area. Patient procedure= Right cardiac cath. Patient on cardiac monitor, non-invasive blood pressure, Patient status doing well without problems. Patient is A&Ox 4. Patient reports no pain, no chest pain, no n/v. Procedure site without any bleeding and no hematoma.

## 2021-03-18 ENCOUNTER — TELEPHONE (OUTPATIENT)
Dept: CARDIOLOGY CLINIC | Age: 71
End: 2021-03-18

## 2021-03-18 ENCOUNTER — APPOINTMENT (OUTPATIENT)
Dept: VASCULAR SURGERY | Age: 71
DRG: 286 | End: 2021-03-18
Attending: NURSE PRACTITIONER
Payer: MEDICARE

## 2021-03-18 ENCOUNTER — HOME HEALTH ADMISSION (OUTPATIENT)
Dept: HOME HEALTH SERVICES | Facility: HOME HEALTH | Age: 71
End: 2021-03-18
Payer: MEDICARE

## 2021-03-18 LAB
ALBUMIN SERPL-MCNC: 2.9 G/DL (ref 3.5–5)
ALBUMIN/GLOB SERPL: 0.5 {RATIO} (ref 1.1–2.2)
ALP SERPL-CCNC: 103 U/L (ref 45–117)
ALT SERPL-CCNC: 58 U/L (ref 12–78)
AMPHET UR QL SCN: NEGATIVE
ANION GAP SERPL CALC-SCNC: 4 MMOL/L (ref 5–15)
AST SERPL-CCNC: 33 U/L (ref 15–37)
BARBITURATES UR QL SCN: NEGATIVE
BENZODIAZ UR QL: POSITIVE
BILIRUB SERPL-MCNC: 0.7 MG/DL (ref 0.2–1)
BNP SERPL-MCNC: 292 PG/ML
BUN SERPL-MCNC: 26 MG/DL (ref 6–20)
BUN/CREAT SERPL: 29 (ref 12–20)
CALCIUM SERPL-MCNC: 9.2 MG/DL (ref 8.5–10.1)
CANNABINOIDS UR QL SCN: NEGATIVE
CHLORIDE SERPL-SCNC: 103 MMOL/L (ref 97–108)
CHOLEST SERPL-MCNC: 169 MG/DL
CO2 SERPL-SCNC: 26 MMOL/L (ref 21–32)
COCAINE UR QL SCN: NEGATIVE
COMMENT, HOLDF: NORMAL
CREAT SERPL-MCNC: 0.91 MG/DL (ref 0.55–1.02)
CRP SERPL HS-MCNC: 5.4 MG/L
DIGOXIN SERPL-MCNC: 0.7 NG/ML (ref 0.9–2)
DRUG SCRN COMMENT,DRGCM: ABNORMAL
GLOBULIN SER CALC-MCNC: 5.3 G/DL (ref 2–4)
GLUCOSE BLD STRIP.AUTO-MCNC: 113 MG/DL (ref 65–100)
GLUCOSE BLD STRIP.AUTO-MCNC: 182 MG/DL (ref 65–100)
GLUCOSE BLD STRIP.AUTO-MCNC: 267 MG/DL (ref 65–100)
GLUCOSE SERPL-MCNC: 159 MG/DL (ref 65–100)
HBV CORE IGM SER QL: NONREACTIVE
HCG UR QL: NEGATIVE
HDLC SERPL-MCNC: 70 MG/DL
HDLC SERPL: 2.4 {RATIO} (ref 0–5)
LACTATE SERPL-SCNC: 0.9 MMOL/L (ref 0.4–2)
LDH SERPL L TO P-CCNC: 249 U/L (ref 81–246)
LDLC SERPL CALC-MCNC: 84.6 MG/DL (ref 0–100)
LIPID PROFILE,FLP: NORMAL
MAGNESIUM SERPL-MCNC: 2.2 MG/DL (ref 1.6–2.4)
METHADONE UR QL: NEGATIVE
OPIATES UR QL: NEGATIVE
PCP UR QL: NEGATIVE
PF4 HEPARIN CMPLX AB SER-ACNC: 0.2 OD (ref 0–0.4)
POTASSIUM SERPL-SCNC: 4.6 MMOL/L (ref 3.5–5.1)
PREALB SERPL-MCNC: 26.9 MG/DL (ref 20–40)
PROT SERPL-MCNC: 8.2 G/DL (ref 6.4–8.2)
RUBV IGG SER-IMP: NONREACTIVE
RUBV IGG SERPL IA-ACNC: 1.9 IU/ML
SAMPLES BEING HELD,HOLD: NORMAL
SERVICE CMNT-IMP: ABNORMAL
SODIUM SERPL-SCNC: 133 MMOL/L (ref 136–145)
T3FREE SERPL-MCNC: 2.7 PG/ML (ref 2.2–4)
TRIGL SERPL-MCNC: 72 MG/DL (ref ?–150)
VLDLC SERPL CALC-MCNC: 14.4 MG/DL

## 2021-03-18 PROCEDURE — 74011250636 HC RX REV CODE- 250/636: Performed by: NURSE PRACTITIONER

## 2021-03-18 PROCEDURE — 86664 EPSTEIN-BARR NUCLEAR ANTIGEN: CPT

## 2021-03-18 PROCEDURE — 99232 SBSQ HOSP IP/OBS MODERATE 35: CPT | Performed by: INTERNAL MEDICINE

## 2021-03-18 PROCEDURE — 99233 SBSQ HOSP IP/OBS HIGH 50: CPT | Performed by: INTERNAL MEDICINE

## 2021-03-18 PROCEDURE — 65660000000 HC RM CCU STEPDOWN

## 2021-03-18 PROCEDURE — 94727 GAS DIL/WSHOT DETER LNG VOL: CPT

## 2021-03-18 PROCEDURE — 80061 LIPID PANEL: CPT

## 2021-03-18 PROCEDURE — 85306 CLOT INHIBIT PROT S FREE: CPT

## 2021-03-18 PROCEDURE — 93922 UPR/L XTREMITY ART 2 LEVELS: CPT

## 2021-03-18 PROCEDURE — 84481 FREE ASSAY (FT-3): CPT

## 2021-03-18 PROCEDURE — 02HV33Z INSERTION OF INFUSION DEVICE INTO SUPERIOR VENA CAVA, PERCUTANEOUS APPROACH: ICD-10-PCS | Performed by: INTERNAL MEDICINE

## 2021-03-18 PROCEDURE — 83880 ASSAY OF NATRIURETIC PEPTIDE: CPT

## 2021-03-18 PROCEDURE — 86762 RUBELLA ANTIBODY: CPT

## 2021-03-18 PROCEDURE — 93880 EXTRACRANIAL BILAT STUDY: CPT

## 2021-03-18 PROCEDURE — 80307 DRUG TEST PRSMV CHEM ANLYZR: CPT

## 2021-03-18 PROCEDURE — 94640 AIRWAY INHALATION TREATMENT: CPT

## 2021-03-18 PROCEDURE — 83735 ASSAY OF MAGNESIUM: CPT

## 2021-03-18 PROCEDURE — 36415 COLL VENOUS BLD VENIPUNCTURE: CPT

## 2021-03-18 PROCEDURE — 86592 SYPHILIS TEST NON-TREP QUAL: CPT

## 2021-03-18 PROCEDURE — 84134 ASSAY OF PREALBUMIN: CPT

## 2021-03-18 PROCEDURE — 86141 C-REACTIVE PROTEIN HS: CPT

## 2021-03-18 PROCEDURE — 83605 ASSAY OF LACTIC ACID: CPT

## 2021-03-18 PROCEDURE — 81025 URINE PREGNANCY TEST: CPT

## 2021-03-18 PROCEDURE — 74011000250 HC RX REV CODE- 250: Performed by: HOSPITALIST

## 2021-03-18 PROCEDURE — 82784 ASSAY IGA/IGD/IGG/IGM EACH: CPT

## 2021-03-18 PROCEDURE — 86790 VIRUS ANTIBODY NOS: CPT

## 2021-03-18 PROCEDURE — 74011000250 HC RX REV CODE- 250: Performed by: PHYSICIAN ASSISTANT

## 2021-03-18 PROCEDURE — 86480 TB TEST CELL IMMUN MEASURE: CPT

## 2021-03-18 PROCEDURE — 85613 RUSSELL VIPER VENOM DILUTED: CPT

## 2021-03-18 PROCEDURE — 74011250637 HC RX REV CODE- 250/637: Performed by: NURSE PRACTITIONER

## 2021-03-18 PROCEDURE — 86787 VARICELLA-ZOSTER ANTIBODY: CPT

## 2021-03-18 PROCEDURE — 77030020365 HC SOL INJ SOD CL 0.9% 50ML

## 2021-03-18 PROCEDURE — 86777 TOXOPLASMA ANTIBODY: CPT

## 2021-03-18 PROCEDURE — 86705 HEP B CORE ANTIBODY IGM: CPT

## 2021-03-18 PROCEDURE — 80053 COMPREHEN METABOLIC PANEL: CPT

## 2021-03-18 PROCEDURE — 82955 ASSAY OF G6PD ENZYME: CPT

## 2021-03-18 PROCEDURE — 83051 HEMOGLOBIN PLASMA: CPT

## 2021-03-18 PROCEDURE — 74011250637 HC RX REV CODE- 250/637: Performed by: INTERNAL MEDICINE

## 2021-03-18 PROCEDURE — 86765 RUBEOLA ANTIBODY: CPT

## 2021-03-18 PROCEDURE — 76937 US GUIDE VASCULAR ACCESS: CPT

## 2021-03-18 PROCEDURE — 86644 CMV ANTIBODY: CPT

## 2021-03-18 PROCEDURE — C1751 CATH, INF, PER/CENT/MIDLINE: HCPCS

## 2021-03-18 PROCEDURE — 74011250637 HC RX REV CODE- 250/637: Performed by: HOSPITALIST

## 2021-03-18 PROCEDURE — 80162 ASSAY OF DIGOXIN TOTAL: CPT

## 2021-03-18 PROCEDURE — 82962 GLUCOSE BLOOD TEST: CPT

## 2021-03-18 PROCEDURE — 83615 LACTATE (LD) (LDH) ENZYME: CPT

## 2021-03-18 PROCEDURE — 94729 DIFFUSING CAPACITY: CPT

## 2021-03-18 PROCEDURE — 94010 BREATHING CAPACITY TEST: CPT

## 2021-03-18 PROCEDURE — 86735 MUMPS ANTIBODY: CPT

## 2021-03-18 PROCEDURE — 85302 CLOT INHIBIT PROT C ANTIGEN: CPT

## 2021-03-18 PROCEDURE — 74011636637 HC RX REV CODE- 636/637: Performed by: HOSPITALIST

## 2021-03-18 RX ORDER — DOBUTAMINE HYDROCHLORIDE 200 MG/100ML
7.5 INJECTION INTRAVENOUS CONTINUOUS
Status: DISCONTINUED | OUTPATIENT
Start: 2021-03-18 | End: 2021-03-20 | Stop reason: HOSPADM

## 2021-03-18 RX ADMIN — ISOSORBIDE MONONITRATE 30 MG: 30 TABLET, EXTENDED RELEASE ORAL at 09:09

## 2021-03-18 RX ADMIN — ISOSORBIDE MONONITRATE 30 MG: 30 TABLET, EXTENDED RELEASE ORAL at 17:58

## 2021-03-18 RX ADMIN — OXYBUTYNIN CHLORIDE 5 MG: 5 TABLET ORAL at 09:09

## 2021-03-18 RX ADMIN — INSULIN GLARGINE 30 UNITS: 100 INJECTION, SOLUTION SUBCUTANEOUS at 12:06

## 2021-03-18 RX ADMIN — INSULIN LISPRO 2 UNITS: 100 INJECTION, SOLUTION INTRAVENOUS; SUBCUTANEOUS at 09:08

## 2021-03-18 RX ADMIN — Medication 800 MG: at 09:09

## 2021-03-18 RX ADMIN — ZOLPIDEM TARTRATE 5 MG: 5 TABLET ORAL at 23:29

## 2021-03-18 RX ADMIN — POLYETHYLENE GLYCOL 3350, SODIUM SULFATE, SODIUM CHLORIDE, POTASSIUM CHLORIDE, ASCORBIC ACID, SODIUM ASCORBATE 2 L: KIT at 17:59

## 2021-03-18 RX ADMIN — ARFORMOTEROL TARTRATE 15 MCG: 15 SOLUTION RESPIRATORY (INHALATION) at 19:07

## 2021-03-18 RX ADMIN — Medication 10 ML: at 17:58

## 2021-03-18 RX ADMIN — Medication 100 MG: at 09:09

## 2021-03-18 RX ADMIN — BUDESONIDE 500 MCG: 0.5 INHALANT RESPIRATORY (INHALATION) at 19:08

## 2021-03-18 RX ADMIN — BUMETANIDE 2 MG: 1 TABLET ORAL at 09:09

## 2021-03-18 RX ADMIN — DIGOXIN 0.25 MG: 125 TABLET ORAL at 09:09

## 2021-03-18 RX ADMIN — HYDRALAZINE HYDROCHLORIDE 75 MG: 50 TABLET, FILM COATED ORAL at 23:16

## 2021-03-18 RX ADMIN — Medication 10 ML: at 23:34

## 2021-03-18 RX ADMIN — OXYBUTYNIN CHLORIDE 5 MG: 5 TABLET ORAL at 23:17

## 2021-03-18 RX ADMIN — INSULIN LISPRO 5 UNITS: 100 INJECTION, SOLUTION INTRAVENOUS; SUBCUTANEOUS at 17:56

## 2021-03-18 RX ADMIN — HYDRALAZINE HYDROCHLORIDE 75 MG: 50 TABLET, FILM COATED ORAL at 16:57

## 2021-03-18 RX ADMIN — OXYBUTYNIN CHLORIDE 5 MG: 5 TABLET ORAL at 17:02

## 2021-03-18 RX ADMIN — HYDRALAZINE HYDROCHLORIDE 75 MG: 50 TABLET, FILM COATED ORAL at 09:09

## 2021-03-18 RX ADMIN — DOBUTAMINE HYDROCHLORIDE 7.5 MCG/KG/MIN: 200 INJECTION INTRAVENOUS at 23:30

## 2021-03-18 RX ADMIN — DOBUTAMINE HYDROCHLORIDE 7.5 MCG/KG/MIN: 200 INJECTION INTRAVENOUS at 09:12

## 2021-03-18 RX ADMIN — ARFORMOTEROL TARTRATE 15 MCG: 15 SOLUTION RESPIRATORY (INHALATION) at 07:28

## 2021-03-18 RX ADMIN — SPIRONOLACTONE 12.5 MG: 25 TABLET ORAL at 18:01

## 2021-03-18 RX ADMIN — BUDESONIDE 500 MCG: 0.5 INHALANT RESPIRATORY (INHALATION) at 07:28

## 2021-03-18 RX ADMIN — Medication 800 MG: at 17:59

## 2021-03-18 NOTE — CONSULTS
Palliative Medicine Consult  Montero: [phone] ([phone])          Consult received and chart reviewed. Will follow up with patient 3/19/21 for palliative medicine consult. BOB AltmanC

## 2021-03-18 NOTE — PROGRESS NOTES
[address] [facility] in [place], [place]  Inpatient Consult Progress Note      Patient name: Geri Lopez  Patient : 1950  Patient MRN: [id number]  Consulting MD: Jasmyne Corado MD  Date of service: 21    REASON FOR CONSULT:  Acute on chronic systolic heart failure     PLAN:  · Severe decompensated dilated cardiomyopathy, LVEF < 15%, NYHA Class IV- improved on inotropes  · CI > 2.3, LVAD evaluate ordered.  Will plan on discharge on home inotropes for now     Continue dobutamine 7.5 mcg/kg/min  No beta-blockers while on dobutamine gtt  Cont digoxin to 0.25mg daily; level daily 0.7 today  (goal 0.7-1.2)  Hold ACEi/ARB/ARNi due to renal function  Cont hydralazine to 74 mg PO TID  Cont Imdur 30mg PO BID   Continue bumex 2mg PO daily  Continue spironolactone 12.5mg daily  Continue magnesium oxide 800mg twice daily  Keep K>4 and Mg>2  Consider baby ASA; will defer to primary cardiology team   Hold statin due to elevated tbili and transaminitis  Give venofer 200mg x 2 doses  Fecal occult negative   Continue high dose vitamin D weekly  Check genetic testing for cardiomyopathy- pending  PYP equivocal for amyloid and gammopathy pending   Nutritionist consult for heart failure diet and weight loss  Provide heart failure education  LVAD coordinator meeting with patient   Palliative Care consultation   Ambulate daily  PT to evaluate and 6MW  Lifevest ordered  Hopefully home tomorrow or Monday if evaluation is complete   Recommend at discharge:   · Vaccinations for flu, pneumonia and covid  · Referral to sleep medicine for sleep study     IMPRESSION:  Fatigue  Shortness of breath at rest  Volume overload  Acute on chronic systolic heart failure  · Stage D, NYHA class IV symptoms, INTERMACS 2  · Non-ischemic cardiomyopathy, recent deterioration to severe LV dysfunction  · Normal cors (by [facility] 3/11/21)  · LVEF 15-20% (by echo 3/9/21) from 20-25% (by echo 2021) from 51-55% (by echo 11/2019)  · Severe pulmonary artery hypertension, PVR 4  · RV dysfunction, moderate-severe  · Low cardiac index at rest 1.3  · Inotropic-dependence  Anemia  Hypokalemia/hypomagensemia  Cardiac risk factors  · Morbid obesity (BMI 32)  · High risk for SHARLENE  · DM2 (hgba1c 6.8)  · HL  · HTN  · Former tobacco (stopped 2014 after > 40 years)  · H/o alcohol abuse  OA, right knee  Carpal tunnel syndrome  Gout  Iron deficiency  Vitamin D deficincy     Interval Events  No acute events overnight  Creatinine stable  RHC- normal filling pressures, CI 2.93    CARDIAC IMAGING:  RHC 3/17/21  PA 47/20 (29), RA 7, PCWP 10, CI 2.93  RHC (3/11/21) 25, PA 49/27/37, W 34, Allen CI 1.34  Echo (3/9/21) LVEF 15-20%, mild-moderate MR, severely elevated CVP, IVC > 21mm  Echo (1/29/21) LVEF 20-25%, moderately to severely reduced RV function  Echo (11/20/19) LVEF 51-55%  Echo (12/18/18) LVEF 50-55%  Echo (11/23/16) LVEF 45-55%  EKG (3/9/21) ST 103bpm, QRS 84ms  NST (5/2018) no scar or ischemia, LVEF 48%  Normal cors (by [facility] 3/11/21)     PHYSICAL EXAM:  Visit Vitals  /69 (BP 1 Location: Right arm, BP Patient Position: At rest)   Pulse 93   Temp 98.1 °F (36.7 °C)   Resp 16   Ht 5' 4\" (1.626 m)   Wt 182 lb 12.2 oz (82.9 kg)   SpO2 93%   BMI 31.37 kg/m²     General: Patient is well developed, well-nourished in no acute distress  HEENT: Normocephalic and atraumatic. No scleral icterus. Pupils are equal, round and reactive to light and accomodation. No conjunctival injection. Oropharynx is clear. Neck: Supple. No evidence of thyroid enlargements or lymphadenopathy. JVD: Cannot be appreciated   Lungs: Breath sounds are equal and clear bilaterally. No wheezes, rhonchi, or rales. Heart: Regular rate and rhythm with normal S1 and S2. No murmurs, gallops or rubs. Abdomen: Soft, no mass or tenderness. No organomegaly or hernia. Bowel sounds present.   Genitourinary and rectal: deferred  Extremities: No cyanosis, clubbing, or edema. Neurologic: No focal sensory or motor deficits are noted. Grossly intact. Psychiatric: Awake, alert an doriented x 3. Appropriate mood and affect. Skin: Warm, dry and well perfused. No lesions, nodules or rashes are noted. REVIEW OF SYSTEMS:  General: Denies fever, night sweats. Ear, nose and throat: Denies difficulty hearing, sinus problems, runny nose, post-nasal drip, ringing in ears, mouth sores, loose teeth, ear pain, nosebleeds, sore throate, facial pain or numbess  Cardiovascular: see above in the interval history  Respiratory: Denies cough, wheezing, sputum production, hemoptysis. Gastrointestinal: Denies heartburn, constipation, intolerance to certain foods, diarrhea, abdominal pain, nausea, vomiting, difficulty swallowing, blood in stool  Kidney and bladder: Denies painful urination, frequent urination, urgency, prostate problems and impotence  Musculoskeletal: Denies joint pain, muscle weakness  Skin and hair: Denies change in existing skin lesions, hair loss or increase, breast changes    PAST MEDICAL HISTORY:  Past Medical History:   Diagnosis Date    Arthritis     Diabetes ([facility] [address]     Hypercholesterolemia     Hypertension        PAST SURGICAL HISTORY:  Past Surgical History:   Procedure Laterality Date    HX ORTHOPAEDIC      Arthroscopy right knee       FAMILY HISTORY:  Family History   Problem Relation Age of Onset    Diabetes Mother        SOCIAL HISTORY:  Social History     Socioeconomic History    Marital status:      Spouse name: Not on file    Number of children: 3    Years of education: 15    Highest education level: Not on file   Occupational History    Occupation: retired   Tobacco Use    Smoking status: Former Smoker     Packs/day: 0.25     Years: 20.00     Pack years: 5.00     Quit date: 2014     Years since quittin.2    Smokeless tobacco: Never Used   Substance and Sexual Activity    Alcohol use:  Yes     Alcohol/week: 0.0 standard drinks Comment: 21-24 Beers per week    Drug use: No    Sexual activity: Yes     Partners: Male       LABORATORY RESULTS:     Labs Latest Ref Rng & Units 3/18/2021 3/17/2021 3/16/2021 3/15/2021 3/14/2021 3/13/2021 3/12/2021   WBC 3.6 - 11.0 K/uL - - - - 6.2 6.1 5.5   RBC 3.80 - 5.20 M/uL - - - - 4.09 4.01 3.81   Hemoglobin 11.5 - 16.0 g/dL - - - - 11. 2(L) 10. 9(L) 10. 4(L)   Hematocrit 35.0 - 47.0 % - - - - 35.9 34. 3(L) 33. 2(L)   MCV 80.0 - 99.0 FL - - - - 87.8 85.5 87.1   Platelets 989 - 908 K/uL - - - - 248 231 259   Lymphocytes 12 - 49 % - - - - 8(L) 10(L) 15   Monocytes 5 - 13 % - - - - 7 7 6   Eosinophils 0 - 7 % - - - - 3 4 3   Basophils 0 - 1 % - - - - 0 0 1   Albumin 3.5 - 5.0 g/dL 2. 9(L) 2. 9(L) 2. 9(L) 2. 8(L) 2. 9(L) 3.0(L) 2. 7(L)   Calcium 8.5 - 10.1 MG/DL 9.2 9.3 9.5 9.1 8.6 8.4(L) 8.6   Glucose 65 - 100 mg/dL 159(H) 129(H) 115(H) 107(H) 164(H) 155(H) 160(H)   BUN 6 - 20 MG/DL 26(H) 22(H) 17 16 27(H) 41(H) 52(H)   Creatinine 0.55 - 1.02 MG/DL 0.91 0.90 0.85 0.81 0.90 0.92 1.23(H)   Sodium 136 - 145 mmol/L 133(L) 131(L) 134(L) 136 136 138 138   Potassium 3.5 - 5.1 mmol/L 4.6 4.8 4.8 4.2 3.6 3.3(L) 3.9   TSH 0.36 - 3.74 uIU/mL - - - - - - -   Some recent data might be hidden     Lab Results   Component Value Date/Time    TSH 2.75 03/11/2021 10:31 AM    TSH 1.050 10/08/2020 12:01 PM    TSH 1.200 08/08/2019 02:31 PM    TSH 1.280 08/26/2016 11:48 AM       ALLERGY:  No Known Allergies     CURRENT MEDICATIONS:    Current Facility-Administered Medications:     DOBUTamine (DOBUTREX) 500 mg/250 mL (2,000 mcg/mL) infusion, 7.5 mcg/kg/min, IntraVENous, CONTINUOUS, Mago, Danita B, NP    hydrALAZINE (APRESOLINE) tablet 75 mg, 75 mg, Oral, TID, Mago, Danita B, NP, 75 mg at 03/18/21 0909    isosorbide mononitrate ER (IMDUR) tablet 30 mg, 30 mg, Oral, BID, Mago, Danita B, NP, 30 mg at 03/18/21 0909    peg 3350-Electrolytes-Vit C (MOVIPREP) oral solution 2 L, 2 L, Oral, ONCE, Yordan Silva, [address]    digoxin (LANOXIN) tablet 0.25 mg, 0.25 mg, Oral, DAILY, Polliard, Robyn STODDARD NP, 0.25 mg at 03/18/21 0909    bumetanide (BUMEX) tablet 2 mg, 2 mg, Oral, DAILY, Polliard, Efrain Bettencourt NP, 2 mg at 03/18/21 0909    ergocalciferol capsule 50,000 Units, 50,000 Units, Oral, Q7D, Jessica Dunne MD, 50,000 Units at 03/13/21 1258    spironolactone (ALDACTONE) tablet 12.5 mg, 12.5 mg, Oral, DAILY, Jessica Dunne MD, 12.5 mg at 03/17/21 2745    magnesium oxide (MAG-OX) tablet 800 mg, 800 mg, Oral, BID, Jessica Dunne MD, 800 mg at 03/18/21 0909    sodium chloride (NS) flush 5-40 mL, 5-40 mL, IntraVENous, Q8H, Maddie Bettencourt MD, Stopped at 03/17/21 2304    sodium chloride (NS) flush 5-40 mL, 5-40 mL, IntraVENous, PRN, Maddie Bettencourt MD    acetaminophen (TYLENOL) tablet 650 mg, 650 mg, Oral, Q6H PRN, 650 mg at 03/13/21 2302 **OR** acetaminophen (TYLENOL) suppository 650 mg, 650 mg, Rectal, Q6H PRN, Maddie Bettencourt MD    polyethylene glycol (MIRALAX) packet 17 g, 17 g, Oral, DAILY PRN, Maddie Bettencourt MD    promethazine (PHENERGAN) tablet 12.5 mg, 12.5 mg, Oral, Q6H PRN **OR** ondansetron (ZOFRAN) injection 4 mg, 4 mg, IntraVENous, Q6H PRN, Maddie Bettencourt MD    sodium chloride (NS) flush 5-40 mL, 5-40 mL, IntraVENous, Q8H, Maddie Bettencourt MD, Stopped at 03/17/21 2305    glucose chewable tablet 16 g, 4 Tab, Oral, PRN, Maddie Bettencourt MD    dextrose (D50W) injection syrg 12.5-25 g, 25-50 mL, IntraVENous, PRN, Maddie Bettencourt MD, 25 g at 03/11/21 1509    glucagon (GLUCAGEN) injection 1 mg, 1 mg, IntraMUSCular, PRN, Maddie Bettencourt MD    insulin glargine (LANTUS) injection 30 Units, 30 Units, SubCUTAneous, DAILY, Maddie Bettencourt MD, 30 Units at 03/17/21 0927    insulin lispro (HUMALOG) injection 1-10 Units, 1-10 Units, SubCUTAneous, AC&HS, Maddie Bettencourt MD, 2 Units at 03/18/21 0908    oxybutynin (DITROPAN) tablet 5 mg, 5 mg, Oral, TID, Maddie Bettencourt MD, 5 mg at 03/18/21 0909    budesonide (PULMICORT) 500 mcg/2 ml nebulizer suspension, 500 mcg, Nebulization, BID RT, Fay Moctezuma MD, 500 mcg at 03/18/21 0728    arformoteroL (BROVANA) neb solution 15 mcg, 15 mcg, Nebulization, BID RT, Fay Moctezuma MD, 15 mcg at 03/18/21 0728    albuterol-ipratropium (DUO-NEB) 2.5 MG-0.5 MG/3 ML, 3 mL, Nebulization, Q4H PRN, Fay Moctezuma MD, 3 mL at 03/13/21 2039    thiamine HCL (B-1) tablet 100 mg, 100 mg, Oral, DAILY, Fay Moctezuma MD, 100 mg at 03/18/21 0909    melatonin tablet 3 mg, 3 mg, Oral, QHS PRN, Fay Moctezuma MD, 3 mg at 03/11/21 0233    zolpidem (AMBIEN) tablet 5 mg, 5 mg, Oral, QHS PRN, Fay Moctezuma MD, 5 mg at 03/17/21 2315    PATIENT CARE TEAM:  Patient Care Team:  Leisa Joy MD as PCP - General (Internal Medicine)  Leisa Joy MD as PCP - [address] [place] [address]  [facility] Provider  Jo Trevizo MD as Physician (Cardiology)  Chavo Jean MD as Surgeon (Orthopedic Surgery)     Thank you for allowing me to participate in this patient's care. Soni Sharma, NP  [facility]  [address] [facility], [address]  Phone: [phone]    [facility] ATTENDING ADDENDUM    Patient was seen and examined in person. Data and notes were reviewed. I have discussed and agree with the plan as noted in the NP note above without further additions.     Flavio Martin MD PhD  [facility]

## 2021-03-18 NOTE — PROGRESS NOTES
[address]  [address] [facility], [address]       GI PROGRESS NOTE  Will May Alicea  [phone] office  [phone] NP/PA in-hospital cell phone M-F until 4:30PM  After 5PM or on weekends, please call  for physician on call      NAME: Nela Davalos   :  1950   MRN:  [id number]       Subjective:   Patient is sitting on the edge of the bed. RN is at the bedside. No nausea, vomiting, or abdominal pain. Objective:     VITALS:   Last 24hrs VS reviewed since prior progress note. Most recent are:  Visit Vitals  /69 (BP 1 Location: Right arm, BP Patient Position: At rest)   Pulse 93   Temp 98.1 °F (36.7 °C)   Resp 16   Ht 5' 4\" (1.626 m)   Wt 82.9 kg (182 lb 12.2 oz)   SpO2 93%   BMI 31.37 kg/m²       PHYSICAL EXAM:  General: Cooperative, no acute distress    Neurologic:  Alert and oriented  HEENT: EOMI, no scleral icterus   Lungs:  No respiratory distress  Abdomen: Soft, non-distended, no tenderness, no guarding, no rebound.   Extremities: Warm  Psych:   Not anxious or agitated      Lab Data Reviewed:     Recent Results (from the past 24 hour(s))   GLUCOSE, POC    Collection Time: 21 11:57 AM   Result Value Ref Range    Glucose (POC) 120 (H) 65 - 100 mg/dL    Performed by Angelika Holland    GLUCOSE, POC    Collection Time: 21  4:46 PM   Result Value Ref Range    Glucose (POC) 213 (H) 65 - 100 mg/dL    Performed by URVASHI Roman  [facility]    CULTURE, BLOOD, PAIRED    Collection Time: 21  5:50 PM    Specimen: Blood   Result Value Ref Range    Special Requests: NO SPECIAL REQUESTS      Culture result: NO GROWTH AFTER 10 HOURS     GLUCOSE, POC    Collection Time: 21  9:55 PM   Result Value Ref Range    Glucose (POC) 157 (H) 65 - 100 mg/dL    Performed by URVASHI Roman  PCT    MAGNESIUM    Collection Time: 21  5:51 AM   Result Value Ref Range    Magnesium 2.2 1.6 - 2.4 mg/dL   LACTIC ACID    Collection Time: 21  5:51 AM   Result Value Ref Range Lactic acid 0.9 0.4 - 2.0 MMOL/L   METABOLIC PANEL, COMPREHENSIVE    Collection Time: 03/18/21  5:51 AM   Result Value Ref Range    Sodium 133 (L) 136 - 145 mmol/L    Potassium 4.6 3.5 - 5.1 mmol/L    Chloride 103 97 - 108 mmol/L    CO2 26 21 - 32 mmol/L    Anion gap 4 (L) 5 - 15 mmol/L    Glucose 159 (H) 65 - 100 mg/dL    BUN 26 (H) 6 - 20 MG/DL    Creatinine 0.91 0.55 - 1.02 MG/DL    BUN/Creatinine ratio 29 (H) 12 - 20      GFR est AA >60 >60 ml/min/1.73m2    GFR est non-AA >60 >60 ml/min/1.73m2    Calcium 9.2 8.5 - 10.1 MG/DL    Bilirubin, total 0.7 0.2 - 1.0 MG/DL    ALT (SGPT) 58 12 - 78 U/L    AST (SGOT) 33 15 - 37 U/L    Alk. phosphatase 103 45 - 117 U/L    Protein, total 8.2 6.4 - 8.2 g/dL    Albumin 2.9 (L) 3.5 - 5.0 g/dL    Globulin 5.3 (H) 2.0 - 4.0 g/dL    A-G Ratio 0.5 (L) 1.1 - 2.2     NT-PRO BNP    Collection Time: 03/18/21  5:51 AM   Result Value Ref Range    NT pro- (H) <125 PG/ML   DIGOXIN    Collection Time: 03/18/21  5:51 AM   Result Value Ref Range    Digoxin level 0.7 (L) 0.90 - 2.00 NG/ML   GLUCOSE, POC    Collection Time: 03/18/21  8:17 AM   Result Value Ref Range    Glucose (POC) 182 (H) 65 - 100 mg/dL    Performed by Irish Schwartz        Assessment:   · LVAD evaluation: Hgb 11.2 (3/14), platelets 465, hemoccult negative, iron 48, iron saturation 16%, TIBC 293, ferritin 347. No signs of active GI bleeding. No history of EGD or colonoscopy.   · Congestive heart failure: on dobutamine  · Cardiomyopathy  · Chronic kidney disease  · Hypertension  · Diabetes mellitus type II  · Dyslipidemia     Patient Active Problem List   Diagnosis Code    Dilated cardiomyopathy ([facility] I42.0    Dyslipidemia E78.5    Gout M10.9    Alcohol abuse F10.10    Hypertension I10    Dermatitis L30.9    Primary osteoarthritis of right knee M17.11    Carpal tunnel syndrome of right wrist G56.01    Reactive airway disease J45.909    Former tobacco use--stopped 2014 after >40 yrs smoking Z87.891    Obesity (BMI 30.0-34. 9) E66.9    Epistaxis R04.0    Decreased libido R68.82    Chest pain, exertional R07.9    PARDO (dyspnea on exertion) R06.00    Osteoarthrosis, localized, primary, knee, right M17.11    Status post total right knee replacement Z96.651    CKD (chronic kidney disease) stage 3, GFR 30-59 ml/min N18.30    Uncontrolled type 2 diabetes mellitus with hyperglycemia (HCC) E11.65    Congestive heart failure (HCC) I50.9    CHF exacerbation (HCC) I50.9    Acute on chronic systolic congestive heart failure, NYHA class 3 (HCC) I50.23    Moderate pulmonary arterial systolic hypertension (HCC) I27.21    Hypokalemia E87.6    Hypomagnesemia E83.42     Plan:   · Clear liquids. Bowel prep this evening. NPO after midnight. · Plan for EGD and colonoscopy tomorrow at 9AM with Dr. Selena Cash. Details and risks of the procedure have been discussed with the patient. She understands and is in agreement with the plan. Please verify consent has been obtained. Signed By: Flor Sanchez     3/18/2021  11:06 AM         GI Attending: Agree with above plan. EGD and colonoscopy tomorrow. Please keep NPO after midnight. Alfredo Cash MD

## 2021-03-18 NOTE — PROGRESS NOTES
Problem: Heart Failure: Day 1  Goal: Respiratory  Outcome: Progressing Towards Goal  Goal: Treatments/Interventions/Procedures  Outcome: Progressing Towards Goal     Problem: Heart Failure: Day 1  Goal: Treatments/Interventions/Procedures  Outcome: Progressing Towards Goal

## 2021-03-18 NOTE — PROGRESS NOTES
Bedside and Verbal shift change report given to Lopez Guidry (oncoming nurse) by Damir (offgoing nurse). Report included the following information SBAR, Kardex, ED Summary, OR Summary, Procedure Summary, Intake/Output, MAR, Accordion, Recent Results, Med Rec Status and Cardiac Rhythm NSR.

## 2021-03-18 NOTE — PROGRESS NOTES
Hospital Progress Note    NAME:  Dmitry Arango   :   1950   MRN:  [id number]     Date/Time:  3/18/2021 8:07 PM    Plan:   1. Management /AHF  2. RHC today  Risk of Deterioration: Low  []           Moderate  [x]           High  []                 Assessment:   # Acute on chronic severe systolic congestive heart failure, NYHA class 3 ([facility] (3/11/2021). EF <15%, NYHA IV with cardiogenic shock, dobutamin gtt  # Dilated cardiomyopathy ([facility] (2014), Nonischemic. S/p BiV AICD  -Cardiology and AHF teams on board  -I/O -  -Continue Dobutamine gtt  -Monitor BMP/CMP closely, stable  -RHC (3/17/21): Findings  1. Normal right-sided filling pressures  2. Normal cardiac output on dobutamine drip  3. Normal PVR and SVR  4. Normal cardiac output and index both by thermodilution and by Allen.    Overall hemodynamics much improved on inotrope support  -Consider LVAD before discharge per AHF/ cardiology teams  -PFTs per AHF team  -Palliative care consult  -No BB while on Dobutamine GTT  -Holding ACE/ARB/ARNi/MRA per AHF team in anticipation of surgery  -Started on Spironolactone 12.5mg daily per AHF team 3/13  -KCl and MgOx supplementation uptitration per AHF team  -ASA defered to primary cardiology team  -IV Venofer infusions 200mg x 2 doses  -Hold statin for elevated LFTs  -High dose Vit D weekly  -6MWT per PT -refused    # Dyslipidemia (2014)  -LDL 73 on 10/20  -Per cardiology    # Hypertension (2014)  -Currently on po bumex with pressor support from Dobutamine GTT  -ACE/ARB/BB on hold.  Defer to cardiology    # Obesity (BMI 30.0-34.9) (11/15/2016)  -Lifestyle modifications    # CKD (chronic kidney disease) stage 3, GFR 30-59 ml/min (2019)  -Overview:  negative lexiscan  - LVVH, LAE, otherwise normal echo    # Uncontrolled type 2 diabetes mellitus with hyperglycemia ([facility] (2020), better controlled  -A1c 7.6  -Continue lantus 30U daily  -SSU humalog AC-HS    # Moderate pulmonary arterial systolic hypertension ([facility] (3/11/2021)    # Hypokalemia (3/11/2021)  -Replete and monitor as appropriate  -KCl PO 40mEq x 2, Q4h apart today    # Hypomagnesemia (3/11/2021)  -On PO Mag Ox  -Will give IV Magnesium Sulfate 2gm once  -Mg daily monitoring    Case discussed with Pt/ family and nurse  Dispo: >48hrs, pending RHC early next week and possible LVAD before discharge    Per AHF team, Recommend at discharge:   · Lifevest if patient full code  · Vaccinations for flu, pneumonia and covid  · Referral to sleep medicine for sleep study     Admitting notes:71 y.o. female who presents with CHF and transferred from [facility] for evaluation by [facility]TON.      Alexia Aaron is a 73 y.o.   female with PMH of above mentioned problems who presents to ED from her cardiologist office with progressive shortness of breath and cough.  Patient states that she has been having progressive shortness of breath for past couple of weeks and for the past 10 days it has gotten to a point that she cannot walk more than 10 today before catching her breath.  She also endorsed that she is having more cough than usual.  Patient also endorsed PND and orthopnea.  Denies any swelling of her legs.  Patient states that she has been taking her medication as prescribed but she denied using any beta-blocker or Aldactone for heart failure.  Patient is unsure whether she had cardiac angiogram done in the past but she said she been diagnosed with heart failure since 2005.  Patient was a heavy smoker in the past and smoked pack a day for more than 15 years but she has quit smoking 6 years ago. Daniel Murciape drink alcohol and denies any recent drug use.  Patient denies any fever chills diarrhea belly pain chest pain.        The patient denies any fever, chills, chest pain, cough, congestion, recent illness, palpitations, or dysuria.       Subjective:    Feeling better this am no new complaints         11 Point Review of Systems:   Negative except no cp,swelling    []            Unable to obtain ROS due to:       []            mental status change []            sedated []            intubated     Social History     Tobacco Use    Smoking status: Former Smoker     Packs/day: 0.25     Years: 20.00     Pack years: 5.00     Quit date: 2014     Years since quittin.2    Smokeless tobacco: Never Used   Substance Use Topics    Alcohol use:  Yes     Alcohol/week: 0.0 standard drinks     Comment: 21-24 Beers per week     Medications reviewed:  Current Facility-Administered Medications   Medication Dose Route Frequency    hydrALAZINE (APRESOLINE) tablet 75 mg  75 mg Oral TID    isosorbide mononitrate ER (IMDUR) tablet 30 mg  30 mg Oral BID    peg 3350-Electrolytes-Vit C (MOVIPREP) oral solution 2 L  2 L Oral ONCE    DOBUTamine (DOBUTREX) 500 mg/250 mL (2,000 mcg/mL) infusion  7.5 mcg/kg/min IntraVENous CONTINUOUS    digoxin (LANOXIN) tablet 0.25 mg  0.25 mg Oral DAILY    bumetanide (BUMEX) tablet 2 mg  2 mg Oral DAILY    ergocalciferol capsule 50,000 Units  50,000 Units Oral Q7D    spironolactone (ALDACTONE) tablet 12.5 mg  12.5 mg Oral DAILY    magnesium oxide (MAG-OX) tablet 800 mg  800 mg Oral BID    sodium chloride (NS) flush 5-40 mL  5-40 mL IntraVENous Q8H    sodium chloride (NS) flush 5-40 mL  5-40 mL IntraVENous PRN    acetaminophen (TYLENOL) tablet 650 mg  650 mg Oral Q6H PRN    Or    acetaminophen (TYLENOL) suppository 650 mg  650 mg Rectal Q6H PRN    polyethylene glycol (MIRALAX) packet 17 g  17 g Oral DAILY PRN    promethazine (PHENERGAN) tablet 12.5 mg  12.5 mg Oral Q6H PRN    Or    ondansetron (ZOFRAN) injection 4 mg  4 mg IntraVENous Q6H PRN    sodium chloride (NS) flush 5-40 mL  5-40 mL IntraVENous Q8H    glucose chewable tablet 16 g  4 Tab Oral PRN    dextrose (D50W) injection syrg 12.5-25 g  25-50 mL IntraVENous PRN    glucagon (GLUCAGEN) injection 1 mg  1 mg IntraMUSCular PRN    insulin glargine (LANTUS) injection 30 Units  30 Units SubCUTAneous DAILY   • insulin lispro (HUMALOG) injection 1-10 Units  1-10 Units SubCUTAneous AC&HS   • oxybutynin (DITROPAN) tablet 5 mg  5 mg Oral TID   • budesonide (PULMICORT) 500 mcg/2 ml nebulizer suspension  500 mcg Nebulization BID RT   • arformoteroL (BROVANA) neb solution 15 mcg  15 mcg Nebulization BID RT   • albuterol-ipratropium (DUO-NEB) 2.5 MG-0.5 MG/3 ML  3 mL Nebulization Q4H PRN   • thiamine HCL (B-1) tablet 100 mg  100 mg Oral DAILY   • melatonin tablet 3 mg  3 mg Oral QHS PRN   • zolpidem (AMBIEN) tablet 5 mg  5 mg Oral QHS PRN        Objective:   Vitals:  Visit Vitals  /69 (BP 1 Location: Right arm, BP Patient Position: At rest)   Pulse 93   Temp 98.1 °F (36.7 °C)   Resp 16   Ht 5' 4\" (1.626 m)   Wt 182 lb 12.2 oz (82.9 kg)   SpO2 93%   BMI 31.37 kg/m²     Temp (24hrs), Av.8 °F (36.6 °C), Min:97.2 °F (36.2 °C), Max:98.2 °F (36.8 °C)    O2 Flow Rate (L/min): 1 l/min O2 Device: Room air    Last 24hr Input/Output:    Intake/Output Summary (Last 24 hours) at 3/18/2021 1002  Last data filed at 3/18/2021 0527  Gross per 24 hour   Intake --   Output 300 ml   Net -300 ml        PHYSICAL EXAM:  General:          Alert, cooperative, no distress, appears stated age.     HEENT:           Atraumatic, anicteric sclerae, pink conjunctivae                          No oral ulcers, mucosa moist, throat clear, dentition fair  Neck:               Supple, symmetrical  Lungs:             Clear to auscultation bilaterally.  No Wheezing or Rhonchi. No rales.  Chest wall:      No tenderness  No Accessory muscle use.  Heart:              Regular  rhythm,  No  murmur   No edema  Abdomen:        Soft, non-tender. Not distended.  Bowel sounds normal  Extremities:     No cyanosis.  No clubbing,                            Skin turgor normal, Capillary refill normal  Skin:                Not pale.  Not Jaundiced  No rashes   Psych:             Not anxious or agitated.  Neurologic:       Alert, moves all extremities, answers questions appropriately and responds to commands         Lab Data Reviewed:    No results for input(s): WBC, HGB, HCT, PLT, HGBEXT, HCTEXT, PLTEXT, HGBEXT, HCTEXT, PLTEXT in the last 72 hours. Recent Labs     03/18/21  0551 03/17/21  0429 03/16/21  0328   * 131* 134*   K 4.6 4.8 4.8    102 101   CO2 26 22 24   * 129* 115*   BUN 26* 22* 17   CREA 0.91 0.90 0.85   CA 9.2 9.3 9.5   MG 2.2 2.1 2.0   ALB 2.9* 2.9* 2.9*   TBILI 0.7 1.0 1.3*   ALT 58 68 82*     Lab Results   Component Value Date/Time    Glucose (POC) 182 (H) 03/18/2021 08:17 AM    Glucose (POC) 157 (H) 03/17/2021 09:55 PM    Glucose (POC) 213 (H) 03/17/2021 04:46 PM    Glucose (POC) 120 (H) 03/17/2021 11:57 AM    Glucose (POC) 156 (H) 03/17/2021 08:03 AM     No results for input(s): PH, PCO2, PO2, HCO3, FIO2 in the last 72 hours. No results for input(s): INR, INREXT, INREXT in the last 72 hours. Radiology  Cta Chest W Or W Wo Cont    Result Date: 3/10/2021  No pulmonary embolus. Mild patchy groundglass lung opacities predominantly dependently likely reflect atelectasis or mild edema. Dilated cardiomegaly with contrast reflux into prominent inferior vena cava and hepatic veins consistent with elevated right heart pressures and right heart failure.     Nuclear Cardiac Amyloid Spect    Result Date: 3/15/2021  : PYP scan is equivocal for ATTR cardiac amyloidosis based on the H:CL ratio of 1.144 and semiquantitative score of 1.       ___________________________________________________  ___________________________________________________    Attending Physician: Mike Talamantes MD

## 2021-03-18 NOTE — PROGRESS NOTES
[facility] in [facility] - spoke with Dr. Jasmine Ormond who states he was able to see patient for LVAD surgery consult. 21  - Noted that patient's LVAD Work Up labs ordered on 3/17/21 had not been drawn. Called and spoke with patient's nurse, Breann Love RN and requested labs be drawn to facilitate patient's discharge. Eliceoelisabetdirk Eduardo verbalized agreement and stated she would obtain labs.     Candi Nieves, BRENDA  RN VAD Coordinator

## 2021-03-18 NOTE — PROCEDURES
Order for PICC received and verified. Consent obtained from pt after risks, benefits, procedure explained and questions answered. PICC Placement Note    PRE-PROCEDURE VERIFICATION  Correct Procedure: yes  Correct Site:  yes  Temperature: Temp: 98.1 °F (36.7 °C), Temperature Source: Temp Source: Oral  Recent Labs     03/18/21  0551   BUN 26*   CREA 0.91     Allergies: Patient has no known allergies. Education materials, including PICC Booklet, for PICC Care given to patient: yes. See Patient Education activity for further details. PROCEDURE DETAIL  A double lumen PICC line was started for Home IV Therapy. The following documentation is in addition to the PICC properties in the lines/airways flowsheet :  Lot #: [id number]  Was xylocaine 1% used intradermally:  yes  Total catheter length: 38 (cm)  External catheter length:  2 (cm)  Vein Selection for PICC:right brachial  Central Line Bundle followed yes  Complication Related to Insertion: none    The placement was verified by ECG/Sapiens technology: The  tip location is in the superior vena cava. See ECG results for PICC tip placement. Report given to nurse Cammy Jules is okay to use.     Raul Paniagua RN

## 2021-03-18 NOTE — TELEPHONE ENCOUNTER
Request for dental clearance letter for LVAD work up faxed to patient's dentist, Dr. Rhina Whitaker, this date. Will await clearance letter. Anali Gonzalez RN.

## 2021-03-18 NOTE — PROGRESS NOTES
Transition of Care  1. RUR 19%  2. Disposition Home with HHSN-referral pending with [address] home tomorrow or Monday  3. DME  Walker and cane-Life Vest has been ordered this admission  4. Transportation Family  5. Follow Up  PCP, Cardiologist    LYNETTE completed referral to [place] [id number] for home inotrope-patient is 100% covered. [facility] unable to provide nursing. LYNETTE completed referral to [address] response. *Patient is not a [facility] member* CM to monitor. Transition of Care Plan:     The Plan for Transition of Care is related to the following treatment goals: Home Health SN    The Patient and/or patient representative was provided with a choice of provider and agrees  with the discharge plan. Yes [x] No []    A Freedom of choice list was provided with basic dialogue that supports the patient's individualized plan of care/goals and shares the quality data associated with the providers. Yes [x] No []      Martin Girard MS    1:10 [facility] has accepted patient for home health SN. AVS updated. Patient will need smaart e discharge.     Martin Girard MS

## 2021-03-18 NOTE — PROGRESS NOTES
Follow up visit with pt. Her friend Kyle Toney was at bedside. She reports feeling \"better\" this morning and was lamenting her liquid breakfast. She expressed no spiritual needs at time of visit.    Chaplain Patience, MDiv, MS, [facility]  [phone])

## 2021-03-19 ENCOUNTER — APPOINTMENT (OUTPATIENT)
Dept: CT IMAGING | Age: 71
DRG: 286 | End: 2021-03-19
Attending: NURSE PRACTITIONER
Payer: MEDICARE

## 2021-03-19 ENCOUNTER — ANESTHESIA EVENT (OUTPATIENT)
Dept: ENDOSCOPY | Age: 71
DRG: 286 | End: 2021-03-19
Payer: MEDICARE

## 2021-03-19 ENCOUNTER — DOCUMENTATION ONLY (OUTPATIENT)
Dept: CARDIOLOGY CLINIC | Age: 71
End: 2021-03-19

## 2021-03-19 ENCOUNTER — ANESTHESIA (OUTPATIENT)
Dept: ENDOSCOPY | Age: 71
DRG: 286 | End: 2021-03-19
Payer: MEDICARE

## 2021-03-19 LAB
ALBUMIN SERPL-MCNC: 3 G/DL (ref 3.5–5)
ALBUMIN/GLOB SERPL: 0.6 {RATIO} (ref 1.1–2.2)
ALP SERPL-CCNC: 80 U/L (ref 45–117)
ALT SERPL-CCNC: 48 U/L (ref 12–78)
ANION GAP SERPL CALC-SCNC: 5 MMOL/L (ref 5–15)
APTT PPP: 26.9 SEC (ref 22.1–31)
AST SERPL-CCNC: 28 U/L (ref 15–37)
BILIRUB SERPL-MCNC: 0.9 MG/DL (ref 0.2–1)
BNP SERPL-MCNC: 247 PG/ML
BUN SERPL-MCNC: 17 MG/DL (ref 6–20)
BUN/CREAT SERPL: 28 (ref 12–20)
CALCIUM SERPL-MCNC: 9.5 MG/DL (ref 8.5–10.1)
CHLORIDE SERPL-SCNC: 103 MMOL/L (ref 97–108)
CO2 SERPL-SCNC: 26 MMOL/L (ref 21–32)
CREAT SERPL-MCNC: 0.61 MG/DL (ref 0.55–1.02)
DIGOXIN SERPL-MCNC: 0.8 NG/ML (ref 0.9–2)
GLOBULIN SER CALC-MCNC: 5.3 G/DL (ref 2–4)
GLUCOSE BLD STRIP.AUTO-MCNC: 107 MG/DL (ref 65–100)
GLUCOSE BLD STRIP.AUTO-MCNC: 108 MG/DL (ref 65–100)
GLUCOSE BLD STRIP.AUTO-MCNC: 125 MG/DL (ref 65–100)
GLUCOSE BLD STRIP.AUTO-MCNC: 99 MG/DL (ref 65–100)
GLUCOSE SERPL-MCNC: 121 MG/DL (ref 65–100)
HBV SURFACE AB SER QL: NONREACTIVE
HBV SURFACE AB SER-ACNC: <3.1 MIU/ML
HBV SURFACE AG SER QL: <0.1 INDEX
HBV SURFACE AG SER QL: NEGATIVE
LACTATE SERPL-SCNC: 0.8 MMOL/L (ref 0.4–2)
MAGNESIUM SERPL-MCNC: 2.1 MG/DL (ref 1.6–2.4)
POTASSIUM SERPL-SCNC: 3.6 MMOL/L (ref 3.5–5.1)
PROT SERPL-MCNC: 8.3 G/DL (ref 6.4–8.2)
RPR SER QL: NONREACTIVE
SERVICE CMNT-IMP: ABNORMAL
SERVICE CMNT-IMP: NORMAL
SODIUM SERPL-SCNC: 134 MMOL/L (ref 136–145)
THERAPEUTIC RANGE,PTTT: NORMAL SECS (ref 58–77)

## 2021-03-19 PROCEDURE — 83735 ASSAY OF MAGNESIUM: CPT

## 2021-03-19 PROCEDURE — 80053 COMPREHEN METABOLIC PANEL: CPT

## 2021-03-19 PROCEDURE — 77030013992 HC SNR POLYP ENDOSC BSC -B: Performed by: INTERNAL MEDICINE

## 2021-03-19 PROCEDURE — 0DBM8ZZ EXCISION OF DESCENDING COLON, VIA NATURAL OR ARTIFICIAL OPENING ENDOSCOPIC: ICD-10-PCS | Performed by: INTERNAL MEDICINE

## 2021-03-19 PROCEDURE — 36415 COLL VENOUS BLD VENIPUNCTURE: CPT

## 2021-03-19 PROCEDURE — 65660000000 HC RM CCU STEPDOWN

## 2021-03-19 PROCEDURE — 86803 HEPATITIS C AB TEST: CPT

## 2021-03-19 PROCEDURE — 74011636637 HC RX REV CODE- 636/637: Performed by: HOSPITALIST

## 2021-03-19 PROCEDURE — 74011250637 HC RX REV CODE- 250/637: Performed by: NURSE PRACTITIONER

## 2021-03-19 PROCEDURE — 0DB98ZX EXCISION OF DUODENUM, VIA NATURAL OR ARTIFICIAL OPENING ENDOSCOPIC, DIAGNOSTIC: ICD-10-PCS | Performed by: INTERNAL MEDICINE

## 2021-03-19 PROCEDURE — 74011250637 HC RX REV CODE- 250/637: Performed by: INTERNAL MEDICINE

## 2021-03-19 PROCEDURE — 76060000032 HC ANESTHESIA 0.5 TO 1 HR: Performed by: INTERNAL MEDICINE

## 2021-03-19 PROCEDURE — 74011250636 HC RX REV CODE- 250/636: Performed by: NURSE ANESTHETIST, CERTIFIED REGISTERED

## 2021-03-19 PROCEDURE — 86706 HEP B SURFACE ANTIBODY: CPT

## 2021-03-19 PROCEDURE — 0DB68ZX EXCISION OF STOMACH, VIA NATURAL OR ARTIFICIAL OPENING ENDOSCOPIC, DIAGNOSTIC: ICD-10-PCS | Performed by: INTERNAL MEDICINE

## 2021-03-19 PROCEDURE — 2709999900 HC NON-CHARGEABLE SUPPLY: Performed by: INTERNAL MEDICINE

## 2021-03-19 PROCEDURE — 0DBN8ZZ EXCISION OF SIGMOID COLON, VIA NATURAL OR ARTIFICIAL OPENING ENDOSCOPIC: ICD-10-PCS | Performed by: INTERNAL MEDICINE

## 2021-03-19 PROCEDURE — 94640 AIRWAY INHALATION TREATMENT: CPT

## 2021-03-19 PROCEDURE — 99232 SBSQ HOSP IP/OBS MODERATE 35: CPT | Performed by: INTERNAL MEDICINE

## 2021-03-19 PROCEDURE — 88305 TISSUE EXAM BY PATHOLOGIST: CPT

## 2021-03-19 PROCEDURE — 83880 ASSAY OF NATRIURETIC PEPTIDE: CPT

## 2021-03-19 PROCEDURE — 99223 1ST HOSP IP/OBS HIGH 75: CPT | Performed by: NURSE PRACTITIONER

## 2021-03-19 PROCEDURE — 74011000250 HC RX REV CODE- 250: Performed by: HOSPITALIST

## 2021-03-19 PROCEDURE — 74011250637 HC RX REV CODE- 250/637: Performed by: HOSPITALIST

## 2021-03-19 PROCEDURE — 85730 THROMBOPLASTIN TIME PARTIAL: CPT

## 2021-03-19 PROCEDURE — 74176 CT ABD & PELVIS W/O CONTRAST: CPT

## 2021-03-19 PROCEDURE — 74011250636 HC RX REV CODE- 250/636: Performed by: NURSE PRACTITIONER

## 2021-03-19 PROCEDURE — 80162 ASSAY OF DIGOXIN TOTAL: CPT

## 2021-03-19 PROCEDURE — 83605 ASSAY OF LACTIC ACID: CPT

## 2021-03-19 PROCEDURE — 87340 HEPATITIS B SURFACE AG IA: CPT

## 2021-03-19 PROCEDURE — 74011000250 HC RX REV CODE- 250: Performed by: NURSE ANESTHETIST, CERTIFIED REGISTERED

## 2021-03-19 PROCEDURE — 76040000007: Performed by: INTERNAL MEDICINE

## 2021-03-19 PROCEDURE — 82962 GLUCOSE BLOOD TEST: CPT

## 2021-03-19 PROCEDURE — 87529 HSV DNA AMP PROBE: CPT

## 2021-03-19 PROCEDURE — 77030021593 HC FCPS BIOP ENDOSC BSC -A: Performed by: INTERNAL MEDICINE

## 2021-03-19 RX ORDER — HYDRALAZINE HYDROCHLORIDE 50 MG/1
100 TABLET, FILM COATED ORAL 3 TIMES DAILY
Status: DISCONTINUED | OUTPATIENT
Start: 2021-03-19 | End: 2021-03-20 | Stop reason: HOSPADM

## 2021-03-19 RX ORDER — BUMETANIDE 2 MG/1
2 TABLET ORAL DAILY
Qty: 90 TAB | Refills: 2 | Status: SHIPPED | OUTPATIENT
Start: 2021-03-20 | End: 2021-09-16

## 2021-03-19 RX ORDER — SPIRONOLACTONE 25 MG/1
25 TABLET ORAL DAILY
Status: DISCONTINUED | OUTPATIENT
Start: 2021-03-20 | End: 2021-03-20 | Stop reason: HOSPADM

## 2021-03-19 RX ORDER — SODIUM CHLORIDE 9 MG/ML
50 INJECTION, SOLUTION INTRAVENOUS CONTINUOUS
Status: DISPENSED | OUTPATIENT
Start: 2021-03-19 | End: 2021-03-19

## 2021-03-19 RX ORDER — SPIRONOLACTONE 25 MG/1
25 TABLET ORAL DAILY
Qty: 90 TAB | Refills: 1 | Status: SHIPPED | OUTPATIENT
Start: 2021-03-20 | End: 2021-09-16

## 2021-03-19 RX ORDER — SODIUM CHLORIDE 9 MG/ML
INJECTION, SOLUTION INTRAVENOUS
Status: DISCONTINUED | OUTPATIENT
Start: 2021-03-19 | End: 2021-03-19 | Stop reason: HOSPADM

## 2021-03-19 RX ORDER — OXYBUTYNIN CHLORIDE 5 MG/1
2.5 TABLET ORAL 3 TIMES DAILY
Qty: 90 TAB | Refills: 11 | Status: SHIPPED | OUTPATIENT
Start: 2021-03-19 | End: 2021-10-17

## 2021-03-19 RX ORDER — DEXTROMETHORPHAN/PSEUDOEPHED 2.5-7.5/.8
1.2 DROPS ORAL
Status: DISCONTINUED | OUTPATIENT
Start: 2021-03-19 | End: 2021-03-19 | Stop reason: HOSPADM

## 2021-03-19 RX ORDER — FENTANYL CITRATE 50 UG/ML
25-200 INJECTION, SOLUTION INTRAMUSCULAR; INTRAVENOUS
Status: DISCONTINUED | OUTPATIENT
Start: 2021-03-19 | End: 2021-03-19 | Stop reason: HOSPADM

## 2021-03-19 RX ORDER — DOBUTAMINE HYDROCHLORIDE 200 MG/100ML
7.5 INJECTION INTRAVENOUS CONTINUOUS
Qty: 250 ML | Refills: 0 | Status: SHIPPED
Start: 2021-03-19 | End: 2021-04-16 | Stop reason: SDUPTHER

## 2021-03-19 RX ORDER — DIGOXIN 250 MCG
0.25 TABLET ORAL DAILY
Qty: 90 TAB | Refills: 1 | Status: SHIPPED | OUTPATIENT
Start: 2021-03-20 | End: 2021-04-22

## 2021-03-19 RX ORDER — NALOXONE HYDROCHLORIDE 0.4 MG/ML
0.4 INJECTION, SOLUTION INTRAMUSCULAR; INTRAVENOUS; SUBCUTANEOUS
Status: DISCONTINUED | OUTPATIENT
Start: 2021-03-19 | End: 2021-03-19 | Stop reason: HOSPADM

## 2021-03-19 RX ORDER — SODIUM CHLORIDE 0.9 % (FLUSH) 0.9 %
5-40 SYRINGE (ML) INJECTION AS NEEDED
Status: DISCONTINUED | OUTPATIENT
Start: 2021-03-19 | End: 2021-03-20 | Stop reason: HOSPADM

## 2021-03-19 RX ORDER — ISOSORBIDE MONONITRATE 30 MG/1
30 TABLET, EXTENDED RELEASE ORAL 2 TIMES DAILY
Qty: 180 TAB | Refills: 1 | Status: SHIPPED | OUTPATIENT
Start: 2021-03-19 | End: 2021-04-01

## 2021-03-19 RX ORDER — FLUMAZENIL 0.1 MG/ML
0.2 INJECTION INTRAVENOUS
Status: DISCONTINUED | OUTPATIENT
Start: 2021-03-19 | End: 2021-03-19 | Stop reason: HOSPADM

## 2021-03-19 RX ORDER — MIDAZOLAM HYDROCHLORIDE 1 MG/ML
.25-5 INJECTION, SOLUTION INTRAMUSCULAR; INTRAVENOUS
Status: DISCONTINUED | OUTPATIENT
Start: 2021-03-19 | End: 2021-03-19 | Stop reason: HOSPADM

## 2021-03-19 RX ORDER — ATROPINE SULFATE 0.1 MG/ML
0.5 INJECTION INTRAVENOUS
Status: DISCONTINUED | OUTPATIENT
Start: 2021-03-19 | End: 2021-03-19 | Stop reason: HOSPADM

## 2021-03-19 RX ORDER — HYDRALAZINE HYDROCHLORIDE 100 MG/1
100 TABLET, FILM COATED ORAL 3 TIMES DAILY
Qty: 240 TAB | Refills: 2 | Status: SHIPPED | OUTPATIENT
Start: 2021-03-19 | End: 2021-05-17 | Stop reason: DRUGHIGH

## 2021-03-19 RX ORDER — PROPOFOL 10 MG/ML
INJECTION, EMULSION INTRAVENOUS AS NEEDED
Status: DISCONTINUED | OUTPATIENT
Start: 2021-03-19 | End: 2021-03-19 | Stop reason: HOSPADM

## 2021-03-19 RX ORDER — LANOLIN ALCOHOL/MO/W.PET/CERES
800 CREAM (GRAM) TOPICAL 2 TIMES DAILY
Qty: 180 TAB | Refills: 1 | Status: SHIPPED | OUTPATIENT
Start: 2021-03-19 | End: 2021-05-13

## 2021-03-19 RX ORDER — SODIUM CHLORIDE 0.9 % (FLUSH) 0.9 %
5-40 SYRINGE (ML) INJECTION EVERY 8 HOURS
Status: DISCONTINUED | OUTPATIENT
Start: 2021-03-19 | End: 2021-03-20 | Stop reason: HOSPADM

## 2021-03-19 RX ORDER — LANOLIN ALCOHOL/MO/W.PET/CERES
3 CREAM (GRAM) TOPICAL
Qty: 90 TAB | Refills: 0 | Status: SHIPPED | OUTPATIENT
Start: 2021-03-19 | End: 2021-06-11

## 2021-03-19 RX ORDER — LIDOCAINE HYDROCHLORIDE 20 MG/ML
INJECTION, SOLUTION EPIDURAL; INFILTRATION; INTRACAUDAL; PERINEURAL AS NEEDED
Status: DISCONTINUED | OUTPATIENT
Start: 2021-03-19 | End: 2021-03-19 | Stop reason: HOSPADM

## 2021-03-19 RX ORDER — ERGOCALCIFEROL 1.25 MG/1
50000 CAPSULE ORAL
Qty: 21 CAP | Refills: 0 | Status: SHIPPED | OUTPATIENT
Start: 2021-03-20 | End: 2021-06-02 | Stop reason: ALTCHOICE

## 2021-03-19 RX ORDER — EPINEPHRINE 0.1 MG/ML
1 INJECTION INTRACARDIAC; INTRAVENOUS
Status: DISCONTINUED | OUTPATIENT
Start: 2021-03-19 | End: 2021-03-19 | Stop reason: HOSPADM

## 2021-03-19 RX ORDER — OXYBUTYNIN CHLORIDE 5 MG/1
2.5 TABLET ORAL 3 TIMES DAILY
Status: DISCONTINUED | OUTPATIENT
Start: 2021-03-19 | End: 2021-03-20 | Stop reason: HOSPADM

## 2021-03-19 RX ORDER — LANOLIN ALCOHOL/MO/W.PET/CERES
100 CREAM (GRAM) TOPICAL DAILY
Qty: 90 TAB | Refills: 1 | Status: SHIPPED | OUTPATIENT
Start: 2021-03-20 | End: 2021-09-19

## 2021-03-19 RX ADMIN — PROPOFOL 20 MG: 10 INJECTION, EMULSION INTRAVENOUS at 10:46

## 2021-03-19 RX ADMIN — Medication 10 ML: at 07:48

## 2021-03-19 RX ADMIN — OXYBUTYNIN CHLORIDE 2.5 MG: 5 TABLET ORAL at 22:00

## 2021-03-19 RX ADMIN — PROPOFOL 20 MG: 10 INJECTION, EMULSION INTRAVENOUS at 10:42

## 2021-03-19 RX ADMIN — PROPOFOL 20 MG: 10 INJECTION, EMULSION INTRAVENOUS at 11:10

## 2021-03-19 RX ADMIN — ZOLPIDEM TARTRATE 5 MG: 5 TABLET ORAL at 22:01

## 2021-03-19 RX ADMIN — Medication 100 MG: at 12:02

## 2021-03-19 RX ADMIN — Medication 10 ML: at 13:20

## 2021-03-19 RX ADMIN — PROPOFOL 20 MG: 10 INJECTION, EMULSION INTRAVENOUS at 10:50

## 2021-03-19 RX ADMIN — HYDRALAZINE HYDROCHLORIDE 100 MG: 50 TABLET, FILM COATED ORAL at 17:01

## 2021-03-19 RX ADMIN — OXYBUTYNIN CHLORIDE 2.5 MG: 5 TABLET ORAL at 17:01

## 2021-03-19 RX ADMIN — PROPOFOL 20 MG: 10 INJECTION, EMULSION INTRAVENOUS at 11:15

## 2021-03-19 RX ADMIN — IVABRADINE 2.5 MG: 5 TABLET, FILM COATED ORAL at 12:04

## 2021-03-19 RX ADMIN — PROPOFOL 30 MG: 10 INJECTION, EMULSION INTRAVENOUS at 10:40

## 2021-03-19 RX ADMIN — BUMETANIDE 2 MG: 1 TABLET ORAL at 12:02

## 2021-03-19 RX ADMIN — IVABRADINE 2.5 MG: 5 TABLET, FILM COATED ORAL at 17:05

## 2021-03-19 RX ADMIN — ISOSORBIDE MONONITRATE 30 MG: 30 TABLET, EXTENDED RELEASE ORAL at 17:01

## 2021-03-19 RX ADMIN — PROPOFOL 20 MG: 10 INJECTION, EMULSION INTRAVENOUS at 10:58

## 2021-03-19 RX ADMIN — PROPOFOL 30 MG: 10 INJECTION, EMULSION INTRAVENOUS at 11:12

## 2021-03-19 RX ADMIN — BUDESONIDE 500 MCG: 0.5 INHALANT RESPIRATORY (INHALATION) at 08:20

## 2021-03-19 RX ADMIN — Medication 800 MG: at 17:01

## 2021-03-19 RX ADMIN — PROPOFOL 80 MG: 10 INJECTION, EMULSION INTRAVENOUS at 10:38

## 2021-03-19 RX ADMIN — Medication 800 MG: at 12:02

## 2021-03-19 RX ADMIN — BUDESONIDE 500 MCG: 0.5 INHALANT RESPIRATORY (INHALATION) at 21:10

## 2021-03-19 RX ADMIN — ARFORMOTEROL TARTRATE 15 MCG: 15 SOLUTION RESPIRATORY (INHALATION) at 21:10

## 2021-03-19 RX ADMIN — DOBUTAMINE HYDROCHLORIDE 7.5 MCG/KG/MIN: 200 INJECTION INTRAVENOUS at 13:00

## 2021-03-19 RX ADMIN — Medication 10 ML: at 13:19

## 2021-03-19 RX ADMIN — ISOSORBIDE MONONITRATE 30 MG: 30 TABLET, EXTENDED RELEASE ORAL at 12:02

## 2021-03-19 RX ADMIN — ARFORMOTEROL TARTRATE 15 MCG: 15 SOLUTION RESPIRATORY (INHALATION) at 08:20

## 2021-03-19 RX ADMIN — PROPOFOL 20 MG: 10 INJECTION, EMULSION INTRAVENOUS at 10:54

## 2021-03-19 RX ADMIN — Medication 10 ML: at 21:55

## 2021-03-19 RX ADMIN — PROPOFOL 20 MG: 10 INJECTION, EMULSION INTRAVENOUS at 11:04

## 2021-03-19 RX ADMIN — PROPOFOL 20 MG: 10 INJECTION, EMULSION INTRAVENOUS at 10:48

## 2021-03-19 RX ADMIN — DIGOXIN 0.25 MG: 125 TABLET ORAL at 12:02

## 2021-03-19 RX ADMIN — PROPOFOL 30 MG: 10 INJECTION, EMULSION INTRAVENOUS at 10:44

## 2021-03-19 RX ADMIN — HYDRALAZINE HYDROCHLORIDE 100 MG: 50 TABLET, FILM COATED ORAL at 22:00

## 2021-03-19 RX ADMIN — PROPOFOL 30 MG: 10 INJECTION, EMULSION INTRAVENOUS at 11:07

## 2021-03-19 RX ADMIN — INSULIN GLARGINE 30 UNITS: 100 INJECTION, SOLUTION SUBCUTANEOUS at 12:02

## 2021-03-19 RX ADMIN — SODIUM CHLORIDE: 900 INJECTION, SOLUTION INTRAVENOUS at 10:30

## 2021-03-19 RX ADMIN — PROPOFOL 20 MG: 10 INJECTION, EMULSION INTRAVENOUS at 10:56

## 2021-03-19 RX ADMIN — PROPOFOL 20 MG: 10 INJECTION, EMULSION INTRAVENOUS at 10:52

## 2021-03-19 RX ADMIN — Medication 10 ML: at 21:54

## 2021-03-19 RX ADMIN — PROPOFOL 30 MG: 10 INJECTION, EMULSION INTRAVENOUS at 11:01

## 2021-03-19 RX ADMIN — LIDOCAINE HYDROCHLORIDE 50 MG: 20 INJECTION, SOLUTION EPIDURAL; INFILTRATION; INTRACAUDAL; PERINEURAL at 10:38

## 2021-03-19 NOTE — PROGRESS NOTES
SW attempted to visit with patient, she was in restroom, when SW returned to meet with patient Kristyjessica Etienne representative was fitting patient for lifevest. Plan is for patient to discharge tomorrow or Monday. COLLINS will continue to follow.

## 2021-03-19 NOTE — PROGRESS NOTES
TRANSFER - OUT REPORT:    Verbal report given to Juan (name) on Hari Winter  being transferred to [facility]  (unit) for routine post - op       Report consisted of patients Situation, Background, Assessment and   Recommendations(SBAR). Information from the following report(s) SBAR and Procedure Summary was reviewed with the receiving nurse. Lines:   PICC Double Lumen 03/18/21 Right;Brachial (Active)   Central Line Being Utilized No 03/18/21 1125   Criteria for Appropriate Use Long term IV/antibiotic administration 03/18/21 1638   Site Assessment Clean, dry, & intact 03/19/21 0439   Phlebitis Assessment 0 03/19/21 0439   Infiltration Assessment 0 03/19/21 0439   Arm Circumference (cm) 28 cm 03/18/21 1125   Date of Last Dressing Change 03/18/21 03/18/21 1125   Dressing Status Clean, dry, & intact 03/19/21 0439   Action Taken Open ports on tubing capped 03/19/21 0439   External Catheter Length (cm) 2 centimeters 03/18/21 1125   Dressing Type Disk with Chlorhexadine gluconate (CHG); Stabilization/securement device;Transparent 03/18/21 1125   Hub Color/Line Status Pink 03/18/21 1125   Positive Blood Return (Site #1) Yes 03/18/21 1125   Hub Color/Line Status White 03/18/21 1125   Positive Blood Return (Site #2) Yes 03/18/21 1125   Alcohol Cap Used Yes 03/19/21 0439       Peripheral IV 03/17/21 Left Antecubital (Active)   Site Assessment Clean, dry, & intact 03/19/21 0439   Phlebitis Assessment 0 03/19/21 0439   Infiltration Assessment 0 03/19/21 0439   Dressing Status Clean, dry, & intact 03/19/21 0439   Dressing Type Transparent 03/19/21 0439   Hub Color/Line Status Pink 03/18/21 0900   Action Taken Open ports on tubing capped 03/19/21 0439   Alcohol Cap Used Yes 03/19/21 0439        Opportunity for questions and clarification was provided.

## 2021-03-19 NOTE — PROGRESS NOTES
Hospital Progress Note    NAME:  Mera Padilla   :   1950   MRN:  [id number]     Date/Time:  3/19/2021 8:07 PM    Plan:   1. Management /AHF  2. EGD/Colon  today  Risk of Deterioration: Low  []           Moderate  [x]           High  []                 Assessment:   # Acute on chronic severe systolic congestive heart failure, NYHA class 3 ([facility] (3/11/2021). EF <15%, NYHA IV with cardiogenic shock, dobutamin gtt  # Dilated cardiomyopathy ([facility] [address] (2014), Nonischemic. S/p BiV AICD  -Cardiology and AHF teams on board  -I/O -  -Continue Dobutamine gtt  -Monitor BMP/CMP closely, stable  -RHC (3/17/21): Findings  1. Normal right-sided filling pressures  2. Normal cardiac output on dobutamine drip  3. Normal PVR and SVR  4. Normal cardiac output and index both by thermodilution and by Allen.    Overall hemodynamics much improved on inotrope support  -Consider LVAD before discharge per AHF/ cardiology teams  -PFTs per AHF team  -Palliative care consult  -No BB while on Dobutamine GTT  -Holding ACE/ARB/ARNi/MRA per AHF team in anticipation of surgery  -Started on Spironolactone 12.5mg daily per AHF team 3/13  -KCl and MgOx supplementation uptitration per AHF team  -ASA defered to primary cardiology team  -IV Venofer infusions 200mg x 2 doses  -Hold statin for elevated LFTs  -High dose Vit D weekly  -6MWT per PT -refused    # Dyslipidemia (2014)  -LDL 73 on 10/20  -Per cardiology    # Hypertension (2014)  -Currently on po bumex with pressor support from Dobutamine GTT  -ACE/ARB/BB on hold.  Defer to cardiology    # Obesity (BMI 30.0-34.9) (11/15/2016)  -Lifestyle modifications    # CKD (chronic kidney disease) stage 3, GFR 30-59 ml/min (2019)  -Overview:  negative lexiscan  - LVVH, LAE, otherwise normal echo    # Uncontrolled type 2 diabetes mellitus with hyperglycemia ([facility] [address] (2020), better controlled  -A1c 7.6  -Continue lantus 30U daily  -SSU humalog AC-HS    # Moderate pulmonary arterial systolic hypertension ([facility] [address] (3/11/2021)    # Hypokalemia (3/11/2021)  -Replete and monitor as appropriate  -KCl PO 40mEq x 2, Q4h apart today    # Hypomagnesemia (3/11/2021)  -On PO Mag Ox  -Will give IV Magnesium Sulfate 2gm once  -Mg daily monitoring    Case discussed with Pt/ family and nurse  Dispo: >48hrs, pending RHC early next week and possible LVAD before discharge    Per AHF team, Recommend at discharge:   · Lifevest if patient full code  · Vaccinations for flu, pneumonia and covid  · Referral to sleep medicine for sleep study     Admitting notes:71 y.o. female who presents with CHF and transferred from [facility] for evaluation by COLTON.      Alexia Aaron is a 73 y.o.   female with PMH of above mentioned problems who presents to ED from her cardiologist office with progressive shortness of breath and cough.  Patient states that she has been having progressive shortness of breath for past couple of weeks and for the past 10 days it has gotten to a point that she cannot walk more than 10 today before catching her breath.  She also endorsed that she is having more cough than usual.  Patient also endorsed PND and orthopnea.  Denies any swelling of her legs.  Patient states that she has been taking her medication as prescribed but she denied using any beta-blocker or Aldactone for heart failure.  Patient is unsure whether she had cardiac angiogram done in the past but she said she been diagnosed with heart failure since 2005.  Patient was a heavy smoker in the past and smoked pack a day for more than 15 years but she has quit smoking 6 years ago. Ana Benedict drink alcohol and denies any recent drug use.  Patient denies any fever chills diarrhea belly pain chest pain.        The patient denies any fever, chills, chest pain, cough, congestion, recent illness, palpitations, or dysuria.       Subjective:    Feeling better this am no new complaints         11 Point Review of Systems:   Negative except no cp,swelling    []            Unable to obtain ROS due to:       []            mental status change []            sedated []            intubated     Social History     Tobacco Use    Smoking status: Former Smoker     Packs/day: 0.25     Years: 20.00     Pack years: 5.00     Quit date: 2014     Years since quittin.2    Smokeless tobacco: Never Used   Substance Use Topics    Alcohol use:  Yes     Alcohol/week: 0.0 standard drinks     Comment: 21-24 Beers per week     Medications reviewed:  Current Facility-Administered Medications   Medication Dose Route Frequency    DOBUTamine (DOBUTREX) 500 mg/250 mL (2,000 mcg/mL) infusion  7.5 mcg/kg/min IntraVENous CONTINUOUS    hydrALAZINE (APRESOLINE) tablet 75 mg  75 mg Oral TID    isosorbide mononitrate ER (IMDUR) tablet 30 mg  30 mg Oral BID    digoxin (LANOXIN) tablet 0.25 mg  0.25 mg Oral DAILY    bumetanide (BUMEX) tablet 2 mg  2 mg Oral DAILY    ergocalciferol capsule 50,000 Units  50,000 Units Oral Q7D    spironolactone (ALDACTONE) tablet 12.5 mg  12.5 mg Oral DAILY    magnesium oxide (MAG-OX) tablet 800 mg  800 mg Oral BID    sodium chloride (NS) flush 5-40 mL  5-40 mL IntraVENous Q8H    sodium chloride (NS) flush 5-40 mL  5-40 mL IntraVENous PRN    acetaminophen (TYLENOL) tablet 650 mg  650 mg Oral Q6H PRN    Or    acetaminophen (TYLENOL) suppository 650 mg  650 mg Rectal Q6H PRN    polyethylene glycol (MIRALAX) packet 17 g  17 g Oral DAILY PRN    promethazine (PHENERGAN) tablet 12.5 mg  12.5 mg Oral Q6H PRN    Or    ondansetron (ZOFRAN) injection 4 mg  4 mg IntraVENous Q6H PRN    sodium chloride (NS) flush 5-40 mL  5-40 mL IntraVENous Q8H    glucose chewable tablet 16 g  4 Tab Oral PRN    dextrose (D50W) injection syrg 12.5-25 g  25-50 mL IntraVENous PRN    glucagon (GLUCAGEN) injection 1 mg  1 mg IntraMUSCular PRN    insulin glargine (LANTUS) injection 30 Units  30 Units SubCUTAneous DAILY    insulin lispro (HUMALOG) injection 1-10 Units  1-10 Units SubCUTAneous AC&HS    oxybutynin (DITROPAN) tablet 5 mg  5 mg Oral TID    budesonide (PULMICORT) 500 mcg/2 ml nebulizer suspension  500 mcg Nebulization BID RT    arformoteroL (BROVANA) neb solution 15 mcg  15 mcg Nebulization BID RT    albuterol-ipratropium (DUO-NEB) 2.5 MG-0.5 MG/3 ML  3 mL Nebulization Q4H PRN    thiamine HCL (B-1) tablet 100 mg  100 mg Oral DAILY    melatonin tablet 3 mg  3 mg Oral QHS PRN    zolpidem (AMBIEN) tablet 5 mg  5 mg Oral QHS PRN        Objective:   Vitals:  Visit Vitals  /71 (BP 1 Location: Left upper arm, BP Patient Position: At rest)   Pulse (!) 108   Temp 98.5 °F (36.9 °C)   Resp 20   Ht 5' 4\" (1.626 m)   Wt 181 lb 7 oz (82.3 kg)   SpO2 91%   BMI 31.14 kg/m²     Temp (24hrs), Av °F (36.7 °C), Min:97.6 °F (36.4 °C), Max:98.5 °F (36.9 °C)    O2 Flow Rate (L/min): 1 l/min O2 Device: Room air    Last 24hr Input/Output:    Intake/Output Summary (Last 24 hours) at 3/19/2021 0746  Last data filed at 3/18/2021 1830  Gross per 24 hour   Intake 1000 ml   Output 500 ml   Net 500 ml        PHYSICAL EXAM:  General:          Alert, cooperative, no distress, appears stated age. HEENT:           Atraumatic, anicteric sclerae, pink conjunctivae                          No oral ulcers, mucosa moist, throat clear, dentition fair  Neck:               Supple, symmetrical  Lungs:             Clear to auscultation bilaterally. No Wheezing or Rhonchi. No rales. Chest wall:      No tenderness  No Accessory muscle use. Heart:              Regular  rhythm,  No  murmur   No edema  Abdomen:        Soft, non-tender. Not distended. Bowel sounds normal  Extremities:     No cyanosis. No clubbing,                            Skin turgor normal, Capillary refill normal  Skin:                Not pale. Not Jaundiced  No rashes   Psych:             Not anxious or agitated.   Neurologic:      Alert, moves all extremities, answers questions appropriately and responds to commands         Lab Data Reviewed:    No results for input(s): WBC, HGB, HCT, PLT, HGBEXT, HCTEXT, PLTEXT, HGBEXT, HCTEXT, PLTEXT in the last 72 hours. Recent Labs     03/19/21  0459 03/18/21  0551 03/17/21  0429   * 133* 131*   K 3.6 4.6 4.8    103 102   CO2 26 26 22   * 159* 129*   BUN 17 26* 22*   CREA 0.61 0.91 0.90   CA 9.5 9.2 9.3   MG 2.1 2.2 2.1   ALB 3.0* 2.9* 2.9*   TBILI 0.9 0.7 1.0   ALT 48 58 68     Lab Results   Component Value Date/Time    Glucose (POC) 113 (H) 03/18/2021 11:10 PM    Glucose (POC) 267 (H) 03/18/2021 04:25 PM    Glucose (POC) 182 (H) 03/18/2021 08:17 AM    Glucose (POC) 157 (H) 03/17/2021 09:55 PM    Glucose (POC) 213 (H) 03/17/2021 04:46 PM     No results for input(s): PH, PCO2, PO2, HCO3, FIO2 in the last 72 hours. No results for input(s): INR, INREXT, INREXT in the last 72 hours. Radiology  Cta Chest W Or W Wo Cont    Result Date: 3/10/2021  No pulmonary embolus. Mild patchy groundglass lung opacities predominantly dependently likely reflect atelectasis or mild edema. Dilated cardiomegaly with contrast reflux into prominent inferior vena cava and hepatic veins consistent with elevated right heart pressures and right heart failure.     Nuclear Cardiac Amyloid Spect    Result Date: 3/15/2021  : PYP scan is equivocal for ATTR cardiac amyloidosis based on the H:CL ratio of 1.144 and semiquantitative score of 1.       ___________________________________________________  ___________________________________________________    Attending Physician: Katt Meyer MD

## 2021-03-19 NOTE — PROGRESS NOTES
TRANSFER - IN REPORT:    Verbal report received from Rubina Rowland. (name) on Donald Ackerman  being received from [phone] (unit) for ordered procedure      Report consisted of patients Situation, Background, Assessment and   Recommendations(SBAR). Information from the following report(s) SBAR, Kardex, ED Summary, Intake/Output, MAR, Recent Results and Cardiac Rhythm ST was reviewed with the receiving nurse. Opportunity for questions and clarification was provided. Assessment completed upon patients arrival to unit and care assumed. EGD/Colonoscopy w/ Dr. Elvera Buerger. NPO. A&O. Finished bowel prep last night, yellow liquid output. Denies pain. Dobutamine gtt infusing via PICC.

## 2021-03-19 NOTE — PROGRESS NOTES

## 2021-03-19 NOTE — ANESTHESIA PREPROCEDURE EVALUATION
Relevant Problems   No relevant active problems       Anesthetic History   No history of anesthetic complications            Review of Systems / Medical History  Patient summary reviewed, nursing notes reviewed and pertinent labs reviewed    Pulmonary  Within defined limits                 Neuro/Psych   Within defined limits           Cardiovascular    Hypertension      CHF: NYHA Classification IV    Pacemaker      Comments: LVEF is 15 - 20%  AICD  Dobutamine gtt   GI/Hepatic/Renal                Endo/Other    Diabetes    Arthritis     Other Findings              Physical Exam    Airway  Mallampati: I  TM Distance: > 6 cm  Neck ROM: normal range of motion   Mouth opening: Normal     Cardiovascular    Rhythm: regular           Dental  No notable dental hx       Pulmonary  Breath sounds clear to auscultation               Abdominal         Other Findings            Anesthetic Plan    ASA: 4            Induction: Intravenous  Anesthetic plan and risks discussed with: Patient

## 2021-03-19 NOTE — ROUTINE PROCESS
Loren Osborn  1950  [id number]    Situation:  Verbal report received from: Lisa at [phone]  Procedure: Procedure(s):  ESOPHAGOGASTRODUODENOSCOPY (EGD)  COLONOSCOPY  ESOPHAGOGASTRODUODENAL (EGD) BIOPSY  ENDOSCOPIC POLYPECTOMY    Background:    Preoperative diagnosis: LVAD Work-up  Postoperative diagnosis: Duodenitis  Gastritis  Internal Hemorrhoids  Colon Polyps    :  Dr. Alex Sumner  Assistant(s): Endoscopy RN-1: Alex Martinez RN  Endoscopy RN-2: Nikki Barrera RN    Specimens:   ID Type Source Tests Collected by Time Destination   1 : Duodenum Bx Preservative Duodenum  Cristina Copping., MD 3/19/2021 1042 Pathology   2 : Gastric Bx Preservative Gastric  Cristina Copping., MD 3/19/2021 1043 Pathology   3 : Descending Colon Polyps Preservative Colon, Descending  Cristina Copping., MD 3/19/2021 1059 Pathology   4 : Sigmoid Colon Polyps Preservative Sigmoid  Cristina Copping., MD 3/19/2021 1114 Pathology     H. Pylori  no    Assessment:  Intra-procedure medications     Anesthesia gave intra-procedure sedation and medications, see anesthesia flow sheet     Intravenous fluids: NS@ KVO     Vital signs stable  Abdominal assessment: round and soft     Recommendation:    Return to floor room 353

## 2021-03-19 NOTE — PROCEDURES
[address] [place] [address], [address] [place] [address] (EGD) Procedure Note    Yenni Love  1950  [id number]      Procedure: Endoscopic Gastroduodenoscopy with biopsy    Indication:  GERD     Pre-operative Diagnosis: see indication above    Post-operative Diagnosis: see findings below    : Kenan Cole. Leonel Espinal MD    Referring Provider:  Hosea Glasgow MD      Anesthesia/Sedation:  MAC anesthesia Propofol        Procedure Details     After informed consent was obtained for the procedure, with all risks and benefits of procedure explained the patient was taken to the endoscopy suite and placed in the left lateral decubitus position. Following sequential administration of sedation as per above, the endoscope was inserted into the mouth and advanced under direct vision to second portion of the duodenum. A careful inspection was made as the gastroscope was withdrawn, including a retroflexed view of the proximal stomach; findings and interventions are described below. Findings:   Esophagus:normal  Stomach: patchy antral erythema - cold biopsied. Duodenum: patchy erythema in the bulb - cold biopsied      Therapies:  As above    Specimens: 1. Duodenum, 2. Gastric         EBL: None      Complications:   None; patient tolerated the procedure well. Impression:    As above    Recommendations:  1. Follow up surgical pathology  2. Proceed to colonoscopy    Signed By: Kenan Cole.  Leonel Espinal MD     3/19/2021  11:20 AM

## 2021-03-19 NOTE — CONSULTS
Palliative Medicine Consult  [place]: [phone] ([phone])    Patient Name: Ashu Santana  YOB: 1950    Date of Initial Consult: 3/19/21  Reason for Consult: Care decisions  Requesting Provider: Marquis Simona RUTHERFORD  Primary Care Physician: Larissa Aviles MD     SUMMARY:   Ashu Santana is a 73 y.o. female with a past history of chronic systolic heart failure, dilated cardiomyopathy, hypertension, obesity, CKD 3, and diabetes mellitus type 2, who was admitted on 3/10/2021 from home with a diagnosis of acute on chronic systolic heart failure. She was initially diagnosed with heart failure in 2005, but had been stable for many years. She developed worsening shortness of breath and activity intolerance over the last few weeks. She presented to the ED and was found to be in acute on chronic heart failure. She was admitted for further management. The AHF team was consulted and are recommending LVAD work up. She has been started on a dobutamine drip and a LifeVest has been placed for outpatient use. Current medical issues leading to Palliative Medicine involvement include: chronic systolic heart failure, severe decompensated dilated cardiomyopathy, LVAD work up. Social: She has been with her significant other, Kaushik Terrell for 21 years. They have a 10year old daughter. The patient also has three adult daughters from a previous relationship. PALLIATIVE DIAGNOSES:   1. Advance care planning  2. Acute on chronic systolic heart failure  3. Dilated cardiomyopathy  4. Shortness of breath  5. Fatigue       PLAN:   1. Prior to meeting patient, the medical record was reviewed, including her advanced medical directive. 2. Patient seen at the bedside with her significant other, Janay Ulloa present. Palliative medicine services introduced. 3. We talked about the importance of advance care planning and identifying her goals and limits for her care.  We reviewed the information from the advanced medical directive that she completed 3/16/21 and she confirms that the document is correct. 4. We talked about her quality of life and what is important to her. She values spending time with her family, going to [place], and being outside doing activities like gardening. She is motivated by her family to get better and continue living, especially for her 10year old daughter. 5. We discussed the following different scenarios that could occur in the future that would require medical decision makin. Need for feeding tube and artificial nutrition: She had never thought about this before and is unsure what she would want. We talked about being open to a feeding tube temporarily, permanently, or not at all. She requests more time to think about this before answering. I encouraged her to do so and talk with her family about her decision. 2. Need for tracheostomy and long term mechanical ventilation: Again, she reports that she has never thought about this scenario and is unsure what she would want. She requests more time to think about this as well. I encouraged her to do so and talk with her family about her decision. 3. Requiring long term care in a nursing home: She says that her family would not let her live in a nursing home and would care for her at home. 6. We discussed discharge planning and she had several additional questions, which were answered. She is going home with a dobutamine drip and a LifeVest. We talked about these treatments being a lot to process, but she is motivated to do whatever she needs to get better and live longer. 7. Initial consult note routed to primary continuity provider and/or primary health care team members  8.  Communicated plan of care with: Palliative [place] Team     GOALS OF CARE / TREATMENT PREFERENCES:     GOALS OF CARE:  Patient/Health Care Proxy Stated Goals: Prolong life    TREATMENT PREFERENCES:   Code Status: Full Code    Advance Care Planning:  [] The Pall Med Interdisciplinary Team has updated the ACP Navigator with Health Care Decision Maker and Patient Capacity      Primary Decision Maker: Emilie Kaur - Daughter - [phone]    Secondary Decision Maker: Marie Waller - Daughter - [phone]    Advance Care Planning 3/10/2021   Patient's Healthcare Decision Maker is: -   Confirm Advance Directive None   Patient Would Like to Complete Advance Directive Yes       Medical Interventions: Full interventions     Other Instructions:  Artificially Administered Nutrition: (undecided)     Other:    As far as possible, the palliative care team has discussed with patient / health care proxy about goals of care / treatment preferences for patient. HISTORY:     History obtained from: chart, patient    CHIEF COMPLAINT: Shortness of breath    HPI/SUBJECTIVE:    The patient is:   [x] Verbal and participatory  [] Non-participatory due to: She reports being diagnosed with heart failure in 2005. She thought that things were getting better, but recently developed worsening shortness of breath and activity intolerance. She was found to have severe cardiomyopathy. She says that she would become short of breath easily with activity. She denies pain or nausea.     Clinical Pain Assessment (nonverbal scale for severity on nonverbal patients):   Clinical Pain Assessment  Severity: 0          Duration: for how long has pt been experiencing pain (e.g., 2 days, 1 month, years)  Frequency: how often pain is an issue (e.g., several times per day, once every few days, constant)     FUNCTIONAL ASSESSMENT:     Palliative Performance Scale (PPS):  PPS: 70       PSYCHOSOCIAL/SPIRITUAL SCREENING:     Palliative IDT has assessed this patient for cultural preferences / practices and a referral made as appropriate to needs (Cultural Services, Patient Advocacy, Ethics, etc.)    Any spiritual / Baptism concerns:  [] Yes /  [x] No    Caregiver Burnout:  [] Yes /  [x] No /  [] No Caregiver Present      Anticipatory grief assessment:   [x] Normal  / [] Maladaptive       ESAS Anxiety:      ESAS Depression:          REVIEW OF SYSTEMS:     Positive and pertinent negative findings in ROS are noted above in HPI. The following systems were [x] reviewed / [] unable to be reviewed as noted in HPI  Other findings are noted below. Systems: constitutional, ears/nose/mouth/throat, respiratory, gastrointestinal, genitourinary, musculoskeletal, integumentary, neurologic, psychiatric, endocrine. Positive findings noted below. Modified ESAS Completed by: provider   Fatigue: 3 Drowsiness: 0     Pain: 0     Nausea: 0     Dyspnea: 1                    PHYSICAL EXAM:     From RN flowsheet:  Wt Readings from Last 3 Encounters:   03/19/21 181 lb 7 oz (82.3 kg)   03/09/21 189 lb 6.4 oz (85.9 kg)   03/09/21 190 lb (86.2 kg)     Blood pressure 138/66, pulse 96, temperature 98 °F (36.7 °C), resp. rate 14, height 5' 4\" (1.626 m), weight 181 lb 7 oz (82.3 kg), SpO2 95 %.     Pain Scale 1: Numeric (0 - 10)  Pain Intensity 1: 0     Pain Location 1: Abdomen  Pain Orientation 1: Left, Upper  Pain Description 1: Cramping  Pain Intervention(s) 1: Medication (see MAR)  Last bowel movement, if known:     Constitutional: alert, awake, pleasant, no acute distress  Eyes: pupils equal, anicteric  ENMT: no nasal discharge, moist mucous membranes  Cardiovascular: regular rhythm  Respiratory: breathing not labored, symmetric  Musculoskeletal: no deformity, no tenderness to palpation  Skin: warm, dry  Neurologic: following commands, moving all extremities  Psychiatric: full affect, no hallucinations       HISTORY:     Principal Problem:    Acute on chronic systolic congestive heart failure, NYHA class 3 ([facility]) (3/11/2021)    Active Problems:    Dilated cardiomyopathy ([facility] [address] (9/9/2014)      Overview: Nonischemic      Dyslipidemia (9/9/2014)      Hypertension (9/9/2014)      Obesity (BMI 30.0-34.9) (11/15/2016)      CKD (chronic kidney disease) stage 3, GFR 30-59 ml/min (2019)      Overview:  negative lexiscan       lvh, lae, otherwise normal echo      Uncontrolled type 2 diabetes mellitus with hyperglycemia (HCC) (2020)      Moderate pulmonary arterial systolic hypertension (HCC) (3/11/2021)      Hypokalemia (3/11/2021)      Hypomagnesemia (3/11/2021)      Advance care planning ()      Shortness of breath ()      Past Medical History:   Diagnosis Date    Arthritis     Diabetes ([address]     Hypercholesterolemia     Hypertension       Past Surgical History:   Procedure Laterality Date    HX ORTHOPAEDIC      Arthroscopy right knee      Family History   Problem Relation Age of Onset    Diabetes Mother       History reviewed, no pertinent family history. Social History     Tobacco Use    Smoking status: Former Smoker     Packs/day: 0.25     Years: 20.00     Pack years: 5.00     Quit date: 2014     Years since quittin.2    Smokeless tobacco: Never Used   Substance Use Topics    Alcohol use:  Yes     Alcohol/week: 0.0 standard drinks     Comment: 21-24 Beers per week     No Known Allergies   Current Facility-Administered Medications   Medication Dose Route Frequency    sodium chloride (NS) flush 5-40 mL  5-40 mL IntraVENous Q8H    sodium chloride (NS) flush 5-40 mL  5-40 mL IntraVENous PRN    [START ON 3/20/2021] spironolactone (ALDACTONE) tablet 25 mg  25 mg Oral DAILY    hydrALAZINE (APRESOLINE) tablet 100 mg  100 mg Oral TID    oxybutynin (DITROPAN) tablet 2.5 mg  2.5 mg Oral TID    ivabradine (CORLANOR) tablet 2.5 mg  2.5 mg Oral BID WITH MEALS    DOBUTamine (DOBUTREX) 500 mg/250 mL (2,000 mcg/mL) infusion  7.5 mcg/kg/min IntraVENous CONTINUOUS    isosorbide mononitrate ER (IMDUR) tablet 30 mg  30 mg Oral BID    digoxin (LANOXIN) tablet 0.25 mg  0.25 mg Oral DAILY    bumetanide (BUMEX) tablet 2 mg  2 mg Oral DAILY    ergocalciferol capsule 50,000 Units  50,000 Units Oral Q7D    magnesium oxide (MAG-OX) tablet 800 mg  800 mg Oral BID    sodium chloride (NS) flush 5-40 mL  5-40 mL IntraVENous Q8H    sodium chloride (NS) flush 5-40 mL  5-40 mL IntraVENous PRN    acetaminophen (TYLENOL) tablet 650 mg  650 mg Oral Q6H PRN    Or    acetaminophen (TYLENOL) suppository 650 mg  650 mg Rectal Q6H PRN    polyethylene glycol (MIRALAX) packet 17 g  17 g Oral DAILY PRN    promethazine (PHENERGAN) tablet 12.5 mg  12.5 mg Oral Q6H PRN    Or    ondansetron (ZOFRAN) injection 4 mg  4 mg IntraVENous Q6H PRN    sodium chloride (NS) flush 5-40 mL  5-40 mL IntraVENous Q8H    glucose chewable tablet 16 g  4 Tab Oral PRN    dextrose (D50W) injection syrg 12.5-25 g  25-50 mL IntraVENous PRN    glucagon (GLUCAGEN) injection 1 mg  1 mg IntraMUSCular PRN    insulin glargine (LANTUS) injection 30 Units  30 Units SubCUTAneous DAILY    insulin lispro (HUMALOG) injection 1-10 Units  1-10 Units SubCUTAneous AC&HS    budesonide (PULMICORT) 500 mcg/2 ml nebulizer suspension  500 mcg Nebulization BID RT    arformoteroL (BROVANA) neb solution 15 mcg  15 mcg Nebulization BID RT    albuterol-ipratropium (DUO-NEB) 2.5 MG-0.5 MG/3 ML  3 mL Nebulization Q4H PRN    thiamine HCL (B-1) tablet 100 mg  100 mg Oral DAILY    melatonin tablet 3 mg  3 mg Oral QHS PRN    zolpidem (AMBIEN) tablet 5 mg  5 mg Oral QHS PRN          LAB AND IMAGING FINDINGS:     Lab Results   Component Value Date/Time    WBC 6.2 03/14/2021 03:46 AM    HGB 11.2 (L) 03/14/2021 03:46 AM    PLATELET 265 14/85/6122 03:46 AM     Lab Results   Component Value Date/Time    Sodium 134 (L) 03/19/2021 04:59 AM    Potassium 3.6 03/19/2021 04:59 AM    Chloride 103 03/19/2021 04:59 AM    CO2 26 03/19/2021 04:59 AM    BUN 17 03/19/2021 04:59 AM    Creatinine 0.61 03/19/2021 04:59 AM    Calcium 9.5 03/19/2021 04:59 AM    Magnesium 2.1 03/19/2021 04:59 AM      Lab Results   Component Value Date/Time    Alk.  phosphatase 80 03/19/2021 04:59 AM    Protein, total 8.3 (H) 03/19/2021 04:59 AM Albumin 3.0 (L) 03/19/2021 04:59 AM    Globulin 5.3 (H) 03/19/2021 04:59 AM     Lab Results   Component Value Date/Time    INR 1.1 01/07/2019 10:01 AM    Prothrombin time 11.2 (H) 01/07/2019 10:01 AM    aPTT 26.9 03/19/2021 12:12 PM      Lab Results   Component Value Date/Time    Iron 48 03/13/2021 01:52 AM    TIBC 293 03/13/2021 01:52 AM    Iron % saturation 16 (L) 03/13/2021 01:52 AM    Ferritin 347 03/13/2021 01:52 AM      No results found for: PH, PCO2, PO2  No components found for: [place]   Lab Results   Component Value Date/Time    CK 70 03/12/2021 04:15 AM                Total time: 70 min  Counseling / coordination time, spent as noted above: 65 min  > 50% counseling / coordination?: yes    Prolonged service was provided for  []30 min   []75 min in face to face time in the presence of the patient, spent as noted above. Time Start:   Time End:   Note: this can only be billed with 34190 (initial) or 32428 (follow up). If multiple start / stop times, list each separately.

## 2021-03-19 NOTE — PROGRESS NOTES
[facility] in [place], [place]  Inpatient Consult Progress Note      Patient name: Richi Bean  Patient : 1950  Patient MRN: [id number]  Consulting MD: Ginna Akers MD  Date of service: 21    REASON FOR CONSULT:  Acute on chronic systolic heart failure     PLAN:  · Severe decompensated dilated cardiomyopathy, LVEF < 15%, NYHA Class IV- improved on inotropes  · CI > 2.3, LVAD evaluate ordered.  Will plan on discharge on home inotropes for now     Continue dobutamine 7.5 mcg/kg/min  No beta-blockers while on dobutamine gtt  Cont digoxin to 0.25mg daily; level daily 0.7 today  (goal 0.7-1.2)  Hold ACEi/ARB/ARNi due to renal function  Increase hydralazine to 100 mg PO TID  Cont Imdur 30mg PO BID   Continue bumex 2mg PO daily  Start Corlanor 2.5mg BID  Decrease Ditropan due to tachycardia   Increase spironolactone 25mg daily  Continue magnesium oxide 800mg twice daily  Keep K>4 and Mg>2  Consider baby ASA; will defer to primary cardiology team   Hold statin due to elevated tbili and transaminitis  Give venofer 200mg x 2 doses  Fecal occult negative   Continue high dose vitamin D weekly  Check genetic testing for cardiomyopathy- pending  PYP equivocal for amyloid and gammopathy pending   Nutritionist consult for heart failure diet and weight loss  Provide heart failure education  LVAD coordinator meeting with patient   Palliative Care consultation   Ambulate daily  PT to evaluate and 6MW  Lifevest to be fitted today   Hopefully home today if eval is complete, needs abd CT and palliative care consult   Recommend at discharge:   · Vaccinations for flu, pneumonia and covid  · Referral to sleep medicine for sleep study  · Monthly TTE to eval for recovery  · Dietician consult   · DEXA and other health maintenance      IMPRESSION:  Fatigue  Shortness of breath at rest  Volume overload  Acute on chronic systolic heart failure  · Stage D, NYHA class IV symptoms, INTERMACS 2  · Non-ischemic cardiomyopathy, recent deterioration to severe LV dysfunction  · Normal cors (by [facility] 3/11/21)  · LVEF 15-20% (by echo 3/9/21) from 20-25% (by echo 1/2021) from 51-55% (by echo 11/2019)  · Severe pulmonary artery hypertension, PVR 4  · RV dysfunction, moderate-severe  · Low cardiac index at rest 1.3  · Inotropic-dependence  Anemia  Hypokalemia/hypomagensemia  Cardiac risk factors  · Morbid obesity (BMI 32)  · High risk for SHARLENE  · DM2 (hgba1c 6.8)  · HL  · HTN  · Former tobacco (stopped 2014 after > 40 years)  · H/o alcohol abuse  OA, right knee  Carpal tunnel syndrome  Gout  Iron deficiency  Vitamin D deficincy     Interval Events  No acute events overnight  GI scope prep  Creatinine stable  BP remains elevated     CARDIAC IMAGING:  RHC 3/17/21  PA 47/20 (29), RA 7, PCWP 10, CI 2.93  RHC (3/11/21) 25, PA 49/27/37, W 34, Allen CI 1.34  Echo (3/9/21) LVEF 15-20%, mild-moderate MR, severely elevated CVP, IVC > 21mm  Echo (1/29/21) LVEF 20-25%, moderately to severely reduced RV function  Echo (11/20/19) LVEF 51-55%  Echo (12/18/18) LVEF 50-55%  Echo (11/23/16) LVEF 45-55%  EKG (3/9/21) ST 103bpm, QRS 84ms  NST (5/2018) no scar or ischemia, LVEF 48%  Normal cors (by [facility] 3/11/21)     PHYSICAL EXAM:  Visit Vitals  BP (!) 164/78   Pulse 95   Temp 98 °F (36.7 °C)   Resp 16   Ht 5' 4\" (1.626 m)   Wt 181 lb 7 oz (82.3 kg)   SpO2 93%   BMI 31.14 kg/m²     General: Patient is well developed, well-nourished in no acute distress  HEENT: Normocephalic and atraumatic. No scleral icterus. Pupils are equal, round and reactive to light and accomodation. No conjunctival injection. Oropharynx is clear. Neck: Supple. No evidence of thyroid enlargements or lymphadenopathy. JVD: Cannot be appreciated   Lungs: Breath sounds are equal and clear bilaterally. No wheezes, rhonchi, or rales. Heart: Regular rate and rhythm with normal S1 and S2. No murmurs, gallops or rubs.   Abdomen: Soft, no mass or tenderness. No organomegaly or hernia. Bowel sounds present. Genitourinary and rectal: deferred  Extremities: No cyanosis, clubbing, or edema. Neurologic: No focal sensory or motor deficits are noted. Grossly intact. Psychiatric: Awake, alert an doriented x 3. Appropriate mood and affect. Skin: Warm, dry and well perfused. No lesions, nodules or rashes are noted. REVIEW OF SYSTEMS:  General: Denies fever, night sweats. Ear, nose and throat: Denies difficulty hearing, sinus problems, runny nose, post-nasal drip, ringing in ears, mouth sores, loose teeth, ear pain, nosebleeds, sore throate, facial pain or numbess  Cardiovascular: see above in the interval history  Respiratory: Denies cough, wheezing, sputum production, hemoptysis.   Gastrointestinal: Denies heartburn, constipation, intolerance to certain foods, diarrhea, abdominal pain, nausea, vomiting, difficulty swallowing, blood in stool  Kidney and bladder: Denies painful urination, frequent urination, urgency, prostate problems and impotence  Musculoskeletal: Denies joint pain, muscle weakness  Skin and hair: Denies change in existing skin lesions, hair loss or increase, breast changes    PAST MEDICAL HISTORY:  Past Medical History:   Diagnosis Date    Arthritis     Diabetes ([address]     Hypercholesterolemia     Hypertension        PAST SURGICAL HISTORY:  Past Surgical History:   Procedure Laterality Date    HX ORTHOPAEDIC      Arthroscopy right knee       FAMILY HISTORY:  Family History   Problem Relation Age of Onset    Diabetes Mother        SOCIAL HISTORY:  Social History     Socioeconomic History    Marital status:      Spouse name: Not on file    Number of children: 3    Years of education: 15    Highest education level: Not on file   Occupational History    Occupation: retired   Tobacco Use    Smoking status: Former Smoker     Packs/day: 0.25     Years: 20.00     Pack years: 5.00     Quit date: 12/11/2014     Years since quitting: 6. 2    Smokeless tobacco: Never Used   Substance and Sexual Activity    Alcohol use: Yes     Alcohol/week: 0.0 standard drinks     Comment: 21-24 Beers per week    Drug use: No    Sexual activity: Yes     Partners: Male       LABORATORY RESULTS:     Labs Latest Ref Rng & Units 3/19/2021 3/18/2021 3/17/2021 3/16/2021 3/15/2021 3/14/2021 3/13/2021   WBC 3.6 - 11.0 K/uL - - - - - 6.2 6.1   RBC 3.80 - 5.20 M/uL - - - - - 4.09 4.01   Hemoglobin 11.5 - 16.0 g/dL - - - - - 11. 2(L) 10. 9(L)   Hematocrit 35.0 - 47.0 % - - - - - 35.9 34. 3(L)   MCV 80.0 - 99.0 FL - - - - - 87.8 85.5   Platelets 159 - 240 K/uL - - - - - 248 231   Lymphocytes 12 - 49 % - - - - - 8(L) 10(L)   Monocytes 5 - 13 % - - - - - 7 7   Eosinophils 0 - 7 % - - - - - 3 4   Basophils 0 - 1 % - - - - - 0 0   Albumin 3.5 - 5.0 g/dL 3. 0(L) 2. 9(L) 2. 9(L) 2. 9(L) 2. 8(L) 2. 9(L) 3.0(L)   Calcium 8.5 - 10.1 MG/DL 9.5 9.2 9.3 9.5 9.1 8.6 8.4(L)   Glucose 65 - 100 mg/dL 121(H) 159(H) 129(H) 115(H) 107(H) 164(H) 155(H)   BUN 6 - 20 MG/DL 17 26(H) 22(H) 17 16 27(H) 41(H)   Creatinine 0.55 - 1.02 MG/DL 0.61 0.91 0.90 0.85 0.81 0.90 0.92   Sodium 136 - 145 mmol/L 134(L) 133(L) 131(L) 134(L) 136 136 138   Potassium 3.5 - 5.1 mmol/L 3.6 4.6 4.8 4.8 4.2 3.6 3.3(L)   TSH 0.36 - 3.74 uIU/mL - - - - - - -   LDH 81 - 246 U/L - 249(H) - - - - -   Some recent data might be hidden     Lab Results   Component Value Date/Time    TSH 2.75 03/11/2021 10:31 AM    TSH 1.050 10/08/2020 12:01 PM    TSH 1.200 08/08/2019 02:31 PM    TSH 1.280 08/26/2016 11:48 AM       ALLERGY:  No Known Allergies     CURRENT MEDICATIONS:    Current Facility-Administered Medications:     0.9% sodium chloride infusion, 50 mL/hr, IntraVENous, CONTINUOUS, Alfredo Mary MD    sodium chloride (NS) flush 5-40 mL, 5-40 mL, IntraVENous, Q8H, Alfredo Mary MD    sodium chloride (NS) flush 5-40 mL, 5-40 mL, IntraVENous, PRN, Sade Melendez MD    [START ON 3/20/2021] spironolactone (ALDACTONE) tablet 25 mg, 25 mg, Oral, DAILY, Mago, Danita B, NP    hydrALAZINE (APRESOLINE) tablet 100 mg, 100 mg, Oral, TID, Mago, Danita B, NP    oxybutynin (DITROPAN) tablet 2.5 mg, 2.5 mg, Oral, TID, Mago, Danita B, NP    ivabradine (CORLANOR) tablet 2.5 mg, 2.5 mg, Oral, BID WITH MEALS, Mago, Danita B, NP, 2.5 mg at 03/19/21 1204    DOBUTamine (DOBUTREX) 500 mg/250 mL (2,000 mcg/mL) infusion, 7.5 mcg/kg/min, IntraVENous, CONTINUOUS, Mago, Danita B, NP, Last Rate: 18.7 mL/hr at 03/18/21 2330, 7.5 mcg/kg/min at 03/18/21 2330    isosorbide mononitrate ER (IMDUR) tablet 30 mg, 30 mg, Oral, BID, Mago, Danita B, NP, 30 mg at 03/19/21 1202    digoxin (LANOXIN) tablet 0.25 mg, 0.25 mg, Oral, DAILY, Polliard, Macie Ninal T, NP, 0.25 mg at 03/19/21 1202    bumetanide (BUMEX) tablet 2 mg, 2 mg, Oral, DAILY, Polliard, Macie Suhwl T, NP, 2 mg at 03/19/21 1202    ergocalciferol capsule 50,000 Units, 50,000 Units, Oral, Q7D, Janean Leventhal, MD, 50,000 Units at 03/13/21 1258    magnesium oxide (MAG-OX) tablet 800 mg, 800 mg, Oral, BID, Janean Leventhal, MD, 800 mg at 03/19/21 1202    sodium chloride (NS) flush 5-40 mL, 5-40 mL, IntraVENous, Q8H, Debbrah Kayser, MD, 10 mL at 03/19/21 0748    sodium chloride (NS) flush 5-40 mL, 5-40 mL, IntraVENous, PRN, Debbrah Kayser, MD    acetaminophen (TYLENOL) tablet 650 mg, 650 mg, Oral, Q6H PRN, 650 mg at 03/13/21 2302 **OR** acetaminophen (TYLENOL) suppository 650 mg, 650 mg, Rectal, Q6H PRN, Debbrah Kayser, MD    polyethylene glycol (MIRALAX) packet 17 g, 17 g, Oral, DAILY PRN, Debbrah Kayser, MD    promethazine (PHENERGAN) tablet 12.5 mg, 12.5 mg, Oral, Q6H PRN **OR** ondansetron (ZOFRAN) injection 4 mg, 4 mg, IntraVENous, Q6H PRN, Anshul Michele MD    sodium chloride (NS) flush 5-40 mL, 5-40 mL, IntraVENous, Q8H, Anshul Baca MD, 10 mL at 03/19/21 0748    glucose chewable tablet 16 g, 4 Tab, Oral, PRN, Debbrah Kayser, MD    dextrose (D50W) injection syrg 12.5-25 g, 25-50 mL, IntraVENous, PRN, Frederic Maurice MD, 25 g at 03/11/21 1509    glucagon (GLUCAGEN) injection 1 mg, 1 mg, IntraMUSCular, PRN, Frederic Maurice MD    insulin glargine (LANTUS) injection 30 Units, 30 Units, SubCUTAneous, DAILY, Frederic Maurice MD, 30 Units at 03/19/21 1202    insulin lispro (HUMALOG) injection 1-10 Units, 1-10 Units, SubCUTAneous, AC&HS, Frederic Maurice MD, Stopped at 03/18/21 2200    budesonide (PULMICORT) 500 mcg/2 ml nebulizer suspension, 500 mcg, Nebulization, BID RT, Frederic Maurice MD, 500 mcg at 03/19/21 0820    arformoteroL (BROVANA) neb solution 15 mcg, 15 mcg, Nebulization, BID RT, Frederic Maurice MD, 15 mcg at 03/19/21 0820    albuterol-ipratropium (DUO-NEB) 2.5 MG-0.5 MG/3 ML, 3 mL, Nebulization, Q4H PRN, Frederic Maurice MD, 3 mL at 03/13/21 2039    thiamine HCL (B-1) tablet 100 mg, 100 mg, Oral, DAILY, Frederic Maurice MD, 100 mg at 03/19/21 1202    melatonin tablet 3 mg, 3 mg, Oral, QHS PRN, Frederic Maurice MD, 3 mg at 03/11/21 0233    zolpidem (AMBIEN) tablet 5 mg, 5 mg, Oral, QHS PRN, Frederic Maurice MD, 5 mg at 03/18/21 2329    PATIENT CARE TEAM:  Patient Care Team:  Sandra Orantes MD as PCP - General (Internal Medicine)  Sandra Orantes MD as PCP - [facility]  Mandeep Trujillo MD as Physician (Cardiology)  Tita Joseph MD as Surgeon (Orthopedic Surgery)     Thank you for allowing me to participate in this patient's care. Alyssa Nuno NP  [facility] [address], [address]  Phone: [phone]    [facility] ATTENDING ADDENDUM    Patient was seen and examined in person. Data and notes were reviewed. I have discussed and agree with the plan as noted in the NP note above without further additions.     Benjamin Ojeda MD PhD  Nohemi Dougherty

## 2021-03-19 NOTE — PROGRESS NOTES
[facility] in [facility] 3N and spoke with nurse Montserrat Lim as orders placed for Hep B surface AG and AB, HSV, and Hep C AB ordered for LVAD work up but not yet drawn. Requested labs be drawn this morning. Katherine verbalized understanding and confirmed labs to be drawn today. Also noted PT order previously discontinued. New order placed. Will await results. Talita Hinkle RN.

## 2021-03-19 NOTE — ANESTHESIA POSTPROCEDURE EVALUATION
Post-Anesthesia Evaluation and Assessment    Patient: Bryce Gutierrez MRN: [id number]  SSN: [id number]    YOB: 1950  Age: 73 y.o. Sex: female      I have evaluated the patient and they are stable and ready for discharge from the PACU. Cardiovascular Function/Vital Signs  Visit Vitals  BP (!) 157/77   Pulse 98   Temp 36.6 °C (97.8 °F)   Resp 16   Ht 5' 4\" (1.626 m)   Wt 82.3 kg (181 lb 7 oz)   SpO2 95%   BMI 31.14 kg/m²       Patient is status post MAC anesthesia for Procedure(s):  ESOPHAGOGASTRODUODENOSCOPY (EGD)  COLONOSCOPY  ESOPHAGOGASTRODUODENAL (EGD) BIOPSY  ENDOSCOPIC POLYPECTOMY. Nausea/Vomiting: None    Postoperative hydration reviewed and adequate. Pain:  Pain Scale 1: Numeric (0 - 10) (03/19/21 1140)  Pain Intensity 1: 0 (03/19/21 1140)   Managed    Neurological Status:   Neuro  Neurologic State: Alert (03/18/21 2030)  Orientation Level: Oriented X4 (03/18/21 2030)   At baseline    Mental Status, Level of Consciousness: Alert and  oriented to person, place, and time    Pulmonary Status:   O2 Device: Room air (03/19/21 1140)   Adequate oxygenation and airway patent    Complications related to anesthesia: None    Post-anesthesia assessment completed. No concerns    Signed By: Celso Jiang MD     March 19, 2021              Procedure(s):  ESOPHAGOGASTRODUODENOSCOPY (EGD)  COLONOSCOPY  ESOPHAGOGASTRODUODENAL (EGD) BIOPSY  ENDOSCOPIC POLYPECTOMY. MAC    <BSHSIANPOST>    INITIAL Post-op Vital signs:   Vitals Value Taken Time   /77 03/19/21 1140   Temp 36.6 °C (97.8 °F) 03/19/21 1125   Pulse 98 03/19/21 1140   Resp 16 03/19/21 1140   SpO2 94 % 03/19/21 1141   Vitals shown include unvalidated device data.

## 2021-03-19 NOTE — PROGRESS NOTES
Bedside and Verbal shift change report given to Katherine RN (oncoming nurse) by Madiha RN/Devora RN (offgoing nurse). Report included the following information SBAR, Kardex, ED Summary, OR Summary, Procedure Summary, Intake/Output, MAR, Accordion, Recent Results, Med Rec Status and Cardiac Rhythm Sinus Tach.

## 2021-03-19 NOTE — PROGRESS NOTES
1000: TRANSFER - OUT REPORT:    Verbal report given to BRENDA Badillo(name) on Geri Lopez  being transferred to Endoscopy(unit) for ordered procedure       Report consisted of patients Situation, Background, Assessment and   Recommendations(SBAR). Information from the following report(s) SBAR, Kardex, ED Summary, OR Summary, Procedure Summary, Intake/Output, MAR and Recent Results was reviewed with the receiving nurse. Lines:   PICC Double Lumen 03/18/21 Right;Brachial (Active)   Central Line Being Utilized No 03/18/21 1125   Criteria for Appropriate Use Long term IV/antibiotic administration 03/18/21 1638   Site Assessment Clean, dry, & intact 03/19/21 0439   Phlebitis Assessment 0 03/19/21 0439   Infiltration Assessment 0 03/19/21 0439   Arm Circumference (cm) 28 cm 03/18/21 1125   Date of Last Dressing Change 03/18/21 03/18/21 1125   Dressing Status Clean, dry, & intact 03/19/21 0439   Action Taken Open ports on tubing capped 03/19/21 0439   External Catheter Length (cm) 2 centimeters 03/18/21 1125   Dressing Type Disk with Chlorhexadine gluconate (CHG); Stabilization/securement device;Transparent 03/18/21 1125   Hub Color/Line Status Pink 03/18/21 1125   Positive Blood Return (Site #1) Yes 03/18/21 1125   Hub Color/Line Status White 03/18/21 1125   Positive Blood Return (Site #2) Yes 03/18/21 1125   Alcohol Cap Used Yes 03/19/21 0439       Peripheral IV 03/17/21 Left Antecubital (Active)   Site Assessment Clean, dry, & intact 03/19/21 0439   Phlebitis Assessment 0 03/19/21 0439   Infiltration Assessment 0 03/19/21 0439   Dressing Status Clean, dry, & intact 03/19/21 0439   Dressing Type Transparent 03/19/21 0439   Hub Color/Line Status Pink 03/18/21 0900   Action Taken Open ports on tubing capped 03/19/21 0439   Alcohol Cap Used Yes 03/19/21 0439        Opportunity for questions and clarification was provided.       Patient transported with:   Registered Nurse

## 2021-03-19 NOTE — PROCEDURES
[address]  [address] [facility], [address] [place] [address]      Colonoscopy Operative Report    Joy Brown  [id number]  1950      Procedure Type:   Colonoscopy with polypectomy (snare cautery), polypectomy (cold biopsy)     Indications:    Screening colonoscopy         Pre-operative Diagnosis: see indication above    Post-operative Diagnosis:  See findings below    :  Bradford Garcia. Delmar Renteria MD      Referring Provider: Audra Peralta MD      Sedation:  MAC anesthesia Propofol      Procedure Details:  After informed consent was obtained with all risks and benefits of procedure explained and preoperative exam completed, the patient was taken to the endoscopy suite and placed in the left lateral decubitus position. Upon sequential sedation as per above, a digital rectal exam was performed demonstrating internal hemorrhoids. The Olympus pediatric videocolonoscope  was inserted in the rectum and carefully advanced to the cecum, which was identified by the ileocecal valve and appendiceal orifice. The cecum was identified by the ileocecal valve and appendiceal orifice. The quality of preparation was good. The colonoscope was slowly withdrawn with careful evaluation between folds. Retroflexion in the rectum was completed . Findings:   Rectum: normal  Sigmoid: two sessile 4-6 mm polyps - removed with hot snare. Descending Colon: three sessile 2-5 mm polyps. Largest was removed with hot snare. Two smaller polyps removed with cold biopsy forceps  Transverse Colon: normal  Ascending Colon: normal  Cecum: normal  Terminal Ileum: not intubated      Specimen Removed: 1. Descending colon polyps, 2. Sigmoid polyps    Complications: None. EBL:  None. Impression:   As above    Recommendations:  1. Follow up surgical pathology  2. Repeat colonoscopy in 3-5 years based on pathology results  3. Cardiac regular diet  4. We will sign off. Please call with any questions.     Signed By: Dinesh Connolly Candida Castelan MD     3/19/2021  11:23 AM

## 2021-03-19 NOTE — PROGRESS NOTES
1130: Hep B surface AG and AB,Hep C,PTT Sent. HSV 1and 2 PCR waiting for special kit from Lab. Called Lab they will sent. 1300: Life Vest delivered    1500: CT abdomen done    1600: HSV 1&2 PCR sent. 1930: Bedside and Verbal shift change report given to [address] [place] [address],[address] (oncoming nurse) by Justina Eubanks (offgoing nurse). Report included the following information SBAR, Kardex, OR Summary, Procedure Summary, Intake/Output, MAR, Recent Results and Cardiac Rhythm Sinus Tachy.

## 2021-03-19 NOTE — PROGRESS NOTES
Transition of Care  1. RUR 18%  2. Disposition Home with [facility] for home inotrope  3. DME DC with Life Vest  4. Transportation Family  5. Follow Up PCP, Cardiology    CM followed up with Zuleyka Thomas [phone] regarding Life Vest order-Bill confirmed that patient has been fitted and vest will be delivered today. CM messaged [facility] NP regarding DC date-patient may DC later today pending completion of CT of abdomen and palliative care consult. CM followed up with Valorie Albarran at [address] [place] [address] delivery/hook-up and education is set for tomorrow 3/20/21 between 10:00-11:00 with Argelia Cosby [phone]. Attending, [facility] NP and [facility] updated. Medicare pt has received, reviewed, and signed 2nd IM letter informing them of their right to appeal the discharge. Signed copy has been placed on pt bedside chart.     *Patient will need smaart e discharge*    Apryl Higginbotham MS

## 2021-03-20 VITALS
SYSTOLIC BLOOD PRESSURE: 124 MMHG | HEART RATE: 90 BPM | OXYGEN SATURATION: 97 % | DIASTOLIC BLOOD PRESSURE: 75 MMHG | TEMPERATURE: 98.1 F | WEIGHT: 183.2 LBS | RESPIRATION RATE: 16 BRPM | BODY MASS INDEX: 31.28 KG/M2 | HEIGHT: 64 IN

## 2021-03-20 LAB
ALBUMIN SERPL-MCNC: 2.9 G/DL (ref 3.5–5)
ALBUMIN/GLOB SERPL: 0.6 {RATIO} (ref 1.1–2.2)
ALP SERPL-CCNC: 78 U/L (ref 45–117)
ALT SERPL-CCNC: 39 U/L (ref 12–78)
ANION GAP SERPL CALC-SCNC: 5 MMOL/L (ref 5–15)
AST SERPL-CCNC: 22 U/L (ref 15–37)
BILIRUB SERPL-MCNC: 0.7 MG/DL (ref 0.2–1)
BNP SERPL-MCNC: 238 PG/ML
BUN SERPL-MCNC: 18 MG/DL (ref 6–20)
BUN/CREAT SERPL: 26 (ref 12–20)
CALCIUM SERPL-MCNC: 9.1 MG/DL (ref 8.5–10.1)
CHLORIDE SERPL-SCNC: 103 MMOL/L (ref 97–108)
CMV IGG SERPL IA-ACNC: 3.1 U/ML (ref 0–0.59)
CO2 SERPL-SCNC: 26 MMOL/L (ref 21–32)
COTININE UR QL SCN: NEGATIVE NG/ML
CREAT SERPL-MCNC: 0.68 MG/DL (ref 0.55–1.02)
DIGOXIN SERPL-MCNC: 0.9 NG/ML (ref 0.9–2)
DRUG SCREEN COMMENT:, 753798: NORMAL
GLOBULIN SER CALC-MCNC: 5 G/DL (ref 2–4)
GLUCOSE BLD STRIP.AUTO-MCNC: 160 MG/DL (ref 65–100)
GLUCOSE BLD STRIP.AUTO-MCNC: 94 MG/DL (ref 65–100)
GLUCOSE SERPL-MCNC: 110 MG/DL (ref 65–100)
INTERPRETATION, 117893: NORMAL
LACTATE SERPL-SCNC: 0.8 MMOL/L (ref 0.4–2)
MAGNESIUM SERPL-MCNC: 2.2 MG/DL (ref 1.6–2.4)
MEV IGG SER IA-ACNC: >300 AU/ML
MUV IGG SER IA-ACNC: 43.6 AU/ML
POTASSIUM SERPL-SCNC: 3.7 MMOL/L (ref 3.5–5.1)
PROT S ACT/NOR PPP: 102 % (ref 63–140)
PROT SERPL-MCNC: 7.9 G/DL (ref 6.4–8.2)
SCREEN APTT: 36.4 SEC (ref 0–51.9)
SCREEN DRVVT: 39.3 SEC (ref 0–47)
SERVICE CMNT-IMP: ABNORMAL
SERVICE CMNT-IMP: NORMAL
SODIUM SERPL-SCNC: 134 MMOL/L (ref 136–145)
T GONDII IGG SERPL IA-ACNC: <3 IU/ML (ref 0–7.1)
VZV IGG SER IA-ACNC: <135 INDEX

## 2021-03-20 PROCEDURE — 74011250637 HC RX REV CODE- 250/637: Performed by: NURSE PRACTITIONER

## 2021-03-20 PROCEDURE — 83605 ASSAY OF LACTIC ACID: CPT

## 2021-03-20 PROCEDURE — 82962 GLUCOSE BLOOD TEST: CPT

## 2021-03-20 PROCEDURE — 74011250637 HC RX REV CODE- 250/637: Performed by: HOSPITALIST

## 2021-03-20 PROCEDURE — 74011250637 HC RX REV CODE- 250/637: Performed by: INTERNAL MEDICINE

## 2021-03-20 PROCEDURE — 80162 ASSAY OF DIGOXIN TOTAL: CPT

## 2021-03-20 PROCEDURE — 74011000250 HC RX REV CODE- 250: Performed by: HOSPITALIST

## 2021-03-20 PROCEDURE — 83880 ASSAY OF NATRIURETIC PEPTIDE: CPT

## 2021-03-20 PROCEDURE — 83735 ASSAY OF MAGNESIUM: CPT

## 2021-03-20 PROCEDURE — 74011250636 HC RX REV CODE- 250/636: Performed by: NURSE PRACTITIONER

## 2021-03-20 PROCEDURE — 80053 COMPREHEN METABOLIC PANEL: CPT

## 2021-03-20 PROCEDURE — 36415 COLL VENOUS BLD VENIPUNCTURE: CPT

## 2021-03-20 PROCEDURE — 94640 AIRWAY INHALATION TREATMENT: CPT

## 2021-03-20 PROCEDURE — 74011636637 HC RX REV CODE- 636/637: Performed by: HOSPITALIST

## 2021-03-20 RX ADMIN — DIGOXIN 0.25 MG: 125 TABLET ORAL at 08:32

## 2021-03-20 RX ADMIN — Medication 10 ML: at 06:28

## 2021-03-20 RX ADMIN — IVABRADINE 2.5 MG: 5 TABLET, FILM COATED ORAL at 08:32

## 2021-03-20 RX ADMIN — HYDRALAZINE HYDROCHLORIDE 100 MG: 50 TABLET, FILM COATED ORAL at 08:32

## 2021-03-20 RX ADMIN — ERGOCALCIFEROL 50000 UNITS: 1.25 CAPSULE ORAL at 12:23

## 2021-03-20 RX ADMIN — DOBUTAMINE HYDROCHLORIDE 7.5 MCG/KG/MIN: 200 INJECTION INTRAVENOUS at 02:18

## 2021-03-20 RX ADMIN — OXYBUTYNIN CHLORIDE 2.5 MG: 5 TABLET ORAL at 08:32

## 2021-03-20 RX ADMIN — BUMETANIDE 2 MG: 1 TABLET ORAL at 08:32

## 2021-03-20 RX ADMIN — BUDESONIDE 500 MCG: 0.5 INHALANT RESPIRATORY (INHALATION) at 08:03

## 2021-03-20 RX ADMIN — ISOSORBIDE MONONITRATE 30 MG: 30 TABLET, EXTENDED RELEASE ORAL at 08:31

## 2021-03-20 RX ADMIN — Medication 100 MG: at 08:31

## 2021-03-20 RX ADMIN — ARFORMOTEROL TARTRATE 15 MCG: 15 SOLUTION RESPIRATORY (INHALATION) at 08:03

## 2021-03-20 RX ADMIN — SPIRONOLACTONE 25 MG: 25 TABLET ORAL at 08:31

## 2021-03-20 RX ADMIN — Medication 10 ML: at 06:27

## 2021-03-20 RX ADMIN — INSULIN LISPRO 2 UNITS: 100 INJECTION, SOLUTION INTRAVENOUS; SUBCUTANEOUS at 12:22

## 2021-03-20 RX ADMIN — Medication 800 MG: at 08:31

## 2021-03-20 RX ADMIN — INSULIN GLARGINE 30 UNITS: 100 INJECTION, SOLUTION SUBCUTANEOUS at 08:32

## 2021-03-20 NOTE — PROGRESS NOTES
Bedside and Verbal shift change [facility] by Sumanth Valenzuela (offgoing nurse). Report included the following information SBAR, Kardex, Procedure Summary, Intake/Output, MAR, Accordion, Recent Results, Med Rec Status and Cardiac Rhythm NSR.

## 2021-03-20 NOTE — PROGRESS NOTES
Cm received a page from Pepe vu/ Kristopher  ([phone]) and she will be coming to the hospital around 2pm for education. [employer] was waiting for confirmation patient was being discharged today. Staff nurse Josefina Fitzgerald informed cm patient is being discharged and family is in the room. Caleb contacted Deshawn vu/ [facility] to inform them of the discharge.

## 2021-03-20 NOTE — PROGRESS NOTES
1447: SMART-E discharger provided. She verbalized  Understanding. I have reviewed discharge instructions with the patient. The patient verbalized understanding.

## 2021-03-20 NOTE — DISCHARGE INSTRUCTIONS
Discharge Instructions       PATIENT ID: Yenni Love  MRN: [id number]   YOB: 1950    DATE OF ADMISSION: 3/10/2021  3:01 PM    DATE OF DISCHARGE: 3/20/2021    PRIMARY CARE PROVIDER: Hosea Glasgow MD     ATTENDING PHYSICIAN: Genie Pacheco MD  DISCHARGING PROVIDER: Tres Hubbard NP    To contact this individual call [phone] and ask the  to page. If unavailable ask to be transferred the Adult Hospitalist Department. DISCHARGE DIAGNOSES: Acute on Chronic HFrEF NYHA Class IV, Dilated Cardiomyopathy, HLD, HTN, Obesity,  CKD 3, T2DM,  Moderate Pulmonary Arterial  Systolic HTN, Electrolyte Disturbances    CONSULTATIONS: IP CONSULT TO ADVANCED HEART FAILURE  IP CONSULT TO GASTROENTEROLOGY  IP CONSULT TO PALLIATIVE CARE - PROVIDER  IP CONSULT TO CARDIOLOGY  IP CONSULT TO CARDIAC SURGERY    PROCEDURES/SURGERIES: Procedure(s):  ESOPHAGOGASTRODUODENOSCOPY (EGD)  COLONOSCOPY  ESOPHAGOGASTRODUODENAL (EGD) BIOPSY  ENDOSCOPIC POLYPECTOMY    PENDING TEST RESULTS:   At the time of discharge the following test results are still pending: none. FOLLOW UP APPOINTMENTS:   Follow-up Information     Follow up With Specialties Details Why Contact Info    [facility] [address]  [address], Infusion Therapy  Home Infusion [address]   [facility] [address]  [address] [facility] Cardiology On 3/25/2021 11am 200 [facility] 197 [facility] 99    Marlene White MD Cardiology In 3 days Cardiology [address] [facility]  [address]      Avril Pacheco MD Gastroenterology In 2 weeks GI [address] [address]  [phone]             ADDITIONAL CARE RECOMMENDATIONS:   You have been deemed stable for discharge and are being discharged home with family and Naomi Medina. Should you need medication refills beyond what we have prescribed for you, you will need to contact your primary care provider for refills. Return to the ER or notify your primary care office if you have a fever greater than 101.5 associated with chills, chest pain, or signs and symptoms of a stroke which are:  B E F A S T  -loss of balance, headaches or dizziness  -eyes blurred vision (sudden)  -asymmetrical facial symmetry (side of face droops)  -arms and leg weakness  -slurred speech / difficulty speaking  -if you experience any of these (time to call for an ambulance)      DIET: Cardiac Diet    ACTIVITY: Activity as tolerated and no driving for today    EQUIPMENT needed:   Yael Ginny [phone] Life Vest order-Bill confirmed fitted and vest delivered. Malissa at [employer] delivery/hook-up and education done 3/20/21,10:00-11:00 with Mario Wilkes [phone]. DISCHARGE MEDICATIONS:   See Medication Reconciliation Form    · It is important that you take the medication exactly as they are prescribed. · Keep your medication in the bottles provided by the pharmacist and keep a list of the medication names, dosages, and times to be taken in your wallet. · Do not take other medications without consulting your doctor. NOTIFY YOUR PHYSICIAN FOR ANY OF THE FOLLOWING:   Fever over 101 degrees for 24 hours. Chest pain, shortness of breath, fever, chills, nausea, vomiting, diarrhea, change in mentation, falling, weakness, bleeding. Severe pain or pain not relieved by medications. Or, any other signs or symptoms that you may have questions about.       DISPOSITION:    Home With: [facility]   OT  PT [facility]  RN       SNF/Inpatient Rehab/LTAC    Independent/assisted living    Hospice    Other:     Patient Education   Patient Education   Patient Education        Learning About a Wearable Cardioverter-Defibrillator (WCD)  What is it? A wearable cardioverter-defibrillator (WCD) is a vest that has a defibrillator built into it. A defibrillator is a device that fixes serious changes in your heartbeat. The device is always checking your heart rate and rhythm. If it detects a life-threatening, rapid heart rhythm, it sends an electric shock to the heart. This can restore a normal rhythm. A WCD helps control abnormal heart rhythms. You aren't likely to get a shock from the [address], just as you aren't likely to use the air bag on a car. But it can save your life if it's needed. Why is a WCD used? You might get a WCD if you can't have an ICD (implantable cardioverter-defibrillator) or while you are waiting to have one put in. A WCD may also be used if you might have a high risk of sudden cardiac death for a short time. How is it used? You may wear the WCD for about 2 to 6 weeks or longer. You will be measured so your vest will be the right size. The vest is worn under your clothes. It has electrodes and wires that lie against your skin. You wear a monitor around your waist or on a strap over your shoulder. The WCD should be worn all the time except when you bathe. You can do your normal activities while you wear it. Your doctor will show you how the [address] works and how to take care of it. Be sure to pay attention to alert sounds and messages on the monitor. You need to follow the monitor's instructions exactly. Cori Brenner also get instructions for what to do after a shock. What does it feel like? The vest may feel uncomfortable, especially at first when you're not used to it. The shock delivered by the defibrillator may be painful. Follow-up care is a key part of your treatment and safety. Be sure to make and go to all appointments, and call your doctor if you are having problems. It's also a good idea to know your test results and keep a list of the medicines you take. Where can you learn more?   Go to http://www.gray.com/  Enter W135 in the search box to learn more about \"Learning About a Wearable Cardioverter-Defibrillator (WCD). \"  Current as of: December 16, 2019               Content Version: 12.6  © 5915-5859 [employer], Incorporated. Care instructions adapted under license by [employer] (which disclaims liability or warranty for this information). If you have questions about a medical condition or this instruction, always ask your healthcare professional. [address] any warranty or liability for your use of this information. Heart Failure: Care Instructions  Your Care Instructions     Heart failure occurs when your heart does not pump as much blood as the body needs. Failure does not mean that the heart has stopped pumping but rather that it is not pumping as well as it should. Over time, this causes fluid buildup in your lungs and other parts of your body. Fluid buildup can cause shortness of breath, fatigue, swollen ankles, and other problems. By taking medicines regularly, reducing sodium (salt) in your diet, checking your weight every day, and making lifestyle changes, you can feel better and live longer. Follow-up care is a key part of your treatment and safety. Be sure to make and go to all appointments, and call your doctor if you are having problems. It's also a good idea to know your test results and keep a list of the medicines you take. How can you care for yourself at home? Medicines    · Be safe with medicines. Take your medicines exactly as prescribed.  Call your doctor if you think you are having a problem with your medicine.     · Do not take any vitamins, over-the-counter medicine, or herbal products without talking to your doctor first. Dorice Or not take ibuprofen (Advil or Motrin) and naproxen (Aleve) without talking to your doctor first. They could make your heart failure worse.     · You may take some of the following medicine. ? Angiotensin-converting enzyme inhibitors (ACEIs) or angiotensin II receptor blockers (ARBs) reduce the heart's workload, lower blood pressure, and reduce swelling. Taking an ACEI or ARB may lower your chance of needing to be hospitalized. ? Beta-blockers can slow heart rate, decrease blood pressure, and improve your condition. Taking a beta-blocker may lower your chance of needing to be hospitalized. ? Diuretics, also called water pills, reduce swelling. You will get more details on the specific medicines your doctor prescribes. Diet    · Your doctor may suggest that you limit sodium. Your doctor can tell you how much sodium is right for you. An example is less than 3,000 mg a day. This includes all the salt you eat in cooking or in packaged foods. People get most of their sodium from processed foods. Fast food and restaurant meals also tend to be very high in sodium.     · Ask your doctor how much liquid you can drink each day. You may have to limit liquids. Weight    · Weigh yourself without clothing at the same time each day. Record your weight. Call your doctor if you have a sudden weight gain, such as more than 2 to 3 pounds in a day or 5 pounds in a week. (Your doctor may suggest a different range of weight gain.) A sudden weight gain may mean that your heart failure is getting worse. Activity level    · Start light exercise (if your doctor says it is okay). Even if you can only do a small amount, exercise will help you get stronger, have more energy, and manage your weight and your stress. Walking is an easy way to get exercise. Start out by walking a little more than you did before. Bit by bit, increase the amount you walk.     · When you exercise, watch for signs that your heart is working too hard. You are pushing yourself too hard if you cannot talk while you are exercising.  If you become short of breath or dizzy or have chest pain, stop, sit down, and rest.     · If you feel \"wiped out\" the day after you exercise, walk slower or for a shorter distance until you can work up to a better pace.     · Get enough rest at night. Sleeping with 1 or 2 pillows under your upper body and head may help you breathe easier. Lifestyle changes    · Do not smoke. Smoking can make a heart condition worse. If you need help quitting, talk to your doctor about stop-smoking programs and medicines. These can increase your chances of quitting for good. Quitting smoking may be the most important step you can take to protect your heart.     · Limit alcohol to 2 drinks a day for men and 1 drink a day for women. Too much alcohol can cause health problems.     · Avoid getting sick from colds and the flu. Get a pneumococcal vaccine shot. If you have had one before, ask your doctor whether you need another dose. Get a flu shot each year. If you must be around people with colds or the flu, wash your hands often. When should you call for help? Call 911 if you have symptoms of sudden heart failure such as:    · You have severe trouble breathing.     · You cough up pink, foamy mucus.     · You have a new irregular or rapid heartbeat. Call your doctor now or seek immediate medical care if:    · You have new or increased shortness of breath.     · You are dizzy or lightheaded, or you feel like you may faint.     · You have sudden weight gain, such as more than 2 to 3 pounds in a day or 5 pounds in a week. (Your doctor may suggest a different range of weight gain.)     · You have increased swelling in your legs, ankles, or feet.     · You are suddenly so tired or weak that you cannot do your usual activities. Watch closely for changes in your health, and be sure to contact your doctor if you develop new symptoms. Where can you learn more? Go to http://www.gray.com/  Enter N753 in the search box to learn more about \"Heart Failure: Care Instructions. \"  Current as of: December 16, 1926               YXBZPGJ Version: 12.6  © 4432-0272 [employer]. Care instructions adapted under license by [employer] (which disclaims liability or warranty for this information). If you have questions about a medical condition or this instruction, always ask your healthcare professional. [address] any warranty or liability for your use of this information. Fluid Restriction: Care Instructions  Your Care Instructions     A buildup of fluid in the body can cause low sodium levels in the blood. It may also cause symptoms such as swelling and pain. Your doctor may suggest that you limit liquids, including foods that contain a lot of liquid. Limiting liquids is called fluid restriction. Keeping track of the amount of fluids you take in may help you feel better. Your doctor will tell you how much fluid you can have in a day. Follow-up care is a key part of your treatment and safety. Be sure to make and go to all appointments, and call your doctor if you are having problems. It's also a good idea to know your test results and keep a list of the medicines you take. How can you care for yourself at home? · Find a way of tracking the fluids you take in that works for you. Here are two methods you can try:  ? Write down how much you drink throughout the day. ? Keep a container filled with the amount of liquid allowed for the day. As you drink liquids during the day, such as a 6-ounce cup of coffee, pour that same amount out of the container. When the container is empty, you've had your liquid for the day. · Count any foods that will melt (such as ice cream, gelatin, or flavored ice treats) or liquid foods (such as soup) as part of your fluids for the day. Also count the liquid in canned fruits and vegetables as part of your daily intake, or drain them well before serving. · Space your liquids throughout the day.  Then you won't be tempted to drink more than the amount your doctor recommends. · To relieve thirst without taking in extra water, try chewing gum, sucking on hard candy (sugarless if you have diabetes), or rinsing your mouth with water and spitting it out. Where can you learn more? Go to http://www.gray.com/  Enter T891 in the search box to learn more about \"Fluid Restriction: Care Instructions. \"  Current as of: December 16, 2019               Content Version: 12.6  © 3146-9922 [employer]. Care instructions adapted under license by [employer] (which disclaims liability or warranty for this information). If you have questions about a medical condition or this instruction, always ask your healthcare professional. [address] [place] [address] any warranty or liability for your use of this information.          Signed:   Ricarda Berman NP  3/20/2021  1:14 PM

## 2021-03-20 NOTE — PROGRESS NOTES
Chart reviewed, no changes to current regimen, life vest and home inotrope set up. Ok to DC home with home health today.  Has follow up with [facility] next week.

## 2021-03-20 NOTE — DISCHARGE SUMMARY
Discharge Summary       PATIENT ID: Estefanía Gibbons  MRN: [id number]   YOB: 1950    DATE OF ADMISSION: 3/10/2021  3:01 PM    DATE OF DISCHARGE: 3/20/2021     PRIMARY CARE PROVIDER: Jan Lozano MD     ATTENDING PHYSICIAN: Gabby Martinez MD  DISCHARGING PROVIDER: Nicky Martell NP    To contact this individual call [phone] and ask the  to page. If unavailable ask to be transferred the Adult Hospitalist Department. CONSULTATIONS: IP CONSULT TO ADVANCED HEART FAILURE  IP CONSULT TO GASTROENTEROLOGY  IP CONSULT TO PALLIATIVE CARE - PROVIDER  IP CONSULT TO CARDIOLOGY  IP CONSULT TO CARDIAC SURGERY    PROCEDURES/SURGERIES: Procedure(s):  ESOPHAGOGASTRODUODENOSCOPY (EGD)  COLONOSCOPY  ESOPHAGOGASTRODUODENAL (EGD) BIOPSY  ENDOSCOPIC POLYPECTOMY    ADMITTING [address] [place] [address] COURSE:     Acute on Chronic HFrEF NYHA Class IV, Dilated Cardiomyopathy, HLD, HTN, Obesity,  CKD 3, T2DM,  Moderate Pulmonary Arterial  Systolic HTN, Electrolyte Disturbances    Alexia Aaron is a 73 y.o. female with a PMH of Severe Chronic HFrEF NYHA Class IV, Dilated Cardiomyopathy, HLD, HTN, Obesity,  CKD 3, T2DM,  Moderate Pulmonary Arterial  Systolic HTN and Electrolyte Disturbances who presented to [facility] ED from her cardiologist office with progressive shortness of breath and cough x2 weeks, worsening 10 days prior to admit causing functional limitations of cannot walk more than 10ft before having to stop to catch her breath and worsening cough. The patient also endorsed PND and orthopnea, denied any swelling of her legs. Patient stated that she has been taking most of her medication as prescribed but she denied using any beta-blocker or Aldactone for heart failure. Patient is unsure whether she had cardiac angiogram done in the past but she said she been diagnosed with heart failure since 2005. Patient was a heavy smoker in the past and smoked pack a day for more than 15 years but quit smoking 6 years ago. She occasionally drinks alcohol and denied any recent drug use. Patient denied any fever chills diarrhea belly pain or chest pain.  The patient denies any fever, chills, chest pain, congestion, recent illness, palpitations or dysuria.    DISCHARGE DIAGNOSES / PLAN:      Acute on Chronic SevereHFrEF, NYHA class 3: SOB, fatigue, volume  Overload.  -ECHO 3/9:EF <15%, NYHA IV with cardiogenic shock  -Dobutamin gtt  -continue home meds  -Cardiology following: f/u out pt     Severe Decompensated Dilated Cardiomyopathy, Nonischemic: s/p BiV AICD  -Cardiology and AHF teams following: LVAD before d/c, Vaccinations for flu, pneumonia and COVID-19, Sleep study referral, genetic testing for cardiomyopathy. : f/u out pt  -Corlanor 2.5mg BID  -strict I/O  -continue Dobutamine gtt  -monitor CMP closely  -RHC (3/17/21): Findings:Normal right-sided filling pressures.Normal cardiac output on dobutamine drip. Normal PVR and SVR. Normal cardiac output and index both by thermodilution and by Allen.  -Overall hemodynamics much improved on inotrope support  -PFTs per AHF team  -Palliative Care consult  -No BB while on Dobutamine GTT  -Holding ACE/ARB/ARNi/MRA per AHF team in anticipation of surgery  -Started on Spironolactone 12.5mg daily per AHF team 3/13  -ASA defered to primary cardiology team  -IV Venofer infusions 200mg x 2 doses  -6MWT per PT -refused  -monitor BNP and Digoxin level daily    F/u PCP:  Electrolyte Disturbances: monitor and replete  CKD 3: monitor renal function  GERD: GI following-EGD 3/19: Stomach: patchy antral erythema - cold biopsied. Duodenum: patchy erythema in the bulb - cold biopsied.  Moderate Pulmonary Arterial Systolic HTN: plan as above.   HLD: Hold statin for elevated LFTs  HTN: plan as above  Obesity: BMI 30.0-34.9, encouraged lifestyle modifications.  T2DM: uncontrolled with hyperglycemia: HgA1c: 7.6, continue basal, ISS, accuchecks ac&hs  Iron Deficiency Anemia: monitor.  OA, right knee, Carpal tunnel  syndrome: monitor,  Pain control. Gout: stable, monitor. Vitamin D deficiency: high dose Vit D weekly. ADDITIONAL CARE RECOMMENDATIONS:   You have been deemed stable for discharge and are being discharged home with family and [address] Should you need medication refills beyond what we have prescribed for you, you will need to contact your primary care provider for refills. Return to the ER or notify your primary care office if you have a fever greater than 101.5 associated with chills, chest pain, or signs and symptoms of a stroke which are:  B E F A S T  -loss of balance, headaches or dizziness  -eyes blurred vision (sudden)  -asymmetrical facial symmetry (side of face droops)  -arms and leg weakness  -slurred speech / difficulty speaking  -if you experience any of these (time to call for an ambulance)      PENDING TEST RESULTS:   At the time of discharge the following test results are still pending: none    FOLLOW UP APPOINTMENTS:    Follow-up Information     Follow up With Specialties Details Why Contact Info    [facility] 108 [facility] In[facility]  Home Infusion [address] [place] [address]  110 [facility] Cardiology On 3/25/2021 11am 200 [facility] 197 [facility] 99    Tonna Siemens, MD Cardiology In 3 days Cardiology 330 [facility]  [address]      Socorro Prescott MD Gastroenterology In 2 weeks GI [address] [place] [address] Erin Sterling 150      Kristin Amaro MD Internal Medicine   [address] [address] [phone]               DIET: Cardiac Diet    ACTIVITY: Activity as tolerated and no driving for today    EQUIPMENT needed:   Becky Nettles [phone] Life Vest order-Bill confirmed fitted and vest delivered. Malissa at [employer] delivery/hook-up and education done 3/20/21,10:00-11:00 with Norberto Griffin [phone]. DISCHARGE MEDICATIONS:  Current Discharge Medication List      START taking these medications    Details   bumetanide (BUMEX) 2 mg tablet Take 1 Tab by mouth daily. Qty: 90 Tab, Refills: 2      digoxin (LANOXIN) 0.25 mg tablet Take 1 Tab by mouth daily. Qty: 90 Tab, Refills: 1      DOBUTamine (DOBUTREX) 500 mg/250 mL (2,000 mcg/mL) infusion 622 mcg/min by IntraVENous route continuous. Qty: 250 mL, Refills: 0      ergocalciferol (ERGOCALCIFEROL) 1,250 mcg (50,000 unit) capsule Take 1 Cap by mouth every seven (7) days. Qty: 21 Cap, Refills: 0      hydrALAZINE (APRESOLINE) 100 mg tablet Take 1 Tab by mouth three (3) times daily. Qty: 240 Tab, Refills: 2      isosorbide mononitrate ER (IMDUR) 30 mg tablet Take 1 Tab by mouth two (2) times a day. Qty: 180 Tab, Refills: 1      ivabradine (CORLANOR) 5 mg tablet Take 0.5 Tabs by mouth two (2) times daily (with meals). Qty: 180 Tab, Refills: 1      magnesium oxide (MAG-OX) 400 mg tablet Take 2 Tabs by mouth two (2) times a day. Qty: 180 Tab, Refills: 1      melatonin 3 mg tablet Take 1 Tab by mouth nightly as needed for Insomnia. Qty: 90 Tab, Refills: 0      spironolactone (ALDACTONE) 25 mg tablet Take 1 Tab by mouth daily. Qty: 90 Tab, Refills: 1      thiamine HCL (B-1) 100 mg tablet Take 1 Tab by mouth daily. Qty: 90 Tab, Refills: 1         CONTINUE these medications which have CHANGED    Details   oxybutynin (DITROPAN) 5 mg tablet Take 0.5 Tabs by mouth three (3) times daily. Qty: 90 Tab, Refills: 11         CONTINUE these medications which have NOT CHANGED    Details   metFORMIN (GLUMETZA ER) 500 mg TG24 24 hour tablet Take 1 Tab by mouth Every morning. potassium chloride SR (KLOR-CON 10) 10 mEq tablet Take 10 mEq by mouth Every morning. diclofenac (VOLTAREN) 1 % gel APPLY TO AFFECTED AREA TWICE A DAY AS NEEDED      fluticasone propion-salmeteroL (ADVAIR/WIXELA) 250-50 mcg/dose diskus inhaler Take 2 Puffs by inhalation two (2) times a day. fluticasone propionate (FLONASE) 50 mcg/actuation nasal spray 2 Sprays by Both Nostrils route daily. Qty: 1 Bottle, Refills: 0      allopurinoL (ZYLOPRIM) 300 mg tablet TAKE 1 TABLET BY MOUTH DAILY  Qty: 90 Tab, Refills: 3      insulin glargine (LANTUS,BASAGLAR) 100 unit/mL (3 mL) inpn 60 units every morning  Qty: 6 Adjustable Dose Pre-filled Pen Syringe, Refills: 11         STOP taking these medications       furosemide (LASIX) 40 mg tablet Comments:   Reason for Stopping:         lisinopril (PRINIVIL, ZESTRIL) 20 mg tablet Comments:   Reason for Stopping:                 NOTIFY YOUR PHYSICIAN FOR ANY OF THE FOLLOWING:   Fever over 101 degrees for 24 hours. Chest pain, shortness of breath, fever, chills, nausea, vomiting, diarrhea, change in mentation, falling, weakness, bleeding. Severe pain or pain not relieved by medications. Or, any other signs or symptoms that you may have questions about. DISPOSITION:  X  Home With: [facility]   OT  PT X HH  RN       Long term SNF/Inpatient Rehab    Independent/assisted living    Hospice    Other:       PATIENT CONDITION AT DISCHARGE:     Functional status    Poor    X Deconditioned     Independent      Cognition   X  Lucid     Forgetful     Dementia      Catheters/lines (plus indication)    Grady     PICC     PEG    X None      Code status   X  Full code     DNR      PHYSICAL EXAMINATION AT DISCHARGE:    Constitutional:  No acute distress, cooperative, pleasant    ENT:  Oral mucosa moist.    Resp:  CTA bilaterally. No wheezing/rhonchi/rales. No accessory muscle use. CV:  Regular rhythm, normal rate, S1,S2.    GI/:  Soft, non distended, non tender, no guarding, BS present. Voids Freely. Musculoskeletal:  No edema, warm, 2+ pulses throughout. Neurologic:  Moves all extremities. AAOx3, CN II-XII reviewed. Skin:  Good turgor, no rashes or ulcers  Psych:  Good insight, Not anxious nor agitated.          CHRONIC MEDICAL DIAGNOSES:  Problem List as of 3/20/2021 Date Reviewed: 3/9/2021          Codes Class Noted - Resolved    Advance care planning ICD-10-CM: Z71.89  ICD-9-CM: V65.49  Unknown - Present        Shortness of breath ICD-10-CM: R06.02  ICD-9-CM: 786.05  Unknown - Present        * (Principal) Acute on chronic systolic congestive heart failure, NYHA class 3 (HCC) ICD-10-CM: I50.23  ICD-9-CM: 428.23, 428.0  3/11/2021 - Present        Moderate pulmonary arterial systolic hypertension ([facility] ICD-10-CM: I27.21  ICD-9-CM: 416.8  3/11/2021 - Present        Hypokalemia ICD-10-CM: E87.6  ICD-9-CM: 276.8  3/11/2021 - Present        Hypomagnesemia ICD-10-CM: E83.42  ICD-9-CM: 275.2  3/11/2021 - Present        CHF exacerbation ([facility] ICD-10-CM: I50.9  ICD-9-CM: 428.0  3/9/2021 - Present        Congestive heart failure ([facility] ICD-10-CM: I50.9  ICD-9-CM: 428.0  Unknown - Present        Uncontrolled type 2 diabetes mellitus with hyperglycemia (HCC) ICD-10-CM: E11.65  ICD-9-CM: 250.02  1/26/2020 - Present        CKD (chronic kidney disease) stage 3, GFR 30-59 ml/min ICD-10-CM: N18.30  ICD-9-CM: 585.3  7/16/2019 - Present    Overview Addendum 9/17/2020  8:33 AM by Anais Holloway MD     5/18 negative lexiscan  11/19 lvh, lae, otherwise normal echo             Status post total right knee replacement ICD-10-CM: C54.710  ICD-9-CM: V43.65  2/5/2019 - Present        Osteoarthrosis, localized, primary, knee, right ICD-10-CM: M17.11  ICD-9-CM: 715.16  1/21/2019 - Present        PARDO (dyspnea on exertion) ICD-10-CM: R06.00  ICD-9-CM: 786.09  5/10/2018 - Present        Chest pain, exertional ICD-10-CM: R07.9  ICD-9-CM: 786.50  5/9/2018 - Present        Decreased libido ICD-10-CM: O25.62  ICD-9-CM: 799.81  10/3/2017 - Present        Epistaxis ICD-10-CM: R04.0  ICD-9-CM: 784.7  8/22/2017 - Present        Former tobacco use--stopped 2014 after >40 yrs smoking ICD-10-CM: Z87.891  ICD-9-CM: V15.82  11/15/2016 - Present        Obesity (BMI 30.0-34.9) ICD-10-CM: E66.9  ICD-9-CM: 278.00  11/15/2016 - Present        Reactive airway disease ICD-10-CM: J45.909  ICD-9-CM: 493.90  1/12/2016 - Present        Carpal tunnel syndrome of right wrist ICD-10-CM: G56.01  ICD-9-CM: 354.0  12/6/2015 - Present        Primary osteoarthritis of right knee ICD-10-CM: M17.11  ICD-9-CM: 715.16  7/12/2015 - Present        Dilated cardiomyopathy ([facility] ICD-10-CM: I42.0  ICD-9-CM: 425.4  9/9/2014 - Present    Overview Signed 11/24/2019  5:40 PM by Rob Mac MD     Nonischemic             Dyslipidemia ICD-10-CM: E78.5  ICD-9-CM: 272.4  9/9/2014 - Present        Gout ICD-10-CM: M10.9  ICD-9-CM: 274.9  9/9/2014 - Present        Alcohol abuse ICD-10-CM: F10.10  ICD-9-CM: 305.00  9/9/2014 - Present        Hypertension ICD-10-CM: I10  ICD-9-CM: 401.9  9/9/2014 - Present        Dermatitis ICD-10-CM: L30.9  ICD-9-CM: 692.9  9/9/2014 - Present        RESOLVED: Preoperative clearance ICD-10-CM: Z01.818  ICD-9-CM: V72.84  12/11/2018 - 9/23/2019        RESOLVED: Insulin dependent diabetes mellitus ICD-10-CM: [id number]  ICD-9-CM: 250.00, V58.67  9/9/2014 - 1/26/2020              Greater than 30 minutes were spent with the patient on counseling and coordination of care    Signed:   Jerica Lewis NP  3/20/2021  1:43 PM

## 2021-03-21 ENCOUNTER — HOME CARE VISIT (OUTPATIENT)
Dept: SCHEDULING | Facility: HOME HEALTH | Age: 71
End: 2021-03-21
Payer: MEDICARE

## 2021-03-21 LAB
ABO + RH BLD: NORMAL
BLD PROD TYP BPU: NORMAL
BLOOD GROUP ANTIBODIES SERPL: NORMAL
BLOOD GROUP ANTIBODIES SERPL: NORMAL
BPU ID: NORMAL
CROSSMATCH RESULT,%XM: NORMAL
EBV NA IGG SER-ACNC: 198 U/ML (ref 0–17.9)
G6PD BLD QN: 251 U/10E12 RBC (ref 127–427)
HCV AB SERPL QL IA: NONREACTIVE
HCV COMMENT,HCGAC: NORMAL
HSV1 DNA SPEC QL NAA+PROBE: NEGATIVE
HSV2 DNA SPEC QL NAA+PROBE: NEGATIVE
LEFT ABI: 1.19
LEFT ANTERIOR TIBIAL: 172 MMHG
LEFT ARM BP: 144 MMHG
LEFT CCA DIST DIAS: 9.1 CM/S
LEFT CCA DIST SYS: 64.1 CM/S
LEFT CCA PROX DIAS: 11.1 CM/S
LEFT CCA PROX SYS: 125.7 CM/S
LEFT ECA DIAS: 6.92 CM/S
LEFT ECA SYS: 64.1 CM/S
LEFT ICA DIST DIAS: 8.2 CM/S
LEFT ICA DIST SYS: 43 CM/S
LEFT ICA MID DIAS: 13.7 CM/S
LEFT ICA MID SYS: 49.3 CM/S
LEFT ICA PROX DIAS: 8 CM/S
LEFT ICA PROX SYS: 34.4 CM/S
LEFT ICA/CCA SYS: 0.77
LEFT POSTERIOR TIBIAL: 170 MMHG
LEFT VERTEBRAL DIAS: 0 CM/S
LEFT VERTEBRAL SYS: 0 CM/S
M TB IFN-G BLD-IMP: NEGATIVE
PROT C AG PPP IA-ACNC: 94 % (ref 60–150)
QUANTIFERON CRITERIA, QFI1T: NORMAL
QUANTIFERON MITOGEN VALUE: 4.59 IU/ML
QUANTIFERON NIL VALUE: 0 IU/ML
QUANTIFERON TB1 AG: 0 IU/ML
QUANTIFERON TB2 AG: 0 IU/ML
RBC # BLD AUTO: 4.25 X10E6/UL (ref 3.77–5.28)
RIGHT ABI: 1.17
RIGHT ANTERIOR TIBIAL: 157 MMHG
RIGHT CCA DIST DIAS: 10.6 CM/S
RIGHT CCA DIST SYS: 68.8 CM/S
RIGHT CCA PROX DIAS: 12.1 CM/S
RIGHT CCA PROX SYS: 102.3 CM/S
RIGHT ECA DIAS: 4.8 CM/S
RIGHT ECA SYS: 74.6 CM/S
RIGHT ICA DIST DIAS: 17.7 CM/S
RIGHT ICA DIST SYS: 57 CM/S
RIGHT ICA MID DIAS: 13.6 CM/S
RIGHT ICA MID SYS: 44.3 CM/S
RIGHT ICA PROX DIAS: 8.1 CM/S
RIGHT ICA PROX SYS: 50.9 CM/S
RIGHT ICA/CCA SYS: 0.8
RIGHT POSTERIOR TIBIAL: 169 MMHG
RIGHT VERTEBRAL DIAS: 5.91 CM/S
RIGHT VERTEBRAL SYS: 32.2 CM/S
SPECIMEN EXP DATE BLD: NORMAL
SPECIMEN SOURCE: NORMAL
STATUS OF UNIT,%ST: NORMAL
UNIT DIVISION, %UDIV: 0

## 2021-03-21 PROCEDURE — 400013 HH SOC

## 2021-03-21 PROCEDURE — G0299 HHS/HOSPICE OF RN EA 15 MIN: HCPCS

## 2021-03-22 ENCOUNTER — TELEPHONE (OUTPATIENT)
Dept: CARDIOLOGY CLINIC | Age: 71
End: 2021-03-22

## 2021-03-22 ENCOUNTER — HOME CARE VISIT (OUTPATIENT)
Dept: SCHEDULING | Facility: HOME HEALTH | Age: 71
End: 2021-03-22
Payer: MEDICARE

## 2021-03-22 ENCOUNTER — TELEPHONE (OUTPATIENT)
Dept: CASE MANAGEMENT | Age: 71
End: 2021-03-22

## 2021-03-22 ENCOUNTER — PATIENT OUTREACH (OUTPATIENT)
Dept: CASE MANAGEMENT | Age: 71
End: 2021-03-22

## 2021-03-22 VITALS
RESPIRATION RATE: 18 BRPM | HEART RATE: 92 BPM | SYSTOLIC BLOOD PRESSURE: 152 MMHG | OXYGEN SATURATION: 98 % | TEMPERATURE: 96.9 F | DIASTOLIC BLOOD PRESSURE: 74 MMHG

## 2021-03-22 VITALS
TEMPERATURE: 97.4 F | HEART RATE: 98 BPM | RESPIRATION RATE: 20 BRPM | DIASTOLIC BLOOD PRESSURE: 68 MMHG | OXYGEN SATURATION: 97 % | SYSTOLIC BLOOD PRESSURE: 122 MMHG

## 2021-03-22 LAB
ADDITIONAL INFORMATION 480297: NORMAL
ALBUMIN SERPL ELPH-MCNC: 3.3 G/DL (ref 2.9–4.4)
ALBUMIN/GLOB SERPL: 0.8 {RATIO} (ref 0.7–1.7)
ALPHA1 GLOB SERPL ELPH-MCNC: 0.3 G/DL (ref 0–0.4)
ALPHA2 GLOB SERPL ELPH-MCNC: 0.8 G/DL (ref 0.4–1)
B-GLOBULIN SERPL ELPH-MCNC: 1.3 G/DL (ref 0.7–1.3)
BACTERIA SPEC CULT: NORMAL
F2 GENE MUT ANL BLD/T: NORMAL
GAMMA GLOB SERPL ELPH-MCNC: 2 G/DL (ref 0.4–1.8)
GLOBULIN SER-MCNC: 4.3 G/DL (ref 2.2–3.9)
HGB FREE PLAS-MCNC: 48.3 MG/DL (ref 0–4.9)
IGA SERPL-MCNC: 509 MG/DL (ref 87–352)
IGG SERPL-MCNC: 2025 MG/DL (ref 586–1602)
IGM SERPL-MCNC: 294 MG/DL (ref 26–217)
INTERPRETATION SERPL IEP-IMP: ABNORMAL
KAPPA LC FREE SER-MCNC: 54.3 MG/L (ref 3.3–19.4)
KAPPA LC FREE/LAMBDA FREE SER: 0.87 {RATIO} (ref 0.26–1.65)
LAMBDA LC FREE SERPL-MCNC: 62.5 MG/L (ref 5.7–26.3)
M PROTEIN SERPL ELPH-MCNC: ABNORMAL G/DL
PROT SERPL-MCNC: 7.6 G/DL (ref 6–8.5)
SERVICE CMNT-IMP: NORMAL

## 2021-03-22 PROCEDURE — G0299 HHS/HOSPICE OF RN EA 15 MIN: HCPCS

## 2021-03-22 NOTE — PROGRESS NOTES
Patient contacted regarding Julia Leachook. Discussed COVID-19 related testing which was available at this time. Test results were negative. Patient informed of results, if available? yes     Care Transition Nurse contacted the patient by telephone to perform post discharge assessment. Call within 2 business days of discharge: Yes Verified name and  with patient as identifiers. Provided introduction to self, and explanation of the CTN/ACM role, and reason for call due to risk factors for infection and/or exposure to COVID-19. Symptoms reviewed with patient who verbalized the following symptoms: no worsening symptoms      Due to no new or worsening symptoms encounter was not routed to provider for escalation. Discussed follow-up appointments. If no appointment was previously scheduled, appointment scheduling offered:  [facility] follow up appointment(s):   Future Appointments   Date Time Provider John Escalante   3/25/2021 11:00 AM Karley Babin NP [facility] BS AMB   2021 10:00 AM Fransisca Yadav MD [address]     Non-[facility] follow up appointment(s): none     Advance Care Planning:   Does patient have an Advance Directive:  reviewed and current. Patient has following risk factors of: heart failure and diabetes. CTN reviewed discharge instructions, medical action plan and red flags such as increased shortness of breath, increasing fever and signs of decompensation with patient who verbalized understanding. Discussed exposure protocols and quarantine with CDC Guidelines What to do if you are sick with coronavirus disease .  Patient was given an opportunity for questions and concerns. The patient agrees to contact the [facility] exposure line [phone], local [facility] [place] department [facility] 106  [phone] and PCP office for questions related to their healthcare. CTN provided contact information for future needs.     Reviewed and educated patient on any new and changed medications related to discharge diagnosis     Was patient discharged with a pulse oximeter? no   Patient/family/caregiver given information for [employer] and agrees to enroll no  14 day call based on severity of symptoms and risk factors.

## 2021-03-22 NOTE — TELEPHONE ENCOUNTER
HEART FAILURE NURSE NAVIGATOR POST DISCHARGE FOLLOW UP PHONE CALL     HF NN contacted patient by telephone to perform post hospital discharge follow up call. Verified patient name and date of birth as identifiers. Provided introduction to self and role. Reviewed discharge instructions; confirmed patient is in receipt of all prescribed HF medications. Reinforced importance of daily weights and dietary restrictions, following low sodium diet. Reinforced signs/symptoms of HF and when to notify the physician. Confirmed knowledge of scheduled follow up appointment:   Srinivas Wray NP 3/25/21 at 11:00 AM    Confirmed patient has transportation to above appointment. Patient given opportunity to ask questions/express concerns. All questions answered with good understanding.

## 2021-03-22 NOTE — TELEPHONE ENCOUNTER
Prior authorization approval received for corlanor and digoxin.  Called pharmacy and spoke with [facility] and advised of approval.  Chery Mendoza RN

## 2021-03-24 ENCOUNTER — TELEPHONE (OUTPATIENT)
Dept: CARDIOLOGY CLINIC | Age: 71
End: 2021-03-24

## 2021-03-24 ENCOUNTER — DOCUMENTATION ONLY (OUTPATIENT)
Dept: CARDIOLOGY CLINIC | Age: 71
End: 2021-03-24

## 2021-03-24 ENCOUNTER — HOSPITAL ENCOUNTER (OUTPATIENT)
Dept: LAB | Age: 71
Discharge: HOME | End: 2021-03-24
Payer: MEDICARE

## 2021-03-24 ENCOUNTER — HOME CARE VISIT (OUTPATIENT)
Dept: SCHEDULING | Facility: HOME HEALTH | Age: 71
End: 2021-03-24
Payer: MEDICARE

## 2021-03-24 VITALS
HEART RATE: 92 BPM | TEMPERATURE: 98.9 F | WEIGHT: 179.4 LBS | SYSTOLIC BLOOD PRESSURE: 140 MMHG | RESPIRATION RATE: 18 BRPM | OXYGEN SATURATION: 98 % | BODY MASS INDEX: 30.79 KG/M2 | DIASTOLIC BLOOD PRESSURE: 70 MMHG

## 2021-03-24 LAB
Lab: NORMAL
REFERENCE LAB,REFLB: NORMAL
TEST DESCRIPTION:,ATST: NORMAL

## 2021-03-24 PROCEDURE — 80162 ASSAY OF DIGOXIN TOTAL: CPT

## 2021-03-24 PROCEDURE — G0299 HHS/HOSPICE OF RN EA 15 MIN: HCPCS

## 2021-03-24 NOTE — PROGRESS NOTES
Patient Evaluation for Ventricular Assist Device/ Transplant    Name: Arlinda Hamman  MRN: [id number]   YOB: 1950  Age: 73 y.o. Gender:  Female      Evaluation for: LVAD HM3    *Deferred due to recovery*    LVAD: (Y/N)     Date of Implant:              Device:    Referring Physician:   [employer] Cardiologist: Khushboo Spain DNP, ANP-BC  PCP: Cindy Ruby MD    Intermacs Profile: 3    Blood Group:   O POSITIVE    Height:   Ht Readings from Last 3 Encounters:   03/11/21 5' 4\" (1.626 m)   03/09/21 5' 4\" (1.626 m)   03/09/21 5' 4\" (1.626 m)     Weight:   Wt Readings from Last 3 Encounters:   03/20/21 183 lb 3.2 oz (83.1 kg)   03/09/21 189 lb 6.4 oz (85.9 kg)   03/09/21 190 lb (86.2 kg)     BMI: 31.45 kg/m²    Vitals (last 3 visits):  Vitals 3/22/2021 3/21/2021 3/20/2021   Blood Pressure 152/74 122/68 124/75   Pulse 92 98 90   Temp 96.9 97.4 98.1   Resp 18 20 16   Height      Weight      SpO2 98 97 97   BSA      BMI            Heart Failure Etiology: Non-ischemic cardiomyopathy  Time of GDMT: n/a -  inotropes    HPI:   Yemi Betancourt a 73 y.o. female with a past medical history of nonischemic hypertensive cardiomyopathy, type 2 diabetes, CKD, hypertension, COPD/reactive airway disease, former smoker who presented to clinic yesterday for her routine visit with Dr. Krystal Guzman. Tiana Fried was obviously quite dyspneic, moderate distress and subsequently admitted through the ER for decompensated systolic heart failure.  Her initial diagnosis of heart failure occurred 2007 she was previously followed by Dr. Josh Zavala at [facility] up until 2015 where she changed to Dr. Meg Tracy due to insurance coverage. Severino Ng ejection fraction at [facility] in 2015 was 45-50%, at the time she had been maintained on high-dose diuretics to maintain a euvolemic state, metoprolol 100 mg daily as well as lisinopril 20 mg daily.  Ms. Bianca Price had been doing well on guideline directed medical therapy, she was on significant doses of diuretics to include upwards of 120 mg of furosemide along with 5 mg of metolazone daily. Virginie Waller has been recommended since she had maintained a euvolemic state and essentially near normal ejection fraction that reducing and/or eliminating metolazone due to its potential for renal decline; this conversation occurred with patient by Dr. Saravanan Flores on 2020. Ms. Linh Johnson underwent a full evaluation for her HF and a VAD/OHT evaluation. She was started on Dobutamine that was up titrated to 7.5mcg/kg/min and discharged home on that dose. Past Medical History:   Past Medical History:   Diagnosis Date    Arthritis     Diabetes ([address]     Hypercholesterolemia     Hypertension        Past Surgical History:   Prior Sternotomy: N         Prior Abd Surgery: N  Past Surgical History:   Procedure Laterality Date    COLONOSCOPY N/A 3/19/2021    COLONOSCOPY performed by Arslan Jacobson MD at [address] [facility]      Arthroscopy right knee       Past Social History:    Social History     Socioeconomic History    Marital status:      Spouse name: Not on file    Number of children: 1    Years of education: 15    Highest education level: Not on file   Occupational History    Occupation: retired   Social Needs    Financial resource strain: Not on file    Food insecurity     Worry: Not on file     Inability: Not on file   [employer] needs     Medical: Not on file     Non-medical: Not on file   Tobacco Use    Smoking status: Former Smoker     Packs/day: 0.25     Years: 20.00     Pack years: 5.00     Quit date: 2014     Years since quittin.2    Smokeless tobacco: Never Used   Substance and Sexual Activity    Alcohol use:  Yes     Alcohol/week: 0.0 standard drinks     Comment: 21-24 Beers per week    Drug use: No    Sexual activity: Yes     Partners: Male   Lifestyle    Physical activity     Days per week: Not on file     Minutes per session: Not on file    Stress: Not on file   Relationships    Social connections     Talks on phone: Not on file     Gets together: Not on file     Attends Sabianist service: Not on file     Active member of club or organization: Not on file     Attends meetings of clubs or organizations: Not on file     Relationship status: Not on file    Intimate partner violence     Fear of current or ex partner: Not on file     Emotionally abused: Not on file     Physically abused: Not on file     Forced sexual activity: Not on file   Other Topics Concern    Not on file   Social History Narrative    Not on file       Immunization History:   Immunization History   Administered Date(s) Administered    Influenza, Quadrivalent, Adjuvanted (>65 Yrs FLUAD QUAD [id number]) 11/12/2020       Allergies:   No Known Allergies    Medications:  *ANTIPLATELET &/or ANTICOAGULATION: none    No Known Allergies     Current Outpatient Medications on File Prior to Visit   Medication Sig    metFORMIN (GLUCOPHAGE) 500 mg tablet Take 500 mg by mouth three (3) times daily (with meals).  diclofenac (VOLTAREN) 1 % gel Apply 2 g to affected area two (2) times daily as needed for Pain. thin layer to joint pain    sodium chloride (Normal Saline Flush) 10-20 mL by InterCATHeter route daily.  heparin sod,porcine/0.9 % NaCl (HEPARIN FLUSH IV) 3-5 mL by IntraCATHeter route daily.  budesonide (PULMICORT) 0.25 mg/2 mL nbsp 500 mcg by Nebulization route every six (6) hours as needed for Other (wheezing shortness of breath).  oxybutynin (DITROPAN) 5 mg tablet Take 0.5 Tabs by mouth three (3) times daily.  bumetanide (BUMEX) 2 mg tablet Take 1 Tab by mouth daily.  digoxin (LANOXIN) 0.25 mg tablet Take 1 Tab by mouth daily.  DOBUTamine (DOBUTREX) 500 mg/250 mL (2,000 mcg/mL) infusion 622 mcg/min by IntraVENous route continuous.  ergocalciferol (ERGOCALCIFEROL) 1,250 mcg (50,000 unit) capsule Take 1 Cap by mouth every seven (7) days.     hydrALAZINE (APRESOLINE) 100 mg tablet Take 1 Tab by mouth three (3) times daily.    isosorbide mononitrate ER (IMDUR) 30 mg tablet Take 1 Tab by mouth two (2) times a day.  ivabradine (CORLANOR) 5 mg tablet Take 0.5 Tabs by mouth two (2) times daily (with meals).  magnesium oxide (MAG-OX) 400 mg tablet Take 2 Tabs by mouth two (2) times a day.  melatonin 3 mg tablet Take 1 Tab by mouth nightly as needed for Insomnia.  spironolactone (ALDACTONE) 25 mg tablet Take 1 Tab by mouth daily.  thiamine HCL (B-1) 100 mg tablet Take 1 Tab by mouth daily.  potassium chloride SR (KLOR-CON 10) 10 mEq tablet Take 10 mEq by mouth Every morning.  fluticasone propion-salmeteroL (ADVAIR/WIXELA) 250-50 mcg/dose diskus inhaler Take 2 Puffs by inhalation two (2) times a day.  fluticasone propionate (FLONASE) 50 mcg/actuation nasal spray 2 Sprays by Both Nostrils route daily.  allopurinoL (ZYLOPRIM) 300 mg tablet TAKE 1 TABLET BY MOUTH DAILY    insulin glargine (LANTUS,BASAGLAR) 100 unit/mL (3 mL) inpn 60 units every morning (Patient taking differently: by SubCUTAneous route. 60 units every morning)     No current facility-administered medications on file prior to visit. TESTING:  ECHO UPDATE: 21   LViDd: 5.04 cm (3.90 - 5.30)  EF: 50-55%    [facility]  ECHO ADULT COMPLETE  Order# [id number]  Reading physician: Richie Manning MD Ordering physician: Katty Gore NP Study date: 21   Patient Information    Patient Name   Rashard [facility] MRN   [id number] Sex   Female      1950 (73 y.o.)   Reason for Exam  Priority: Routine  cardiomyopathy   Dx: Congestive heart failure, unspecified HF chronicity, unspecified heart failure type ([facility] [address] [I50.9 (ICD-10-CM)]; Dilated cardiomyopathy ([facility] [address] [I42.0 (ICD-10-CM)]; Stage 3 chronic kidney disease, unspecified whether stage 3a or 3b CKD ([facility] [address] [N18.30 (ICD-10-CM)];  Receiving inotropic medication [Z79.899 (ICD-10-CM)]   Comments:    Vitals    Weight Height BSA (calculated - sq m) BP Pulse (Heart Rate) 177 lb (80.3 kg) 5' 4\" (1.626 m) 1.9 sq meters  78   Interpretation Summary    · LV: Estimated LVEF is 50 - 55%. Visually measured ejection fraction. Normal cavity size and wall thickness. Low normal systolic function. Echo Findings    Left Ventricle Normal cavity size and wall thickness. Sigmoid septum. The estimated EF is 50 - 55%. Visually measured ejection fraction. Low normal systolic function. Wall Scoring The following segments are hypokinetic: basal anteroseptal, basal inferoseptal, basal inferior, mid anteroseptal, mid inferoseptal, mid inferior, apical septal and apical inferior. All other segments are normal.            Left Atrium Normal cavity size. Right Ventricle Normal cavity size and global systolic function. Right Atrium Normal cavity size. Aortic Valve Trileaflet valve structure, no stenosis and no regurgitation. Mitral Valve Normal valve structure, no stenosis and no regurgitation. Tricuspid Valve Normal valve structure and no stenosis. Trace regurgitation. Pulmonic Valve Pulmonic valve not well visualized, but normal doppler findings. Normal valve structure, no stenosis and no regurgitation. Aorta Normal aortic root. Pulmonary Artery Pulmonary arteries not well visualized. Pericardium No evidence of pericardial effusion. Wall Scoring    Score Index: 1.471 Percent Normal: 52.9%             The following segments are hypokinetic: basal anteroseptal, basal inferoseptal, basal inferior, mid anteroseptal, mid inferoseptal, mid inferior, apical septal and apical inferior. All other segments are normal.               TTE procedure Findings    TTE Procedure Information Image quality: good. The view(s) performed were parasternal, apical and subcostal. Color flow Doppler was performed and pulse wave and/or continuous wave Doppler was performed. No contrast was given.    Procedure Staff    Technologist/Clinician: Ann Delgado  Supporting Staff: None  Performing Physician/Midlevel: None     Exam Completion Date/Time: 4/8/21 10:12 AM   2D Volume Measurements     ESV ESV Index EDV EDV Index EF   LV Biplane 44.35 mL (Range: 19.0 - 49.0)       23.9 mL/m2       118. 98 mL (Range: 56.0 - 104.0)       64.1 mL/m2       62.7 percent (Range: 55.0 - 100.0)         LV A4C 49.82 mL       26.8 mL/m2       124.91 mL       67.3 mL/m2       60 percent         LV A2C 37.15 mL       20 mL/m2       108. 21 mL       58.3 mL/m2       66 percent         LA Biplane 84.91 mL (Range: 22.0 - 52.0)       45.72 ml/m2 (Range: 16.00 - 28.00)            LA A4C 73.61 mL (Range: 22.00 - 52.00)       39.64 ml/m2 (Range: 16.00 - 28.00)            LA A2C 81.49 mL (Range: 22.00 - 52.00)       43.88 ml/m2 (Range: 16.00 - 28.00)                  2D/M-Mode Measurements    Dimensions   Measurement Value (Range)   LVIDs 3.8 cm      LVIDd 5.04 cm (3.90 - 5.30)      IVSd 0.94 cm (0.60 - 0.90)      LVPWd 0.93 cm (0.60 - 0.90)      LV Mass .6 g (67.0 - 162.0)      LV Mass AL Index 90.8 g/m2 (43.0 - 95.0)      Left Atrium Major Axis 3.93 cm      Left Atrium Minor [place] 2.12 cm      LA Area 4C 22.53 cm2      Tapse 2.38 cm (1.50 - 2.00)      RVIDd 2.99 cm                Aorta Measurements    Aorta   Measurement Value (Range)   Ao Root D 3.24 cm             Aortic Valve Measurements    Stenosis   Aortic Valve Systolic Peak Velocity 050.30 cm/s      AoV PG 10.3 mmHg      Aortic Valve Area by Continuity of Peak Velocity 1.48 cm2       LVOT   LVOT Peak Velocity 96.86 cm/s      LVOT Peak Gradient 3.75 mmHg      LVOT d 1.77 cm                 Tricuspid Valve Measurements    Regurgitation   Triscuspid Valve Regurgitation Peak Gradient 14.73 mmHg      TR Max Velocity 191.89 cm/s                  Diastolic Filling/Shunts    Diastolic Filling   Mitral Valve E Wave Deceleration Time 222.64 ms      MV E Omar 73.25 cm/s      MV A Omar 112.75 cm/s      MV E/A 0.65              Signed    Electronically signed by Julio C Del Rosario MD on 4/8/21 at [phone] EDT     Echocardiogram: 3/9/21  (summary including: valve function, RV, abnormalities)   LViDd: 5.77 cm   EF: 15-20%  Interpretation Summary    · LV: Estimated LVEF is 15 - 20%. Visually measured ejection fraction. Mildly dilated left ventricle. Upper normal wall thickness. Moderate-to-severely reduced systolic function. Wall motion: normal. Left ventricular diastolic dysfunction. · LA: Moderately dilated left atrium. · RV: Mildly dilated right ventricle. Mildly reduced systolic function. · RA: Mildly dilated right atrium. · MV: Mitral valve thickening. Mild to moderate mitral valve regurgitation is present. · TV: Moderate tricuspid valve regurgitation is present. · IVC: Severely elevated central venous pressure (15 mmHg); IVC diameter is larger than 21 mm and collapses less than 50% with respiration. Echo Findings    Left Ventricle Mildly dilated left ventricle. Upper normal wall thickness. Wall motion: normal. The estimated EF is 15 - 20%. Visually measured ejection fraction. Moderate-to-severely reduced systolic function. There is left ventricular diastolic dysfunction. Left Atrium Moderately dilated left atrium. The volume is moderately increased. Right Ventricle Mildly dilated right ventricle. Mildly reduced systolic function. Right Atrium Mildly dilated right atrium. Aortic Valve Trileaflet valve structure, no stenosis and no regurgitation. Mild aortic valve sclerosis with no evidence of reduced excursion. Mitral Valve No stenosis. Mitral valve thickening. Mitral valve leaflet separation. Mild to moderate regurgitation. Tricuspid Valve Normal valve structure and no stenosis. Moderate regurgitation. Pulmonic Valve Pulmonic valve not well visualized. No stenosis and no regurgitation. Aorta Normal aortic root. Pulmonary Artery Pulmonary hypertension not suggested by Doppler findings. IVC/Hepatic Veins Severely elevated central venous pressure (15 mmHg);  IVC diameter is larger than 21 mm and collapses less than 50% with respiration. Pericardium No evidence of pericardial effusion. TTE procedure Findings    TTE Procedure Information Image quality: good. The view(s) performed were parasternal, apical, subcostal and suprasternal. Color flow Doppler was performed and pulse wave and/or continuous wave Doppler was performed. No contrast was given.    Procedure Staff    Technologist/Clinician: Margarita Paul  Supporting Staff: None  Performing Physician/Midlevel: None     Exam Completion Date/Time: 3/9/21  4:43 PM   2D Volume Measurements     ESV ESV Index   LA Biplane 91.07 mL (Range: 22.0 - 52.0)       47.59 ml/m2 (Range: 16.00 - 28.00)         LA A4C 79.28 mL (Range: 22.00 - 52.00)       41.42 ml/m2 (Range: 16.00 - 28.00)         LA A2C 80.68 mL (Range: 22.00 - 52.00)       42.16 ml/m2 (Range: 16.00 - 28.00)               2D/M-Mode Measurements    Dimensions   Measurement Value (Range)   LVIDs 5.17 cm      LVIDd 5.77 cm (3.90 - 5.30)      IVSd 1.04 cm (0.60 - 0.90)      LVPWd 1.02 cm (0.60 - 0.90)      LV Mass .2 g (67.0 - 162.0)      LV Mass AL Index 125.5 g/m2 (43.0 - 95.0)      Left Atrium Major Axis 4.37 cm      Left Atrium Minor Axis 2.28 cm      LA Area 4C 23.45 cm2      RVIDd 4.14 cm       M-Mode   Measurement Value (Range)   AV Cusp 1.85 cm                  Aortic Valve Measurements    Stenosis   Aortic Valve Systolic Peak Velocity 57.6 cm/s      AoV PG 3.5 mmHg      Aortic Valve Systolic Mean Gradient 4.99 mmHg      AoV VTI 17.4 cm       LVOT   LVOT Peak Velocity 59.75 cm/s      LVOT Peak Gradient 1.43 mmHg      LVOT VTI 10.39 cm                 Tricuspid Valve Measurements    Regurgitation   Triscuspid Valve Regurgitation Peak Gradient 30.66 mmHg      TR Max Velocity 275.3 cm/s               Pulmonary Measurements    Stenosis/Regurgitation   Pulmonic Valve Max Velocity 75.3 cm/s                 Signed    Electronically signed by Bekah Coello III, MD on 3/9/21 at 1739 EST         Results for orders placed or performed during the hospital encounter of 03/09/21   EKG, 12 LEAD, INITIAL   Result Value Ref Range    Ventricular Rate 103 BPM    Atrial Rate 103 BPM    P-R Interval 190 ms    QRS Duration 84 ms    Q-T Interval 368 ms    QTC Calculation (Bezet) 482 ms    Calculated P Axis 64 degrees    Calculated R Axis 13 degrees    Calculated T Axis 91 degrees    Diagnosis       Sinus tachycardia  Possible Left atrial enlargement  T wave abnormality, consider lateral ischemia  Abnormal ECG  When compared with ECG of 17-FEB-2021 18:00,  No significant change was found  Confirmed by Rebecca Ventura M.D., Bety Andrade ([id number]) on 3/10/2021 8:13:11 AM             Right Heart Catheterization: 3/17/21  Conclusion    Findings  1. Normal right-sided filling pressures  2. Normal cardiac output on dobutamine drip  3. Normal PVR and SVR  4. Normal cardiac output and index both by thermodilution and by Allen.       Overall hemodynamics much improved on inotrope support     Access: Right internal jugular vein no issues   Right Heart    Right Heart Cath Vitals - VO2 Value: 239.48 ml/min  Hb: 12.4 %   Blood Oximetry Information - AV Hb PV: 0 gm/dL   Pressure Phase - Phase: Phase: Baseline   PA Pressures - PA Systolic: 47 mmHg  PA Diastolic: 20 mmHg  PA Mean: 29 mmHg   RV Pressures - RV Systolic: 46 mmHg RV End Diastolic: 10 mmHg  RV dP/dt: 382 mmHg/sec   RA Pressures - RA Wedge A Wave: 9 mmHg  RA Wedge V Wave: 6 mmHg  RA Wedge Mean: 7 mmHg   PCW Pressures - PCW A Wave: 12 mmHg  PCW V Wave: 11 mmHg  PCW Mean: 10 mmHg   Atrial Wedge Pressures - Source: Measured  RA Atrial Wedge Heart Rate: 94 bpm   Cath Pressure - PCW Source: Measured  PCW Heart Rate: 95 bpm   Cardiac Output Results - TDCO: 6.03 L/min  TDCI: 3.15 L/min/m2  Allen C.O.: 5.62 L/min  Allen C. I.: 2.93 L/min/m2  QPI: 2.93 L/min/m2  QSI Value: 2.93 L/min/m2   Resistance Results - PVR: 270.63 dsc-5  PVR-I: 518.49 dsc-5/m2  TPR: 413.07 dsc-5  TPR-I: 791.38 dsc-5/m2   Stroke Work Results - RVSW: 19.77 gm*m  RVSW-I: 10.32 gm*m/m2   Link to printable coronary/vascular diagram report    Coronary/Vascular Diagrams   Phase: Baseline    Data Systolic (mmHg) Diastolic (mmHg) Mean (mmHg) dP/dt (mmHg/sec) A Wave (mmHg) V Wave (mmHg)   RV Pressures 46    10     382        PA Pressures 47    20    29         AO Pressures 140    70    97         RA Pressures   7     9    6      PCW Pressures   10     12    11      Phase: Baseline    Data HR (bpm) TDCO (L/min) TDCI (L/min/m2) Allen CI (L/min/m2)   Cardiac Output Results  6.03    3.15    2.93      Phase: Baseline    Resistance PVR  SVR  PVR-I (dsc-5/m2) SVR-I  PVR/SVR  TPR  TVR  TPR-I  TVR-I  TPR/TVR    Resistance Results 270.63 dsc-5    1281.93 dsc-5    518.49    2456 dsc-5/m2    0.21    413.07 dsc-5    1381.64 dsc-5    791.38 dsc-5/m2    2647.03 dsc-5/m2    0.3      Phase: Baseline    Stroke Work LVSW (gm*m) LVSW-I (gm*m/m2) RVSW (gm*m) RVSW-I (gm*m/m2)   Stroke Work Results 77.25    40.32    19.77    10.32      Phase: Baseline    Blood Flow Results Result  Indexed Values (L/min/m2)   QP  2.93      QS  2.93      Blood Oximetry 3/17/21 1239--3/17/21 1340 before discharge    PA O2 Sat   65 %       Sedation Time    Moderate conscious sedation of 45 minutes 58 seconds was performed by an independent provider giving the medication per my discretion and monitoring the vital signs throughout the case. Contrast Administration    No contrast administered during procedure. Amount: 0 mL. Radiation Exposure      Event Details User   1:37 PM 3/17/21 Radiation Tracking Panel 1: Jaren Almanza MD   Invasive Radiation (mGy) = 0.000; Fluoro Time (min) = 0.1; DAP (cGy.cm2) = 2.200 LH   Estimated Blood Loss/Specimen Collection    No specimen collected. Estimated Blood Loss: <30 mL. Implants     No implant documentation for this case.    PACS Images     Show images for CARDIAC PROCEDURE   Cath/EP Waveforms    Cath/EP waveform scan report          Signed    Electronically signed by Lizbet Mohan MD on 3/17/21 at 66 [phone] 21 EDT         Left Heart Catheterization:   Conclusion    Findings  1. Normal coronary arteries without any obstructive disease  2. Severely reduced cardiac index/outputs. By thermodilution cardiac output was 3.05 L/min and index was 1.6 L/min/m². By [employer] based on Lafarge formula, cardiac output was 2.6 L/min with index of 1.35 L/min/m² meter. Using Maribel formula, cardiac output was 3.4 L/min and index was 1.9 L/min/m². 3.  High SVR of about 20 Wood units. 4.  Moderate pulmonary hypertension mixed pattern with elevated capillary wedge pressure mean of 27. PVR is about 4 Novak units  5. Elevated right-sided filling pressure with mean RA pressure of 25.     Access: Right IJ and right radial.  No issues  Contrast 20 cc     Recommendations:  1. Guideline recommended therapy for heart failure with reduced action fraction. 2.  Consideration for inotropic therapy and advanced heart failure therapies . Coronary Findings    Diagnostic  Dominance: Co-dominant  No diagnostic findings have been documented. Intervention    No interventions have been documented.   Right Heart    Right Heart Cath Vitals - VO2 Value: 178.66 ml/min  Hb: 11.3 %   Blood Oximetry Information - AV Hb PV: 0 gm/dL   Pressure Phase - Phase: Phase: Baseline   AO Pressures - AO Systolic: 972 mmHg  AO Diastolic: 56 mmHg  AO Mean: 76 mmHg  Heart Rate: 70 bpm   PA Pressures - PA Systolic: 49 mmHg  PA Diastolic: 27 mmHg  PA Mean: 37 mmHg   RV Pressures - RV Systolic: 59 mmHg  RV End Diastolic: 25 mmHg  RV dP/dt: 192 mmHg/sec   LV Pressures - LV Systolic: 744 mmHg  LV End Diastolic: 73 mmHg  LV dP/dt: 720 mmHg/sec   RA Pressures - RA Wedge A Wave: 26 mmHg  RA Wedge V Wave: 27 mmHg  RA Wedge Mean: 25 mmHg   PCW Pressures - PCW A Wave: 36 mmHg  PCW V Wave: 34 mmHg  PCW Mean: 27 mmHg   Atrial Wedge Pressures - Source: Measured  RA Atrial Wedge Heart Rate: 72 bpm   Cath Pressure - FA End Diastolic Cath Pressure: 72 mmHg  PCW Source: Measured  PCW Heart Rate: 75 bpm   Cardiac Output Results - TDCO: 3.05 L/min  TDCI: 1.59 L/min/m2  Allen C.O.: 2.56 L/min  Allen C. I.: 1.34 L/min/m2  Allen HR: 72 bpm  QPI: 1.34 L/min/m2  QSI Value: 1.34 L/min/m2   Resistance Results - PVR: 312.34 dsc-5  PVR-I: 598.99 dsc-5/m2  SVR: 1592.92 dsc-5  SVR-I: 3054.83 dsc-5/m2  PVR/SVR: 0.2  TPR: 1155.65 dsc-5  TPR-I: 2216.25 dsc-5/m2  TVR: 2373.76 dsc-5  TVR-I: 4552.29 dsc-5/m2  TPR/TVR: 0.49   Stroke Work Results - LVSW: 24.38 gm*m  LVSW-I: 12.71 gm*m/m2  RVSW: 5.57 gm*m  RVSW-I: 2.91 gm*m/m2   Valve Results - TPR/TVR: 0.49  PVR/SVR: 0.2   Link to printable coronary/vascular diagram report    Coronary/Vascular Diagrams   Coronary Diagram    Diagnostic  Dominance: Co-dominant    Intervention    Phase: Baseline    Data Systolic (mmHg) Diastolic (mmHg) Mean (mmHg) dP/dt (mmHg/sec) A Wave (mmHg) V Wave (mmHg)   RV Pressures 59    25     192        PA Pressures 49    27    37         LV Pressures 117    73     720        AO Pressures 107    56    76         RA Pressures   25     26    27      PCW Pressures   27     36    34      Phase: Baseline    Data HR (bpm) TDCO (L/min) TDCI (L/min/m2) Allen CI (L/min/m2)   Cardiac Output Results 72    3.05    1.59    1.34      Phase: Baseline    Resistance PVR  SVR  PVR-I (dsc-5/m2) SVR-I  PVR/SVR  TPR  TVR  TPR-I  TVR-I  TPR/TVR    Resistance Results 312.34 dsc-5    1592.92 dsc-5    598.99    3054.83 dsc-5/m2    0.2    1155.65 dsc-5    2373.76 dsc-5    2216.25 dsc-5/m2    4552.29 dsc-5/m2    0.49      Phase: Baseline    Stroke Work LVSW (gm*m) LVSW-I (gm*m/m2) RVSW (gm*m) RVSW-I (gm*m/m2)   Stroke Work Results 24.38    12.71    5.57    2.91      Phase: Baseline    Blood Flow Results Result  Indexed Values (L/min/m2)   QP  1.34      QS  1.34      Blood Oximetry 3/11/21 1501--3/11/21 1548 before discharge    PA O2 Sat   49.4 %       Sedation Time    Moderate conscious sedation of 25 minutes 37 seconds was performed by an independent provider giving the medication per my discretion and monitoring the vital signs throughout the case. Contrast Administration    Contrast: Isovue. Contrast concentration: 370. Amount: 19 mL. Radiation Exposure      Event Details User   3:47 PM 3/11/21 Radiation Tracking Panel 1: Abdoulaye Doss MD   Invasive Radiation (mGy) = 309.000; Fluoro Time (min) = 2.7; DAP (cGy.cm2) = 4440.383 WK   Estimated Blood Loss/Specimen Collection    No specimen collected. Estimated Blood Loss: <30 mL. Implants     No implant documentation for this case. PACS Images     Show images for CARDIAC PROCEDURE   Cath/EP Waveforms    Cath/EP waveform scan report          Signed    Electronically signed by Abdoulaye Doss MD on 3/11/21 at 1630 EST     Cardiopulmonary Exercise Testing (VO2) not completed  Date:   Exercise Protocol:   Exercise Time:   METS achieved:   Exercise Stopped:   ECG:    HR  % MPHR  BP  RQ  VO2  % pred VO2    Baseline          AT          Peak          Anaerobic threshold was:    VE/VCO2 slope:   HR recovery:     Room Air ABG (Date: not completed)   pH PCO2 PO2 HCO3 BE Sat                 6 Minute Walk Test (Date): 3/15/21    6MWT    Date: 3/15/2021   Pre/Post HR: 88/87   Pre/Post SpO2: 100/98   Distance (meters): 100.74         Chest CT or Chest X-ray (Date): XR Results (most recent):  Results from Hospital Encounter encounter on 03/09/21   XR CHEST PORT    Narrative EXAM: XR CHEST PORT    INDICATION: Dyspnea on exertion and orthopnea, pulmonary edema? COMPARISON: 2.17.2021    FINDINGS: A portable AP radiograph of the chest was obtained at 1139 hours. The  patient is on a cardiac monitor. The lungs are clear. The cardiac and  mediastinal contours and pulmonary vascularity are remarkable for cardiomegaly. The bones and soft tissues are grossly within normal limits. Impression Unchanged cardiomegaly.  No evidence for overt CHF CT Results (most recent):  Results from Hospital Encounter encounter on 03/10/21   CT ABD PELV WO CONT    Narrative EXAM:  CT abdomen pelvis without contrast    INDICATION: Acute on chronic congestive heart failure. VAD/HT eval.    COMPARISON: CT chest 3/10/2021. TECHNIQUE: Helical CT of the abdomen  and pelvis  without contrast. Coronal and  sagittal reformats are performed. CT dose reduction was achieved through use of  a standardized protocol tailored for this examination and automatic exposure  control for dose modulation. Adaptive statistical iterative reconstruction  (ASIR) was utilized. FINDINGS:   Solid organ evaluation is limited without contrast.     The visualized lung bases demonstrate no mass or consolidation. The heart size  is normal. There is no pericardial or pleural effusion. There is no renal, ureteral, or bladder calculus. The kidneys are symmetric  without hydronephrosis. There is no perinephric fluid or fat stranding. The liver, spleen, pancreas, and adrenal glands are normal.  The gall bladder is  present  without intra- or extra-hepatic biliary dilatation. There are no dilated bowel loops. The appendix is normal.      There are no enlarged lymph nodes. There is no free fluid or free air. The  aorta tapers without aneurysm. There is aortoiliac atherosclerosis. The urinary bladder is unremarkable. There is no pelvic mass. The bony structures are age-appropriate. Impression No acute abnormality in the abdomen or pelvis. EXAM: CTA CHEST W OR W WO CONT     INDICATION: r/o PE     COMPARISON: Chest x-ray of 10/9/2021. .     CONTRAST: mL of Isovue-370.     TECHNIQUE:   Precontrast  images were obtained to localize the volume for acquisition. Multislice helical CT arteriography was performed from the diaphragm to the  thoracic inlet during uneventful rapid bolus intravenous contrast  administration. Lung and soft tissue windows were generated. Coronal and  sagittal images were generated and 3D post processing consisting of coronal  maximum intensity images was performed. CT dose reduction was achieved through  use of a standardized protocol tailored for this examination and automatic  exposure control for dose modulation.     FINDINGS:  The lungs show mild areas of ground glass opacity in the posterior lung bases  dependently with no consolidative infiltrate, nodule, or mass. .     The pulmonary arteries are well enhanced and no pulmonary emboli are identified.     There is no mediastinal or hilar adenopathy or mass. Heart is enlarged with  chamber dilation and no pericardial effusion. The aorta enhances normally  without evidence of aneurysm or dissection.     The visualized portions of the upper abdominal organs are normal with note of  contrast reflux into prominent inferior vena cava and hepatic veins.     IMPRESSION  No pulmonary embolus. Mild patchy groundglass lung opacities  predominantly dependently likely reflect atelectasis or mild edema. Dilated  cardiomegaly with contrast reflux into prominent inferior vena cava and hepatic  veins consistent with elevated right heart pressures and right heart failure    PFTs (Date): 3/18/21     Predicted Measured %   FVC 2.86 2.31 81   FEV1 2.04 1.80 88   FEV1/FVC 71.21 77.75 109   DLCO 17.83 13.01 73<         Carotid Dopplers (Date): VAS/US Results (most recent): Interpretation Summary    · No evidence of right or left carotid artery stenosis. · The right vertebral artery is patent with antegrade flow. · The left vertebral artery is not positively identified - possible occlusion. Cerebrovascular Findings    Right Carotid    There is no stenosis in the right CCA. The right ICA is normal. There is no stenosis in the right ECA. The right vertebral is antegrade. Left Carotid    There is no stenosis in the left CCA. The left ICA is normal. There is no stenosis in the left ECA.  The vertebral is not positively identified - possible occlusion. Carotid Measurements     Right PSV Right EDV Left PSV Left EDV   CCA Prox 102.3 cm/s       12.1 cm/s       125.7 cm/s       11.1 cm/s         CCA Dist 68.8 cm/s       10.6 cm/s       64.1 cm/s       9.1 cm/s         ICA Prox 50.9 cm/s       8.1 cm/s       34.4 cm/s       8 cm/s         ICA Mid 44.3 cm/s       13.6 cm/s       49.3 cm/s       13.7 cm/s         ICA Dist 57 cm/s       17.7 cm/s       43 cm/s       8.2 cm/s         ICA/CCA Ratio 0.8         0.77           ECA 74.6 cm/s       4.8 cm/s       64.1 cm/s       6.92 cm/s         Vertebral 32.2 cm/s       5.91 cm/s       0 cm/s       0 cm/s                                 Procedure Staff    Technologist/Clinician: Ivett Rush  Supporting Staff: None  Performing Physician/Midlevel: None     Exam Completion Date/Time: 3/18/21 12:17 PM   PACS Images     Show images for DUPLEX CAROTID BILATERAL   All Reviewers List    Forrest Clifford NP on 3/22/2021 12:38   Signed    Electronically signed by Ike Aguilar MD on 3/21/21 at [address] [place] [address]     Lower Extremity Dopplers (Date): Interpretation Summary    · No evidence of hemodynamically significant right or left lower extremity arterial obstruction. Lower Extremity Arterial Findings    MONE    Right brachial pressure not obtained due to PICC. MONE is normal on the right and the left. PVR waveforms are normal at the right and left ankle. PPG waveforms are normal at the right and left great toe.    Arterial Pressure Measurements     Right Left   Brachial BP     144 mmHg         Post Tibial  mmHg       170 mmHg         Tibial/DP  mmHg       172 mmHg         MONE 1.17        1.19              Procedure Staff    Technologist/Clinician: Ivett Rush [facility]  Supporting Staff: None  Performing Physician/Midlevel: None     Exam Completion Date/Time: 3/18/21 12:21 PM   Signed    Electronically signed by Ike Aguilar MD on 3/21/21 at 2129 T     [facility] AMYLOID SPECT    Interpretation Summary    Clinical history: Amyloid     Plantar imaging of the chest was performed followed by SPECT imaging in the transverse, sagittal, and coronal planes utilizing  15.9 mCi Tc-99M PYP.      Quantitative: The heart to contralateral lung ratio was 1.144.     Semiquantitative: Visual comparison of the cardiac uptake with the bone and uptake was performed. The visual score is grade 1.     IMPRESSION: PYP scan is equivocal for ATTR cardiac amyloidosis based on the H:CL ratio of 1.144 and semiquantitative score of 1.      Stress Measurements    Exercise Data   Target  bpm           Muse Waveforms     Open stress waveform images   Procedure Staff    Technologist/Clinician: Yang Weaver RT, NM  Supporting Staff: Juan David Geller  Performing Physician/Midlevel: None     Exam Completion Date/Time:         If Indicated: (may be completed prior to [facility]    Mammogram: 4/21/21    Pap Smear: 4/6/21 (see Media)    Colonoscopy:  Colonoscopy Operative Report     Twilla Rinne  [id number]  1950        Procedure Type:   Colonoscopy with polypectomy (snare cautery), polypectomy (cold biopsy)      Indications:    Screening colonoscopy            Pre-operative Diagnosis: see indication above     Post-operative Diagnosis:  See findings below     :  Jeanette Valdivia. Ayaz Hammer MD        Referring Provider:      Quentin Munroe MD        Sedation:  MAC anesthesia Propofol        Procedure Details:  After informed consent was obtained with all risks and benefits of procedure explained and preoperative exam completed, the patient was taken to the endoscopy suite and placed in the left lateral decubitus position. Upon sequential sedation as per above, a digital rectal exam was performed demonstrating internal hemorrhoids.   The Olympus pediatric videocolonoscope  was inserted in the rectum and carefully advanced to the cecum, which was identified by the ileocecal valve and appendiceal orifice. The cecum was identified by the ileocecal valve and appendiceal orifice. The quality of preparation was good. The colonoscope was slowly withdrawn with careful evaluation between folds. Retroflexion in the rectum was completed .      Findings:   Rectum: normal  Sigmoid: two sessile 4-6 mm polyps - removed with hot snare. Descending Colon: three sessile 2-5 mm polyps. Largest was removed with hot snare. Two smaller polyps removed with cold biopsy forceps  Transverse Colon: normal  Ascending Colon: normal  Cecum: normal  Terminal Ileum: not intubated        Specimen Removed: 1. Descending colon polyps, 2. Sigmoid polyps     Complications: None.      EBL:  None.     Impression:   As above     Recommendations:  1. Follow up surgical pathology  2. Repeat colonoscopy in 3-5 years based on pathology results  3. Cardiac regular diet  4. We will sign off. Please call with any questions.     Signed By: Rhonda Polo MD      3/19/2021  11:23 AM                      Electronically signed by Li Odonnell MD at 03/19/21 1125  [facility]- Gastroduodenoscopy (EGD) Procedure Note     [facility]  1950  [id number]        Procedure: Endoscopic Gastroduodenoscopy with biopsy     Indication:  GERD      Pre-operative Diagnosis: see indication above     Post-operative Diagnosis: see findings below     : Rhonda Polo MD     Referring Provider:  Krystyna Trevino MD        Anesthesia/Sedation:  MAC anesthesia Propofol         Procedure Details      After informed consent was obtained for the procedure, with all risks and benefits of procedure explained the patient was taken to the endoscopy suite and placed in the left lateral decubitus position. Following sequential administration of sedation as per above, the endoscope was inserted into the mouth and advanced under direct vision to second portion of the duodenum.   A careful inspection was made as the gastroscope was withdrawn, including a retroflexed view of the proximal stomach; findings and interventions are described below.       Findings:   Esophagus:normal  Stomach: patchy antral erythema - cold biopsied. Duodenum: patchy erythema in the bulb - cold biopsied        Therapies:  As above     Specimens: 1. Duodenum, 2. Gastric         EBL: None      Complications:   None; patient tolerated the procedure well. Impression:    As above     Recommendations:  1. Follow up surgical pathology  2. Proceed to colonoscopy     Signed By: Anna Marie Sin. Katia De La Cruz MD      3/19/2021  11:20 AM             Electronically signed by Isaac Payne MD at 03/19/21 1122      Dental Clearance:  Seen 10/19/2020 - clearance received 3/18/21    CONSULTATIONS: (additional [facility] consults will be done at [place] [id number] Failure Cardiologist (Date): Completed on 3/10/21    Cardiac Surgeon (Date): pending. Social Work (Date): Completed on 3/15/2021    Financial (Date): Completed on 3/15/2021    Nutrition (Date): Completed on 3/11/2021    Sleep Medicine: Reminded again on 4/21/21 to call and schedule appointment.      VAD Education Complete:   Ongoing

## 2021-03-24 NOTE — TELEPHONE ENCOUNTER
Telephone Call RE:  Appointment reminder     Outcome:     [x] Patient confirmed appointment   [] Patient rescheduled appointment for    [] Unable to reach   [] Left message              [] Other:       Stewart Raygoza   Confirmed w/pt. Screened patient for Covid,  Reminder to arrive 15 minutes early for screening and check in.

## 2021-03-25 ENCOUNTER — HOME CARE VISIT (OUTPATIENT)
Dept: HOME HEALTH SERVICES | Facility: HOME HEALTH | Age: 71
End: 2021-03-25
Payer: MEDICARE

## 2021-03-25 ENCOUNTER — TELEPHONE (OUTPATIENT)
Dept: CARDIOLOGY CLINIC | Age: 71
End: 2021-03-25

## 2021-03-25 ENCOUNTER — DOCUMENTATION ONLY (OUTPATIENT)
Dept: CARDIOLOGY CLINIC | Age: 71
End: 2021-03-25

## 2021-03-25 ENCOUNTER — OFFICE VISIT (OUTPATIENT)
Dept: CARDIOLOGY CLINIC | Age: 71
End: 2021-03-25

## 2021-03-25 VITALS
RESPIRATION RATE: 20 BRPM | TEMPERATURE: 96.2 F | BODY MASS INDEX: 30.77 KG/M2 | DIASTOLIC BLOOD PRESSURE: 62 MMHG | WEIGHT: 180.2 LBS | OXYGEN SATURATION: 97 % | HEART RATE: 87 BPM | HEIGHT: 64 IN | SYSTOLIC BLOOD PRESSURE: 152 MMHG

## 2021-03-25 DIAGNOSIS — Z79.899 RECEIVING INOTROPIC MEDICATION: ICD-10-CM

## 2021-03-25 DIAGNOSIS — N18.30 STAGE 3 CHRONIC KIDNEY DISEASE, UNSPECIFIED WHETHER STAGE 3A OR 3B CKD (HCC): ICD-10-CM

## 2021-03-25 DIAGNOSIS — I50.9 CONGESTIVE HEART FAILURE, UNSPECIFIED HF CHRONICITY, UNSPECIFIED HEART FAILURE TYPE (HCC): Primary | ICD-10-CM

## 2021-03-25 DIAGNOSIS — Z79.4 TYPE 2 DIABETES MELLITUS WITHOUT COMPLICATION, WITH LONG-TERM CURRENT USE OF INSULIN (HCC): ICD-10-CM

## 2021-03-25 DIAGNOSIS — E11.9 TYPE 2 DIABETES MELLITUS WITHOUT COMPLICATION, WITH LONG-TERM CURRENT USE OF INSULIN (HCC): ICD-10-CM

## 2021-03-25 DIAGNOSIS — I42.0 DILATED CARDIOMYOPATHY (HCC): ICD-10-CM

## 2021-03-25 NOTE — PROGRESS NOTES
COLLINS met with patient to discuss completion of the [facility] Care Card application, she will fax SW copy of her SS benefit letter. Patient stated she would like to be re taught how to change her IV Inotrope bag and how to work with the pump. COLLINS reached out to patient's [facility], [facility] spoke with Reymundo Gaspar- she emailed the patient's request to the  to put re teach request in for nurse at patient's next home visit. COLLINS will continue to follow.

## 2021-03-25 NOTE — PROGRESS NOTES
[facility] in [facility],  [facility]. Note      Patient name: Hari Winter  Patient : 1950  Patient MRN: [id number]  Date of service: 21      Chief Complaint   Patient presents with    CHF          PLAN:  · Severe decompensated dilated cardiomyopathy, LVEF < 15%, NYHA Class IV- improved on inotropes  · CI > 2.3, LVAD evaluation ordered.  Will plan on discharge on home inotropes for now     Continue dobutamine 7.5 mcg/kg/min  No beta-blockers while on dobutamine gtt  Cont digoxin to 0.25mg daily; (goal 0.7-1.2)  Hold ACEi/ARB/ARNi due to renal function  Cont hydralazine 100 mg PO TID  Cont Imdur 30mg PO BID   Continue bumex 2mg PO daily  Cont Corlanor 2.5mg BID  Continue lower dose of Ditropan   Cont spironolactone 25mg daily  Continue magnesium oxide 800mg twice daily  Hold statin due to elevated tbili and transaminitis  Continue high dose vitamin D weekly  Check genetic testing for cardiomyopathy- pending  PYP equivocal for amyloid and gammopathy- no M spike  Palliative Care consultation appreciated  Danilo Constantino in place  Follow up in 1 week     Needs:  · Vaccinations for flu and COVID are up to date  · Will need to follow up on pneumonia  · Referral to sleep medicine for sleep study  · Monthly TTE to eval for recovery- first ordered   · DEXA scan, mammogram and pap smear, podiatrist- ordered referrals     IMPRESSION:    Acute on chronic systolic heart failure  · Stage D, NYHA class IV symptoms, INTERMACS 2  · Non-ischemic cardiomyopathy, recent deterioration to severe LV dysfunction  · Normal cors (by [facility] 3/11/21)  · LVEF 15-20% (by echo 3/9/21) from 20-25% (by echo 2021) from 51-55% (by echo 2019)  · Severe pulmonary artery hypertension, PVR 4  · RV dysfunction, moderate-severe  · Low cardiac index at rest 1.3  · Inotropic-dependence  Anemia  Hypokalemia/hypomagensemia  Cardiac risk factors  · Morbid obesity (BMI 32)  · High risk for SHARLENE  · DM2 (hgba1c 6.8)  · HL  · HTN  · Former tobacco (stopped 2014 after > 40 years)  · H/o alcohol abuse  OA, right knee  Carpal tunnel syndrome  Gout  Iron deficiency  Vitamin D deficincy     Interval Events  Ms. Jason Georges presents today for hospital follow up accompanied by her . She states that since discharge she is doing much better, she is no longer fatigued or SOB at rest. She does not feel she is holding on to any fluid. She remains on her home Dobutamine infusion and does request some additional education. She is taking all of her medications, no acute HF complaints, PICC line site is intact. She is able to do her own ADLs, appetite is normal and she is sleeping well. HISTORY OF PRESENT ILLNESS:  Matthew Anna a 73 y.o. female with a past medical history of nonischemic hypertensive cardiomyopathy, type 2 diabetes, CKD, hypertension, COPD/reactive airway disease, former smoker who presented to clinic yesterday for her routine visit with Dr. Jayro Ludwig. Armaan Cox was obviously quite dyspneic, moderate distress and subsequently admitted through the ER for decompensated systolic heart failure. Dennys Ramos initial diagnosis of heart failure occurred 2007 she was previously followed by Dr. Nimco Bolivar at [facility] up until 2015 where she changed to Dr. Kay Hurst due to insurance coverage. Noyolagenesis Ramos ejection fraction at [facility] in 2015 was 45-50%, at the time she had been maintained on high-dose diuretics to maintain a euvolemic state, metoprolol 100 mg daily as well as lisinopril 20 mg daily.  Ms. Jason Georges had been doing well on guideline directed medical therapy, she was on significant doses of diuretics to include upwards of 120 mg of furosemide along with 5 mg of metolazone daily. Nba Borrow has been recommended since she had maintained a euvolemic state and essentially near normal ejection fraction that reducing and/or eliminating metolazone due to its potential for renal decline; this conversation occurred with patient by Dr. Jayro Ludwig on 9/17/2020. Ms. Sloane Jeffries underwent a full evaluation for her HF and a VAD/OHT evaluation. She was started on Dobutamine that was up titrated to 7.5mcg/kg/min and discharged home on that dose. CARDIAC IMAGING:  R[facility] 3/17/21  PA 47/20 (29), RA 7, PCWP 10, CI 2.93  RHC (3/11/21) 25, PA 49/27/37, W 34, Allen CI 1.34  Echo (3/9/21) LVEF 15-20%, mild-moderate MR, severely elevated CVP, IVC > 21mm  Echo (1/29/21) LVEF 20-25%, moderately to severely reduced RV function  Echo (11/20/19) LVEF 51-55%  Echo (12/18/18) LVEF 50-55%  Echo (11/23/16) LVEF 45-55%  EKG (3/9/21) ST 103bpm, QRS 84ms  NST (5/2018) no scar or ischemia, LVEF 48%  Normal cors (by [facility] 3/11/21)     PHYSICAL EXAM:  Visit Vitals  BP (!) 152/62 (BP 1 Location: Left arm, BP Patient Position: Sitting, BP Cuff Size: Adult)   Pulse 87   Temp (!) 96.2 °F (35.7 °C) (Oral)   Resp 20   Ht 5' 4\" (1.626 m)   Wt 180 lb 3.2 oz (81.7 kg)   SpO2 97%   BMI 30.93 kg/m²     General: Patient is well developed, well-nourished in no acute distress  HEENT: Normocephalic and atraumatic. No scleral icterus. Pupils are equal, round and reactive to light and accomodation. No conjunctival injection. Oropharynx is clear. Neck: Supple. No evidence of thyroid enlargements or lymphadenopathy. JVD: Cannot be appreciated   Lungs: Breath sounds are equal and clear bilaterally. No wheezes, rhonchi, or rales. Heart: Regular rate and rhythm with normal S1 and S2. No murmurs, gallops or rubs. Abdomen: Soft, no mass or tenderness. No organomegaly or hernia. Bowel sounds present. Genitourinary and rectal: deferred  Extremities: No cyanosis, clubbing, or edema. Neurologic: No focal sensory or motor deficits are noted. Grossly intact. Psychiatric: Awake, alert an doriented x 3. Appropriate mood and affect. Skin: Warm, dry and well perfused. No lesions, nodules or rashes are noted. REVIEW OF SYSTEMS:  General: Denies fever, night sweats.   Ear, nose and throat: Denies difficulty hearing, sinus problems, runny nose, post-nasal drip, ringing in ears, mouth sores, loose teeth, ear pain, nosebleeds, sore throate, facial pain or numbess  Cardiovascular: see above in the interval history  Respiratory: Denies cough, wheezing, sputum production, hemoptysis. Gastrointestinal: Denies heartburn, constipation, intolerance to certain foods, diarrhea, abdominal pain, nausea, vomiting, difficulty swallowing, blood in stool  Kidney and bladder: Denies painful urination, frequent urination, urgency, prostate problems and impotence  Musculoskeletal: Denies joint pain, muscle weakness  Skin and hair: Denies change in existing skin lesions, hair loss or increase, breast changes    PAST MEDICAL HISTORY:  Past Medical History:   Diagnosis Date    Arthritis     Diabetes ([address]     Hypercholesterolemia     Hypertension        PAST SURGICAL HISTORY:  Past Surgical History:   Procedure Laterality Date    COLONOSCOPY N/A 3/19/2021    COLONOS[facility]rios [facility] ENDOSCOPY    HX ORTHOPAEDIC      Arthroscopy right knee       FAMILY HISTORY:  Family History   Problem Relation Age of Onset    Diabetes Mother        SOCIAL HISTORY:  Social History     Socioeconomic History    Marital status:      Spouse name: Not on file    Number of children: 1    Years of education: 15    Highest education level: Not on file   Occupational History    Occupation: retired   Tobacco Use    Smoking status: Former Smoker     Packs/day: 0.25     Years: 20.00     Pack years: 5.00     Quit date: 2014     Years since quittin.2    Smokeless tobacco: Never Used   Substance and Sexual Activity    Alcohol use:  Yes     Alcohol/week: 0.0 standard drinks     Comment: 21-24 Beers per week    Drug use: No    Sexual activity: Yes     Partners: Male       LABORATORY RESULTS:     Labs Latest Ref Rng & Units 3/20/2021 3/19/2021 3/18/2021 3/17/2021 3/16/2021 3/15/2021 3/14/2021   WBC 3.6 - 11.0 K/uL - - - - - - 6.2   RBC 3.80 - 5.20 M/uL - - - - - - 4.09   Hemoglobin 11.5 - 16.0 g/dL - - - - - - 11. 2(L)   Hematocrit 35.0 - 47.0 % - - - - - - 35.9   MCV 80.0 - 99.0 FL - - - - - - 87.8   Platelets 864 - 213 K/uL - - - - - - 248   Lymphocytes 12 - 49 % - - - - - - 8(L)   Monocytes 5 - 13 % - - - - - - 7   Eosinophils 0 - 7 % - - - - - - 3   Basophils 0 - 1 % - - - - - - 0   Albumin 3.5 - 5.0 g/dL 2. 9(L) 3.0(L) 2. 9(L) 2. 9(L) 2. 9(L) 2. 8(L) 2. 9(L)   Calcium 8.5 - 10.1 MG/DL 9.1 9.5 9.2 9.3 9.5 9.1 8.6   Glucose 65 - 100 mg/dL 110(H) 121(H) 159(H) 129(H) 115(H) 107(H) 164(H)   BUN 6 - 20 MG/DL 18 17 26(H) 22(H) 17 16 27(H)   Creatinine 0.55 - 1.02 MG/DL 0.68 0.61 0.91 0.90 0.85 0.81 0.90   Sodium 136 - 145 mmol/L 134(L) 134(L) 133(L) 131(L) 134(L) 136 136   Potassium 3.5 - 5.1 mmol/L 3.7 3.6 4.6 4.8 4.8 4.2 3.6   TSH 0.36 - 3.74 uIU/mL - - - - - - -   LDH 81 - 246 U/L - - 249(H) - - - -   Some recent data might be hidden     Lab Results   Component Value Date/Time    TSH 2.75 03/11/2021 10:31 AM    TSH 1.050 10/08/2020 12:01 PM    TSH 1.200 08/08/2019 02:31 PM    TSH 1.280 08/26/2016 11:48 AM       ALLERGY:  No Known Allergies     CURRENT MEDICATIONS:    Current Outpatient Medications:     metFORMIN (GLUCOPHAGE) 500 mg tablet, Take 500 mg by mouth three (3) times daily (with meals). , Disp: , Rfl:     diclofenac (VOLTAREN) 1 % gel, Apply 2 g to affected area two (2) times daily as needed for Pain. thin layer to joint pain, Disp: , Rfl:     sodium chloride (Normal Saline Flush), 10-20 mL by InterCATHeter route daily. , Disp: , Rfl:     heparin sod,porcine/0.9 % NaCl (HEPARIN FLUSH IV), 3-5 mL by IntraCATHeter route daily. , Disp: , Rfl:     budesonide (PULMICORT) 0.25 mg/2 mL nbsp, 500 mcg by Nebulization route every six (6) hours as needed for Other (wheezing shortness of breath). , Disp: , Rfl:     oxybutynin (DITROPAN) 5 mg tablet, Take 0.5 Tabs by mouth three (3) times daily. , Disp: 90 Tab, Rfl: 11    bumetanide (BUMEX) 2 mg tablet, Take 1 Tab by mouth daily. , Disp: 90 Tab, Rfl: 2    digoxin (LANOXIN) 0.25 mg tablet, Take 1 Tab by mouth daily. , Disp: 90 Tab, Rfl: 1    DOBUTamine (DOBUTREX) 500 mg/250 mL (2,000 mcg/mL) infusion, 622 mcg/min by IntraVENous route continuous. , Disp: 250 mL, Rfl: 0    ergocalciferol (ERGOCALCIFEROL) 1,250 mcg (50,000 unit) capsule, Take 1 Cap by mouth every seven (7) days. , Disp: 21 Cap, Rfl: 0    hydrALAZINE (APRESOLINE) 100 mg tablet, Take 1 Tab by mouth three (3) times daily. , Disp: 240 Tab, Rfl: 2    isosorbide mononitrate ER (IMDUR) 30 mg tablet, Take 1 Tab by mouth two (2) times a day., Disp: 180 Tab, Rfl: 1    ivabradine (CORLANOR) 5 mg tablet, Take 0.5 Tabs by mouth two (2) times daily (with meals). , Disp: 180 Tab, Rfl: 1    magnesium oxide (MAG-OX) 400 mg tablet, Take 2 Tabs by mouth two (2) times a day., Disp: 180 Tab, Rfl: 1    melatonin 3 mg tablet, Take 1 Tab by mouth nightly as needed for Insomnia., Disp: 90 Tab, Rfl: 0    spironolactone (ALDACTONE) 25 mg tablet, Take 1 Tab by mouth daily. , Disp: 90 Tab, Rfl: 1    thiamine HCL (B-1) 100 mg tablet, Take 1 Tab by mouth daily. , Disp: 90 Tab, Rfl: 1    potassium chloride SR (KLOR-CON 10) 10 mEq tablet, Take 10 mEq by mouth Every morning., Disp: , Rfl:     fluticasone propion-salmeteroL (ADVAIR/WIXELA) 250-50 mcg/dose diskus inhaler, Take 2 Puffs by inhalation two (2) times a day., Disp: , Rfl:     fluticasone propionate (FLONASE) 50 mcg/actuation nasal spray, 2 Sprays by Both Nostrils route daily. , Disp: 1 Bottle, Rfl: 0    allopurinoL (ZYLOPRIM) 300 mg tablet, TAKE 1 TABLET BY MOUTH DAILY, Disp: 90 Tab, Rfl: 3    insulin glargine (LANTUS,BASAGLAR) 100 unit/mL (3 mL) inpn, 60 units every morning (Patient taking differently: by SubCUTAneous route.  60 units every morning), Disp: 6 Adjustable Dose Pre-filled Pen Syringe, Rfl: 11    PATIENT CARE TEAM:  Patient Care Team:  Jared Flores MD as PCP - General (Internal Medicine)  Poncho Mas MD as PCP - REHABILITATION [facility] Empaneled Provider  Katarina Murillo MD as Physician (Cardiology)  Antonio Chavez MD as Surgeon (Orthopedic Surgery)  Sonja Cespedes RN as Care Transitions Nurse     Thank you for allowing me to participate in this patient's care.     Chet Oneill NP  [address] [place] [address], [address]  Phone: [phone]

## 2021-03-25 NOTE — PATIENT INSTRUCTIONS
Medication changes:    None today- we will call you if we need you to increase your spironolactone     Please take this to your pharmacy to notify them of the change in medications. Testing Ordered: An order for ECHO has been placed to be done next month- the week of 4/19. You will be receiving an automated call from [facility] to schedule this test. If you are unavailable to receive the call or would like to contact coordination of care yourself you may contact [phone] to schedule. You will need to contact coordination of care yourself if you miss their calls as they will only make 3 attempts to reach you. Other Recommendations:     Please follow up with GYN for pap smear and mammogram- will send referral     Please follow up with podiatry for foot care- will send referral     Please follow up with Sleep Medicine- ordered     Please follow up for DEXA scan- ordered      Ensure your drinking an adequate amount of water with a goal of 6-8 eight ounce glasses (1.5-2 liters) of fluid daily. Your urine should be clear and light yellow straw colored. If your blood pressure begins to consistently run below 90/60 and/or you begin to experience dizziness or lightheadedness, please contact the [employer] at [phone]. Follow up in 1 week with "[employer] 1721      Please monitor your weights daily upon waking and after using the bathroom. Keep a written records of your weights and bring to your next appointment. If you have a weight gain of 3 or more pounds overnight OR 5 or more pounds in one week please contact our office. Thank you for allowing us the privilege of being a part of your healthcare team! Please do not hesitate to contact our office at [phone] with any questions or concerns.        Virtual Heart Failure [address] invites you to learn more about heart failure and to share your questions, ideas, and experiences with others. Each month, the Heart Failure Support Group features a new educational topic and a guest speaker, followed by an interactive discussion. Our Heart Failure Nurse Navigator will moderate each session. You will be able to participate by phone, tablet or computer through [address] [place] [address]. This support group takes place on the 3rd Thursday of each month from 6:00-7:30PM. All individuals living with heart failure and their caregivers are welcome to join. If you are interested in participating, please contact us at [email or web address] and you will be sent the link to join the [employer].

## 2021-03-25 NOTE — PROGRESS NOTES
Pt finds the venous lab draws extremely painful and distressing. She was told her labs could be drawn from her PICC line. May we draw future lab speciments from PICC line?

## 2021-03-25 NOTE — TELEPHONE ENCOUNTER
Sleep Medicine referral faxed to [address] [place] [address] ([phone]). This will be reviewed and they will call patient to schedule her appointment.

## 2021-03-26 ENCOUNTER — HOME CARE VISIT (OUTPATIENT)
Dept: SCHEDULING | Facility: HOME HEALTH | Age: 71
End: 2021-03-26
Payer: MEDICARE

## 2021-03-26 ENCOUNTER — TELEPHONE (OUTPATIENT)
Dept: CARDIOLOGY CLINIC | Age: 71
End: 2021-03-26

## 2021-03-26 ENCOUNTER — TELEPHONE (OUTPATIENT)
Dept: SLEEP MEDICINE | Age: 71
End: 2021-03-26

## 2021-03-26 VITALS
RESPIRATION RATE: 18 BRPM | OXYGEN SATURATION: 95 % | WEIGHT: 178 LBS | HEART RATE: 85 BPM | SYSTOLIC BLOOD PRESSURE: 130 MMHG | TEMPERATURE: 97.8 F | DIASTOLIC BLOOD PRESSURE: 60 MMHG | BODY MASS INDEX: 30.55 KG/M2

## 2021-03-26 DIAGNOSIS — N18.30 STAGE 3 CHRONIC KIDNEY DISEASE, UNSPECIFIED WHETHER STAGE 3A OR 3B CKD (HCC): ICD-10-CM

## 2021-03-26 DIAGNOSIS — E66.9 OBESITY (BMI 30.0-34.9): ICD-10-CM

## 2021-03-26 DIAGNOSIS — F10.10 ALCOHOL ABUSE: ICD-10-CM

## 2021-03-26 DIAGNOSIS — M17.11 PRIMARY OSTEOARTHRITIS OF RIGHT KNEE: ICD-10-CM

## 2021-03-26 DIAGNOSIS — I50.20 SYSTOLIC CONGESTIVE HEART FAILURE, UNSPECIFIED HF CHRONICITY (HCC): Primary | ICD-10-CM

## 2021-03-26 DIAGNOSIS — R93.89 ABNORMAL CAROTID ULTRASOUND: ICD-10-CM

## 2021-03-26 DIAGNOSIS — Z87.891 FORMER TOBACCO USE: ICD-10-CM

## 2021-03-26 DIAGNOSIS — Z01.818 PRE-OP EVALUATION: Primary | ICD-10-CM

## 2021-03-26 DIAGNOSIS — M17.11 OSTEOARTHROSIS, LOCALIZED, PRIMARY, KNEE, RIGHT: ICD-10-CM

## 2021-03-26 LAB
ALBUMIN SERPL-MCNC: 4.1 G/DL (ref 3.8–4.8)
ALBUMIN/GLOB SERPL: 1 {RATIO} (ref 1.2–2.2)
ALP SERPL-CCNC: 80 IU/L (ref 39–117)
ALT SERPL-CCNC: 14 IU/L (ref 0–32)
AST SERPL-CCNC: 21 IU/L (ref 0–40)
BASOPHILS # BLD AUTO: 0 X10E3/UL (ref 0–0.2)
BASOPHILS NFR BLD AUTO: 1 %
BILIRUB SERPL-MCNC: 0.7 MG/DL (ref 0–1.2)
BUN SERPL-MCNC: 27 MG/DL (ref 8–27)
BUN/CREAT SERPL: 26 (ref 12–28)
CALCIUM SERPL-MCNC: 10.1 MG/DL (ref 8.7–10.3)
CHLORIDE SERPL-SCNC: 99 MMOL/L (ref 96–106)
CO2 SERPL-SCNC: 26 MMOL/L (ref 20–29)
CREAT SERPL-MCNC: 1.05 MG/DL (ref 0.57–1)
EOSINOPHIL # BLD AUTO: 0.2 X10E3/UL (ref 0–0.4)
EOSINOPHIL NFR BLD AUTO: 3 %
ERYTHROCYTE [DISTWIDTH] IN BLOOD BY AUTOMATED COUNT: 17.9 % (ref 11.7–15.4)
GLOBULIN SER CALC-MCNC: 4.3 G/DL (ref 1.5–4.5)
GLUCOSE SERPL-MCNC: 79 MG/DL (ref 65–99)
HCT VFR BLD AUTO: 35.8 % (ref 34–46.6)
HGB BLD-MCNC: 11.4 G/DL (ref 11.1–15.9)
IMM GRANULOCYTES # BLD AUTO: 0 X10E3/UL (ref 0–0.1)
IMM GRANULOCYTES NFR BLD AUTO: 0 %
LYMPHOCYTES # BLD AUTO: 0.6 X10E3/UL (ref 0.7–3.1)
LYMPHOCYTES NFR BLD AUTO: 10 %
MCH RBC QN AUTO: 27.7 PG (ref 26.6–33)
MCHC RBC AUTO-ENTMCNC: 31.8 G/DL (ref 31.5–35.7)
MCV RBC AUTO: 87 FL (ref 79–97)
MONOCYTES # BLD AUTO: 0.4 X10E3/UL (ref 0.1–0.9)
MONOCYTES NFR BLD AUTO: 7 %
NEUTROPHILS # BLD AUTO: 4.9 X10E3/UL (ref 1.4–7)
NEUTROPHILS NFR BLD AUTO: 79 %
NT-PROBNP SERPL-MCNC: 279 PG/ML (ref 0–301)
PLATELET # BLD AUTO: 246 X10E3/UL (ref 150–450)
POTASSIUM SERPL-SCNC: 4.1 MMOL/L (ref 3.5–5.2)
PROT SERPL-MCNC: 8.4 G/DL (ref 6–8.5)
RBC # BLD AUTO: 4.12 X10E6/UL (ref 3.77–5.28)
SODIUM SERPL-SCNC: 142 MMOL/L (ref 134–144)
WBC # BLD AUTO: 6.2 X10E3/UL (ref 3.4–10.8)

## 2021-03-26 PROCEDURE — G0299 HHS/HOSPICE OF RN EA 15 MIN: HCPCS

## 2021-03-26 NOTE — TELEPHONE ENCOUNTER
Zeyad Tavarez from [place] [employer] scheduling called. They need a new updated order faxed to them for the bone density order at [phone]. There is an ABN trigger. Her call back is [phone] if questions.

## 2021-03-26 NOTE — TELEPHONE ENCOUNTER
Patient reviewed at Medical Review Board Meeting this date. Per Dr. Jason SINGH patient to have repeat carotid ultrasound. Order placed. Contacted patient to notify. Informed her coordination of care will make three attempts to reach her for scheduling. Number to coordination of care provided to patient and she was educated that should she miss the calls from coordination of care she will need to call to schedule. She verbalized understanding and had no further questions. Grace Gipson RN.

## 2021-03-28 ENCOUNTER — HOME CARE VISIT (OUTPATIENT)
Dept: SCHEDULING | Facility: HOME HEALTH | Age: 71
End: 2021-03-28
Payer: MEDICARE

## 2021-03-28 VITALS
OXYGEN SATURATION: 98 % | SYSTOLIC BLOOD PRESSURE: 150 MMHG | TEMPERATURE: 98 F | RESPIRATION RATE: 16 BRPM | HEART RATE: 76 BPM | DIASTOLIC BLOOD PRESSURE: 78 MMHG

## 2021-03-28 PROCEDURE — G0299 HHS/HOSPICE OF RN EA 15 MIN: HCPCS

## 2021-03-29 ENCOUNTER — TELEPHONE (OUTPATIENT)
Dept: CARDIOLOGY CLINIC | Age: 71
End: 2021-03-29

## 2021-03-29 NOTE — TELEPHONE ENCOUNTER
Called patient back to get her information regarding appointment with Dr. Dagmar Higginbotham on April 6th. Patient has a conflict with appointments. Office number given to her .  Will call call office to move this appointment

## 2021-03-30 ENCOUNTER — HOME CARE VISIT (OUTPATIENT)
Dept: SCHEDULING | Facility: HOME HEALTH | Age: 71
End: 2021-03-30
Payer: MEDICARE

## 2021-03-30 ENCOUNTER — OFFICE VISIT (OUTPATIENT)
Dept: CARDIOLOGY CLINIC | Age: 71
End: 2021-03-30
Payer: MEDICARE

## 2021-03-30 ENCOUNTER — HOME CARE VISIT (OUTPATIENT)
Dept: HOME HEALTH SERVICES | Facility: HOME HEALTH | Age: 71
End: 2021-03-30
Payer: MEDICARE

## 2021-03-30 VITALS
HEART RATE: 98 BPM | RESPIRATION RATE: 18 BRPM | OXYGEN SATURATION: 98 % | WEIGHT: 180 LBS | HEIGHT: 64 IN | BODY MASS INDEX: 30.73 KG/M2 | DIASTOLIC BLOOD PRESSURE: 60 MMHG | SYSTOLIC BLOOD PRESSURE: 130 MMHG

## 2021-03-30 VITALS
SYSTOLIC BLOOD PRESSURE: 138 MMHG | RESPIRATION RATE: 18 BRPM | TEMPERATURE: 98.7 F | DIASTOLIC BLOOD PRESSURE: 72 MMHG | WEIGHT: 177 LBS | OXYGEN SATURATION: 98 % | HEART RATE: 91 BPM | BODY MASS INDEX: 30.38 KG/M2

## 2021-03-30 DIAGNOSIS — Z09 HOSPITAL DISCHARGE FOLLOW-UP: ICD-10-CM

## 2021-03-30 DIAGNOSIS — I42.9 CARDIOMYOPATHY, UNSPECIFIED TYPE (HCC): Primary | ICD-10-CM

## 2021-03-30 PROCEDURE — G8752 SYS BP LESS 140: HCPCS | Performed by: INTERNAL MEDICINE

## 2021-03-30 PROCEDURE — 1101F PT FALLS ASSESS-DOCD LE1/YR: CPT | Performed by: INTERNAL MEDICINE

## 2021-03-30 PROCEDURE — G9899 SCRN MAM PERF RSLTS DOC: HCPCS | Performed by: INTERNAL MEDICINE

## 2021-03-30 PROCEDURE — 1090F PRES/ABSN URINE INCON ASSESS: CPT | Performed by: INTERNAL MEDICINE

## 2021-03-30 PROCEDURE — G0299 HHS/HOSPICE OF RN EA 15 MIN: HCPCS

## 2021-03-30 PROCEDURE — G8417 CALC BMI ABV UP PARAM F/U: HCPCS | Performed by: INTERNAL MEDICINE

## 2021-03-30 PROCEDURE — 3017F COLORECTAL CA SCREEN DOC REV: CPT | Performed by: INTERNAL MEDICINE

## 2021-03-30 PROCEDURE — 93000 ELECTROCARDIOGRAM COMPLETE: CPT | Performed by: INTERNAL MEDICINE

## 2021-03-30 PROCEDURE — G8400 PT W/DXA NO RESULTS DOC: HCPCS | Performed by: INTERNAL MEDICINE

## 2021-03-30 PROCEDURE — G8427 DOCREV CUR MEDS BY ELIG CLIN: HCPCS | Performed by: INTERNAL MEDICINE

## 2021-03-30 PROCEDURE — 1111F DSCHRG MED/CURRENT MED MERGE: CPT | Performed by: INTERNAL MEDICINE

## 2021-03-30 PROCEDURE — G8536 NO DOC ELDER MAL SCRN: HCPCS | Performed by: INTERNAL MEDICINE

## 2021-03-30 PROCEDURE — 99214 OFFICE O/P EST MOD 30 MIN: CPT | Performed by: INTERNAL MEDICINE

## 2021-03-30 PROCEDURE — G8432 DEP SCR NOT DOC, RNG: HCPCS | Performed by: INTERNAL MEDICINE

## 2021-03-30 PROCEDURE — G8754 DIAS BP LESS 90: HCPCS | Performed by: INTERNAL MEDICINE

## 2021-03-30 NOTE — PROGRESS NOTES
Dr. Bronson Kimball. [phone]            Cardiology Consult/Progress Note      Requesting/referring provider: Shazia Grant MD, Dr. Umberto Robles, Dr. Laurance Hatchet    Reason for Consult: CHF    Assessment/Plan:  1. Dilated cardiomyopathy with biventricular dysfunction  2. At least moderate functional mitral regurgitation  3. Type 2 diabetes mellitus  4. Hypertension  5. Hypercholesterolemia  6. Status post ICD placement with  7. Acute on chronic systolic congestive heart failure on dobutamine drip    Ms. Yury Woodruff was seen at the request of Dr. Umberto Robles. She has reported progressive shortness of breath in conjunction with progressive biventricular dysfunction. She has longstanding history of congestive heart failure and now seems to be progressing to advanced stages. She was recently in the hospital with stage D NYHA class IV symptoms and underwent a left and right heart catheterization which demonstrated no significant coronary disease. Initial right heart catheterization to moderate significantly depressed cardiac index with elevated right-sided filling pressures. After a few days of therapy with dobutamine, right-sided filling pressures reduced and cardiac index significantly increased. She is now doing well on 7.5 mics per KG per minute of dobutamine drip in an ambulatory setting. She reports no significant weight gain since discharge and has good exercise tolerance. I would defer the management of ambulatory dobutamine therapy to Dr. Umberto Robles. Her blood pressure appears to be well controlled. She will have a follow-up with Dr. Laurance Hatchet in the future. [x]    High complexity decision making was performed  [x]    Patient is at high-risk of decompensation with multiple organ involvement    Investigations personally reviewed and interpreted    Investigations reviewed    HPI: Lex Cowden, a 73y.o. year-old who presents for evaluation of dyspnea.   Over the past 2 months she has reported increasing shortness of breath. She has prior known history of cardiomyopathy with prior ICD placement. Ejection fraction about 2 years ago was low normal.    Recently noted increasing shortness of breath with systemic congestion. No chest pain was reported. She did not have any prior history of microinfarction. ICD in place. Risk factors for atherotic vascular disease such as hypertension diabetes and hyperlipidemia are present but are controlled. Hence she most likely has nonischemic cardiomyopathy which is advanced/progressed over the last few years. Etiology of cardiomyopathy is uncertain and she is under follow-up with advanced heart failure team for further evaluation. She was just discharged from the hospital and is here for post discharge follow-up. Currently reports no significant pedal edema and exercise to tolerance appears to be preserved. She reports no increasing pedal edema since discharge from the hospital and is tolerating all discharge medications well. Due to CKD she is not on ACE/ARB/Arni. Clearly she is not on a beta-blocker because of advanced LV systolic dysfunction requiring dobutamine therapy she is on hydralazine/Imdur combination which she is tolerating well in addition to digoxin, spironolactone and Bumex 2 mg daily. She may be a candidate for SGLT2 inhibitors but I would defer that to advanced heart failure team/Dr. Soni Zaman. She is originally Dr. Soni Zaman patient with acute follow-up in future. She  has a past medical history of Arthritis, Diabetes ([address] Hypercholesterolemia, and Hypertension. Review of system:Patient reports no  CP. She reports no cough/fever/focal neurological deficits/abdominal pain. All other systems negative except as above.    Family History   Problem Relation Age of Onset    Diabetes Mother       Social History     Socioeconomic History    Marital status:      Spouse name: Not on file    Number of children: 3    Years of education: 15    Highest education level: Not on file   Occupational History    Occupation: retired   Tobacco Use    Smoking status: Former Smoker     Packs/day: 0.25     Years: 20.00     Pack years: 5.00     Quit date: 2014     Years since quittin.3    Smokeless tobacco: Never Used   Substance and Sexual Activity    Alcohol use: Yes     Alcohol/week: 0.0 standard drinks     Comment: 21-24 Beers per week    Drug use: No    Sexual activity: Yes     Partners: Male      PE  There were no vitals filed for this visit. There is no height or weight on file to calculate BMI. General:    Alert, cooperative, no distress. JVD is elevated. Psychiatric:    Normal Mood and affect    Eye/ENT:      Pupils equal, No asymmetry, Conjunctival pink. Able to hear voice at normal amplitude   Lungs:      Visibly symmetric chest expansion, few crackles bilaterally. [facility] Heart[de-identified]    Regular rate and rhythm, S1, S2 normal, LV S3 is heard. No murmur, click, rub or gallop. Normal palpable peripheral pulses. No cyanosis   Abdomen:     Soft, non-tender. Bowel sounds normal. No masses,  No      organomegaly. Extremities:   Extremities normal, atraumatic, mild bilateral edema. Neurologic:   CN II-XII grossly intact.  No gross focal deficits           Recent Labs:  Lab Results   Component Value Date/Time    Cholesterol, total 169 2021 05:31 PM    HDL Cholesterol 70 2021 05:31 PM    LDL, calculated 84.6 2021 05:31 PM    Triglyceride 72 2021 05:31 PM    CHOL/HDL Ratio 2.4 2021 05:31 PM     Lab Results   Component Value Date/Time    Creatinine 1.05 (H) 2021 01:30 PM     Lab Results   Component Value Date/Time    BUN 27 2021 01:30 PM     Lab Results   Component Value Date/Time    Potassium 4.1 2021 01:30 PM     Lab Results   Component Value Date/Time    Hemoglobin A1c 7.6 (H) 2021 01:52 AM     Lab Results   Component Value Date/Time    HGB 11.4 2021 01:30 PM     Lab Results   Component Value Date/Time    PLATELET 057  01:30 PM       Reviewed:  Past Medical History:   Diagnosis Date    Arthritis     Diabetes ([address]     Hypercholesterolemia     Hypertension      Social History     Tobacco Use   Smoking Status Former Smoker    Packs/day: 0.25    Years: 20.00    Pack years: 5.00    Quit date: 2014    Years since quittin.3   Smokeless Tobacco Never Used     Social History     Substance and Sexual Activity   Alcohol Use Yes    Alcohol/week: 0.0 standard drinks    Comment: 21-24 Beers per week     No Known Allergies  Family History   Problem Relation Age of Onset    Diabetes Mother         Current Outpatient Medications   Medication Sig    metFORMIN (GLUCOPHAGE) 500 mg tablet Take 500 mg by mouth three (3) times daily (with meals).  diclofenac (VOLTAREN) 1 % gel Apply 2 g to affected area two (2) times daily as needed for Pain. thin layer to joint pain    sodium chloride (Normal Saline Flush) 10-20 mL by InterCATHeter route daily.  heparin sod,porcine/0.9 % NaCl (HEPARIN FLUSH IV) 3-5 mL by IntraCATHeter route daily.  budesonide (PULMICORT) 0.25 mg/2 mL nbsp 500 mcg by Nebulization route every six (6) hours as needed for Other (wheezing shortness of breath).  oxybutynin (DITROPAN) 5 mg tablet Take 0.5 Tabs by mouth three (3) times daily.  bumetanide (BUMEX) 2 mg tablet Take 1 Tab by mouth daily.  digoxin (LANOXIN) 0.25 mg tablet Take 1 Tab by mouth daily.  DOBUTamine (DOBUTREX) 500 mg/250 mL (2,000 mcg/mL) infusion 622 mcg/min by IntraVENous route continuous.  ergocalciferol (ERGOCALCIFEROL) 1,250 mcg (50,000 unit) capsule Take 1 Cap by mouth every seven (7) days.  hydrALAZINE (APRESOLINE) 100 mg tablet Take 1 Tab by mouth three (3) times daily.  isosorbide mononitrate ER (IMDUR) 30 mg tablet Take 1 Tab by mouth two (2) times a day.     ivabradine (CORLANOR) 5 mg tablet Take 0.5 Tabs by mouth two (2) times daily (with meals).  magnesium oxide (MAG-OX) 400 mg tablet Take 2 Tabs by mouth two (2) times a day.  melatonin 3 mg tablet Take 1 Tab by mouth nightly as needed for Insomnia.  spironolactone (ALDACTONE) 25 mg tablet Take 1 Tab by mouth daily.  thiamine HCL (B-1) 100 mg tablet Take 1 Tab by mouth daily.  potassium chloride SR (KLOR-CON 10) 10 mEq tablet Take 10 mEq by mouth Every morning.  fluticasone propion-salmeteroL (ADVAIR/WIXELA) 250-50 mcg/dose diskus inhaler Take 2 Puffs by inhalation two (2) times a day.  fluticasone propionate (FLONASE) 50 mcg/actuation nasal spray 2 Sprays by Both Nostrils route daily.  allopurinoL (ZYLOPRIM) 300 mg tablet TAKE 1 TABLET BY MOUTH DAILY    insulin glargine (LANTUS,BASAGLAR) 100 unit/mL (3 mL) inpn 60 units every morning (Patient taking differently: by SubCUTAneous route. 60 units every morning)     No current facility-administered medications for this visit. Eduardo Marks MD03/30/21     ATTENTION:   This medical record was transcribed using an electronic medical records/speech recognition system. Although proofread, it may and can contain electronic, spelling and other errors. Corrections may be executed at a later time. Please feel free to contact us for any clarifications as needed.     [facility] and [facility] [place]  [facility], [address] [place], [place]. [phone]

## 2021-03-30 NOTE — LETTER
3/30/2021 Patient: Dmitry Arango YOB: 1950 Date of Visit: 3/30/2021 Elvis Finch MD 
[facility] [address] [facility] [address] [id number] [address] [employer] Dear Elvis Finch MD, Thank you for referring Ms. Manan Crystal to [facility] for evaluation. My notes for this consultation are attached. If you have questions, please do not hesitate to call me. I look forward to following your patient along with you.  
 
 
Sincerely, 
 
Salomon Quevedo MD

## 2021-03-31 ENCOUNTER — OFFICE VISIT (OUTPATIENT)
Dept: INTERNAL MEDICINE CLINIC | Age: 71
End: 2021-03-31
Payer: MEDICARE

## 2021-03-31 VITALS
HEIGHT: 64 IN | WEIGHT: 179.9 LBS | OXYGEN SATURATION: 96 % | SYSTOLIC BLOOD PRESSURE: 138 MMHG | RESPIRATION RATE: 16 BRPM | BODY MASS INDEX: 30.71 KG/M2 | DIASTOLIC BLOOD PRESSURE: 69 MMHG | HEART RATE: 85 BPM | TEMPERATURE: 98.2 F

## 2021-03-31 DIAGNOSIS — E78.5 DYSLIPIDEMIA: ICD-10-CM

## 2021-03-31 DIAGNOSIS — N18.31 STAGE 3A CHRONIC KIDNEY DISEASE (HCC): ICD-10-CM

## 2021-03-31 DIAGNOSIS — E11.65 UNCONTROLLED TYPE 2 DIABETES MELLITUS WITH HYPERGLYCEMIA (HCC): ICD-10-CM

## 2021-03-31 DIAGNOSIS — I42.8 NICM (NONISCHEMIC CARDIOMYOPATHY) (HCC): Primary | ICD-10-CM

## 2021-03-31 DIAGNOSIS — M17.11 PRIMARY OSTEOARTHRITIS OF RIGHT KNEE: ICD-10-CM

## 2021-03-31 DIAGNOSIS — I50.23 ACUTE ON CHRONIC SYSTOLIC CONGESTIVE HEART FAILURE, NYHA CLASS 3 (HCC): ICD-10-CM

## 2021-03-31 PROCEDURE — G8432 DEP SCR NOT DOC, RNG: HCPCS | Performed by: INTERNAL MEDICINE

## 2021-03-31 PROCEDURE — 1111F DSCHRG MED/CURRENT MED MERGE: CPT | Performed by: INTERNAL MEDICINE

## 2021-03-31 PROCEDURE — 99215 OFFICE O/P EST HI 40 MIN: CPT | Performed by: INTERNAL MEDICINE

## 2021-03-31 PROCEDURE — G9899 SCRN MAM PERF RSLTS DOC: HCPCS | Performed by: INTERNAL MEDICINE

## 2021-03-31 PROCEDURE — G8536 NO DOC ELDER MAL SCRN: HCPCS | Performed by: INTERNAL MEDICINE

## 2021-03-31 PROCEDURE — G8754 DIAS BP LESS 90: HCPCS | Performed by: INTERNAL MEDICINE

## 2021-03-31 PROCEDURE — G8399 PT W/DXA RESULTS DOCUMENT: HCPCS | Performed by: INTERNAL MEDICINE

## 2021-03-31 PROCEDURE — 2022F DILAT RTA XM EVC RTNOPTHY: CPT | Performed by: INTERNAL MEDICINE

## 2021-03-31 PROCEDURE — 3017F COLORECTAL CA SCREEN DOC REV: CPT | Performed by: INTERNAL MEDICINE

## 2021-03-31 PROCEDURE — 1090F PRES/ABSN URINE INCON ASSESS: CPT | Performed by: INTERNAL MEDICINE

## 2021-03-31 PROCEDURE — G8427 DOCREV CUR MEDS BY ELIG CLIN: HCPCS | Performed by: INTERNAL MEDICINE

## 2021-03-31 PROCEDURE — G8752 SYS BP LESS 140: HCPCS | Performed by: INTERNAL MEDICINE

## 2021-03-31 PROCEDURE — 1101F PT FALLS ASSESS-DOCD LE1/YR: CPT | Performed by: INTERNAL MEDICINE

## 2021-03-31 PROCEDURE — G8417 CALC BMI ABV UP PARAM F/U: HCPCS | Performed by: INTERNAL MEDICINE

## 2021-03-31 PROCEDURE — 3051F HG A1C>EQUAL 7.0%<8.0%: CPT | Performed by: INTERNAL MEDICINE

## 2021-03-31 NOTE — PROGRESS NOTES
Mae Smith is a 73 y.o. female     Chief Complaint   Patient presents with   [facility] Follow Up     Patient admitted into [facility] on 3/10/2021    CHF     1. Have you been to the ER, urgent care clinic since your last visit? Hospitalized since your last visit? Yes When: Patient admitted into [facility] on 3/10/2021    2. Have you seen or consulted any other health care providers outside of the [address] [place] [address] since your last visit? Include any pap smears or colon screening.  No     Visit Vitals  Ht 5' 4\" (1.626 m)   Wt 179 lb 14.4 oz (81.6 kg)   BMI 30.88 kg/m²

## 2021-04-01 ENCOUNTER — TELEPHONE (OUTPATIENT)
Dept: CARDIOLOGY CLINIC | Age: 71
End: 2021-04-01

## 2021-04-01 ENCOUNTER — HOME CARE VISIT (OUTPATIENT)
Dept: SCHEDULING | Facility: HOME HEALTH | Age: 71
End: 2021-04-01
Payer: MEDICARE

## 2021-04-01 ENCOUNTER — OFFICE VISIT (OUTPATIENT)
Dept: CARDIOLOGY CLINIC | Age: 71
End: 2021-04-01
Payer: MEDICARE

## 2021-04-01 VITALS
HEIGHT: 64 IN | RESPIRATION RATE: 16 BRPM | OXYGEN SATURATION: 95 % | WEIGHT: 178.2 LBS | SYSTOLIC BLOOD PRESSURE: 152 MMHG | HEART RATE: 77 BPM | DIASTOLIC BLOOD PRESSURE: 68 MMHG | TEMPERATURE: 97.7 F | BODY MASS INDEX: 30.42 KG/M2

## 2021-04-01 VITALS
WEIGHT: 177 LBS | HEART RATE: 99 BPM | RESPIRATION RATE: 18 BRPM | SYSTOLIC BLOOD PRESSURE: 130 MMHG | OXYGEN SATURATION: 97 % | TEMPERATURE: 97 F | DIASTOLIC BLOOD PRESSURE: 60 MMHG | BODY MASS INDEX: 30.38 KG/M2

## 2021-04-01 DIAGNOSIS — I50.20 NYHA CLASS 4 HEART FAILURE WITH REDUCED EJECTION FRACTION (HCC): Primary | ICD-10-CM

## 2021-04-01 DIAGNOSIS — E66.9 OBESITY (BMI 30.0-34.9): ICD-10-CM

## 2021-04-01 DIAGNOSIS — Z79.899 RECEIVING INOTROPIC MEDICATION: ICD-10-CM

## 2021-04-01 DIAGNOSIS — I42.8 NICM (NONISCHEMIC CARDIOMYOPATHY) (HCC): ICD-10-CM

## 2021-04-01 DIAGNOSIS — I10 ESSENTIAL HYPERTENSION: ICD-10-CM

## 2021-04-01 DIAGNOSIS — E78.5 DYSLIPIDEMIA: ICD-10-CM

## 2021-04-01 PROCEDURE — 3017F COLORECTAL CA SCREEN DOC REV: CPT | Performed by: NURSE PRACTITIONER

## 2021-04-01 PROCEDURE — G8432 DEP SCR NOT DOC, RNG: HCPCS | Performed by: NURSE PRACTITIONER

## 2021-04-01 PROCEDURE — G0299 HHS/HOSPICE OF RN EA 15 MIN: HCPCS

## 2021-04-01 PROCEDURE — G8427 DOCREV CUR MEDS BY ELIG CLIN: HCPCS | Performed by: NURSE PRACTITIONER

## 2021-04-01 PROCEDURE — G8536 NO DOC ELDER MAL SCRN: HCPCS | Performed by: NURSE PRACTITIONER

## 2021-04-01 PROCEDURE — G8400 PT W/DXA NO RESULTS DOC: HCPCS | Performed by: NURSE PRACTITIONER

## 2021-04-01 PROCEDURE — 1090F PRES/ABSN URINE INCON ASSESS: CPT | Performed by: NURSE PRACTITIONER

## 2021-04-01 PROCEDURE — 99215 OFFICE O/P EST HI 40 MIN: CPT | Performed by: NURSE PRACTITIONER

## 2021-04-01 PROCEDURE — G9899 SCRN MAM PERF RSLTS DOC: HCPCS | Performed by: NURSE PRACTITIONER

## 2021-04-01 PROCEDURE — 1111F DSCHRG MED/CURRENT MED MERGE: CPT | Performed by: NURSE PRACTITIONER

## 2021-04-01 PROCEDURE — G8752 SYS BP LESS 140: HCPCS | Performed by: NURSE PRACTITIONER

## 2021-04-01 PROCEDURE — G8417 CALC BMI ABV UP PARAM F/U: HCPCS | Performed by: NURSE PRACTITIONER

## 2021-04-01 PROCEDURE — 1101F PT FALLS ASSESS-DOCD LE1/YR: CPT | Performed by: NURSE PRACTITIONER

## 2021-04-01 PROCEDURE — G8754 DIAS BP LESS 90: HCPCS | Performed by: NURSE PRACTITIONER

## 2021-04-01 RX ORDER — ISOSORBIDE MONONITRATE 30 MG/1
60 TABLET, EXTENDED RELEASE ORAL 2 TIMES DAILY
Qty: 180 TAB | Refills: 1 | Status: SHIPPED | OUTPATIENT
Start: 2021-04-01 | End: 2021-04-01

## 2021-04-01 RX ORDER — ISOSORBIDE MONONITRATE 30 MG/1
30 TABLET, EXTENDED RELEASE ORAL 2 TIMES DAILY
Qty: 180 TAB | Refills: 1 | Status: SHIPPED | OUTPATIENT
Start: 2021-04-01 | End: 2021-04-02 | Stop reason: DRUGHIGH

## 2021-04-01 NOTE — PATIENT INSTRUCTIONS
Medication changes:    None- call [facility] after take medications with BP reading today    Testing Ordered:    Continue weekly labs with home health    Other Recommendations:      Ensure your drinking an adequate amount of water with a goal of 6-8 eight ounce glasses (1.5-2 liters) of fluid daily. Your urine should be clear and light yellow straw colored. Check Blood pressure in morning prior to morning medications and 2 hours after morning medications. If your blood pressure begins to consistently run below 90/60 and/or you begin to experience dizziness or lightheadedness, please contact the [facility] at [phone]. Follow up 2 weeks with [facility]      Please monitor your weights daily upon waking and after using the bathroom. Keep a written records of your weights and bring to your next appointment. If you have a weight gain of 3 or more pounds overnight OR 5 or more pounds in one week please contact our office. Thank you for allowing us the privilege of being a part of your healthcare team! Please do not hesitate to contact our office at [phone] with any questions or concerns. Virtual Heart Failure [address] invites you to learn more about heart failure and to share your questions, ideas, and experiences with others. Each month, the Heart Failure Support Group features a new educational topic and a guest speaker, followed by an interactive discussion. Our Heart Failure Nurse Navigator will moderate each session. You will be able to participate by phone, tablet or computer through [address] [place] [address]. This support group takes place on the 3rd Thursday of each month from 6:00-7:30PM. All individuals living with heart failure and their caregivers are welcome to join. If you are interested in participating, please contact us at [email or web address] and you will be sent the link to join the [employer].

## 2021-04-01 NOTE — TELEPHONE ENCOUNTER
Returned call to patient who states she took hydralazine and oxybutynin at 1:15 pm and her blood pressure now is 130/60.

## 2021-04-01 NOTE — TELEPHONE ENCOUNTER
Placed order for digoxin lab to be drawn with next set of labs. Emailed Carlyle Carreon with [address] advising of same.  Alfie Villafana RN

## 2021-04-01 NOTE — PROGRESS NOTES
[facility] in [facility],  [facility]. Note      Patient name: Coni Silverio  Patient : 1950  Patient MRN: [id number]  Date of service: 21      Chief Complaint   Patient presents with    CHF          PLAN:  · Severe decompensated dilated cardiomyopathy, LVEF < 15%, NYHA Class IV- improved on inotropes  CI > 2.3, LVAD evaluation ordered.      Continue dobutamine 7.5 mcg/kg/min  No beta-blockers while on dobutamine gtt  Cont digoxin to 0.25mg daily; (goal 0.7-1.2)  Hold ACEi/ARB/ARNi due to renal function  Cont hydralazine 100 mg PO TID  Cont Imdur 30mg PO BID - may increase for HTN   Continue bumex 2mg PO daily  Cont Corlanor 2.5mg BID  Continue lower dose of Ditropan   Cont spironolactone 25mg daily  Continue magnesium oxide 800mg twice daily  Hold statin due to elevated tbili and transaminitis  Continue high dose vitamin D weekly  Continue weekly labs with Home Health   Daily weights  Daily BP monitoring, record and bring to next clinic visit  Low Na+ diet  Fluid restriction 6x8oz daily   Check genetic testing for cardiomyopathy- pending  PYP equivocal for amyloid and gammopathy- no M spike  Palliative Care consultation appreciated  Michael Tse in place  Follow up in 2-3 weeks    Needs:  · Vaccinations for flu and COVID are up to date?    · Will need to follow up on pneumonia  · Referral to sleep medicine for sleep study - ordered (3/25/21)  · Monthly TTE to eval for recovery- first ordered (21)  · DEXA scan (21)  · Carotid dopplers (21)    · GYN appointment on 21) re: mammogram and pap smear   · Podiatrist re: DM2 evaluation - ordered (3/25/21)      IMPRESSION:    Acute on chronic systolic heart failure  · Stage D, NYHA class IV symptoms, INTERMACS 2  · Non-ischemic cardiomyopathy, recent deterioration to severe LV dysfunction  · Normal cors (by [facility] 3/11/21)  · LVEF 15-20% (by echo 3/9/21) from 20-25% (by echo 2021) from 51-55% (by echo 11/2019)  · Severe pulmonary artery hypertension, PVR 4  · RV dysfunction, moderate-severe  · Low cardiac index at rest 1.3  · Inotropic-dependence  Anemia  Hypokalemia/hypomagensemia  Cardiac risk factors  · Morbid obesity (BMI 32)  · High risk for SHARLENE  · DM2 (hgba1c 6.8)  · HL  · HTN  · Former tobacco (stopped 2014 after > 40 years)  · H/o alcohol abuse  OA, right knee  Carpal tunnel syndrome  Gout  Iron deficiency  Vitamin D deficincy     Interval Events  Ms. Bianca Price presents today for hospital follow up accompanied by her . She states that since discharge she is doing much better, she is no longer fatigued or SOB at rest. She does not feel she is holding on to any fluid. She remains on her home Dobutamine infusion and does request some additional education. She is taking all of her medications, no acute HF complaints, PICC line site is intact. She is able to do her own ADLs, appetite is normal and she is sleeping well. HISTORY OF PRESENT ILLNESS:  Yemi Betancourt a 73 y.o. female with a past medical history of nonischemic hypertensive cardiomyopathy, type 2 diabetes, CKD, hypertension, COPD/reactive airway disease, former smoker who presented to clinic yesterday for her routine visit with Dr. Krystal Guzman. Tiana Fried was obviously quite dyspneic, moderate distress and subsequently admitted through the ER for decompensated systolic heart failure. Severino Ng initial diagnosis of heart failure occurred 2007 she was previously followed by Dr. Josh Zavala at [facility] up until 2015 where she changed to Dr. Meg Tracy due to insurance coverage. Severino Hernandez ejection fraction at [facility] in 2015 was 45-50%, at the time she had been maintained on high-dose diuretics to maintain a euvolemic state, metoprolol 100 mg daily as well as lisinopril 20 mg daily.  Ms. Bianca Price had been doing well on guideline directed medical therapy, she was on significant doses of diuretics to include upwards of 120 mg of furosemide along with 5 mg of metolazone daily.  It has been recommended since she had maintained a euvolemic state and essentially near normal ejection fraction that reducing and/or eliminating metolazone due to its potential for renal decline; this conversation occurred with patient by Dr. Halima Moyer on 9/17/2020. Ms. Marcos Gifford underwent a full evaluation for her HF and a VAD/OHT evaluation. She was started on Dobutamine that was up titrated to 7.5mcg/kg/min and discharged home on that dose. Today Ms. Marcos Gifford presents for HFrEF follow up. Overall she is doing well, tolerating the dobutamine infusion and no issues with her PICC line. She denies and PARDO, CP, dizziness, lightheadedness, presyncope, syncope, orthopnea, PND, nocturia, or Life Vest alarms/shocks. Reports occasional palipitations at rest.  Denies changes in appetite or bowel function. weight is stable at 177lbs. BP is elevated today at 152/68mmHg, she has not had her afternoon medications. She has multiple appointments coming up next week with GYN, Cardiology, dexa scan, and f/u ECHO. CARDIAC IMAGING:  [facility] 3/17/21  PA 47/20 (29), RA 7, PCWP 10, CI 2.93  [facility] (3/11/21) 25, PA 49/27/37, W 34, Allen CI 1.34  Echo (3/9/21) LVEF 15-20%, mild-moderate MR, severely elevated CVP, IVC > 21mm  Echo (1/29/21) LVEF 20-25%, moderately to severely reduced RV function  Echo (11/20/19) LVEF 51-55%  Echo (12/18/18) LVEF 50-55%  Echo (11/23/16) LVEF 45-55%  EKG (3/9/21) ST 103bpm, QRS 84ms  NST (5/2018) no scar or ischemia, LVEF 48%  Normal cors (by [facility] 3/11/21)     PHYSICAL EXAM:  Visit Vitals  BP (!) 152/68 (BP 1 Location: Left arm, BP Patient Position: Sitting, BP Cuff Size: Adult)   Pulse 77   Temp 97.7 °F (36.5 °C)   Resp 16   Ht 5' 4\" (1.626 m)   Wt 178 lb 3.2 oz (80.8 kg)   SpO2 95%   BMI 30.59 kg/m²     General: Patient is well developed, well-nourished in no acute distress  HEENT: Normocephalic and atraumatic. No scleral icterus.  Pupils are equal, round and reactive to light and accomodation. No conjunctival injection. Oropharynx is clear. Neck: Supple. No evidence of thyroid enlargements or lymphadenopathy. JVD: Cannot be appreciated   Lungs: Breath sounds are equal and clear bilaterally. No wheezes, rhonchi, or rales. Heart: Regular rate and rhythm with normal S1 and S2. No murmurs, gallops or rubs. +LifeVest  Abdomen: Soft, no mass or tenderness. No organomegaly or hernia. Bowel sounds present. Genitourinary and rectal: deferred  Extremities: No cyanosis, clubbing, or edema. Neurologic: No focal sensory or motor deficits are noted. Grossly intact. Psychiatric: Awake, alert and oriented x 3. Appropriate mood and affect. Skin: Warm, dry and well perfused. No lesions, nodules or rashes are noted. REVIEW OF SYSTEMS:  General: Denies fever, night sweats. Ear, nose and throat: Denies difficulty hearing, sinus problems, runny nose, post-nasal drip, ringing in ears, mouth sores, loose teeth, ear pain, nosebleeds, sore throate, facial pain or numbess  Cardiovascular: see above in the interval history  Respiratory: Denies cough, wheezing, sputum production, hemoptysis.    Gastrointestinal: Denies heartburn, constipation, intolerance to certain foods, diarrhea, abdominal pain, nausea, vomiting, difficulty swallowing, blood in stool  Kidney and bladder: Denies painful urination, frequent urination, urgency  Musculoskeletal: Denies joint pain, muscle weakness  Skin and hair: Denies change in existing skin lesions, hair loss or increase, breast changes    PAST MEDICAL HISTORY:  Past Medical History:   Diagnosis Date    Arthritis     Diabetes ([address]     Hypercholesterolemia     Hypertension        PAST SURGICAL HISTORY:  Past Surgical History:   Procedure Laterality Date    COLONOSCOPY N/A 3/19/2021    COLONOSCOPY performed by Beryle Melbourne., MD at [facility] ENDOSCOPY    HX ORTHOPAEDIC      Arthroscopy right knee       FAMILY HISTORY:  Family History   Problem Relation Age of Onset    Diabetes Mother        SOCIAL HISTORY:  Social History     Socioeconomic History    Marital status:      Spouse name: Not on file    Number of children: 1    Years of education: 15    Highest education level: Not on file   Occupational History    Occupation: retired   Tobacco Use    Smoking status: Former Smoker     Packs/day: 0.25     Years: 20.00     Pack years: 5.00     Quit date: 2014     Years since quittin.3    Smokeless tobacco: Never Used   Substance and Sexual Activity    Alcohol use: Yes     Alcohol/week: 0.0 standard drinks     Comment: 21-24 Beers per week    Drug use: No    Sexual activity: Yes     Partners: Male       LABORATORY RESULTS:     Labs Latest Ref Rng & Units 3/24/2021 3/20/2021 3/19/2021 3/18/2021 3/17/2021 3/16/2021 3/15/2021   WBC 3.4 - 10.8 x10E3/uL 6.2 - - - - - -   RBC 3.77 - 5.28 x10E6/uL 4.12 - - - - - -   Hemoglobin 11.1 - 15.9 g/dL 11.4 - - - - - -   Hematocrit 34.0 - 46.6 % 35.8 - - - - - -   MCV 79 - 97 fL 87 - - - - - -   Platelets 412 - 566 Q50E1/ - - - - - -   Lymphocytes 12 - 49 % - - - - - - -   Monocytes Not Estab. % 7 - - - - - -   Eosinophils Not Estab. % 3 - - - - - -   Basophils Not Estab. % 1 - - - - - -   Albumin 3.8 - 4.8 g/dL 4.1 2.9(L) 3.0(L) 2. 9(L) 2. 9(L) 2. 9(L) 2. 8(L)   Calcium 8.7 - 10.3 mg/dL 10.1 9.1 9.5 9.2 9.3 9.5 9.1   Glucose 65 - 99 mg/dL 79 110(H) 121(H) 159(H) 129(H) 115(H) 107(H)   BUN 8 - 27 mg/dL 27 18 17 26(H) 22(H) 17 16   Creatinine 0.57 - 1.00 mg/dL 1.05(H) 0.68 0.61 0.91 0.90 0.85 0.81   Sodium 134 - 144 mmol/L 142 134(L) 134(L) 133(L) 131(L) 134(L) 136   Potassium 3.5 - 5.2 mmol/L 4.1 3.7 3.6 4.6 4.8 4.8 4.2   TSH 0.36 - 3.74 uIU/mL - - - - - - -   LDH 81 - 246 U/L - - - 249(H) - - -   Some recent data might be hidden     Lab Results   Component Value Date/Time    TSH 2.75 2021 10:31 AM    TSH 1.050 10/08/2020 12:01 PM    TSH 1.200 2019 02:31 PM    TSH 1.280 2016 11:48 AM       ALLERGY:  No Known Allergies     CURRENT MEDICATIONS:    Current Outpatient Medications:     diclofenac (VOLTAREN) 1 % gel, Apply 2 g to affected area two (2) times daily as needed for Pain. thin layer to joint pain, Disp: , Rfl:     sodium chloride (Normal Saline Flush), 10-20 mL by InterCATHeter route daily. , Disp: , Rfl:     heparin sod,porcine/0.9 % NaCl (HEPARIN FLUSH IV), 3-5 mL by IntraCATHeter route daily. , Disp: , Rfl:     budesonide (PULMICORT) 0.25 mg/2 mL nbsp, 500 mcg by Nebulization route every six (6) hours as needed for Other (wheezing shortness of breath). , Disp: , Rfl:     oxybutynin (DITROPAN) 5 mg tablet, Take 0.5 Tabs by mouth three (3) times daily. , Disp: 90 Tab, Rfl: 11    bumetanide (BUMEX) 2 mg tablet, Take 1 Tab by mouth daily. , Disp: 90 Tab, Rfl: 2    digoxin (LANOXIN) 0.25 mg tablet, Take 1 Tab by mouth daily. , Disp: 90 Tab, Rfl: 1    DOBUTamine (DOBUTREX) 500 mg/250 mL (2,000 mcg/mL) infusion, 622 mcg/min by IntraVENous route continuous. (Patient taking differently: 7.5 mcg/kg/min by IntraVENous route continuous. Based on 86.6 kg (4/1/21)), Disp: 250 mL, Rfl: 0    ergocalciferol (ERGOCALCIFEROL) 1,250 mcg (50,000 unit) capsule, Take 1 Cap by mouth every seven (7) days. , Disp: 21 Cap, Rfl: 0    hydrALAZINE (APRESOLINE) 100 mg tablet, Take 1 Tab by mouth three (3) times daily. , Disp: 240 Tab, Rfl: 2    isosorbide mononitrate ER (IMDUR) 30 mg tablet, Take 1 Tab by mouth two (2) times a day., Disp: 180 Tab, Rfl: 1    ivabradine (CORLANOR) 5 mg tablet, Take 0.5 Tabs by mouth two (2) times daily (with meals). , Disp: 180 Tab, Rfl: 1    magnesium oxide (MAG-OX) 400 mg tablet, Take 2 Tabs by mouth two (2) times a day., Disp: 180 Tab, Rfl: 1    melatonin 3 mg tablet, Take 1 Tab by mouth nightly as needed for Insomnia., Disp: 90 Tab, Rfl: 0    spironolactone (ALDACTONE) 25 mg tablet, Take 1 Tab by mouth daily. , Disp: 90 Tab, Rfl: 1    thiamine HCL (B-1) 100 mg tablet, Take 1 Tab by mouth daily. , Disp: 90 Tab, Rfl: 1    potassium chloride SR (KLOR-CON 10) 10 mEq tablet, Take 10 mEq by mouth Every morning., Disp: , Rfl:     fluticasone propion-salmeteroL (ADVAIR/WIXELA) 250-50 mcg/dose diskus inhaler, Take 2 Puffs by inhalation two (2) times a day., Disp: , Rfl:     fluticasone propionate (FLONASE) 50 mcg/actuation nasal spray, 2 Sprays by Both Nostrils route daily. , Disp: 1 Bottle, Rfl: 0    allopurinoL (ZYLOPRIM) 300 mg tablet, TAKE 1 TABLET BY MOUTH DAILY, Disp: 90 Tab, Rfl: 3    insulin glargine (LANTUS,BASAGLAR) 100 unit/mL (3 mL) inpn, 60 units every morning (Patient taking differently: by SubCUTAneous route. 54 units every morning), Disp: 6 Adjustable Dose Pre-filled Pen Syringe, Rfl: 11    PATIENT CARE TEAM:  Patient Care Team:  Ele Barillas MD as PCP - General (Internal Medicine)  Ele Barillas MD as PCP - [facility]  Bushra Diaz MD as Physician (Cardiology)  Sanam Mauro MD as Surgeon (Orthopedic Surgery)  Deandre Jackson RN as Care Transitions Nurse     Thank you for allowing me to participate in this patient's care.     Ashli Jenkins NP  [address] [place] [address], [address]  Phone: [phone]

## 2021-04-02 ENCOUNTER — TELEPHONE (OUTPATIENT)
Dept: CARDIOLOGY CLINIC | Age: 71
End: 2021-04-02

## 2021-04-02 ENCOUNTER — HOME CARE VISIT (OUTPATIENT)
Dept: HOME HEALTH SERVICES | Facility: HOME HEALTH | Age: 71
End: 2021-04-02
Payer: MEDICARE

## 2021-04-02 ENCOUNTER — PATIENT OUTREACH (OUTPATIENT)
Dept: CASE MANAGEMENT | Age: 71
End: 2021-04-02

## 2021-04-02 RX ORDER — ISOSORBIDE MONONITRATE 60 MG/1
60 TABLET, EXTENDED RELEASE ORAL 2 TIMES DAILY
Qty: 60 TAB | Refills: 1 | Status: SHIPPED | OUTPATIENT
Start: 2021-04-02 | End: 2021-04-29 | Stop reason: SDUPTHER

## 2021-04-02 NOTE — TELEPHONE ENCOUNTER
----- Message from So Waite NP sent at 4/2/2021  2:13 PM EDT -----  Regarding: BP medications  Dr Isabel Aden would like to increase her isosorbide to 60mg po BID     I called patient, reviewed above information, she states understanding, will call Monday with BP readings.

## 2021-04-02 NOTE — TELEPHONE ENCOUNTER
I called patient to schedule her follow up. Per Marquis best to schedule 3 weeks out. Patient is scheduled 4/21/21 at 3pm with Prerna Mixon.

## 2021-04-02 NOTE — PROGRESS NOTES
Patient resolved from Transition of Care episode on 3/22  Patient/family has been provided the following resources and education related to COVID-19:                         Signs, symptoms and red flags related to COVID-19            CDC exposure and quarantine guidelines            [facility] exposure contact - [phone]            Contact for their local Department of Health                 Patient currently reports that the following symptoms have improved:  all symptoms improved     No further outreach scheduled with this CTN/ACM. Episode of Care resolved. Patient has this CTN/ACM contact information if future needs arise.

## 2021-04-03 ENCOUNTER — HOME CARE VISIT (OUTPATIENT)
Dept: SCHEDULING | Facility: HOME HEALTH | Age: 71
End: 2021-04-03
Payer: MEDICARE

## 2021-04-03 LAB
ALBUMIN SERPL-MCNC: 4.5 G/DL (ref 3.8–4.8)
ALBUMIN/GLOB SERPL: 1.1 {RATIO} (ref 1.2–2.2)
ALP SERPL-CCNC: 71 IU/L (ref 39–117)
ALT SERPL-CCNC: 13 IU/L (ref 0–32)
AST SERPL-CCNC: 16 IU/L (ref 0–40)
BASOPHILS # BLD AUTO: NORMAL 10*3/UL
BILIRUB SERPL-MCNC: 0.5 MG/DL (ref 0–1.2)
BUN SERPL-MCNC: 26 MG/DL (ref 8–27)
BUN/CREAT SERPL: 24 (ref 12–28)
CALCIUM SERPL-MCNC: 10.2 MG/DL (ref 8.7–10.3)
CHLORIDE SERPL-SCNC: 100 MMOL/L (ref 96–106)
CO2 SERPL-SCNC: 22 MMOL/L (ref 20–29)
CREAT SERPL-MCNC: 1.09 MG/DL (ref 0.57–1)
EOSINOPHIL # BLD AUTO: NORMAL 10*3/UL
EOSINOPHIL NFR BLD AUTO: NORMAL %
GLOBULIN SER CALC-MCNC: 4 G/DL (ref 1.5–4.5)
GLUCOSE SERPL-MCNC: 188 MG/DL (ref 65–99)
HCT VFR BLD AUTO: NORMAL %
HGB BLD-MCNC: NORMAL G/DL
LYMPHOCYTES # BLD AUTO: NORMAL 10*3/UL
LYMPHOCYTES NFR BLD AUTO: NORMAL %
MAGNESIUM SERPL-MCNC: 1.8 MG/DL (ref 1.6–2.3)
MONOCYTES NFR BLD AUTO: NORMAL %
NEUTROPHILS NFR BLD AUTO: NORMAL %
NT-PROBNP SERPL-MCNC: 182 PG/ML (ref 0–301)
PLATELET # BLD AUTO: NORMAL 10*3/UL
POTASSIUM SERPL-SCNC: 5.3 MMOL/L (ref 3.5–5.2)
PROT SERPL-MCNC: 8.5 G/DL (ref 6–8.5)
RBC # BLD AUTO: NORMAL 10*6/UL
SODIUM SERPL-SCNC: 138 MMOL/L (ref 134–144)
WBC # BLD AUTO: NORMAL X10E3/UL

## 2021-04-03 PROCEDURE — G0299 HHS/HOSPICE OF RN EA 15 MIN: HCPCS

## 2021-04-04 VITALS
TEMPERATURE: 97.4 F | WEIGHT: 175.4 LBS | BODY MASS INDEX: 30.11 KG/M2 | RESPIRATION RATE: 18 BRPM | SYSTOLIC BLOOD PRESSURE: 150 MMHG | HEART RATE: 86 BPM | OXYGEN SATURATION: 96 % | DIASTOLIC BLOOD PRESSURE: 80 MMHG

## 2021-04-05 ENCOUNTER — HOME CARE VISIT (OUTPATIENT)
Dept: HOME HEALTH SERVICES | Facility: HOME HEALTH | Age: 71
End: 2021-04-05
Payer: MEDICARE

## 2021-04-05 ENCOUNTER — HOME CARE VISIT (OUTPATIENT)
Dept: SCHEDULING | Facility: HOME HEALTH | Age: 71
End: 2021-04-05
Payer: MEDICARE

## 2021-04-05 ENCOUNTER — TELEPHONE (OUTPATIENT)
Dept: CARDIOLOGY CLINIC | Age: 71
End: 2021-04-05

## 2021-04-05 VITALS
SYSTOLIC BLOOD PRESSURE: 150 MMHG | WEIGHT: 177.2 LBS | BODY MASS INDEX: 30.42 KG/M2 | OXYGEN SATURATION: 96 % | TEMPERATURE: 97.1 F | HEART RATE: 98 BPM | DIASTOLIC BLOOD PRESSURE: 70 MMHG | RESPIRATION RATE: 18 BRPM

## 2021-04-05 PROCEDURE — G0299 HHS/HOSPICE OF RN EA 15 MIN: HCPCS

## 2021-04-05 NOTE — TELEPHONE ENCOUNTER
Called patient and LM to hold digoxin and K supplement and to call the office at 8:30 tomorrow morning for further instructions.  Eduar Richmond RN

## 2021-04-05 NOTE — TELEPHONE ENCOUNTER
Call patient for BP readings since increasing dose of imdur. Confirmed patient increased imdur two 30 mg imdur tabs until she picks up new rx. BP readings: 4/3 161/72; 164/72 - both of these BP were taken 30 min after taking imdur. 4/5: 148/57. Asked patient to take BP while on the phone 144/61. Will advised provider and will call pt Tuesday with BP before morning meds and then two hours post. Pt's weight today is 177.2 lbs  Pt states BG 68 upon waking, taking 54 units of Lantus Q am. Advised patient to call Dr. Luis Hays (PCP) with BG readings since he is managing diabetes medications. Patient verbalized understanding.

## 2021-04-05 NOTE — TELEPHONE ENCOUNTER
Labs reviewed: Digoxin level elevated at 2.0ng/ml- please hold digoxin until Thursday, check level with home health. Potassium 4.9- stop taking potassium supplement.

## 2021-04-06 ENCOUNTER — HOSPITAL ENCOUNTER (OUTPATIENT)
Dept: VASCULAR SURGERY | Age: 71
Discharge: HOME | End: 2021-04-06
Attending: INTERNAL MEDICINE
Payer: MEDICARE

## 2021-04-06 DIAGNOSIS — Z01.818 PRE-OP EVALUATION: ICD-10-CM

## 2021-04-06 DIAGNOSIS — R93.89 ABNORMAL CAROTID ULTRASOUND: ICD-10-CM

## 2021-04-06 LAB
LEFT CCA DIST DIAS: 9.7 CM/S
LEFT CCA DIST SYS: 87.7 CM/S
LEFT CCA PROX DIAS: 5.7 CM/S
LEFT CCA PROX SYS: 102.3 CM/S
LEFT ECA DIAS: 5.69 CM/S
LEFT ECA SYS: 85.1 CM/S
LEFT ICA DIST DIAS: 19 CM/S
LEFT ICA DIST SYS: 93.7 CM/S
LEFT ICA MID DIAS: 14.6 CM/S
LEFT ICA MID SYS: 77.3 CM/S
LEFT ICA PROX DIAS: 12.3 CM/S
LEFT ICA PROX SYS: 78.4 CM/S
LEFT ICA/CCA SYS: 1.07
RIGHT CCA DIST DIAS: 7 CM/S
RIGHT CCA DIST SYS: 102.3 CM/S
RIGHT CCA PROX DIAS: 14.6 CM/S
RIGHT CCA PROX SYS: 147 CM/S
RIGHT ECA DIAS: 0 CM/S
RIGHT ECA SYS: 124.8 CM/S
RIGHT ICA DIST DIAS: 9.8 CM/S
RIGHT ICA DIST SYS: 59.3 CM/S
RIGHT ICA MID DIAS: 14.2 CM/S
RIGHT ICA MID SYS: 68.2 CM/S
RIGHT ICA PROX DIAS: 11.8 CM/S
RIGHT ICA PROX SYS: 68.3 CM/S
RIGHT ICA/CCA SYS: 0.7
RIGHT VERTEBRAL DIAS: 6.92 CM/S
RIGHT VERTEBRAL SYS: 46.5 CM/S

## 2021-04-06 PROCEDURE — 93880 EXTRACRANIAL BILAT STUDY: CPT

## 2021-04-06 NOTE — TELEPHONE ENCOUNTER
Called patient and inquired if she received message from yesterday, she stated she did not and she has taken her morning medications. Patient instructed to hold digoxin Wed and Thurs. Pt states she has potassium chloride 10 mEq but could not provide dose. She was on her way to a doctor appointment and will call this writer back.

## 2021-04-07 ENCOUNTER — TELEPHONE (OUTPATIENT)
Dept: CARDIOLOGY CLINIC | Age: 71
End: 2021-04-07

## 2021-04-07 ENCOUNTER — HOME CARE VISIT (OUTPATIENT)
Dept: SCHEDULING | Facility: HOME HEALTH | Age: 71
End: 2021-04-07
Payer: MEDICARE

## 2021-04-07 ENCOUNTER — HOSPITAL ENCOUNTER (OUTPATIENT)
Dept: LAB | Age: 71
Discharge: HOME | End: 2021-04-07
Payer: MEDICARE

## 2021-04-07 DIAGNOSIS — Z78.0 POST-MENOPAUSAL: Primary | ICD-10-CM

## 2021-04-07 PROCEDURE — G0299 HHS/HOSPICE OF RN EA 15 MIN: HCPCS

## 2021-04-07 PROCEDURE — 80162 ASSAY OF DIGOXIN TOTAL: CPT

## 2021-04-07 NOTE — TELEPHONE ENCOUNTER
April/[facility] called stating that she needs an order corrected on the Bone Density Scan, it needs to read \"Post Menopausal\".     Fax to :  [phone]

## 2021-04-08 ENCOUNTER — HOSPITAL ENCOUNTER (OUTPATIENT)
Dept: MAMMOGRAPHY | Age: 71
Discharge: HOME | End: 2021-04-08
Attending: NURSE PRACTITIONER
Payer: MEDICARE

## 2021-04-08 ENCOUNTER — HOSPITAL ENCOUNTER (OUTPATIENT)
Dept: NON INVASIVE DIAGNOSTICS | Age: 71
Discharge: HOME | End: 2021-04-08
Attending: NURSE PRACTITIONER
Payer: MEDICARE

## 2021-04-08 VITALS
TEMPERATURE: 97.5 F | WEIGHT: 177.2 LBS | SYSTOLIC BLOOD PRESSURE: 139 MMHG | OXYGEN SATURATION: 96 % | DIASTOLIC BLOOD PRESSURE: 76 MMHG | RESPIRATION RATE: 18 BRPM | HEART RATE: 83 BPM | BODY MASS INDEX: 30.42 KG/M2

## 2021-04-08 VITALS — BODY MASS INDEX: 30.22 KG/M2 | HEIGHT: 64 IN | WEIGHT: 177 LBS

## 2021-04-08 DIAGNOSIS — I42.0 DILATED CARDIOMYOPATHY (HCC): ICD-10-CM

## 2021-04-08 DIAGNOSIS — Z78.0 POST-MENOPAUSAL: ICD-10-CM

## 2021-04-08 DIAGNOSIS — I50.9 CONGESTIVE HEART FAILURE, UNSPECIFIED HF CHRONICITY, UNSPECIFIED HEART FAILURE TYPE (HCC): ICD-10-CM

## 2021-04-08 DIAGNOSIS — Z79.899 RECEIVING INOTROPIC MEDICATION: ICD-10-CM

## 2021-04-08 DIAGNOSIS — N18.30 STAGE 3 CHRONIC KIDNEY DISEASE, UNSPECIFIED WHETHER STAGE 3A OR 3B CKD (HCC): ICD-10-CM

## 2021-04-08 LAB
ALBUMIN SERPL-MCNC: 4.5 G/DL (ref 3.8–4.8)
ALBUMIN/GLOB SERPL: 1.3 {RATIO} (ref 1.2–2.2)
ALP SERPL-CCNC: 63 IU/L (ref 39–117)
ALT SERPL-CCNC: 12 IU/L (ref 0–32)
AST SERPL-CCNC: 14 IU/L (ref 0–40)
BASOPHILS # BLD AUTO: 0 X10E3/UL (ref 0–0.2)
BASOPHILS NFR BLD AUTO: 1 %
BILIRUB SERPL-MCNC: 0.4 MG/DL (ref 0–1.2)
BUN SERPL-MCNC: 29 MG/DL (ref 8–27)
BUN/CREAT SERPL: 28 (ref 12–28)
CALCIUM SERPL-MCNC: 9.6 MG/DL (ref 8.7–10.3)
CHLORIDE SERPL-SCNC: 99 MMOL/L (ref 96–106)
CO2 SERPL-SCNC: 23 MMOL/L (ref 20–29)
CREAT SERPL-MCNC: 1.02 MG/DL (ref 0.57–1)
ECHO AO ROOT DIAM: 3.24 CM
ECHO AV AREA PEAK VELOCITY: 1.48 CM2
ECHO AV AREA/BSA PEAK VELOCITY: 0.8 CM2/M2
ECHO AV PEAK GRADIENT: 10.3 MMHG
ECHO AV PEAK VELOCITY: 160.48 CM/S
ECHO LA AREA 4C: 22.53 CM2
ECHO LA MAJOR AXIS: 3.93 CM
ECHO LA MINOR AXIS: 2.12 CM
ECHO LA VOL 2C: 81.49 ML (ref 22–52)
ECHO LA VOL 4C: 73.61 ML (ref 22–52)
ECHO LA VOL BP: 84.91 ML (ref 22–52)
ECHO LA VOL/BSA BIPLANE: 45.72 ML/M2 (ref 16–28)
ECHO LA VOLUME INDEX A2C: 43.88 ML/M2 (ref 16–28)
ECHO LA VOLUME INDEX A4C: 39.64 ML/M2 (ref 16–28)
ECHO LV EDV A2C: 108.21 ML
ECHO LV EDV A4C: 124.91 ML
ECHO LV EDV BP: 118.98 ML (ref 56–104)
ECHO LV EDV INDEX A4C: 67.3 ML/M2
ECHO LV EDV INDEX BP: 64.1 ML/M2
ECHO LV EDV NDEX A2C: 58.3 ML/M2
ECHO LV EJECTION FRACTION A2C: 66 PERCENT
ECHO LV EJECTION FRACTION A4C: 60 PERCENT
ECHO LV EJECTION FRACTION BIPLANE: 62.7 PERCENT (ref 55–100)
ECHO LV ESV A2C: 37.15 ML
ECHO LV ESV A4C: 49.82 ML
ECHO LV ESV BP: 44.35 ML (ref 19–49)
ECHO LV ESV INDEX A2C: 20 ML/M2
ECHO LV ESV INDEX A4C: 26.8 ML/M2
ECHO LV ESV INDEX BP: 23.9 ML/M2
ECHO LV INTERNAL DIMENSION DIASTOLIC: 5.04 CM (ref 3.9–5.3)
ECHO LV INTERNAL DIMENSION SYSTOLIC: 3.8 CM
ECHO LV IVSD: 0.94 CM (ref 0.6–0.9)
ECHO LV MASS 2D: 168.6 G (ref 67–162)
ECHO LV MASS INDEX 2D: 90.8 G/M2 (ref 43–95)
ECHO LV POSTERIOR WALL DIASTOLIC: 0.93 CM (ref 0.6–0.9)
ECHO LVOT DIAM: 1.77 CM
ECHO LVOT PEAK GRADIENT: 3.75 MMHG
ECHO LVOT PEAK VELOCITY: 96.86 CM/S
ECHO MV A VELOCITY: 112.75 CM/S
ECHO MV E DECELERATION TIME (DT): 222.64 MS
ECHO MV E VELOCITY: 73.25 CM/S
ECHO MV E/A RATIO: 0.65
ECHO PV PEAK INSTANTANEOUS GRADIENT SYSTOLIC: 9.93 MMHG
ECHO RV INTERNAL DIMENSION: 2.99 CM
ECHO RV TAPSE: 2.38 CM (ref 1.5–2)
ECHO TV REGURGITANT MAX VELOCITY: 191.89 CM/S
ECHO TV REGURGITANT PEAK GRADIENT: 14.73 MMHG
EOSINOPHIL # BLD AUTO: 0.2 X10E3/UL (ref 0–0.4)
EOSINOPHIL NFR BLD AUTO: 4 %
ERYTHROCYTE [DISTWIDTH] IN BLOOD BY AUTOMATED COUNT: 16.6 % (ref 11.7–15.4)
GLOBULIN SER CALC-MCNC: 3.5 G/DL (ref 1.5–4.5)
GLUCOSE SERPL-MCNC: 179 MG/DL (ref 65–99)
HCT VFR BLD AUTO: 35.1 % (ref 34–46.6)
HGB BLD-MCNC: 11.2 G/DL (ref 11.1–15.9)
IMM GRANULOCYTES # BLD AUTO: 0 X10E3/UL (ref 0–0.1)
IMM GRANULOCYTES NFR BLD AUTO: 0 %
LA VOL DISK BP: 79.69 ML (ref 22–52)
LYMPHOCYTES # BLD AUTO: 0.6 X10E3/UL (ref 0.7–3.1)
LYMPHOCYTES NFR BLD AUTO: 14 %
MAGNESIUM SERPL-MCNC: 2.3 MG/DL (ref 1.6–2.3)
MCH RBC QN AUTO: 27.9 PG (ref 26.6–33)
MCHC RBC AUTO-ENTMCNC: 31.9 G/DL (ref 31.5–35.7)
MCV RBC AUTO: 87 FL (ref 79–97)
MONOCYTES # BLD AUTO: 0.3 X10E3/UL (ref 0.1–0.9)
MONOCYTES NFR BLD AUTO: 6 %
NEUTROPHILS # BLD AUTO: 3 X10E3/UL (ref 1.4–7)
NEUTROPHILS NFR BLD AUTO: 75 %
NT-PROBNP SERPL-MCNC: 139 PG/ML (ref 0–301)
PLATELET # BLD AUTO: 246 X10E3/UL (ref 150–450)
POTASSIUM SERPL-SCNC: 4.8 MMOL/L (ref 3.5–5.2)
PROT SERPL-MCNC: 8 G/DL (ref 6–8.5)
RBC # BLD AUTO: 4.02 X10E6/UL (ref 3.77–5.28)
SODIUM SERPL-SCNC: 138 MMOL/L (ref 134–144)
WBC # BLD AUTO: 4.1 X10E3/UL (ref 3.4–10.8)

## 2021-04-08 PROCEDURE — 93306 TTE W/DOPPLER COMPLETE: CPT

## 2021-04-08 PROCEDURE — 77080 DXA BONE DENSITY AXIAL: CPT

## 2021-04-09 ENCOUNTER — TELEPHONE (OUTPATIENT)
Dept: CARDIOLOGY CLINIC | Age: 71
End: 2021-04-09

## 2021-04-09 DIAGNOSIS — R06.09 DOE (DYSPNEA ON EXERTION): ICD-10-CM

## 2021-04-09 DIAGNOSIS — I50.20 NYHA CLASS 4 HEART FAILURE WITH REDUCED EJECTION FRACTION (HCC): Primary | ICD-10-CM

## 2021-04-09 NOTE — TELEPHONE ENCOUNTER
----- Message from Cristina Chapin NP sent at 4/9/2021 12:33 PM EDT -----  Please call Betoelizabeth Claire with her TTE results, her EF has much improved to 50-55% on inotropes. We would to decrease her Dobutamine to 5mcg and repeat TTE in 2 weeks. Please order and schedule. I called patient, reviewed all results, she states understanding. I called [facility], spoke with Jessika Garcia, pharmacist, [facility], to decrease to 5 mcg/kg/min at weight of 80.8 kg per last office visit.   Echocardiogram ordered

## 2021-04-09 NOTE — PROGRESS NOTES
Please call Mayra Chavez with her TTE results, her EF has much improved to 50-55% on inotropes. We would to decrease her Dobutamine to 5mcg and repeat TTE in 2 weeks. Please order and schedule.

## 2021-04-10 ENCOUNTER — HOSPITAL ENCOUNTER (EMERGENCY)
Age: 71
Discharge: HOME | End: 2021-04-10
Attending: STUDENT IN AN ORGANIZED HEALTH CARE EDUCATION/TRAINING PROGRAM
Payer: MEDICARE

## 2021-04-10 VITALS
HEIGHT: 64 IN | RESPIRATION RATE: 18 BRPM | OXYGEN SATURATION: 96 % | SYSTOLIC BLOOD PRESSURE: 147 MMHG | TEMPERATURE: 98 F | DIASTOLIC BLOOD PRESSURE: 101 MMHG | HEART RATE: 79 BPM | WEIGHT: 177 LBS | BODY MASS INDEX: 30.22 KG/M2

## 2021-04-10 DIAGNOSIS — R04.0 EPISTAXIS: Primary | ICD-10-CM

## 2021-04-10 PROCEDURE — 99283 EMERGENCY DEPT VISIT LOW MDM: CPT

## 2021-04-10 PROCEDURE — 75810000284 HC CNTRL NASAL HEMORHRAGE SIMPLE

## 2021-04-10 PROCEDURE — 74011250637 HC RX REV CODE- 250/637: Performed by: STUDENT IN AN ORGANIZED HEALTH CARE EDUCATION/TRAINING PROGRAM

## 2021-04-10 RX ORDER — SILVER NITRATE 38.21; 12.74 MG/1; MG/1
1 STICK TOPICAL ONCE
Status: DISCONTINUED | OUTPATIENT
Start: 2021-04-10 | End: 2021-04-10 | Stop reason: HOSPADM

## 2021-04-10 RX ADMIN — Medication 2 SPRAY: at 02:30

## 2021-04-10 NOTE — ED PROVIDER NOTES
EMERGENCY DEPARTMENT HISTORY AND PHYSICAL EXAM      Date: 4/10/2021  Patient Name: Bhavani Villatoro    History of Presenting Illness     Chief Complaint   Patient presents with    Epistaxis       History Provided By: Patient    HPI: Bhavani Villatoro, 73 y.o. female presents to the ED with cc of epistaxis. Reports that she has had slow nosebleed for the past several hours. She thinks that now the bleeding has slowed/stopped. Patient denies any preceding trauma to her nostrils prior to onset of symptoms. She is not quite sure which nostril the bleeding is coming from. States that she was having sensations of blood going down the back of her throat earlier, but currently thinks that this has stopped also. Patient denies being on any blood thinners. She reports a significant cardiac history, and arrives with a dobutamine drip. Denies any chest pain or other cardiac complaints at this time. PCP: Salvador Gardner MD    No current facility-administered medications on file prior to encounter. Current Outpatient Medications on File Prior to Encounter   Medication Sig Dispense Refill    isosorbide mononitrate ER (IMDUR) 60 mg CR tablet Take 1 Tab by mouth two (2) times a day. 60 Tab 1    diclofenac (VOLTAREN) 1 % gel Apply 2 g to affected area two (2) times daily as needed for Pain. thin layer to joint pain      sodium chloride (Normal Saline Flush) 10-20 mL by InterCATHeter route daily.  heparin sod,porcine/0.9 % NaCl (HEPARIN FLUSH IV) 3-5 mL by IntraCATHeter route daily.  budesonide (PULMICORT) 0.25 mg/2 mL nbsp 500 mcg by Nebulization route every six (6) hours as needed for Other (wheezing shortness of breath).  oxybutynin (DITROPAN) 5 mg tablet Take 0.5 Tabs by mouth three (3) times daily. 90 Tab 11    bumetanide (BUMEX) 2 mg tablet Take 1 Tab by mouth daily. 90 Tab 2    digoxin (LANOXIN) 0.25 mg tablet Take 1 Tab by mouth daily.  90 Tab 1    DOBUTamine (DOBUTREX) 500 mg/250 mL (2,000 mcg/mL) infusion 622 mcg/min by IntraVENous route continuous. (Patient taking differently: 7.5 mcg/kg/min by IntraVENous route continuous. Based on 86.6 kg (4/1/21)) 250 mL 0    ergocalciferol (ERGOCALCIFEROL) 1,250 mcg (50,000 unit) capsule Take 1 Cap by mouth every seven (7) days. 21 Cap 0    hydrALAZINE (APRESOLINE) 100 mg tablet Take 1 Tab by mouth three (3) times daily. 240 Tab 2    ivabradine (CORLANOR) 5 mg tablet Take 0.5 Tabs by mouth two (2) times daily (with meals). 180 Tab 1    magnesium oxide (MAG-OX) 400 mg tablet Take 2 Tabs by mouth two (2) times a day. 180 Tab 1    melatonin 3 mg tablet Take 1 Tab by mouth nightly as needed for Insomnia. 90 Tab 0    spironolactone (ALDACTONE) 25 mg tablet Take 1 Tab by mouth daily. 90 Tab 1    thiamine HCL (B-1) 100 mg tablet Take 1 Tab by mouth daily. 90 Tab 1    fluticasone propion-salmeteroL (ADVAIR/WIXELA) 250-50 mcg/dose diskus inhaler Take 2 Puffs by inhalation two (2) times a day.  fluticasone propionate (FLONASE) 50 mcg/actuation nasal spray 2 Sprays by Both Nostrils route daily. 1 Bottle 0    allopurinoL (ZYLOPRIM) 300 mg tablet TAKE 1 TABLET BY MOUTH DAILY 90 Tab 3    insulin glargine (LANTUS,BASAGLAR) 100 unit/mL (3 mL) inpn 60 units every morning (Patient taking differently: 50 Units by SubCUTAneous route daily.  60 units every morning) 6 Adjustable Dose Pre-filled Pen Syringe 11       Past History     Past Medical History:  Past Medical History:   Diagnosis Date    Arthritis     Diabetes ([address]     Heart failure ([address]     Hypercholesterolemia     Hypertension        Past Surgical History:  Past Surgical History:   Procedure Laterality Date    COLONOSCOPY N/A 3/19/2021    COLONOSCOPY performed by Sharon Butler MD at [facility] ENDOSCOPY    HX ORTHOPAEDIC      Arthroscopy right knee    NV CARDIAC SURG PROCEDURE UNLIST      life vest       Family History:  Family History   Problem Relation Age of Onset    Diabetes Mother        Social History:  Social History     Tobacco Use    Smoking status: Former Smoker     Packs/day: 0.25     Years: 20.00     Pack years: 5.00     Quit date: 2014     Years since quittin.3    Smokeless tobacco: Never Used   Substance Use Topics    Alcohol use: Yes     Alcohol/week: 0.0 standard drinks     Comment: 21-24 Beers per week    Drug use: No       Allergies:  No Known Allergies      Review of Systems   Review of Systems   Constitutional: Negative for chills and fever. HENT: Positive for nosebleeds. Negative for congestion and rhinorrhea. Eyes: Negative for visual disturbance. Respiratory: Negative for chest tightness and shortness of breath. Gastrointestinal: Negative for abdominal pain, diarrhea, nausea and vomiting. Genitourinary: Negative for dysuria, flank pain and hematuria. Musculoskeletal: Negative for back pain and neck pain. Skin: Negative for rash. Allergic/Immunologic: Negative for immunocompromised state. Neurological: Negative for dizziness, speech difficulty, weakness and headaches. Hematological: Negative for adenopathy. Psychiatric/Behavioral: Negative for dysphoric mood and suicidal ideas. Physical Exam   Physical Exam  Vitals signs and nursing note reviewed. Constitutional:       General: She is not in acute distress. Appearance: She is not ill-appearing or toxic-appearing. HENT:      Head: Normocephalic and atraumatic. Nose:      Left Nostril: Epistaxis present. Comments: Dried blood seen bilateral naris. Inflamed mucosa with friable cluster of blood vessels seen in left nare still with very slow oozing bleed     Mouth/Throat:      Mouth: Mucous membranes are moist.      Comments: No blood seen in posterior oropharynx  Eyes:      Extraocular Movements: Extraocular movements intact. Pupils: Pupils are equal, round, and reactive to light. Neck:      Musculoskeletal: Normal range of motion and neck supple.    Cardiovascular:      Rate and Rhythm: Normal rate and regular rhythm. Pulses: Normal pulses. Pulmonary:      Effort: Pulmonary effort is normal.      Breath sounds: No stridor. No wheezing or rhonchi. Abdominal:      General: Abdomen is flat. There is no distension. Tenderness: There is no abdominal tenderness. Musculoskeletal: Normal range of motion. Skin:     General: Skin is warm and dry. Neurological:      General: No focal deficit present. Mental Status: She is alert and oriented to person, place, and time. Psychiatric:         Judgment: Judgment normal.         Diagnostic Study Results     Labs -   No results found for this or any previous visit (from the past 24 hour(s)). Radiologic Studies -   No orders to display     CT Results  (Last 48 hours)    None        CXR Results  (Last 48 hours)    None          Medical Decision Making   I am the first provider for this patient. I reviewed the vital signs, available nursing notes, past medical history, past surgical history, family history and social history. Vital Signs-Reviewed the patient's vital signs. Patient Vitals for the past 24 hrs:   Temp Pulse Resp BP SpO2   04/10/21 0148 98 °F (36.7 °C) 79 18 (!) 147/101 96 %       Records Reviewed: Nursing Notes and Old Medical Records    Provider Notes (Medical Decision Making):   74-year-old female presenting for evaluation of epistaxis. Bleeding has largely slowed/stopped prior to ED arrival. Patient is treated with phenylephrine spray in bilateral naris. After period of observation, she continues to have very slow oozing of blood coming from a friable cluster of blood vessels in her left nare. This was cauterized with silver nitrate with subsequent cessation of all bleeding. Patient is observed for a reasonable period of time in the ED without recurrence of the status. She'll be discharged at this time in stable condition to follow-up with her PCP. Return precautions discussed.     Procedure Note - Epistaxis Management:   3:00 AM  Performed by: Henry Hernandez MD.  Complexity: simple  After administration of phenylephrine, the patient underwent silver nitrate chemical cautery applied to left nare(s). .  Hemostasis was achieved after placement. Estimated blood loss: Negligible  The procedure took 1-15 minutes, and pt tolerated well. Henry Hernandez MD      Disposition:  Discharge      DISCHARGE PLAN:  1. Discharge Medication List as of 4/10/2021  4:30 AM        2. Follow-up Information     Follow up With Specialties Details Why Contact Info    Maryann Cagle MD Internal Medicine   [facility]  [address]  [place] [address] [phone]          3. Return to ED if worse     Diagnosis     Clinical Impression:   1. Epistaxis        Attestations:    Henry Hernandez MD    Please note that this dictation was completed with [employer], the computer voice recognition software. Quite often unanticipated grammatical, syntax, homophones, and other interpretive errors are inadvertently transcribed by the computer software. Please disregard these errors. Please excuse any errors that have escaped final proofreading. Thank you.

## 2021-04-10 NOTE — ED TRIAGE NOTES
Pt presents via EMS for nose bleed that began around 1am. No other symptoms. History of CHF and DM. Denies taking blood thinners. Pt does have a life vest and a PICC with continuous Dobutamine infusing.

## 2021-04-12 ENCOUNTER — TELEPHONE (OUTPATIENT)
Dept: CARDIOLOGY CLINIC | Age: 71
End: 2021-04-12

## 2021-04-13 ENCOUNTER — OFFICE VISIT (OUTPATIENT)
Dept: CARDIOLOGY CLINIC | Age: 71
End: 2021-04-13
Payer: MEDICARE

## 2021-04-13 ENCOUNTER — HOME CARE VISIT (OUTPATIENT)
Dept: SCHEDULING | Facility: HOME HEALTH | Age: 71
End: 2021-04-13
Payer: MEDICARE

## 2021-04-13 VITALS
SYSTOLIC BLOOD PRESSURE: 125 MMHG | OXYGEN SATURATION: 96 % | HEIGHT: 64 IN | HEART RATE: 83 BPM | TEMPERATURE: 97.5 F | WEIGHT: 181.1 LBS | RESPIRATION RATE: 18 BRPM | BODY MASS INDEX: 30.92 KG/M2 | DIASTOLIC BLOOD PRESSURE: 68 MMHG

## 2021-04-13 DIAGNOSIS — I10 ESSENTIAL HYPERTENSION: ICD-10-CM

## 2021-04-13 DIAGNOSIS — I42.8 NICM (NONISCHEMIC CARDIOMYOPATHY) (HCC): Primary | ICD-10-CM

## 2021-04-13 PROCEDURE — G8399 PT W/DXA RESULTS DOCUMENT: HCPCS | Performed by: SPECIALIST

## 2021-04-13 PROCEDURE — G8754 DIAS BP LESS 90: HCPCS | Performed by: SPECIALIST

## 2021-04-13 PROCEDURE — 1111F DSCHRG MED/CURRENT MED MERGE: CPT | Performed by: SPECIALIST

## 2021-04-13 PROCEDURE — G8427 DOCREV CUR MEDS BY ELIG CLIN: HCPCS | Performed by: SPECIALIST

## 2021-04-13 PROCEDURE — 99214 OFFICE O/P EST MOD 30 MIN: CPT | Performed by: SPECIALIST

## 2021-04-13 PROCEDURE — G9899 SCRN MAM PERF RSLTS DOC: HCPCS | Performed by: SPECIALIST

## 2021-04-13 PROCEDURE — 1101F PT FALLS ASSESS-DOCD LE1/YR: CPT | Performed by: SPECIALIST

## 2021-04-13 PROCEDURE — 1090F PRES/ABSN URINE INCON ASSESS: CPT | Performed by: SPECIALIST

## 2021-04-13 PROCEDURE — G8417 CALC BMI ABV UP PARAM F/U: HCPCS | Performed by: SPECIALIST

## 2021-04-13 PROCEDURE — G8536 NO DOC ELDER MAL SCRN: HCPCS | Performed by: SPECIALIST

## 2021-04-13 PROCEDURE — G0299 HHS/HOSPICE OF RN EA 15 MIN: HCPCS

## 2021-04-13 PROCEDURE — 3017F COLORECTAL CA SCREEN DOC REV: CPT | Performed by: SPECIALIST

## 2021-04-13 PROCEDURE — G8752 SYS BP LESS 140: HCPCS | Performed by: SPECIALIST

## 2021-04-13 PROCEDURE — G8510 SCR DEP NEG, NO PLAN REQD: HCPCS | Performed by: SPECIALIST

## 2021-04-13 NOTE — TELEPHONE ENCOUNTER
Per JUVE Mercedes NP: ASHLEY Please have her decrease her digoxin to 1/2 tab daily. Called patient; verified ID with two identifiers. Confirmed pt has 0.25 mg digoxin tablets and she will cut in half. She has not restarted potassium chloride. Lab 4/7 - potassium 4.8. HH will draw labs today. Pt had no further questions.  Tatyana Saldana RN

## 2021-04-13 NOTE — PROGRESS NOTES
Chief Complaint   Patient presents with   [facility] Follow Up     Pt has no complaints today.  Results     1m f/u       1. Have you been to the ER, urgent care clinic since your last visit? Hospitalized since your last visit? ER, ED [facility] 4/2021 epitaxies. 2. Have you seen or consulted any other health care providers outside of the [address] [place] [address] since your last visit? Include any pap smears or colon screening.  No

## 2021-04-13 NOTE — PROGRESS NOTES
HISTORY OF PRESENT ILLNESS  Ana Maria Tilley is a 73 y.o. female     SUMMARY:   Problem List  Date Reviewed: 4/13/2021          Codes Class Noted    NICM (nonischemic cardiomyopathy) ([facility] ICD-10-CM: I42.8  ICD-9-CM: 425.4  4/1/2021        Receiving inotropic medication ICD-10-CM: Z79.899  ICD-9-CM: V49.89  4/1/2021        Advance care planning ICD-10-CM: Z71.89  ICD-9-CM: V65.49  Unknown        Shortness of breath ICD-10-CM: R06.02  ICD-9-CM: 786.05  Unknown        Acute on chronic systolic congestive heart failure, NYHA class 3 ([facility] ICD-10-CM: I50.23  ICD-9-CM: 428.23, 428.0  3/11/2021        Moderate pulmonary arterial systolic hypertension ([facility] ICD-10-CM: I27.21  ICD-9-CM: 416.8  3/11/2021        Hypokalemia ICD-10-CM: E87.6  ICD-9-CM: 276.8  3/11/2021        Hypomagnesemia ICD-10-CM: E83.42  ICD-9-CM: 275.2  3/11/2021        CHF exacerbation ([facility] ICD-10-CM: I50.9  ICD-9-CM: 428.0  3/9/2021        NYHA class 4 heart failure with reduced ejection fraction ([facility] ICD-10-CM: I50.20  ICD-9-CM: 428.9  Unknown        Uncontrolled type 2 diabetes mellitus with hyperglycemia ([facility] ICD-10-CM: E11.65  ICD-9-CM: 250.02  1/26/2020        CKD (chronic kidney disease) stage 3, GFR 30-59 ml/min ([facility]) ICD-10-CM: N18.30  ICD-9-CM: 585.3  7/16/2019    Overview Addendum 9/17/2020  8:33 AM by Susanna Ganser, MD     5/18 negative lexiscan  11/19 lvh, lae, otherwise normal echo             Status post total right knee replacement ICD-10-CM: Z96.651  ICD-9-CM: V43.65  2/5/2019        Osteoarthrosis, localized, primary, knee, right ICD-10-CM: M17.11  ICD-9-CM: 715.16  1/21/2019        PARDO (dyspnea on exertion) ICD-10-CM: R06.00  ICD-9-CM: 786.09  5/10/2018        Chest pain, exertional ICD-10-CM: R07.9  ICD-9-CM: 786.50  5/9/2018        Decreased libido ICD-10-CM: R68.82  ICD-9-CM: 799.81  10/3/2017        Epistaxis ICD-10-CM: R04.0  ICD-9-CM: 784.7  8/22/2017        Former tobacco use--stopped 2014 after >40 yrs smoking ICD-10-CM: Z87.891  ICD-9-CM: V15.82  11/15/2016        Obesity (BMI 30.0-34.9) ICD-10-CM: E66.9  ICD-9-CM: 278.00  11/15/2016        Reactive airway disease ICD-10-CM: J45.909  ICD-9-CM: 493.90  1/12/2016        Carpal tunnel syndrome of right wrist ICD-10-CM: G56.01  ICD-9-CM: 354.0  12/6/2015        Primary osteoarthritis of right knee ICD-10-CM: M17.11  ICD-9-CM: 715.16  7/12/2015        Dilated cardiomyopathy ([facility] ICD-10-CM: I42.0  ICD-9-CM: 425.4  9/9/2014    Overview Signed 11/24/2019  5:40 PM by Rafael Valiente MD     Nonischemic             Dyslipidemia ICD-10-CM: E78.5  ICD-9-CM: 272.4  9/9/2014        Gout ICD-10-CM: M10.9  ICD-9-CM: 274.9  9/9/2014        Alcohol abuse ICD-10-CM: F10.10  ICD-9-CM: 305.00  9/9/2014        Hypertension ICD-10-CM: I10  ICD-9-CM: 401.9  9/9/2014        Dermatitis ICD-10-CM: L30.9  ICD-9-CM: 692.9  9/9/2014              Current Outpatient Medications on File Prior to Visit   Medication Sig    isosorbide mononitrate ER (IMDUR) 60 mg CR tablet Take 1 Tab by mouth two (2) times a day.  diclofenac (VOLTAREN) 1 % gel Apply 2 g to affected area two (2) times daily as needed for Pain. thin layer to joint pain    sodium chloride (Normal Saline Flush) 10-20 mL by InterCATHeter route daily.  heparin sod,porcine/0.9 % NaCl (HEPARIN FLUSH IV) 3-5 mL by IntraCATHeter route daily.  budesonide (PULMICORT) 0.25 mg/2 mL nbsp 500 mcg by Nebulization route every six (6) hours as needed for Other (wheezing shortness of breath).  oxybutynin (DITROPAN) 5 mg tablet Take 0.5 Tabs by mouth three (3) times daily.  bumetanide (BUMEX) 2 mg tablet Take 1 Tab by mouth daily.  digoxin (LANOXIN) 0.25 mg tablet Take 1 Tab by mouth daily.  DOBUTamine (DOBUTREX) 500 mg/250 mL (2,000 mcg/mL) infusion 622 mcg/min by IntraVENous route continuous. (Patient taking differently: 7.5 mcg/kg/min by IntraVENous route continuous.  Based on 86.6 kg (4/1/21))    ergocalciferol (ERGOCALCIFEROL) 1,250 mcg (50,000 unit) capsule Take 1 Cap by mouth every seven (7) days.  hydrALAZINE (APRESOLINE) 100 mg tablet Take 1 Tab by mouth three (3) times daily.  ivabradine (CORLANOR) 5 mg tablet Take 0.5 Tabs by mouth two (2) times daily (with meals).  magnesium oxide (MAG-OX) 400 mg tablet Take 2 Tabs by mouth two (2) times a day.  melatonin 3 mg tablet Take 1 Tab by mouth nightly as needed for Insomnia.  spironolactone (ALDACTONE) 25 mg tablet Take 1 Tab by mouth daily.  thiamine HCL (B-1) 100 mg tablet Take 1 Tab by mouth daily.  fluticasone propion-salmeteroL (ADVAIR/WIXELA) 250-50 mcg/dose diskus inhaler Take 2 Puffs by inhalation two (2) times a day.  fluticasone propionate (FLONASE) 50 mcg/actuation nasal spray 2 Sprays by Both Nostrils route daily.  allopurinoL (ZYLOPRIM) 300 mg tablet TAKE 1 TABLET BY MOUTH DAILY    insulin glargine (LANTUS,BASAGLAR) 100 unit/mL (3 mL) inpn 60 units every morning (Patient taking differently: 50 Units by SubCUTAneous route daily. 60 units every morning)     No current facility-administered medications on file prior to visit. CARDIOLOGY STUDIES TO DATE:  5/18 negative lexiscan   11/20/19   ECHO ADULT COMPLETE 11/20/2019 11/20/2019   Narrative   · Left Ventricle: Normal wall thickness. Mildly dilated left ventricle. Low normal systolic dysfunction. Estimated left ventricular ejection fraction is 51 - 55%. Visually measured ejection fraction. Inconclusive left ventricular diastolic function. · Mitral Valve: Mitral valve non-specific thickening. Trace mitral valve regurgitation is present. · Tricuspid Valve: Mild tricuspid valve regurgitation is present. · Pulmonary Artery: Pulmonary hypertension. · Left Atrium: Mildly dilated left atrium.    Signed by: Tom Powers MD     01/29/21  echo lvef 20-25%, lae, mod decreased rvef, mild to mod mr, mild to mod tr, pa pressure 46mm    04/08/21  echo lvef 50-55%    Chief Complaint Patient presents with   [facility] Follow Up     Pt has no complaints today.  Results     1m f/u     HPI :  When we last met she was in bad shape and her EF was way down so we got her admitted to the hospital and transferred to [facility] where she had a complete tuneup right heart cath and so forth and was discharged on dobutamine. She just had an echo a few days ago which showed her LV function has now normalized. She feels much much better. Minimal shortness of breath no problems with her medications. She has occasional mildly productive cough. They are supposed to start tapering her dobutamine dose today  CARDIAC ROS:   negative for chest pain, palpitations, syncope, orthopnea, paroxysmal nocturnal dyspnea, exertional chest pressure/discomfort, claudication, lower extremity edema    Family History   Problem Relation Age of Onset    Diabetes Mother        Past Medical History:   Diagnosis Date    Arthritis     [facility]     Heart failure ([facility]     Hypercholesterolemia     Hypertension        GENERAL ROS:  A comprehensive review of systems was negative except for that written in the HPI.     Visit Vitals  /68 (BP 1 Location: Left arm, BP Patient Position: Sitting, BP Cuff Size: Small adult)   Pulse 83   Temp 97.5 °F (36.4 °C) (Temporal)   Resp 18   Ht 5' 4\" (1.626 m)   Wt 181 lb 1.6 oz (82.1 kg)   SpO2 96%   BMI 31.09 kg/m²       Wt Readings from Last 3 Encounters:   04/13/21 181 lb 1.6 oz (82.1 kg)   04/10/21 177 lb (80.3 kg)   04/08/21 177 lb (80.3 kg)            BP Readings from Last 3 Encounters:   04/13/21 125/68   04/10/21 (!) 147/101   04/07/21 139/76       PHYSICAL EXAM  General appearance: alert, cooperative, no distress, appears stated age  Neurologic: Alert and oriented X 3  Neck: supple, symmetrical, trachea midline, no adenopathy, no carotid bruit and no JVD  Lungs: clear to auscultation bilaterally  Heart: regular rate and rhythm, S1, S2 normal, no murmur, click, rub or gallop  Extremities: extremities normal, atraumatic, no cyanosis or edema    Lab Results   Component Value Date/Time    Cholesterol, total 169 03/18/2021 05:31 PM    Cholesterol, total 143 10/08/2020 12:01 PM    Cholesterol, total 207 (H) 08/08/2019 02:31 PM    Cholesterol, total 175 04/10/2018 01:19 PM    Cholesterol, total 141 08/26/2016 11:48 AM    HDL Cholesterol 70 03/18/2021 05:31 PM    HDL Cholesterol 46 10/08/2020 12:01 PM    HDL Cholesterol 46 08/08/2019 02:31 PM    HDL Cholesterol 56 04/10/2018 01:19 PM    HDL Cholesterol 43 08/26/2016 11:48 AM    LDL, calculated 84.6 03/18/2021 05:31 PM    LDL, calculated 73 10/08/2020 12:01 PM    LDL, calculated 105 (H) 08/08/2019 02:31 PM    LDL, calculated 75 04/10/2018 01:19 PM    LDL, calculated 25 08/26/2016 11:48 AM    Triglyceride 72 03/18/2021 05:31 PM    Triglyceride 137 10/08/2020 12:01 PM    Triglyceride 280 (H) 08/08/2019 02:31 PM    Triglyceride 221 (H) 04/10/2018 01:19 PM    Triglyceride 364 (H) 08/26/2016 11:48 AM    CHOL/HDL Ratio 2.4 03/18/2021 05:31 PM     ASSESSMENT :      She is stable and well compensated at this point. It sounds as if Samara dobutamine is going to be tapered and then I suspect she will be started back on oral guideline directed medical therapy. She needs no further cardiac testing at this time. current treatment plan is effective, no change in therapy  lab results and schedule of future lab studies reviewed with patient  reviewed diet, exercise and weight control    Encounter Diagnoses   Name Primary?  NICM (nonischemic cardiomyopathy) ([facility] Yes    Essential hypertension      No orders of the defined types were placed in this encounter. Follow-up and Dispositions    · Return in about 3 months (around 7/13/2021). Eve Aguilar MD  4/13/2021  Please note that this dictation was completed with [employer], the [employer] voice recognition software.   Quite often unanticipated grammatical, syntax, homophones, and other interpretive errors are inadvertently transcribed by the computer software. Please disregard these errors. Please excuse any errors that have escaped final proofreading. Thank you.

## 2021-04-14 VITALS
RESPIRATION RATE: 18 BRPM | DIASTOLIC BLOOD PRESSURE: 60 MMHG | OXYGEN SATURATION: 99 % | SYSTOLIC BLOOD PRESSURE: 130 MMHG | HEART RATE: 80 BPM | TEMPERATURE: 97.8 F

## 2021-04-14 LAB
ALBUMIN SERPL-MCNC: 4.3 G/DL (ref 3.8–4.8)
ALBUMIN/GLOB SERPL: 1.2 {RATIO} (ref 1.2–2.2)
ALP SERPL-CCNC: 61 IU/L (ref 39–117)
ALT SERPL-CCNC: 13 IU/L (ref 0–32)
AST SERPL-CCNC: 15 IU/L (ref 0–40)
BASOPHILS # BLD AUTO: 0 X10E3/UL (ref 0–0.2)
BASOPHILS NFR BLD AUTO: 1 %
BILIRUB SERPL-MCNC: 0.5 MG/DL (ref 0–1.2)
BUN SERPL-MCNC: 20 MG/DL (ref 8–27)
BUN/CREAT SERPL: 21 (ref 12–28)
CALCIUM SERPL-MCNC: 9.9 MG/DL (ref 8.7–10.3)
CHLORIDE SERPL-SCNC: 99 MMOL/L (ref 96–106)
CO2 SERPL-SCNC: 24 MMOL/L (ref 20–29)
CREAT SERPL-MCNC: 0.96 MG/DL (ref 0.57–1)
EOSINOPHIL # BLD AUTO: 0.2 X10E3/UL (ref 0–0.4)
EOSINOPHIL NFR BLD AUTO: 5 %
ERYTHROCYTE [DISTWIDTH] IN BLOOD BY AUTOMATED COUNT: 16.7 % (ref 11.7–15.4)
GLOBULIN SER CALC-MCNC: 3.5 G/DL (ref 1.5–4.5)
GLUCOSE SERPL-MCNC: 146 MG/DL (ref 65–99)
HCT VFR BLD AUTO: 33.4 % (ref 34–46.6)
HGB BLD-MCNC: 10.4 G/DL (ref 11.1–15.9)
IMM GRANULOCYTES # BLD AUTO: 0 X10E3/UL (ref 0–0.1)
IMM GRANULOCYTES NFR BLD AUTO: 0 %
LYMPHOCYTES # BLD AUTO: 0.6 X10E3/UL (ref 0.7–3.1)
LYMPHOCYTES NFR BLD AUTO: 15 %
MAGNESIUM SERPL-MCNC: 1.8 MG/DL (ref 1.6–2.3)
MCH RBC QN AUTO: 27.3 PG (ref 26.6–33)
MCHC RBC AUTO-ENTMCNC: 31.1 G/DL (ref 31.5–35.7)
MCV RBC AUTO: 88 FL (ref 79–97)
MONOCYTES # BLD AUTO: 0.2 X10E3/UL (ref 0.1–0.9)
MONOCYTES NFR BLD AUTO: 5 %
NEUTROPHILS # BLD AUTO: 3.1 X10E3/UL (ref 1.4–7)
NEUTROPHILS NFR BLD AUTO: 74 %
NT-PROBNP SERPL-MCNC: 149 PG/ML (ref 0–301)
PLATELET # BLD AUTO: 209 X10E3/UL (ref 150–450)
POTASSIUM SERPL-SCNC: 4.4 MMOL/L (ref 3.5–5.2)
PROT SERPL-MCNC: 7.8 G/DL (ref 6–8.5)
RBC # BLD AUTO: 3.81 X10E6/UL (ref 3.77–5.28)
SODIUM SERPL-SCNC: 140 MMOL/L (ref 134–144)
WBC # BLD AUTO: 4.2 X10E3/UL (ref 3.4–10.8)

## 2021-04-16 ENCOUNTER — HOME CARE VISIT (OUTPATIENT)
Dept: SCHEDULING | Facility: HOME HEALTH | Age: 71
End: 2021-04-16
Payer: MEDICARE

## 2021-04-16 ENCOUNTER — TELEPHONE (OUTPATIENT)
Dept: CARDIOLOGY CLINIC | Age: 71
End: 2021-04-16

## 2021-04-16 VITALS
DIASTOLIC BLOOD PRESSURE: 70 MMHG | HEART RATE: 77 BPM | BODY MASS INDEX: 30.38 KG/M2 | OXYGEN SATURATION: 98 % | WEIGHT: 177 LBS | SYSTOLIC BLOOD PRESSURE: 132 MMHG | TEMPERATURE: 98 F

## 2021-04-16 LAB
DIGOXIN SERPL-MCNC: 1.4 NG/ML (ref 0.5–0.9)
SPECIMEN STATUS REPORT, ROLRST: NORMAL

## 2021-04-16 PROCEDURE — G0300 HHS/HOSPICE OF LPN EA 15 MIN: HCPCS

## 2021-04-16 RX ORDER — DOBUTAMINE HYDROCHLORIDE 200 MG/100ML
5 INJECTION INTRAVENOUS CONTINUOUS
Qty: 250 ML | Refills: 0 | Status: SHIPPED | OUTPATIENT
Start: 2021-04-16 | End: 2021-04-30 | Stop reason: SDUPTHER

## 2021-04-16 NOTE — TELEPHONE ENCOUNTER
Received dig level from 3/24 on 4/16. Notable for level 1.4. Will ask HH to repeat with next lab draw.

## 2021-04-16 NOTE — TELEPHONE ENCOUNTER
Received dig level from 3/24 on 4/16. Notable for level 1.4. Will ask HH to repeat with next lab draw. Sent message to Ant Kimball with [facility] requesting dig level be drawn at next lab draw.  Mariam Potter RN

## 2021-04-16 NOTE — TELEPHONE ENCOUNTER
Called and spoke with patient to inquire on her progress on her LVAD/Transplant work up. Patient states she had her pap smear but has not scheduled her mammogram or ophthalmology appointments. Reminded patient to call Sleep Medicine as they have called and left messages for her. Patient verbalized agreement to call sleep mediciane and to make appointments for mammogram and ophthalmology. [facility]ed request to [facility] for pap smear results.

## 2021-04-19 ENCOUNTER — TRANSCRIBE ORDER (OUTPATIENT)
Dept: SCHEDULING | Age: 71
End: 2021-04-19

## 2021-04-19 ENCOUNTER — TELEPHONE (OUTPATIENT)
Dept: CARDIOLOGY CLINIC | Age: 71
End: 2021-04-19

## 2021-04-19 DIAGNOSIS — Z12.31 VISIT FOR SCREENING MAMMOGRAM: Primary | ICD-10-CM

## 2021-04-19 NOTE — TELEPHONE ENCOUNTER
Contacted Ant Kimball with [facility] and requested dig level to be drawn.  RN is scheduled to see patient tomorrow 4/20/21. Discussed with Nick Eddy NP and ok for lab draw to be done tomorrow. Collette made aware.  Mariam Potter, RN

## 2021-04-19 NOTE — TELEPHONE ENCOUNTER
----- Message from Wilfredo Cat MD sent at 4/17/2021  3:46 PM EDT -----  Decrease digoxin to 0.125 daily  ----- Message -----  From: Tatiana Mercado Lab Results In  Sent: 3/26/2021   6:09 AM EDT  To: Wilfredo Cat MD    Dig level is from 3/24-new dig level is pending, will await new dig level.

## 2021-04-20 ENCOUNTER — HOME CARE VISIT (OUTPATIENT)
Dept: SCHEDULING | Facility: HOME HEALTH | Age: 71
End: 2021-04-20
Payer: MEDICARE

## 2021-04-20 ENCOUNTER — HOSPITAL ENCOUNTER (OUTPATIENT)
Dept: MAMMOGRAPHY | Age: 71
Discharge: HOME | End: 2021-04-20
Attending: NURSE PRACTITIONER
Payer: MEDICARE

## 2021-04-20 VITALS
BODY MASS INDEX: 30.07 KG/M2 | HEART RATE: 87 BPM | OXYGEN SATURATION: 97 % | RESPIRATION RATE: 18 BRPM | WEIGHT: 175.2 LBS | TEMPERATURE: 97.7 F | DIASTOLIC BLOOD PRESSURE: 80 MMHG | SYSTOLIC BLOOD PRESSURE: 120 MMHG

## 2021-04-20 DIAGNOSIS — Z12.31 VISIT FOR SCREENING MAMMOGRAM: ICD-10-CM

## 2021-04-20 PROCEDURE — 77067 SCR MAMMO BI INCL CAD: CPT

## 2021-04-20 PROCEDURE — 400013 HH SOC

## 2021-04-20 PROCEDURE — G0299 HHS/HOSPICE OF RN EA 15 MIN: HCPCS

## 2021-04-21 ENCOUNTER — TELEPHONE (OUTPATIENT)
Dept: CARDIOLOGY CLINIC | Age: 71
End: 2021-04-21

## 2021-04-21 ENCOUNTER — OFFICE VISIT (OUTPATIENT)
Dept: CARDIOLOGY CLINIC | Age: 71
End: 2021-04-21
Payer: MEDICARE

## 2021-04-21 VITALS
HEART RATE: 78 BPM | HEIGHT: 64 IN | TEMPERATURE: 98.5 F | OXYGEN SATURATION: 98 % | WEIGHT: 178 LBS | DIASTOLIC BLOOD PRESSURE: 62 MMHG | RESPIRATION RATE: 16 BRPM | BODY MASS INDEX: 30.39 KG/M2 | SYSTOLIC BLOOD PRESSURE: 122 MMHG

## 2021-04-21 DIAGNOSIS — Z79.899 RECEIVING INOTROPIC MEDICATION: ICD-10-CM

## 2021-04-21 DIAGNOSIS — I42.8 NICM (NONISCHEMIC CARDIOMYOPATHY) (HCC): Primary | ICD-10-CM

## 2021-04-21 DIAGNOSIS — R06.09 DOE (DYSPNEA ON EXERTION): ICD-10-CM

## 2021-04-21 DIAGNOSIS — I50.20 NYHA CLASS 4 HEART FAILURE WITH REDUCED EJECTION FRACTION (HCC): ICD-10-CM

## 2021-04-21 LAB — DIGOXIN SERPL-MCNC: 1 NG/ML (ref 0.5–0.9)

## 2021-04-21 PROCEDURE — G8536 NO DOC ELDER MAL SCRN: HCPCS | Performed by: NURSE PRACTITIONER

## 2021-04-21 PROCEDURE — G8427 DOCREV CUR MEDS BY ELIG CLIN: HCPCS | Performed by: NURSE PRACTITIONER

## 2021-04-21 PROCEDURE — G8432 DEP SCR NOT DOC, RNG: HCPCS | Performed by: NURSE PRACTITIONER

## 2021-04-21 PROCEDURE — G8417 CALC BMI ABV UP PARAM F/U: HCPCS | Performed by: NURSE PRACTITIONER

## 2021-04-21 PROCEDURE — G9899 SCRN MAM PERF RSLTS DOC: HCPCS | Performed by: NURSE PRACTITIONER

## 2021-04-21 PROCEDURE — G8752 SYS BP LESS 140: HCPCS | Performed by: NURSE PRACTITIONER

## 2021-04-21 PROCEDURE — 1090F PRES/ABSN URINE INCON ASSESS: CPT | Performed by: NURSE PRACTITIONER

## 2021-04-21 PROCEDURE — G8754 DIAS BP LESS 90: HCPCS | Performed by: NURSE PRACTITIONER

## 2021-04-21 PROCEDURE — 94618 PULMONARY STRESS TESTING: CPT | Performed by: NURSE PRACTITIONER

## 2021-04-21 PROCEDURE — 3017F COLORECTAL CA SCREEN DOC REV: CPT | Performed by: NURSE PRACTITIONER

## 2021-04-21 PROCEDURE — 99214 OFFICE O/P EST MOD 30 MIN: CPT | Performed by: NURSE PRACTITIONER

## 2021-04-21 PROCEDURE — 1101F PT FALLS ASSESS-DOCD LE1/YR: CPT | Performed by: NURSE PRACTITIONER

## 2021-04-21 PROCEDURE — G8399 PT W/DXA RESULTS DOCUMENT: HCPCS | Performed by: NURSE PRACTITIONER

## 2021-04-21 RX ORDER — AMOXICILLIN 250 MG
2 CAPSULE ORAL DAILY
Qty: 30 TAB | Refills: 1 | Status: SHIPPED | OUTPATIENT
Start: 2021-04-21 | End: 2021-06-18

## 2021-04-21 RX ORDER — SACUBITRIL AND VALSARTAN 24; 26 MG/1; MG/1
1 TABLET, FILM COATED ORAL 2 TIMES DAILY
Qty: 60 TAB | Refills: 1 | Status: SHIPPED | OUTPATIENT
Start: 2021-04-21 | End: 2021-06-20

## 2021-04-21 NOTE — PROGRESS NOTES
[address] [facility] in [place],  [facility]. Note      Patient name: Briseida Thompson  Patient : 1950  Patient MRN: [id number]  Date of service: 21      Chief Complaint   Patient presents with    CHF    Leg Swelling          PLAN:  Continue dobutamine 5 mcg/kg/min  No beta-blockers while on dobutamine gtt  Cont digoxin 0.125mg daily; (goal 0.7-1.2)  Start low dose Entresto 24/26mg PO BID   Cont hydralazine 100 mg PO TID  Cont Imdur 30mg PO BID  Continue bumex 2mg PO daily  Cont Corlanor 2.5mg BID  Continue lower dose of Ditropan   Cont spironolactone 25mg daily  Continue magnesium oxide 800mg twice daily  Hold statin due to elevated tbili and transaminitis- resume when appropriate   Continue high dose vitamin D weekly  Continue monthly labs with Home Health   Daily weights at home   Daily BP monitoring, record and bring to next clinic visit  Low Na+ diet  Start Preethi colace for constipation   Genetic testing VUS x 3- family testing information given   PYP equivocal for amyloid and gammopathy- no M spike  Palliative Care consultation appreciated  Brandon Briones discontinued due to EF recovery   Follow up in 1 month with repeat TTE before next appt     Needs:  · Will need to follow up on pneumonia vaccine with PCP  · Referral to sleep medicine for sleep study - ordered (3/25/21)  · DEXA scan- 21- osteopenic but no treatment indicated  · Carotid dopplers (21)  Unchanged   · GYN appointment on 21) re: mammogram and pap smear- pending   · Podiatrist re: DM2 evaluation - ordered (3/25/21)      IMPRESSION:    Chronic systolic heart failure  · Stage D, NYHA class IV symptoms, INTERMACS 2  · Non-ischemic cardiomyopathy, recent deterioration to severe LV dysfunction  · Normal cors (by [facility] 3/11/21)  · LVEF 15-20% (by echo 3/9/21) from 20-25% (by echo 2021) from 51-55% (by echo 2019)  · Severe pulmonary artery hypertension, PVR 4  · RV dysfunction, moderate-severe  · Low cardiac index at rest 1.3  · Inotropic-dependence  Anemia  Hypokalemia/hypomagensemia  Cardiac risk factors  · Morbid obesity (BMI 32)  · High risk for SHARLENE  · DM2 (hgba1c 6.8)  · HL  · HTN  · Former tobacco (stopped 2014 after > 40 years)  · H/o alcohol abuse  OA, right knee  Carpal tunnel syndrome  Gout  Iron deficiency  Vitamin D deficincy       HISTORY OF PRESENT ILLNESS:  Carlos Braun a 73 y.o. female with a past medical history of nonischemic hypertensive cardiomyopathy, type 2 diabetes, CKD, hypertension, COPD/reactive airway disease, former smoker who presented to clinic yesterday for her routine visit with Dr. Marko Mendez. Agustin Haney was obviously quite dyspneic, moderate distress and subsequently admitted through the ER for decompensated systolic heart failure. Virgil Trevino initial diagnosis of heart failure occurred 2007 she was previously followed by Dr. Emma Madera at [facility] up until 2015 where she changed to Dr. Ana Cristina Suarez due to insurance coverage. Virgil Trevino ejection fraction at [facility] in 2015 was 45-50%, at the time she had been maintained on high-dose diuretics to maintain a euvolemic state, metoprolol 100 mg daily as well as lisinopril 20 mg daily.  Ms. Alysha Batista had been doing well on guideline directed medical therapy, she was on significant doses of diuretics to include upwards of 120 mg of furosemide along with 5 mg of metolazone daily. Argyle Bending has been recommended since she had maintained a euvolemic state and essentially near normal ejection fraction that reducing and/or eliminating metolazone due to its potential for renal decline; this conversation occurred with patient by Dr. Marko Mendez on 9/17/2020. Ms. Alysha Batista underwent a full evaluation for her HF and a VAD/OHT evaluation. She was started on Dobutamine that was up titrated to 7.5mcg/kg/min and discharged home on that dose. Ms. Alysha Batista presents today for HF follow up, she remains on dobutamine gtt at a decreased dose 5mcg/kg/min.  She states she is doing well from a HF perspectives, her biggest complaint today is constipation since Sunday with a correlating decrease in appetite. She denies SOB, CP, palpitation, edema, dizziness and headaches. She does state that the PICC line does not draw blood and will need alteplase. She is completing her LVAD evaluation however with the increase in EF, hopefully she will be able to wean off inotropes and remains on GDMT. CARDIAC IMAGING:  RHC 3/17/21  PA 47/20 (29), RA 7, PCWP 10, CI 2.93  RHC (3/11/21) 25, PA 49/27/37, W 34, Allen CI 1.34    Echo (4/8/21) LVEF 50-55%, LIVDd 5.04cm, Tapse 2.38cm, RVIDd 2.99cm  Echo (3/9/21) LVEF 15-20%, mild-moderate MR, severely elevated CVP, IVC > 21mm  Echo (1/29/21) LVEF 20-25%, moderately to severely reduced RV function  Echo (11/20/19) LVEF 51-55%  Echo (12/18/18) LVEF 50-55%  Echo (11/23/16) LVEF 45-55%    EKG (3/9/21) ST 103bpm, QRS 84ms  NST (5/2018) no scar or ischemia, LVEF 48%  Normal cors (by [facility] 3/11/21)     PHYSICAL EXAM:  Visit Vitals  /62   Pulse 78   Temp 98.5 °F (36.9 °C) (Oral)   Resp 16   Ht 5' 4\" (1.626 m)   Wt 178 lb (80.7 kg)   SpO2 98%   BMI 30.55 kg/m²     General: Patient is well developed, well-nourished in no acute distress  HEENT: Normocephalic and atraumatic. No scleral icterus. Pupils are equal, round and reactive to light and accomodation. No conjunctival injection. Oropharynx is clear. Neck: Supple. No evidence of thyroid enlargements or lymphadenopathy. JVD: Cannot be appreciated   Lungs: Breath sounds are equal and clear bilaterally. No wheezes, rhonchi, or rales. Heart: Regular rate and rhythm with normal S1 and S2. No murmurs, gallops or rubs. +LifeVest  Abdomen: Soft, no mass or tenderness. No organomegaly or hernia. Bowel sounds present. Genitourinary and rectal: deferred  Extremities: No cyanosis, clubbing, or edema. Neurologic: No focal sensory or motor deficits are noted. Grossly intact. Psychiatric: Awake, alert and oriented x 3. Appropriate mood and affect. Skin: Warm, dry and well perfused. No lesions, nodules or rashes are noted. REVIEW OF SYSTEMS:  General: Denies fever, night sweats. Ear, nose and throat: Denies difficulty hearing, sinus problems, runny nose, post-nasal drip, ringing in ears, mouth sores, loose teeth, ear pain, nosebleeds, sore throate, facial pain or numbess  Cardiovascular: see above in the interval history  Respiratory: Denies cough, wheezing, sputum production, hemoptysis.    Gastrointestinal: + Constipation, Denies heartburn, , intolerance to certain foods, diarrhea, abdominal pain, nausea, vomiting, difficulty swallowing, blood in stool  Kidney and bladder: Denies painful urination, frequent urination, urgency  Musculoskeletal: Denies joint pain, muscle weakness  Skin and hair: Denies change in existing skin lesions, hair loss or increase, breast changes    PAST MEDICAL HISTORY:  Past Medical History:   Diagnosis Date    Arthritis     Diabetes [facility]     Heart failure ([facility]     Hypercholesterolemia     Hypertension        PAST SURGICAL HISTORY:  Past Surgical History:   Procedure Laterality Date    COLONOSCOPY N/A 3/19/2021    COLONOSCOPY performed by Nitin Rogers MD at [facility] ENDOSCOPY    HX ORTHOPAEDIC      Arthroscopy right knee    MA CARDIAC SURG PROCEDURE UNLIST      life vest       FAMILY HISTORY:  Family History   Problem Relation Age of Onset    Diabetes Mother        SOCIAL HISTORY:  Social History     Socioeconomic History    Marital status:      Spouse name: Not on file    Number of children: 3    Years of education: 15    Highest education level: Not on file   Occupational History    Occupation: retired   Tobacco Use    Smoking status: Former Smoker     Packs/day: 0.25     Years: 20.00     Pack years: 5.00     Quit date: 2014     Years since quittin.3    Smokeless tobacco: Never Used   Substance and Sexual Activity    Alcohol use: Not Currently Alcohol/week: 0.0 standard drinks     Comment: 21-24 Beers per week    Drug use: No    Sexual activity: Yes     Partners: Male       LABORATORY RESULTS:     Labs Latest Ref Rng & Units 4/13/2021 4/7/2021 3/30/2021 3/24/2021 3/20/2021 3/19/2021 3/18/2021   WBC 3.4 - 10.8 x10E3/uL 4.2 4.1 CANCELED 6.2 - - -   RBC 3.77 - 5.28 x10E6/uL 3.81 4.02 CANCELED 4.12 - - -   Hemoglobin 11.1 - 15.9 g/dL 10. 4(L) 11.2 CANCELED 11.4 - - -   Hematocrit 34.0 - 46.6 % 33. 4(L) 35.1 CANCELED 35.8 - - -   MCV 79 - 97 fL 88 87 - 87 - - -   Platelets 459 - 322 X92H3/ 246 CANCELED 246 - - -   Lymphocytes 12 - 49 % - - - - - - -   Monocytes Not Estab. % 5 6 CANCELED 7 - - -   Eosinophils Not Estab. % 5 4 CANCELED 3 - - -   Basophils Not Estab. % 1 1 - 1 - - -   Albumin 3.8 - 4.8 g/dL 4.3 4.5 4.5 4.1 2.9(L) 3.0(L) 2. 9(L)   Calcium 8.7 - 10.3 mg/dL 9.9 9.6 10.2 10.1 9.1 9.5 9.2   Glucose 65 - 99 mg/dL 146(H) 179(H) 188(H) 79 110(H) 121(H) 159(H)   BUN 8 - 27 mg/dL 20 29(H) 26 27 18 17 26(H)   Creatinine 0.57 - 1.00 mg/dL 0.96 1.02(H) 1.09(H) 1.05(H) 0.68 0.61 0.91   Sodium 134 - 144 mmol/L 140 138 138 142 134(L) 134(L) 133(L)   Potassium 3.5 - 5.2 mmol/L 4.4 4.8 5.3(H) 4.1 3.7 3.6 4.6   TSH 0.36 - 3.74 uIU/mL - - - - - - -   LDH 81 - 246 U/L - - - - - - 249(H)   Some recent data might be hidden     Lab Results   Component Value Date/Time    TSH 2.75 03/11/2021 10:31 AM    TSH 1.050 10/08/2020 12:01 PM    TSH 1.200 08/08/2019 02:31 PM    TSH 1.280 08/26/2016 11:48 AM       ALLERGY:  No Known Allergies     CURRENT MEDICATIONS:    Current Outpatient Medications:     senna-docusate (PERICOLACE) 8.6-50 mg per tablet, Take 2 Tabs by mouth daily. , Disp: 30 Tab, Rfl: 1    DOBUTamine (DOBUTREX) 500 mg/250 mL (2,000 mcg/mL) infusion, 415 mcg/min by IntraVENous route continuous. Based on 86.6 kg (4/1/21) (Patient taking differently: 5 mcg/kg/min by IntraVENous route continuous.  Based on 86.6 kg (4/1/21)  Based on 80.8kg (4/21/21)), Disp: 250 mL, Rfl: 0    isosorbide mononitrate ER (IMDUR) 60 mg CR tablet, Take 1 Tab by mouth two (2) times a day., Disp: 60 Tab, Rfl: 1    diclofenac (VOLTAREN) 1 % gel, Apply 2 g to affected area two (2) times daily as needed for Pain. thin layer to joint pain, Disp: , Rfl:     sodium chloride (Normal Saline Flush), 10-20 mL by InterCATHeter route daily. , Disp: , Rfl:     heparin sod,porcine/0.9 % NaCl (HEPARIN FLUSH IV), 3-5 mL by IntraCATHeter route daily. , Disp: , Rfl:     budesonide (PULMICORT) 0.25 mg/2 mL nbsp, 500 mcg by Nebulization route every six (6) hours as needed for Other (wheezing shortness of breath). , Disp: , Rfl:     oxybutynin (DITROPAN) 5 mg tablet, Take 0.5 Tabs by mouth three (3) times daily. , Disp: 90 Tab, Rfl: 11    bumetanide (BUMEX) 2 mg tablet, Take 1 Tab by mouth daily. , Disp: 90 Tab, Rfl: 2    digoxin (LANOXIN) 0.25 mg tablet, Take 1 Tab by mouth daily. , Disp: 90 Tab, Rfl: 1    ergocalciferol (ERGOCALCIFEROL) 1,250 mcg (50,000 unit) capsule, Take 1 Cap by mouth every seven (7) days. , Disp: 21 Cap, Rfl: 0    hydrALAZINE (APRESOLINE) 100 mg tablet, Take 1 Tab by mouth three (3) times daily. , Disp: 240 Tab, Rfl: 2    ivabradine (CORLANOR) 5 mg tablet, Take 0.5 Tabs by mouth two (2) times daily (with meals). , Disp: 180 Tab, Rfl: 1    magnesium oxide (MAG-OX) 400 mg tablet, Take 2 Tabs by mouth two (2) times a day., Disp: 180 Tab, Rfl: 1    melatonin 3 mg tablet, Take 1 Tab by mouth nightly as needed for Insomnia., Disp: 90 Tab, Rfl: 0    spironolactone (ALDACTONE) 25 mg tablet, Take 1 Tab by mouth daily. , Disp: 90 Tab, Rfl: 1    thiamine HCL (B-1) 100 mg tablet, Take 1 Tab by mouth daily. , Disp: 90 Tab, Rfl: 1    fluticasone propion-salmeteroL (ADVAIR/WIXELA) 250-50 mcg/dose diskus inhaler, Take 2 Puffs by inhalation two (2) times a day., Disp: , Rfl:     fluticasone propionate (FLONASE) 50 mcg/actuation nasal spray, 2 Sprays by Both Nostrils route daily. , Disp: 1 Bottle, Rfl: 0   allopurinoL (ZYLOPRIM) 300 mg tablet, TAKE 1 TABLET BY MOUTH DAILY, Disp: 90 Tab, Rfl: 3    insulin glargine (LANTUS,BASAGLAR) 100 unit/mL (3 mL) inpn, 60 units every morning (Patient taking differently: 50 Units by SubCUTAneous route daily. 60 units every morning), Disp: 6 Adjustable Dose Pre-filled Pen Syringe, Rfl: 11    PATIENT CARE TEAM:  Patient Care Team:  Ralph Argueta MD as PCP - General (Internal Medicine)  Ralph Argueta MD as PCP - [facility]  Delia Dickson MD as Physician (Cardiology)  Shelly Penny MD as Surgeon (Orthopedic Surgery)     Thank you for allowing me to participate in this patient's care.     Glenroy Walters, SANGEETA  [address] [place] [address], [address]  Phone: [phone]

## 2021-04-21 NOTE — PATIENT INSTRUCTIONS
Medication changes:    Start taking entresto 24/26 mg twice daily-samples given    Please take this to your pharmacy to notify them of the change in medications. Testing Ordered:    Echocardiogram scheduled for Monday 4/26/21 at 9:00 am at [facility]    Other Recommendations:     Call Sleep Medicine [phone] to make appt    Please call [phone] regarding your bill    You will be called regarding having Alteplase (clot buster medicine in your PICC line) to go to the Outpatient infusion center     An order for an echocardiogram has been placed to be done prior to your next appt. You will be receiving an automated call from [facility] to schedule this test. If you are unavailable to receive the call or would like to contact coordination of care yourself you may contact [phone] to schedule. You will need to contact coordination  care yourself if you miss their calls as they will only make 3 attempts to reach you. [employer] genetic testing results have been reviewed today. Your testing results qualify for complimentary genetic counseling through [employer]. Please visit [email or web address] to schedule your telephone genetic counseling appointment. Your RQ number from your test is [id number]. You will be asked for this number when you schedule the appointment. Your [employer] Genetic Testing results qualify for complimentary family testing for up to two blood relatives. If you would like to proceed with family testing, please contact the [address] [place] [address] at [phone] Option 2 to provide the following information:    -Name of Family Member  -Date of Birth  -Home Address  -Phone Number  -Email (Optional)   -Any cardiac history (Optional)    Our office will order the test and a testing kit will be sent directly to the home of your family member.  Instructions for specimen collection and shipping material to return the specimen to [employer] will be provided with the kit. This test is complimentary and is at no cost to you or your family members. Ensure your drinking an adequate amount of water with a goal of 6-8 eight ounce glasses (1.5-2 liters) of fluid daily. Your urine should be clear and light yellow straw colored. If your blood pressure begins to consistently run below 90/60 and/or you begin to experience dizziness or lightheadedness, please contact the [facility] at [phone]. Follow up in one month with [facility]      Please monitor your weights daily upon waking and after using the bathroom. Keep a written records of your weights and bring to your next appointment. If you have a weight gain of 3 or more pounds overnight OR 5 or more pounds in one week please contact our office. Thank you for allowing us the privilege of being a part of your healthcare team! Please do not hesitate to contact our office at [phone] with any questions or concerns. Virtual Heart Failure [address] invites you to learn more about heart failure and to share your questions, ideas, and experiences with others. Each month, the Heart Failure Support Group features a new educational topic and a guest speaker, followed by an interactive discussion. Our Heart Failure Nurse Navigator will moderate each session. You will be able to participate by phone, tablet or computer through [address] [place] [address]. This support group takes place on the 3rd Thursday of each month from 6:00-7:30PM. All individuals living with heart failure and their caregivers are welcome to join. If you are interested in participating, please contact us at [email or web address] and you will be sent the link to join the [employer].

## 2021-04-21 NOTE — PROCEDURES
[address]  PULMONARY FUNCTION TEST    Name:  Rik Hooks  MR#:  [id number]  :  1950  ACCOUNT #:  [de-identified]  DATE OF SERVICE:  2021      Spirometry reveals no evidence of airflow obstruction or restriction. Lung volumes show mild restriction with a total lung capacity of 73%. Mild diffusion impairment is seen which corrects when adjusted for alveolar volume. In summary, mild restricted physiology is seen, which could be explained by heart failure. Clinical correlation is advised.       DO CHAPIN Bradford/[email or web address]/  D:  2021 9:10  T:  2021 12:32  JOB #:  [id number]

## 2021-04-21 NOTE — TELEPHONE ENCOUNTER
Called ariella  requesting recent digoxin level.  stated that she would have patients nurse return call with digoxin level. Also requested that labs with digoxin level be faxed to [facility]. Provided [facility] call back number and fax number.  Violeta Reed RN

## 2021-04-22 ENCOUNTER — TELEPHONE (OUTPATIENT)
Dept: CARDIOLOGY CLINIC | Age: 71
End: 2021-04-22

## 2021-04-22 DIAGNOSIS — I50.20 NYHA CLASS 4 HEART FAILURE WITH REDUCED EJECTION FRACTION (HCC): Primary | ICD-10-CM

## 2021-04-22 RX ORDER — DIGOXIN 250 MCG
0.12 TABLET ORAL DAILY
Qty: 90 TAB | Refills: 1
Start: 2021-04-22 | End: 2021-05-17 | Stop reason: ALTCHOICE

## 2021-04-22 NOTE — TELEPHONE ENCOUNTER
1046 - Called patient using two patient identifiers. Patient stated she had her GYN appointment on 4/6/21 at [facility] ([phone]). Called [facility] and requested office notes and pap smear for LVAD workup. Awaiting fax. 1200 - received records from [facility]. Results scanned into media.

## 2021-04-22 NOTE — TELEPHONE ENCOUNTER
Called patient using two patient identifiers. Confirmed patients dosing of 0.125 mg of digoxin. Patient states that she currently takes 1/2 pill of 0.25mg of digoxin. Patient notified per JUVE sparrow to continue taking 0.125 mg daily and that we will recheck labs in one month. Patient stated understanding and had no further questions.  Anita GUTIERREZ

## 2021-04-23 ENCOUNTER — DOCUMENTATION ONLY (OUTPATIENT)
Dept: CARDIOLOGY CLINIC | Age: 71
End: 2021-04-23

## 2021-04-23 ENCOUNTER — TELEPHONE (OUTPATIENT)
Dept: CARDIOLOGY CLINIC | Age: 71
End: 2021-04-23

## 2021-04-23 NOTE — TELEPHONE ENCOUNTER
Documentation regarding prior auth for echo.  Contacted Jamarcus Rodriguez and advised [facility] faxed to [facility] instead of to her. Rambo Arce RN

## 2021-04-26 ENCOUNTER — HOSPITAL ENCOUNTER (OUTPATIENT)
Dept: NON INVASIVE DIAGNOSTICS | Age: 71
Discharge: HOME | End: 2021-04-26
Attending: NURSE PRACTITIONER
Payer: MEDICARE

## 2021-04-26 ENCOUNTER — HOSPITAL ENCOUNTER (OUTPATIENT)
Dept: INFUSION THERAPY | Age: 71
Discharge: HOME | End: 2021-04-26
Payer: MEDICARE

## 2021-04-26 ENCOUNTER — TELEPHONE (OUTPATIENT)
Dept: CARDIOLOGY CLINIC | Age: 71
End: 2021-04-26

## 2021-04-26 VITALS
DIASTOLIC BLOOD PRESSURE: 60 MMHG | TEMPERATURE: 97.6 F | SYSTOLIC BLOOD PRESSURE: 124 MMHG | HEART RATE: 79 BPM | RESPIRATION RATE: 18 BRPM

## 2021-04-26 DIAGNOSIS — R06.09 DOE (DYSPNEA ON EXERTION): ICD-10-CM

## 2021-04-26 DIAGNOSIS — I50.20 NYHA CLASS 4 HEART FAILURE WITH REDUCED EJECTION FRACTION (HCC): ICD-10-CM

## 2021-04-26 DIAGNOSIS — I50.20 NYHA CLASS 4 HEART FAILURE WITH REDUCED EJECTION FRACTION (HCC): Primary | ICD-10-CM

## 2021-04-26 LAB
ECHO AO ROOT DIAM: 2.45 CM
ECHO AV AREA PEAK VELOCITY: 1.73 CM2
ECHO AV AREA PLAN: 1.72 CM2
ECHO AV MEAN GRADIENT: 7.35 MMHG
ECHO AV PEAK GRADIENT: 14.6 MMHG
ECHO AV PEAK VELOCITY: 191.02 CM/S
ECHO AV VTI: 32.47 CM
ECHO EST RA PRESSURE: 5 MMHG
ECHO LA AREA 4C: 22.39 CM2
ECHO LA MAJOR AXIS: 3.74 CM
ECHO LA VOL 4C: 60.35 ML (ref 22–52)
ECHO LV EDV A4C: 198.95 ML
ECHO LV EJECTION FRACTION A4C: 63 PERCENT
ECHO LV ESV A4C: 74.56 ML
ECHO LV INTERNAL DIMENSION DIASTOLIC: 5.35 CM (ref 3.9–5.3)
ECHO LV INTERNAL DIMENSION SYSTOLIC: 4.41 CM
ECHO LV IVSD: 0.91 CM (ref 0.6–0.9)
ECHO LV MASS 2D: 203.6 G (ref 67–162)
ECHO LV POSTERIOR WALL DIASTOLIC: 1.09 CM (ref 0.6–0.9)
ECHO LVOT DIAM: 1.97 CM
ECHO LVOT PEAK GRADIENT: 4.71 MMHG
ECHO LVOT PEAK VELOCITY: 108.45 CM/S
ECHO MV A VELOCITY: 69.43 CM/S
ECHO MV AREA PHT: 3.65 CM2
ECHO MV AREA PHT: 3.75 CM2
ECHO MV AREA PLAN: 3.84 CM2
ECHO MV E DECELERATION TIME (DT): 207.9 MS
ECHO MV E VELOCITY: 35.87 CM/S
ECHO MV E/A RATIO: 0.52
ECHO MV MAX VELOCITY: 110.26 CM/S
ECHO MV MEAN GRADIENT: 1.7 MMHG
ECHO MV PEAK GRADIENT: 4.86 MMHG
ECHO MV PRESSURE HALF TIME (PHT): 58.72 MS
ECHO MV PRESSURE HALF TIME (PHT): 60.29 MS
ECHO MV VTI: 41.45 CM
ECHO PV PEAK INSTANTANEOUS GRADIENT SYSTOLIC: 8.13 MMHG
ECHO PV REGURGITANT MAX VELOCITY: 142.6 CM/S
ECHO RA AREA 4C: 11.23 CM2
ECHO RIGHT VENTRICULAR SYSTOLIC PRESSURE (RVSP): 33.33 MMHG
ECHO TV REGURGITANT MAX VELOCITY: 264.7 CM/S
ECHO TV REGURGITANT MAX VELOCITY: 407.46 CM/S
ECHO TV REGURGITANT PEAK GRADIENT: 28.33 MMHG

## 2021-04-26 PROCEDURE — 74011000250 HC RX REV CODE- 250: Performed by: NURSE PRACTITIONER

## 2021-04-26 PROCEDURE — 93306 TTE W/DOPPLER COMPLETE: CPT

## 2021-04-26 PROCEDURE — 74011250636 HC RX REV CODE- 250/636: Performed by: NURSE PRACTITIONER

## 2021-04-26 PROCEDURE — 96374 THER/PROPH/DIAG INJ IV PUSH: CPT

## 2021-04-26 RX ADMIN — ALTEPLASE 2 MG: 2.2 INJECTION, POWDER, LYOPHILIZED, FOR SOLUTION INTRAVENOUS at 15:56

## 2021-04-26 NOTE — PROGRESS NOTES
Please call Genet Ruggiero with their normal lab result. TTE remains with an EF 55-60%, please have Dobutamine decreased to 2.5mcg/kg/min and recheck TTE in 2 weeks.

## 2021-04-26 NOTE — PROGRESS NOTES
[facility] Short Note                       Date: 2021    Name: Klever Cavazos    MRN: [id number]         : 1950      1545 Pt admit to [facility] for [place] ambulatory in stable condition. Assessment completed. No new concerns voiced. Cathflo administered, blood returned obtained. Ms. Angely Crocker vitals were reviewed prior to and after treatment. Patient Vitals for the past 12 hrs:   Temp Pulse Resp BP   21 1630 97.6 °F (36.4 °C) 79 18 124/60         Lab results were obtained and reviewed.   Recent Results (from the past 12 hour(s))   ECHO ADULT COMPLETE    Collection Time: 21 10:02 AM   Result Value Ref Range    MV E/A 0.52     LV Mass .6 67.0 - 162.0 g    IVSd 0.91 (A) 0.60 - 0.90 cm    LVIDd 5.35 (A) 3.90 - 5.30 cm    LVIDs 4.41 cm    LVOT d 1.97 cm    LVPWd 1.09 (A) 0.60 - 0.90 cm    LV Ejection Fraction MOD 4C 63 percent    LV ED Vol A4C 198.95 mL    LV ES Vol A4C 74.56 mL    LVOT Peak Gradient 4.71 mmHg    LVOT Peak Velocity 108.45 cm/s    RVSP 33.33 mmHg    Left Atrium Major Axis 3.74 cm    LA Area 4C 22.39 cm2    LA Vol 4C 60.35 (A) 22.00 - 52.00 mL    Right Atrial Area 4C 11.23 cm2    Est. RA Pressure 5.00 mmHg    Aortic Valve Area by Planimetry 1.72 cm2    Aortic Valve Area by Continuity of Peak Velocity 1.73 cm2    AoV PG 14.60 mmHg    Aortic Valve Systolic Mean Gradient 2.36 mmHg    Aortic Valve Systolic Peak Velocity 176.87 cm/s    AoV VTI 32.47 cm    Mitral Valve Area by Planimetry 3.84 cm2    MV A Omar 69.43 cm/s    Mitral Valve E Wave Deceleration Time 207.90 ms    MV E Omar 35.87 cm/s    Mitral Valve Pressure Half-time 60.29 ms    MVA (PHT) 3.65 cm2    TR Max Velocity 407.46 cm/s    MV Peak Gradient 4.86 mmHg    MV Mean Gradient 1.70 mmHg    Mitral Valve Pressure Half-time 58.72 ms    Mitral Valve Max Velocity 110.26 cm/s    Mitral Valve Annulus Velocity Time Integral 41.45 cm    MVA (PHT) 3.75 cm2    Pulmonic Valve Systolic Peak Instantaneous Gradient 8.13 mmHg Pulmonic Regurgitant End Max Velocity 142.60 cm/s    Triscuspid Valve Regurgitation Peak Gradient 28.33 mmHg    TR Max Velocity 264.70 cm/s    Ao Root D 2.45 cm         Medications Administered     alteplase (CATHFLO) 2 mg in sterile water (preservative free) 2 mL injection     Admin Date  04/26/2021 Action  Given Dose  2 mg Route  InterCATHeter Administered By  Albert Esquivel RN                Port accessed with positive blood return. Port flushed, heparinized and de-accessed per protocol. Ms. English tolerated the infusion, and had no complaints. Ms. English was discharged from [place] [address] in stable condition at [id number].      Future Appointments   Date Time Provider John Escalante   4/27/2021  9:30 AM BRENDA Child   4/28/2021 10:15 AM Evelyn Gibson MD [facility] MAIN BS AMB   5/4/2021 To Be Determined French Dixon RN [address] [facility]   5/10/2021 10:30 AM ECHO LAB 1 [address]   5/11/2021 To Be Determined BRENDA Child   5/18/2021 To Be Determined French Dixon RN [address] [facility]   6/1/2021  9:00 AM Sridevi Levine NP [facility] BS AMB   7/13/2021 10:20 AM Leann Lord MD [facility] BS AMB       Ophelia Matamoros RN  April 26, 2021  6:47 PM

## 2021-04-27 ENCOUNTER — HOME CARE VISIT (OUTPATIENT)
Dept: SCHEDULING | Facility: HOME HEALTH | Age: 71
End: 2021-04-27
Payer: MEDICARE

## 2021-04-27 PROCEDURE — G0299 HHS/HOSPICE OF RN EA 15 MIN: HCPCS

## 2021-04-28 ENCOUNTER — HOME CARE VISIT (OUTPATIENT)
Dept: HOME HEALTH SERVICES | Facility: HOME HEALTH | Age: 71
End: 2021-04-28
Payer: MEDICARE

## 2021-04-28 VITALS
BODY MASS INDEX: 30.52 KG/M2 | SYSTOLIC BLOOD PRESSURE: 118 MMHG | TEMPERATURE: 97.9 F | OXYGEN SATURATION: 94 % | DIASTOLIC BLOOD PRESSURE: 76 MMHG | HEART RATE: 73 BPM | WEIGHT: 177.8 LBS

## 2021-04-28 RX ORDER — DOBUTAMINE HYDROCHLORIDE 200 MG/100ML
2.5 INJECTION INTRAVENOUS CONTINUOUS
Qty: 250 ML | Refills: 0 | Status: CANCELLED | OUTPATIENT
Start: 2021-04-28

## 2021-04-28 NOTE — PROGRESS NOTES
Jimbo Carvalho is a 73 y.o. female with a history of NICM. Patient was worked up for LVAD HM3 evaluation which is deferred at this time due to recovery. Magi Jeffries RN  VAD Coordinator.

## 2021-04-29 LAB
ALBUMIN SERPL-MCNC: 4.1 G/DL (ref 3.8–4.8)
ALBUMIN/GLOB SERPL: 1.2 {RATIO} (ref 1.2–2.2)
ALP SERPL-CCNC: 65 IU/L (ref 39–117)
ALT SERPL-CCNC: 13 IU/L (ref 0–32)
ANA TITR SER IF: NEGATIVE {TITER}
APTT PPP: 29 SEC (ref 24–33)
AST SERPL-CCNC: 18 IU/L (ref 0–40)
B2 MICROGLOB UR-MCNC: 22 UG/L (ref 0–300)
BILIRUB SERPL-MCNC: 0.3 MG/DL (ref 0–1.2)
BUN SERPL-MCNC: 20 MG/DL (ref 8–27)
BUN/CREAT SERPL: 26 (ref 12–28)
CALCIUM SERPL-MCNC: 9.4 MG/DL (ref 8.7–10.3)
CHLORIDE SERPL-SCNC: 101 MMOL/L (ref 96–106)
CO2 SERPL-SCNC: 25 MMOL/L (ref 20–29)
CREAT SERPL-MCNC: 0.76 MG/DL (ref 0.57–1)
DIGOXIN SERPL-MCNC: 1 NG/ML (ref 0.5–0.9)
GLOBULIN SER CALC-MCNC: 3.4 G/DL (ref 1.5–4.5)
GLUCOSE SERPL-MCNC: 110 MG/DL (ref 65–99)
H PYLORI IGA SER-ACNC: 12 UNITS (ref 0–8.9)
H PYLORI IGG SER IA-ACNC: 0.75 INDEX VALUE (ref 0–0.79)
H PYLORI IGM SER-ACNC: <9 UNITS (ref 0–8.9)
INR PPP: 1 (ref 0.9–1.2)
NT-PROBNP SERPL-MCNC: 96 PG/ML (ref 0–301)
PLEASE NOTE, 734348: NORMAL
POTASSIUM SERPL-SCNC: 4.1 MMOL/L (ref 3.5–5.2)
PROCALCITONIN SERPL-MCNC: 0.09 NG/ML (ref 0–0.08)
PROT SERPL-MCNC: 7.5 G/DL (ref 6–8.5)
PROTHROMBIN TIME: 11 SEC (ref 9.1–12)
SODIUM SERPL-SCNC: 141 MMOL/L (ref 134–144)

## 2021-04-29 RX ORDER — ISOSORBIDE MONONITRATE 60 MG/1
60 TABLET, EXTENDED RELEASE ORAL 2 TIMES DAILY
Qty: 60 TAB | Refills: 1 | Status: SHIPPED | OUTPATIENT
Start: 2021-04-29 | End: 2021-05-17 | Stop reason: DRUGHIGH

## 2021-04-29 NOTE — TELEPHONE ENCOUNTER
Requested Prescriptions     Signed Prescriptions Disp Refills    isosorbide mononitrate ER (IMDUR) 60 mg CR tablet 60 Tab 1     Sig: Take 1 Tab by mouth two (2) times a day.      Authorizing Provider: Rene Viveros     Ordering User: Fawn Mcgrath

## 2021-04-29 NOTE — PROGRESS NOTES
[address] [place] [address] and Primary Care  [place] [address]  [address] [place] [address]  Phone:  [phone]  Fax: [phone]       Chief Complaint   Patient presents with   [facility] Follow Up     Patient admitted into [facility] on 3/10/2021    CHF   . SUBJECTIVE:    Marilu Glasgow is a 73 y.o. female comes in for return visit having been hospitalized most recently for acute CHF. I actually sent her there because her failure was getting worse, and she was somewhat diuretic resistant. Her ejection fraction is now down to 15% to 20%. She is on a parenteral inotrope. She is actively followed by cardiology currently. She is asymptomatic with no significant dyspnea on exertion, orthopnea, or PND. Her diabetes has been reasonably stable with no symptomatic hypoglycemia. She remains on the same dose of insulin she was taking prior to her admission. She has a past history of primary hypertension, dyslipidemia as well as obesity. Current Outpatient Medications   Medication Sig Dispense Refill    diclofenac (VOLTAREN) 1 % gel Apply 2 g to affected area two (2) times daily as needed for Pain. thin layer to joint pain      sodium chloride (Normal Saline Flush) 10-20 mL by InterCATHeter route daily.  budesonide (PULMICORT) 0.25 mg/2 mL nbsp 500 mcg by Nebulization route every six (6) hours as needed for Other (wheezing shortness of breath).  oxybutynin (DITROPAN) 5 mg tablet Take 0.5 Tabs by mouth three (3) times daily. 90 Tab 11    bumetanide (BUMEX) 2 mg tablet Take 1 Tab by mouth daily. 90 Tab 2    ergocalciferol (ERGOCALCIFEROL) 1,250 mcg (50,000 unit) capsule Take 1 Cap by mouth every seven (7) days. 21 Cap 0    hydrALAZINE (APRESOLINE) 100 mg tablet Take 1 Tab by mouth three (3) times daily. 240 Tab 2    ivabradine (CORLANOR) 5 mg tablet Take 0.5 Tabs by mouth two (2) times daily (with meals).  180 Tab 1    magnesium oxide (MAG-OX) 400 mg tablet Take 2 Tabs by mouth two (2) times a day. 180 Tab 1    melatonin 3 mg tablet Take 1 Tab by mouth nightly as needed for Insomnia. 90 Tab 0    spironolactone (ALDACTONE) 25 mg tablet Take 1 Tab by mouth daily. 90 Tab 1    thiamine HCL (B-1) 100 mg tablet Take 1 Tab by mouth daily. 90 Tab 1    fluticasone propion-salmeteroL (ADVAIR/WIXELA) 250-50 mcg/dose diskus inhaler Take 2 Puffs by inhalation two (2) times a day.  fluticasone propionate (FLONASE) 50 mcg/actuation nasal spray 2 Sprays by Both Nostrils route daily. 1 Bottle 0    allopurinoL (ZYLOPRIM) 300 mg tablet TAKE 1 TABLET BY MOUTH DAILY 90 Tab 3    insulin glargine (LANTUS,BASAGLAR) 100 unit/mL (3 mL) inpn 60 units every morning (Patient taking differently: 50 Units by SubCUTAneous route daily. 60 units every morning) 6 Adjustable Dose Pre-filled Pen Syringe 11    digoxin (LANOXIN) 0.25 mg tablet Take 0.5 Tabs by mouth daily. 90 Tab 1    senna-docusate (PERICOLACE) 8.6-50 mg per tablet Take 2 Tabs by mouth daily. 30 Tab 1    sacubitriL-valsartan (Entresto) 24-26 mg tablet Take 1 Tab by mouth two (2) times a day. 60 Tab 1    sacubitril-valsartan (ENTRESTO) 24 mg/26 mg tablet Take 1 Tab by mouth two (2) times a day. 28 Tab 0    DOBUTamine (DOBUTREX) 500 mg/250 mL (2,000 mcg/mL) infusion 415 mcg/min by IntraVENous route continuous. Based on 86.6 kg (4/1/21) (Patient taking differently: 2.5 mcg/kg/min by IntraVENous route continuous. Based on 86.6 kg (4/1/21)    Based on 80.8kg (4/21/21)) 250 mL 0    isosorbide mononitrate ER (IMDUR) 60 mg CR tablet Take 1 Tab by mouth two (2) times a day. 60 Tab 1    heparin sod,porcine/0.9 % NaCl (HEPARIN FLUSH IV) 3-5 mL by IntraCATHeter route daily.        Past Medical History:   Diagnosis Date    Arthritis     Diabetes ([address]     [facility]     Hypercholesterolemia     Hypertension      Past Surgical History:   Procedure Laterality Date    COLONOSCOPY N/A 3/19/2021    COLONOSCOPY performed by Paul Higgins MD at [facility] ENDOSCOPY    HX ORTHOPAEDIC      Arthroscopy right knee    [place] CARDIAC SURG PROCEDURE UNLIST      life vest     No Known Allergies      REVIEW OF SYSTEMS:  General: negative for - chills or fever  ENT: negative for - headaches, nasal congestion or tinnitus  Respiratory: negative for - cough, hemoptysis, shortness of breath or wheezing  Cardiovascular : negative for - chest pain, edema, palpitations or shortness of breath  Gastrointestinal: negative for - abdominal pain, blood in stools, heartburn or nausea/vomiting  Genito-Urinary: no dysuria, trouble voiding, or hematuria  Musculoskeletal: negative for - gait disturbance, joint pain, joint stiffness or joint swelling  Neurological: no TIA or stroke symptoms  Hematologic: no bruises, no bleeding, no swollen glands  Integument: no lumps, mole changes, nail changes or rash  Endocrine: no malaise/lethargy or unexpected weight changes      Social History     Socioeconomic History    Marital status:      Spouse name: Not on file    Number of children: 3    Years of education: 15    Highest education level: Not on file   Occupational History    Occupation: retired   Tobacco Use    Smoking status: Former Smoker     Packs/day: 0.25     Years: 20.00     Pack years: 5.00     Quit date: 2014     Years since quittin.3    Smokeless tobacco: Never Used   Substance and Sexual Activity    Alcohol use: Not Currently     Alcohol/week: 0.0 standard drinks     Comment: 21-24 Beers per week    Drug use: No    Sexual activity: Yes     Partners: Male     Family History   Problem Relation Age of Onset    Diabetes Mother        OBJECTIVE:    Visit Vitals  /69 (BP 1 Location: Left upper arm, BP Patient Position: Sitting, BP Cuff Size: Large adult)   Pulse 85   Temp 98.2 °F (36.8 °C) (Oral)   Resp 16   Ht 5' 4\" (1.626 m)   Wt 179 lb 14.4 oz (81.6 kg)   SpO2 96%   BMI 30.88 kg/m²     CONSTITUTIONAL: well , well nourished, appears age appropriate  EYES: perrla, eom intact  ENMT:moist mucous membranes, pharynx clear  NECK: supple. Thyroid normal  RESPIRATORY: Chest: clear to ascultation and percussion   CARDIOVASCULAR: Heart: regular rate and rhythm  GASTROINTESTINAL: Abdomen: soft, bowel sounds active  HEMATOLOGIC: no pathological lymph nodes palpated  MUSCULOSKELETAL: Extremities: no edema, pulse 1+   INTEGUMENT: No unusual rashes or suspicious skin lesions noted. Nails appear normal.  NEUROLOGIC: non-focal exam   MENTAL STATUS: alert and oriented, appropriate affect      ASSESSMENT:  1. NICM (nonischemic cardiomyopathy) ([address]    2. Acute on chronic systolic congestive heart failure, NYHA class 3 ([address]    3. Uncontrolled type 2 diabetes mellitus with hyperglycemia ([address]    4. Primary osteoarthritis of right knee    5. Stage 3a chronic kidney disease ([address]    6. Dyslipidemia        PLAN:  1. The patient has a non-ischemic cardiomyopathy which has gotten progressively worse. She remains on inotrope. This has been an effective agent until now, reasonable compensation. Again, she is actively followed by cardiology currently. 2. Her diabetes has been doing reasonably well. No adjustment is made. She remains on her basal insulin with no symptomatic hypoglycemia. 3. Her osteoarthritic right knee is doing reasonably well, all things considered. 4. She does have mild renal dysfunction, but this is predominantly prerenal secondary to aggressive diuretic usage. 5. She has a significant dyslipidemia and remains on a statin. At this point, she is in a primary risk prevention mode. Follow-up and Dispositions    · Return in about 3 months (around 6/30/2021).            Rosalva Greco MD

## 2021-04-30 LAB
DIGOXIN SERPL-MCNC: 1.8 NG/ML (ref 0.5–0.9)
SPECIMEN STATUS REPORT, ROLRST: NORMAL

## 2021-04-30 RX ORDER — DOBUTAMINE HYDROCHLORIDE 200 MG/100ML
2.5 INJECTION INTRAVENOUS CONTINUOUS
Qty: 250 ML | Refills: 0 | Status: SHIPPED | OUTPATIENT
Start: 2021-04-30 | End: 2021-06-18 | Stop reason: ALTCHOICE

## 2021-05-03 ENCOUNTER — TELEPHONE (OUTPATIENT)
Dept: CARDIOLOGY CLINIC | Age: 71
End: 2021-05-03

## 2021-05-03 NOTE — TELEPHONE ENCOUNTER
----- Message from Ga Lanza MD sent at 5/1/2021 10:54 AM EDT -----  Discontinue digoxin, HR at goal and level high 1.8; will reassess need for digoxin at next clinic visit in 2 weeks, I suspect she will be off dobutamine by then and able to take beta-blockers.   Thank you.  ----- Message -----  From: Rogelio, [facility] Lab Results In  Sent: 4/8/2021   3:08 PM EDT  To: Ga Lanza MD

## 2021-05-03 NOTE — TELEPHONE ENCOUNTER
I called patient to schedule office visit in two weeks per Federico. Patient is scheduled 5/17/21 at 10:30am with Dr. Sylvester Laureano.

## 2021-05-04 ENCOUNTER — HOME CARE VISIT (OUTPATIENT)
Dept: SCHEDULING | Facility: HOME HEALTH | Age: 71
End: 2021-05-04
Payer: MEDICARE

## 2021-05-04 PROCEDURE — G0299 HHS/HOSPICE OF RN EA 15 MIN: HCPCS

## 2021-05-05 ENCOUNTER — OFFICE VISIT (OUTPATIENT)
Dept: INTERNAL MEDICINE CLINIC | Age: 71
End: 2021-05-05
Payer: MEDICARE

## 2021-05-05 VITALS
BODY MASS INDEX: 30.4 KG/M2 | TEMPERATURE: 98.2 F | OXYGEN SATURATION: 94 % | WEIGHT: 178.1 LBS | RESPIRATION RATE: 16 BRPM | HEART RATE: 65 BPM | HEIGHT: 64 IN | DIASTOLIC BLOOD PRESSURE: 57 MMHG | SYSTOLIC BLOOD PRESSURE: 124 MMHG

## 2021-05-05 DIAGNOSIS — I50.23 ACUTE ON CHRONIC SYSTOLIC CONGESTIVE HEART FAILURE, NYHA CLASS 3 (HCC): ICD-10-CM

## 2021-05-05 DIAGNOSIS — E78.5 DYSLIPIDEMIA: ICD-10-CM

## 2021-05-05 DIAGNOSIS — I42.0 DILATED CARDIOMYOPATHY (HCC): ICD-10-CM

## 2021-05-05 DIAGNOSIS — E11.65 UNCONTROLLED TYPE 2 DIABETES MELLITUS WITH HYPERGLYCEMIA (HCC): ICD-10-CM

## 2021-05-05 DIAGNOSIS — I42.8 NICM (NONISCHEMIC CARDIOMYOPATHY) (HCC): Primary | ICD-10-CM

## 2021-05-05 DIAGNOSIS — J44.9 CHRONIC OBSTRUCTIVE PULMONARY DISEASE, UNSPECIFIED COPD TYPE (HCC): ICD-10-CM

## 2021-05-05 LAB
ALBUMIN SERPL-MCNC: 4.4 G/DL (ref 3.8–4.8)
ALBUMIN/GLOB SERPL: 1.4 {RATIO} (ref 1.2–2.2)
ALP SERPL-CCNC: 61 IU/L (ref 39–117)
ALT SERPL-CCNC: 10 IU/L (ref 0–32)
AST SERPL-CCNC: 15 IU/L (ref 0–40)
BILIRUB SERPL-MCNC: 0.5 MG/DL (ref 0–1.2)
BUN SERPL-MCNC: 18 MG/DL (ref 8–27)
BUN/CREAT SERPL: 24 (ref 12–28)
CALCIUM SERPL-MCNC: 9.6 MG/DL (ref 8.7–10.3)
CHLORIDE SERPL-SCNC: 102 MMOL/L (ref 96–106)
CO2 SERPL-SCNC: 25 MMOL/L (ref 20–29)
CREAT SERPL-MCNC: 0.76 MG/DL (ref 0.57–1)
DIGOXIN SERPL-MCNC: 0.6 NG/ML (ref 0.5–0.9)
ERYTHROCYTE [DISTWIDTH] IN BLOOD BY AUTOMATED COUNT: 16.1 % (ref 11.7–15.4)
GLOBULIN SER CALC-MCNC: 3.1 G/DL (ref 1.5–4.5)
GLUCOSE SERPL-MCNC: 229 MG/DL (ref 65–99)
HCT VFR BLD AUTO: 34.1 % (ref 34–46.6)
HGB BLD-MCNC: 10.8 G/DL (ref 11.1–15.9)
MCH RBC QN AUTO: 27.6 PG (ref 26.6–33)
MCHC RBC AUTO-ENTMCNC: 31.7 G/DL (ref 31.5–35.7)
MCV RBC AUTO: 87 FL (ref 79–97)
NT-PROBNP SERPL-MCNC: 100 PG/ML (ref 0–301)
PLATELET # BLD AUTO: 228 X10E3/UL (ref 150–450)
POTASSIUM SERPL-SCNC: 3.8 MMOL/L (ref 3.5–5.2)
PROT SERPL-MCNC: 7.5 G/DL (ref 6–8.5)
RBC # BLD AUTO: 3.92 X10E6/UL (ref 3.77–5.28)
SODIUM SERPL-SCNC: 144 MMOL/L (ref 134–144)
WBC # BLD AUTO: 4.2 X10E3/UL (ref 3.4–10.8)

## 2021-05-05 PROCEDURE — 99215 OFFICE O/P EST HI 40 MIN: CPT | Performed by: INTERNAL MEDICINE

## 2021-05-05 PROCEDURE — 3051F HG A1C>EQUAL 7.0%<8.0%: CPT | Performed by: INTERNAL MEDICINE

## 2021-05-05 PROCEDURE — G8399 PT W/DXA RESULTS DOCUMENT: HCPCS | Performed by: INTERNAL MEDICINE

## 2021-05-05 PROCEDURE — G8432 DEP SCR NOT DOC, RNG: HCPCS | Performed by: INTERNAL MEDICINE

## 2021-05-05 PROCEDURE — G9899 SCRN MAM PERF RSLTS DOC: HCPCS | Performed by: INTERNAL MEDICINE

## 2021-05-05 PROCEDURE — 3017F COLORECTAL CA SCREEN DOC REV: CPT | Performed by: INTERNAL MEDICINE

## 2021-05-05 PROCEDURE — G8417 CALC BMI ABV UP PARAM F/U: HCPCS | Performed by: INTERNAL MEDICINE

## 2021-05-05 PROCEDURE — 1090F PRES/ABSN URINE INCON ASSESS: CPT | Performed by: INTERNAL MEDICINE

## 2021-05-05 PROCEDURE — G8752 SYS BP LESS 140: HCPCS | Performed by: INTERNAL MEDICINE

## 2021-05-05 PROCEDURE — G8427 DOCREV CUR MEDS BY ELIG CLIN: HCPCS | Performed by: INTERNAL MEDICINE

## 2021-05-05 PROCEDURE — 2022F DILAT RTA XM EVC RTNOPTHY: CPT | Performed by: INTERNAL MEDICINE

## 2021-05-05 PROCEDURE — G8754 DIAS BP LESS 90: HCPCS | Performed by: INTERNAL MEDICINE

## 2021-05-05 PROCEDURE — 1101F PT FALLS ASSESS-DOCD LE1/YR: CPT | Performed by: INTERNAL MEDICINE

## 2021-05-05 PROCEDURE — G8536 NO DOC ELDER MAL SCRN: HCPCS | Performed by: INTERNAL MEDICINE

## 2021-05-05 NOTE — PROGRESS NOTES
Chief Complaint   Patient presents with    CHF     routine follow up            1. Have you been to the ER, urgent care clinic since your last visit? Hospitalized since your last visit? Yes When: 4/10/21 Where: [facility] ED  Reason for visit: epistaxis    2. Have you seen or consulted any other health care providers outside of the [address] [place] [address] since your last visit? Include any pap smears or colon screening.  No

## 2021-05-06 VITALS
SYSTOLIC BLOOD PRESSURE: 120 MMHG | OXYGEN SATURATION: 96 % | WEIGHT: 177 LBS | DIASTOLIC BLOOD PRESSURE: 70 MMHG | BODY MASS INDEX: 30.38 KG/M2 | HEART RATE: 71 BPM | RESPIRATION RATE: 17 BRPM | TEMPERATURE: 97.3 F

## 2021-05-06 NOTE — PROGRESS NOTES
[address] [place] [address] and Primary Care  [place] [address]  [address] [place] [id number]  Phone:  [phone]  Fax: [phone]       Chief Complaint   Patient presents with   Collette Satchel CHF     routine follow up    . SUBJECTIVE:    Briseida Thompson is a 73 y.o. female comes in for a return visit, stating that she has been doing extremely well. Her ejection fraction has improved significantly such that it is about 60% currently. She has had complete resolution of the flare of that, acquitted this particular problem. It is a bit unusual.  She has maintained on a dobutamine drip currently, but the dose has progressively decreased. She is not on diuretics either. She is totally asymptomatic, denying any dyspnea on exertion, orthopnea, or PND. She says the diabetes is doing quite well. I increased her baseline insulin to 50 units, and this has made a significant difference as far as blood sugars are concerned. She has a past history of primary hypertension, dyslipidemia, and obesity. Current Outpatient Medications   Medication Sig Dispense Refill    DOBUTamine (DOBUTREX) 500 mg/250 mL (2,000 mcg/mL) infusion 207 mcg/min by IntraVENous route continuous. Based on 86.6 kg (4/1/21)    Based on 80.8kg (4/21/21) 250 mL 0    isosorbide mononitrate ER (IMDUR) 60 mg CR tablet Take 1 Tab by mouth two (2) times a day. 60 Tab 1    senna-docusate (PERICOLACE) 8.6-50 mg per tablet Take 2 Tabs by mouth daily. 30 Tab 1    sacubitriL-valsartan (Entresto) 24-26 mg tablet Take 1 Tab by mouth two (2) times a day. 60 Tab 1    sacubitril-valsartan (ENTRESTO) 24 mg/26 mg tablet Take 1 Tab by mouth two (2) times a day. 28 Tab 0    diclofenac (VOLTAREN) 1 % gel Apply 2 g to affected area two (2) times daily as needed for Pain. thin layer to joint pain      sodium chloride (Normal Saline Flush) 10-20 mL by InterCATHeter route daily.       heparin sod,porcine/0.9 % NaCl (HEPARIN FLUSH IV) 3-5 mL by IntraCATHeter route daily.  budesonide (PULMICORT) 0.25 mg/2 mL nbsp 500 mcg by Nebulization route every six (6) hours as needed for Other (wheezing shortness of breath).  oxybutynin (DITROPAN) 5 mg tablet Take 0.5 Tabs by mouth three (3) times daily. 90 Tab 11    bumetanide (BUMEX) 2 mg tablet Take 1 Tab by mouth daily. 90 Tab 2    ergocalciferol (ERGOCALCIFEROL) 1,250 mcg (50,000 unit) capsule Take 1 Cap by mouth every seven (7) days. 21 Cap 0    hydrALAZINE (APRESOLINE) 100 mg tablet Take 1 Tab by mouth three (3) times daily. 240 Tab 2    ivabradine (CORLANOR) 5 mg tablet Take 0.5 Tabs by mouth two (2) times daily (with meals). 180 Tab 1    magnesium oxide (MAG-OX) 400 mg tablet Take 2 Tabs by mouth two (2) times a day. 180 Tab 1    melatonin 3 mg tablet Take 1 Tab by mouth nightly as needed for Insomnia. 90 Tab 0    spironolactone (ALDACTONE) 25 mg tablet Take 1 Tab by mouth daily. 90 Tab 1    thiamine HCL (B-1) 100 mg tablet Take 1 Tab by mouth daily. 90 Tab 1    fluticasone propion-salmeteroL (ADVAIR/WIXELA) 250-50 mcg/dose diskus inhaler Take 2 Puffs by inhalation two (2) times a day.  fluticasone propionate (FLONASE) 50 mcg/actuation nasal spray 2 Sprays by Both Nostrils route daily. 1 Bottle 0    allopurinoL (ZYLOPRIM) 300 mg tablet TAKE 1 TABLET BY MOUTH DAILY 90 Tab 3    insulin glargine (LANTUS,BASAGLAR) 100 unit/mL (3 mL) inpn 60 units every morning (Patient taking differently: 50 Units by SubCUTAneous route daily. 60 units every morning) 6 Adjustable Dose Pre-filled Pen Syringe 11    digoxin (LANOXIN) 0.25 mg tablet Take 0.5 Tabs by mouth daily.  80 Tab 1     Past Medical History:   Diagnosis Date    Arthritis     Diabetes ([address]     Heart failure ([address]     Hypercholesterolemia     Hypertension      Past Surgical History:   Procedure Laterality Date    COLONOSCOPY N/A 3/19/2021    COLONOSCOPY performed by Sue [facility]ajal [facility] ENDOSCOPY    HX ORTHOPAEDIC      Arthroscopy right knee    [place] CARDIAC SURG PROCEDURE UNLIST      life vest     No Known Allergies      REVIEW OF SYSTEMS:  General: negative for - chills or fever  ENT: negative for - headaches, nasal congestion or tinnitus  Respiratory: negative for - cough, hemoptysis, shortness of breath or wheezing  Cardiovascular : negative for - chest pain, edema, palpitations or shortness of breath  Gastrointestinal: negative for - abdominal pain, blood in stools, heartburn or nausea/vomiting  Genito-Urinary: no dysuria, trouble voiding, or hematuria  Musculoskeletal: negative for - gait disturbance, joint pain, joint stiffness or joint swelling  Neurological: no TIA or stroke symptoms  Hematologic: no bruises, no bleeding, no swollen glands  Integument: no lumps, mole changes, nail changes or rash  Endocrine: no malaise/lethargy or unexpected weight changes      Social History     Socioeconomic History    Marital status:      Spouse name: Not on file    Number of children: 3    Years of education: 15    Highest education level: Not on file   Occupational History    Occupation: retired   Tobacco Use    Smoking status: Former Smoker     Packs/day: 0.25     Years: 20.00     Pack years: 5.00     Quit date: 2014     Years since quittin.4    Smokeless tobacco: Never Used   Substance and Sexual Activity    Alcohol use: Not Currently     Alcohol/week: 0.0 standard drinks     Comment: 21-24 Beers per week    Drug use: No    Sexual activity: Yes     Partners: Male     Family History   Problem Relation Age of Onset    Diabetes Mother        OBJECTIVE:    Visit Vitals  BP (!) 124/57   Pulse 65   Temp 98.2 °F (36.8 °C) (Oral)   Resp 16   Ht 5' 4\" (1.626 m)   Wt 178 lb 1.6 oz (80.8 kg)   SpO2 94%   BMI 30.57 kg/m²     CONSTITUTIONAL: well , well nourished, appears age appropriate  EYES: perrla, eom intact  ENMT:moist mucous membranes, pharynx clear  NECK: supple.  Thyroid normal  RESPIRATORY: Chest: clear to ascultation and percussion   CARDIOVASCULAR: Heart: regular rate and rhythm  GASTROINTESTINAL: Abdomen: soft, bowel sounds active  HEMATOLOGIC: no pathological lymph nodes palpated  MUSCULOSKELETAL: Extremities: no edema, pulse 1+   INTEGUMENT: No unusual rashes or suspicious skin lesions noted. Nails appear normal.  NEUROLOGIC: non-focal exam   MENTAL STATUS: alert and oriented, appropriate affect      ASSESSMENT:  1. NICM (nonischemic cardiomyopathy) ([address]    2. Dilated cardiomyopathy ([address]    3. Acute on chronic systolic congestive heart failure, NYHA class 3 ([address]    4. Uncontrolled type 2 diabetes mellitus with hyperglycemia ([address]    5. Dyslipidemia    6. Chronic obstructive pulmonary disease, unspecified COPD type ([address]        PLAN:  1. The patient's CHF is improving nicely. Her cardiomyopathy, which is nonischemic in nature, is spontaneously improving. This is excellent. Hopefully, this can be maintained. I anticipate a discontinuation of the dobutamine in the very near future. She is on the active followup with Cardiology currently. 2. Her diabetes is doing also quite well. No adjustment is made. She remains on the Lantus 50 units. 3. Her blood pressure is excellent. No adjustments are made. 4. She will continue her statin in view of her increased cardiovascular risk. She does have plaquing, but no obstructive lesions noted on her cardiac catheterization. 5. I encouraged her to minimize weight gain. She is doing an excellent job in this perspective. This can be accomplished by eating meals, eliminating snacks, and avoiding the consumption of processed carbohydrates. 6. She does have existing COPD, but this is not causing a major problem. Fortunately, she stopped smoking cigarettes about two years ago. She does however have COPD and she is not symptomatic. Follow-up and Dispositions    · Return in about 2 months (around 7/5/2021).            Jaime Lowe MD

## 2021-05-07 ENCOUNTER — TELEPHONE (OUTPATIENT)
Dept: CARDIOLOGY CLINIC | Age: 71
End: 2021-05-07

## 2021-05-07 NOTE — TELEPHONE ENCOUNTER
Confirmed patient stopped digoxin 5/3/21. Patient states [facility] RN had difficulty drawing labs from second port and had to perform peripheral stick. States infusing line is working. HH to see patient 5/11/21, pt will have [facility] RN call office if unable to drawn on Tuesday.  Alfie Villafana RN

## 2021-05-11 ENCOUNTER — TELEPHONE (OUTPATIENT)
Dept: CARDIOLOGY CLINIC | Age: 71
End: 2021-05-11

## 2021-05-11 ENCOUNTER — HOME CARE VISIT (OUTPATIENT)
Dept: SCHEDULING | Facility: HOME HEALTH | Age: 71
End: 2021-05-11
Payer: MEDICARE

## 2021-05-11 VITALS
RESPIRATION RATE: 18 BRPM | WEIGHT: 174 LBS | TEMPERATURE: 97.4 F | DIASTOLIC BLOOD PRESSURE: 60 MMHG | BODY MASS INDEX: 29.87 KG/M2 | SYSTOLIC BLOOD PRESSURE: 100 MMHG | HEART RATE: 63 BPM | OXYGEN SATURATION: 98 %

## 2021-05-11 PROCEDURE — G0299 HHS/HOSPICE OF RN EA 15 MIN: HCPCS

## 2021-05-13 ENCOUNTER — HOSPITAL ENCOUNTER (OUTPATIENT)
Dept: NON INVASIVE DIAGNOSTICS | Age: 71
Discharge: HOME | End: 2021-05-13
Attending: NURSE PRACTITIONER
Payer: MEDICARE

## 2021-05-13 DIAGNOSIS — I50.20 NYHA CLASS 4 HEART FAILURE WITH REDUCED EJECTION FRACTION (HCC): ICD-10-CM

## 2021-05-13 LAB
ALBUMIN SERPL-MCNC: 4.6 G/DL (ref 3.8–4.8)
ALBUMIN/GLOB SERPL: 1.1 {RATIO} (ref 1.2–2.2)
ALP SERPL-CCNC: 65 IU/L (ref 39–117)
ALT SERPL-CCNC: 14 IU/L (ref 0–32)
AST SERPL-CCNC: 23 IU/L (ref 0–40)
BASOPHILS # BLD AUTO: 0 X10E3/UL (ref 0–0.2)
BASOPHILS NFR BLD AUTO: 1 %
BILIRUB SERPL-MCNC: 0.5 MG/DL (ref 0–1.2)
BUN SERPL-MCNC: 35 MG/DL (ref 8–27)
BUN/CREAT SERPL: 36 (ref 12–28)
CALCIUM SERPL-MCNC: 10.2 MG/DL (ref 8.7–10.3)
CHLORIDE SERPL-SCNC: 100 MMOL/L (ref 96–106)
CO2 SERPL-SCNC: 24 MMOL/L (ref 20–29)
CREAT SERPL-MCNC: 0.97 MG/DL (ref 0.57–1)
EOSINOPHIL # BLD AUTO: 0.2 X10E3/UL (ref 0–0.4)
EOSINOPHIL NFR BLD AUTO: 5 %
ERYTHROCYTE [DISTWIDTH] IN BLOOD BY AUTOMATED COUNT: 16.6 % (ref 11.7–15.4)
GLOBULIN SER CALC-MCNC: 4.1 G/DL (ref 1.5–4.5)
GLUCOSE SERPL-MCNC: 108 MG/DL (ref 65–99)
HCT VFR BLD AUTO: 34.3 % (ref 34–46.6)
HGB BLD-MCNC: 11 G/DL (ref 11.1–15.9)
IMM GRANULOCYTES # BLD AUTO: 0 X10E3/UL (ref 0–0.1)
IMM GRANULOCYTES NFR BLD AUTO: 0 %
LYMPHOCYTES # BLD AUTO: 0.8 X10E3/UL (ref 0.7–3.1)
LYMPHOCYTES NFR BLD AUTO: 17 %
MCH RBC QN AUTO: 27 PG (ref 26.6–33)
MCHC RBC AUTO-ENTMCNC: 32.1 G/DL (ref 31.5–35.7)
MCV RBC AUTO: 84 FL (ref 79–97)
MONOCYTES # BLD AUTO: 0.3 X10E3/UL (ref 0.1–0.9)
MONOCYTES NFR BLD AUTO: 7 %
NEUTROPHILS # BLD AUTO: 3.4 X10E3/UL (ref 1.4–7)
NEUTROPHILS NFR BLD AUTO: 70 %
NT-PROBNP SERPL-MCNC: 50 PG/ML (ref 0–301)
PLATELET # BLD AUTO: 236 X10E3/UL (ref 150–450)
POTASSIUM SERPL-SCNC: 3.9 MMOL/L (ref 3.5–5.2)
PROT SERPL-MCNC: 8.7 G/DL (ref 6–8.5)
RBC # BLD AUTO: 4.07 X10E6/UL (ref 3.77–5.28)
SODIUM SERPL-SCNC: 140 MMOL/L (ref 134–144)
WBC # BLD AUTO: 4.8 X10E3/UL (ref 3.4–10.8)

## 2021-05-13 PROCEDURE — 93306 TTE W/DOPPLER COMPLETE: CPT

## 2021-05-13 RX ORDER — LANOLIN ALCOHOL/MO/W.PET/CERES
CREAM (GRAM) TOPICAL
Qty: 120 TAB | Refills: 2 | Status: SHIPPED | OUTPATIENT
Start: 2021-05-13 | End: 2021-07-14

## 2021-05-14 ENCOUNTER — TELEPHONE (OUTPATIENT)
Dept: CARDIOLOGY CLINIC | Age: 71
End: 2021-05-14

## 2021-05-14 LAB
ECHO AV PEAK GRADIENT: 8.21 MMHG
ECHO AV PEAK VELOCITY: 143.3 CM/S
ECHO LA AREA 4C: 24.78 CM2
ECHO LA TO AORTIC ROOT RATIO: 0.92
ECHO LA TO AORTIC ROOT RATIO: 0.92
ECHO LA VOL 4C: 82.07 ML (ref 22–52)
ECHO LV INTERNAL DIMENSION DIASTOLIC: 4.92 CM (ref 3.9–5.3)
ECHO LV INTERNAL DIMENSION SYSTOLIC: 3.52 CM
ECHO LV IVSD: 0.88 CM (ref 0.6–0.9)
ECHO LV MASS 2D: 162 G (ref 67–162)
ECHO LV POSTERIOR WALL DIASTOLIC: 0.99 CM (ref 0.6–0.9)
ECHO LVOT PEAK GRADIENT: 3.35 MMHG
ECHO LVOT PEAK VELOCITY: 91.51 CM/S
ECHO MV A VELOCITY: 105.2 CM/S
ECHO MV E VELOCITY: 69.29 CM/S
ECHO MV E/A RATIO: 0.66
ECHO RV INTERNAL DIMENSION: 3.15 CM
ECHO RV TAPSE: 1.52 CM (ref 1.5–2)

## 2021-05-14 NOTE — TELEPHONE ENCOUNTER
Telephone Call RE:  Appointment reminder     Outcome:     [] Patient confirmed appointment   [] Patient rescheduled appointment for    [] Unable to reach   [] Left message              [] Other:       Confirmed w/pt. Screened patient for Covid. Reminder to arrive 15 minutes early for screening and check in.   Tia Membreno

## 2021-05-17 ENCOUNTER — OFFICE VISIT (OUTPATIENT)
Dept: CARDIOLOGY CLINIC | Age: 71
End: 2021-05-17
Payer: MEDICARE

## 2021-05-17 ENCOUNTER — TELEPHONE (OUTPATIENT)
Dept: CARDIOLOGY CLINIC | Age: 71
End: 2021-05-17

## 2021-05-17 VITALS
SYSTOLIC BLOOD PRESSURE: 124 MMHG | HEIGHT: 64 IN | WEIGHT: 177.6 LBS | DIASTOLIC BLOOD PRESSURE: 68 MMHG | RESPIRATION RATE: 20 BRPM | OXYGEN SATURATION: 98 % | HEART RATE: 64 BPM | BODY MASS INDEX: 30.32 KG/M2 | TEMPERATURE: 97.6 F

## 2021-05-17 DIAGNOSIS — E11.9 TYPE 2 DIABETES MELLITUS WITHOUT COMPLICATION, WITH LONG-TERM CURRENT USE OF INSULIN (HCC): ICD-10-CM

## 2021-05-17 DIAGNOSIS — Z79.4 TYPE 2 DIABETES MELLITUS WITHOUT COMPLICATION, WITH LONG-TERM CURRENT USE OF INSULIN (HCC): ICD-10-CM

## 2021-05-17 DIAGNOSIS — I50.20 NYHA CLASS 4 HEART FAILURE WITH REDUCED EJECTION FRACTION (HCC): ICD-10-CM

## 2021-05-17 DIAGNOSIS — M10.00 IDIOPATHIC GOUT, UNSPECIFIED CHRONICITY, UNSPECIFIED SITE: ICD-10-CM

## 2021-05-17 DIAGNOSIS — R53.83 FATIGUE, UNSPECIFIED TYPE: ICD-10-CM

## 2021-05-17 DIAGNOSIS — I50.20 NYHA CLASS 4 HEART FAILURE WITH REDUCED EJECTION FRACTION (HCC): Primary | ICD-10-CM

## 2021-05-17 DIAGNOSIS — E55.9 VITAMIN D DEFICIENCY: ICD-10-CM

## 2021-05-17 PROCEDURE — 3051F HG A1C>EQUAL 7.0%<8.0%: CPT | Performed by: INTERNAL MEDICINE

## 2021-05-17 PROCEDURE — 3017F COLORECTAL CA SCREEN DOC REV: CPT | Performed by: INTERNAL MEDICINE

## 2021-05-17 PROCEDURE — G8427 DOCREV CUR MEDS BY ELIG CLIN: HCPCS | Performed by: INTERNAL MEDICINE

## 2021-05-17 PROCEDURE — G8536 NO DOC ELDER MAL SCRN: HCPCS | Performed by: INTERNAL MEDICINE

## 2021-05-17 PROCEDURE — G8417 CALC BMI ABV UP PARAM F/U: HCPCS | Performed by: INTERNAL MEDICINE

## 2021-05-17 PROCEDURE — 1090F PRES/ABSN URINE INCON ASSESS: CPT | Performed by: INTERNAL MEDICINE

## 2021-05-17 PROCEDURE — G8752 SYS BP LESS 140: HCPCS | Performed by: INTERNAL MEDICINE

## 2021-05-17 PROCEDURE — G9899 SCRN MAM PERF RSLTS DOC: HCPCS | Performed by: INTERNAL MEDICINE

## 2021-05-17 PROCEDURE — 1101F PT FALLS ASSESS-DOCD LE1/YR: CPT | Performed by: INTERNAL MEDICINE

## 2021-05-17 PROCEDURE — 93000 ELECTROCARDIOGRAM COMPLETE: CPT | Performed by: INTERNAL MEDICINE

## 2021-05-17 PROCEDURE — G8399 PT W/DXA RESULTS DOCUMENT: HCPCS | Performed by: INTERNAL MEDICINE

## 2021-05-17 PROCEDURE — 2022F DILAT RTA XM EVC RTNOPTHY: CPT | Performed by: INTERNAL MEDICINE

## 2021-05-17 PROCEDURE — G8510 SCR DEP NEG, NO PLAN REQD: HCPCS | Performed by: INTERNAL MEDICINE

## 2021-05-17 PROCEDURE — 99215 OFFICE O/P EST HI 40 MIN: CPT | Performed by: INTERNAL MEDICINE

## 2021-05-17 PROCEDURE — G8754 DIAS BP LESS 90: HCPCS | Performed by: INTERNAL MEDICINE

## 2021-05-17 RX ORDER — CALCITRIOL 0.25 UG/1
0.25 CAPSULE ORAL DAILY
Qty: 30 CAP | Refills: 1 | Status: SHIPPED | OUTPATIENT
Start: 2021-05-17 | End: 2021-06-09

## 2021-05-17 RX ORDER — ISOSORBIDE MONONITRATE 30 MG/1
30 TABLET, EXTENDED RELEASE ORAL DAILY
Qty: 30 TAB | Refills: 0 | Status: SHIPPED | OUTPATIENT
Start: 2021-05-17 | End: 2021-06-30

## 2021-05-17 RX ORDER — HYDRALAZINE HYDROCHLORIDE 50 MG/1
50 TABLET, FILM COATED ORAL 3 TIMES DAILY
Qty: 90 TAB | Refills: 0 | Status: SHIPPED | OUTPATIENT
Start: 2021-05-17 | End: 2021-06-09

## 2021-05-17 NOTE — PATIENT INSTRUCTIONS
Medication changes:    Discontinue Dobutamine. Your [address] will be calling to schedule a visit to discontinue dobutamine. Your PICC line will stay in place until results of your echo comes back. Discontinue corlanor     Decrease IMDUR to 30mg daily. You can take 1/2 tablet of your 60mg tablet daily until you  your prescription     Decrease Hydralazine 50mg three times daily. You can take 1/2 tabet of your 100mg hyrdalazine tablets until you  your prescription. START calcitriol 0.25mcg daily     CONTINUE Bumex 2mg daily     Please take this to your pharmacy to notify them of the change in medications. Testing Ordered:  EKG, 6 min walk, and orthostatics completed in clinic. Other Recommendations: An order for Echo has been placed to be done 1 Week. You will be receiving an automated call from [facility] of [facility] to schedule this test. If you are unavailable to receive the call or would like to contact coordination of care yourself you may contact [phone] to schedule. You will need to contact coordination of care yourself if you miss their calls as they will only make 3 attempts to reach you. Please follow up with Dr. Katelyn Linton in [facility] July     Please take your blood pressures in the morning before medications and two hours after medications. Write them down and keep a log to bring to your next visit. Please weight yourself daily. [facility] week for a nurses visit to check your vital signs. Ensure your drinking an adequate amount of water with a goal of 6-8 eight ounce glasses (1.5-2 liters) of fluid daily. Your urine should be clear and light yellow straw colored. If your blood pressure begins to consistently run below 90/60 and/or you begin to experience dizziness or lightheadedness, please contact the [facility] [address] at [phone].        Follow up 1 month with [facility]      Please monitor your weights daily upon waking and after using the bathroom. Keep a written records of your weights and bring to your next appointment. If you have a weight gain of 3 or more pounds overnight OR 5 or more pounds in one week please contact our office. Thank you for allowing us the privilege of being a part of your healthcare team! Please do not hesitate to contact our office at [phone] with any questions or concerns. Virtual Heart Failure [address] invites you to learn more about heart failure and to share your questions, ideas, and experiences with others. Each month, the Heart Failure Support Group features a new educational topic and a guest speaker, followed by an interactive discussion. Our Heart Failure Nurse Navigator will moderate each session. You will be able to participate by phone, tablet or computer through [address] [place] [address]. This support group takes place on the 3rd Thursday of each month from 6:00-7:30PM. All individuals living with heart failure and their caregivers are welcome to join. If you are interested in participating, please contact us at [email or web address] and you will be sent the link to join the [employer].

## 2021-05-17 NOTE — TELEPHONE ENCOUNTER
Called bioscripts and notified pharmacist carry per Dr. Salina Estrella to discontinue dobutamine infusion and keep PICC line in. She sated understanding and will reach out to patients [address].  Darin Arreola RN

## 2021-05-17 NOTE — PROGRESS NOTES
[facility], [facility] Note    Patient name: Magdalena Bahena  Patient : 1950  Patient MRN: [id number]  Date of service: 21    Primary care physician: Mariangel Engle MD  Primary general cardiologist:  Dr. Philip Mcneil  Primary [facility] cardiologist: Darell Kim MD    CHIEF COMPLAINT:  Chronic systolic heart failure    PLAN:  Discontinue dobutamine gtt; keep PICC in place for one week  Recheck echocardiogram next week off inotropes   Discontinue PICC line if LVEF remains normal   Discontinue corlanor  Continue current dose entresto 24/26mg twice daily  Decrease hydralazine to 50mg TID   Decrease imdur 30mg to daily  Continue current dose of spironolactone 25mg daily  Not taking SGLT2 inhibitor, consider starting at next visit mid-   Decrease bumex to 2mg daily  Continue magnesium oxice 800mg twice daily, check magnesium level  Continue high dose vitamin D weekly, check level next week  Start calcitriol 0.25mcg daily for osteopenia  Consider weaning off allopurinol, check uric acid level  Does not take ASA  Does not take statins, LDL 84  Check EKG, orthostatics and 6MW in clinic today  Schedule sleep study    Repeat PYP test next year due to equivocal testing (no M-spike and genetic negative for iaTTR)  Routine HF labs: CBC, BMP, Mg, LFT, uric acid, pro-NT-BNP, iron profile with ferritin, TSH, vitamin D level  Reinforced low salt diet  Reinforced fluid restriction to 6 x 8oz glasses per day  Recommend daily at least 30 minutes walking for deconditioning  Counseled on importance to remain abstinent from alcohol  Previously provided educational materials \"Living with heart failure\"   Advanced care plan present on file  Follow-up with Dr. Philip Mcneil in 2021; will alternate visits (we will see patient 1-2 per year due to recovery)  Follow-up with GYN re: mammogram and pap smear  Follow-up with PCP, podiatrist and ophthalmologist for diabetes  Follow-up with nutritionist for weight loss and HF diet  Follow-up with primary cardiologist  Recommend flu, covid and pneumonia vaccinations; offered pneumonia shot today  Provided patient education materials for COVID precautions  Counseled \[facility] [place] [facility]\"" recommends COVID vaccination for HF patients  Return to [address] in one week for vital signs check  Return to [address] in one month for NP/MD visit, June 2021    IMPRESSION:  Chronic systolic heart failure  · Stage C, NYHA class II recovered from stage D class IV symptoms  · Non-ischemic cardiomyopathy, with recovery of severe biventricular failure while on inotropic support  · LVEF 55-60% (by echo 5/2021), LVEF 40-55% (by echo 4/2021) from LVEF 15-20% (by echo 3/9/21) from 20-25% (by echo 1/2021) from 51-55% (by echo 11/2019)  · RV dysfunction, moderate-severe on diagnosis; normalized by echo (5/2021)  · Low cardiac index at rest 1.3 on chronic dobutamine for hypotension; recovered  · Weaned off inotropes (from 1/2021 to 5/2021); competed LVAD workup  · Normal cors (by [facility] 3/11/21)  · Severe pulmonary artery hypertension, PVR 4  Cardiac risk factors  · Morbid obesity (BMI 32)  · High risk for SHARLENE  · DM2 (hgba1c 6.8)  · HL  · HTN  · Former tobacco (stopped 2014 after > 40 years)  · H/o alcohol abuse  OA, right knee  Carpal tunnel syndrome  Gout  Anemia, iron deficiency  · C-scope (3/2021 s/p polyp removal, plan to repeat C-scope 2024)  Hypokalemia/hypomagensemia  Vitamin D deficincy    CARDIAC IMAGING:  RHC 3/17/21  PA 47/20 (29), RA 7, PCWP 10, CI 2.93  RHC (3/11/21) 25, PA 49/27/37, W 34, Allen CI 1.34     Echo (5/14/21) LVEF 55-60%, LVEDD 4.9cm  Echo (4/8/21) LVEF 50-55%, LIVDd 5.04cm, Tapse 2.38cm, RVIDd 2.99cm  Echo (3/9/21) LVEF 15-20%, mild-moderate MR, severely elevated CVP, IVC > 21mm  Echo (1/29/21) LVEF 20-25%, moderately to severely reduced RV function  Echo (11/20/19) LVEF 51-55%  Echo (12/18/18) LVEF 50-55%  Echo (11/23/16) LVEF 45-55%     EKG (3/9/21) ST 103bpm, QRS 84ms  NST (5/2018) no scar or ischemia, LVEF 48%  · Normal cors (by [facility] 3/11/21)    HEMODYNAMICS:  RHC not done  CPEST not done  6MW not done    OTHER IMAGING:  DEXA scan- 4/8/21- osteopenic but no treatment indicated  Carotid dopplers (4/6/21)  Unchanged     HISTORY OF PRESENT ILLNESS:  I had the pleasure of seeing Aakash Kuo in [facility] at [address] [place] [address] in [place]. Briefly, Aakash Kuo is a 73 y.o. female with h/o obesity (BMI 30), high risk for SHARLENE, DM2, HL, HTN, former tobacco (stopped 2014 after > 40 years), h/o alcohol use, iron deficiency anemia, CKD stage 2, COPD/reactive airway disease. Patient initial diagnosis of heart failure occurred 2007 she was previously followed by Dr. Enma Sewell at [facility] up until 2015 where she changed to Dr. Lanny Veras due to insurance coverage. Chika Michael ejection fraction at [facility] in 2015 was 45-50%, at the time she had been maintained on high-dose diuretics to maintain a euvolemic state, metoprolol 100 mg daily as well as lisinopril 20 mg daily.  Ms. Janice Jorge had been doing well on guideline directed medical therapy, she was on significant doses of diuretics to include upwards of 120 mg of furosemide along with 5 mg of metolazone daily. Dallis Baldomero has been recommended since she had maintained a euvolemic state and essentially near normal ejection fraction that reducing and/or eliminating metolazone due to its potential for renal decline. Patient was admitted 3/10-3/20/21 with 2 weeks h/o severe dyspnea, hypotension and NYHA class IV symptoms. Patient was found to have LVEF < 15% and was diuresed and initiated on home inotrope therapy with dobutamine. Workup for LVAD-DT was completed on this admission and patient discharge home on inotropes and with lifevest for a 3 month period of observation. The plan was that if she does not recover LVEF within three months she will undergo LVAD implatnation. During this period of time has heart function has recovered to normal on GDMT. Symptoms improved to stage C, NYHA class II recovered from stage D class IV symptoms. She was confirmed to have non-ischemic cardiomyopathy, with recovery of severe biventricular failure while on inotropic support; LVEF 55-60% (by echo 5/2021), LVEF 40-55% (by echo 4/2021) from LVEF 15-20% (by echo 3/9/21) from 20-25% (by echo 1/2021) from 51-55% (by echo 11/2019). RV dysfunction, moderate-severe on diagnosis normalized by echo to normal LV function (5/2021). Hypotension with low cardiac index at rest 1.3 on chronic dobutamine recovered; GDMT was introduced and she was being weaned off inotropes (from 1/2021 to 5/2021). Normal cors (by [facility] 3/11/21). INTERVAL HISTORY:  Today, patient presents for routine clinic visit. Patient is doing very well. She is not engaged in much physical activity only 20 minutes a day of walking. Patient walked to our clinic from parking garage without having to slow down or stop. Patient can walk more than one block without symptoms of fatigue or shortness of breath or chest pain. Patient can walk one flight of stairs without symptoms of fatigue or shortness of breath or chest pain. Patient can perform home activities without problem and routinely participates in daily walking for more than 15 minutes. Patient denies symptoms of volume overload or leg edema. Patient denies abdominal bloating or change of appetite. Patient's weight remained stable. Patient denies orthopnea, PND or nocturia. Patient denies irregular heart rate or palpitations. No presyncope or syncope. Patient denies other cardiac symptoms such as chest pain or leg pain with walking. Patient is compliant with fluid restriction and taking medications as prescribed. Patient manages his own medications. REVIEW OF SYSTEMS:  General: Denies fever, night sweats.   Ear, nose and throat: Denies difficulty hearing, sinus problems, runny nose, post-nasal drip, ringing in ears, mouth sores, loose teeth, ear pain, nosebleeds, sore throate, facial pain or numbess  Cardiovascular: see above in the interval history  Respiratory: Denies cough, wheezing, sputum production, hemoptysis. Gastrointestinal: Denies heartburn, constipation, diarrhea, abdominal pain, nausea, vomiting, difficulty swallowing, blood in stool  Kidney and bladder: Denies painful urination, frequent urination, urgency  Musculoskeletal: Denies joint pain, muscle weakness  Skin and hair: Denies change in existing skin lesions, hair loss or increase, breast changes    PHYSICAL EXAM:  Visit Vitals  /68 (BP 1 Location: Left arm, BP Patient Position: Sitting, BP Cuff Size: Adult)   Pulse 64   Temp 97.6 °F (36.4 °C) (Oral)   Resp 20   Ht 5' 4\" (1.626 m)   Wt 177 lb 9.6 oz (80.6 kg)   SpO2 98%   BMI 30.48 kg/m²     General: Patient is well developed, well-nourished in no acute distress  HEENT: Normocephalic and atraumatic. No scleral icterus. Pupils are equal, round and reactive to light and accomodation. No conjunctival injection. Oropharynx is clear. Neck: Supple. No evidence of thyroid enlargements or lymphadenopathy. JVD: Cannot be appreciated   Lungs: Breath sounds are equal and clear bilaterally. No wheezes, rhonchi, or rales. Heart: Regular rate and rhythm with normal S1 and S2. No murmurs, gallops or rubs. Abdomen: Soft, no mass or tenderness. No organomegaly or hernia. Bowel sounds present. Genitourinary and rectal: deferred  Extremities: No cyanosis, clubbing, or edema. Neurologic: No focal sensory or motor deficits are noted. Grossly intact. Psychiatric: Awake, alert an doriented x 3. Appropriate mood and affect. Skin: Warm, dry and well perfused. No lesions, nodules or rashes are noted.     PAST MEDICAL HISTORY:  Past Medical History:   Diagnosis Date    Arthritis     Diabetes ([address]     Heart failure (HCC)     Hypercholesterolemia     Hypertension PAST SURGICAL HISTORY:  Past Surgical History:   Procedure Laterality Date    COLONOSCOPY N/A 3/19/2021    [facility] ENDOSCOPY    HX ORTHOPAEDIC      Arthroscopy right knee    CT CARDIAC SURG PROCEDURE UNLIST      life vest       FAMILY HISTORY:  Family History   Problem Relation Age of Onset    Diabetes Mother        SOCIAL HISTORY:  Social History     Socioeconomic History    Marital status:      Spouse name: Not on file    Number of children: 3    Years of education: 12    Highest education level: Not on file   Occupational History    Occupation: retired   Tobacco Use    Smoking status: Former Smoker     Packs/day: 0.25     Years: 20.00     Pack years: 5.00     Quit date: 2014     Years since quittin.4    Smokeless tobacco: Never Used   Substance and Sexual Activity    Alcohol use: Yes     Comment: 21-24 Beers per week    Drug use: No    Sexual activity: Yes     Partners: Male       LABORATORY RESULTS:  Labs Latest Ref Rng & Units 2021 2021 2021 2021 2021 3/30/2021 3/24/2021   WBC 3.4 - 10.8 x10E3/uL 4.8 4.2 - 4.2 4.1 CANCELED 6.2   RBC 3.77 - 5.28 x10E6/uL 4.07 3.92 - 3.81 4.02 CANCELED 4.12   Hemoglobin 11.1 - 15.9 g/dL 11. 0(L) 10. 8(L) - 10. 4(L) 11.2 CANCELED 11.4   Hematocrit 34.0 - 46.6 % 34.3 34.1 - 33. 4(L) 35.1 CANCELED 35.8   MCV 79 - 97 fL 84 87 - 88 87 - 87   Platelets 324 - 587 J46F0/ 228 - 209 246 CANCELED 246   Monocytes Not Estab. % 7 - - 5 6 CANCELED 7   Eosinophils Not Estab. % 5 - - 5 4 CANCELED 3   Basophils Not Estab. % 1 - - 1 1 - 1   Albumin 3.8 - 4.8 g/dL 4.6 4.4 4.1 4.3 4.5 4.5 4.1   Calcium 8.7 - 10.3 mg/dL 10.2 9.6 9.4 9.9 9.6 10.2 10.1   Glucose 65 - 99 mg/dL 108(H) 229(H) 110(H) 146(H) 179(H) 188(H) 79   BUN 8 - 27 mg/dL 35(H) 18 20 20 29(H) 26 27   Creatinine 0.57 - 1.00 mg/dL 0.97 0.76 0.76 0.96 1.02(H) 1.09(H) 1.05(H)   Sodium 134 - 144 mmol/L 140 144 141 140 138 138 142 Potassium 3.5 - 5.2 mmol/L 3.9 3.8 4.1 4.4 4.8 5.3(H) 4.1   LDH 81 - 246 U/L - - - - - - -   Some recent data might be hidden       ALLERGY:  No Known Allergies     CURRENT MEDICATIONS:    Current Outpatient Medications:     magnesium oxide (MAG-OX) 400 mg tablet, TAKE 2 TABLETS BY MOUTH TWO (2) TIMES A DAY., Disp: 120 Tab, Rfl: 2    DOBUTamine (DOBUTREX) 500 mg/250 mL (2,000 mcg/mL) infusion, 207 mcg/min by IntraVENous route continuous. Based on 86.6 kg (4/1/21)  Based on 80.8kg (4/21/21), Disp: 250 mL, Rfl: 0    isosorbide mononitrate ER (IMDUR) 60 mg CR tablet, Take 1 Tab by mouth two (2) times a day., Disp: 60 Tab, Rfl: 1    digoxin (LANOXIN) 0.25 mg tablet, Take 0.5 Tabs by mouth daily. , Disp: 90 Tab, Rfl: 1    senna-docusate (PERICOLACE) 8.6-50 mg per tablet, Take 2 Tabs by mouth daily. , Disp: 30 Tab, Rfl: 1    sacubitriL-valsartan (Entresto) 24-26 mg tablet, Take 1 Tab by mouth two (2) times a day., Disp: 60 Tab, Rfl: 1    sacubitril-valsartan (ENTRESTO) 24 mg/26 mg tablet, Take 1 Tab by mouth two (2) times a day., Disp: 28 Tab, Rfl: 0    diclofenac (VOLTAREN) 1 % gel, Apply 2 g to affected area two (2) times daily as needed for Pain. thin layer to joint pain, Disp: , Rfl:     sodium chloride (Normal Saline Flush), 10-20 mL by InterCATHeter route daily. , Disp: , Rfl:     heparin sod,porcine/0.9 % NaCl (HEPARIN FLUSH IV), 3-5 mL by IntraCATHeter route daily. , Disp: , Rfl:     budesonide (PULMICORT) 0.25 mg/2 mL nbsp, 500 mcg by Nebulization route every six (6) hours as needed for Other (wheezing shortness of breath). , Disp: , Rfl:     oxybutynin (DITROPAN) 5 mg tablet, Take 0.5 Tabs by mouth three (3) times daily. , Disp: 90 Tab, Rfl: 11    bumetanide (BUMEX) 2 mg tablet, Take 1 Tab by mouth daily. , Disp: 90 Tab, Rfl: 2    ergocalciferol (ERGOCALCIFEROL) 1,250 mcg (50,000 unit) capsule, Take 1 Cap by mouth every seven (7) days. , Disp: 21 Cap, Rfl: 0    hydrALAZINE (APRESOLINE) 100 mg tablet, Take 1 Tab by mouth three (3) times daily. , Disp: 240 Tab, Rfl: 2    ivabradine (CORLANOR) 5 mg tablet, Take 0.5 Tabs by mouth two (2) times daily (with meals). , Disp: 180 Tab, Rfl: 1    melatonin 3 mg tablet, Take 1 Tab by mouth nightly as needed for Insomnia., Disp: 90 Tab, Rfl: 0    spironolactone (ALDACTONE) 25 mg tablet, Take 1 Tab by mouth daily. , Disp: 90 Tab, Rfl: 1    thiamine HCL (B-1) 100 mg tablet, Take 1 Tab by mouth daily. , Disp: 90 Tab, Rfl: 1    fluticasone propion-salmeteroL (ADVAIR/WIXELA) 250-50 mcg/dose diskus inhaler, Take 2 Puffs by inhalation two (2) times a day., Disp: , Rfl:     fluticasone propionate (FLONASE) 50 mcg/actuation nasal spray, 2 Sprays by Both Nostrils route daily. , Disp: 1 Bottle, Rfl: 0    allopurinoL (ZYLOPRIM) 300 mg tablet, TAKE 1 TABLET BY MOUTH DAILY, Disp: 90 Tab, Rfl: 3    insulin glargine (LANTUS,BASAGLAR) 100 unit/mL (3 mL) inpn, 60 units every morning (Patient taking differently: 50 Units by SubCUTAneous route daily. 60 units every morning), Disp: 6 Adjustable Dose Pre-filled Pen Syringe, Rfl: 11    Thank you for your referral and allowing me to participate in this patient's care.     Omar Lynch MD PhD  [address] [place] [address], [address]  Phone: [phone]  Fax: [phone]    PATIENT CARE TEAM:  Patient Care Team:  Evelyn Gibson MD as PCP - General (Internal Medicine)  Evelyn Gibson MD as PCP - [address] [place] [address]  [facility] Provider  Randi Coronel MD as Physician (Cardiology)  Michelle Mccoy MD as Surgeon (Orthopedic Surgery)  Yuko Strong MD (Sleep Medicine)     Total visit time: 40 minutes (> 50% spent face-to-face counseling)

## 2021-05-18 ENCOUNTER — TELEPHONE (OUTPATIENT)
Dept: CARDIOLOGY CLINIC | Age: 71
End: 2021-05-18

## 2021-05-18 ENCOUNTER — HOME CARE VISIT (OUTPATIENT)
Dept: SCHEDULING | Facility: HOME HEALTH | Age: 71
End: 2021-05-18
Payer: MEDICARE

## 2021-05-18 PROCEDURE — G0299 HHS/HOSPICE OF RN EA 15 MIN: HCPCS

## 2021-05-18 NOTE — TELEPHONE ENCOUNTER
Kalpeshil received from Sirisha from [facility] stating that with the discontinuation of her Dobutamine drip today, they will need to discharge her. She states that patient will need picc  as long as we maintain it. She states that patient will need to be referred to the outpatient infusion center. If we restart drip they will readmit her. She states that patient is so independent that she can't continue home health just to maintain the picc line. No phone number left on voicemail.

## 2021-05-19 LAB
ALBUMIN SERPL-MCNC: 4.7 G/DL (ref 3.8–4.8)
ALBUMIN/GLOB SERPL: 1.3 {RATIO} (ref 1.2–2.2)
ALP SERPL-CCNC: 67 IU/L (ref 48–121)
ALT SERPL-CCNC: 16 IU/L (ref 0–32)
AST SERPL-CCNC: 23 IU/L (ref 0–40)
BASOPHILS # BLD AUTO: 0 X10E3/UL (ref 0–0.2)
BASOPHILS NFR BLD AUTO: 1 %
BILIRUB SERPL-MCNC: 0.6 MG/DL (ref 0–1.2)
BUN SERPL-MCNC: 39 MG/DL (ref 8–27)
BUN/CREAT SERPL: 35 (ref 12–28)
CALCIUM SERPL-MCNC: 10.2 MG/DL (ref 8.7–10.3)
CHLORIDE SERPL-SCNC: 96 MMOL/L (ref 96–106)
CO2 SERPL-SCNC: 25 MMOL/L (ref 20–29)
CREAT SERPL-MCNC: 1.13 MG/DL (ref 0.57–1)
EOSINOPHIL # BLD AUTO: 0.2 X10E3/UL (ref 0–0.4)
EOSINOPHIL NFR BLD AUTO: 3 %
ERYTHROCYTE [DISTWIDTH] IN BLOOD BY AUTOMATED COUNT: 16.3 % (ref 11.7–15.4)
GLOBULIN SER CALC-MCNC: 3.5 G/DL (ref 1.5–4.5)
GLUCOSE SERPL-MCNC: 134 MG/DL (ref 65–99)
HCT VFR BLD AUTO: 34.8 % (ref 34–46.6)
HGB BLD-MCNC: 11.4 G/DL (ref 11.1–15.9)
IMM GRANULOCYTES # BLD AUTO: 0 X10E3/UL (ref 0–0.1)
IMM GRANULOCYTES NFR BLD AUTO: 0 %
LYMPHOCYTES # BLD AUTO: 0.7 X10E3/UL (ref 0.7–3.1)
LYMPHOCYTES NFR BLD AUTO: 16 %
MCH RBC QN AUTO: 27.6 PG (ref 26.6–33)
MCHC RBC AUTO-ENTMCNC: 32.8 G/DL (ref 31.5–35.7)
MCV RBC AUTO: 84 FL (ref 79–97)
MONOCYTES # BLD AUTO: 0.3 X10E3/UL (ref 0.1–0.9)
MONOCYTES NFR BLD AUTO: 6 %
NEUTROPHILS # BLD AUTO: 3.3 X10E3/UL (ref 1.4–7)
NEUTROPHILS NFR BLD AUTO: 74 %
NT-PROBNP SERPL-MCNC: 59 PG/ML (ref 0–301)
PLATELET # BLD AUTO: 226 X10E3/UL (ref 150–450)
POTASSIUM SERPL-SCNC: 4.3 MMOL/L (ref 3.5–5.2)
PROT SERPL-MCNC: 8.2 G/DL (ref 6–8.5)
RBC # BLD AUTO: 4.13 X10E6/UL (ref 3.77–5.28)
SODIUM SERPL-SCNC: 141 MMOL/L (ref 134–144)
WBC # BLD AUTO: 4.5 X10E3/UL (ref 3.4–10.8)

## 2021-05-21 ENCOUNTER — TELEPHONE (OUTPATIENT)
Dept: CARDIOLOGY CLINIC | Age: 71
End: 2021-05-21

## 2021-05-21 NOTE — TELEPHONE ENCOUNTER
Telephone Call RE:  Appointment reminder     Outcome:     [x] Patient confirmed appointment   [] Patient rescheduled appointment for    [] Unable to reach   [] Left message              [] Other:       Confirmed w/pt. Screened patient for Covid. Reminder to arrive 15 minutes early for screening and check in.   Clif Willson

## 2021-05-22 VITALS
OXYGEN SATURATION: 95 % | HEART RATE: 91 BPM | TEMPERATURE: 97 F | SYSTOLIC BLOOD PRESSURE: 134 MMHG | BODY MASS INDEX: 30.45 KG/M2 | RESPIRATION RATE: 17 BRPM | DIASTOLIC BLOOD PRESSURE: 63 MMHG | WEIGHT: 177.4 LBS

## 2021-05-24 ENCOUNTER — TELEPHONE (OUTPATIENT)
Dept: CARDIOLOGY CLINIC | Age: 71
End: 2021-05-24

## 2021-05-24 NOTE — TELEPHONE ENCOUNTER
Faxed over orders for weekly PICC Line dressing changes until picc line is discontinued per EDSON Rand NP to [facility]. Fax confirmation received.  Mari Ball RN

## 2021-05-25 ENCOUNTER — HOSPITAL ENCOUNTER (OUTPATIENT)
Dept: INFUSION THERAPY | Age: 71
Discharge: HOME | End: 2021-05-25

## 2021-05-25 VITALS
RESPIRATION RATE: 18 BRPM | DIASTOLIC BLOOD PRESSURE: 56 MMHG | WEIGHT: 177 LBS | TEMPERATURE: 97.4 F | SYSTOLIC BLOOD PRESSURE: 106 MMHG | OXYGEN SATURATION: 99 % | HEART RATE: 80 BPM | BODY MASS INDEX: 30.38 KG/M2

## 2021-05-25 LAB
25(OH)D3 SERPL-MCNC: 60.4 NG/ML (ref 30–100)
ALBUMIN SERPL-MCNC: 4.4 G/DL (ref 3.5–5)
ALBUMIN/GLOB SERPL: 1 {RATIO} (ref 1.1–2.2)
ALP SERPL-CCNC: 70 U/L (ref 45–117)
ALT SERPL-CCNC: 26 U/L (ref 12–78)
ANION GAP SERPL CALC-SCNC: 4 MMOL/L (ref 5–15)
AST SERPL-CCNC: 22 U/L (ref 15–37)
BASOPHILS # BLD: 0.1 K/UL (ref 0–0.1)
BASOPHILS NFR BLD: 1 % (ref 0–1)
BILIRUB SERPL-MCNC: 0.6 MG/DL (ref 0.2–1)
BNP SERPL-MCNC: 91 PG/ML
BUN SERPL-MCNC: 51 MG/DL (ref 6–20)
BUN/CREAT SERPL: 50 (ref 12–20)
CALCIUM SERPL-MCNC: 10 MG/DL (ref 8.5–10.1)
CHLORIDE SERPL-SCNC: 101 MMOL/L (ref 97–108)
CO2 SERPL-SCNC: 31 MMOL/L (ref 21–32)
CREAT SERPL-MCNC: 1.03 MG/DL (ref 0.55–1.02)
DIFFERENTIAL METHOD BLD: ABNORMAL
EOSINOPHIL # BLD: 0.2 K/UL (ref 0–0.4)
EOSINOPHIL NFR BLD: 4 % (ref 0–7)
ERYTHROCYTE [DISTWIDTH] IN BLOOD BY AUTOMATED COUNT: 17 % (ref 11.5–14.5)
EST. AVERAGE GLUCOSE BLD GHB EST-MCNC: 157 MG/DL
FERRITIN SERPL-MCNC: 301 NG/ML (ref 26–388)
FT4I SERPL CALC-MCNC: 1.3 (ref 1.2–4.9)
GLOBULIN SER CALC-MCNC: 4.4 G/DL (ref 2–4)
GLUCOSE SERPL-MCNC: 198 MG/DL (ref 65–100)
HBA1C MFR BLD: 7.1 % (ref 4–5.6)
HCT VFR BLD AUTO: 33 % (ref 35–47)
HGB BLD-MCNC: 10.3 G/DL (ref 11.5–16)
IMM GRANULOCYTES # BLD AUTO: 0 K/UL (ref 0–0.04)
IMM GRANULOCYTES NFR BLD AUTO: 0 % (ref 0–0.5)
IRON SATN MFR SERPL: 37 % (ref 20–50)
IRON SERPL-MCNC: 110 UG/DL (ref 35–150)
LYMPHOCYTES # BLD: 1 K/UL (ref 0.8–3.5)
LYMPHOCYTES NFR BLD: 19 % (ref 12–49)
MAGNESIUM SERPL-MCNC: 2.3 MG/DL (ref 1.6–2.4)
MCH RBC QN AUTO: 28.1 PG (ref 26–34)
MCHC RBC AUTO-ENTMCNC: 31.2 G/DL (ref 30–36.5)
MCV RBC AUTO: 90.2 FL (ref 80–99)
MONOCYTES # BLD: 0.3 K/UL (ref 0–1)
MONOCYTES NFR BLD: 6 % (ref 5–13)
NEUTS SEG # BLD: 3.5 K/UL (ref 1.8–8)
NEUTS SEG NFR BLD: 70 % (ref 32–75)
NRBC # BLD: 0 K/UL (ref 0–0.01)
NRBC BLD-RTO: 0 PER 100 WBC
PLATELET # BLD AUTO: 224 K/UL (ref 150–400)
PMV BLD AUTO: 11.5 FL (ref 8.9–12.9)
POTASSIUM SERPL-SCNC: 4 MMOL/L (ref 3.5–5.1)
PROT SERPL-MCNC: 8.8 G/DL (ref 6.4–8.2)
RBC # BLD AUTO: 3.66 M/UL (ref 3.8–5.2)
RBC MORPH BLD: ABNORMAL
SODIUM SERPL-SCNC: 136 MMOL/L (ref 136–145)
T3RU NFR SERPL: 28 % (ref 24–39)
T4 SERPL-MCNC: 4.7 UG/DL (ref 4.5–12)
T4 SERPL-MCNC: 5.6 UG/DL (ref 4.8–13.9)
TIBC SERPL-MCNC: 294 UG/DL (ref 250–450)
TSH SERPL DL<=0.005 MIU/L-ACNC: 1.24 UIU/ML (ref 0.45–4.5)
URATE SERPL-MCNC: 2.4 MG/DL (ref 2.6–6)
WBC # BLD AUTO: 5.1 K/UL (ref 3.6–11)

## 2021-05-25 NOTE — PROGRESS NOTES
OPIC Short Note                       Date: May 25, 2021    Name: Amanuel Ferrer    MRN: [id number]         : 1950    Treatment: PICC Line dressing change    OPIC COVID-19 SCREENING      The patient was asked the following questions and answered as documented below:    1. Do you have any symptoms of COVID-19? SOB, coughing, fever, or generally not feeling well? NO  2. Have you been exposed to COVID-19 recently? NO  3. Have you had any recent contact with family/friend that has a pending COVID test? NO      Follow Up: Proceed with treatment    Ms. Getachew Ruiz was assessed and education was provided. Problem: Infection - Risk of, Central Venous Catheter-Associated Bloodstream Infection  Goal: *Absence of infection signs and symptoms  Outcome: Progressing Towards Goal     Problem: Patient Education:  Go to Education Activity  Goal: Patient/Family Education  Outcome: Progressing Towards Goal    Lines: R upper are double lumen PICC line  PICC Double Lumen 21 Right;Brachial;Cephalic (Active)   Central Line Being Utilized No 21   Site Assessment Dry; Intact 21   Phlebitis Assessment 0 21   Infiltration Assessment 0 21   Date of Last Dressing Change 21   Dressing Status Clean, dry, & intact;New;Occlusive 21   Action Taken Dressing changed 21   External Catheter Length (cm) 0 centimeters 21   Dressing Type Disk with Chlorhexadine gluconate (CHG); Stabilization/securement device;Transparent 21   Hub Color/Line Status Pink;Flushed;Patent;Cap end changed 21   Positive Blood Return (Site #1) No 21   Hub Color/Line Status White;Flushed;Patent;Cap end changed 21   Positive Blood Return (Site #2) No 21   Alcohol Cap Used Yes 21      PICC line flushed and heparinized, end caps changed. Ms. Dennis Valdez vitals were reviewed prior to treatment.    Patient Vitals for the past 12 hrs:   Temp Pulse Resp BP SpO2   05/25/21 0857 97.4 °F (36.3 °C) 80 18 (!) 106/56 99 %       Ms. Aaron tolerated the treatment without complaints. Ms. Dago Pavon was discharged from [place] [address] in stable condition at 1020.       Patient provided with AVS , which includes future appointment and written educational material.     Future Appointments   Date Time Provider John Escalante   6/1/2021  9:00 AM Mike Alvarez NP [facility] BS AMB   6/1/2021 11:20 AM Ahmet Guzman MD [address] [facility] BS AMB   6/1/2021  1:00 PM [facility] INFUSION NURSE 2 [address] [facility]   6/8/2021  9:30 AM [facility] INFUSION NURSE 2 [address] COM   6/14/2021 10:30 AM ECHO LAB 1 [facility]   6/15/2021  9:30 AM [facility] INFUSION NURSE 1 [address] COM   6/18/2021  9:00 AM Mike Alvarez NP [facility] BS AMB   6/22/2021  9:30 AM [facility] INFUSION NURSE 2 [address] [facility]   7/9/2021  9:00 AM Matias Hair MD [facility] BS AMB   7/13/2021 10:20 AM Nhi Kenny MD [address], RN  May 25, 2021  10:19 AM

## 2021-05-28 ENCOUNTER — TELEPHONE (OUTPATIENT)
Dept: CARDIOLOGY CLINIC | Age: 71
End: 2021-05-28

## 2021-05-28 NOTE — TELEPHONE ENCOUNTER
Telephone Call RE:  Appointment reminder     Outcome:     [] Patient confirmed appointment   [] Patient rescheduled appointment for    [] Unable to reach   [] Left message              [] Other:       Confirmed w/pt. Screened patient for Covid. Reminder to arrive 15 minutes early for screening and check in.   Loy Hernandez

## 2021-06-01 ENCOUNTER — TELEPHONE (OUTPATIENT)
Dept: CARDIOLOGY CLINIC | Age: 71
End: 2021-06-01

## 2021-06-01 ENCOUNTER — HOSPITAL ENCOUNTER (OUTPATIENT)
Dept: INFUSION THERAPY | Age: 71
Discharge: HOME | End: 2021-06-01
Payer: MEDICARE

## 2021-06-01 ENCOUNTER — OFFICE VISIT (OUTPATIENT)
Dept: CARDIOLOGY CLINIC | Age: 71
End: 2021-06-01
Payer: MEDICARE

## 2021-06-01 VITALS
TEMPERATURE: 97.6 F | WEIGHT: 177.8 LBS | SYSTOLIC BLOOD PRESSURE: 106 MMHG | OXYGEN SATURATION: 98 % | HEART RATE: 75 BPM | HEIGHT: 64 IN | RESPIRATION RATE: 16 BRPM | BODY MASS INDEX: 30.35 KG/M2 | DIASTOLIC BLOOD PRESSURE: 58 MMHG

## 2021-06-01 VITALS
OXYGEN SATURATION: 100 % | RESPIRATION RATE: 18 BRPM | DIASTOLIC BLOOD PRESSURE: 51 MMHG | HEART RATE: 77 BPM | SYSTOLIC BLOOD PRESSURE: 100 MMHG | TEMPERATURE: 97.2 F

## 2021-06-01 DIAGNOSIS — E55.9 VITAMIN D DEFICIENCY: ICD-10-CM

## 2021-06-01 DIAGNOSIS — Z79.4 TYPE 2 DIABETES MELLITUS WITHOUT COMPLICATION, WITH LONG-TERM CURRENT USE OF INSULIN (HCC): Primary | ICD-10-CM

## 2021-06-01 DIAGNOSIS — I42.8 NICM (NONISCHEMIC CARDIOMYOPATHY) (HCC): ICD-10-CM

## 2021-06-01 DIAGNOSIS — E11.9 TYPE 2 DIABETES MELLITUS WITHOUT COMPLICATION, WITH LONG-TERM CURRENT USE OF INSULIN (HCC): Primary | ICD-10-CM

## 2021-06-01 PROCEDURE — G9899 SCRN MAM PERF RSLTS DOC: HCPCS | Performed by: NURSE PRACTITIONER

## 2021-06-01 PROCEDURE — G8399 PT W/DXA RESULTS DOCUMENT: HCPCS | Performed by: NURSE PRACTITIONER

## 2021-06-01 PROCEDURE — 1101F PT FALLS ASSESS-DOCD LE1/YR: CPT | Performed by: NURSE PRACTITIONER

## 2021-06-01 PROCEDURE — 1090F PRES/ABSN URINE INCON ASSESS: CPT | Performed by: NURSE PRACTITIONER

## 2021-06-01 PROCEDURE — G8427 DOCREV CUR MEDS BY ELIG CLIN: HCPCS | Performed by: NURSE PRACTITIONER

## 2021-06-01 PROCEDURE — 2022F DILAT RTA XM EVC RTNOPTHY: CPT | Performed by: NURSE PRACTITIONER

## 2021-06-01 PROCEDURE — G8432 DEP SCR NOT DOC, RNG: HCPCS | Performed by: NURSE PRACTITIONER

## 2021-06-01 PROCEDURE — 3017F COLORECTAL CA SCREEN DOC REV: CPT | Performed by: NURSE PRACTITIONER

## 2021-06-01 PROCEDURE — 36589 REMOVAL TUNNELED CV CATH: CPT

## 2021-06-01 PROCEDURE — G8536 NO DOC ELDER MAL SCRN: HCPCS | Performed by: NURSE PRACTITIONER

## 2021-06-01 PROCEDURE — 99214 OFFICE O/P EST MOD 30 MIN: CPT | Performed by: NURSE PRACTITIONER

## 2021-06-01 PROCEDURE — G8752 SYS BP LESS 140: HCPCS | Performed by: NURSE PRACTITIONER

## 2021-06-01 PROCEDURE — G8417 CALC BMI ABV UP PARAM F/U: HCPCS | Performed by: NURSE PRACTITIONER

## 2021-06-01 PROCEDURE — 3051F HG A1C>EQUAL 7.0%<8.0%: CPT | Performed by: NURSE PRACTITIONER

## 2021-06-01 PROCEDURE — G8754 DIAS BP LESS 90: HCPCS | Performed by: NURSE PRACTITIONER

## 2021-06-01 RX ORDER — ALLOPURINOL 300 MG/1
150 TABLET ORAL DAILY
Qty: 90 TABLET | Refills: 1
Start: 2021-06-01 | End: 2021-06-02 | Stop reason: DRUGHIGH

## 2021-06-01 NOTE — DISCHARGE INSTRUCTIONS
OUTPATIENT INFUSION CENTER    DISCHARGE INSTRUCTIONS FOR:  REMOVAL OF PICC LINE    Now that your PICC Line has been removed, please follow the instructions below regarding your site care: You will be kept resting in a supine position for 30--60 minutes immediately after the PICC has been removed. Avoid heavy lifting or excessive use of the extremity for 24 hours. If drainage or bleeding is present, apply direct pressure until it has stopped. If bleeding persists, continue pressure and seek emergency medical care. The area under the dressing must be kept clean and dry for 3 days. Do not submerge the area in water for 3 days. You may shower, but cover the dressing with clear plastic wrap and apply tape around the edges. Remove the plastic wrap after showering. After 3 days, the dressing may be removed. Before removing the dressing, wash hands thoroughly with soap and water. Inspect the site. Gently wash with warm soapy water. Pat dry and re-cover with a band aid until a scab forms. Report to your physician any of the following:    Chills and fever;  Swelling, redness, pain or if site is warm to the touch; Any pus or unusual drainage from the site; Any questions or concerns.     [facility], Signature: ______________________________ 6/1/2021  Garima Turner RN

## 2021-06-01 NOTE — PROGRESS NOTES
[facility], [facility] Note    Patient name: Briseida Thompson  Patient : 1950  Patient MRN: [id number]  Date of service: 21    Primary care physician: Erlinda Le MD  Primary general cardiologist:  Dr. Juna Rivera  Primary [facility] cardiologist: Larissa Barragan MD    CHIEF COMPLAINT:  Chronic systolic heart failure    PLAN:  [id number] Teresa Cervantes to remove PICC line today  Recheck echocardiogram on 21  Continue current dose entresto 24/26mg twice daily  Cont hydralazine to 50mg TID   Cont imdur 30mg to daily  Continue current dose of spironolactone 25mg daily  Not taking SGLT2 inhibitor, consider starting at next visit mid-   Cont bumex 2mg daily  Continue magnesium oxice 800mg twice daily  Continue high dose vitamin D weekly  Start calcitriol 0.25mcg daily for osteopenia  Decrease allopurinol to 150mg daily  Does not take ASA  Ok to stop Lifevest   Does not take statins, LDL 84  Schedule sleep study 21  Repeat PYP test next year due to equivocal testing (no M-spike and genetic negative for iaTTR)  Labs at next visit on   Reinforced low salt diet  Reinforced fluid restriction to 6 x 8oz glasses per day  Recommend daily at least 30 minutes walking for deconditioning  Counseled on importance to remain abstinent from alcohol  Previously provided educational materials \"Living with heart failure\"   Advanced care plan present on file  Follow-up with Dr. Juan Rivera in 2021; will alternate visits (we will see patient 1-2 per year due to recovery)  Follow-up with GYN re: mammogram and pap smear  Follow-up with PCP, podiatrist and ophthalmologist for diabetes  Follow-up with nutritionist for weight loss and HF diet  Recommend flu, covid and pneumonia vaccinations; offered pneumonia shot today  Return to [facility] for NP/MD visit, 2021    IMPRESSION:  Chronic systolic heart failure  · Stage C, NYHA class II recovered from stage D class IV symptoms  · Non-ischemic cardiomyopathy, with recovery of severe biventricular failure while on inotropic support  · LVEF 55-60% (by echo 5/2021), LVEF 40-55% (by echo 4/2021) from LVEF 15-20% (by echo 3/9/21) from 20-25% (by echo 1/2021) from 51-55% (by echo 11/2019)  · RV dysfunction, moderate-severe on diagnosis; normalized by echo (5/2021)  · Low cardiac index at rest 1.3 on chronic dobutamine for hypotension; recovered  · Weaned off inotropes (from 1/2021 to 5/2021); competed LVAD workup  · Normal cors (by [facility] 3/11/21)  · Severe pulmonary artery hypertension, PVR 4  Cardiac risk factors  · Morbid obesity (BMI 32)  · High risk for SHARLENE  · DM2 (hgba1c 6.8)  · HL  · HTN  · Former tobacco (stopped 2014 after > 40 years)  · H/o alcohol abuse  OA, right knee  Carpal tunnel syndrome  Gout  Anemia, iron deficiency  · C-scope (3/2021 s/p polyp removal, plan to repeat C-scope 2024)  Hypokalemia/hypomagensemia  Vitamin D deficincy    CARDIAC IMAGING:  RHC 3/17/21  PA 47/20 (29), RA 7, PCWP 10, CI 2.93  RHC (3/11/21) 25, PA 49/27/37, W 34, Allen CI 1.34     Echo (5/14/21) LVEF 55-60%, LVEDD 4.9cm  Echo (4/8/21) LVEF 50-55%, LIVDd 5.04cm, Tapse 2.38cm, RVIDd 2.99cm  Echo (3/9/21) LVEF 15-20%, mild-moderate MR, severely elevated CVP, IVC > 21mm  Echo (1/29/21) LVEF 20-25%, moderately to severely reduced RV function  Echo (11/20/19) LVEF 51-55%  Echo (12/18/18) LVEF 50-55%  Echo (11/23/16) LVEF 45-55%     EKG (3/9/21) ST 103bpm, QRS 84ms  NST (5/2018) no scar or ischemia, LVEF 48%  · Normal cors (by [facility] 3/11/21)    HEMODYNAMICS:  RHC not done  CPEST not done  6MW     6 Min Walk Report 5/17/2021 4/21/2021 3/15/2021   (PRE) HR 71 86 88   (PRE) O2 Sat 99 98 100   (POST) HR 82 102 87   (POST) O2 Sat 97 96 98   Distance in Meters 355.92 267.31 100.74         OTHER IMAGING:  DEXA scan- 4/8/21- osteopenic but no treatment indicated  Carotid dopplers (4/6/21)  Unchanged     HISTORY OF PRESENT ILLNESS:  I had the pleasure of seeing Susy Salvador in [facility] at [facility] in [place]. Briefly, Susy Salvador is a 73 y.o. female with h/o obesity (BMI 30), high risk for SHARLENE, DM2, HL, HTN, former tobacco (stopped 2014 after > 40 years), h/o alcohol use, iron deficiency anemia, CKD stage 2, COPD/reactive airway disease. Patient initial diagnosis of heart failure occurred 2007 she was previously followed by Dr. Nathan Clemons at [facility] up until 2015 where she changed to Dr. Gely Doshi due to insurance coverage. Carissa Abad ejection fraction at [facility] in 2015 was 45-50%, at the time she had been maintained on high-dose diuretics to maintain a euvolemic state, metoprolol 100 mg daily as well as lisinopril 20 mg daily.  Ms. Tasha Green had been doing well on guideline directed medical therapy, she was on significant doses of diuretics to include upwards of 120 mg of furosemide along with 5 mg of metolazone daily. Kathy Neri has been recommended since she had maintained a euvolemic state and essentially near normal ejection fraction that reducing and/or eliminating metolazone due to its potential for renal decline. Patient was admitted 3/10-3/20/21 with 2 weeks h/o severe dyspnea, hypotension and NYHA class IV symptoms. Patient was found to have LVEF < 15% and was diuresed and initiated on home inotrope therapy with dobutamine. Workup for LVAD-DT was completed on this admission and patient discharge home on inotropes and with lifevest for a 3 month period of observation. The plan was that if she does not recover LVEF within three months she will undergo LVAD implatnation. During this period of time has heart function has recovered to normal on GDMT. Symptoms improved to stage C, NYHA class II recovered from stage D class IV symptoms.  She was confirmed to have non-ischemic cardiomyopathy, with recovery of severe biventricular failure while on inotropic support; LVEF 55-60% (by echo 5/2021), LVEF 40-55% (by echo 4/2021) from LVEF 15-20% (by echo 3/9/21) from 20-25% (by echo 1/2021) from 51-55% (by echo 11/2019). RV dysfunction, moderate-severe on diagnosis normalized by echo to normal LV function (5/2021). Hypotension with low cardiac index at rest 1.3 on chronic dobutamine recovered; GDMT was introduced and she was being weaned off inotropes (from 1/2021 to 5/2021). Normal cors (by [facility] 3/11/21). INTERVAL HISTORY:  Today, Ms Juliann Gonsalez presents for HF follow up. Her dobutamine infusion has been discontinued, she has can stop wearing her lifevest and we will remove her PICC line for risk of infection. Next TTE on 6/14/21. She denies acute complaints, she will need to increase her activity and continue her current course of GDMT. REVIEW OF SYSTEMS:  General: Denies fever, night sweats. Ear, nose and throat: Denies difficulty hearing, sinus problems, runny nose, post-nasal drip, ringing in ears, mouth sores, loose teeth, ear pain, nosebleeds, sore throate, facial pain or numbess  Cardiovascular: see above in the interval history  Respiratory: Denies cough, wheezing, sputum production, hemoptysis. Gastrointestinal: Denies heartburn, constipation, diarrhea, abdominal pain, nausea, vomiting, difficulty swallowing, blood in stool  Kidney and bladder: Denies painful urination, frequent urination, urgency  Musculoskeletal: Denies joint pain, muscle weakness  Skin and hair: Denies change in existing skin lesions, hair loss or increase, breast changes    PHYSICAL EXAM:  Visit Vitals  BP (!) 106/58 (BP 1 Location: Left arm, BP Patient Position: Sitting, BP Cuff Size: Adult)   Pulse 75   Temp 97.6 °F (36.4 °C)   Resp 16   Ht 5' 4\" (1.626 m)   Wt 177 lb 12.8 oz (80.6 kg)   SpO2 98%   BMI 30.52 kg/m²     General: Patient is well developed, well-nourished in no acute distress  HEENT: Normocephalic and atraumatic. No scleral icterus. Pupils are equal, round and reactive to light and accomodation. No conjunctival injection.  Oropharynx is clear. Neck: Supple. No evidence of thyroid enlargements or lymphadenopathy. JVD: Cannot be appreciated   Lungs: Breath sounds are equal and clear bilaterally. No wheezes, rhonchi, or rales. Heart: Regular rate and rhythm with normal S1 and S2. No murmurs, gallops or rubs. Abdomen: Soft, no mass or tenderness. No organomegaly or hernia. Bowel sounds present. Genitourinary and rectal: deferred  Extremities: No cyanosis, clubbing, or edema. Neurologic: No focal sensory or motor deficits are noted. Grossly intact. Psychiatric: Awake, alert an doriented x 3. Appropriate mood and affect. Skin: Warm, dry and well perfused. No lesions, nodules or rashes are noted.     PAST MEDICAL HISTORY:  Past Medical History:   Diagnosis Date    Arthritis     Diabetes ([address]     Heart failure [address] [address] [facility]     Hypercholesterolemia     Hypertension        PAST SURGICAL HISTORY:  Past Surgical History:   Procedure Laterality Date    COLONOSCOPY N/A 3/19/2021    COLONOSCOPY performed by Clara Grossman MD at [facility] ENDOSCOPY    HX ORTHOPAEDIC      Arthroscopy right knee    CA CARDIAC SURG PROCEDURE UNLIST      life vest       FAMILY HISTORY:  Family History   Problem Relation Age of Onset    Diabetes Mother        SOCIAL HISTORY:  Social History     Socioeconomic History    Marital status:      Spouse name: Not on file    Number of children: 3    Years of education: 15    Highest education level: Not on file   Occupational History    Occupation: retired   Tobacco Use    Smoking status: Former Smoker     Packs/day: 0.25     Years: 20.00     Pack years: 5.00     Quit date: 2014     Years since quittin.4    Smokeless tobacco: Never Used   Vaping Use    Vaping Use: Never used   Substance and Sexual Activity    Alcohol use: Yes     Comment: 21-24 Beers per week    Drug use: No    Sexual activity: Yes     Partners: Male     Social Determinants of Health     Financial Resource Strain:     Difficulty of Paying Living Expenses:    Food Insecurity:     Worried About Running Out of Food in the Last Year:     [address] in the Last Year:    Transportation Needs:     Lack of Transportation (Medical):  Lack of Transportation (Non-Medical):    Physical Activity:     Days of Exercise per Week:     Minutes of Exercise per Session:    Stress:     Feeling of Stress :    Social Connections:     Frequency of Communication with Friends and Family:     Frequency of Social Gatherings with Friends and Family:     Attends Taoist Services:     Active Member of Clubs or Organizations:     Attends Club or Organization Meetings:     Marital Status:        LABORATORY RESULTS:  Labs Latest Ref Rng & Units 5/24/2021 5/18/2021 5/11/2021 5/4/2021 4/27/2021 4/13/2021 4/7/2021   WBC 3.6 - 11.0 K/uL 5.1 4.5 4.8 4.2 - 4.2 4.1   RBC 3.80 - 5.20 M/uL 3.66(L) 4.13 4.07 3.92 - 3.81 4.02   Hemoglobin 11.5 - 16.0 g/dL 10. 3(L) 11.4 11. 0(L) 10. 8(L) - 10. 4(L) 11.2   Hematocrit 35.0 - 47.0 % 33. 0(L) 34.8 34.3 34.1 - 33. 4(L) 35.1   MCV 80.0 - 99.0 FL 90.2 84 84 87 - 88 87   Platelets 578 - 046 K/uL 224 226 236 228 - 209 246   Lymphocytes 12 - 49 % 19 - - - - - -   Monocytes 5 - 13 % 6 6 7 - - 5 6   Eosinophils 0 - 7 % 4 3 5 - - 5 4   Basophils 0 - 1 % 1 1 1 - - 1 1   Albumin 3.5 - 5.0 g/dL 4.4 4.7 4.6 4.4 4.1 4.3 4.5   Calcium 8.5 - 10.1 MG/DL 10.0 10.2 10.2 9.6 9.4 9.9 9.6   Glucose 65 - 100 mg/dL 198(H) 134(H) 108(H) 229(H) 110(H) 146(H) 179(H)   BUN 6 - 20 MG/DL 51(H) 39(H) 35(H) 18 20 20 29(H)   Creatinine 0.55 - 1.02 MG/DL 1.03(H) 1.13(H) 0.97 0.76 0.76 0.96 1.02(H)   Sodium 136 - 145 mmol/L 136 141 140 144 141 140 138   Potassium 3.5 - 5.1 mmol/L 4.0 4.3 3.9 3.8 4.1 4.4 4.8   TSH 0.450 - 4.500 uIU/mL 1.240 - - - - - -   Some recent data might be hidden       ALLERGY:  No Known Allergies     CURRENT MEDICATIONS:    Current Outpatient Medications:     calcitRIOL (ROCALTROL) 0.25 mcg capsule, Take 1 Cap by mouth daily. , Disp: 30 Cap, Rfl: 1    isosorbide mononitrate ER (IMDUR) 30 mg tablet, Take 1 Tab by mouth daily. , Disp: 30 Tab, Rfl: 0    hydrALAZINE (APRESOLINE) 50 mg tablet, Take 1 Tab by mouth three (3) times daily. , Disp: 90 Tab, Rfl: 0    magnesium oxide (MAG-OX) 400 mg tablet, TAKE 2 TABLETS BY MOUTH TWO (2) TIMES A DAY., Disp: 120 Tab, Rfl: 2    senna-docusate (PERICOLACE) 8.6-50 mg per tablet, Take 2 Tabs by mouth daily. (Patient taking differently: Take 2 Tabs by mouth as needed.), Disp: 30 Tab, Rfl: 1    sacubitriL-valsartan (Entresto) 24-26 mg tablet, Take 1 Tab by mouth two (2) times a day., Disp: 60 Tab, Rfl: 1    diclofenac (VOLTAREN) 1 % gel, Apply 2 g to affected area two (2) times daily as needed for Pain. thin layer to joint pain, Disp: , Rfl:     sodium chloride (Normal Saline Flush), 10-20 mL by InterCATHeter route daily. , Disp: , Rfl:     heparin sod,porcine/0.9 % NaCl (HEPARIN FLUSH IV), 3-5 mL by IntraCATHeter route daily. , Disp: , Rfl:     budesonide (PULMICORT) 0.25 mg/2 mL nbsp, 500 mcg by Nebulization route every six (6) hours as needed for Other (wheezing shortness of breath). , Disp: , Rfl:     oxybutynin (DITROPAN) 5 mg tablet, Take 0.5 Tabs by mouth three (3) times daily. , Disp: 90 Tab, Rfl: 11    bumetanide (BUMEX) 2 mg tablet, Take 1 Tab by mouth daily. , Disp: 90 Tab, Rfl: 2    ergocalciferol (ERGOCALCIFEROL) 1,250 mcg (50,000 unit) capsule, Take 1 Cap by mouth every seven (7) days. , Disp: 21 Cap, Rfl: 0    melatonin 3 mg tablet, Take 1 Tab by mouth nightly as needed for Insomnia., Disp: 90 Tab, Rfl: 0    spironolactone (ALDACTONE) 25 mg tablet, Take 1 Tab by mouth daily. , Disp: 90 Tab, Rfl: 1    thiamine HCL (B-1) 100 mg tablet, Take 1 Tab by mouth daily. , Disp: 90 Tab, Rfl: 1    fluticasone propion-salmeteroL (ADVAIR/WIXELA) 250-50 mcg/dose diskus inhaler, Take 2 Puffs by inhalation two (2) times a day., Disp: , Rfl:     fluticasone propionate (FLONASE) 50 mcg/actuation nasal spray, 2 Sprays by Both Nostrils route daily. , Disp: 1 Bottle, Rfl: 0    allopurinoL (ZYLOPRIM) 300 mg tablet, TAKE 1 TABLET BY MOUTH DAILY, Disp: 90 Tab, Rfl: 3    insulin glargine (LANTUS,BASAGLAR) 100 unit/mL (3 mL) inpn, 60 units every morning (Patient taking differently: 50 Units by SubCUTAneous route daily. 60 units every morning), Disp: 6 Adjustable Dose Pre-filled Pen Syringe, Rfl: 11    calcitRIOL (RocaltroL) 0.25 mcg capsule, Take 0.25 mcg by mouth daily. (Patient not taking: Reported on 6/1/2021), Disp: , Rfl:     hydrALAZINE (APRESOLINE) 50 mg tablet, Take 50 mg by mouth three (3) times daily. (Patient not taking: Reported on 6/1/2021), Disp: , Rfl:     isosorbide mononitrate ER (IMDUR) 30 mg tablet, Take 30 mg by mouth daily. (Patient not taking: Reported on 6/1/2021), Disp: , Rfl:     DOBUTamine (DOBUTREX) 500 mg/250 mL (2,000 mcg/mL) infusion, 207 mcg/min by IntraVENous route continuous. Based on 86.6 kg (4/1/21)  Based on 80.8kg (4/21/21) (Patient not taking: Reported on 6/1/2021), Disp: 250 mL, Rfl: 0    Thank you for your referral and allowing me to participate in this patient's care.     Anibal Marie NP  [address] [place] [address], [address]  Phone: [phone]      PATIENT CARE TEAM:  Patient Care Team:  Alannah Gruber MD as PCP - General (Internal Medicine)  Alannah Gruber MD as PCP - [facility]  Irasema Swan MD as Physician (Cardiology)  Ilda Panda MD as Surgeon (Orthopedic Surgery)  David Ritter MD (Sleep Medicine)     Total visit time: 40 minutes (> 50% spent face-to-face counseling)

## 2021-06-01 NOTE — PATIENT INSTRUCTIONS
Medication changes:  Decrease allopurinol 300mg- take 1/2 tablet daily       Please take this to your pharmacy to notify them of the change in medications. Testing Ordered:    Echo    Other Recommendations:     OPIC to remove PICC line today     STOP life vest and return in box provided. Echo is scheduled at [facility] on 6/14 at 10:30      Ensure your drinking an adequate amount of water with a goal of 6-8 eight ounce glasses (1.5-2 liters) of fluid daily. Your urine should be clear and light yellow straw colored. If your blood pressure begins to consistently run below 90/60 and/or you begin to experience dizziness or lightheadedness, please contact the [facility] at [phone]. Follow up  June 18th at [address] with [facility] [address]      Please monitor your weights daily upon waking and after using the bathroom. Keep a written records of your weights and bring to your next appointment. If you have a weight gain of 3 or more pounds overnight OR 5 or more pounds in one week please contact our office. Thank you for allowing us the privilege of being a part of your healthcare team! Please do not hesitate to contact our office at [phone] with any questions or concerns. Virtual Heart Failure [address] invites you to learn more about heart failure and to share your questions, ideas, and experiences with others. Each month, the Heart Failure Support Group features a new educational topic and a guest speaker, followed by an interactive discussion. Our Heart Failure Nurse Navigator will moderate each session. You will be able to participate by phone, tablet or computer through [address] [place] [address]. This support group takes place on the 3rd Thursday of each month from 6:00-7:30PM. All individuals living with heart failure and their caregivers are welcome to join.      If you are interested in participating, please contact us at [email or web address] and you will be sent the link to join the [email or web address].

## 2021-06-01 NOTE — TELEPHONE ENCOUNTER
[facility] notified her that patient has an appointment today at 1 pm. Original orders were to complete PICC line dressing changes however patient now needs PICC line discontinued. Advised her PT now needs PICC line discontinued. She stated understanding and she provided fax number to fax over new order information. Faxed new order form.  Epi Arellano RN

## 2021-06-01 NOTE — PROGRESS NOTES
OPIC Short Note                       Date: 2021    Name: Eneida Carmen    MRN: [id number]         : 1950    Treatment: PICC Line Removal    OPIC COVID-19 SCREENING      The patient was asked the following questions and answered as documented below:    1. Do you have any symptoms of COVID-19? SOB, coughing, fever, or generally not feeling well? NO  2. Have you been exposed to COVID-19 recently? NO  3. Have you had any recent contact with family/friend that has a pending COVID test? NO      Follow Up: Proceed with treatment    Ms. Angie Agrawal was assessed and education was provided. Problem: Infection - Risk of, Central Venous Catheter-Associated Bloodstream Infection  Goal: *Absence of infection signs and symptoms  Outcome: Progressing Towards Goal     Problem: Patient Education:  Go to Education Activity  Goal: Patient/Family Education  Outcome: Progressing Towards Goal    Lines:   PICC LINE REMOVED. Ms. Mi Benitez vitals were reviewed prior to and after treatment. Patient Vitals for the past 12 hrs:   Temp Pulse Resp BP SpO2   21 1336 -- 77 -- (!) 100/51 --   21 1308 97.2 °F (36.2 °C) 74 18 (!) 106/55 100 %       1304: Right arm PICC line removed without difficulty. 38cm removed without difficulty. Pressure held for two minutes, no bleeding noted. Site covered with 4x4 and Tegaderm, site then wrapped with coban. Patient lying supine, and remained in OPIC for 30 mins. Ms. Angie Agrawal tolerated the treatment without complaints. Ms. Angie Agrawal was discharged from [place] [address] in stable condition at 1340.       Patient provided with AVS , which includes future appointment and written educational material.     Future Appointments   Date Time Provider John Escalante   2021  9:30  [facility] 2 81 [facility]   2021 10:30 AM ECHO LAB 1 [facility]   6/15/2021  9:30 AM [facility] INFUSION NURSE 1 [facility] [employer]   2021  9:00 AM Mike Alvarez NP [facility] BS AMB   6/22/2021  9:30 AM [facility] INFUSION NURSE 2 [facility]   7/9/2021  9:00 AM Matias Hair MD [facility] MAIN BS AMB   7/13/2021 10:20 AM Nhi Kenny MD [facility] BS AMB   7/22/2021 11:20 AM Ahmte Guzman MD [facility] PSYCHIATRIC [place] BS AMB       Sarah Kee RN  June 1, 2021  4:07 PM

## 2021-06-02 ENCOUNTER — TELEPHONE (OUTPATIENT)
Dept: CARDIOLOGY CLINIC | Age: 71
End: 2021-06-02

## 2021-06-02 DIAGNOSIS — M10.00 IDIOPATHIC GOUT, UNSPECIFIED CHRONICITY, UNSPECIFIED SITE: ICD-10-CM

## 2021-06-02 DIAGNOSIS — E55.9 VITAMIN D DEFICIENCY: Primary | ICD-10-CM

## 2021-06-02 RX ORDER — ACETAMINOPHEN 500 MG
2000 TABLET ORAL DAILY
Qty: 30 CAPSULE | Refills: 2 | Status: SHIPPED | OUTPATIENT
Start: 2021-06-02 | End: 2021-09-22

## 2021-06-02 RX ORDER — ALLOPURINOL 100 MG/1
100 TABLET ORAL DAILY
Qty: 30 TABLET | Refills: 2 | Status: SHIPPED | OUTPATIENT
Start: 2021-06-02 | End: 2021-07-09 | Stop reason: SDUPTHER

## 2021-06-02 NOTE — TELEPHONE ENCOUNTER
----- Message from Hetal Martinez MD sent at 6/1/2021  8:02 PM EDT -----  Discontinue weekly high dose vitamin D  Take vitamin D 2000 units daily  ----- Message -----  From: Rogelio, Lab In [facility]  Sent: 5/25/2021   2:04 PM EDT  To: MD Dona Hackett MD Lynnetta Calkins, RN; Lindsay Orozco RN  Decrease allopurinol to 100mg daily     Called patient using two patient identifers. Advised patient on above information per Dr. Sukh Valenzuela. New prescriptions sent to pharmacy. Patient stated understanding and had no further questions.  William Montes RN

## 2021-06-04 LAB
DIGOXIN SERPL-MCNC: <0.4 NG/ML (ref 0.5–0.9)
SPECIMEN STATUS REPORT, ROLRST: NORMAL

## 2021-06-08 ENCOUNTER — APPOINTMENT (OUTPATIENT)
Dept: INFUSION THERAPY | Age: 71
End: 2021-06-08

## 2021-06-08 DIAGNOSIS — I50.20 NYHA CLASS 4 HEART FAILURE WITH REDUCED EJECTION FRACTION (HCC): ICD-10-CM

## 2021-06-09 RX ORDER — CALCITRIOL 0.25 UG/1
CAPSULE ORAL
Qty: 30 CAPSULE | Refills: 1 | Status: SHIPPED | OUTPATIENT
Start: 2021-06-09 | End: 2021-07-12

## 2021-06-09 RX ORDER — HYDRALAZINE HYDROCHLORIDE 50 MG/1
TABLET, FILM COATED ORAL
Qty: 90 TABLET | Refills: 0 | Status: SHIPPED | OUTPATIENT
Start: 2021-06-09 | End: 2021-06-22 | Stop reason: SDUPTHER

## 2021-06-11 RX ORDER — LANOLIN ALCOHOL/MO/W.PET/CERES
CREAM (GRAM) TOPICAL
Qty: 90 TABLET | Refills: 0 | Status: SHIPPED | OUTPATIENT
Start: 2021-06-11 | End: 2021-09-08

## 2021-06-14 ENCOUNTER — HOSPITAL ENCOUNTER (OUTPATIENT)
Dept: NON INVASIVE DIAGNOSTICS | Age: 71
Discharge: HOME | End: 2021-06-14
Attending: INTERNAL MEDICINE
Payer: MEDICARE

## 2021-06-14 VITALS — WEIGHT: 177 LBS | HEIGHT: 64 IN | BODY MASS INDEX: 30.22 KG/M2

## 2021-06-14 DIAGNOSIS — I50.20 NYHA CLASS 4 HEART FAILURE WITH REDUCED EJECTION FRACTION (HCC): ICD-10-CM

## 2021-06-14 LAB
ECHO AO ROOT DIAM: 3.28 CM
ECHO AV AREA PEAK VELOCITY: 1.83 CM2
ECHO AV AREA/BSA PEAK VELOCITY: 1 CM2/M2
ECHO AV PEAK GRADIENT: 5.12 MMHG
ECHO AV PEAK VELOCITY: 113.13 CM/S
ECHO EST RA PRESSURE: 5 MMHG
ECHO IVC PROX: 0.96 CM
ECHO LA AREA 4C: 19.38 CM2
ECHO LA MAJOR AXIS: 3.67 CM
ECHO LA MINOR AXIS: 1.97 CM
ECHO LA VOL 2C: 91.67 ML (ref 22–52)
ECHO LA VOL 4C: 52.25 ML (ref 22–52)
ECHO LA VOL BP: 73.18 ML (ref 22–52)
ECHO LA VOL/BSA BIPLANE: 39.34 ML/M2 (ref 16–28)
ECHO LA VOLUME INDEX A2C: 49.28 ML/M2 (ref 16–28)
ECHO LA VOLUME INDEX A4C: 28.09 ML/M2 (ref 16–28)
ECHO LV EDV A2C: 79.91 ML
ECHO LV EDV A4C: 138.15 ML
ECHO LV EDV BP: 104.41 ML (ref 56–104)
ECHO LV EDV INDEX A4C: 74.3 ML/M2
ECHO LV EDV INDEX BP: 56.1 ML/M2
ECHO LV EDV NDEX A2C: 43 ML/M2
ECHO LV EJECTION FRACTION A2C: 20 PERCENT
ECHO LV EJECTION FRACTION A4C: 47 PERCENT
ECHO LV EJECTION FRACTION BIPLANE: 33.9 PERCENT (ref 55–100)
ECHO LV ESV A2C: 64.19 ML
ECHO LV ESV A4C: 72.77 ML
ECHO LV ESV BP: 69 ML (ref 19–49)
ECHO LV ESV INDEX A2C: 34.5 ML/M2
ECHO LV ESV INDEX A4C: 39.1 ML/M2
ECHO LV ESV INDEX BP: 37.1 ML/M2
ECHO LV INTERNAL DIMENSION DIASTOLIC: 1.06 CM (ref 3.9–5.3)
ECHO LV INTERNAL DIMENSION DIASTOLIC: 4.7 CM (ref 3.9–5.3)
ECHO LV INTERNAL DIMENSION SYSTOLIC: 3.9 CM
ECHO LV IVSD: 0.97 CM (ref 0.6–0.9)
ECHO LV POSTERIOR WALL DIASTOLIC: 0.95 CM (ref 0.6–0.9)
ECHO LVOT DIAM: 1.94 CM
ECHO LVOT PEAK GRADIENT: 1.99 MMHG
ECHO LVOT PEAK VELOCITY: 70.48 CM/S
ECHO MV A VELOCITY: 101.09 CM/S
ECHO MV E DECELERATION TIME (DT): 302.03 MS
ECHO MV E VELOCITY: 52 CM/S
ECHO MV E/A RATIO: 0.51
ECHO PV PEAK INSTANTANEOUS GRADIENT SYSTOLIC: 2.82 MMHG
ECHO RV INTERNAL DIMENSION: 1.95 CM
ECHO RV TAPSE: 1.24 CM (ref 1.5–2)
LA VOL DISK BP: 68.98 ML (ref 22–52)

## 2021-06-14 PROCEDURE — 93306 TTE W/DOPPLER COMPLETE: CPT

## 2021-06-14 PROCEDURE — 93306 TTE W/DOPPLER COMPLETE: CPT | Performed by: INTERNAL MEDICINE

## 2021-06-15 ENCOUNTER — APPOINTMENT (OUTPATIENT)
Dept: INFUSION THERAPY | Age: 71
End: 2021-06-15

## 2021-06-17 ENCOUNTER — TELEPHONE (OUTPATIENT)
Dept: CARDIOLOGY CLINIC | Age: 71
End: 2021-06-17

## 2021-06-17 NOTE — TELEPHONE ENCOUNTER
Telephone Call RE:  Appointment reminder     Outcome:     [x] Patient confirmed appointment   [] Patient rescheduled appointment for    [] Unable to reach   [] Left message              [] Other:     Confirmed appt with patient, screened for Covid. Reminder to arrive 15 minutes early for check-in and screening.   Carmen Ramirez

## 2021-06-18 ENCOUNTER — OFFICE VISIT (OUTPATIENT)
Dept: CARDIOLOGY CLINIC | Age: 71
End: 2021-06-18
Payer: MEDICARE

## 2021-06-18 VITALS
TEMPERATURE: 96.9 F | OXYGEN SATURATION: 96 % | SYSTOLIC BLOOD PRESSURE: 98 MMHG | HEART RATE: 62 BPM | WEIGHT: 180 LBS | BODY MASS INDEX: 30.73 KG/M2 | HEIGHT: 64 IN | DIASTOLIC BLOOD PRESSURE: 56 MMHG | RESPIRATION RATE: 16 BRPM

## 2021-06-18 DIAGNOSIS — I50.20 NYHA CLASS 4 HEART FAILURE WITH REDUCED EJECTION FRACTION (HCC): Primary | ICD-10-CM

## 2021-06-18 PROCEDURE — G8417 CALC BMI ABV UP PARAM F/U: HCPCS | Performed by: NURSE PRACTITIONER

## 2021-06-18 PROCEDURE — G8399 PT W/DXA RESULTS DOCUMENT: HCPCS | Performed by: NURSE PRACTITIONER

## 2021-06-18 PROCEDURE — 99214 OFFICE O/P EST MOD 30 MIN: CPT | Performed by: NURSE PRACTITIONER

## 2021-06-18 PROCEDURE — G8754 DIAS BP LESS 90: HCPCS | Performed by: NURSE PRACTITIONER

## 2021-06-18 PROCEDURE — 94618 PULMONARY STRESS TESTING: CPT | Performed by: NURSE PRACTITIONER

## 2021-06-18 PROCEDURE — G9899 SCRN MAM PERF RSLTS DOC: HCPCS | Performed by: NURSE PRACTITIONER

## 2021-06-18 PROCEDURE — 99000 SPECIMEN HANDLING OFFICE-LAB: CPT | Performed by: NURSE PRACTITIONER

## 2021-06-18 PROCEDURE — G8536 NO DOC ELDER MAL SCRN: HCPCS | Performed by: NURSE PRACTITIONER

## 2021-06-18 PROCEDURE — G8752 SYS BP LESS 140: HCPCS | Performed by: NURSE PRACTITIONER

## 2021-06-18 PROCEDURE — 1101F PT FALLS ASSESS-DOCD LE1/YR: CPT | Performed by: NURSE PRACTITIONER

## 2021-06-18 PROCEDURE — 3017F COLORECTAL CA SCREEN DOC REV: CPT | Performed by: NURSE PRACTITIONER

## 2021-06-18 PROCEDURE — G8432 DEP SCR NOT DOC, RNG: HCPCS | Performed by: NURSE PRACTITIONER

## 2021-06-18 PROCEDURE — 1090F PRES/ABSN URINE INCON ASSESS: CPT | Performed by: NURSE PRACTITIONER

## 2021-06-18 PROCEDURE — G8427 DOCREV CUR MEDS BY ELIG CLIN: HCPCS | Performed by: NURSE PRACTITIONER

## 2021-06-18 NOTE — PROGRESS NOTES
[facility], [facility] Note    Patient name: Briseida Thompson  Patient : 1950  Patient MRN: [id number]  Date of service: 21    Primary care physician: Erlinda Le MD  Primary general cardiologist:  Dr. Juan Rivera  Primary [facility] cardiologist: Larissa Barragan MD    CHIEF COMPLAINT:  Chief Complaint   Patient presents with    CHF         PLAN:  Continue current dose entresto 24/26mg twice daily  Cont hydralazine to 50mg TID   Cont imdur 30mg to daily  Continue current dose of spironolactone 25mg daily  Start low dose Farxiga 5mg   Cont bumex 2mg daily  Continue magnesium oxice 800mg twice daily  Continue high dose vitamin D weekly  Cont calcitriol 0.25mcg daily for osteopenia  Continue allopurinol to 150mg daily  Does not take ASA  Does not take statins, LDL 84  Schedule sleep study 21  Repeat PYP test next year due to equivocal testing (no M-spike and genetic negative for iaTTR)  Labs today   Repeat TTE 3 months   Consider CPET in 6 months  Reinforced low salt diet  Reinforced fluid restriction to 6 x 8oz glasses per day  Recommend daily at least 30 minutes walking for deconditioning  Counseled on importance to remain abstinent from alcohol  Previously provided educational materials \"Living with heart failure\"   Advanced care plan present on file  Follow-up with Dr. Juan Rivera in 2021; will alternate visits   Follow-up with GYN re: mammogram and pap smear  Follow-up with PCP, podiatrist and ophthalmologist for diabetes  Follow-up with nutritionist for weight loss and HF diet  Recommend flu, covid and pneumonia vaccinations; offered pneumonia shot today  [facility], 3 months     IMPRESSION:  Chronic systolic heart failure  · Stage C, NYHA class II recovered from stage D class IV symptoms  · Non-ischemic cardiomyopathy, with recovery of severe biventricular failure while on inotropic support  · LVEF 55-60% (by echo 5/2021), LVEF 40-55% (by echo 4/2021) from LVEF 15-20% (by echo 3/9/21) from 20-25% (by echo 1/2021) from 51-55% (by echo 11/2019)  · RV dysfunction, moderate-severe on diagnosis; normalized by echo (5/2021)  · Low cardiac index at rest 1.3 on chronic dobutamine for hypotension; recovered  · Weaned off inotropes (from 1/2021 to 5/2021); competed LVAD workup  · Normal cors (by [facility] 3/11/21)  · Severe pulmonary artery hypertension, PVR 4  Cardiac risk factors  · Morbid obesity (BMI 32)  · High risk for SHARLENE  · DM2 (hgba1c 6.8)  · HL  · HTN  · Former tobacco (stopped 2014 after > 40 years)  · H/o alcohol abuse  OA, right knee  Carpal tunnel syndrome  Gout  Anemia, iron deficiency  · C-scope (3/2021 s/p polyp removal, plan to repeat C-scope 2024)  Hypokalemia/hypomagensemia  Vitamin D deficincy    CARDIAC IMAGING:  RHC 3/17/21  PA 47/20 (29), RA 7, PCWP 10, CI 2.93  RHC (3/11/21) 25, PA 49/27/37, W 34, Allen CI 1.34     Echo (5/14/21) LVEF 55-60%, LVEDD 4.9cm  Echo (4/8/21) LVEF 50-55%, LIVDd 5.04cm, Tapse 2.38cm, RVIDd 2.99cm  Echo (3/9/21) LVEF 15-20%, mild-moderate MR, severely elevated CVP, IVC > 21mm  Echo (1/29/21) LVEF 20-25%, moderately to severely reduced RV function  Echo (11/20/19) LVEF 51-55%  Echo (12/18/18) LVEF 50-55%  Echo (11/23/16) LVEF 45-55%     EKG (3/9/21) ST 103bpm, QRS 84ms  NST (5/2018) no scar or ischemia, LVEF 48%  · Normal cors (by [facility] 3/11/21)    HEMODYNAMICS:  CPEST not done    6 Min Walk Report 6/18/2021 5/17/2021 4/21/2021 3/15/2021   (PRE) HR 68 71 86 88   (PRE) O2 Sat 99 99 98 100   (POST) HR 97 82 102 87   (POST) O2 Sat 95 97 96 98   Distance in Meters 364.51 355.92 267.31 100.74         OTHER IMAGING:  DEXA scan- 4/8/21- osteopenic but no treatment indicated  Carotid dopplers (4/6/21)  Unchanged     HISTORY OF PRESENT ILLNESS:  I had the pleasure of seeing Jose Manuel Lab in [facility] at [address] [place] [address] in [place]. Briefly, Irasema Al is a 73 y.o. female with h/o obesity (BMI 30), high risk for SHARLENE, DM2, HL, HTN, former tobacco (stopped 2014 after > 40 years), h/o alcohol use, iron deficiency anemia, CKD stage 2, COPD/reactive airway disease. Patient initial diagnosis of heart failure occurred 2007 she was previously followed by Dr. Denise Sandhoff at [facility] up until 2015 where she changed to Dr. Rolan Qureshi due to insurance coverage. [facility] ejection fraction at [facility] in 2015 was 45-50%, at the time she had been maintained on high-dose diuretics to maintain a euvolemic state, metoprolol 100 mg daily as well as lisinopril 20 mg daily.  Ms. Omid Flores had been doing well on guideline directed medical therapy, she was on significant doses of diuretics to include upwards of 120 mg of furosemide along with 5 mg of metolazone daily. Jimmy Gene has been recommended since she had maintained a euvolemic state and essentially near normal ejection fraction that reducing and/or eliminating metolazone due to its potential for renal decline. Patient was admitted 3/10-3/20/21 with 2 weeks h/o severe dyspnea, hypotension and NYHA class IV symptoms. Patient was found to have LVEF < 15% and was diuresed and initiated on home inotrope therapy with dobutamine. Workup for LVAD-DT was completed on this admission and patient discharge home on inotropes and with lifevest for a 3 month period of observation. The plan was that if she does not recover LVEF within three months she will undergo LVAD implatnation. During this period of time has heart function has recovered to normal on GDMT. Symptoms improved to stage C, NYHA class II recovered from stage D class IV symptoms. She was confirmed to have non-ischemic cardiomyopathy, with recovery of severe biventricular failure while on inotropic support; LVEF 55-60% (by echo 5/2021), LVEF 40-55% (by echo 4/2021) from LVEF 15-20% (by echo 3/9/21) from 20-25% (by echo 1/2021) from 51-55% (by echo 11/2019).  RV dysfunction, moderate-severe on diagnosis normalized by echo to normal LV function (5/2021). Hypotension with low cardiac index at rest 1.3 on chronic dobutamine recovered; GDMT was introduced and she was being weaned off inotropes (from 1/2021 to 5/2021). Normal cors (by [facility] 3/11/21). INTERVAL HISTORY:  Today, Ms Yanet Leonard presents for HF follow up. She is doing well off of her inotrope infusion, her EF remains > 50%. She denies acute HF symptoms but only states she is having some hair loss. She will need to increase her activity and continue her current course of GDMT and keep a close eye on her symptoms. REVIEW OF SYSTEMS:  General: Denies fever, night sweats. Ear, nose and throat: Denies difficulty hearing, sinus problems, runny nose, post-nasal drip, ringing in ears, mouth sores, loose teeth, ear pain, nosebleeds, sore throate, facial pain or numbess  Cardiovascular: see above in the interval history  Respiratory: Denies cough, wheezing, sputum production, hemoptysis. Gastrointestinal: Denies heartburn, constipation, diarrhea, abdominal pain, nausea, vomiting, difficulty swallowing, blood in stool  Kidney and bladder: Denies painful urination, frequent urination, urgency  Musculoskeletal: Denies joint pain, muscle weakness  Skin and hair: Denies change in existing skin lesions, hair loss or increase, breast changes    PHYSICAL EXAM:  Visit Vitals  BP (!) 98/56 (BP 1 Location: Left arm, BP Patient Position: Sitting, BP Cuff Size: Adult)   Pulse 62   Temp 96.9 °F (36.1 °C) (Oral)   Resp 16   Ht 5' 4\" (1.626 m)   Wt 180 lb (81.6 kg)   SpO2 96%   BMI 30.90 kg/m²     General: Patient is well developed, well-nourished in no acute distress  HEENT: Normocephalic and atraumatic. No scleral icterus. Pupils are equal, round and reactive to light and accomodation. No conjunctival injection. Oropharynx is clear. Neck: Supple. No evidence of thyroid enlargements or lymphadenopathy.   JVD: Cannot be appreciated Lungs: Breath sounds are equal and clear bilaterally. No wheezes, rhonchi, or rales. Heart: Regular rate and rhythm with normal S1 and S2. No murmurs, gallops or rubs. Abdomen: Soft, no mass or tenderness. No organomegaly or hernia. Bowel sounds present. Genitourinary and rectal: deferred  Extremities: No cyanosis, clubbing, or edema. Neurologic: No focal sensory or motor deficits are noted. Grossly intact. Psychiatric: Awake, alert an doriented x 3. Appropriate mood and affect. Skin: Warm, dry and well perfused. No lesions, nodules or rashes are noted.     PAST MEDICAL HISTORY:  Past Medical History:   Diagnosis Date    Arthritis     Diabetes ([facility] [address]     Heart failure ([facility] [address]     Hypercholesterolemia     Hypertension        PAST SURGICAL HISTORY:  Past Surgical History:   Procedure Laterality Date    COLONOSCOPY N/A 3/19/2021    COLONOSCOPY performed by Lisa Jimenez MD at [address] [facility]      Arthroscopy right knee    CO CARDIAC SURG PROCEDURE UNLIST      life vest       FAMILY HISTORY:  Family History   Problem Relation Age of Onset    Diabetes Mother        SOCIAL HISTORY:  Social History     Socioeconomic History    Marital status:      Spouse name: Not on file    Number of children: 3    Years of education: 15    Highest education level: Not on file   Occupational History    Occupation: retired   Tobacco Use    Smoking status: Former Smoker     Packs/day: 0.25     Years: 20.00     Pack years: 5.00     Quit date: 2014     Years since quittin.5    Smokeless tobacco: Never Used   Vaping Use    Vaping Use: Never used   Substance and Sexual Activity    Alcohol use: Yes     Comment: 21-24 Beers per week    Drug use: No    Sexual activity: Yes     Partners: Male     Social Determinants of Health     Financial Resource Strain:     Difficulty of Paying Living Expenses:    Food Insecurity:     Worried About Running Out of Food in the Last Year:     Ran Out of Food in the Last Year:    Transportation Needs:     Lack of Transportation (Medical):  Lack of Transportation (Non-Medical):    Physical Activity:     Days of Exercise per Week:     Minutes of Exercise per Session:    Stress:     Feeling of Stress :    Social Connections:     Frequency of Communication with Friends and Family:     Frequency of Social Gatherings with Friends and Family:     Attends Nondenominational Services:     Active Member of Clubs or Organizations:     Attends Club or Organization Meetings:     Marital Status:        LABORATORY RESULTS:  Labs Latest Ref Rng & Units 5/24/2021 5/18/2021 5/11/2021 5/4/2021 4/27/2021   WBC 3.6 - 11.0 K/uL 5.1 4.5 4.8 4.2 -   RBC 3.80 - 5.20 M/uL 3.66(L) 4.13 4.07 3.92 -   Hemoglobin 11.5 - 16.0 g/dL 10. 3(L) 11.4 11. 0(L) 10. 8(L) -   Hematocrit 35.0 - 47.0 % 33. 0(L) 34.8 34.3 34.1 -   MCV 80.0 - 99.0 FL 90.2 84 84 87 -   Platelets 729 - 560 K/uL 224 226 236 228 -   Lymphocytes 12 - 49 % 19 - - - -   Monocytes 5 - 13 % 6 6 7 - -   Eosinophils 0 - 7 % 4 3 5 - -   Basophils 0 - 1 % 1 1 1 - -   Albumin 3.5 - 5.0 g/dL 4.4 4.7 4.6 4.4 4.1   Calcium 8.5 - 10.1 MG/DL 10.0 10.2 10.2 9.6 9.4   Glucose 65 - 100 mg/dL 198(H) 134(H) 108(H) 229(H) 110(H)   BUN 6 - 20 MG/DL 51(H) 39(H) 35(H) 18 20   Creatinine 0.55 - 1.02 MG/DL 1.03(H) 1.13(H) 0.97 0.76 0.76   Sodium 136 - 145 mmol/L 136 141 140 144 141   Potassium 3.5 - 5.1 mmol/L 4.0 4.3 3.9 3.8 4.1   TSH 0.450 - 4.500 uIU/mL 1.240 - - - -   Some recent data might be hidden       ALLERGY:  No Known Allergies     CURRENT MEDICATIONS:    Current Outpatient Medications:     dapagliflozin (FARXIGA) 5 mg tab tablet, Take 1 Tablet by mouth daily. , Disp: 90 Tablet, Rfl: 1    Insulin Needles, Disposable, (BD Ultra-Fine Short Pen Needle) 31 gauge x 5/16\" ndle, USE WITH INSULIN PENS TWICE DAILY AS DIRECTED, Disp: 100 Pen Needle, Rfl: 11    melatonin 3 mg tablet, TAKE 1 TABLET BY MOUTH EVERY NIGHT AS NEEDED FOR INSOMNIA., Disp: 90 Tablet, Rfl: 0    hydrALAZINE (APRESOLINE) 50 mg tablet, TAKE 1 TABLET BY MOUTH THREE TIMES A DAY, Disp: 90 Tablet, Rfl: 0    calcitRIOL (ROCALTROL) 0.25 mcg capsule, TAKE 1 CAPSULE BY MOUTH EVERY DAY, Disp: 30 Capsule, Rfl: 1    cholecalciferol (VITAMIN D3) (2,000 UNITS /50 MCG) cap capsule, Take 1 Capsule by mouth daily. , Disp: 30 Capsule, Rfl: 2    allopurinoL (ZYLOPRIM) 100 mg tablet, Take 1 Tablet by mouth daily. (Patient taking differently: Take 50 mg by mouth daily.), Disp: 30 Tablet, Rfl: 2    isosorbide mononitrate ER (IMDUR) 30 mg tablet, Take 1 Tab by mouth daily. , Disp: 30 Tab, Rfl: 0    magnesium oxide (MAG-OX) 400 mg tablet, TAKE 2 TABLETS BY MOUTH TWO (2) TIMES A DAY., Disp: 120 Tab, Rfl: 2    sacubitriL-valsartan (Entresto) 24-26 mg tablet, Take 1 Tab by mouth two (2) times a day., Disp: 60 Tab, Rfl: 1    diclofenac (VOLTAREN) 1 % gel, Apply 2 g to affected area two (2) times daily as needed for Pain. thin layer to joint pain, Disp: , Rfl:     budesonide (PULMICORT) 0.25 mg/2 mL nbsp, 500 mcg by Nebulization route every six (6) hours as needed for Other (wheezing shortness of breath). , Disp: , Rfl:     oxybutynin (DITROPAN) 5 mg tablet, Take 0.5 Tabs by mouth three (3) times daily. , Disp: 90 Tab, Rfl: 11    bumetanide (BUMEX) 2 mg tablet, Take 1 Tab by mouth daily. , Disp: 90 Tab, Rfl: 2    spironolactone (ALDACTONE) 25 mg tablet, Take 1 Tab by mouth daily. , Disp: 90 Tab, Rfl: 1    thiamine HCL (B-1) 100 mg tablet, Take 1 Tab by mouth daily. , Disp: 90 Tab, Rfl: 1    fluticasone propion-salmeteroL (ADVAIR/WIXELA) 250-50 mcg/dose diskus inhaler, Take 2 Puffs by inhalation two (2) times a day., Disp: , Rfl:     fluticasone propionate (FLONASE) 50 mcg/actuation nasal spray, 2 Sprays by Both Nostrils route daily. , Disp: 1 Bottle, Rfl: 0    insulin glargine (LANTUS,BASAGLAR) 100 unit/mL (3 mL) inpn, 60 units every morning (Patient taking differently: 50 Units by SubCUTAneous route daily. 60 units every morning), Disp: 6 Adjustable Dose Pre-filled Pen Syringe, Rfl: 11    calcitRIOL (RocaltroL) 0.25 mcg capsule, Take 0.25 mcg by mouth daily. (Patient not taking: Reported on 6/1/2021), Disp: , Rfl:     sodium chloride (Normal Saline Flush), 10-20 mL by InterCATHeter route daily. (Patient not taking: Reported on 6/18/2021), Disp: , Rfl:     heparin sod,porcine/0.9 % NaCl (HEPARIN FLUSH IV), 3-5 mL by IntraCATHeter route daily. (Patient not taking: Reported on 6/18/2021), Disp: , Rfl:     Thank you for your referral and allowing me to participate in this patient's care.     Anibal Marie NP  [address] [place] [address], [address]  Phone: [phone]      PATIENT CARE TEAM:  Patient Care Team:  Alannah Gruber MD as PCP - General (Internal Medicine)  Alannah Gruber MD as PCP - [facility]  Irasema Swan MD as Physician (Cardiology)  Ilda Panda MD as Surgeon (Orthopedic Surgery)  David Ritter MD (Sleep Medicine)     Total visit time: 40 minutes (> 50% spent face-to-face counseling)

## 2021-06-18 NOTE — PATIENT INSTRUCTIONS
Medication changes:    Start Farxiga 5mg- one tablet once daily     Please take this to your pharmacy to notify them of the change in medications. Testing Ordered:    6 minute walk     Labs completed in clinic- you will be notified of any abnormal results that require a change in medication regimen    Other Recommendations: An order for Echo has been placed to be done 3 months. You will be receiving an automated call from [facility] to schedule this test. If you are unavailable to receive the call or would like to contact coordination of care yourself you may contact [phone] to schedule. You will need to contact coordination of care yourself if you miss their calls as they will only make 3 attempts to reach you. Ensure your drinking an adequate amount of water with a goal of 6-8 eight ounce glasses (1.5-2 liters) of fluid daily. Your urine should be clear and light yellow straw colored. If your blood pressure begins to consistently run below 90/60 and/or you begin to experience dizziness or lightheadedness, please contact the [facility] [address] at [phone]. Please call if you become symptomatic with shortness of breath, swelling, light headness, dizziness, or have any other concerns [phone] option 2        Follow up  In 3 months (make sure echo is completed before follow up appointment) with [facility]      Please monitor your weights daily upon waking and after using the bathroom. Keep a written records of your weights and bring to your next appointment. If you have a weight gain of 3 or more pounds overnight OR 5 or more pounds in one week please contact our office. Thank you for allowing us the privilege of being a part of your healthcare team! Please do not hesitate to contact our office at [phone] with any questions or concerns.        Virtual Heart Failure [address] invites you to learn more about heart failure and to share your questions, ideas, and experiences with others. Each month, the Heart Failure Support Group features a new educational topic and a guest speaker, followed by an interactive discussion. Our Heart Failure Nurse Navigator will moderate each session. You will be able to participate by phone, tablet or computer through [address] [place] [address]. This support group takes place on the 3rd Thursday of each month from 6:00-7:30PM. All individuals living with heart failure and their caregivers are welcome to join. If you are interested in participating, please contact us at [email or web address] and you will be sent the link to join the [employer].

## 2021-06-19 LAB
ALBUMIN SERPL-MCNC: 4.2 G/DL (ref 3.8–4.8)
ALBUMIN/GLOB SERPL: 1.3 {RATIO} (ref 1.2–2.2)
ALP SERPL-CCNC: 65 IU/L (ref 48–121)
ALT SERPL-CCNC: 19 IU/L (ref 0–32)
AST SERPL-CCNC: 21 IU/L (ref 0–40)
BILIRUB SERPL-MCNC: 0.7 MG/DL (ref 0–1.2)
BUN SERPL-MCNC: 33 MG/DL (ref 8–27)
BUN/CREAT SERPL: 37 (ref 12–28)
CALCIUM SERPL-MCNC: 9.4 MG/DL (ref 8.7–10.3)
CHLORIDE SERPL-SCNC: 104 MMOL/L (ref 96–106)
CO2 SERPL-SCNC: 26 MMOL/L (ref 20–29)
CREAT SERPL-MCNC: 0.89 MG/DL (ref 0.57–1)
GLOBULIN SER CALC-MCNC: 3.3 G/DL (ref 1.5–4.5)
GLUCOSE SERPL-MCNC: 123 MG/DL (ref 65–99)
MAGNESIUM SERPL-MCNC: 2.1 MG/DL (ref 1.6–2.3)
NT-PROBNP SERPL-MCNC: 199 PG/ML (ref 0–301)
POTASSIUM SERPL-SCNC: 4.1 MMOL/L (ref 3.5–5.2)
PROT SERPL-MCNC: 7.5 G/DL (ref 6–8.5)
SODIUM SERPL-SCNC: 143 MMOL/L (ref 134–144)
SPECIMEN STATUS REPORT, ROLRST: NORMAL

## 2021-06-19 NOTE — PROGRESS NOTES
Please call Kathleen Fleming with their normal lab results. I would like to change her labs to 1 month please. Remind her to call with any change in symptoms.

## 2021-06-20 RX ORDER — SACUBITRIL AND VALSARTAN 24; 26 MG/1; MG/1
TABLET, FILM COATED ORAL
Qty: 60 TABLET | Refills: 1 | Status: SHIPPED | OUTPATIENT
Start: 2021-06-20 | End: 2021-08-16

## 2021-06-21 ENCOUNTER — TELEPHONE (OUTPATIENT)
Dept: CARDIOLOGY CLINIC | Age: 71
End: 2021-06-21

## 2021-06-21 DIAGNOSIS — I50.20 NYHA CLASS 4 HEART FAILURE WITH REDUCED EJECTION FRACTION (HCC): Primary | ICD-10-CM

## 2021-06-21 DIAGNOSIS — R06.09 DOE (DYSPNEA ON EXERTION): ICD-10-CM

## 2021-06-21 NOTE — TELEPHONE ENCOUNTER
----- Message from Valeria Hamilton NP sent at 6/19/2021 11:21 AM EDT -----  Please call Arjun Childress with their normal lab results. I would like to change her labs to 1 month please. Remind her to call with any change in symptoms. I called patient, discussed lab results, she states understanding, placed on lab list for one month and lab orders placed.

## 2021-06-22 ENCOUNTER — APPOINTMENT (OUTPATIENT)
Dept: INFUSION THERAPY | Age: 71
End: 2021-06-22

## 2021-06-22 DIAGNOSIS — I50.20 NYHA CLASS 4 HEART FAILURE WITH REDUCED EJECTION FRACTION (HCC): ICD-10-CM

## 2021-06-22 RX ORDER — HYDRALAZINE HYDROCHLORIDE 50 MG/1
50 TABLET, FILM COATED ORAL 3 TIMES DAILY
Qty: 270 TABLET | Refills: 1 | Status: SHIPPED | OUTPATIENT
Start: 2021-06-22 | End: 2021-11-30

## 2021-06-22 NOTE — TELEPHONE ENCOUNTER
Requested Prescriptions     Signed Prescriptions Disp Refills    hydrALAZINE (APRESOLINE) 50 mg tablet 270 Tablet 1     Sig: Take 1 Tablet by mouth three (3) times daily.      Authorizing Provider: Shonda Wolf     Ordering User: Moi Jorge

## 2021-06-30 DIAGNOSIS — I50.20 NYHA CLASS 4 HEART FAILURE WITH REDUCED EJECTION FRACTION (HCC): ICD-10-CM

## 2021-06-30 RX ORDER — ISOSORBIDE MONONITRATE 30 MG/1
TABLET, EXTENDED RELEASE ORAL
Qty: 30 TABLET | Refills: 0 | Status: SHIPPED | OUTPATIENT
Start: 2021-06-30 | End: 2021-07-28

## 2021-07-09 ENCOUNTER — OFFICE VISIT (OUTPATIENT)
Dept: INTERNAL MEDICINE CLINIC | Age: 71
End: 2021-07-09
Payer: MEDICARE

## 2021-07-09 VITALS
SYSTOLIC BLOOD PRESSURE: 130 MMHG | OXYGEN SATURATION: 97 % | TEMPERATURE: 98.2 F | HEART RATE: 59 BPM | DIASTOLIC BLOOD PRESSURE: 66 MMHG | RESPIRATION RATE: 16 BRPM | BODY MASS INDEX: 30.52 KG/M2 | HEIGHT: 64 IN | WEIGHT: 178.8 LBS

## 2021-07-09 DIAGNOSIS — I10 ESSENTIAL HYPERTENSION: ICD-10-CM

## 2021-07-09 DIAGNOSIS — R79.89 PRERENAL AZOTEMIA: ICD-10-CM

## 2021-07-09 DIAGNOSIS — E11.65 UNCONTROLLED TYPE 2 DIABETES MELLITUS WITH HYPERGLYCEMIA (HCC): Primary | ICD-10-CM

## 2021-07-09 DIAGNOSIS — E66.9 OBESITY (BMI 30.0-34.9): ICD-10-CM

## 2021-07-09 DIAGNOSIS — M10.00 IDIOPATHIC GOUT, UNSPECIFIED CHRONICITY, UNSPECIFIED SITE: ICD-10-CM

## 2021-07-09 DIAGNOSIS — I42.8 NICM (NONISCHEMIC CARDIOMYOPATHY) (HCC): ICD-10-CM

## 2021-07-09 DIAGNOSIS — I50.20 NYHA CLASS 4 HEART FAILURE WITH REDUCED EJECTION FRACTION (HCC): ICD-10-CM

## 2021-07-09 DIAGNOSIS — J43.1 PANLOBULAR EMPHYSEMA (HCC): ICD-10-CM

## 2021-07-09 PROCEDURE — 3051F HG A1C>EQUAL 7.0%<8.0%: CPT | Performed by: INTERNAL MEDICINE

## 2021-07-09 PROCEDURE — 1101F PT FALLS ASSESS-DOCD LE1/YR: CPT | Performed by: INTERNAL MEDICINE

## 2021-07-09 PROCEDURE — G8754 DIAS BP LESS 90: HCPCS | Performed by: INTERNAL MEDICINE

## 2021-07-09 PROCEDURE — G8432 DEP SCR NOT DOC, RNG: HCPCS | Performed by: INTERNAL MEDICINE

## 2021-07-09 PROCEDURE — 2022F DILAT RTA XM EVC RTNOPTHY: CPT | Performed by: INTERNAL MEDICINE

## 2021-07-09 PROCEDURE — G8427 DOCREV CUR MEDS BY ELIG CLIN: HCPCS | Performed by: INTERNAL MEDICINE

## 2021-07-09 PROCEDURE — G8536 NO DOC ELDER MAL SCRN: HCPCS | Performed by: INTERNAL MEDICINE

## 2021-07-09 PROCEDURE — 99215 OFFICE O/P EST HI 40 MIN: CPT | Performed by: INTERNAL MEDICINE

## 2021-07-09 PROCEDURE — G8417 CALC BMI ABV UP PARAM F/U: HCPCS | Performed by: INTERNAL MEDICINE

## 2021-07-09 PROCEDURE — G9899 SCRN MAM PERF RSLTS DOC: HCPCS | Performed by: INTERNAL MEDICINE

## 2021-07-09 PROCEDURE — G8752 SYS BP LESS 140: HCPCS | Performed by: INTERNAL MEDICINE

## 2021-07-09 PROCEDURE — G8399 PT W/DXA RESULTS DOCUMENT: HCPCS | Performed by: INTERNAL MEDICINE

## 2021-07-09 PROCEDURE — 1090F PRES/ABSN URINE INCON ASSESS: CPT | Performed by: INTERNAL MEDICINE

## 2021-07-09 PROCEDURE — 3017F COLORECTAL CA SCREEN DOC REV: CPT | Performed by: INTERNAL MEDICINE

## 2021-07-09 RX ORDER — ALLOPURINOL 100 MG/1
100 TABLET ORAL DAILY
Qty: 30 TABLET | Refills: 11 | Status: SHIPPED | OUTPATIENT
Start: 2021-07-09

## 2021-07-09 NOTE — PROGRESS NOTES
[address] [place] [address] and Primary Care  [place] [address]  [address]  [address]  Phone:  [phone]  Fax: [phone]       Chief Complaint   Patient presents with    Diabetes     2 month follow up    . SUBJECTIVE:    Kirsten Reid is a 73 y.o. female comes in for return visit stating that she has done well. Interestingly, her ejection fraction has improved significantly such that last when done in June was back to 50%. She is no longer on her inotrope, only her Entresto and other associated medications pertinent to the congestive heart failure. Her diabetes is doing quite well. She is currently taking 15 units of insulin, specifically Lantus as a basal insulin. She has a past history of primary hypertension, dyslipidemia, gout, as well as obesity. She is well compensated regarding her other comorbidities above and beyond the cardiac status. Current Outpatient Medications   Medication Sig Dispense Refill    allopurinoL (ZYLOPRIM) 100 mg tablet Take 1 Tablet by mouth daily. 30 Tablet 11    isosorbide mononitrate ER (IMDUR) 30 mg tablet TAKE 1 TABLET BY MOUTH EVERY DAY 30 Tablet 0    hydrALAZINE (APRESOLINE) 50 mg tablet Take 1 Tablet by mouth three (3) times daily. 270 Tablet 1    Entresto 24-26 mg tablet TAKE 1 TABLET BY MOUTH TWICE A DAY 60 Tablet 1    Insulin Needles, Disposable, (BD Ultra-Fine Short Pen Needle) 31 gauge x 5/16\" ndle USE WITH INSULIN PENS TWICE DAILY AS DIRECTED 100 Pen Needle 11    melatonin 3 mg tablet TAKE 1 TABLET BY MOUTH EVERY NIGHT AS NEEDED FOR INSOMNIA. 90 Tablet 0    cholecalciferol (VITAMIN D3) (2,000 UNITS /50 MCG) cap capsule Take 1 Capsule by mouth daily. 30 Capsule 2    calcitRIOL (RocaltroL) 0.25 mcg capsule Take 0.25 mcg by mouth daily.  magnesium oxide (MAG-OX) 400 mg tablet TAKE 2 TABLETS BY MOUTH TWO (2) TIMES A DAY.  120 Tab 2    diclofenac (VOLTAREN) 1 % gel Apply 2 g to affected area two (2) times daily as needed for Pain. thin layer to joint pain      budesonide (PULMICORT) 0.25 mg/2 mL nbsp 500 mcg by Nebulization route every six (6) hours as needed for Other (wheezing shortness of breath).  oxybutynin (DITROPAN) 5 mg tablet Take 0.5 Tabs by mouth three (3) times daily. 90 Tab 11    bumetanide (BUMEX) 2 mg tablet Take 1 Tab by mouth daily. 90 Tab 2    spironolactone (ALDACTONE) 25 mg tablet Take 1 Tab by mouth daily. 90 Tab 1    thiamine HCL (B-1) 100 mg tablet Take 1 Tab by mouth daily. 90 Tab 1    fluticasone propion-salmeteroL (ADVAIR/WIXELA) 250-50 mcg/dose diskus inhaler Take 2 Puffs by inhalation two (2) times a day.  fluticasone propionate (FLONASE) 50 mcg/actuation nasal spray 2 Sprays by Both Nostrils route daily. 1 Bottle 0    insulin glargine (LANTUS,BASAGLAR) 100 unit/mL (3 mL) inpn 60 units every morning (Patient taking differently: 50 Units by SubCUTAneous route daily. 60 units every morning) 6 Adjustable Dose Pre-filled Pen Syringe 11    dapagliflozin (FARXIGA) 5 mg tab tablet Take 1 Tablet by mouth daily. (Patient not taking: Reported on 7/9/2021) 90 Tablet 1    calcitRIOL (ROCALTROL) 0.25 mcg capsule TAKE 1 CAPSULE BY MOUTH EVERY DAY (Patient not taking: Reported on 7/9/2021) 30 Capsule 1    sodium chloride (Normal Saline Flush) 10-20 mL by InterCATHeter route daily. (Patient not taking: Reported on 6/18/2021)      heparin sod,porcine/0.9 % NaCl (HEPARIN FLUSH IV) 3-5 mL by IntraCATHeter route daily.  (Patient not taking: Reported on 6/18/2021)       Past Medical History:   Diagnosis Date    Arthritis     Diabetes ([address]     Heart failure ([address]     Hypercholesterolemia     Hypertension      Past Surgical History:   Procedure Laterality Date    COLONOSCOPY N/A 3/19/2021    COLONOSCOPY performed by Lawrence June MD at [facility] ENDOSCOPY    HX ORTHOPAEDIC      Arthroscopy right knee    OR CARDIAC SURG PROCEDURE UNLIST      life vest     No Known Allergies      REVIEW OF SYSTEMS:  General: negative for - chills or fever  ENT: negative for - headaches, nasal congestion or tinnitus  Respiratory: negative for - cough, hemoptysis, shortness of breath or wheezing  Cardiovascular : negative for - chest pain, edema, palpitations or shortness of breath  Gastrointestinal: negative for - abdominal pain, blood in stools, heartburn or nausea/vomiting  Genito-Urinary: no dysuria, trouble voiding, or hematuria  Musculoskeletal: negative for - gait disturbance, joint pain, joint stiffness or joint swelling  Neurological: no TIA or stroke symptoms  Hematologic: no bruises, no bleeding, no swollen glands  Integument: no lumps, mole changes, nail changes or rash  Endocrine: no malaise/lethargy or unexpected weight changes      Social History     Socioeconomic History    Marital status:      Spouse name: Not on file    Number of children: 3    Years of education: 15    Highest education level: Not on file   Occupational History    Occupation: retired   Tobacco Use    Smoking status: Former Smoker     Packs/day: 0.25     Years: 20.00     Pack years: 5.00     Quit date: 2014     Years since quittin.5    Smokeless tobacco: Never Used   Vaping Use    Vaping Use: Never used   Substance and Sexual Activity    Alcohol use: Yes     Comment: 21-24 Beers per week    Drug use: No    Sexual activity: Yes     Partners: Male     Social Determinants of Health     Financial Resource Strain:     Difficulty of Paying Living Expenses:    Food Insecurity:     Worried About Running Out of Food in the Last Year:     Ran Out of Food in the Last Year:    Transportation Needs:     Lack of Transportation (Medical):      Lack of Transportation (Non-Medical):    Physical Activity:     Days of Exercise per Week:     Minutes of Exercise per Session:    Stress:     Feeling of Stress :    Social Connections:     Frequency of Communication with Friends and Family:     Frequency of Social Gatherings with Friends and Family:     Attends [employer] Services:     Active Member of Clubs or Organizations:     Attends Club or Organization Meetings:     Marital Status:      Family History   Problem Relation Age of Onset    Diabetes Mother        OBJECTIVE:    Visit Vitals  /66   Pulse (!) 59   Temp 98.2 °F (36.8 °C) (Oral)   Resp 16   Ht 5' 4\" (1.626 m)   Wt 178 lb 12.8 oz (81.1 kg)   SpO2 97%   BMI 30.69 kg/m²     CONSTITUTIONAL: well , well nourished, appears age appropriate  EYES: perrla, eom intact  ENMT:moist mucous membranes, pharynx clear  NECK: supple. Thyroid normal  RESPIRATORY: Chest: clear to ascultation and percussion   CARDIOVASCULAR: Heart: regular rate and rhythm  GASTROINTESTINAL: Abdomen: soft, bowel sounds active  HEMATOLOGIC: no pathological lymph nodes palpated  MUSCULOSKELETAL: Extremities: no edema, pulse 1+   INTEGUMENT: No unusual rashes or suspicious skin lesions noted. Nails appear normal.  NEUROLOGIC: non-focal exam   MENTAL STATUS: alert and oriented, appropriate affect      ASSESSMENT:  1. Uncontrolled type 2 diabetes mellitus with hyperglycemia ([address]    2. Essential hypertension    3. Prerenal azotemia    4. Idiopathic gout, unspecified chronicity, unspecified site    5. NICM (nonischemic cardiomyopathy) ([address]    6. Obesity (BMI 30.0-34.9)    7. Panlobular emphysema ([address]        PLAN:  1. Her diabetes appears to be doing quite well. I will await the results of her hemoglobin A1c.  2. Blood pressure is excellent, no adjustments are made. 3. She does have prerenal azotemia. She has no intrinsic renal disease. This is all diuretic related. 4. Her gout is stable, but she needs to remain on the allopurinol 100 mg daily. This compensates further reduction of the GFR. 5. She is under the care of the cardiologist regarding her nonischemic cardiomyopathy, which has improved spontaneously. 6. She needs to minimize her weight gain, which she is doing.   I congratulated her on this. This can be accomplished by eating meals, eliminating snacks, and avoiding the consumption of processed carbohydrates. 7. She does have mild emphysema related to her history of cigarette smoking, which she fortunately stopped doing several years ago. 8. I will be very careful resuming the SGPT-2inhibitor in this patient. .  Orders Placed This Encounter    allopurinoL (ZYLOPRIM) 100 mg tablet         Follow-up and Dispositions    · Return in about 2 months (around 9/9/2021).            Kosta Yanes MD

## 2021-07-12 RX ORDER — CALCITRIOL 0.25 UG/1
CAPSULE ORAL
Qty: 30 CAPSULE | Refills: 1 | Status: SHIPPED | OUTPATIENT
Start: 2021-07-12 | End: 2021-07-13 | Stop reason: SDUPTHER

## 2021-07-13 ENCOUNTER — OFFICE VISIT (OUTPATIENT)
Dept: CARDIOLOGY CLINIC | Age: 71
End: 2021-07-13
Payer: MEDICARE

## 2021-07-13 VITALS
SYSTOLIC BLOOD PRESSURE: 102 MMHG | DIASTOLIC BLOOD PRESSURE: 56 MMHG | HEIGHT: 64 IN | RESPIRATION RATE: 16 BRPM | OXYGEN SATURATION: 97 % | BODY MASS INDEX: 30.19 KG/M2 | WEIGHT: 176.8 LBS | HEART RATE: 85 BPM

## 2021-07-13 DIAGNOSIS — E78.5 DYSLIPIDEMIA: ICD-10-CM

## 2021-07-13 DIAGNOSIS — I42.8 NICM (NONISCHEMIC CARDIOMYOPATHY) (HCC): Primary | ICD-10-CM

## 2021-07-13 DIAGNOSIS — I10 ESSENTIAL HYPERTENSION: ICD-10-CM

## 2021-07-13 DIAGNOSIS — I27.21 MODERATE PULMONARY ARTERIAL SYSTOLIC HYPERTENSION (HCC): ICD-10-CM

## 2021-07-13 PROCEDURE — 3017F COLORECTAL CA SCREEN DOC REV: CPT | Performed by: SPECIALIST

## 2021-07-13 PROCEDURE — G8417 CALC BMI ABV UP PARAM F/U: HCPCS | Performed by: SPECIALIST

## 2021-07-13 PROCEDURE — G9899 SCRN MAM PERF RSLTS DOC: HCPCS | Performed by: SPECIALIST

## 2021-07-13 PROCEDURE — G8752 SYS BP LESS 140: HCPCS | Performed by: SPECIALIST

## 2021-07-13 PROCEDURE — G8536 NO DOC ELDER MAL SCRN: HCPCS | Performed by: SPECIALIST

## 2021-07-13 PROCEDURE — 1090F PRES/ABSN URINE INCON ASSESS: CPT | Performed by: SPECIALIST

## 2021-07-13 PROCEDURE — 99214 OFFICE O/P EST MOD 30 MIN: CPT | Performed by: SPECIALIST

## 2021-07-13 PROCEDURE — G8427 DOCREV CUR MEDS BY ELIG CLIN: HCPCS | Performed by: SPECIALIST

## 2021-07-13 PROCEDURE — 1101F PT FALLS ASSESS-DOCD LE1/YR: CPT | Performed by: SPECIALIST

## 2021-07-13 PROCEDURE — G8510 SCR DEP NEG, NO PLAN REQD: HCPCS | Performed by: SPECIALIST

## 2021-07-13 PROCEDURE — G8754 DIAS BP LESS 90: HCPCS | Performed by: SPECIALIST

## 2021-07-13 PROCEDURE — G8399 PT W/DXA RESULTS DOCUMENT: HCPCS | Performed by: SPECIALIST

## 2021-07-13 RX ORDER — ATORVASTATIN CALCIUM 40 MG/1
TABLET, FILM COATED ORAL
COMMUNITY
Start: 2021-06-15 | End: 2021-09-11

## 2021-07-13 RX ORDER — FLASH GLUCOSE SENSOR
KIT MISCELLANEOUS
COMMUNITY
Start: 2021-06-22 | End: 2022-06-21 | Stop reason: ALTCHOICE

## 2021-07-13 NOTE — PROGRESS NOTES
1. Have you been to the ER, urgent care clinic since your last visit? Hospitalized since your last visit? No    2. Have you seen or consulted any other health care providers outside of the [address] [place] [address] since your last visit? Include any pap smears or colon screening.  No      Chief Complaint   Patient presents with    Cardiomyopathy     3mo f/u; Denied cardiac symptoms

## 2021-07-13 NOTE — PROGRESS NOTES
HISTORY OF PRESENT ILLNESS  Kirsten Reid is a 73 y.o. female     SUMMARY:   Problem List  Date Reviewed: 7/13/2021        Codes Class Noted    Prerenal azotemia ICD-10-CM: R79.89  ICD-9-CM: 790.6  7/9/2021        Panlobular emphysema ([facility] ICD-10-CM: J43.1  ICD-9-CM: 492.8  7/9/2021        NICM (nonischemic cardiomyopathy) ([facility] ICD-10-CM: I42.8  ICD-9-CM: 425.4  4/1/2021        Receiving inotropic medication ICD-10-CM: Z79.899  ICD-9-CM: V49.89  4/1/2021        Advance care planning ICD-10-CM: Z71.89  ICD-9-CM: V65.49  Unknown        Shortness of breath ICD-10-CM: R06.02  ICD-9-CM: 786.05  Unknown        Acute on chronic systolic congestive heart failure, NYHA class 3 (HCC) ICD-10-CM: I50.23  ICD-9-CM: 428.23, 428.0  3/11/2021        Moderate pulmonary arterial systolic hypertension ([facility] ICD-10-CM: I27.21  ICD-9-CM: 416.8  3/11/2021        Hypokalemia ICD-10-CM: E87.6  ICD-9-CM: 276.8  3/11/2021        Hypomagnesemia ICD-10-CM: E83.42  ICD-9-CM: 275.2  3/11/2021        CHF exacerbation ([facility] ICD-10-CM: I50.9  ICD-9-CM: 428.0  3/9/2021        NYHA class 4 heart failure with reduced ejection fraction ([facility] ICD-10-CM: I50.20  ICD-9-CM: 428.9  Unknown        Uncontrolled type 2 diabetes mellitus with hyperglycemia ([facility] ICD-10-CM: E11.65  ICD-9-CM: 250.02  1/26/2020        CKD (chronic kidney disease) stage 3, GFR 30-59 ml/min ([facility] ICD-10-CM: N18.30  ICD-9-CM: 585.3  7/16/2019    Overview Addendum 9/17/2020  8:33 AM by Anibal Miranda MD     5/18 negative lexiscan  11/19 lvh, lae, otherwise normal echo             Status post total right knee replacement ICD-10-CM: F21.136  ICD-9-CM: V43.65  2/5/2019        Osteoarthrosis, localized, primary, knee, right ICD-10-CM: M17.11  ICD-9-CM: 715.16  1/21/2019        PARDO (dyspnea on exertion) ICD-10-CM: R06.00  ICD-9-CM: 786.09  5/10/2018        Chest pain, exertional ICD-10-CM: R07.9  ICD-9-CM: 786.50  5/9/2018        Decreased libido ICD-10-CM: Q86.47  ICD-9-CM: 799.81  10/3/2017        Epistaxis ICD-10-CM: R04.0  ICD-9-CM: 784.7  8/22/2017        Former tobacco use--stopped 2014 after >40 yrs smoking ICD-10-CM: Z87.891  ICD-9-CM: V15.82  11/15/2016        Obesity (BMI 30.0-34.9) ICD-10-CM: E66.9  ICD-9-CM: 278.00  11/15/2016        Reactive airway disease ICD-10-CM: J45.909  ICD-9-CM: 493.90  1/12/2016        Carpal tunnel syndrome of right wrist ICD-10-CM: G56.01  ICD-9-CM: 354.0  12/6/2015        Primary osteoarthritis of right knee ICD-10-CM: M17.11  ICD-9-CM: 715.16  7/12/2015        Dilated cardiomyopathy ([facility] ICD-10-CM: I42.0  ICD-9-CM: 425.4  9/9/2014    Overview Signed 11/24/2019  5:40 PM by Anderson Oscar MD     Nonischemic             Dyslipidemia ICD-10-CM: E78.5  ICD-9-CM: 272.4  9/9/2014        Gout ICD-10-CM: M10.9  ICD-9-CM: 274.9  9/9/2014        Alcohol abuse ICD-10-CM: F10.10  ICD-9-CM: 305.00  9/9/2014        Hypertension ICD-10-CM: I10  ICD-9-CM: 401.9  9/9/2014        Dermatitis ICD-10-CM: L30.9  ICD-9-CM: 692.9  9/9/2014              Current Outpatient Medications on File Prior to Visit   Medication Sig    FreeStyle Nathaniel 14 Day Sensor kit USE TO CHECK BG 3X A DAY AND AS NEEDED. DX  E11.69    atorvastatin (LIPITOR) 40 mg tablet TAKE 1 TABLET BY MOUTH EVERY DAY    allopurinoL (ZYLOPRIM) 100 mg tablet Take 1 Tablet by mouth daily.  isosorbide mononitrate ER (IMDUR) 30 mg tablet TAKE 1 TABLET BY MOUTH EVERY DAY    hydrALAZINE (APRESOLINE) 50 mg tablet Take 1 Tablet by mouth three (3) times daily.  Entresto 24-26 mg tablet TAKE 1 TABLET BY MOUTH TWICE A DAY    dapagliflozin (FARXIGA) 5 mg tab tablet Take 1 Tablet by mouth daily.  Insulin Needles, Disposable, (BD Ultra-Fine Short Pen Needle) 31 gauge x 5/16\" ndle USE WITH INSULIN PENS TWICE DAILY AS DIRECTED    melatonin 3 mg tablet TAKE 1 TABLET BY MOUTH EVERY NIGHT AS NEEDED FOR INSOMNIA.     cholecalciferol (VITAMIN D3) (2,000 UNITS /50 MCG) cap capsule Take 1 Capsule by mouth daily.  calcitRIOL (RocaltroL) 0.25 mcg capsule Take 0.25 mcg by mouth daily.  magnesium oxide (MAG-OX) 400 mg tablet TAKE 2 TABLETS BY MOUTH TWO (2) TIMES A DAY.  diclofenac (VOLTAREN) 1 % gel Apply 2 g to affected area two (2) times daily as needed for Pain. thin layer to joint pain    budesonide (PULMICORT) 0.25 mg/2 mL nbsp 500 mcg by Nebulization route every six (6) hours as needed for Other (wheezing shortness of breath).  oxybutynin (DITROPAN) 5 mg tablet Take 0.5 Tabs by mouth three (3) times daily.  bumetanide (BUMEX) 2 mg tablet Take 1 Tab by mouth daily.  spironolactone (ALDACTONE) 25 mg tablet Take 1 Tab by mouth daily.  thiamine HCL (B-1) 100 mg tablet Take 1 Tab by mouth daily.  fluticasone propion-salmeteroL (ADVAIR/WIXELA) 250-50 mcg/dose diskus inhaler Take 2 Puffs by inhalation two (2) times a day.  fluticasone propionate (FLONASE) 50 mcg/actuation nasal spray 2 Sprays by Both Nostrils route daily.  insulin glargine (LANTUS,BASAGLAR) 100 unit/mL (3 mL) inpn 60 units every morning (Patient taking differently: 50 Units by SubCUTAneous route daily. 60 units every morning)    sodium chloride (Normal Saline Flush) 10-20 mL by InterCATHeter route daily. (Patient not taking: Reported on 6/18/2021)     No current facility-administered medications on file prior to visit. CARDIOLOGY STUDIES TO DATE:  5/18 negative lexiscan   11/20/19   ECHO ADULT COMPLETE 11/20/2019 11/20/2019   Narrative   · Left Ventricle: Normal wall thickness. Mildly dilated left ventricle. Low normal systolic dysfunction. Estimated left ventricular ejection fraction is 51 - 55%. Visually measured ejection fraction. Inconclusive left ventricular diastolic function. · Mitral Valve: Mitral valve non-specific thickening. Trace mitral valve regurgitation is present. · Tricuspid Valve: Mild tricuspid valve regurgitation is present. · Pulmonary Artery: Pulmonary hypertension. · Left Atrium: Mildly dilated left atrium. Signed by: Ruth Tamayo MD     01/29/21  echo lvef 20-25%, lae, mod decreased rvef, mild to mod mr, mild to mod tr, pa pressure 46mm    04/08/21  echo lvef 50-55%    Chief Complaint   Patient presents with    Cardiomyopathy     3mo f/u; Denied cardiac symptoms     HPI :  She is doing great.  her cardiomyopathy resolved with proper treatment and she is off inotropes. She is walking some and weight is holding steady. Blood pressures well controlled and she is having no problems with the medication. CARDIAC ROS:   negative for chest pain, dyspnea, palpitations, syncope, orthopnea, paroxysmal nocturnal dyspnea, exertional chest pressure/discomfort, claudication, lower extremity edema    Family History   Problem Relation Age of Onset    Diabetes Mother        Past Medical History:   Diagnosis Date    Arthritis     Diabetes ([address]     Heart failure ([address]     Hypercholesterolemia     Hypertension        GENERAL ROS:  A comprehensive review of systems was negative except for that written in the HPI.     Visit Vitals  BP (!) 102/56 (BP 1 Location: Left upper arm, BP Patient Position: Sitting, BP Cuff Size: Large adult)   Pulse 85   Resp 16   Ht 5' 4\" (1.626 m)   Wt 176 lb 12.8 oz (80.2 kg)   SpO2 97%   BMI 30.35 kg/m²       Wt Readings from Last 3 Encounters:   07/13/21 176 lb 12.8 oz (80.2 kg)   07/09/21 178 lb 12.8 oz (81.1 kg)   06/18/21 180 lb (81.6 kg)            BP Readings from Last 3 Encounters:   07/13/21 (!) 102/56   07/09/21 130/66   06/18/21 (!) 98/56       PHYSICAL EXAM  General appearance: alert, cooperative, no distress, appears stated age  Neurologic: Alert and oriented X 3  Neck: supple, symmetrical, trachea midline, no adenopathy, no carotid bruit and no JVD  Lungs: clear to auscultation bilaterally  Heart: regular rate and rhythm, S1, S2 normal, no murmur, click, rub or gallop  Extremities: extremities normal, atraumatic, no cyanosis or edema    Lab Results Component Value Date/Time    Cholesterol, total 169 03/18/2021 05:31 PM    Cholesterol, total 143 10/08/2020 12:01 PM    Cholesterol, total 207 (H) 08/08/2019 02:31 PM    Cholesterol, total 175 04/10/2018 01:19 PM    Cholesterol, total 141 08/26/2016 11:48 AM    HDL Cholesterol 70 03/18/2021 05:31 PM    HDL Cholesterol 46 10/08/2020 12:01 PM    HDL Cholesterol 46 08/08/2019 02:31 PM    HDL Cholesterol 56 04/10/2018 01:19 PM    HDL Cholesterol 43 08/26/2016 11:48 AM    LDL, calculated 84.6 03/18/2021 05:31 PM    LDL, calculated 73 10/08/2020 12:01 PM    LDL, calculated 105 (H) 08/08/2019 02:31 PM    LDL, calculated 75 04/10/2018 01:19 PM    LDL, calculated 25 08/26/2016 11:48 AM    Triglyceride 72 03/18/2021 05:31 PM    Triglyceride 137 10/08/2020 12:01 PM    Triglyceride 280 (H) 08/08/2019 02:31 PM    Triglyceride 221 (H) 04/10/2018 01:19 PM    Triglyceride 364 (H) 08/26/2016 11:48 AM    CHOL/HDL Ratio 2.4 03/18/2021 05:31 PM     ASSESSMENT :      She is stable and asymptomatic on a good medical regimen and needs no cardiac testing at this time. She has an appointment with advanced heart failure in September so we will see her back again in December. current treatment plan is effective, no change in therapy  lab results and schedule of future lab studies reviewed with patient  reviewed diet, exercise and weight control    Encounter Diagnoses   Name Primary?  NICM (nonischemic cardiomyopathy) ([facility] Yes    Moderate pulmonary arterial systolic hypertension (HCC)     Dyslipidemia     Essential hypertension      Orders Placed This Encounter    FreeStyle Nathaniel 14 Day Sensor kit    atorvastatin (LIPITOR) 40 mg tablet       Follow-up and Dispositions    · Return in about 5 months (around 12/13/2021). Torie Pisano MD  7/13/2021  Please note that this dictation was completed with [employer], the [employer] voice recognition software.   Quite often unanticipated grammatical, syntax, homophones, and other interpretive errors are inadvertently transcribed by the computer software. Please disregard these errors. Please excuse any errors that have escaped final proofreading. Thank you.

## 2021-07-14 RX ORDER — LANOLIN ALCOHOL/MO/W.PET/CERES
CREAM (GRAM) TOPICAL
Qty: 120 TABLET | Refills: 2 | Status: SHIPPED | OUTPATIENT
Start: 2021-07-14 | End: 2021-11-04

## 2021-07-16 ENCOUNTER — TELEPHONE (OUTPATIENT)
Dept: CARDIOLOGY CLINIC | Age: 71
End: 2021-07-16

## 2021-07-16 NOTE — TELEPHONE ENCOUNTER
Telephone Call RE:  Lab Reminder      Outcome:     [x] Patient verbalizes understanding    [] Unable to reach   [] Left message              []       Aj Currie

## 2021-07-20 ENCOUNTER — TELEPHONE (OUTPATIENT)
Dept: CARDIOLOGY CLINIC | Age: 71
End: 2021-07-20

## 2021-07-20 DIAGNOSIS — I50.20 NYHA CLASS 4 HEART FAILURE WITH REDUCED EJECTION FRACTION (HCC): Primary | ICD-10-CM

## 2021-07-20 DIAGNOSIS — R06.09 DOE (DYSPNEA ON EXERTION): ICD-10-CM

## 2021-07-20 LAB
ALBUMIN SERPL-MCNC: 4.5 G/DL (ref 3.8–4.8)
ALBUMIN/GLOB SERPL: 1.2 {RATIO} (ref 1.2–2.2)
ALP SERPL-CCNC: 64 IU/L (ref 48–121)
ALT SERPL-CCNC: 24 IU/L (ref 0–32)
AST SERPL-CCNC: 26 IU/L (ref 0–40)
BILIRUB SERPL-MCNC: 0.8 MG/DL (ref 0–1.2)
BUN SERPL-MCNC: 38 MG/DL (ref 8–27)
BUN/CREAT SERPL: 34 (ref 12–28)
CALCIUM SERPL-MCNC: 9.4 MG/DL (ref 8.7–10.3)
CHLORIDE SERPL-SCNC: 101 MMOL/L (ref 96–106)
CO2 SERPL-SCNC: 28 MMOL/L (ref 20–29)
CREAT SERPL-MCNC: 1.13 MG/DL (ref 0.57–1)
GLOBULIN SER CALC-MCNC: 3.7 G/DL (ref 1.5–4.5)
GLUCOSE SERPL-MCNC: 127 MG/DL (ref 65–99)
MAGNESIUM SERPL-MCNC: 2.2 MG/DL (ref 1.6–2.3)
NT-PROBNP SERPL-MCNC: 74 PG/ML (ref 0–301)
POTASSIUM SERPL-SCNC: 3.7 MMOL/L (ref 3.5–5.2)
PROT SERPL-MCNC: 8.2 G/DL (ref 6–8.5)
SODIUM SERPL-SCNC: 141 MMOL/L (ref 134–144)

## 2021-07-20 NOTE — TELEPHONE ENCOUNTER
----- Message from Micha Prado NP sent at 7/20/2021  9:26 AM EDT -----  Please call Deedee Rahman to discuss their abnormal lab results.  Creatinine is up slightly, please ask her to keep hydrated, labs in 1 month     I called patient, reviewed all information, she states understanding, has no questions, labs ordered and placed on lab list.

## 2021-07-20 NOTE — TELEPHONE ENCOUNTER
Please call Zan Schreiber to discuss their abnormal lab results.  Creatinine is up slightly, please ask her to keep hydrated, labs in 1 month

## 2021-07-22 ENCOUNTER — OFFICE VISIT (OUTPATIENT)
Dept: SLEEP MEDICINE | Age: 71
End: 2021-07-22
Payer: MEDICARE

## 2021-07-22 VITALS
WEIGHT: 180.5 LBS | HEIGHT: 64 IN | DIASTOLIC BLOOD PRESSURE: 53 MMHG | TEMPERATURE: 97.2 F | SYSTOLIC BLOOD PRESSURE: 117 MMHG | BODY MASS INDEX: 30.81 KG/M2 | OXYGEN SATURATION: 100 % | HEART RATE: 72 BPM

## 2021-07-22 DIAGNOSIS — I27.21 MODERATE PULMONARY ARTERIAL SYSTOLIC HYPERTENSION (HCC): ICD-10-CM

## 2021-07-22 DIAGNOSIS — Z86.79 H/O CONGESTIVE HEART FAILURE: ICD-10-CM

## 2021-07-22 DIAGNOSIS — G47.33 OSA (OBSTRUCTIVE SLEEP APNEA): Primary | ICD-10-CM

## 2021-07-22 PROCEDURE — G9899 SCRN MAM PERF RSLTS DOC: HCPCS | Performed by: INTERNAL MEDICINE

## 2021-07-22 PROCEDURE — G8754 DIAS BP LESS 90: HCPCS | Performed by: INTERNAL MEDICINE

## 2021-07-22 PROCEDURE — 1101F PT FALLS ASSESS-DOCD LE1/YR: CPT | Performed by: INTERNAL MEDICINE

## 2021-07-22 PROCEDURE — G8752 SYS BP LESS 140: HCPCS | Performed by: INTERNAL MEDICINE

## 2021-07-22 PROCEDURE — G8417 CALC BMI ABV UP PARAM F/U: HCPCS | Performed by: INTERNAL MEDICINE

## 2021-07-22 PROCEDURE — 3017F COLORECTAL CA SCREEN DOC REV: CPT | Performed by: INTERNAL MEDICINE

## 2021-07-22 PROCEDURE — G8399 PT W/DXA RESULTS DOCUMENT: HCPCS | Performed by: INTERNAL MEDICINE

## 2021-07-22 PROCEDURE — 99204 OFFICE O/P NEW MOD 45 MIN: CPT | Performed by: INTERNAL MEDICINE

## 2021-07-22 PROCEDURE — G8536 NO DOC ELDER MAL SCRN: HCPCS | Performed by: INTERNAL MEDICINE

## 2021-07-22 PROCEDURE — G8427 DOCREV CUR MEDS BY ELIG CLIN: HCPCS | Performed by: INTERNAL MEDICINE

## 2021-07-22 PROCEDURE — G8432 DEP SCR NOT DOC, RNG: HCPCS | Performed by: INTERNAL MEDICINE

## 2021-07-22 PROCEDURE — 1090F PRES/ABSN URINE INCON ASSESS: CPT | Performed by: INTERNAL MEDICINE

## 2021-07-22 NOTE — PATIENT INSTRUCTIONS
[address] [place], [address]  Tel.  [phone]  Fax. [address]  [place], [address] [facility]  Tel.  [phone]  Fax. [phone] [address] [place]  Tel.  [phone]  Fax. [phone]     Sleep Apnea: After Your Visit  Your Care Instructions  Sleep apnea occurs when you frequently stop breathing for 10 seconds or longer during sleep. It can be mild to severe, based on the number of times per hour that you stop breathing or have slowed breathing. Blocked or narrowed airways in your nose, mouth, or throat can cause sleep apnea. Your airway can become blocked when your throat muscles and tongue relax during sleep. Sleep apnea is common, occurring in 1 out of 20 individuals. Individuals having any of the following characteristics should be evaluated and treated right away due to high risk and detrimental consequences from untreated sleep apnea:  1. Obesity  2. Congestive Heart failure  3. Atrial Fibrillation  4. Uncontrolled Hypertension  5. Type II Diabetes  6. Night-time Arrhythmias  7. Stroke  8. Pulmonary Hypertension  9. High-risk Driving Populations (pilots, truck drivers, etc.)  10. Patients Considering Weight-loss Surgery    How do you know you have sleep apnea? You probably have sleep apnea if you answer 'yes' to 3 or more of the following questions:  S - Have you been told that you Snore? T - Are you often Tired during the day? O - Has anyone Observed you stop breathing while sleeping? P- Do you have (or are being treated for) high blood Pressure? B - Are you obese (Body Mass Index > 35)? A - Is your Age 48years old or older? N - Is your Neck size greater than 16 inches? G - Are you male Gender? A sleep physician can prescribe a breathing device that prevents tissues in the throat from blocking your airway.  Or your doctor may recommend using a dental device (oral breathing device) to help keep your airway open. In some cases, surgery may be needed to remove enlarged tissues in the throat. Follow-up care is a key part of your treatment and safety. Be sure to make and go to all appointments, and call your doctor if you are having problems. It's also a good idea to know your test results and keep a list of the medicines you take. How can you care for yourself at home? · Lose weight, if needed. It may reduce the number of times you stop breathing or have slowed breathing. · Go to bed at the same time every night. · Sleep on your side. It may stop mild apnea. If you tend to roll onto your back, sew a pocket in the back of your pajama top. Put a tennis ball into the pocket, and stitch the pocket shut. This will help keep you from sleeping on your back. · Avoid alcohol and medicines such as sleeping pills and sedatives before bed. · Do not smoke. Smoking can make sleep apnea worse. If you need help quitting, talk to your doctor about stop-smoking programs and medicines. These can increase your chances of quitting for good. · Prop up the head of your bed 4 to 6 inches by putting bricks under the legs of the bed. · Treat breathing problems, such as a stuffy nose, caused by a cold or allergies. · Use a continuous positive airway pressure (CPAP) breathing machine if lifestyle changes do not help your apnea and your doctor recommends it. The machine keeps your airway from closing when you sleep. · If CPAP does not help you, ask your doctor whether you should try other breathing machines. A bilevel positive airway pressure machine has two types of air pressureâone for breathing in and one for breathing out. Another device raises or lowers air pressure as needed while you breathe. · If your nose feels dry or bleeds when using one of these machines, talk with your doctor about increasing moisture in the air. A humidifier may help.   · If your nose is runny or stuffy from using a breathing machine, talk with your doctor about using decongestants or a corticosteroid nasal spray. When should you call for help? Watch closely for changes in your health, and be sure to contact your doctor if:  · You still have sleep apnea even though you have made lifestyle changes. · You are thinking of trying a device such as CPAP. · You are having problems using a CPAP or similar machine. Where can you learn more? Go to CyberSponse. Enter V670 in the search box to learn more about \"Sleep Apnea: After Your Visit. \"   © 2706-0080 Healthwise, Incorporated. Care instructions adapted under license by [employer] (which disclaims liability or warranty for this information). This care instruction is for use with your licensed healthcare professional. If you have questions about a medical condition or this instruction, always ask your healthcare professional. Laurie Delgado any warranty or liability for your use of this information. PROPER SLEEP HYGIENE    What to avoid  · Do not have drinks with caffeine, such as coffee or black tea, for 8 hours before bed. · Do not smoke or use other types of tobacco near bedtime. Nicotine is a stimulant and can keep you awake. · Avoid drinking alcohol late in the evening, because it can cause you to wake in the middle of the night. · Do not eat a big meal close to bedtime. If you are hungry, eat a light snack. · Do not drink a lot of water close to bedtime, because the need to urinate may wake you up during the night. · Do not read or watch TV in bed. Use the bed only for sleeping and sexual activity. What to try  · Go to bed at the same time every night, and wake up at the same time every morning. Do not take naps during the day. · Keep your bedroom quiet, dark, and cool. · Get regular exercise, but not within 3 to 4 hours of your bedtime. .  · Sleep on a comfortable pillow and mattress.   · If watching the clock makes you anxious, turn it facing away from you so you cannot see the time. · If you worry when you lie down, start a worry book. Well before bedtime, write down your worries, and then set the book and your concerns aside. · Try meditation or other relaxation techniques before you go to bed. · If you cannot fall asleep, get up and go to another room until you feel sleepy. Do something relaxing. Repeat your bedtime routine before you go to bed again. · Make your house quiet and calm about an hour before bedtime. Turn down the lights, turn off the TV, log off the computer, and turn down the volume on music. This can help you relax after a busy day. Drowsy Driving  The [address] [place] [address] Road Traffic Safety Administration cites drowsiness as a causing factor in more than 012,393 police reported crashes annually, resulting in 76,000 injuries and 1,500 deaths. Other surveys suggest 55% of people polled have driven while drowsy in the past year, 23% had fallen asleep but not crashed, 3% crashed, and 2% had and accident due to drowsy driving. Who is at risk? Young Drivers: One study of drowsy driving accidents states that 55% of the drivers were under 25 years. Of those, 75% were male. Shift Workers and Travelers: People who work overnight or travel across time zones frequently are at higher risk of experiencing Circadian Rhythm Disorders. They are trying to work and function when their body is programed to sleep. Sleep Deprived: Lack of sleep has a serious impact on your ability to pay attention or focus on a task. Consistently getting less than the average of 8 hours your body needs creates partial or cumulative sleep deprivation. Untreated Sleep Disorders: Sleep Apnea, Narcolepsy, R.L.S., and other sleep disorders (untreated) prevent a person from getting enough restful sleep. This leads to excessive daytime sleepiness and increases the risk for drowsy driving accidents by up to 7 times.   Medications / Alcohol: Even over the counter medications can cause drowsiness. Medications that impair a drivers attention should have a warning label. Alcohol naturally makes you sleepy and on its own can cause accidents. Combined with excessive drowsiness its effects are amplified. Signs of Drowsy Driving:   * You don't remember driving the last few miles   * You may drift out of your thaddeus   * You are unable to focus and your thoughts wander   * You may yawn more often than normal   * You have difficulty keeping your eyes open / nodding off   * Missing traffic signs, speeding, or tailgating  Prevention-   Good sleep hygiene, lifestyle and behavioral choices have the most impact on drowsy driving. There is no substitute for sleep and the average person requires 8 hours nightly. If you find yourself driving drowsy, stop and sleep. Consider the sleep hygiene tips provided during your visit as well. Medication Refill Policy: Refills for all medications require 1 week advance notice. Please have your pharmacy fax a refill request. We are unable to fax, or call in \"controled substance\" medications and you will need to pick these prescriptions up from our office. Liquidnet Activation    Thank you for requesting access to [employer]. Please follow the instructions below to securely access and download your online medical record. Liquidnet allows you to send messages to your doctor, view your test results, renew your prescriptions, schedule appointments, and more. How Do I Sign Up? 1. In your internet browser, go to [email or web address]. 2. Click on the First Time User? Click Here link in the Sign In box. You will see the New Member Sign Up page. 3. Enter your Liquidnet Access Code exactly as it appears below. You will not need to use this code after youve completed the sign-up process. If you do not sign up before the expiration date, you must request a new code. Liquidnet Access Code:  Activation code not generated  Current Liquidnet Status: Active (This is the date your FanDuel access code will )    4. Enter the last four digits of your Social Security Number (xxxx) and Date of Birth (mm/dd/yyyy) as indicated and click Submit. You will be taken to the next sign-up page. 5. Create a [employer]yr [employer] ID. This will be your FanDuel login ID and cannot be changed, so think of one that is secure and easy to remember. 6. Create a FanDuel password. You can change your password at any time. 7. Enter your Password Reset Question and Answer. This can be used at a later time if you forget your password. 8. Enter your e-mail address. You will receive e-mail notification when new information is available in [address] 9. Click Sign Up. You can now view and download portions of your medical record. 10. Click the Download Summary menu link to download a portable copy of your medical information. Additional Information    If you have questions, please call [phone]. Remember, FanDuel is NOT to be used for urgent needs. For medical emergencies, dial 911.

## 2021-07-22 NOTE — PROGRESS NOTES
[address] [facility] [place]. [place], [address]  Tel.  [phone]  Fax. [address] [facility]  [place], [address] [facility]  Tel.  [phone]  Fax. [phone] [address]  Tel.  [phone]  Fax. [phone]       Edenilson Bazan is a 73y.o. year old female referred by Ms. Bennett Upton NP  for evaluation of a sleep disorder. ASSESSMENT/PLAN:      ICD-10-CM ICD-9-CM    1. SHARLENE (obstructive sleep apnea)  G47.33 327.23 POLYSOMNOGRAPHY 1 NIGHT   2. Moderate pulmonary arterial systolic hypertension (HCC)  I27.21 416.8    3. H/O congestive heart failure  Z86.79 V12.59    4. BMI 30.0-30.9,adult  Z68.30 V85.30        Patient has a history and examination consistent with the diagnosis of sleep apnea. Follow-up and Dispositions    · Return for telephone follow-up after testing is completed. * The patient currently has a Moderate Risk for having sleep apnea. STOP-BANG score 4.    * Sleep testing was ordered for initial evaluation. Orders Placed This Encounter    POLYSOMNOGRAPHY 1 NIGHT     Standing Status:   Future     Standing Expiration Date:   1/22/2022     Order Specific Question:   Reason for Exam     Answer:   SHARLENE       * She was provided information on sleep apnea including corresponding risk factors and the importance of proper treatment. * Treatment options were reviewed in detail. she would like to proceed with PAP therapy. Patient will be seen in follow-up in 6-8 weeks after PAP setup to gauge treatment response and adherence to therapy. * The patient was counseled regarding proper sleep hygiene, with emphasis on ensuring sufficient total sleep time; safe driving and the benefits of exercise and weight loss. * All of her questions were addressed.     2. Recommended a dedicated weight loss program through appropriate diet and exercise regimen as significant weight reduction has been shown to reduce severity of obstructive sleep apnea. SUBJECTIVE/OBJECTIVE:    Telly Cabrera is an 73 y.o. female referred for evaluation for a sleep disorder. She complains of snoring associated with awakening in the middle of the night because of urination along with daytime tiredness. Symptoms began 10 years ago, unchanged since that time. She usually can fall asleep in 10-15 minutes. Family or house members note snoring. She denies of symptoms indicative of cataplexy, sleep paralysis or sleep related hallucinations. She denies of a history of unusual movements occurring during sleep. Telly Cabrera does wake up frequently at night. She is bothered by waking up too early and left unable to get back to sleep. She actually sleeps about 10 hours at night and wakes up about 6 times during the night. She does not work shifts: Dariusz Hurtado indicates she does get too little sleep at night. Her bedtime is 2100. She awakens at 0800. She does not take naps. She has the following observed behaviors: Loud snoring, Light snoring, Sleep talking;  . Other remarks: The patient has not undergone diagnostic testing for the current problems. Review of Systems:  Constitutional:  No significant weight loss or weight gain  Eyes:  No blurred vision  CVS:  No significant chest pain  Pulm:  No significant shortness of breath  GI:  No significant nausea or vomiting  :  significant nocturia  Musculoskeletal:  No significant joint pain at night  Skin:  No significant rashes  Neuro:  No significant dizziness   Psych:  No active mood issues    Sleep Review of Systems: notable for Negative difficulty falling asleep; Positive awakenings at night; Positive perceived regular dreaming; Negative nightmares; Negative  early morning headaches; Negative  memory problems; Negative  concentration issues; Negative caffeine;  Negative alcohol;   Negative history of any automobile or occupational accidents due to daytime drowsiness.       [place] Sleepiness Score: 11   and Modified F.O.S.Q. Score Total / 2: 18.5    Visit Vitals  BP (!) 117/53 (BP 1 Location: Left upper arm, BP Patient Position: Sitting, BP Cuff Size: Small adult)   Pulse 72   Temp 97.2 °F (36.2 °C) (Oral)   Ht 5' 4\" (1.626 m)   Wt 180 lb 8 oz (81.9 kg)   SpO2 100%   BMI 30.98 kg/m²           General:   Alert, oriented, not in acute distress   Eyes:  Anicteric Sclerae; intact EOM's   Nose:  No obvious nasal septum deviation    Oropharynx:   Mallampati score 4, thick tongue base, uvula not seen due to low-lying soft palate, narrow tonsilo-pharyngeal pilars, tongue scalloped   Neck:   midline trachea,  no JVD   Chest/Lungs:  symmetrical lung expansion ,clear lung fields on auscultation    CVS:  Normal rate, regular rhythm    Extremities:  No obvious rashes, absent edema    Neuro:  No focal deficits; No obvious tremor    Psych:  Normal eye contact; normal  affect, normal countenance      Patient's phone number [phone] (home)  was reviewed and confirmed for accuracy. She gives permission for messages regarding results and appointments to be left at that number. Gisel Talley MD, FAASM  Diplomate American Board of Sleep Medicine  Diplomate in Sleep Medicine - ABP    Electronically signed.  07/22/21

## 2021-07-28 DIAGNOSIS — I50.20 NYHA CLASS 4 HEART FAILURE WITH REDUCED EJECTION FRACTION (HCC): ICD-10-CM

## 2021-07-28 RX ORDER — ISOSORBIDE MONONITRATE 30 MG/1
TABLET, EXTENDED RELEASE ORAL
Qty: 30 TABLET | Refills: 0 | Status: SHIPPED | OUTPATIENT
Start: 2021-07-28 | End: 2021-08-12 | Stop reason: SDUPTHER

## 2021-08-12 DIAGNOSIS — I50.20 NYHA CLASS 4 HEART FAILURE WITH REDUCED EJECTION FRACTION (HCC): ICD-10-CM

## 2021-08-12 RX ORDER — ISOSORBIDE MONONITRATE 30 MG/1
30 TABLET, EXTENDED RELEASE ORAL
Qty: 90 TABLET | Refills: 0 | Status: ON HOLD | OUTPATIENT
Start: 2021-08-12 | End: 2021-11-29

## 2021-08-12 NOTE — TELEPHONE ENCOUNTER
Requested Prescriptions     Signed Prescriptions Disp Refills    isosorbide mononitrate ER (IMDUR) 30 mg tablet 90 Tablet 0     Sig: Take 1 Tablet by mouth every morning.      Authorizing Provider: Isaias Grady     Ordering User: Mcay Amador

## 2021-08-13 ENCOUNTER — TELEPHONE (OUTPATIENT)
Dept: CARDIOLOGY CLINIC | Age: 71
End: 2021-08-13

## 2021-08-13 NOTE — TELEPHONE ENCOUNTER
Telephone Call RE:  Lab Reminder      Outcome:     [x] Patient verbalizes understanding    [] Unable to reach   [] Left message              []       Mohan Ventura

## 2021-08-16 RX ORDER — SACUBITRIL AND VALSARTAN 24; 26 MG/1; MG/1
TABLET, FILM COATED ORAL
Qty: 60 TABLET | Refills: 1 | Status: SHIPPED | OUTPATIENT
Start: 2021-08-16 | End: 2021-10-20

## 2021-08-17 ENCOUNTER — TELEPHONE (OUTPATIENT)
Dept: CARDIOLOGY CLINIC | Age: 71
End: 2021-08-17

## 2021-08-17 LAB
ALBUMIN SERPL-MCNC: 4.6 G/DL (ref 3.8–4.8)
ALBUMIN/GLOB SERPL: 1.2 {RATIO} (ref 1.2–2.2)
ALP SERPL-CCNC: 57 IU/L (ref 48–121)
ALT SERPL-CCNC: 23 IU/L (ref 0–32)
AST SERPL-CCNC: 22 IU/L (ref 0–40)
BILIRUB SERPL-MCNC: 0.7 MG/DL (ref 0–1.2)
BUN SERPL-MCNC: 42 MG/DL (ref 8–27)
BUN/CREAT SERPL: 40 (ref 12–28)
CALCIUM SERPL-MCNC: 10.1 MG/DL (ref 8.7–10.3)
CHLORIDE SERPL-SCNC: 100 MMOL/L (ref 96–106)
CO2 SERPL-SCNC: 27 MMOL/L (ref 20–29)
CREAT SERPL-MCNC: 1.05 MG/DL (ref 0.57–1)
GLOBULIN SER CALC-MCNC: 3.9 G/DL (ref 1.5–4.5)
GLUCOSE SERPL-MCNC: 131 MG/DL (ref 65–99)
NT-PROBNP SERPL-MCNC: 58 PG/ML (ref 0–301)
POTASSIUM SERPL-SCNC: 3.8 MMOL/L (ref 3.5–5.2)
PROT SERPL-MCNC: 8.5 G/DL (ref 6–8.5)
SODIUM SERPL-SCNC: 140 MMOL/L (ref 134–144)

## 2021-08-17 NOTE — TELEPHONE ENCOUNTER
----- Message from Amanda Hammond NP sent at 8/17/2021  9:09 AM EDT -----  Please call Harsh Cyr- her labs have improved, please continue current course     Called patient to review above information and left a message with [facility] call back number. Will await return call. Merle Cuadra RN     Returned patients call using two patient identifiers. Notified patient on above information per Carissa Ann NP. Patient stated understanding and had no further questions.  Merle Cuadra RN

## 2021-08-19 ENCOUNTER — HOSPITAL ENCOUNTER (OUTPATIENT)
Dept: SLEEP MEDICINE | Age: 71
Discharge: HOME | End: 2021-08-19
Payer: MEDICARE

## 2021-08-19 VITALS
OXYGEN SATURATION: 97 % | WEIGHT: 176 LBS | BODY MASS INDEX: 30.05 KG/M2 | DIASTOLIC BLOOD PRESSURE: 65 MMHG | HEART RATE: 79 BPM | TEMPERATURE: 99.1 F | SYSTOLIC BLOOD PRESSURE: 102 MMHG | HEIGHT: 64 IN

## 2021-08-19 PROCEDURE — 95810 POLYSOM 6/> YRS 4/> PARAM: CPT | Performed by: INTERNAL MEDICINE

## 2021-08-20 NOTE — PROGRESS NOTES
[address] [facility]. [place], [facility]  Tel.  [phone]  Fax. [address] [facility] [address]  [place], [address] [facility]  Tel.  [phone]  Fax. [phone] [address] [facility] [address], [address]   Tel.  [phone]  Fax. [phone]     Sleep Study Technical Notes        PRE-Test:  Kenyon Smith (: 1950) arrived in the lobby. Patient was greeted, temperature checked (99.1) and screening questions asked. The patient was taken to the Sleep Center and taken directly to his/her room. BP (102/65) and SaO2 (97%) were taken. Weight per patient (176 lbs). Procedure explained to the patient and questions were answered. The patient expressed understanding of the procedure. Electrodes were applied without incident. The patient was placed in bed and the study was started.  PAP mask acclimation:  NA    Acquisition Notes:   Lights off:  9:10 pm   Respiratory events: Reras, hypopneas, obstructive apneas, and central apneas.  ECG:  Normal sinus rhythm   PAP titration:  NA   Desensitization Mask(s) Used: NA  o Other comments: NO  o Patient had caregiver in attendance:  NO  o Patient watched TV or on phone after lights out:  NO  o Patient slept with positional therapy:  NO  o Patient slept with head of bed elevated:  NO  o Patient wore an oral appliance:  NO  o Patient to bathroom 1 time. o Pediatric Patient:  NA  - Parent accompanied patient:  NA  - Parent slept in bed with patient:  NA      POST Test:   Patient was awakened. Electrodes were removed. The patient was discharged after answering the Post Questionnaire. Patient stated that he/she was alert and ok to drive.  Equipment and room cleaned per infection control policy.

## 2021-08-31 ENCOUNTER — DOCUMENTATION ONLY (OUTPATIENT)
Dept: SLEEP MEDICINE | Age: 71
End: 2021-08-31

## 2021-08-31 ENCOUNTER — TELEPHONE (OUTPATIENT)
Dept: SLEEP MEDICINE | Age: 71
End: 2021-08-31

## 2021-08-31 DIAGNOSIS — G47.33 OSA (OBSTRUCTIVE SLEEP APNEA): Primary | ICD-10-CM

## 2021-08-31 NOTE — TELEPHONE ENCOUNTER
Sean Moreira is to be contacted by lead sleep technologist regarding results of Sleep Testing which was indicative of an average AHI of 17.7 per hour with an SpO2 vinh of 78%, the duration of SpO2 <88% was 3.30 minutes. * A second polysomnogram has been ordered for mask fitting, PAP desensitizing protocol, and pressure titration. Patient should be educated on the risks versus benefits of this elective sleep testing procedure being done during the pandemic and their consent be obtained. * Follow-up appointment will be scheduled 8-12 weeks following PAP initiation to gauge treatment response and compliance. Encounter Diagnosis   Name Primary?     SHARLENE (obstructive sleep apnea) Yes       Orders Placed This Encounter    SLEEP LAB (PAP TITRATION)     Standing Status:   Future     Standing Expiration Date:   12/31/2021     Order Specific Question:   Reason for Exam     Answer:   SHARLENE

## 2021-09-08 RX ORDER — LANOLIN ALCOHOL/MO/W.PET/CERES
CREAM (GRAM) TOPICAL
Qty: 90 TABLET | Refills: 0 | Status: SHIPPED | OUTPATIENT
Start: 2021-09-08 | End: 2022-10-05

## 2021-09-11 RX ORDER — ATORVASTATIN CALCIUM 40 MG/1
TABLET, FILM COATED ORAL
Qty: 90 TABLET | Refills: 3 | Status: SHIPPED | OUTPATIENT
Start: 2021-09-11 | End: 2022-09-15

## 2021-09-16 ENCOUNTER — OFFICE VISIT (OUTPATIENT)
Dept: CARDIOLOGY CLINIC | Age: 71
End: 2021-09-16
Payer: MEDICARE

## 2021-09-16 VITALS
BODY MASS INDEX: 31.04 KG/M2 | HEART RATE: 84 BPM | DIASTOLIC BLOOD PRESSURE: 52 MMHG | RESPIRATION RATE: 16 BRPM | WEIGHT: 181.8 LBS | HEIGHT: 64 IN | TEMPERATURE: 96.6 F | SYSTOLIC BLOOD PRESSURE: 102 MMHG | OXYGEN SATURATION: 100 %

## 2021-09-16 DIAGNOSIS — I42.8 NON-ISCHEMIC CARDIOMYOPATHY (HCC): ICD-10-CM

## 2021-09-16 DIAGNOSIS — Z20.822 ENCOUNTER FOR SCREENING LABORATORY TESTING FOR COVID-19 VIRUS: ICD-10-CM

## 2021-09-16 DIAGNOSIS — I42.8 NICM (NONISCHEMIC CARDIOMYOPATHY) (HCC): ICD-10-CM

## 2021-09-16 DIAGNOSIS — R06.09 DOE (DYSPNEA ON EXERTION): ICD-10-CM

## 2021-09-16 DIAGNOSIS — I50.20 NYHA CLASS 4 HEART FAILURE WITH REDUCED EJECTION FRACTION (HCC): Primary | ICD-10-CM

## 2021-09-16 PROCEDURE — G8428 CUR MEDS NOT DOCUMENT: HCPCS | Performed by: NURSE PRACTITIONER

## 2021-09-16 PROCEDURE — 3017F COLORECTAL CA SCREEN DOC REV: CPT | Performed by: NURSE PRACTITIONER

## 2021-09-16 PROCEDURE — G8432 DEP SCR NOT DOC, RNG: HCPCS | Performed by: NURSE PRACTITIONER

## 2021-09-16 PROCEDURE — G8399 PT W/DXA RESULTS DOCUMENT: HCPCS | Performed by: NURSE PRACTITIONER

## 2021-09-16 PROCEDURE — 1101F PT FALLS ASSESS-DOCD LE1/YR: CPT | Performed by: NURSE PRACTITIONER

## 2021-09-16 PROCEDURE — G8536 NO DOC ELDER MAL SCRN: HCPCS | Performed by: NURSE PRACTITIONER

## 2021-09-16 PROCEDURE — 1090F PRES/ABSN URINE INCON ASSESS: CPT | Performed by: NURSE PRACTITIONER

## 2021-09-16 PROCEDURE — G9899 SCRN MAM PERF RSLTS DOC: HCPCS | Performed by: NURSE PRACTITIONER

## 2021-09-16 PROCEDURE — G8754 DIAS BP LESS 90: HCPCS | Performed by: NURSE PRACTITIONER

## 2021-09-16 PROCEDURE — G8752 SYS BP LESS 140: HCPCS | Performed by: NURSE PRACTITIONER

## 2021-09-16 PROCEDURE — 99214 OFFICE O/P EST MOD 30 MIN: CPT | Performed by: NURSE PRACTITIONER

## 2021-09-16 PROCEDURE — G8417 CALC BMI ABV UP PARAM F/U: HCPCS | Performed by: NURSE PRACTITIONER

## 2021-09-16 RX ORDER — BUMETANIDE 2 MG/1
1 TABLET ORAL DAILY
Qty: 90 TABLET | Refills: 2
Start: 2021-09-16 | End: 2021-12-11

## 2021-09-16 RX ORDER — SPIRONOLACTONE 25 MG/1
TABLET ORAL
Qty: 90 TABLET | Refills: 1 | Status: SHIPPED | OUTPATIENT
Start: 2021-09-16 | End: 2022-03-23

## 2021-09-16 NOTE — PROGRESS NOTES
[facility], [facility] Note    Patient name: Zehra Kong  Patient : 1950  Patient MRN: [id number]  Date of service: 21    Primary care physician: Senthil Cavazos MD  Primary general cardiologist:  Dr. Meliza Summers  Primary [facility] cardiologist: Rick Rosado MD    CHIEF COMPLAINT:  Chief Complaint   Patient presents with    CHF    Dizziness     \"sometimes when I bend over\"    Claudication     \"leg cramps\"         PLAN:  Continue current dose entresto 24/26mg twice daily  Cont hydralazine 50mg TID   Cont imdur 30mg to daily  Continue spironolactone 25mg daily  Increase Farxiga to 10mg- may need to decrease insulin   Decrease bumex to 1mg daily  Continue magnesium oxice 800mg twice daily  Continue high dose vitamin D weekly  Cont calcitriol 0.25mcg daily for osteopenia  Continue allopurinol to 150mg daily  Does not take ASA  Does not take statins, LDL 84  Positive sleeps study- titration study on   Repeat PYP test next year due to equivocal testing (no M-spike and genetic negative for iaTTR)  Labs in 3 months   Repeat TTE 3 months   CPET in 3 months  Reinforced low salt diet  Reinforced fluid restriction to 6 x 8oz glasses per day  Recommend daily at least 30 minutes walking for deconditioning  Counseled on importance to remain abstinent from alcohol  Advanced care plan present on file  Follow-up with Dr. Meliza Summers   Follow-up with GYN re: mammogram and pap smear  Follow-up with PCP, podiatrist and ophthalmologist for diabetes  Follow-up with nutritionist for weight loss and HF diet  Recommend flu and PNA vaccines   Up to date covid vaccine   [facility], 6 months     IMPRESSION:  Chronic systolic heart failure  · Stage C, NYHA class II recovered from stage D class IV symptoms  · Non-ischemic cardiomyopathy, with recovery of severe biventricular failure while on inotropic support  · LVEF 55-60% (by echo 5/2021), LVEF 40-55% (by echo 4/2021) from LVEF 15-20% (by echo 3/9/21) from 20-25% (by echo 1/2021) from 51-55% (by echo 11/2019)  · RV dysfunction, moderate-severe on diagnosis; normalized by echo (5/2021)  · Low cardiac index at rest 1.3 on chronic dobutamine for hypotension; recovered  · Weaned off inotropes (from 1/2021 to 5/2021); competed LVAD workup  · Normal cors (by [facility] 3/11/21)  · Severe pulmonary artery hypertension, PVR 4  Cardiac risk factors  · Morbid obesity (BMI 32)  · High risk for SHARLENE  · DM2 (hgba1c 6.8)  · HL  · HTN  · Former tobacco (stopped 2014 after > 40 years)  · H/o alcohol abuse  OA, right knee  Carpal tunnel syndrome  Gout  Anemia, iron deficiency  · C-scope (3/2021 s/p polyp removal, plan to repeat C-scope 2024)  Hypokalemia/hypomagensemia  Vitamin D deficincy    CARDIAC IMAGING:  RHC 3/17/21  PA 47/20 (29), RA 7, PCWP 10, CI 2.93  RHC (3/11/21) 25, PA 49/27/37, W 34, Allen CI 1.34     Echo (5/14/21) LVEF 55-60%, LVEDD 4.9cm  Echo (4/8/21) LVEF 50-55%, LIVDd 5.04cm, Tapse 2.38cm, RVIDd 2.99cm  Echo (3/9/21) LVEF 15-20%, mild-moderate MR, severely elevated CVP, IVC > 21mm  Echo (1/29/21) LVEF 20-25%, moderately to severely reduced RV function  Echo (11/20/19) LVEF 51-55%  Echo (12/18/18) LVEF 50-55%  Echo (11/23/16) LVEF 45-55%     EKG (3/9/21) ST 103bpm, QRS 84ms  NST (5/2018) no scar or ischemia, LVEF 48%  · Normal cors (by [facility] 3/11/21)    HEMODYNAMICS:  CPEST ordered    6 Min Walk Report 6/18/2021 5/17/2021 4/21/2021 3/15/2021   (PRE) HR 68 71 86 88   (PRE) O2 Sat 99 99 98 100   (POST) HR 97 82 102 87   (POST) O2 Sat 95 97 96 98   Distance in Meters 364.51 355.92 267.31 100.74         OTHER IMAGING:  DEXA scan- 4/8/21- osteopenic but no treatment indicated  Carotid dopplers (4/6/21)  Unchanged     HISTORY OF PRESENT ILLNESS:  I had the pleasure of seeing Dipti Harman in [facility] at [facility].     [place] Mikaela Early is a 73 y.o. female with h/o obesity (BMI 30), high risk for SHARLENE, DM2, HL, HTN, former tobacco (stopped 2014 after > 40 years), h/o alcohol use, iron deficiency anemia, CKD stage 2, COPD/reactive airway disease. Patient initial diagnosis of heart failure occurred 2007 she was previously followed by Dr. Mike Schaffer at [facility] up until 2015 where she changed to Dr. Monisha Garcia due to insurance coverage. Jackeline Potter ejection fraction at [facility] in 2015 was 45-50%, at the time she had been maintained on high-dose diuretics to maintain a euvolemic state, metoprolol 100 mg daily as well as lisinopril 20 mg daily.  Ms. Sarah Osborn had been doing well on guideline directed medical therapy, she was on significant doses of diuretics to include upwards of 120 mg of furosemide along with 5 mg of metolazone daily. Diana Prey has been recommended since she had maintained a euvolemic state and essentially near normal ejection fraction that reducing and/or eliminating metolazone due to its potential for renal decline. Patient was admitted 3/10-3/20/21 with 2 weeks h/o severe dyspnea, hypotension and NYHA class IV symptoms. Patient was found to have LVEF < 15% and was diuresed and initiated on home inotrope therapy with dobutamine. Workup for LVAD-DT was completed on this admission and patient discharge home on inotropes and with lifevest for a 3 month period of observation. The plan was that if she does not recover LVEF within three months she will undergo LVAD implatnation. During this period of time has heart function has recovered to normal on GDMT. Symptoms improved to stage C, NYHA class II recovered from stage D class IV symptoms. She was confirmed to have non-ischemic cardiomyopathy, with recovery of severe biventricular failure while on inotropic support; LVEF 55-60% (by echo 5/2021), LVEF 40-55% (by echo 4/2021) from LVEF 15-20% (by echo 3/9/21) from 20-25% (by echo 1/2021) from 51-55% (by echo 11/2019).  RV dysfunction, moderate-severe on diagnosis normalized by echo to normal LV function (5/2021). Hypotension with low cardiac index at rest 1.3 on chronic dobutamine recovered; GDMT was introduced and she was being weaned off inotropes (from 1/2021 to 5/2021). Normal cors (by [facility] 3/11/21). INTERVAL HISTORY:  Today, Ms Leana Mercado presents for HF follow up. Overall she continues to do well off Dobutamine, she denies acute HF symptoms, she has intermittent dizziness with bending over and some leg cramps that are relieved by walking. She is back to doing her normal activities and will need continue her current course of GDMT and keep a close eye on her symptoms. REVIEW OF SYSTEMS:  General: Denies fever, night sweats. Ear, nose and throat: Denies difficulty hearing, sinus problems, runny nose, post-nasal drip, ringing in ears, mouth sores, loose teeth, ear pain, nosebleeds, sore throate, facial pain or numbess  Cardiovascular: see above in the interval history  Respiratory: Denies cough, wheezing, sputum production, hemoptysis. Gastrointestinal: Denies heartburn, constipation, diarrhea, abdominal pain, nausea, vomiting, difficulty swallowing, blood in stool  Kidney and bladder: Denies painful urination, frequent urination, urgency  Musculoskeletal: Denies joint pain, muscle weakness  Skin and hair: Denies change in existing skin lesions, hair loss or increase, breast changes    PHYSICAL EXAM:  Visit Vitals  BP (!) 102/52 (BP 1 Location: Left arm, BP Patient Position: Sitting, BP Cuff Size: Adult)   Pulse 84   Temp (!) 96.6 °F (35.9 °C) (Oral)   Resp 16   Ht 5' 4\" (1.626 m)   Wt 181 lb 12.8 oz (82.5 kg)   SpO2 100%   BMI 31.21 kg/m²     General: Patient is well developed, well-nourished in no acute distress  HEENT: Normocephalic and atraumatic. No scleral icterus. Pupils are equal, round and reactive to light and accomodation. No conjunctival injection. Oropharynx is clear. Neck: Supple. No evidence of thyroid enlargements or lymphadenopathy.   JVD: Cannot be appreciated   Lungs: Breath sounds are equal and clear bilaterally. No wheezes, rhonchi, or rales. Heart: Regular rate and rhythm with normal S1 and S2. No murmurs, gallops or rubs. Abdomen: Soft, no mass or tenderness. No organomegaly or hernia. Bowel sounds present. Genitourinary and rectal: deferred  Extremities: No cyanosis, clubbing, or edema. Neurologic: No focal sensory or motor deficits are noted. Grossly intact. Psychiatric: Awake, alert an doriented x 3. Appropriate mood and affect. Skin: Warm, dry and well perfused. No lesions, nodules or rashes are noted. PAST MEDICAL HISTORY:  Past Medical History:   Diagnosis Date    Arthritis     Diabetes ([facility]     Heart failure ([facility]     Hypercholesterolemia     Hypertension        PAST SURGICAL HISTORY:  Past Surgical History:   Procedure Laterality Date    COLONOSCOPY N/A 3/19/2021    COLONOSCOPY performed by Alejo Winston MD [facility]COPY    HX ORTHOPAEDIC      Arthroscopy right knee    [place] CARDIAC SURG PROCEDURE UNLIST      life vest       FAMILY HISTORY:  Family History   Problem Relation Age of Onset    Diabetes Mother        SOCIAL HISTORY:  Social History     Socioeconomic History    Marital status:      Spouse name: Not on file    Number of children: 3    Years of education: 15    Highest education level: Not on file   Occupational History    Occupation: retired   Tobacco Use    Smoking status: Former Smoker     Packs/day: 0.25     Years: 20.00     Pack years: 5.00     Quit date: 2014     Years since quittin.7    Smokeless tobacco: Never Used   Vaping Use    Vaping Use: Never used   Substance and Sexual Activity    Alcohol use:  Yes     Alcohol/week: 14.0 standard drinks     Types: 14 Cans of beer per week     Comment: occ    Drug use: No    Sexual activity: Yes     Partners: Male   Other Topics Concern     Social Determinants of Health     Financial Resource Strain:     Difficulty of Paying Living Expenses:    Food Insecurity:     Worried About [facility] in the Last Year:     [address] [facility] in the Last Year:    Transportation Needs:     Lack of Transportation (Medical):  Lack of Transportation (Non-Medical):    Physical Activity:     Days of Exercise per Week:     Minutes of Exercise per Session:    Stress:     Feeling of Stress :    Social Connections:     Frequency of Communication with Friends and Family:     Frequency of Social Gatherings with Friends and Family:     Attends [employer] Services:     Active Member of Clubs or Organizations:     Attends Club or Organization Meetings:     Marital Status:        LABORATORY RESULTS:  Labs Latest Ref Rng & Units 8/16/2021 7/19/2021   Albumin 3.8 - 4.8 g/dL 4.6 4.5   Calcium 8.7 - 10.3 mg/dL 10.1 9.4   Glucose 65 - 99 mg/dL 131(H) 127(H)   BUN 8 - 27 mg/dL 42(H) 38(H)   Creatinine 0.57 - 1.00 mg/dL 1.05(H) 1.13(H)   Sodium 134 - 144 mmol/L 140 141   Potassium 3.5 - 5.2 mmol/L 3.8 3.7   Some recent data might be hidden       ALLERGY:  No Known Allergies     CURRENT MEDICATIONS:    Current Outpatient Medications:     atorvastatin (LIPITOR) 40 mg tablet, TAKE 1 TABLET BY MOUTH EVERY DAY, Disp: 90 Tablet, Rfl: 3    melatonin 3 mg tablet, TAKE 1 TABLET BY MOUTH EVERY NIGHT AS NEEDED FOR INSOMNIA., Disp: 90 Tablet, Rfl: 0    Entresto 24-26 mg tablet, TAKE 1 TABLET BY MOUTH TWICE A DAY, Disp: 60 Tablet, Rfl: 1    isosorbide mononitrate ER (IMDUR) 30 mg tablet, Take 1 Tablet by mouth every morning., Disp: 90 Tablet, Rfl: 0    magnesium oxide (MAG-OX) 400 mg tablet, TAKE 2 TABLETS BY MOUTH TWO (2) TIMES A DAY., Disp: 120 Tablet, Rfl: 2    FreeStyle Nathaniel 14 Day Sensor kit, USE TO CHECK BG 3X A DAY AND AS NEEDED. DX  E11.69, Disp: , Rfl:     allopurinoL (ZYLOPRIM) 100 mg tablet, Take 1 Tablet by mouth daily. , Disp: 30 Tablet, Rfl: 11    hydrALAZINE (APRESOLINE) 50 mg tablet, Take 1 Tablet by mouth three (3) times daily. , Disp: 270 Tablet, Rfl: 1    dapagliflozin (FARXIGA) 5 mg tab tablet, Take 1 Tablet by mouth daily. , Disp: 90 Tablet, Rfl: 1    Insulin Needles, Disposable, (BD Ultra-Fine Short Pen Needle) 31 gauge x 5/16\" ndle, USE WITH INSULIN PENS TWICE DAILY AS DIRECTED, Disp: 100 Pen Needle, Rfl: 11    cholecalciferol (VITAMIN D3) (2,000 UNITS /50 MCG) cap capsule, Take 1 Capsule by mouth daily. , Disp: 30 Capsule, Rfl: 2    calcitRIOL (RocaltroL) 0.25 mcg capsule, Take 0.25 mcg by mouth daily. , Disp: , Rfl:     diclofenac (VOLTAREN) 1 % gel, Apply 2 g to affected area two (2) times daily as needed for Pain. thin layer to joint pain, Disp: , Rfl:     budesonide (PULMICORT) 0.25 mg/2 mL nbsp, 500 mcg by Nebulization route every six (6) hours as needed for Other (wheezing shortness of breath). , Disp: , Rfl:     oxybutynin (DITROPAN) 5 mg tablet, Take 0.5 Tabs by mouth three (3) times daily. , Disp: 90 Tab, Rfl: 11    bumetanide (BUMEX) 2 mg tablet, Take 1 Tab by mouth daily. , Disp: 90 Tab, Rfl: 2    spironolactone (ALDACTONE) 25 mg tablet, Take 1 Tab by mouth daily. , Disp: 90 Tab, Rfl: 1    thiamine HCL (B-1) 100 mg tablet, Take 1 Tab by mouth daily. , Disp: 90 Tab, Rfl: 1    fluticasone propion-salmeteroL (ADVAIR/WIXELA) 250-50 mcg/dose diskus inhaler, Take 2 Puffs by inhalation two (2) times a day., Disp: , Rfl:     fluticasone propionate (FLONASE) 50 mcg/actuation nasal spray, 2 Sprays by Both Nostrils route daily. , Disp: 1 Bottle, Rfl: 0    insulin glargine (LANTUS,BASAGLAR) 100 unit/mL (3 mL) inpn, 60 units every morning (Patient taking differently: 50 Units by SubCUTAneous route daily. 60 units every morning), Disp: 6 Adjustable Dose Pre-filled Pen Syringe, Rfl: 11    sodium chloride (Normal Saline Flush), 10-20 mL by InterCATHeter route daily. (Patient not taking: Reported on 9/16/2021), Disp: , Rfl:     Thank you for your referral and allowing me to participate in this patient's care.     Darrell Jessica NP  Advanced Heart Failure [address]  [address] [facility], [address]  Phone: [phone]      PATIENT CARE TEAM:  Patient Care Team:  Bela Banerjee MD as PCP - General (Internal Medicine)  Bela Banerjee MD as PCP - [facility]  López Ruiz MD as Physician (Cardiology)  Jovanna Gardiner MD as Surgeon (Orthopedic Surgery)  Kahlil Doe MD (Sleep Medicine)     Total visit time: 40 minutes (> 50% spent face-to-face counseling)

## 2021-09-16 NOTE — PATIENT INSTRUCTIONS
Medication changes:    Decrease Bumex 1mg- take 1/2 tablet daily     Increase farxiga to 10mg daily- take one tablet daily   You may take two 5mg tablets of farxiga to equal this dose     Please take this to your pharmacy to notify them of the change in medications. Testing Ordered: An order for Echo and CPET has been placed to be done In December . You will be receiving an automated call from Coordination of Care to schedule this test. If you are unavailable to receive the call or would like to contact coordination of care yourself you may contact [phone] to schedule. You will need to contact coordination of care yourself if you miss their calls as they will only make 3 attempts to reach you. Lab slips have been given to you in clinic. Please present in December at a local lab [place] to have labs drawn. You will be notified of any abnormal results that require a change in medication regimen. Other Recommendations:     Please weight yourself daily. Write them down and keep a log. Please take your blood pressures and heart rates before medications and two hours after medications write them down and keep a log. Call [phone] option 2 September 30th with weights and blood pressure logs. .You will need to have COVID-19 testing done 3-4 days prior to your scheduled testing. Orders have been provided to you today. Please present to one of the following [facility] locations for COVID-19 testing:    [facility] location- Located at the patient discharge area at the corner of [address] [place] [address] and [address] [facility] ([facility], [address] Testing center hours are from 7:00am-3:00pm Monday through Friday and 7:00am-10:00am Saturday and Sunday. [address] location- Located at [place] [address] ([address] [place] [address], [place], [address] [facility]).  Testing center hours are from 7:00am-12:00pm Monday through Friday and 7: 00am-10:00am Saturday and Sunday. [facility] Testing location- Located at [facility] ([address] [place] [address], [address] [place] [address]). Testing center hours Sunday-Saturday (7 days/week) from 8:00am-11:00am.     If you do not have the testing done within the recommended time frame your CPET will be rescheduled. Ensure your drinking an adequate amount of water with a goal of 6-8 eight ounce glasses (1.5-2 liters) of fluid daily. Your urine should be clear and light yellow straw colored. If your blood pressure begins to consistently run below 90/60 and/or you begin to experience dizziness or lightheadedness, please contact the [address] at [phone]. Follow up 6 months with echo and CPET results  with [facility]      Please monitor your weights daily upon waking and after using the bathroom. Keep a written records of your weights and bring to your next appointment. If you have a weight gain of 3 or more pounds overnight OR 5 or more pounds in one week please contact our office. Thank you for allowing us the privilege of being a part of your healthcare team! Please do not hesitate to contact our office at [phone] with any questions or concerns. Virtual Heart Failure [address] invites you to learn more about heart failure and to share your questions, ideas, and experiences with others. Each month, the Heart Failure Support Group features a new educational topic and a guest speaker, followed by an interactive discussion. Our Heart Failure Nurse Navigator will moderate each session. You will be able to participate by phone, tablet or computer through [employer]. This support group takes place on the 3rd Thursday of each month from 6:00-7:30PM. All individuals living with heart failure and their caregivers are welcome to join.      If you are interested in participating, please contact us at [email or web address] and you will be sent the link to join the [employer].

## 2021-09-19 DIAGNOSIS — E55.9 VITAMIN D DEFICIENCY: ICD-10-CM

## 2021-09-19 RX ORDER — LANOLIN ALCOHOL/MO/W.PET/CERES
CREAM (GRAM) TOPICAL
Qty: 90 TABLET | Refills: 1 | Status: SHIPPED | OUTPATIENT
Start: 2021-09-19 | End: 2022-05-02

## 2021-09-22 RX ORDER — ACETAMINOPHEN 500 MG
TABLET ORAL
Qty: 90 CAPSULE | Refills: 2 | Status: SHIPPED | OUTPATIENT
Start: 2021-09-22

## 2021-09-23 ENCOUNTER — HOSPITAL ENCOUNTER (OUTPATIENT)
Dept: SLEEP MEDICINE | Age: 71
Discharge: HOME | End: 2021-09-23
Payer: MEDICARE

## 2021-09-23 ENCOUNTER — OFFICE VISIT (OUTPATIENT)
Dept: INTERNAL MEDICINE CLINIC | Age: 71
End: 2021-09-23
Payer: MEDICARE

## 2021-09-23 VITALS
OXYGEN SATURATION: 97 % | RESPIRATION RATE: 14 BRPM | HEIGHT: 64 IN | HEART RATE: 60 BPM | BODY MASS INDEX: 31.92 KG/M2 | TEMPERATURE: 98 F | WEIGHT: 187 LBS | DIASTOLIC BLOOD PRESSURE: 67 MMHG | SYSTOLIC BLOOD PRESSURE: 107 MMHG

## 2021-09-23 DIAGNOSIS — I42.0 DILATED CARDIOMYOPATHY (HCC): Primary | ICD-10-CM

## 2021-09-23 DIAGNOSIS — N18.31 STAGE 3A CHRONIC KIDNEY DISEASE (HCC): ICD-10-CM

## 2021-09-23 DIAGNOSIS — E78.5 DYSLIPIDEMIA: ICD-10-CM

## 2021-09-23 DIAGNOSIS — E66.9 OBESITY (BMI 30.0-34.9): ICD-10-CM

## 2021-09-23 DIAGNOSIS — I50.43 ACUTE ON CHRONIC COMBINED SYSTOLIC AND DIASTOLIC CONGESTIVE HEART FAILURE (HCC): ICD-10-CM

## 2021-09-23 DIAGNOSIS — E11.65 UNCONTROLLED TYPE 2 DIABETES MELLITUS WITH HYPERGLYCEMIA (HCC): ICD-10-CM

## 2021-09-23 DIAGNOSIS — J43.1 PANLOBULAR EMPHYSEMA (HCC): ICD-10-CM

## 2021-09-23 PROCEDURE — 99215 OFFICE O/P EST HI 40 MIN: CPT | Performed by: INTERNAL MEDICINE

## 2021-09-23 PROCEDURE — G8417 CALC BMI ABV UP PARAM F/U: HCPCS | Performed by: INTERNAL MEDICINE

## 2021-09-23 PROCEDURE — G8536 NO DOC ELDER MAL SCRN: HCPCS | Performed by: INTERNAL MEDICINE

## 2021-09-23 PROCEDURE — G8752 SYS BP LESS 140: HCPCS | Performed by: INTERNAL MEDICINE

## 2021-09-23 PROCEDURE — 1090F PRES/ABSN URINE INCON ASSESS: CPT | Performed by: INTERNAL MEDICINE

## 2021-09-23 PROCEDURE — 1101F PT FALLS ASSESS-DOCD LE1/YR: CPT | Performed by: INTERNAL MEDICINE

## 2021-09-23 PROCEDURE — 3051F HG A1C>EQUAL 7.0%<8.0%: CPT | Performed by: INTERNAL MEDICINE

## 2021-09-23 PROCEDURE — G8399 PT W/DXA RESULTS DOCUMENT: HCPCS | Performed by: INTERNAL MEDICINE

## 2021-09-23 PROCEDURE — G8754 DIAS BP LESS 90: HCPCS | Performed by: INTERNAL MEDICINE

## 2021-09-23 PROCEDURE — G8432 DEP SCR NOT DOC, RNG: HCPCS | Performed by: INTERNAL MEDICINE

## 2021-09-23 PROCEDURE — 2022F DILAT RTA XM EVC RTNOPTHY: CPT | Performed by: INTERNAL MEDICINE

## 2021-09-23 PROCEDURE — 95811 POLYSOM 6/>YRS CPAP 4/> PARM: CPT | Performed by: INTERNAL MEDICINE

## 2021-09-23 PROCEDURE — G9899 SCRN MAM PERF RSLTS DOC: HCPCS | Performed by: INTERNAL MEDICINE

## 2021-09-23 PROCEDURE — 3017F COLORECTAL CA SCREEN DOC REV: CPT | Performed by: INTERNAL MEDICINE

## 2021-09-23 PROCEDURE — G8427 DOCREV CUR MEDS BY ELIG CLIN: HCPCS | Performed by: INTERNAL MEDICINE

## 2021-09-23 NOTE — PROGRESS NOTES
[facility]  [facility]  [address] [facility] [address]  Phone:  [phone]  Fax: [phone]       Chief Complaint   Patient presents with    Diabetes     routine follow up    . SUBJECTIVE:    Archana Herrera is a 73 y.o. female comes in for return visit stating that she is doing great. She denies any dyspnea on exertion, orthopnea or PND. Her ejection fraction has improved significantly. She is actively followed by Cardiology. There has been no change in her medications. Her diabetes hopefully is doing reasonably well. She remains on 50 units of insulin. Average sugars are generally in the 150 range. She has a past history of primary hypertension, dyslipidemia as well as COPD, although it has been seven years since she smoked. Current Outpatient Medications   Medication Sig Dispense Refill    cholecalciferol (VITAMIN D3) (2,000 UNITS /50 MCG) cap capsule TAKE 1 CAPSULE BY MOUTH EVERY DAY 90 Capsule 2    thiamine HCL (B-1) 100 mg tablet TAKE 1 TABLET BY MOUTH EVERY DAY 90 Tablet 1    spironolactone (ALDACTONE) 25 mg tablet TAKE 1 TABLET BY MOUTH EVERY DAY 90 Tablet 1    dapagliflozin (FARXIGA) 10 mg tab tablet Take 1 Tablet by mouth daily. 30 Tablet 1    bumetanide (BUMEX) 2 mg tablet Take 0.5 Tablets by mouth daily. 90 Tablet 2    atorvastatin (LIPITOR) 40 mg tablet TAKE 1 TABLET BY MOUTH EVERY DAY 90 Tablet 3    melatonin 3 mg tablet TAKE 1 TABLET BY MOUTH EVERY NIGHT AS NEEDED FOR INSOMNIA. 90 Tablet 0    Entresto 24-26 mg tablet TAKE 1 TABLET BY MOUTH TWICE A DAY 60 Tablet 1    isosorbide mononitrate ER (IMDUR) 30 mg tablet Take 1 Tablet by mouth every morning. 90 Tablet 0    magnesium oxide (MAG-OX) 400 mg tablet TAKE 2 TABLETS BY MOUTH TWO (2) TIMES A DAY. 120 Tablet 2    FreeStyle Nathaniel 14 Day Sensor kit USE TO CHECK BG 3X A DAY AND AS NEEDED. DX  E11.69      allopurinoL (ZYLOPRIM) 100 mg tablet Take 1 Tablet by mouth daily.  27 Tablet 11    hydrALAZINE (APRESOLINE) 50 mg tablet Take 1 Tablet by mouth three (3) times daily. 270 Tablet 1    Insulin Needles, Disposable, (BD Ultra-Fine Short Pen Needle) 31 gauge x 5/16\" ndle USE WITH INSULIN PENS TWICE DAILY AS DIRECTED 100 Pen Needle 11    calcitRIOL (RocaltroL) 0.25 mcg capsule Take 0.25 mcg by mouth daily.  diclofenac (VOLTAREN) 1 % gel Apply 2 g to affected area two (2) times daily as needed for Pain. thin layer to joint pain      budesonide (PULMICORT) 0.25 mg/2 mL nbsp 500 mcg by Nebulization route every six (6) hours as needed for Other (wheezing shortness of breath).  oxybutynin (DITROPAN) 5 mg tablet Take 0.5 Tabs by mouth three (3) times daily. 90 Tab 11    fluticasone propion-salmeteroL (ADVAIR/WIXELA) 250-50 mcg/dose diskus inhaler Take 2 Puffs by inhalation two (2) times a day.  fluticasone propionate (FLONASE) 50 mcg/actuation nasal spray 2 Sprays by Both Nostrils route daily. 1 Bottle 0    insulin glargine (LANTUS,BASAGLAR) 100 unit/mL (3 mL) inpn 60 units every morning (Patient taking differently: 50 Units by SubCUTAneous route daily.  60 units every morning) 6 Adjustable Dose Pre-filled Pen Syringe 11     Past Medical History:   Diagnosis Date    Arthritis     Diabetes ([address]     Heart failure ([address]     Hypercholesterolemia     Hypertension      Past Surgical History:   Procedure Laterality Date    COLONOSCOPY N/A 3/19/2021    COLONOSCOPY performed by Louis Mariee [facility] at [facility] END[facility]COPY    HX ORTHOPAEDIC      Arthroscopy right knee    [place] CARDIAC SURG PROCEDURE UNLIST      life vest     No Known Allergies      REVIEW OF SYSTEMS:  General: negative for - chills or fever  ENT: negative for - headaches, nasal congestion or tinnitus  Respiratory: negative for - cough, hemoptysis, shortness of breath or wheezing  Cardiovascular : negative for - chest pain, edema, palpitations or shortness of breath  Gastrointestinal: negative for - abdominal pain, blood in stools, heartburn or nausea/vomiting  Genito-Urinary: no dysuria, trouble voiding, or hematuria  Musculoskeletal: negative for - gait disturbance, joint pain, joint stiffness or joint swelling  Neurological: no TIA or stroke symptoms  Hematologic: no bruises, no bleeding, no swollen glands  Integument: no lumps, mole changes, nail changes or rash  Endocrine: no malaise/lethargy or unexpected weight changes      Social History     Socioeconomic History    Marital status:      Spouse name: Not on file    Number of children: 3    Years of education: 15    Highest education level: Not on file   Occupational History    Occupation: retired   Tobacco Use    Smoking status: Former Smoker     Packs/day: 0.25     Years: 20.00     Pack years: 5.00     Quit date: 2014     Years since quittin.7    Smokeless tobacco: Never Used   Vaping Use    Vaping Use: Never used   Substance and Sexual Activity    Alcohol use: Yes     Alcohol/week: 14.0 standard drinks     Types: 14 Cans of beer per week     Comment: occ    Drug use: No    Sexual activity: Yes     Partners: Male   Other Topics Concern     Social Determinants of Health     Financial Resource Strain:     Difficulty of Paying Living Expenses:    Food Insecurity:     Worried About Running Out of Food in the Last Year:     [address] in the Last Year:    Transportation Needs:     Lack of Transportation (Medical):      Lack of Transportation (Non-Medical):    Physical Activity:     Days of Exercise per Week:     Minutes of Exercise per Session:    Stress:     Feeling of Stress :    Social Connections:     Frequency of Communication with Friends and Family:     Frequency of Social Gatherings with Friends and Family:     Attends Hindu Services:     Active Member of Clubs or Organizations:     Attends Club or Organization Meetings:     Marital Status:      Family History   Problem Relation Age of Onset    Diabetes Mother OBJECTIVE:    Visit Vitals  /67   Pulse 60   Temp 98 °F (36.7 °C) (Oral)   Resp 14   Ht 5' 4\" (1.626 m)   Wt 187 lb (84.8 kg)   SpO2 97%   BMI 32.10 kg/m²     CONSTITUTIONAL: well , well nourished, appears age appropriate  EYES: perrla, eom intact  ENMT:moist mucous membranes, pharynx clear  NECK: supple. Thyroid normal  RESPIRATORY: Chest: clear to ascultation and percussion   CARDIOVASCULAR: Heart: regular rate and rhythm  GASTROINTESTINAL: Abdomen: soft, bowel sounds active  HEMATOLOGIC: no pathological lymph nodes palpated  MUSCULOSKELETAL: Extremities: no edema, pulse 1+   INTEGUMENT: No unusual rashes or suspicious skin lesions noted. Nails appear normal.  NEUROLOGIC: non-focal exam   MENTAL STATUS: alert and oriented, appropriate affect      ASSESSMENT:  1. Dilated cardiomyopathy ([address]    2. Acute on chronic combined systolic and diastolic congestive heart failure ([address]    3. Uncontrolled type 2 diabetes mellitus with hyperglycemia (HCC)    4. Panlobular emphysema (HCC)    5. Stage 3a chronic kidney disease (HCC)    6. Obesity (BMI 30.0-34.9)    7. Dyslipidemia        PLAN:  1. Her cardiomyopathy is quite stable. There are no overt signs of congestive heart failure today. She will follow up with Cardiology. Records have been reviewed from Cardiology. 2. Hopefully, her diabetes is doing well, but I will await the results of her hemoglobin A1c. I reminded her the importance of eating at the same time every day. 3. Her COPD is stable. 4. She does have mild chronic renal failure, but this is predominantly related to her prerenal component from her diuretic. She does not really have any intrinsic renal disease technically. 5. I reminded her the importance of minimizing weight gain. This can be accomplished by eating meals, eliminating snacks, and avoiding the consumption of processed carbohydrates. 6. She remains on her statin, and efficacy and compliance will be assessed.   7. I encouraged her to increase her physical activity. Her adopted daughter is now [de-identified] years of age. .  Orders Placed This Encounter    HEMOGLOBIN A1C WITH EAG    APOLIPOPROTEIN B    METABOLIC PANEL, BASIC    NT-PRO BNP         Follow-up and Dispositions    · Return in about 3 months (around 12/23/2021).            Bela Garcia MD

## 2021-09-23 NOTE — PROGRESS NOTES
Chief Complaint   Patient presents with    Diabetes     routine follow up          1. Have you been to the ER, urgent care clinic since your last visit? Hospitalized since your last visit? No    2. Have you seen or consulted any other health care providers outside of the [address] [place] [address] since your last visit? Include any pap smears or colon screening.  No

## 2021-09-24 VITALS
SYSTOLIC BLOOD PRESSURE: 118 MMHG | TEMPERATURE: 97.5 F | HEIGHT: 64 IN | WEIGHT: 176 LBS | HEART RATE: 65 BPM | OXYGEN SATURATION: 98 % | DIASTOLIC BLOOD PRESSURE: 66 MMHG | BODY MASS INDEX: 30.05 KG/M2

## 2021-09-24 LAB
APO B SERPL-MCNC: 60 MG/DL
BUN SERPL-MCNC: 24 MG/DL (ref 8–27)
BUN/CREAT SERPL: 29 (ref 12–28)
CALCIUM SERPL-MCNC: 9.2 MG/DL (ref 8.7–10.3)
CHLORIDE SERPL-SCNC: 104 MMOL/L (ref 96–106)
CO2 SERPL-SCNC: 26 MMOL/L (ref 20–29)
CREAT SERPL-MCNC: 0.84 MG/DL (ref 0.57–1)
EST. AVERAGE GLUCOSE BLD GHB EST-MCNC: 166 MG/DL
GLUCOSE SERPL-MCNC: 94 MG/DL (ref 65–99)
HBA1C MFR BLD: 7.4 % (ref 4.8–5.6)
NT-PROBNP SERPL-MCNC: 87 PG/ML (ref 0–301)
POTASSIUM SERPL-SCNC: 4.4 MMOL/L (ref 3.5–5.2)
SODIUM SERPL-SCNC: 141 MMOL/L (ref 134–144)

## 2021-09-24 NOTE — PROGRESS NOTES
[address] [facility], [place]. [place], [address]  Tel.  [phone]  Fax. [address] [facility]  [place], [address] [facility]  Tel.  [phone]  Fax. [phone] [address] [facility] D[address] Sanz  Tel.  [phone]  Fax. [phone]     Sleep Study Technical Notes        PRE-Test:  My Nelson (: 1950) arrived in the lobby. Patient was greeted, temperature checked (97.5) and screening questions asked. The patient was taken to the Sleep Center and taken directly to his/her room. BP (118/66) and SaO2 (98%) were taken. Weight per patient (176 lbs). Procedure explained to the patient and questions were answered. The patient expressed understanding of the procedure. Electrodes were applied without incident. The patient was placed in bed and the study was started.  PAP mask acclimation for 5 min. Acquisition Notes:   Lights off: 9:42 pm   Respiratory events: RERAS, HYPOPNEAS, AND A FEW OBSTRUCTIVE APNEAS   ECG:  NORMAL SINUS RHYTHM   Snoring:  MILD/MODERATE   PAP titration: CPAP 5.0CM TO 9.0CM   Desensitization Mask(s) Used: RESMED AIR FIT F20- MEDIUM AND RESPIRONICS DREAM WEAR FULL SMALL/MEDIUM   Other comments: NA  o Patient had caregiver in attendance:  NO  o Patient watched TV or on phone after lights out:  NO  o Patient slept with positional therapy:  NO  o Patient slept with head of bed elevated:  YES 15 DEGREES  o Patient wore an oral appliance:  NO  o Patient to bathroom 1 times  o Pediatric Patient:  NA  - Parent accompanied patient:  NA  - Parent slept in bed with patient:  NA      POST Test:   Patient was awakened. Electrodes were removed. The patient was discharged after answering the Post Questionnaire. Patient stated that he/she was alert and ok to drive.  Equipment and room cleaned per infection control policy.

## 2021-09-28 ENCOUNTER — TELEPHONE (OUTPATIENT)
Dept: SLEEP MEDICINE | Age: 71
End: 2021-09-28

## 2021-09-28 DIAGNOSIS — G47.33 OSA (OBSTRUCTIVE SLEEP APNEA): Primary | ICD-10-CM

## 2021-09-29 NOTE — TELEPHONE ENCOUNTER
Orders Placed This Encounter    AMB SUPPLY ORDER     Primary Encounter Diagnosis: Obstructive Sleep Apnea  (G47.33)    ResMed Device with Heated Humidifer [id number] / [id number]. Positive Airway Pressure Therapy: Duration of need: 99 months. Set Pressure: 9 cmH2O     Nasal Cushion (Replace) 2 per month.  Nasal Interface Mask 1 every 3 months.  Headgear 1 every 6 months.  Filter(s) Disposable 2 per month.  Filter(s) Non-Disposable 1 every 6 months. [facility] for [employer] (Replace) 1 every 6 months.  Tubing with heating element 1 every 3 months. Perform Mask Fitting per patient preference and comfort - replace as above. De Michael MD, FAASM; NPI: [id number]  Electronically signed.  09/28/21

## 2021-10-01 ENCOUNTER — TELEPHONE (OUTPATIENT)
Dept: CARDIOLOGY CLINIC | Age: 71
End: 2021-10-01

## 2021-10-01 NOTE — TELEPHONE ENCOUNTER
Called patient using two patient identifiers. Asked patient for blood pressure log over the last two weeks. She states she has not kept a log. She notes her blood pressure this morning before medications 132/54. She states she occasionally has dizziness when she bends over but it goes away in minutes. She states that her current weight is 179lbs on home scale and that this is stable for her. She denies sob, swelling, palpitations, and chest pain. She also states she has a new insurance card that is effective today. Advised patient to bring copy of insurance card to office also notified her that I will have  schedulers follow up with her about insurance cards. Advised her I will call back if any changes need to be made  KATHRYN Manning RN       Called patient and provided social workers email for patient to send updated insurance card to. Diogenes Olivas RN         Per NP no changes.  Diogenes Olivas RN

## 2021-10-04 ENCOUNTER — HOSPITAL ENCOUNTER (OUTPATIENT)
Dept: PREADMISSION TESTING | Age: 71
Discharge: HOME | End: 2021-10-04
Payer: MEDICARE

## 2021-10-04 ENCOUNTER — TRANSCRIBE ORDER (OUTPATIENT)
Dept: REGISTRATION | Age: 71
End: 2021-10-04

## 2021-10-04 DIAGNOSIS — Z01.812 PRE-PROCEDURE LAB EXAM: Primary | ICD-10-CM

## 2021-10-04 DIAGNOSIS — Z01.812 PRE-PROCEDURE LAB EXAM: ICD-10-CM

## 2021-10-04 PROCEDURE — U0003 INFECTIOUS AGENT DETECTION BY NUCLEIC ACID (DNA OR RNA); SEVERE ACUTE RESPIRATORY SYNDROME CORONAVIRUS 2 (SARS-COV-2) (CORONAVIRUS DISEASE [COVID-19]), AMPLIFIED PROBE TECHNIQUE, MAKING USE OF HIGH THROUGHPUT TECHNOLOGIES AS DESCRIBED BY CMS-2020-01-R: HCPCS

## 2021-10-05 LAB
SARS-COV-2, XPLCVT: NOT DETECTED
SOURCE, COVRS: NORMAL

## 2021-10-07 ENCOUNTER — DOCUMENTATION ONLY (OUTPATIENT)
Dept: SLEEP MEDICINE | Age: 71
End: 2021-10-07

## 2021-10-08 ENCOUNTER — HOSPITAL ENCOUNTER (OUTPATIENT)
Dept: NON INVASIVE DIAGNOSTICS | Age: 71
Discharge: HOME | End: 2021-10-08
Attending: NURSE PRACTITIONER
Payer: MEDICARE

## 2021-10-08 VITALS
BODY MASS INDEX: 30.56 KG/M2 | WEIGHT: 179 LBS | SYSTOLIC BLOOD PRESSURE: 111 MMHG | HEIGHT: 64 IN | DIASTOLIC BLOOD PRESSURE: 59 MMHG

## 2021-10-08 DIAGNOSIS — I42.8 NICM (NONISCHEMIC CARDIOMYOPATHY) (HCC): ICD-10-CM

## 2021-10-08 DIAGNOSIS — R06.09 DOE (DYSPNEA ON EXERTION): ICD-10-CM

## 2021-10-08 DIAGNOSIS — I42.8 NON-ISCHEMIC CARDIOMYOPATHY (HCC): ICD-10-CM

## 2021-10-08 LAB
ECHO AO ROOT DIAM: 3.24 CM
ECHO AV AREA PEAK VELOCITY: 1.79 CM2
ECHO AV AREA/BSA PEAK VELOCITY: 1 CM2/M2
ECHO AV PEAK GRADIENT: 9.31 MMHG
ECHO AV PEAK VELOCITY: 152.53 CM/S
ECHO LA AREA 4C: 21.97 CM2
ECHO LA MAJOR AXIS: 4.08 CM
ECHO LA MINOR AXIS: 2.18 CM
ECHO LA VOL 2C: 92.83 ML (ref 22–52)
ECHO LA VOL 4C: 65.55 ML (ref 22–52)
ECHO LA VOL BP: 87.79 ML (ref 22–52)
ECHO LA VOL/BSA BIPLANE: 46.95 ML/M2 (ref 16–28)
ECHO LA VOLUME INDEX A2C: 49.64 ML/M2 (ref 16–28)
ECHO LA VOLUME INDEX A4C: 35.05 ML/M2 (ref 16–28)
ECHO LV INTERNAL DIMENSION DIASTOLIC: 4.82 CM (ref 3.9–5.3)
ECHO LV INTERNAL DIMENSION SYSTOLIC: 3.5 CM
ECHO LV IVSD: 0.91 CM (ref 0.6–0.9)
ECHO LV MASS 2D: 166.7 G (ref 67–162)
ECHO LV MASS INDEX 2D: 89.2 G/M2 (ref 43–95)
ECHO LV POSTERIOR WALL DIASTOLIC: 1.05 CM (ref 0.6–0.9)
ECHO LVOT DIAM: 2.07 CM
ECHO LVOT PEAK GRADIENT: 2.61 MMHG
ECHO LVOT PEAK VELOCITY: 80.72 CM/S
ECHO MV A VELOCITY: 100 CM/S
ECHO MV E DECELERATION TIME (DT): 224.52 MS
ECHO MV E VELOCITY: 62.55 CM/S
ECHO MV E/A RATIO: 0.63
ECHO PV PEAK INSTANTANEOUS GRADIENT SYSTOLIC: 3.41 MMHG
ECHO RV TAPSE: 1.16 CM (ref 1.5–2)
LA VOL DISK BP: 79.99 ML (ref 22–52)

## 2021-10-08 PROCEDURE — 93306 TTE W/DOPPLER COMPLETE: CPT

## 2021-10-08 PROCEDURE — 93306 TTE W/DOPPLER COMPLETE: CPT | Performed by: SPECIALIST

## 2021-10-08 PROCEDURE — 94621 CARDIOPULM EXERCISE TESTING: CPT

## 2021-10-12 LAB
STRESS ANGINA INDEX: 0
STRESS BASELINE DIAS BP: 59 MMHG
STRESS BASELINE HR: 93 BPM
STRESS BASELINE SYS BP: 111 MMHG
STRESS O2 SAT REST: 97 %
STRESS ST DEPRESSION: 0 MM
STRESS ST ELEVATION: 0 MM
STRESS STAGE 1 BP: NORMAL MMHG
STRESS STAGE 1 COMMENTS: NORMAL
STRESS STAGE 1 DURATION: 2 MIN:SEC
STRESS STAGE 1 HR: 137 BPM
STRESS STAGE 2 COMMENTS: NORMAL
STRESS STAGE 2 DURATION: 2 MIN:SEC
STRESS STAGE 2 HR: 144 BPM
STRESS STAGE 3 COMMENTS: NORMAL
STRESS STAGE 3 DURATION: NORMAL MIN:SEC
STRESS STAGE 3 HR: 144 BPM
STRESS STAGE RECOVERY 1 DURATION: NORMAL MIN:SEC
STRESS STAGE RECOVERY 1 HR: 144 BPM
STRESS STAGE RECOVERY 2 BP: NORMAL MMHG
STRESS STAGE RECOVERY 2 COMMENTS: NORMAL
STRESS STAGE RECOVERY 2 DURATION: NORMAL MIN:SEC
STRESS STAGE RECOVERY 2 HR: 133 BPM
STRESS STAGE RECOVERY 3 DURATION: NORMAL MIN:SEC
STRESS STAGE RECOVERY 3 HR: 122 BPM
STRESS STAGE RECOVERY 4 COMMENTS: NORMAL
STRESS STAGE RECOVERY 4 DURATION: NORMAL MIN:SEC
STRESS STAGE RECOVERY 4 HR: 86 BPM
STRESS TARGET HR: 151 BPM

## 2021-10-13 ENCOUNTER — TELEPHONE (OUTPATIENT)
Dept: CARDIOLOGY CLINIC | Age: 71
End: 2021-10-13

## 2021-10-13 ENCOUNTER — HOSPITAL ENCOUNTER (EMERGENCY)
Age: 71
Discharge: HOME | End: 2021-10-13
Attending: EMERGENCY MEDICINE
Payer: MEDICARE

## 2021-10-13 VITALS
SYSTOLIC BLOOD PRESSURE: 122 MMHG | HEIGHT: 64 IN | DIASTOLIC BLOOD PRESSURE: 68 MMHG | RESPIRATION RATE: 18 BRPM | TEMPERATURE: 97.1 F | HEART RATE: 91 BPM | WEIGHT: 179 LBS | OXYGEN SATURATION: 98 % | BODY MASS INDEX: 30.56 KG/M2

## 2021-10-13 DIAGNOSIS — R04.0 EPISTAXIS: Primary | ICD-10-CM

## 2021-10-13 PROCEDURE — C9046 COCAINE HCL NASAL SOLUTION: HCPCS | Performed by: EMERGENCY MEDICINE

## 2021-10-13 PROCEDURE — 99284 EMERGENCY DEPT VISIT MOD MDM: CPT

## 2021-10-13 PROCEDURE — 74011250636 HC RX REV CODE- 250/636: Performed by: EMERGENCY MEDICINE

## 2021-10-13 RX ORDER — COCAINE HYDROCHLORIDE 40 MG/ML
SOLUTION NASAL
Status: COMPLETED | OUTPATIENT
Start: 2021-10-13 | End: 2021-10-13

## 2021-10-13 RX ADMIN — COCAINE HYDROCHLORIDE 4 ML: 40 SOLUTION NASAL at 19:17

## 2021-10-13 NOTE — ED NOTES
Bedside and Verbal shift change report given to Keon Nam RN (oncoming nurse) by Loni Hernandez RN (offgoing nurse). Report included the following information SBAR.

## 2021-10-13 NOTE — TELEPHONE ENCOUNTER
----- Message from Raul Mari RN sent at 10/13/2021 10:41 AM EDT -----  Please return patient's call. She left a  with no details asking for a return call. Returned call to patient, she states she has had 2 nosebleeds this week, has been able to get both stopped, but she was concerned. I advised her to use her flonase daily, use vaseline in nares, call PCP or present to ED if she cannot stop bleeding.

## 2021-10-13 NOTE — ED PROVIDER NOTES
EMERGENCY DEPARTMENT HISTORY AND PHYSICAL EXAM      Date: 10/13/2021  Patient Name: Richi Bean    History of Presenting Illness     Chief Complaint   Patient presents with    Epistaxis     History Provided By: Patient    HPI: Richi Bean, 73 y.o. female with past medical history significant for arthritis, diabetes, CHF, hypercholesterolemia, and hypertension who presents via EMS to the ED with cc of epistaxis that has been intermittent since Monday. Patient states she has had these problems in the past but they will usually spontaneously resolved. She has had for episodes of epistaxis since Monday and states this most recent time she is unable to get the bleeding to stop. She denies being on any aspirin or blood thinners. She also denies any trauma or cocaine use. She denies any pain, lightheadedness, or dizziness. PMHx: Osteoarthritis, diabetes, CHF, hypercholesterolemia, and hypertension  Social Hx: Former smoker, occasional alcohol use, denies illegal drug use    PCP: Javon Holcomb MD    There are no other complaints, changes, or physical findings at this time. No current facility-administered medications on file prior to encounter. Current Outpatient Medications on File Prior to Encounter   Medication Sig Dispense Refill    cholecalciferol (VITAMIN D3) (2,000 UNITS /50 MCG) cap capsule TAKE 1 CAPSULE BY MOUTH EVERY DAY 90 Capsule 2    thiamine HCL (B-1) 100 mg tablet TAKE 1 TABLET BY MOUTH EVERY DAY 90 Tablet 1    spironolactone (ALDACTONE) 25 mg tablet TAKE 1 TABLET BY MOUTH EVERY DAY 90 Tablet 1    dapagliflozin (FARXIGA) 10 mg tab tablet Take 1 Tablet by mouth daily. 30 Tablet 1    bumetanide (BUMEX) 2 mg tablet Take 0.5 Tablets by mouth daily. 90 Tablet 2    atorvastatin (LIPITOR) 40 mg tablet TAKE 1 TABLET BY MOUTH EVERY DAY 90 Tablet 3    melatonin 3 mg tablet TAKE 1 TABLET BY MOUTH EVERY NIGHT AS NEEDED FOR INSOMNIA.  90 Tablet 0    Entresto 24-26 mg tablet TAKE 1 TABLET BY MOUTH TWICE A DAY 60 Tablet 1    isosorbide mononitrate ER (IMDUR) 30 mg tablet Take 1 Tablet by mouth every morning. 90 Tablet 0    magnesium oxide (MAG-OX) 400 mg tablet TAKE 2 TABLETS BY MOUTH TWO (2) TIMES A DAY. 120 Tablet 2    FreeStyle Nathaniel 14 Day Sensor kit USE TO CHECK BG 3X A DAY AND AS NEEDED. DX  E11.69      allopurinoL (ZYLOPRIM) 100 mg tablet Take 1 Tablet by mouth daily. 30 Tablet 11    hydrALAZINE (APRESOLINE) 50 mg tablet Take 1 Tablet by mouth three (3) times daily. 270 Tablet 1    Insulin Needles, Disposable, (BD Ultra-Fine Short Pen Needle) 31 gauge x 5/16\" ndle USE WITH INSULIN PENS TWICE DAILY AS DIRECTED 100 Pen Needle 11    calcitRIOL (RocaltroL) 0.25 mcg capsule Take 0.25 mcg by mouth daily.  diclofenac (VOLTAREN) 1 % gel Apply 2 g to affected area two (2) times daily as needed for Pain. thin layer to joint pain      budesonide (PULMICORT) 0.25 mg/2 mL nbsp 500 mcg by Nebulization route every six (6) hours as needed for Other (wheezing shortness of breath).  oxybutynin (DITROPAN) 5 mg tablet Take 0.5 Tabs by mouth three (3) times daily. 90 Tab 11    fluticasone propion-salmeteroL (ADVAIR/WIXELA) 250-50 mcg/dose diskus inhaler Take 2 Puffs by inhalation two (2) times a day.  fluticasone propionate (FLONASE) 50 mcg/actuation nasal spray 2 Sprays by Both Nostrils route daily. 1 Bottle 0    insulin glargine (LANTUS,BASAGLAR) 100 unit/mL (3 mL) inpn 60 units every morning (Patient taking differently: 50 Units by SubCUTAneous route daily.  60 units every morning) 6 Adjustable Dose Pre-filled Pen Syringe 11     Past History     Past Medical History:  Past Medical History:   Diagnosis Date    Arthritis     [address] ([facility] [address]     Heart failure ([facility]     Hypercholesterolemia     Hypertension      Past Surgical History:  Past Surgical History:   Procedure Laterality Date    COLONOSCOPY N/A 3/19/2021    COLONOSCOPY performed by Dina Palumbo MD at [facility] ENDOSCOPY  HX ORTHOPAEDIC      Arthroscopy right knee    [place] CARDIAC SURG PROCEDURE UNLIST      life vest     Family History:  Family History   Problem Relation Age of Onset    Diabetes Mother      Social History:  Social History     Tobacco Use    Smoking status: Former Smoker     Packs/day: 0.25     Years: 20.00     Pack years: 5.00     Quit date: 2014     Years since quittin.8    Smokeless tobacco: Never Used   Vaping Use    Vaping Use: Never used   Substance Use Topics    Alcohol use: Yes     Alcohol/week: 14.0 standard drinks     Types: 14 Cans of beer per week     Comment: occ    Drug use: No     Allergies:  No Known Allergies  Review of Systems   Review of Systems   Constitutional: Negative for chills and fever. HENT: Positive for nosebleeds. Negative for congestion, rhinorrhea, sneezing and sore throat. Eyes: Negative for redness and visual disturbance. Respiratory: Negative for shortness of breath. Cardiovascular: Negative for leg swelling. Gastrointestinal: Negative for abdominal pain, nausea and vomiting. Genitourinary: Negative for difficulty urinating and frequency. Musculoskeletal: Negative for back pain, myalgias and neck stiffness. Skin: Negative for rash. Neurological: Negative for dizziness, syncope, weakness and headaches. Hematological: Negative for adenopathy. All other systems reviewed and are negative. Physical Exam   Physical Exam  Vitals and nursing note reviewed. Constitutional:       Appearance: Normal appearance. She is well-developed. She is obese. HENT:      Head: Normocephalic and atraumatic. Nose:      Right Nostril: Epistaxis present. Left Nostril: Epistaxis present. Eyes:      Conjunctiva/sclera: Conjunctivae normal.   Cardiovascular:      Rate and Rhythm: Normal rate and regular rhythm. Pulses: Normal pulses. Heart sounds: Normal heart sounds, S1 normal and S2 normal. No murmur heard.      Pulmonary:      Effort: Pulmonary effort is normal. No respiratory distress. Breath sounds: Normal breath sounds. No wheezing. Abdominal:      General: Bowel sounds are normal. There is no distension. Palpations: Abdomen is soft. Tenderness: There is no abdominal tenderness. There is no rebound. Musculoskeletal:         General: Normal range of motion. Cervical back: Full passive range of motion without pain, normal range of motion and neck supple. Skin:     General: Skin is warm and dry. Findings: No rash. Neurological:      Mental Status: She is alert and oriented to person, place, and time. Psychiatric:         Speech: Speech normal.         Behavior: Behavior normal.         Thought Content: Thought content normal.         Judgment: Judgment normal.       Diagnostic Study Results   Labs -   No results found for this or any previous visit (from the past 12 hour(s)). Radiologic Studies -   No orders to display     No results found. Medical Decision Making   I am the first provider for this patient. I reviewed the vital signs, available nursing notes, past medical history, past surgical history, family history and social history. Vital Signs-Reviewed the patient's vital signs. Patient Vitals for the past 24 hrs:   Temp Pulse Resp BP SpO2   10/13/21 1900 -- -- -- 122/68 98 %   10/13/21 1801 -- 91 18 (!) 122/107 95 %   10/13/21 1742 97.1 °F (36.2 °C) 86 16 112/88 98 %     Pulse Oximetry Analysis - 98% on RA (normal)    Cardiac Monitor:   Rate: 86 bpm  Rhythm: Normal Sinus Rhythm     Records Reviewed: Nursing Notes and Old Medical Records    Provider Notes (Medical Decision Making):   68-year-old female presents with intermittent epistaxis for the past 3 days. Differential includes hypertensive urgency, anterior epistaxis, or posterior epistaxis. We will have her hold pressure and place ice behind the back of her neck. If she continues to bleed, will use topical lidocaine.   If this does not work, will place a [employer]. ED Course:   Initial assessment performed. The patients presenting problems have been discussed, and they are in agreement with the care plan formulated and outlined with them. I have encouraged them to ask questions as they arise throughout their visit. Progress Note  7:01 PM  I have re-evaluated pt and the bleeding has stopped after holding pressure and ice to the back of the neck. Patient states that she cannot breathe out of her nose at this time due to the clots. Will have her blow her nose to clear the clotted blood and spray the nostrils with 4% topical lidocaine. Patient has blown the majority of the clot out of the left nose and all the clot on the right nose. Immediately sprayed 2 mL of 4% cocaine in the right nostril and 3 mL in the left nostril. Patient is no longer bleeding. We will have her hold pressure and observe for an additional 20 minutes. 8:23 PM  Patient is no longer bleeding. Will discharge with ENT follow-up. Progress Note:   Updated pt on all returned results and findings. Discussed the importance of proper follow up as referred below along with return precautions. Pt in agreement with the care plan and expresses agreement with and understanding of all items discussed. Disposition:  Discharge Note:  The pt is ready for discharge. The pt's signs, symptoms, diagnosis, and discharge instructions have been discussed and pt has conveyed their understanding. The pt is to follow up as recommended or return to ER should their symptoms worsen. Plan has been discussed and pt is in agreement. PLAN:  1. Current Discharge Medication List        2.    Follow-up Information     Follow up With Specialties Details Why Contact Info    Sandra Orantes MD Internal Medicine Schedule an appointment as soon as possible for a visit   [place] [address]  Rayne [address]      Ottoniel Lindsay MD Otolaryngology Schedule an appointment as soon as possible for a visit   [address] [place]      [facility] EMERGENCY DEPT Emergency Medicine  As needed, If symptoms worsen Terrance Gauthier  [phone]        Return to ED if worse     Diagnosis     Clinical Impression:   1. Epistaxis            Please note that this dictation was completed with Dragon, computer voice recognition software. Quite often unanticipated grammatical, syntax, homophones, and other interpretive errors are inadvertently transcribed by the computer software. Please disregard these errors. Additionally, please excuse any errors that have escaped final proofreading.

## 2021-10-13 NOTE — ED NOTES
Dr Héctor Castaneda at the bedside for bleeding control. Ice pack applied to back of patient's neck. Emergency Department Nursing Plan of Care       The Nursing Plan of Care is developed from the Nursing assessment and Emergency Department Attending provider initial evaluation. The plan of care may be reviewed in the ED Provider note.     The Plan of Care was developed with the following considerations:   Patient / Family readiness to learn indicated by:verbalized understanding  Persons(s) to be included in education: patient  Barriers to Learning/Limitations:No    Signed     Arleen Munoz RN    10/13/2021   5:47 PM

## 2021-10-17 RX ORDER — OXYBUTYNIN CHLORIDE 5 MG/1
TABLET ORAL
Qty: 270 TABLET | Refills: 3 | Status: SHIPPED | OUTPATIENT
Start: 2021-10-17

## 2021-10-20 RX ORDER — SACUBITRIL AND VALSARTAN 24; 26 MG/1; MG/1
TABLET, FILM COATED ORAL
Qty: 60 TABLET | Refills: 1 | Status: SHIPPED | OUTPATIENT
Start: 2021-10-20 | End: 2021-12-23

## 2021-10-25 NOTE — TELEPHONE ENCOUNTER
----- Message from Forrest Younger NP sent at 4/26/2021  1:04 PM EDT -----  Please call Leah Lockhart with their normal lab result. TTE remains with an EF 55-60%, please have Dobutamine decreased to 2.5mcg/kg/min and recheck TTE in 2 weeks. Called patient to go over the above information per Yarelis Mejía. Patient did not answer call. Left patient a voicemail with [facility] call back number will await patients return call. Galo Feldman RN     Called patient using two patient identifiers. Notified patient on above information per Yarelis Mejía. Notified patient that she will need an echo in two weeks. Echo order placed. Provided patient with number to schedule echo. Patient stated understanding and had no further questions. aGlo Feldman RN     Called bioscripts to decrease patient dobutamine rate per Yarelis Mejía to 2.5mcg/kg/min. spoke with flaquito the pharmacist which stated she would reach out to patient [facility] nurse to adjust pump.  Galo Feldman RN Bleeding that does not stop/Swelling that gets worse/Pain not relieved by Medications/Wound/Surgical Site with redness, or foul smelling discharge or pus/Numbness, tingling, color or temperature change to extremity/Nausea and vomiting that does not stop/Unable to urinate/Excessive diarrhea/Inability to tolerate liquids or foods/Increased irritability or sluggishness

## 2021-11-04 RX ORDER — LANOLIN ALCOHOL/MO/W.PET/CERES
CREAM (GRAM) TOPICAL
Qty: 360 TABLET | Refills: 2 | Status: SHIPPED | OUTPATIENT
Start: 2021-11-04 | End: 2021-12-14 | Stop reason: SDUPTHER

## 2021-11-10 RX ORDER — DAPAGLIFLOZIN 10 MG/1
TABLET, FILM COATED ORAL
Qty: 30 TABLET | Refills: 1 | Status: SHIPPED | OUTPATIENT
Start: 2021-11-10 | End: 2022-01-03

## 2021-11-28 ENCOUNTER — HOSPITAL ENCOUNTER (INPATIENT)
Age: 71
LOS: 2 days | Discharge: HOME | DRG: 313 | End: 2021-11-30
Attending: EMERGENCY MEDICINE | Admitting: STUDENT IN AN ORGANIZED HEALTH CARE EDUCATION/TRAINING PROGRAM
Payer: MEDICARE

## 2021-11-28 ENCOUNTER — APPOINTMENT (OUTPATIENT)
Dept: GENERAL RADIOLOGY | Age: 71
DRG: 313 | End: 2021-11-28
Attending: STUDENT IN AN ORGANIZED HEALTH CARE EDUCATION/TRAINING PROGRAM
Payer: MEDICARE

## 2021-11-28 DIAGNOSIS — R07.9 CHEST PAIN, UNSPECIFIED TYPE: Primary | ICD-10-CM

## 2021-11-28 DIAGNOSIS — I10 BENIGN ESSENTIAL HTN: ICD-10-CM

## 2021-11-28 DIAGNOSIS — I50.9 CONGESTIVE HEART FAILURE, UNSPECIFIED HF CHRONICITY, UNSPECIFIED HEART FAILURE TYPE (HCC): ICD-10-CM

## 2021-11-28 PROBLEM — I20.8 ANGINA AT REST (HCC): Status: ACTIVE | Noted: 2021-11-28

## 2021-11-28 LAB
ALBUMIN SERPL-MCNC: 3.4 G/DL (ref 3.5–5)
ALBUMIN/GLOB SERPL: 0.7 {RATIO} (ref 1.1–2.2)
ALP SERPL-CCNC: 68 U/L (ref 45–117)
ALT SERPL-CCNC: 26 U/L (ref 12–78)
ANION GAP SERPL CALC-SCNC: 4 MMOL/L (ref 5–15)
AST SERPL-CCNC: 24 U/L (ref 15–37)
BASOPHILS # BLD: 0 K/UL (ref 0–0.1)
BASOPHILS NFR BLD: 1 % (ref 0–1)
BILIRUB SERPL-MCNC: 0.4 MG/DL (ref 0.2–1)
BNP SERPL-MCNC: 62 PG/ML
BUN SERPL-MCNC: 35 MG/DL (ref 6–20)
BUN/CREAT SERPL: 35 (ref 12–20)
CALCIUM SERPL-MCNC: 9.2 MG/DL (ref 8.5–10.1)
CHLORIDE SERPL-SCNC: 105 MMOL/L (ref 97–108)
CO2 SERPL-SCNC: 27 MMOL/L (ref 21–32)
COMMENT, HOLDF: NORMAL
CREAT SERPL-MCNC: 0.99 MG/DL (ref 0.55–1.02)
D DIMER PPP FEU-MCNC: 0.81 MG/L FEU (ref 0–0.65)
DIFFERENTIAL METHOD BLD: ABNORMAL
EOSINOPHIL # BLD: 0.4 K/UL (ref 0–0.4)
EOSINOPHIL NFR BLD: 8 % (ref 0–7)
ERYTHROCYTE [DISTWIDTH] IN BLOOD BY AUTOMATED COUNT: 13 % (ref 11.5–14.5)
GLOBULIN SER CALC-MCNC: 5.2 G/DL (ref 2–4)
GLUCOSE SERPL-MCNC: 136 MG/DL (ref 65–100)
HCT VFR BLD AUTO: 34 % (ref 35–47)
HGB BLD-MCNC: 10.6 G/DL (ref 11.5–16)
IMM GRANULOCYTES # BLD AUTO: 0 K/UL (ref 0–0.04)
IMM GRANULOCYTES NFR BLD AUTO: 0 % (ref 0–0.5)
LYMPHOCYTES # BLD: 1 K/UL (ref 0.8–3.5)
LYMPHOCYTES NFR BLD: 19 % (ref 12–49)
MCH RBC QN AUTO: 28.3 PG (ref 26–34)
MCHC RBC AUTO-ENTMCNC: 31.2 G/DL (ref 30–36.5)
MCV RBC AUTO: 90.9 FL (ref 80–99)
MONOCYTES # BLD: 0.4 K/UL (ref 0–1)
MONOCYTES NFR BLD: 8 % (ref 5–13)
NEUTS SEG # BLD: 3.4 K/UL (ref 1.8–8)
NEUTS SEG NFR BLD: 64 % (ref 32–75)
NRBC # BLD: 0 K/UL (ref 0–0.01)
NRBC BLD-RTO: 0 PER 100 WBC
PLATELET # BLD AUTO: 208 K/UL (ref 150–400)
PMV BLD AUTO: 8.9 FL (ref 8.9–12.9)
POTASSIUM SERPL-SCNC: 3.8 MMOL/L (ref 3.5–5.1)
PROT SERPL-MCNC: 8.6 G/DL (ref 6.4–8.2)
RBC # BLD AUTO: 3.74 M/UL (ref 3.8–5.2)
SAMPLES BEING HELD,HOLD: NORMAL
SODIUM SERPL-SCNC: 136 MMOL/L (ref 136–145)
TROPONIN-HIGH SENSITIVITY: 12 NG/L (ref 0–51)
WBC # BLD AUTO: 5.3 K/UL (ref 3.6–11)

## 2021-11-28 PROCEDURE — 74011250637 HC RX REV CODE- 250/637: Performed by: EMERGENCY MEDICINE

## 2021-11-28 PROCEDURE — 74011000250 HC RX REV CODE- 250: Performed by: EMERGENCY MEDICINE

## 2021-11-28 PROCEDURE — 99284 EMERGENCY DEPT VISIT MOD MDM: CPT

## 2021-11-28 PROCEDURE — 84145 PROCALCITONIN (PCT): CPT

## 2021-11-28 PROCEDURE — 80053 COMPREHEN METABOLIC PANEL: CPT

## 2021-11-28 PROCEDURE — 71046 X-RAY EXAM CHEST 2 VIEWS: CPT

## 2021-11-28 PROCEDURE — 74011250636 HC RX REV CODE- 250/636: Performed by: STUDENT IN AN ORGANIZED HEALTH CARE EDUCATION/TRAINING PROGRAM

## 2021-11-28 PROCEDURE — 93005 ELECTROCARDIOGRAM TRACING: CPT

## 2021-11-28 PROCEDURE — 36415 COLL VENOUS BLD VENIPUNCTURE: CPT

## 2021-11-28 PROCEDURE — 85025 COMPLETE CBC W/AUTO DIFF WBC: CPT

## 2021-11-28 PROCEDURE — 85379 FIBRIN DEGRADATION QUANT: CPT

## 2021-11-28 PROCEDURE — 94664 DEMO&/EVAL PT USE INHALER: CPT

## 2021-11-28 PROCEDURE — 77030029684 HC NEB SM VOL KT MONA -A

## 2021-11-28 PROCEDURE — 94640 AIRWAY INHALATION TREATMENT: CPT

## 2021-11-28 PROCEDURE — 84484 ASSAY OF TROPONIN QUANT: CPT

## 2021-11-28 PROCEDURE — 65270000029 HC RM PRIVATE

## 2021-11-28 PROCEDURE — 83880 ASSAY OF NATRIURETIC PEPTIDE: CPT

## 2021-11-28 PROCEDURE — 96374 THER/PROPH/DIAG INJ IV PUSH: CPT

## 2021-11-28 PROCEDURE — 74011000250 HC RX REV CODE- 250: Performed by: STUDENT IN AN ORGANIZED HEALTH CARE EDUCATION/TRAINING PROGRAM

## 2021-11-28 RX ORDER — HYDRALAZINE HYDROCHLORIDE 50 MG/1
50 TABLET, FILM COATED ORAL 3 TIMES DAILY
Status: DISCONTINUED | OUTPATIENT
Start: 2021-11-29 | End: 2021-11-29

## 2021-11-28 RX ORDER — ARFORMOTEROL TARTRATE 15 UG/2ML
15 SOLUTION RESPIRATORY (INHALATION)
Status: DISCONTINUED | OUTPATIENT
Start: 2021-11-28 | End: 2021-11-30 | Stop reason: HOSPADM

## 2021-11-28 RX ORDER — POLYETHYLENE GLYCOL 3350 17 G/17G
17 POWDER, FOR SOLUTION ORAL DAILY PRN
Status: DISCONTINUED | OUTPATIENT
Start: 2021-11-28 | End: 2021-11-30 | Stop reason: HOSPADM

## 2021-11-28 RX ORDER — OXYBUTYNIN CHLORIDE 5 MG/1
5 TABLET ORAL 3 TIMES DAILY
Status: DISCONTINUED | OUTPATIENT
Start: 2021-11-29 | End: 2021-11-30 | Stop reason: HOSPADM

## 2021-11-28 RX ORDER — SODIUM CHLORIDE 0.9 % (FLUSH) 0.9 %
5-40 SYRINGE (ML) INJECTION EVERY 8 HOURS
Status: DISCONTINUED | OUTPATIENT
Start: 2021-11-28 | End: 2021-11-30 | Stop reason: HOSPADM

## 2021-11-28 RX ORDER — ACETAMINOPHEN 325 MG/1
650 TABLET ORAL
Status: DISCONTINUED | OUTPATIENT
Start: 2021-11-28 | End: 2021-11-30 | Stop reason: HOSPADM

## 2021-11-28 RX ORDER — LANOLIN ALCOHOL/MO/W.PET/CERES
100 CREAM (GRAM) TOPICAL DAILY
Status: DISCONTINUED | OUTPATIENT
Start: 2021-11-29 | End: 2021-11-29 | Stop reason: CLARIF

## 2021-11-28 RX ORDER — ATORVASTATIN CALCIUM 40 MG/1
40 TABLET, FILM COATED ORAL DAILY
Status: DISCONTINUED | OUTPATIENT
Start: 2021-11-29 | End: 2021-11-30 | Stop reason: HOSPADM

## 2021-11-28 RX ORDER — ACETAMINOPHEN 650 MG/1
650 SUPPOSITORY RECTAL
Status: DISCONTINUED | OUTPATIENT
Start: 2021-11-28 | End: 2021-11-30 | Stop reason: HOSPADM

## 2021-11-28 RX ORDER — ALLOPURINOL 100 MG/1
100 TABLET ORAL DAILY
Status: DISCONTINUED | OUTPATIENT
Start: 2021-11-29 | End: 2021-11-30 | Stop reason: HOSPADM

## 2021-11-28 RX ORDER — HEPARIN SODIUM 5000 [USP'U]/ML
5000 INJECTION, SOLUTION INTRAVENOUS; SUBCUTANEOUS EVERY 8 HOURS
Status: DISCONTINUED | OUTPATIENT
Start: 2021-11-29 | End: 2021-11-30 | Stop reason: HOSPADM

## 2021-11-28 RX ORDER — BUMETANIDE 0.25 MG/ML
1 INJECTION INTRAMUSCULAR; INTRAVENOUS
Status: COMPLETED | OUTPATIENT
Start: 2021-11-28 | End: 2021-11-28

## 2021-11-28 RX ORDER — BUDESONIDE 0.5 MG/2ML
500 INHALANT ORAL
Status: DISCONTINUED | OUTPATIENT
Start: 2021-11-28 | End: 2021-11-30 | Stop reason: HOSPADM

## 2021-11-28 RX ORDER — ONDANSETRON 2 MG/ML
4 INJECTION INTRAMUSCULAR; INTRAVENOUS
Status: DISCONTINUED | OUTPATIENT
Start: 2021-11-28 | End: 2021-11-30 | Stop reason: HOSPADM

## 2021-11-28 RX ORDER — SODIUM CHLORIDE 0.9 % (FLUSH) 0.9 %
5-40 SYRINGE (ML) INJECTION AS NEEDED
Status: DISCONTINUED | OUTPATIENT
Start: 2021-11-28 | End: 2021-11-30 | Stop reason: HOSPADM

## 2021-11-28 RX ORDER — GUAIFENESIN 100 MG/5ML
162 LIQUID (ML) ORAL
Status: COMPLETED | OUTPATIENT
Start: 2021-11-28 | End: 2021-11-28

## 2021-11-28 RX ORDER — SPIRONOLACTONE 25 MG/1
25 TABLET ORAL DAILY
Status: DISCONTINUED | OUTPATIENT
Start: 2021-11-29 | End: 2021-11-30 | Stop reason: HOSPADM

## 2021-11-28 RX ORDER — ISOSORBIDE MONONITRATE 30 MG/1
30 TABLET, EXTENDED RELEASE ORAL
Status: DISCONTINUED | OUTPATIENT
Start: 2021-11-29 | End: 2021-11-30 | Stop reason: HOSPADM

## 2021-11-28 RX ORDER — ONDANSETRON 4 MG/1
4 TABLET, ORALLY DISINTEGRATING ORAL
Status: DISCONTINUED | OUTPATIENT
Start: 2021-11-28 | End: 2021-11-30 | Stop reason: HOSPADM

## 2021-11-28 RX ORDER — LANOLIN ALCOHOL/MO/W.PET/CERES
3 CREAM (GRAM) TOPICAL
Status: DISCONTINUED | OUTPATIENT
Start: 2021-11-28 | End: 2021-11-30 | Stop reason: HOSPADM

## 2021-11-28 RX ADMIN — BUMETANIDE 1 MG: 0.25 INJECTION INTRAMUSCULAR; INTRAVENOUS at 22:54

## 2021-11-28 RX ADMIN — ASPIRIN 81 MG CHEWABLE TABLET 162 MG: 81 TABLET CHEWABLE at 22:46

## 2021-11-28 RX ADMIN — NITROGLYCERIN 1 INCH: 20 OINTMENT TOPICAL at 22:47

## 2021-11-28 RX ADMIN — BUDESONIDE 500 MCG: 0.5 INHALANT RESPIRATORY (INHALATION) at 23:54

## 2021-11-28 RX ADMIN — ARFORMOTEROL TARTRATE 15 MCG: 15 SOLUTION RESPIRATORY (INHALATION) at 23:54

## 2021-11-28 RX ADMIN — HEPARIN SODIUM 5000 UNITS: 5000 INJECTION INTRAVENOUS; SUBCUTANEOUS at 23:41

## 2021-11-29 ENCOUNTER — APPOINTMENT (OUTPATIENT)
Dept: NON INVASIVE DIAGNOSTICS | Age: 71
DRG: 313 | End: 2021-11-29
Attending: INTERNAL MEDICINE
Payer: MEDICARE

## 2021-11-29 ENCOUNTER — APPOINTMENT (OUTPATIENT)
Dept: CT IMAGING | Age: 71
DRG: 313 | End: 2021-11-29
Attending: INTERNAL MEDICINE
Payer: MEDICARE

## 2021-11-29 DIAGNOSIS — I50.20 NYHA CLASS 4 HEART FAILURE WITH REDUCED EJECTION FRACTION (HCC): ICD-10-CM

## 2021-11-29 PROBLEM — R91.8 PULMONARY INFILTRATES: Status: ACTIVE | Noted: 2021-11-29

## 2021-11-29 LAB
ANION GAP SERPL CALC-SCNC: 6 MMOL/L (ref 5–15)
B PERT DNA SPEC QL NAA+PROBE: NOT DETECTED
BORDETELLA PARAPERTUSSIS PCR, BORPAR: NOT DETECTED
BUN SERPL-MCNC: 30 MG/DL (ref 6–20)
BUN/CREAT SERPL: 38 (ref 12–20)
C PNEUM DNA SPEC QL NAA+PROBE: NOT DETECTED
CALCIUM SERPL-MCNC: 9.1 MG/DL (ref 8.5–10.1)
CHLORIDE SERPL-SCNC: 105 MMOL/L (ref 97–108)
CO2 SERPL-SCNC: 27 MMOL/L (ref 21–32)
COMMENT, HOLDF: NORMAL
CREAT SERPL-MCNC: 0.8 MG/DL (ref 0.55–1.02)
FLUAV H1 2009 PAND RNA SPEC QL NAA+PROBE: NOT DETECTED
FLUAV H1 RNA SPEC QL NAA+PROBE: NOT DETECTED
FLUAV H3 RNA SPEC QL NAA+PROBE: NOT DETECTED
FLUAV SUBTYP SPEC NAA+PROBE: NOT DETECTED
FLUBV RNA SPEC QL NAA+PROBE: NOT DETECTED
GLUCOSE BLD STRIP.AUTO-MCNC: 106 MG/DL (ref 65–117)
GLUCOSE BLD STRIP.AUTO-MCNC: 120 MG/DL (ref 65–117)
GLUCOSE BLD STRIP.AUTO-MCNC: 133 MG/DL (ref 65–117)
GLUCOSE BLD STRIP.AUTO-MCNC: 164 MG/DL (ref 65–117)
GLUCOSE BLD STRIP.AUTO-MCNC: 177 MG/DL (ref 65–117)
GLUCOSE BLD STRIP.AUTO-MCNC: 218 MG/DL (ref 65–117)
GLUCOSE SERPL-MCNC: 137 MG/DL (ref 65–100)
HADV DNA SPEC QL NAA+PROBE: NOT DETECTED
HCOV 229E RNA SPEC QL NAA+PROBE: NOT DETECTED
HCOV HKU1 RNA SPEC QL NAA+PROBE: NOT DETECTED
HCOV NL63 RNA SPEC QL NAA+PROBE: NOT DETECTED
HCOV OC43 RNA SPEC QL NAA+PROBE: NOT DETECTED
HMPV RNA SPEC QL NAA+PROBE: NOT DETECTED
HPIV1 RNA SPEC QL NAA+PROBE: NOT DETECTED
HPIV2 RNA SPEC QL NAA+PROBE: NOT DETECTED
HPIV3 RNA SPEC QL NAA+PROBE: NOT DETECTED
HPIV4 RNA SPEC QL NAA+PROBE: NOT DETECTED
M PNEUMO DNA SPEC QL NAA+PROBE: NOT DETECTED
POTASSIUM SERPL-SCNC: 3.2 MMOL/L (ref 3.5–5.1)
PROCALCITONIN SERPL-MCNC: 0.07 NG/ML
RSV RNA SPEC QL NAA+PROBE: NOT DETECTED
RV+EV RNA SPEC QL NAA+PROBE: NOT DETECTED
SAMPLES BEING HELD,HOLD: NORMAL
SARS-COV-2 PCR, COVPCR: NOT DETECTED
SERVICE CMNT-IMP: ABNORMAL
SERVICE CMNT-IMP: NORMAL
SODIUM SERPL-SCNC: 138 MMOL/L (ref 136–145)
TROPONIN-HIGH SENSITIVITY: 10 NG/L (ref 0–51)
TROPONIN-HIGH SENSITIVITY: 12 NG/L (ref 0–51)

## 2021-11-29 PROCEDURE — 0202U NFCT DS 22 TRGT SARS-COV-2: CPT

## 2021-11-29 PROCEDURE — 36415 COLL VENOUS BLD VENIPUNCTURE: CPT

## 2021-11-29 PROCEDURE — 94640 AIRWAY INHALATION TREATMENT: CPT

## 2021-11-29 PROCEDURE — 74011250637 HC RX REV CODE- 250/637: Performed by: STUDENT IN AN ORGANIZED HEALTH CARE EDUCATION/TRAINING PROGRAM

## 2021-11-29 PROCEDURE — 84484 ASSAY OF TROPONIN QUANT: CPT

## 2021-11-29 PROCEDURE — 93306 TTE W/DOPPLER COMPLETE: CPT | Performed by: INTERNAL MEDICINE

## 2021-11-29 PROCEDURE — 80048 BASIC METABOLIC PNL TOTAL CA: CPT

## 2021-11-29 PROCEDURE — 74011000250 HC RX REV CODE- 250: Performed by: STUDENT IN AN ORGANIZED HEALTH CARE EDUCATION/TRAINING PROGRAM

## 2021-11-29 PROCEDURE — 74011000636 HC RX REV CODE- 636: Performed by: INTERNAL MEDICINE

## 2021-11-29 PROCEDURE — 99222 1ST HOSP IP/OBS MODERATE 55: CPT | Performed by: INTERNAL MEDICINE

## 2021-11-29 PROCEDURE — 65270000032 HC RM SEMIPRIVATE

## 2021-11-29 PROCEDURE — 82962 GLUCOSE BLOOD TEST: CPT

## 2021-11-29 PROCEDURE — 74011250636 HC RX REV CODE- 250/636: Performed by: STUDENT IN AN ORGANIZED HEALTH CARE EDUCATION/TRAINING PROGRAM

## 2021-11-29 PROCEDURE — 74011636637 HC RX REV CODE- 636/637: Performed by: INTERNAL MEDICINE

## 2021-11-29 PROCEDURE — 99232 SBSQ HOSP IP/OBS MODERATE 35: CPT | Performed by: INTERNAL MEDICINE

## 2021-11-29 PROCEDURE — 93306 TTE W/DOPPLER COMPLETE: CPT

## 2021-11-29 PROCEDURE — 71275 CT ANGIOGRAPHY CHEST: CPT

## 2021-11-29 RX ORDER — ISOSORBIDE MONONITRATE 30 MG/1
TABLET, EXTENDED RELEASE ORAL
Qty: 90 TABLET | Refills: 0 | Status: SHIPPED | OUTPATIENT
Start: 2021-11-29 | End: 2022-03-01

## 2021-11-29 RX ORDER — INSULIN LISPRO 100 [IU]/ML
INJECTION, SOLUTION INTRAVENOUS; SUBCUTANEOUS
Status: DISCONTINUED | OUTPATIENT
Start: 2021-11-29 | End: 2021-11-30 | Stop reason: HOSPADM

## 2021-11-29 RX ORDER — MAGNESIUM SULFATE 100 %
4 CRYSTALS MISCELLANEOUS AS NEEDED
Status: DISCONTINUED | OUTPATIENT
Start: 2021-11-29 | End: 2021-11-30 | Stop reason: HOSPADM

## 2021-11-29 RX ORDER — INSULIN GLARGINE 100 [IU]/ML
20 INJECTION, SOLUTION SUBCUTANEOUS
Status: DISCONTINUED | OUTPATIENT
Start: 2021-11-29 | End: 2021-11-30 | Stop reason: HOSPADM

## 2021-11-29 RX ORDER — ASPIRIN 325 MG/1
100 TABLET, FILM COATED ORAL DAILY
Status: DISCONTINUED | OUTPATIENT
Start: 2021-11-29 | End: 2021-11-30 | Stop reason: HOSPADM

## 2021-11-29 RX ORDER — INSULIN LISPRO 100 [IU]/ML
INJECTION, SOLUTION INTRAVENOUS; SUBCUTANEOUS
Status: DISCONTINUED | OUTPATIENT
Start: 2021-11-29 | End: 2021-11-29

## 2021-11-29 RX ORDER — DEXTROSE 50 % IN WATER (D50W) INTRAVENOUS SYRINGE
12.5-25 AS NEEDED
Status: DISCONTINUED | OUTPATIENT
Start: 2021-11-29 | End: 2021-11-30 | Stop reason: HOSPADM

## 2021-11-29 RX ADMIN — HEPARIN SODIUM 5000 UNITS: 5000 INJECTION INTRAVENOUS; SUBCUTANEOUS at 17:22

## 2021-11-29 RX ADMIN — HEPARIN SODIUM 5000 UNITS: 5000 INJECTION INTRAVENOUS; SUBCUTANEOUS at 07:15

## 2021-11-29 RX ADMIN — OXYBUTYNIN CHLORIDE 5 MG: 5 TABLET ORAL at 09:16

## 2021-11-29 RX ADMIN — Medication 100 MG: at 10:48

## 2021-11-29 RX ADMIN — INSULIN GLARGINE 20 UNITS: 100 INJECTION, SOLUTION SUBCUTANEOUS at 22:40

## 2021-11-29 RX ADMIN — IOPAMIDOL 80 ML: 755 INJECTION, SOLUTION INTRAVENOUS at 12:07

## 2021-11-29 RX ADMIN — ARFORMOTEROL TARTRATE 15 MCG: 15 SOLUTION RESPIRATORY (INHALATION) at 20:43

## 2021-11-29 RX ADMIN — ALLOPURINOL 100 MG: 100 TABLET ORAL at 09:16

## 2021-11-29 RX ADMIN — Medication 10 ML: at 14:43

## 2021-11-29 RX ADMIN — SPIRONOLACTONE 25 MG: 25 TABLET ORAL at 09:16

## 2021-11-29 RX ADMIN — ISOSORBIDE MONONITRATE 30 MG: 30 TABLET, EXTENDED RELEASE ORAL at 07:15

## 2021-11-29 RX ADMIN — ATORVASTATIN CALCIUM 40 MG: 40 TABLET, FILM COATED ORAL at 09:16

## 2021-11-29 RX ADMIN — BUDESONIDE 500 MCG: 0.5 INHALANT RESPIRATORY (INHALATION) at 07:03

## 2021-11-29 RX ADMIN — OXYBUTYNIN CHLORIDE 5 MG: 5 TABLET ORAL at 22:40

## 2021-11-29 RX ADMIN — OXYBUTYNIN CHLORIDE 5 MG: 5 TABLET ORAL at 17:22

## 2021-11-29 RX ADMIN — SACUBITRIL AND VALSARTAN 1 TABLET: 24; 26 TABLET, FILM COATED ORAL at 17:22

## 2021-11-29 RX ADMIN — ARFORMOTEROL TARTRATE 15 MCG: 15 SOLUTION RESPIRATORY (INHALATION) at 07:03

## 2021-11-29 RX ADMIN — HYDRALAZINE HYDROCHLORIDE 50 MG: 50 TABLET, FILM COATED ORAL at 09:16

## 2021-11-29 RX ADMIN — SACUBITRIL AND VALSARTAN 1 TABLET: 24; 26 TABLET, FILM COATED ORAL at 09:16

## 2021-11-29 RX ADMIN — BUDESONIDE 500 MCG: 0.5 INHALANT RESPIRATORY (INHALATION) at 20:43

## 2021-11-29 NOTE — CONSULTS
Pulmonary    Reason:  Pulmonary infiltrates  Requesting:  Dr Santamaria April reviewed / images viewed    72 yo female  has a past medical history of Arthritis, Diabetes ([address] Heart failure ([address] Hypercholesterolemia, and Hypertension. admitted with chest pain    CXR   Suggestive of pulmonary infiltrates and chest CT has been ordered by PCP    Previous evaluation for  Chest pain has included cardiac cath without significant obstructive coronary disease but did show elevation of R sided pressures and increased PCWP of 27. Cardiac output reduced    WBC 5.3  Hgb 10.6  Cr o.8  Trop high sens 10  Pro BNP 62    PFTs from earlier this year showed  No airflow  Obstruction there will  Was mild restrictive lung volumes and reduction in dlco that corrected for alveolar hypoventilation    Cardiology has been consulted as well    Patient Vitals for the past 4 hrs:   BP Temp Pulse Resp SpO2 Height Weight   21 0842 129/73 98.1 °F (36.7 °C) 69 18 92 % -- --   21 0801 (!) 101/59 -- 71 13 93 % -- --   21 0719 -- -- -- -- -- 5' 4\" (1.626 m) 81.6 kg (180 lb)   21 0715 128/69 -- 70 12 100 % -- --   21 0704 -- -- -- -- 98 % -- --   Temp (24hrs), Av.9 °F (36.6 °C), Min:97.6 °F (36.4 °C), Max:98.1 °F (36.7 °C)  No intake or output data in the 24 hours ending 21 0929   O2 sats 92% on RA    Pulmonary Impression     Pulmonary infiltrates on CXR - viral testing negative. Patient is afebrile. Has h/o elevated L and R sided heart pressures but pro BNP is not elevated.   She has a mild degree of eosinophilia on her cbc of ? significance    --Will review chest CT as ordered by PCP  --has been followed by advanced heart failure team and cardiology has been consulted as well   --consider CTD eval  --If she has significant infiltrates on CT and etiology remains unclear may benefit from bronchoscopy / Dalton Daugherty MD

## 2021-11-29 NOTE — ROUTINE PROCESS
TRANSFER - OUT REPORT:    Verbal report given to Samina Cortes RN (name) on Estefanía Gibbons  being transferred to ,  (unit) for routine progression of care       Report consisted of patients Situation, Background, Assessment and   Recommendations(SBAR). Information from the following report(s) SBAR, ED Summary and MAR was reviewed with the receiving nurse. Lines:   Peripheral IV 11/29/21 Right Hand (Active)   Site Assessment Clean, dry, & intact 11/29/21 0109   Phlebitis Assessment 0 11/29/21 0109   Infiltration Assessment 0 11/29/21 0109   Dressing Status Clean, dry, & intact 11/29/21 0109   Dressing Type Transparent 11/29/21 0109   Hub Color/Line Status Pink 11/29/21 0109        Opportunity for questions and clarification was provided.       Patient transported with:   Oesia

## 2021-11-29 NOTE — ED TRIAGE NOTES
Pt presents to department with a CC of CP that woke her up from sleep this evening. Pt denies SOB. Pt reports hx of CHF.

## 2021-11-29 NOTE — PROGRESS NOTES
TRANSFER - IN REPORT:    Verbal report received from RN (name) on Jonas Lu  being received from ED (unit) for routine progression of care      Report consisted of patients Situation, Background, Assessment and   Recommendations(SBAR). Information from the following report(s) SBAR, Kardex, Intake/Output and MAR was reviewed with the receiving nurse. Opportunity for questions and clarification was provided. Assessment completed upon patients arrival to unit and care assumed.

## 2021-11-29 NOTE — PROGRESS NOTES
Transition of Care Plan   RUR- 15% Moderate Risk   DISPOSITION: The disposition plan is pending medical progression and recommendation.  F/U with PCP/Specialist     Transport: [facility]/family     Reason for Admission: \"chest pain\"                    RUR Score: 15% Moderate Risk                   Plan for utilizing home health:  N/A        PCP: First and Last name:  Audra Peralta MD     Name of Practice: [facility]   Are you a current patient: Yes/No: Yes   Approximate date of last visit: 3 months ago, reports she has another in appointment in December 2021   Can you participate in a virtual visit with your PCP: N/A                    Current Advanced Directive/Advance Care Plan: Full Code  Has ACP documentation on file at this time. Healthcare Decision Maker:   Click here to complete [place] including selection of the Healthcare Decision Maker Relationship (ie \"Primary\")             Primary Decision Maker: Julia Larsen - Daughter - [phone]    Secondary Decision Maker: Mirella Griffin Daughter - [phone]                  Transition of Care Plan: Pending recommendation. Reviewed chart for transitions of care and discussed in rounds. CM met with patient at bedside to explain role and offer support. Patient is alert and oriented x4 and confirmed demographics. Baseline: DME - none  ADLs/IDALS: Independent   Previous Home Health: N/A  Previous SNF/IPR: N/A  ER Contact: Friend Dasia Alexandra - [phone]    Patient lives in a 1 level house with 4 steps to enter. Patient reports that she lives alone. Patient is independent with ADLs/IDALs. Patient does not utilize DMEs to ambulate. Patient's preferred pharmacy is [facility] located on [facility] for her prescriptions. Patient's friend is expected to transport at discharge. Care Management Interventions  PCP Verified by CM:  Yes  Palliative Care Criteria Met (RRAT>21 & CHF Dx)?: No  Mode of Transport at Discharge: Other (see comment)  Transition of Care Consult (CM Consult): Discharge Planning  MyChart Signup: Yes  Discharge Durable Medical Equipment: No  Physical Therapy Consult: No  Occupational Therapy Consult: No  Speech Therapy Consult: No  Support Systems: Other Family Member(s), Child(melinda)  Confirm Follow Up Transport: Family  The Patient and/or Patient Representative was Provided with a Choice of Provider and Agrees with the Discharge Plan?: Yes  Freedom of Choice List was Provided with Basic Dialogue that Supports the Patient's Individualized Plan of Care/Goals, Treatment Preferences and Shares the Quality Data Associated with the Providers?: Yes   Resource Information Provided?: No    CM will continue to provide updates as they become available. CM will continue to follow, provide support and assist with [facility] needs as they arise.     Gucci Fairbanks, SAUD, CRM, LMHP-e

## 2021-11-29 NOTE — ED NOTES
Attempted to give report to floor RN, however they are not available at this time. Will attempt again later.

## 2021-11-29 NOTE — H&P
History & Physical    Primary Care Provider: Beatriz Nowak MD  Source of Information: Patient and chart review    History of Presenting Illness:   Mera Padilla is a 73 y.o. female with history of HFrEF, insulin-dependent type 2 diabetes, hypertension, dyslipidemia, obesity who presents to hospital with complaints of chest pain. Patient states she had been in her usual state of health when she was awoken from her sleep by left-sided sharp and pressure-like pain. Pain is nonradiating, rated a 6/10. Intermittent in nature and occurring at rest.  Currently pain-free. No associated palpitations, PND, orthopnea or lower extremity edema. The patient denies any fever, chills, chest or abdominal pain, nausea, vomiting, cough, congestion, recent illness, palpitations, or dysuria. Remarkable vitals on ER Presentations: Vital signs unremarkable. Labs Remarkable for: At baseline. ER Images: Chest x-ray shows mild diffuse patchy interstitial opacities. Review of Systems:  A comprehensive review of systems was negative except for that written in the History of Present Illness. Past Medical History:   Diagnosis Date    Arthritis     Diabetes ([address]     Heart failure ([address]     Hypercholesterolemia     Hypertension       Past Surgical History:   Procedure Laterality Date    COLONOSCOPY N/A 3/19/2021    COLONOSCOPY performed by Talmage Hashimoto., [facility] at [facility] ENDOSCOPY    HX ORTHOPAEDIC      Arthroscopy right knee    CA CARDIAC SURG PROCEDURE UNLIST      life vest     Prior to Admission medications    Medication Sig Start Date End Date Taking? Authorizing Provider   Farxiga 10 mg tab tablet TAKE 1 TABLET BY MOUTH EVERY DAY 11/10/21   Eric Mercedes NP   magnesium oxide (MAG-OX) 400 mg tablet TAKE 2 TABLETS BY MOUTH TWO (2) TIMES A DAY.  11/4/21   Garett Chin NP   Entresto 24-26 mg tablet TAKE 1 TABLET BY MOUTH TWICE A DAY 10/20/21   Garett Chin NP oxybutynin (DITROPAN) 5 mg tablet TAKE 1 TABLET BY MOUTH THREE TIMES A DAY 10/17/21   Conor Thomas MD   cholecalciferol (VITAMIN D3) (2,000 UNITS /50 MCG) cap capsule TAKE 1 CAPSULE BY MOUTH EVERY DAY 9/22/21   Andrew Bennett MD   thiamine HCL (B-1) 100 mg tablet TAKE 1 TABLET BY MOUTH EVERY DAY 9/19/21   Diana Mercedes NP   spironolactone (ALDACTONE) 25 mg tablet TAKE 1 TABLET BY MOUTH EVERY DAY 9/16/21   Diana Mercedes NP   bumetanide (BUMEX) 2 mg tablet Take 0.5 Tablets by mouth daily. 9/16/21   Diana Mercedes NP   atorvastatin (LIPITOR) 40 mg tablet TAKE 1 TABLET BY MOUTH EVERY DAY 9/11/21   Conor Thomas MD   melatonin 3 mg tablet TAKE 1 TABLET BY MOUTH EVERY NIGHT AS NEEDED FOR INSOMNIA. 9/8/21   Diana Mercedes NP   isosorbide mononitrate ER (IMDUR) 30 mg tablet Take 1 Tablet by mouth every morning. 8/12/21   Indira Mayberry NP   FreeStyle Nathaniel 14 Day Sensor kit USE TO CHECK BG 3X A DAY AND AS NEEDED. DX  E11.69 6/22/21   Provider, Historical   allopurinoL (ZYLOPRIM) 100 mg tablet Take 1 Tablet by mouth daily. 7/9/21   Conor Thomas MD   hydrALAZINE (APRESOLINE) 50 mg tablet Take 1 Tablet by mouth three (3) times daily. 6/22/21   Jean Mensah NP   Insulin Needles, Disposable, (BD Ultra-Fine Short Pen Needle) 31 gauge x 5/16\" ndle USE WITH INSULIN PENS TWICE DAILY AS DIRECTED 6/17/21   Conor Thomas MD   calcitRIOL (RocaltroL) 0.25 mcg capsule Take 0.25 mcg by mouth daily. Provider, Historical   diclofenac (VOLTAREN) 1 % gel Apply 2 g to affected area two (2) times daily as needed for Pain. thin layer to joint pain 3/22/21   Provider, Historical   budesonide (PULMICORT) 0.25 mg/2 mL nbsp 500 mcg by Nebulization route every six (6) hours as needed for Other (wheezing shortness of breath). Provider, Historical   fluticasone propion-salmeteroL (ADVAIR/WIXELA) 250-50 mcg/dose diskus inhaler Take 2 Puffs by inhalation two (2) times a day.  11/25/20   Provider, Historical   fluticasone propionate (FLONASE) 50 mcg/actuation nasal spray 2 Sprays by Both Nostrils route daily. 12/13/20   Karl Mail, DO   insulin glargine (LANTUS,BASAGLAR) 100 unit/mL (3 mL) inpn 60 units every morning  Patient taking differently: 50 Units by SubCUTAneous route daily. 60 units every morning 10/12/20   Javon Holcomb MD     No Known Allergies   Family History   Problem Relation Age of Onset    Diabetes Mother         SOCIAL HISTORY:  Patient resides:  Independently x   Assisted Living    SNF    With family care       Smoking history:   None x   Former    Chronic      Alcohol history:   None x   Social    Chronic      Ambulates:   Independently x   w/cane    w/walker    w/wc    CODE STATUS:  DNR    Full x   Other      Objective:     Physical Exam:     Visit Vitals  /87   Pulse 68   Temp 97.6 °F (36.4 °C)   Resp 16   SpO2 97%      O2 Device: None (Room air)    General:  Alert, cooperative, no distress, appears stated age. Head:  Normocephalic, without obvious abnormality, atraumatic. Eyes:  Conjunctivae/corneas clear. PERRL, EOMs intact. Nose: Nares normal. Septum midline. Mucosa normal.        Neck: Supple, symmetrical, trachea midline, no carotid bruit and no JVD. Lungs:   Clear to auscultation bilaterally. Chest wall:  No tenderness or deformity. Heart:  Regular rate and rhythm, S1, S2 normal   Abdomen:   Soft, non-tender. Bowel sounds normal. No masses,  No organomegaly. Extremities: Extremities normal, atraumatic, no cyanosis or edema. Pulses: 2+ and symmetric all extremities. Skin: Skin color, texture, turgor normal. No rashes or lesions   Neurologic: CNII-XII intact.           EKG: Normal sinus rhythm  Data Review:     Recent Days:  Recent Labs     11/28/21 2059   WBC 5.3   HGB 10.6*   HCT 34.0*        Recent Labs     11/28/21 2059      K 3.8      CO2 27   *   BUN 35*   CREA 0.99   CA 9.2   ALB 3.4*   ALT 26     No results for input(s): PH, PCO2, PO2, HCO3, FIO2 in the last 72 hours. 24 Hour Results:  Recent Results (from the past 24 hour(s))   EKG, 12 LEAD, INITIAL    Collection Time: 11/28/21  8:31 PM   Result Value Ref Range    Ventricular Rate 76 BPM    Atrial Rate 76 BPM    P-R Interval 210 ms    QRS Duration 86 ms    Q-T Interval 394 ms    QTC Calculation (Bezet) 443 ms    Calculated P Axis 68 degrees    Calculated R Axis 24 degrees    Calculated T Axis 10 degrees    Diagnosis       Sinus rhythm with 1st degree AV block  T wave abnormality, consider lateral ischemia  Abnormal ECG  When compared with ECG of 09-MAR-2021 11:18,  No significant change was found     SAMPLES BEING HELD    Collection Time: 11/28/21  8:59 PM   Result Value Ref Range    SAMPLES BEING HELD 1LAV 1BLU 1PST 1RED      COMMENT        Add-on orders for these samples will be processed based on acceptable specimen integrity and analyte stability, which may vary by analyte. CBC WITH AUTOMATED DIFF    Collection Time: 11/28/21  8:59 PM   Result Value Ref Range    WBC 5.3 3.6 - 11.0 K/uL    RBC 3.74 (L) 3.80 - 5.20 M/uL    HGB 10.6 (L) 11.5 - 16.0 g/dL    HCT 34.0 (L) 35.0 - 47.0 %    MCV 90.9 80.0 - 99.0 FL    MCH 28.3 26.0 - 34.0 PG    MCHC 31.2 30.0 - 36.5 g/dL    RDW 13.0 11.5 - 14.5 %    PLATELET 372 832 - 718 K/uL    MPV 8.9 8.9 - 12.9 FL    NRBC 0.0 0  WBC    ABSOLUTE NRBC 0.00 0.00 - 0.01 K/uL    NEUTROPHILS 64 32 - 75 %    LYMPHOCYTES 19 12 - 49 %    MONOCYTES 8 5 - 13 %    EOSINOPHILS 8 (H) 0 - 7 %    BASOPHILS 1 0 - 1 %    IMMATURE GRANULOCYTES 0 0.0 - 0.5 %    ABS. NEUTROPHILS 3.4 1.8 - 8.0 K/UL    ABS. LYMPHOCYTES 1.0 0.8 - 3.5 K/UL    ABS. MONOCYTES 0.4 0.0 - 1.0 K/UL    ABS. EOSINOPHILS 0.4 0.0 - 0.4 K/UL    ABS. BASOPHILS 0.0 0.0 - 0.1 K/UL    ABS. IMM.  GRANS. 0.0 0.00 - 0.04 K/UL    DF AUTOMATED     METABOLIC PANEL, COMPREHENSIVE    Collection Time: 11/28/21  8:59 PM   Result Value Ref Range    Sodium 136 136 - 145 mmol/L    Potassium 3.8 3.5 - 5.1 mmol/L    Chloride 105 97 - 108 mmol/L    CO2 27 21 - 32 mmol/L    Anion gap 4 (L) 5 - 15 mmol/L    Glucose 136 (H) 65 - 100 mg/dL    BUN 35 (H) 6 - 20 MG/DL    Creatinine 0.99 0.55 - 1.02 MG/DL    BUN/Creatinine ratio 35 (H) 12 - 20      GFR est AA >60 >60 ml/min/1.73m2    GFR est non-AA 56 (L) >60 ml/min/1.73m2    Calcium 9.2 8.5 - 10.1 MG/DL    Bilirubin, total 0.4 0.2 - 1.0 MG/DL    ALT (SGPT) 26 12 - 78 U/L    AST (SGOT) 24 15 - 37 U/L    Alk. phosphatase 68 45 - 117 U/L    Protein, total 8.6 (H) 6.4 - 8.2 g/dL    Albumin 3.4 (L) 3.5 - 5.0 g/dL    Globulin 5.2 (H) 2.0 - 4.0 g/dL    A-G Ratio 0.7 (L) 1.1 - 2.2     TROPONIN-HIGH SENSITIVITY    Collection Time: 11/28/21  8:59 PM   Result Value Ref Range    Troponin-High Sensitivity 12 0 - 51 ng/L   NT-PRO BNP    Collection Time: 11/28/21  8:59 PM   Result Value Ref Range    NT pro-BNP 62 <125 PG/ML         Imaging:     Assessment:     Frandy Couch is a 73 y.o. female with history of HFrEF, insulin-dependent type 2 diabetes, hypertension, dyslipidemia, obesity who is admitted for angina. Plan:       Angina  Pulmonary Infiltrates  -Pulmonary edema vs atypical pulmonary infection  -No overt clinical signs or symptoms of volume overload acute HFrEF  -Check procalcitonin, D-dimer and respiratory viral panel  -Low threshold for CTA chest  -Keep n.p.o.  -Serial troponins    HFrEF  -No clear/overt symptoms of volume overload  -ECHO 10/18/21: Estimated LVEF is 50 - 55%. Normal cavity size and wall thickness. Low normal systolic function.  Mild (grade 1) left ventricular diastolic dysfunction.  -Work-up as stated above  -We will consider repeat echo pending work-up    Insulin-dependent type 2 diabetes  -Lantus 40 units plus SSI  -Hypoglycemic protocols    Hypertension  -Home meds    Dyslipidemia  -Home statin    Obesity  -Counseled on weight loss                FEN/GI -  PO hydration  Activity - As tolerated  DVT prophylaxis - Heparin  GI prophylaxis - NI  Disposition - Home    CODE STATUS:  Full code       Signed By: Faiza Blas MD     November 28, 2021

## 2021-11-29 NOTE — PROGRESS NOTES
Medicare pt has received, reviewed, and signed 1st IM letter informing them of their right to appeal the discharge. Signed copy has been placed on pt bedside chart.     SAUD Mon, CRM, LMHP-e

## 2021-11-29 NOTE — ED PROVIDER NOTES
HPI     73 female with a history of congestive heart failure, hypertension, high cholesterol, diabetes, presents the emergency department complaining of intermittent chest pain since 530 this evening. She states it woke her from sleep. She does report shortness of breath. She also reports weight gain. She states the last time she had this it was her heart failure. She denies nausea or sweating or radiation of the pain. She states it is sharp in nature and last for a few minutes and goes away but is fairly persistent. She has had not had any change in medications. She denies any preceding exertional symptoms. She is urinating frequently. Denies a fever or cough. She has been vaccinated for Covid. She currently has no chest pain. Past Medical History:   Diagnosis Date    Arthritis     Diabetes ([address]     Heart failure ([facility]     Hypercholesterolemia     Hypertension        Past Surgical History:   Procedure Laterality Date    COLONOSCOPY N/A 3/19/2021    COLONOSCOPY performed by Laura France MD at [address] [facility]      Arthroscopy right knee    MO CARDIAC SURG PROCEDURE UNLIST      life vest         Family History:   Problem Relation Age of Onset    Diabetes Mother        Social History     Socioeconomic History    Marital status:      Spouse name: Not on file    Number of children: 3    Years of education: 15    Highest education level: Not on file   Occupational History    Occupation: retired   Tobacco Use    Smoking status: Former Smoker     Packs/day: 0.25     Years: 20.00     Pack years: 5.00     Quit date: 2014     Years since quittin.9    Smokeless tobacco: Never Used   Vaping Use    Vaping Use: Never used   Substance and Sexual Activity    Alcohol use:  Yes     Alcohol/week: 14.0 standard drinks     Types: 14 Cans of beer per week     Comment: occ    Drug use: No    Sexual activity: Yes     Partners: Male   Other Topics Concern     Service Not Asked    Blood Transfusions Not Asked    Caffeine Concern Not Asked    Occupational Exposure Not Asked    Hobby Hazards Not Asked    Sleep Concern Not Asked    Stress Concern Not Asked    Weight Concern Not Asked    Special Diet Not Asked    Back Care Not Asked    Exercise Not Asked    Bike Helmet Not Asked   [address] Not Asked    Self-Exams Not Asked   Social History Narrative    Not on file     Social Determinants of Health     Financial Resource Strain:     Difficulty of Paying Living Expenses: Not on file   Food Insecurity:     Worried About Running Out of Food in the Last Year: Not on file    Yuval of Food in the Last Year: Not on file   Transportation Needs:     Lack of Transportation (Medical): Not on file    Lack of Transportation (Non-Medical): Not on file   Physical Activity:     Days of Exercise per Week: Not on file    Minutes of Exercise per Session: Not on file   Stress:     Feeling of Stress : Not on file   Social Connections:     Frequency of Communication with Friends and Family: Not on file    Frequency of Social Gatherings with Friends and Family: Not on file    Attends Druze Services: Not on file    Active Member of [address] [place] [address] or Organizations: Not on file    Attends Club or Organization Meetings: Not on file    Marital Status: Not on file   Intimate Partner Violence:     Fear of Current or Ex-Partner: Not on file    Emotionally Abused: Not on file    Physically Abused: Not on file    Sexually Abused: Not on file   Housing Stability:     Unable to Pay for Housing in the Last Year: Not on file    Number of [place] in the Last Year: Not on file    Unstable Housing in the Last Year: Not on file         ALLERGIES: Patient has no known allergies. Review of Systems   Constitutional: Negative for fever. HENT: Negative for congestion. Eyes: Negative for visual disturbance.    Respiratory: Positive for chest tightness and shortness of breath. Cardiovascular: Positive for chest pain. Negative for palpitations and leg swelling. Gastrointestinal: Negative for abdominal pain, nausea and vomiting. Endocrine: Negative for polyuria. Genitourinary: Negative for dysuria. Musculoskeletal: Negative for gait problem. Skin: Negative for rash. Neurological: Negative for headaches. Psychiatric/Behavioral: Negative for dysphoric mood. Vitals:    11/28/21 2036   BP: 127/64   Pulse: 74   Resp: 16   Temp: 97.6 °F (36.4 °C)   SpO2: 97%            Physical Exam  Constitutional:       General: She is not in acute distress. Appearance: She is well-developed. HENT:      Head: Normocephalic and atraumatic. Mouth/Throat:      Pharynx: No oropharyngeal exudate. Eyes:      General: No scleral icterus. Right eye: No discharge. Left eye: No discharge. Pupils: Pupils are equal, round, and reactive to light. Neck:      Vascular: No JVD. Cardiovascular:      Rate and Rhythm: Normal rate and regular rhythm. Heart sounds: Normal heart sounds. No murmur heard. Pulmonary:      Effort: Pulmonary effort is normal. No respiratory distress. Breath sounds: Normal breath sounds. No stridor. No wheezing or rales. Chest:      Chest wall: No tenderness. Abdominal:      General: Bowel sounds are normal. There is no distension. Palpations: Abdomen is soft. There is no mass. Tenderness: There is no abdominal tenderness. There is no guarding or rebound. Musculoskeletal:         General: Normal range of motion. Cervical back: Normal range of motion and neck supple. Skin:     General: Skin is warm and dry. Capillary Refill: Capillary refill takes less than 2 seconds. Findings: No rash. Neurological:      Mental Status: She is oriented to person, place, and time. Psychiatric:         Behavior: Behavior normal.         Thought Content:  Thought content normal.         Judgment: Judgment normal.          MDM     40 minutes of critical care time  Procedures      ED EKG interpretation:  Rhythm: normal sinus rhythm; and regular . Rate (approx.): 76; Axis: normal; P wave: normal; QRS interval: normal ; ST/T wave: non-specific changes; compared to EKG of May 2021, pseudonormalization of t waves V1-V3, I and aVL with flipped t's in II,III and aVF. This EKG was interpreted by Jabari Avalos MD,ED Provider. t waves changes in EKG in 73year old female with htn, dm, chol, CHF. INitial trop is 12. CXR with edema. Aspirin, Nitro paste, bumex and admit for further cardiac work up. Perfect Serve Consult for Admission  10:43 PM    ED Room Number: TW09/06  Patient Name and age: Heather Conway 73 y.o.  female  Working Diagnosis:   1. Chest pain, unspecified type    2. Congestive heart failure, unspecified HF chronicity, unspecified heart failure type ([facility]        COVID-19 Suspicion:  no  Sepsis present:  no  Reassessment needed: no  Code Status:  Full Code  Readmission: no  Isolation Requirements:  no  Recommended Level of Care:  telemetry  Department:[facility] Adult ED - 21   Other:  73year old female with CHF, HTN, DM, Chol presents with intermittent CP since 5:30 pm.  EKG shows t wave changes. Initial trop is 12. CXR shows some intersitial edema.

## 2021-11-29 NOTE — ED NOTES
[facility] Relinquishing care of pt at this time.

## 2021-11-29 NOTE — ED NOTES
Verbal shift change report given to [facility]-BRENDA DIAZ (oncoming nurse) by BRENDA Banegas (offgoing nurse). Report included the following information SBAR, Kardex and ED Summary.

## 2021-11-29 NOTE — CONSULTS
Cardiology   Initial visit    Patient: Dewayne Martinez MRN: [id number]  SSN: [id number]    YOB: 1950  Age: 73 y.o. Sex: female       Subjective:      Date of  Admission: 2021     Admission type: Emergency    Dewayne Martinez is a 73 y.o. female admitted for Angina at rest [facility]) [I20.8]. Reason: chest pain  Referring: Dr. Fish Valenzuela  Primary cardiologist: Dr. Sana Mack  Pt with chest pain that woke her up in the middle of the night, substernal, non exertional, on/off lasting minutes at a time. She does say she may have overeaten. No recent chest pains otherwise. Breathing overall ok. Cath 3/2021 with no CAD. EF previously low but normalized in 2021 and multiple echos since then preserved LV systolic function. Primary Care Provider: Srinivasa Mcdaniels MD  Past Medical History:   Diagnosis Date    Arthritis     Diabetes ([facility]     Heart failure ([facility]     Hypercholesterolemia     Hypertension       Past Surgical History:   Procedure Laterality Date    COLONOSCOPY N/A 3/19/2021    COLON[facility]COPY performed by Inag Garcia MD at [facility] ENDOSCOPY    HX ORTHOPAEDIC      Arthroscopy right knee    [place] CARDIAC SURG PROCEDURE UNLIST      life vest     Family History   Problem Relation Age of Onset    Diabetes Mother       Social History     Tobacco Use    Smoking status: Former Smoker     Packs/day: 0.25     Years: 20.00     Pack years: 5.00     Quit date: 2014     Years since quittin.9    Smokeless tobacco: Never Used   Substance Use Topics    Alcohol use:  Yes     Alcohol/week: 14.0 standard drinks     Types: 14 Cans of beer per week     Comment: occ      Current Facility-Administered Medications   Medication Dose Route Frequency    thiamine mononitrate (B-1) tablet 100 mg  100 mg Oral DAILY    insulin lispro (HUMALOG) injection   SubCUTAneous TIDAC    glucose chewable tablet 16 g  4 Tablet Oral PRN    dextrose (D50W) injection syrg 12.5-25 g  12.5-25 g IntraVENous PRN    glucagon (GLUCAGEN) injection 1 mg  1 mg IntraMUSCular PRN    insulin glargine (LANTUS) injection 20 Units  20 Units SubCUTAneous QHS    iopamidoL (ISOVUE-370) 76 % injection 100 mL  100 mL IntraVENous RAD ONCE    allopurinoL (ZYLOPRIM) tablet 100 mg  100 mg Oral DAILY    atorvastatin (LIPITOR) tablet 40 mg  40 mg Oral DAILY    sacubitriL-valsartan (ENTRESTO) 24-26 mg tablet 1 Tablet  1 Tablet Oral BID    hydrALAZINE (APRESOLINE) tablet 50 mg  50 mg Oral TID    isosorbide mononitrate ER (IMDUR) tablet 30 mg  30 mg Oral 7am    melatonin tablet 3 mg  3 mg Oral QHS PRN    oxybutynin (DITROPAN) tablet 5 mg  5 mg Oral TID    spironolactone (ALDACTONE) tablet 25 mg  25 mg Oral DAILY    arformoteroL (BROVANA) neb solution 15 mcg  15 mcg Nebulization BID RT    And    budesonide (PULMICORT) 500 mcg/2 ml nebulizer suspension  500 mcg Nebulization BID RT    sodium chloride (NS) flush 5-40 mL  5-40 mL IntraVENous Q8H    sodium chloride (NS) flush 5-40 mL  5-40 mL IntraVENous PRN    acetaminophen (TYLENOL) tablet 650 mg  650 mg Oral Q6H PRN    Or    acetaminophen (TYLENOL) suppository 650 mg  650 mg Rectal Q6H PRN    polyethylene glycol (MIRALAX) packet 17 g  17 g Oral DAILY PRN    ondansetron (ZOFRAN ODT) tablet 4 mg  4 mg Oral Q8H PRN    Or    ondansetron (ZOFRAN) injection 4 mg  4 mg IntraVENous Q6H PRN    heparin (porcine) injection 5,000 Units  5,000 Units SubCUTAneous Q8H        No Known Allergies     Review of Systems:  A comprehensive review of systems was negative except for that written in the History of Present Illness.        Subjective:     Visit Vitals  /73 (BP 1 Location: Left upper arm, BP Patient Position: At rest)   Pulse 88   Temp 98.1 °F (36.7 °C)   Resp 18   Ht 5' 4\" (1.626 m)   Wt 180 lb (81.6 kg)   SpO2 92%   BMI 30.90 kg/m²        Physical Exam:  Visit Vitals  /73 (BP 1 Location: Left upper arm, BP Patient Position: At rest)   Pulse 88   Temp 98.1 °F (36.7 °C)   Resp 18   Ht 5' 4\" (1.626 m)   Wt 180 lb (81.6 kg)   SpO2 92%   BMI 30.90 kg/m²     General Appearance:  Well developed, well nourished,alert and oriented x 3, and individual in no acute distress. Ears/Nose/Mouth/Throat:   Hearing grossly normal.         Neck: Supple. Chest:   Lungs clear to auscultation bilaterally. Cardiovascular:  Regular rate and rhythm, S1, S2 normal, no murmur. Abdomen:   Soft, non-tender, bowel sounds are active. Extremities: No edema bilaterally. Skin: Warm and dry. Cardiographics:  Telemetry: normal sinus rhythm  ECG: normal sinus rhythm, nonspecific ST and T waves changes  Echocardiogram: pending    Data Reviewed: All lab results for the last 24 hours reviewed. Assessment:         Hospital Problems  Date Reviewed: 9/23/2021          Codes Class Noted POA    Pulmonary infiltrates ICD-10-CM: R91.8  ICD-9-CM: 793.19  11/29/2021 Yes        * (Principal) Angina at rest [facility]) ICD-10-CM: I20.8  ICD-9-CM: 413.9  11/28/2021 Yes        Diastolic heart failure, NYHA class 2 ([facility] ICD-10-CM: I50.30  ICD-9-CM: 428.30  Unknown Yes        Uncontrolled type 2 diabetes mellitus with hyperglycemia ([facility] ICD-10-CM: E11.65  ICD-9-CM: 250.02  1/26/2020 Yes        Dyslipidemia ICD-10-CM: E78.5  ICD-9-CM: 272.4  9/9/2014 Yes        Hypertension ICD-10-CM: I10  ICD-9-CM: 401.9  9/9/2014 Yes               Plan:     Chest pain. Non cardiac. Cardiac enzymes negative and cath earlier this year no CAD. Will obtain an echo given prior cardiomyopathy. If this is normal, no additional cardiac evaluation indicated and can follow up with Dr. Jayro sykes. No evidence of CHF and BNP was normal. Agree with pulmonary evaluation. ADDENDUM: echo was normal, will sign off, available prn.

## 2021-11-30 VITALS
OXYGEN SATURATION: 95 % | DIASTOLIC BLOOD PRESSURE: 65 MMHG | TEMPERATURE: 97.5 F | BODY MASS INDEX: 30.49 KG/M2 | HEART RATE: 79 BPM | SYSTOLIC BLOOD PRESSURE: 113 MMHG | RESPIRATION RATE: 16 BRPM | HEIGHT: 64 IN | WEIGHT: 178.57 LBS

## 2021-11-30 DIAGNOSIS — R91.8 PULMONARY INFILTRATES: Primary | ICD-10-CM

## 2021-11-30 LAB
ANION GAP SERPL CALC-SCNC: 5 MMOL/L (ref 5–15)
ATRIAL RATE: 76 BPM
BASOPHILS # BLD: 0 K/UL (ref 0–0.1)
BASOPHILS NFR BLD: 1 % (ref 0–1)
BUN SERPL-MCNC: 23 MG/DL (ref 6–20)
BUN/CREAT SERPL: 31 (ref 12–20)
CALCIUM SERPL-MCNC: 9.9 MG/DL (ref 8.5–10.1)
CALCULATED P AXIS, ECG09: 68 DEGREES
CALCULATED R AXIS, ECG10: 24 DEGREES
CALCULATED T AXIS, ECG11: 10 DEGREES
CHLORIDE SERPL-SCNC: 104 MMOL/L (ref 97–108)
CO2 SERPL-SCNC: 27 MMOL/L (ref 21–32)
CREAT SERPL-MCNC: 0.74 MG/DL (ref 0.55–1.02)
CRP SERPL-MCNC: 1.66 MG/DL (ref 0–0.6)
DIAGNOSIS, 93000: NORMAL
DIFFERENTIAL METHOD BLD: ABNORMAL
EOSINOPHIL # BLD: 0.5 K/UL (ref 0–0.4)
EOSINOPHIL NFR BLD: 13 % (ref 0–7)
ERYTHROCYTE [DISTWIDTH] IN BLOOD BY AUTOMATED COUNT: 13 % (ref 11.5–14.5)
ERYTHROCYTE [SEDIMENTATION RATE] IN BLOOD: 106 MM/HR (ref 0–30)
GLUCOSE BLD STRIP.AUTO-MCNC: 134 MG/DL (ref 65–117)
GLUCOSE BLD STRIP.AUTO-MCNC: 163 MG/DL (ref 65–117)
GLUCOSE SERPL-MCNC: 107 MG/DL (ref 65–100)
HCT VFR BLD AUTO: 31.9 % (ref 35–47)
HGB BLD-MCNC: 10.4 G/DL (ref 11.5–16)
IMM GRANULOCYTES # BLD AUTO: 0 K/UL (ref 0–0.04)
IMM GRANULOCYTES NFR BLD AUTO: 0 % (ref 0–0.5)
LYMPHOCYTES # BLD: 0.7 K/UL (ref 0.8–3.5)
LYMPHOCYTES NFR BLD: 17 % (ref 12–49)
MCH RBC QN AUTO: 28.8 PG (ref 26–34)
MCHC RBC AUTO-ENTMCNC: 32.6 G/DL (ref 30–36.5)
MCV RBC AUTO: 88.4 FL (ref 80–99)
MONOCYTES # BLD: 0.3 K/UL (ref 0–1)
MONOCYTES NFR BLD: 8 % (ref 5–13)
NEUTS SEG # BLD: 2.7 K/UL (ref 1.8–8)
NEUTS SEG NFR BLD: 61 % (ref 32–75)
NRBC # BLD: 0 K/UL (ref 0–0.01)
NRBC BLD-RTO: 0 PER 100 WBC
P-R INTERVAL, ECG05: 210 MS
PLATELET # BLD AUTO: 206 K/UL (ref 150–400)
PMV BLD AUTO: 9.4 FL (ref 8.9–12.9)
POTASSIUM SERPL-SCNC: 3.9 MMOL/L (ref 3.5–5.1)
Q-T INTERVAL, ECG07: 394 MS
QRS DURATION, ECG06: 86 MS
QTC CALCULATION (BEZET), ECG08: 443 MS
RBC # BLD AUTO: 3.61 M/UL (ref 3.8–5.2)
RBC MORPH BLD: ABNORMAL
SERVICE CMNT-IMP: ABNORMAL
SERVICE CMNT-IMP: ABNORMAL
SODIUM SERPL-SCNC: 136 MMOL/L (ref 136–145)
VENTRICULAR RATE, ECG03: 76 BPM
WBC # BLD AUTO: 4.2 K/UL (ref 3.6–11)

## 2021-11-30 PROCEDURE — 74011250636 HC RX REV CODE- 250/636: Performed by: STUDENT IN AN ORGANIZED HEALTH CARE EDUCATION/TRAINING PROGRAM

## 2021-11-30 PROCEDURE — 74011636637 HC RX REV CODE- 636/637: Performed by: INTERNAL MEDICINE

## 2021-11-30 PROCEDURE — 74011000250 HC RX REV CODE- 250: Performed by: STUDENT IN AN ORGANIZED HEALTH CARE EDUCATION/TRAINING PROGRAM

## 2021-11-30 PROCEDURE — 82784 ASSAY IGA/IGD/IGG/IGM EACH: CPT

## 2021-11-30 PROCEDURE — 85652 RBC SED RATE AUTOMATED: CPT

## 2021-11-30 PROCEDURE — 80048 BASIC METABOLIC PNL TOTAL CA: CPT

## 2021-11-30 PROCEDURE — 74011250637 HC RX REV CODE- 250/637: Performed by: STUDENT IN AN ORGANIZED HEALTH CARE EDUCATION/TRAINING PROGRAM

## 2021-11-30 PROCEDURE — 85025 COMPLETE CBC W/AUTO DIFF WBC: CPT

## 2021-11-30 PROCEDURE — 82962 GLUCOSE BLOOD TEST: CPT

## 2021-11-30 PROCEDURE — 36415 COLL VENOUS BLD VENIPUNCTURE: CPT

## 2021-11-30 PROCEDURE — 94640 AIRWAY INHALATION TREATMENT: CPT

## 2021-11-30 PROCEDURE — 86140 C-REACTIVE PROTEIN: CPT

## 2021-11-30 PROCEDURE — 99238 HOSP IP/OBS DSCHRG MGMT 30/<: CPT | Performed by: INTERNAL MEDICINE

## 2021-11-30 RX ORDER — INSULIN GLARGINE 100 [IU]/ML
20 INJECTION, SOLUTION SUBCUTANEOUS
Qty: 6 ADJUSTABLE DOSE PRE-FILLED PEN SYRINGE | Refills: 0 | Status: SHIPPED | OUTPATIENT
Start: 2021-11-30 | End: 2021-11-30 | Stop reason: SDUPTHER

## 2021-11-30 RX ORDER — PREDNISONE 10 MG/1
20 TABLET ORAL DAILY
Qty: 60 TABLET | Refills: 0 | Status: SHIPPED | OUTPATIENT
Start: 2021-11-30 | End: 2021-12-30

## 2021-11-30 RX ORDER — INSULIN GLARGINE 100 [IU]/ML
30 INJECTION, SOLUTION SUBCUTANEOUS
Qty: 6 ADJUSTABLE DOSE PRE-FILLED PEN SYRINGE | Refills: 0 | Status: SHIPPED | OUTPATIENT
Start: 2021-11-30 | End: 2022-02-04

## 2021-11-30 RX ADMIN — SACUBITRIL AND VALSARTAN 1 TABLET: 24; 26 TABLET, FILM COATED ORAL at 09:42

## 2021-11-30 RX ADMIN — Medication 100 MG: at 09:42

## 2021-11-30 RX ADMIN — ATORVASTATIN CALCIUM 40 MG: 40 TABLET, FILM COATED ORAL at 09:42

## 2021-11-30 RX ADMIN — INSULIN LISPRO 2 UNITS: 100 INJECTION, SOLUTION INTRAVENOUS; SUBCUTANEOUS at 11:45

## 2021-11-30 RX ADMIN — ARFORMOTEROL TARTRATE 15 MCG: 15 SOLUTION RESPIRATORY (INHALATION) at 08:36

## 2021-11-30 RX ADMIN — HEPARIN SODIUM 5000 UNITS: 5000 INJECTION INTRAVENOUS; SUBCUTANEOUS at 07:49

## 2021-11-30 RX ADMIN — OXYBUTYNIN CHLORIDE 5 MG: 5 TABLET ORAL at 09:42

## 2021-11-30 RX ADMIN — BUDESONIDE 500 MCG: 0.5 INHALANT RESPIRATORY (INHALATION) at 08:36

## 2021-11-30 RX ADMIN — HEPARIN SODIUM 5000 UNITS: 5000 INJECTION INTRAVENOUS; SUBCUTANEOUS at 00:46

## 2021-11-30 RX ADMIN — ISOSORBIDE MONONITRATE 30 MG: 30 TABLET, EXTENDED RELEASE ORAL at 07:57

## 2021-11-30 RX ADMIN — SPIRONOLACTONE 25 MG: 25 TABLET ORAL at 09:42

## 2021-11-30 RX ADMIN — ALLOPURINOL 100 MG: 100 TABLET ORAL at 09:42

## 2021-11-30 NOTE — PROGRESS NOTES
Physician Progress Note      Ousmane Boone  CSN #:                  [id number]  :                       1950  ADMIT DATE:       2021 10:09 PM  [address] DATE:        2021 1:00 PM  RESPONDING  PROVIDER #:        Esvin Kohler MD          QUERY TEXT:    Patient admitted with complaints of angina at rest. Cardiology was consulted and noted normal Cath in March of this year and ECHO was ordered. Tracy Hamilton MD  Cardiology Consult states, Chest pain. Non cardiac. Echo was normal, will sign off. CXR showed infiltrate and CTA Chest was ordered and was negative for PE, however, scattered ggo favoring atelectasis or edema noted. Pulmonary Consulted. Current attending documentation states principal problem angina at rest. Active Problems lists dyslipidemia, HTN, Uncontrolled DMII, DHF, and Pulmonary Infiltrate. Is it possible to clarify the etiology of the patient's angina at rest as    The medical record reflects the following:  Risk Factors: 71F pmhx HFpEF, HRN, DMII, Obesity  Clinical Indicators:     Tracy Hamilton MD Cardiology Consult  admitted for Angina at rest  Pt with chest pain that woke her up in the middle of the night, substernal, non exertional, on/off lasting minutes at a time  Cath 3/2021 with no CAD. EF previously low but normalized in 2021 and multiple echos since then preserved LV systolic function. ADDENDUM: echo was normal, will sign off, available prn.     eJnnifer LEVI Pulmonary Consult  Pulmonary infiltrates on CXR - viral testing negative. Patient is afebrile. Has h/o elevated L and R sided heart pressures but pro BNP is not elevated.   Chest CTA viewed - no pulmonary emboli.   Very mild peripheral ggo without fibrosis  Would check follow up CT in 2-3 months with repeat PFTs at that time as well  ?   [place] LEVI Progress Note  Principal Problem:  Angina at rest [facility]) (2021)  3/11/21 cath:  1. ?Normal coronary arteries without any obstructive disease  2. ?Severely reduced cardiac index/outputs. ?  3. ? High SVR of about 20 Wood units. 4. ?Moderate pulmonary hypertension mixed pattern with elevated capillary wedge pressure mean of 27. ?PVR is about 4 Novak units  5. ?Elevated right-sided filling pressure with mean RA pressure of 25. Active Problems:  Dyslipidemia  ? Hypertension  Titrate home meds  Uncontrolled type 2 diabetes mellitus with hyperglycemia  Correctional scale  ? Diastolic heart failure, NYHA class 2  ? Pulmonary infiltrates  CT chest  Pul opinion    11/30 CRP 1.66  11/28 2059 Trop HS 12 11/28 2343 12 11/29 0342 10  11/28 2059 NT pro-BNP 62    11/28 EKG  Sinus rhythm with 1st degree AV block  T wave abnormality, consider lateral ischemia  Abnormal ECG  When compared with ECG of 09-MAR-2021 11:18,  No significant change was found    11/28 CXR  IMPRESSION  Mild diffuse patchy interstitial opacities can be seen with pulmonary edema or  atypical/viral infection. 11/29 CTA Chest  IMPRESSION  1. No pulmonary embolism, no acute vascular abnormality. 2. Scattered peripheral groundglass opacities favoring atelectasis or edema. No  significant fibrotic changes. Recommend follow-up CT in 12 months to assess for  evolution.     Treatment: Brovana 15 mcg neb BID, Pulmicort 500 mcg Neb BID Resp, Aldactone 25mg PO Daily, Pulmonology Consult, Cardiology Consult, CXR, CTA, ECHO, Lab monitoring    Thank you    Lorrie Duarte BSN RN [facility], [place]  Clinical Documentation Improvement Specialist  [phone]  Options provided:  -- Angina at rest due to atelectasis  -- Angina at rest due to pulmonary edema in a patient with HFpEF not exacerbated  -- Angina at rest due to, please clarify etiology of angina at rest  -- Other - I will add my own diagnosis  -- Disagree - Not applicable / Not valid  -- Disagree - Clinically unable to determine / Unknown  -- Refer to Clinical Documentation Reviewer    PROVIDER RESPONSE TEXT:    As noted by cardiology - chest pain non cardiac    Query created by: Richard Torres on 11/30/2021 11:39 AM      Electronically signed by:  Gaby Haskins MD 11/30/2021 6:49 PM

## 2021-11-30 NOTE — PROGRESS NOTES
Pulmonary    Chest CTA viewed - no pulmonary emboli.   Very mild peripheral ggo without fibrosis    Remains afebrile and is on RA    Will order ESR / CRP - if significantly elevated may consider a short course of prednisone for these mild ggo but would avoid empiric prednisone at this time given the very mild nature of the lung findings, not requiring O2 and h/o DM    Would check follow up CT in 2-3 months with repeat PFTs at that time as well    Ludwig Park MD

## 2021-11-30 NOTE — PROGRESS NOTES
Hospital follow-up PCP transitional care appointment has been scheduled with Dr. Brigido Miller for Friday, 12/3/21 at 1:00 p.m. Pending patient discharge.   Lulu Mcdonald, Care Management Specialist.

## 2021-11-30 NOTE — PROGRESS NOTES
Transition of Care Plan   RUR- 15% Moderate Risk   DISPOSITION: The disposition plan is home with family assistance.  F/U with PCP/Specialist     Transport: Family    At this time patient does not have any CM needs. Patient to likely discharge home today.     SAUD Tom, CRM, LMHP-e

## 2021-11-30 NOTE — DISCHARGE INSTRUCTIONS
Patient Discharge Instructions    Faustina Alba / [id number] : 1950    Admitted 2021 Discharged: 2021     Patient Active Problem List   Diagnosis Code    Dilated cardiomyopathy ([facility] I42.0    Dyslipidemia E78.5    Gout M10.9    Alcohol abuse F10.10    Hypertension I10    Dermatitis L30.9    Primary osteoarthritis of right knee M17.11    Carpal tunnel syndrome of right wrist G56.01    Reactive airway disease J45.909    Former tobacco use--stopped  after >40 yrs smoking Z87.891    Obesity (BMI 30.0-34. 9) E66.9    Epistaxis R04.0    Decreased libido R68.82    Chest pain, exertional R07.9    PARDO (dyspnea on exertion) R06.00    Osteoarthrosis, localized, primary, knee, right M17.11    Status post total right knee replacement Z96.651    CKD (chronic kidney disease) stage 3, GFR 30-59 ml/min ([facility]) N18.30    Uncontrolled type 2 diabetes mellitus with hyperglycemia ([facility]) G09.18    Diastolic heart failure, NYHA class 2 ([facility]) I50.30    CHF exacerbation ([facility]) I50.9    Acute on chronic systolic congestive heart failure, NYHA class 3 ([facility]) I50.23    Moderate pulmonary arterial systolic hypertension ([facility]) I27.21    Hypokalemia E87.6    Hypomagnesemia E83.42    Advance care planning Z71.89    Shortness of breath R06.02    NICM (nonischemic cardiomyopathy) ([facility]) I42.8    Receiving inotropic medication Z79.899    Prerenal azotemia R79.89    Panlobular emphysema ([facility]) J43.1    Angina at rest ([facility] I20.8    Pulmonary infiltrates R91.8       Take Home Medications      As discussed with you on the hospital increase your insulin by 3 to 5 units to maintain your morning blood sugar between 110 and 140. If you have concerns or trouble adjusting your insulin please call the office nurse    · It is important that you take the medication exactly as they are prescribed.    · Keep your medication in the bottles provided by the pharmacist and keep a list of the medication names, dosages, and times to be taken in your wallet. · Do not take other medications without consulting your doctor. What to do at Home    Recommended diet: Cardiac Diet and Diabetic Diet,   Recommended activity: Activity as tolerated,     Follow-up with JESSA ARECHIGA MD  in 3 day    I personally saw the patient today and spent less than or equal to 30 minutes  on counseling and coordination of care in discharging this patient. Information obtained by :  I understand that if any problems occur once I am at home I am to contact my physician. I understand and acknowledge receipt of the instructions indicated above.                                                                                                                                            Physician's or R.N.'s Signature                                                                  Date/Time                                                                                                                                              Patient or Representative Signature                                                          Date/Time

## 2021-11-30 NOTE — PROGRESS NOTES
Hospital Progress Note    NAME:  Hari Winter   :   1950   MRN:  [id number]     Date/Time:  2021 9:01 AM    Plan:   1. Home today  Risk of Deterioration: Low  []           Moderate  [x]           High  []                 Assessment:   Principal Problem:    Angina at rest [facility]) (2021)     3/11/21 cath:      1. Normal coronary arteries without any obstructive disease  2. Severely reduced cardiac index/outputs. By thermodilution cardiac output was 3.05 L/min and index was 1.6 L/min/m². By Dillan Hasting based on Lafarge formula, cardiac output was 2.6 L/min with index of 1.35 L/min/m² meter. Using Maribel formula, cardiac output was 3.4 L/min and index was 1.9 L/min/m². 3.  High SVR of about 20 Wood units. 4.  Moderate pulmonary hypertension mixed pattern with elevated capillary wedge pressure mean of 27. PVR is about 4 Novak units  5. Elevated right-sided filling pressure with mean RA pressure of 25. Active Problems:    Dyslipidemia (2014)      Hypertension (2014)      Titrate home meds      Uncontrolled type 2 diabetes mellitus with hyperglycemia ([facility] [address] (2020)     Correctional scale      Diastolic heart failure, NYHA class 2 (HCC) ()      Pulmonary infiltrates (2021)     CT chest     Pul opinion          Admitting notes:71 y.o. female with history of HFrEF, insulin-dependent type 2 diabetes, hypertension, dyslipidemia, obesity who presents to hospital with complaints of chest pain. Patient states she had been in her usual state of health when she was awoken from her sleep by left-sided sharp and pressure-like pain. Pain is nonradiating, rated a 6/10. Intermittent in nature and occurring at rest.  Currently pain-free.   No associated palpitations, PND, orthopnea or lower extremity edema.     The patient denies any fever, chills, chest or abdominal pain, nausea, vomiting, cough, congestion, recent illness, palpitations, or dysuria.     Remarkable vitals on ER Presentations: Vital signs unremarkable. Labs Remarkable for: At baseline. ER Images: Chest x-ray shows mild diffuse patchy interstitial opacities      Subjective:     Feeling better  - no further cp  11 Point Review of Systems:   Negative except productive cough    []            Unable to obtain ROS due to:       []            mental status change []            sedated []            intubated     Social History     Tobacco Use    Smoking status: Former Smoker     Packs/day: 0.25     Years: 20.00     Pack years: 5.00     Quit date: 2014     Years since quittin.9    Smokeless tobacco: Never Used   Substance Use Topics    Alcohol use:  Yes     Alcohol/week: 14.0 standard drinks     Types: 14 Cans of beer per week     Comment: occ     Medications reviewed:  Current Facility-Administered Medications   Medication Dose Route Frequency    thiamine mononitrate (B-1) tablet 100 mg  100 mg Oral DAILY    glucose chewable tablet 16 g  4 Tablet Oral PRN    dextrose (D50W) injection syrg 12.5-25 g  12.5-25 g IntraVENous PRN    glucagon (GLUCAGEN) injection 1 mg  1 mg IntraMUSCular PRN    insulin glargine (LANTUS) injection 20 Units  20 Units SubCUTAneous QHS    insulin lispro (HUMALOG) injection   SubCUTAneous TIDAC    dapagliflozin (FARXIGA) tablet 10 mg (Patient Supplied)  10 mg Oral DAILY    allopurinoL (ZYLOPRIM) tablet 100 mg  100 mg Oral DAILY    atorvastatin (LIPITOR) tablet 40 mg  40 mg Oral DAILY    sacubitriL-valsartan (ENTRESTO) 24-26 mg tablet 1 Tablet  1 Tablet Oral BID    isosorbide mononitrate ER (IMDUR) tablet 30 mg  30 mg Oral 7am    melatonin tablet 3 mg  3 mg Oral QHS PRN    oxybutynin (DITROPAN) tablet 5 mg  5 mg Oral TID    spironolactone (ALDACTONE) tablet 25 mg  25 mg Oral DAILY    arformoteroL (BROVANA) neb solution 15 mcg  15 mcg Nebulization BID RT    And    budesonide (PULMICORT) 500 mcg/2 ml nebulizer suspension  500 mcg Nebulization BID RT    sodium chloride (NS) flush 5-40 mL  5-40 mL IntraVENous Q8H    sodium chloride (NS) flush 5-40 mL  5-40 mL IntraVENous PRN    acetaminophen (TYLENOL) tablet 650 mg  650 mg Oral Q6H PRN    Or    acetaminophen (TYLENOL) suppository 650 mg  650 mg Rectal Q6H PRN    polyethylene glycol (MIRALAX) packet 17 g  17 g Oral DAILY PRN    ondansetron (ZOFRAN ODT) tablet 4 mg  4 mg Oral Q8H PRN    Or    ondansetron (ZOFRAN) injection 4 mg  4 mg IntraVENous Q6H PRN    heparin (porcine) injection 5,000 Units  5,000 Units SubCUTAneous Q8H        Objective:   Vitals:  Visit Vitals  /69 (BP Patient Position: At rest)   Pulse (!) 57   Temp 98.9 °F (37.2 °C)   Resp 18   Ht 5' 4\" (1.626 m)   Wt 178 lb 9.2 oz (81 kg)   SpO2 97%   BMI 30.65 kg/m²     Temp (24hrs), Av.5 °F (36.9 °C), Min:98 °F (36.7 °C), Max:98.9 °F (37.2 °C)      O2 Device: None (Room air)    Last 24hr Input/Output:    Intake/Output Summary (Last 24 hours) at 2021 0855  Last data filed at 2021 1829  Gross per 24 hour   Intake 100 ml   Output --   Net 100 ml        PHYSICAL EXAM:  General:    Alert, cooperative, no distress, appears stated age. Head:   Normocephalic, without obvious abnormality, atraumatic. Eyes:   Conjunctivae/corneas clear. PERRLA  Nose:  Nares normal. No drainage or sinus tenderness. Throat:    Lips, mucosa, and tongue normal.  No Thrush  Neck:  Supple, symmetrical,  no adenopathy, thyroid: non tender    no carotid bruit and no JVD. Back:    Symmetric,  No CVA tenderness. Lungs:   Clear to auscultation bilaterally. No Wheezing or Rhonchi. No rales. Chest wall:  No tenderness or deformity. No Accessory muscle use. Heart:   Regular rate and rhythm,  no murmur, rub or gallop. Abdomen:   Soft, non-tender. Not distended. Bowel sounds normal. No masses  Extremities: Extremities normal, atraumatic, No cyanosis. No edema. No clubbing  Skin:     Texture, turgor normal. No rashes or lesions.   Not Jaundiced  Lymph nodes: Cervical, supraclavicular normal.  Psych:  Good insight. Not depressed. Not anxious or agitated. Neurologic: Normal strength, Alert and oriented X 3. Lab Data Reviewed:    Recent Labs     11/28/21 2059   WBC 5.3   HGB 10.6*   HCT 34.0*        Recent Labs     11/29/21  0342 11/28/21 2059    136   K 3.2* 3.8    105   CO2 27 27   * 136*   BUN 30* 35*   CREA 0.80 0.99   CA 9.1 9.2   ALB  --  3.4*   TBILI  --  0.4   ALT  --  26     Lab Results   Component Value Date/Time    Glucose (POC) 134 (H) 11/30/2021 05:45 AM    Glucose (POC) 164 (H) 11/29/2021 10:01 PM    Glucose (POC) 120 (H) 11/29/2021 05:19 PM    Glucose (POC) 177 (H) 11/29/2021 03:56 PM    Glucose (POC) 106 11/29/2021 01:15 PM     No results for input(s): PH, PCO2, PO2, HCO3, FIO2 in the last 72 hours. No results for input(s): INR, INREXT, INREXT in the last 72 hours.   ___________________________________________________  ___________________________________________________    Attending Physician: Flor Wilson MD

## 2021-11-30 NOTE — PROGRESS NOTES
Bedside and Verbal shift change report given to Juli Crump RN (oncoming nurse) by RN (offgoing nurse). Report included the following information SBAR, Kardex, Intake/Output and MAR.

## 2021-12-01 ENCOUNTER — PATIENT OUTREACH (OUTPATIENT)
Dept: CASE MANAGEMENT | Age: 71
End: 2021-12-01

## 2021-12-01 NOTE — DISCHARGE SUMMARY
[address] SUMMARY    Name:  Coco Lund  MR#:  [id number]  :  1950  ACCOUNT #:  [de-identified]  ADMIT DATE:  2021  DISCHARGE DATE:  2021      HISTORY:  A 68-year-old black female presented to the hospital with a complaint of chest pain. The patient states she has been in her usual state of health when she was awakened from sleep by left-sided sharp and pressure-like pain. Pain is nonradiating, rated 6/10, intermittent in nature, and occurs at rest.  Currently pain-free. No associated palpitations, PND, orthopnea, or lower extremity edema. The patient was seen by the hospitalist and subsequently admitted for further evaluation and care. Past medical history, review of systems, and physical examination as noted in the HPI. CONSULTATIONS:  Mini Zuluaga MD, Pulmonary. Impression:  Pulmonary infiltrates on chest x-ray, viral testing negative, the patient is afebrile, has a history of elevated left and right-sided heart pressures, the pro-BNP is not elevated. She has some mild degree of eosinophil on her CBC of questionable significance. Chest CT was ordered and has been followed by [facility] and Cardiology had been consulted as well, consider CTD evaluation. She has significant infiltrates on CT and etiology remains unclear; may benefit from bronchoscopy/BAL. Joey Olmos MD, Cardiology. Assessment and Plan:  Chest pain, noncardiac, cardiac enzymes negative and cath earlier this year showed no coronary artery disease. We will obtain echo given prior cardiomyopathy. If this is normal, no additional cardiac evaluation indicated and can follow up with Dr. Brodie Mcfadden, outpatient. No evidence of congestive heart failure and BNP is normal, agree with Pulmonary evaluation. Again, echo normal, will sign off, will see as needed. RESULTS REVIEW:  Hemoglobin 10.9, hematocrit 31.9, WBC 4.2.   Sodium 136, potassium 3.9, chloride 104, CO2 of 27, glucose 107, BUN 23, creatinine 0.74. Procalcitonin less than 0.07. BNP was 62. C-reactive protein 1.66. Sed rate 106. RSV by PCR not detected. Gammopathy profile pending. IMAGING STUDIES:  Chest x-ray, mild diffuse patchy interstitial opacity can be seen with pulmonary edema or atypical/viral infection. CTA chest, no pulmonary embolism, no acute vascular abnormalities, scattered peripheral ground-glass opacities favoring atelectasis or edema. No significant fibrotic change. Recommended followup CT in 12 months to assess evolution. CARDIAC STUDIES:  Echocardiogram with ejection fraction of 55% to 60%. Normal cavity size, wall thickness and systolic function. Wall motion is normal.    HOSPITAL COURSE:  The patient was admitted as stated above. She was seen in consultation by the consultants who assisted us with her care. Her principal presenting problem was felt to be angina at rest.  She had a cardiac catheterization as noted above on 03/11/2021 that showed normal coronaries with no obstructive disease. She is being followed by [facility]. She was seen in consultation by Cardiology who did not feel the chest pain was cardiac in origin and advised no further cardiac evaluation. They did agree with Pulmonary evaluation. She was noted to have pulmonary infiltrates on chest x-ray. A CT scan was performed with the results described above. We had a conversation with Dr. Jerica Richards on the day of discharge, who indicated that he would be in favor of steroids if she had an elevated sed rate or C-reactive protein.   Both were elevated and therefore was placed on prednisone 20 mg daily, which should be followed up by Dr. Jerica Richards as an outpatient as well as by her primary care physician, Juan Castle MD.    She has a known history of diabetes mellitus type 2 and we were concerned about the control of her diabetes and therefore, gave her specific instructions how to adjust her insulin in view of the addition of prednisone. Insulin requirement was very low during the hospital stay, in fact, only had third of what her normal dose of insulin use at home. We will be in contact with her to help her adjust her insulin almost on a daily basis until her blood sugars are adequately controlled with the use of prednisone. Blood pressure control remained adequate during the hospital stay. There is no evidence of diastolic decompensation. All medical issues have been addressed. She has had no further chest pain, no shortness of breath, O2 saturations remained stable during her walk test by the nurse. She is therefore being discharged home ambulatory and in satisfactory condition, improved. FINAL DIAGNOSES:  1. Noncardiac chest pain. 2.  Pulmonary infiltrates of questionable etiology. 3.  Compensated diastolic heart failure, New York Heart Association class II. 4.  Diabetes mellitus type 2.  5.  Dyslipidemia. 6.  Primary hypertension. DISPOSITION:  Discharged to home ambulatory, up as tolerated. New York Heart Association diet. Resume home medications as noted. DISCHARGE MEDICATIONS:  1. Forxiga 10 mg daily. 2.  Magnesium oxide 400 mg 2 tablets b.i.d.  3.  Entresto 24/26 b.i.d.  4.  Ditropan 5 mg t.i.d.  5.  Vitamin D 2000 units daily. 6.  Thiamine 100 mg daily. 7.  Spironolactone 25 mg daily. 8.  Bumex 2 mg daily. 9.  Atorvastatin 40 mg daily. 10.  Melatonin 3 mg at bedtime. 11.  Isosorbide mononitrate 30 mg daily. 12.  Allopurinol 100 mg daily. 13.  Hydralazine 50 mg t.i.d.  14.  Rocaltrol 0.25 mcg daily. 15.  Diclofenac p.r.n.  16.  Pulmicort via jet nebs q. 6 hours. 17.  Advair Diskus 250/50 b.i.d. 18.  Flonase 2 sprays daily. 19.  Lantus 30 units subcu at bedtime and titrate insulin as discussed for fasting blood sugar between 110 and 120. DISCHARGE INSTRUCTIONS:  She may be up as tolerated.     She is to return to see Dr. Colin Bravo in 7 days for followup care.    Follow up with Cardiology as previously scheduled. Follow up with Dr. Saurabh Hartman within 2 weeks. I personally saw the patient today and spent less than or equal to 30 minutes in counseling and coordination of care, and discharging this patient.         Kavon Groves MD      [id number]  D:  11/30/2021 19:37  T:  12/01/2021 10:11  JOB #:  [id number]

## 2021-12-01 NOTE — PROGRESS NOTES
I have reviewed discharge instructions with the patient. The patient verbalized understanding. Patient discharged in stable condition with discharge instruction paperwork.

## 2021-12-01 NOTE — PROGRESS NOTES
Care Transitions Initial Call    Call within 2 business days of discharge: Yes     Patient: Tamika Crump Patient : 1950 MRN: [id number]    Last Discharge [address]       Complaint Diagnosis Description Type Department Provider    21 Chest Pain Chest pain, unspecified type . .. ED to Hosp-Admission (Discharged) (ADMIT) David Oates MD; Jenny Oglesby,... Was this an external facility discharge? No     Challenges to be reviewed by the provider   Additional needs identified to be addressed with provider: yes  Pt.reports need new prescription; FreeStyle Nathaniel       Method of communication with provider : none    Discussed COVID-19 related testing which was not done at this time. Advance Care Planning:   Does patient have an Advance Directive:  yes; reviewed and current     Inpatient Readmission Risk score: Unplanned Readmit Risk Score: 14.3 ( )    Was this a readmission? no   Patient stated reason for the admission: c/o chest pain    Patients top risk factors for readmission: medical condition-DM/Angina   Interventions to address risk factors: Scheduled appointment with PCP-12/3/21, Scheduled appointment with Specialist-21 and Obtained and reviewed discharge summary and/or continuity of care documents    Care Transition Nurse (CTN) contacted the patient by telephone to perform post hospital discharge assessment. Verified name and  with patient as identifiers. Provided introduction to self, and explanation of the CTN role. CTN reviewed discharge instructions, medical action plan and red flags with patient who verbalized understanding. Were discharge instructions available to patient? yes. Reviewed appropriate site of care based on symptoms and resources available to patient including: PCP, Specialist and When to call 911. Patient given an opportunity to ask questions and does not have any further questions or concerns at this time.  The patient agrees to contact the PCP office for questions related to their healthcare. Medication reconciliation was performed with patient, who verbalizes understanding of administration of home medications. Advised obtaining a 90-day supply of all daily and as-needed medications. Referral to Pharm D needed: no   START taking:  predniSONE (DELTASONE)    CHANGE how you take:  insulin glargine (LANTUS,BASAGLAR)  isosorbide mononitrate ER (IMDUR)    STOP taking:  hydrALAZINE 50 mg tablet (APRESOLINE)    Covid Risk Education    Decline education COVID-19 and CDC guidelines. CTN reviewed discharge instructions, medical action plan and red flag symptoms with the patient who verbalized understanding. Patient was given an opportunity to verbalize any questions and concerns and agrees to contact CTN or health care provider for questions related to their healthcare. Discussed follow-up appointments. If no appointment was previously scheduled, appointment scheduling offered: yes. Is follow up appointment scheduled within 7 days of discharge? yes. [address] follow up appointment(s):   Future Appointments   Date Time Provider John Escalante   12/3/2021  1:00 PM Salas Elise MD [facility] BS AMB   12/14/2021 10:20 AM Sylwia Sullivan MD [facility] [address] [facility] BS AMB   12/23/2021  9:45 AM Shelli Collier MD [facility] BS AMB   1/4/2022 10:40 AM Harman Chowdhury MD [facility] BS AMB       Plan for follow-up call in 5-7 days based on severity of symptoms and risk factors. Plan for next call: follow up appointment-pcp  CTN provided contact information for future needs. Goals      Establish PCP relationships and regularly scheduled appointments. 11/30 Patient will attend  follow-up with PCP and specialist as directed, keep a list of her medications and FBS (12/1 -134) readings to bring to f/u appointments.   Future Appointments   Date Time Provider   12/3/2021  1:00 PM Salas Elise MD   12/14/2021 10:20 AM Sylwia Sullivan MD 12/23/2021  9:45 AM Frankie Booth MD   1/4/2022 10:40 AM Randy Rubi MD   CTN provided pt. information [facility] [phone]) explain briefly type of service; contact information provided, f/u with pt.in 5-7 days. -                             Understands red flags post discharge. 12/1 Pt.will return to ED for symptoms of CP or pressure, strange feeling in the chest, back, neck, jaw, shoulders or arms, sweating, nausea or vomiting, fast irregular heartbeat, pt will  Call 911 or have someone drive her to the nearest hospital. CTN will f/u with pt.in 5-7 days. -[address]

## 2021-12-02 ENCOUNTER — TRANSCRIBE ORDER (OUTPATIENT)
Dept: SCHEDULING | Age: 71
End: 2021-12-02

## 2021-12-02 DIAGNOSIS — R06.02 SHORTNESS OF BREATH: Primary | ICD-10-CM

## 2021-12-02 LAB
ALBUMIN SERPL ELPH-MCNC: 3.3 G/DL (ref 2.9–4.4)
ALBUMIN/GLOB SERPL: 0.8 {RATIO} (ref 0.7–1.7)
ALPHA1 GLOB SERPL ELPH-MCNC: 0.2 G/DL (ref 0–0.4)
ALPHA2 GLOB SERPL ELPH-MCNC: 1 G/DL (ref 0.4–1)
B-GLOBULIN SERPL ELPH-MCNC: 1.1 G/DL (ref 0.7–1.3)
GAMMA GLOB SERPL ELPH-MCNC: 2.3 G/DL (ref 0.4–1.8)
GLOBULIN SER-MCNC: 4.6 G/DL (ref 2.2–3.9)
IGA SERPL-MCNC: 444 MG/DL (ref 87–352)
IGG SERPL-MCNC: 2151 MG/DL (ref 586–1602)
IGM SERPL-MCNC: 273 MG/DL (ref 26–217)
INTERPRETATION SERPL IEP-IMP: ABNORMAL
KAPPA LC FREE SER-MCNC: 69.9 MG/L (ref 3.3–19.4)
KAPPA LC FREE/LAMBDA FREE SER: 0.62 {RATIO} (ref 0.26–1.65)
LAMBDA LC FREE SERPL-MCNC: 113.5 MG/L (ref 5.7–26.3)
M PROTEIN SERPL ELPH-MCNC: ABNORMAL G/DL
PROT SERPL-MCNC: 7.9 G/DL (ref 6–8.5)

## 2021-12-03 ENCOUNTER — OFFICE VISIT (OUTPATIENT)
Dept: INTERNAL MEDICINE CLINIC | Age: 71
End: 2021-12-03
Payer: MEDICARE

## 2021-12-03 VITALS
TEMPERATURE: 98.5 F | OXYGEN SATURATION: 97 % | SYSTOLIC BLOOD PRESSURE: 116 MMHG | DIASTOLIC BLOOD PRESSURE: 68 MMHG | RESPIRATION RATE: 18 BRPM | HEART RATE: 72 BPM | BODY MASS INDEX: 31.31 KG/M2 | WEIGHT: 182.4 LBS

## 2021-12-03 DIAGNOSIS — R91.8 PULMONARY INFILTRATES: ICD-10-CM

## 2021-12-03 DIAGNOSIS — N18.31 STAGE 3A CHRONIC KIDNEY DISEASE (HCC): ICD-10-CM

## 2021-12-03 DIAGNOSIS — E11.65 UNCONTROLLED TYPE 2 DIABETES MELLITUS WITH HYPERGLYCEMIA (HCC): Primary | ICD-10-CM

## 2021-12-03 DIAGNOSIS — Z09 HOSPITAL DISCHARGE FOLLOW-UP: ICD-10-CM

## 2021-12-03 DIAGNOSIS — I10 PRIMARY HYPERTENSION: ICD-10-CM

## 2021-12-03 DIAGNOSIS — E78.5 DYSLIPIDEMIA: ICD-10-CM

## 2021-12-03 DIAGNOSIS — I42.0 DILATED CARDIOMYOPATHY (HCC): ICD-10-CM

## 2021-12-03 LAB — GLUCOSE POC: 178 MG/DL

## 2021-12-03 PROCEDURE — 1111F DSCHRG MED/CURRENT MED MERGE: CPT | Performed by: INTERNAL MEDICINE

## 2021-12-03 PROCEDURE — 82962 GLUCOSE BLOOD TEST: CPT | Performed by: INTERNAL MEDICINE

## 2021-12-03 PROCEDURE — 99496 TRANSJ CARE MGMT HIGH F2F 7D: CPT | Performed by: INTERNAL MEDICINE

## 2021-12-03 PROCEDURE — G8428 CUR MEDS NOT DOCUMENT: HCPCS | Performed by: INTERNAL MEDICINE

## 2021-12-03 NOTE — PROGRESS NOTES
SPORTS MEDICINE AND PRIMARY CARE  Elizabeth Mendez MD, [address] [address]  Phone:  [phone]  Fax: [phone]       Chief Complaint   Patient presents with   [facility] Follow Up     angina/ chest pains   . SUBJECTIVE:    Hari Winter is a 73 y.o. female *Patient returns today for transition of care services following admission to [facility] on 11/28 and discharged on 11/30. She was found to have noncardiac chest pain. She was seen in consultation by Rosalino Billingsley MD, who confirmed noncardiac chest pain, cardiac enzymes negative. Cath earlier this year showed no coronary artery disease. She had a history of cardiomyopathy and echocardiogram was performed and follow up with Dr. Dominick Grimes was recommended. The echocardiogram revealed an ejection fraction of 55-60% with normal cavity size, wall thickness and systolic function. She also was seen in consultation by pulmonary because she was found to have an abnormal chest x-ray. CTA of the chest revealed no pulmonary embolus, no acute vascular abnormalities, scattered peripheral ground glass opacities favoring atelectasis or edema and recommended a CT scan follow up. Dr. Dona Pugh suggested steroids and a follow up with him. The steroids were suggested if sed rate and C-reactive protein were elevated and both were elevated. The etiology of the infiltrates is unclear. Other medical problems include DM type 2, which we were concerned about the steroids and advised her what to do with her insulin regarding that. She also has dyslipidemia and primary hypertension and is seen today on the behalf of Dr. Leonard Calles. Nurse navigator performed an outreach to the patient on 12/01/21 to complete medication reconciliation and assessment of her condition. Patient states she feels better. Her blood sugars in the morning are 111-112. Random blood sugar today is 178.          Current Outpatient Medications   Medication Sig Dispense Refill    insulin glargine (LANTUS,BASAGLAR) 100 unit/mL (3 mL) inpn 30 Units by SubCUTAneous route nightly. As discussed increase your insulin according to your morning blood sugar. Would like the morning blood sugar to be between 110 and 140. Please call the office nurse if you need assistance 6 Adjustable Dose Pre-filled Pen Syringe 0    predniSONE (DELTASONE) 10 mg tablet Take 20 mg by mouth daily. 60 Tablet 0    isosorbide mononitrate ER (IMDUR) 30 mg tablet TAKE 1 TABLET BY MOUTH EVERY DAY IN THE MORNING 90 Tablet 0    Farxiga 10 mg tab tablet TAKE 1 TABLET BY MOUTH EVERY DAY 30 Tablet 1    magnesium oxide (MAG-OX) 400 mg tablet TAKE 2 TABLETS BY MOUTH TWO (2) TIMES A DAY. 360 Tablet 2    Entresto 24-26 mg tablet TAKE 1 TABLET BY MOUTH TWICE A DAY 60 Tablet 1    oxybutynin (DITROPAN) 5 mg tablet TAKE 1 TABLET BY MOUTH THREE TIMES A  Tablet 3    cholecalciferol (VITAMIN D3) (2,000 UNITS /50 MCG) cap capsule TAKE 1 CAPSULE BY MOUTH EVERY DAY 90 Capsule 2    thiamine HCL (B-1) 100 mg tablet TAKE 1 TABLET BY MOUTH EVERY DAY 90 Tablet 1    spironolactone (ALDACTONE) 25 mg tablet TAKE 1 TABLET BY MOUTH EVERY DAY 90 Tablet 1    bumetanide (BUMEX) 2 mg tablet Take 0.5 Tablets by mouth daily. 90 Tablet 2    atorvastatin (LIPITOR) 40 mg tablet TAKE 1 TABLET BY MOUTH EVERY DAY 90 Tablet 3    melatonin 3 mg tablet TAKE 1 TABLET BY MOUTH EVERY NIGHT AS NEEDED FOR INSOMNIA. 90 Tablet 0    FreeStyle Nathaniel 14 Day Sensor kit USE TO CHECK BG 3X A DAY AND AS NEEDED. DX  E11.69      allopurinoL (ZYLOPRIM) 100 mg tablet Take 1 Tablet by mouth daily. 30 Tablet 11    Insulin Needles, Disposable, (BD Ultra-Fine Short Pen Needle) 31 gauge x 5/16\" ndle USE WITH INSULIN PENS TWICE DAILY AS DIRECTED 100 Pen Needle 11    calcitRIOL (RocaltroL) 0.25 mcg capsule Take 0.25 mcg by mouth daily.       diclofenac (VOLTAREN) 1 % gel Apply 2 g to affected area two (2) times daily as needed for Pain. thin layer to joint pain      budesonide (PULMICORT) 0.25 mg/2 mL nbsp 500 mcg by Nebulization route every six (6) hours as needed for Other (wheezing shortness of breath).  fluticasone propion-salmeteroL (ADVAIR/WIXELA) 250-50 mcg/dose diskus inhaler Take 2 Puffs by inhalation two (2) times a day.  fluticasone propionate (FLONASE) 50 mcg/actuation nasal spray 2 Sprays by Both Nostrils route daily.  1 Bottle 0     Past Medical History:   Diagnosis Date    Arthritis     Diabetes [facility]     Heart failure ([facility]     Hypercholesterolemia     Hypertension      Past Surgical History:   Procedure Laterality Date    COLONOSCOPY N/A 3/19/2021    COLONOSCOPY performed by Sade Melendez MD at [facility] END[facility]COPY    HX ORTHOPAEDIC      Arthroscopy right knee    [place] CARDIAC SURG PROCEDURE UNLIST      life vest     No Known Allergies      REVIEW OF SYSTEMS:  General: negative for - chills or fever  ENT: negative for - headaches, nasal congestion or tinnitus  Respiratory: negative for - cough, hemoptysis, shortness of breath or wheezing  Cardiovascular : negative for - chest pain, edema, palpitations or shortness of breath  Gastrointestinal: negative for - abdominal pain, blood in stools, heartburn or nausea/vomiting  Genito-Urinary: no dysuria, trouble voiding, or hematuria  Musculoskeletal: negative for - gait disturbance, joint pain, joint stiffness or joint swelling  Neurological: no TIA or stroke symptoms  Hematologic: no bruises, no bleeding, no swollen glands  Integument: no lumps, mole changes, nail changes or rash  Endocrine: no malaise/lethargy or unexpected weight changes      Social History     Socioeconomic History    Marital status:     Number of children: 3    Years of education: 15   Occupational History    Occupation: retired   Tobacco Use    Smoking status: Former Smoker     Packs/day: 0.25     Years: 20.00     Pack years: 5.00     Quit date: 2014     Years since quittin.9    Smokeless tobacco: Never Used   Vaping Use    Vaping Use: Never used   Substance and Sexual Activity    Alcohol use: Yes     Alcohol/week: 14.0 standard drinks     Types: 14 Cans of beer per week     Comment: occ    Drug use: No    Sexual activity: Yes     Partners: Male     Family History   Problem Relation Age of Onset    Diabetes Mother        OBJECTIVE:    Visit Vitals  /68   Pulse 72   Temp 98.5 °F (36.9 °C) (Oral)   Resp 18   Wt 182 lb 6.4 oz (82.7 kg)   LMP  (LMP Unknown)   SpO2 97%   BMI 31.31 kg/m²     CONSTITUTIONAL: well , well nourished, appears age appropriate  EYES: perrla, eom intact  ENMT:moist mucous membranes, pharynx clear  NECK: supple. Thyroid normal  RESPIRATORY: Chest: clear bilaterally   CARDIOVASCULAR: Heart: regular rate and rhythm  GASTROINTESTINAL: Abdomen: soft, bowel sounds active  HEMATOLOGIC: no pathological lymph nodes palpated  MUSCULOSKELETAL: Extremities: no edema, pulse 1+   INTEGUMENT: No unusual rashes or suspicious skin lesions noted. Nails appear normal.  NEUROLOGIC: non-focal exam   MENTAL STATUS: alert and oriented, appropriate affect           ASSESSMENT:  1. Uncontrolled type 2 diabetes mellitus with hyperglycemia (HCC)    2. Pulmonary infiltrates    3. Primary hypertension    4. Dyslipidemia    5. Dilated cardiomyopathy ([address]    6. Stage 3a chronic kidney disease ([address]      I am concerned about her blood sugar and if it is adequately controlled. We advised her how to increase her Lantus if the blood sugar in the morning is greater than 130. The pulmonary infiltrates will be followed by Dr. Zackary Saul. She has an appointment to see him on the 12th of next month. Until then, she will stay at prednisone 20 mg daily. The marker that we will follow will be the sed rate with C-reactive protein. Blood pressure control is at goal, no titration is needed. Dilated cardiomyopathy. The ejection fraction is improved. Echocardiogram looked better.     She will return to see Dr. Claude Stark in one month. I have discussed the diagnosis with the patient and the intended plan as seen in the  Orders. The patient understands and agees with the plan. The patient has   received an after visit summary and questions were answered concerning  future plans  Patient labs and/or xrays were reviewed  Past records were reviewed. PLAN:  .  Orders Placed This Encounter    AMB POC GLUCOSE BLOOD, BY GLUCOSE MONITORING DEVICE       Follow-up and Dispositions    · Return in about 4 weeks (around 12/31/2021). ATTENTION:   This medical record was transcribed using an electronic medical records system. Although proofread, it may and can contain electronic and spelling errors. Other human spelling and other errors may be present. Corrections may be executed at a later time. Please feel free to contact us for any clarifications as needed.

## 2021-12-03 NOTE — PROGRESS NOTES
1. Have you been to the ER, urgent care clinic since your last visit? Hospitalized since your last visit? Yes When: [facility], 11/28/21, Chest pain    2. Have you seen or consulted any other health care providers outside of the [address] [place] [address] since your last visit? Include any pap smears or colon screening.  No   Chief Complaint   Patient presents with   [facility] Follow Up     angina/ chest pains     Visit Vitals  /68   Pulse 72   Temp 98.5 °F (36.9 °C) (Oral)   Resp 18   Wt 182 lb 6.4 oz (82.7 kg)   SpO2 97%   BMI 31.31 kg/m²

## 2021-12-08 ENCOUNTER — PATIENT OUTREACH (OUTPATIENT)
Dept: CASE MANAGEMENT | Age: 71
End: 2021-12-08

## 2021-12-08 NOTE — PROGRESS NOTES
Goals      Establish PCP relationships and regularly scheduled appointments. 11/30 Patient will attend  follow-up with PCP and specialist as directed, keep a list of her medications and FBS (12/1 -134) readings to bring to f/u appointments. Future Appointments   Date Time Provider   12/3/2021  1:00 PM Cody Salas MD   12/14/2021 10:20 AM Krissy Heard MD   12/23/2021  9:45 AM Todd Westbrook MD   1/4/2022 10:40 AM Bridget Navarro MD   CTN provided pt. information [facility] [phone]) explain briefly type of service; contact information provided, f/u with pt.in 5-7 days. -[address]       12/8 Pt.seen by Dr. Yifan Israel MD 12/3; upcoming appts. listed above, CTN unsuccessful attempt to contact pt.,  LVM will follow up in 7-10 days. -                        Understands red flags post discharge. 12/1 Pt.will return to ED for symptoms of CP or pressure, strange feeling in the chest, back, neck, jaw, shoulders or arms, sweating, nausea or vomiting, fast irregular heartbeat, pt will  Call 911 or have someone drive her to the nearest hospital. CTN will f/u with pt.in 5-7 days. -[address]      12/8 CTN unsuccessful attempt to contact pt.,  LVM will follow up in 7-10 days. -[address]

## 2021-12-09 ENCOUNTER — TELEPHONE (OUTPATIENT)
Dept: CARDIOLOGY CLINIC | Age: 71
End: 2021-12-09

## 2021-12-09 NOTE — TELEPHONE ENCOUNTER
Telephone Call RE:  Lab Reminder      Outcome:     [x] Patient verbalizes understanding    [] Unable to reach   [] Left message              []       Stanford De La Garza

## 2021-12-11 LAB
ALBUMIN SERPL-MCNC: 4.3 G/DL (ref 3.8–4.8)
ALBUMIN/GLOB SERPL: 1 {RATIO} (ref 1.2–2.2)
ALP SERPL-CCNC: 62 IU/L (ref 44–121)
ALT SERPL-CCNC: 24 IU/L (ref 0–32)
AST SERPL-CCNC: 19 IU/L (ref 0–40)
BILIRUB SERPL-MCNC: 0.7 MG/DL (ref 0–1.2)
BUN SERPL-MCNC: 25 MG/DL (ref 8–27)
BUN/CREAT SERPL: 26 (ref 12–28)
CALCIUM SERPL-MCNC: 9.9 MG/DL (ref 8.7–10.3)
CHLORIDE SERPL-SCNC: 97 MMOL/L (ref 96–106)
CO2 SERPL-SCNC: 29 MMOL/L (ref 20–29)
CREAT SERPL-MCNC: 0.97 MG/DL (ref 0.57–1)
ERYTHROCYTE [DISTWIDTH] IN BLOOD BY AUTOMATED COUNT: 12.3 % (ref 11.7–15.4)
GLOBULIN SER CALC-MCNC: 4.2 G/DL (ref 1.5–4.5)
GLUCOSE SERPL-MCNC: 186 MG/DL (ref 65–99)
HCT VFR BLD AUTO: 35.8 % (ref 34–46.6)
HGB BLD-MCNC: 11.9 G/DL (ref 11.1–15.9)
MAGNESIUM SERPL-MCNC: 2 MG/DL (ref 1.6–2.3)
MCH RBC QN AUTO: 28.5 PG (ref 26.6–33)
MCHC RBC AUTO-ENTMCNC: 33.2 G/DL (ref 31.5–35.7)
MCV RBC AUTO: 86 FL (ref 79–97)
NT-PROBNP SERPL-MCNC: 94 PG/ML (ref 0–301)
PLATELET # BLD AUTO: 253 X10E3/UL (ref 150–450)
POTASSIUM SERPL-SCNC: 4.2 MMOL/L (ref 3.5–5.2)
PROT SERPL-MCNC: 8.5 G/DL (ref 6–8.5)
RBC # BLD AUTO: 4.18 X10E6/UL (ref 3.77–5.28)
SODIUM SERPL-SCNC: 141 MMOL/L (ref 134–144)
WBC # BLD AUTO: 5.6 X10E3/UL (ref 3.4–10.8)

## 2021-12-11 RX ORDER — BUMETANIDE 2 MG/1
TABLET ORAL
Qty: 90 TABLET | Refills: 2 | Status: SHIPPED | OUTPATIENT
Start: 2021-12-11

## 2021-12-14 ENCOUNTER — OFFICE VISIT (OUTPATIENT)
Dept: CARDIOLOGY CLINIC | Age: 71
End: 2021-12-14
Payer: MEDICARE

## 2021-12-14 VITALS
HEART RATE: 75 BPM | HEIGHT: 64 IN | WEIGHT: 183.2 LBS | SYSTOLIC BLOOD PRESSURE: 94 MMHG | BODY MASS INDEX: 31.28 KG/M2 | OXYGEN SATURATION: 98 % | DIASTOLIC BLOOD PRESSURE: 55 MMHG | RESPIRATION RATE: 19 BRPM

## 2021-12-14 DIAGNOSIS — I42.8 NICM (NONISCHEMIC CARDIOMYOPATHY) (HCC): Primary | ICD-10-CM

## 2021-12-14 DIAGNOSIS — R06.09 DOE (DYSPNEA ON EXERTION): ICD-10-CM

## 2021-12-14 DIAGNOSIS — E78.5 DYSLIPIDEMIA: ICD-10-CM

## 2021-12-14 DIAGNOSIS — I10 PRIMARY HYPERTENSION: ICD-10-CM

## 2021-12-14 PROCEDURE — 99214 OFFICE O/P EST MOD 30 MIN: CPT | Performed by: SPECIALIST

## 2021-12-14 PROCEDURE — 1090F PRES/ABSN URINE INCON ASSESS: CPT | Performed by: SPECIALIST

## 2021-12-14 PROCEDURE — 3017F COLORECTAL CA SCREEN DOC REV: CPT | Performed by: SPECIALIST

## 2021-12-14 PROCEDURE — G8417 CALC BMI ABV UP PARAM F/U: HCPCS | Performed by: SPECIALIST

## 2021-12-14 PROCEDURE — G8427 DOCREV CUR MEDS BY ELIG CLIN: HCPCS | Performed by: SPECIALIST

## 2021-12-14 PROCEDURE — G8536 NO DOC ELDER MAL SCRN: HCPCS | Performed by: SPECIALIST

## 2021-12-14 PROCEDURE — G8510 SCR DEP NEG, NO PLAN REQD: HCPCS | Performed by: SPECIALIST

## 2021-12-14 PROCEDURE — 1101F PT FALLS ASSESS-DOCD LE1/YR: CPT | Performed by: SPECIALIST

## 2021-12-14 PROCEDURE — G8754 DIAS BP LESS 90: HCPCS | Performed by: SPECIALIST

## 2021-12-14 PROCEDURE — 1111F DSCHRG MED/CURRENT MED MERGE: CPT | Performed by: SPECIALIST

## 2021-12-14 PROCEDURE — G8752 SYS BP LESS 140: HCPCS | Performed by: SPECIALIST

## 2021-12-14 PROCEDURE — G8399 PT W/DXA RESULTS DOCUMENT: HCPCS | Performed by: SPECIALIST

## 2021-12-14 PROCEDURE — G9899 SCRN MAM PERF RSLTS DOC: HCPCS | Performed by: SPECIALIST

## 2021-12-14 RX ORDER — LANOLIN ALCOHOL/MO/W.PET/CERES
CREAM (GRAM) TOPICAL
Qty: 360 TABLET | Refills: 2 | Status: SHIPPED | OUTPATIENT
Start: 2021-12-14

## 2021-12-14 NOTE — PROGRESS NOTES
HISTORY OF PRESENT ILLNESS  Gregory Yu is a 73 y.o. female     SUMMARY:   Problem List  Date Reviewed: 12/14/2021          Codes Class Noted    Pulmonary infiltrates ICD-10-CM: R91.8  ICD-9-CM: 793.19  11/29/2021        Angina at rest [facility]) ICD-10-CM: I20.8  ICD-9-CM: 413.9  11/28/2021        Prerenal azotemia ICD-10-CM: R79.89  ICD-9-CM: 790.6  7/9/2021        Panlobular emphysema ([facility] ICD-10-CM: J43.1  ICD-9-CM: 492.8  7/9/2021        NICM (nonischemic cardiomyopathy) ([facility] ICD-10-CM: I42.8  ICD-9-CM: 425.4  4/1/2021        Receiving inotropic medication ICD-10-CM: Z79.899  ICD-9-CM: V49.89  4/1/2021        Advance care planning ICD-10-CM: Z71.89  ICD-9-CM: V65.49  Unknown        Shortness of breath ICD-10-CM: R06.02  ICD-9-CM: 786.05  Unknown        Acute on chronic systolic congestive heart failure, NYHA class 3 (HCC) ICD-10-CM: I50.23  ICD-9-CM: 428.23, 428.0  3/11/2021        Moderate pulmonary arterial systolic hypertension (HCC) ICD-10-CM: I27.21  ICD-9-CM: 416.8  3/11/2021        Hypokalemia ICD-10-CM: E87.6  ICD-9-CM: 276.8  3/11/2021        Hypomagnesemia ICD-10-CM: E83.42  ICD-9-CM: 275.2  3/11/2021        CHF exacerbation (HCC) ICD-10-CM: I50.9  ICD-9-CM: 428.0  6/5/5739        Diastolic heart failure, NYHA class 2 ([facility] ICD-10-CM: I50.30  ICD-9-CM: 428.30  Unknown        Uncontrolled type 2 diabetes mellitus with hyperglycemia (HCC) ICD-10-CM: E11.65  ICD-9-CM: 250.02  1/26/2020        CKD (chronic kidney disease) stage 3, GFR 30-59 ml/min ([facility]) ICD-10-CM: N18.30  ICD-9-CM: 585.3  7/16/2019    Overview Addendum 9/17/2020  8:33 AM by Neal Halsted, MD     5/18 negative lexiscan  11/19 lvh, lae, otherwise normal echo             Status post total right knee replacement ICD-10-CM: R54.376  ICD-9-CM: V43.65  2/5/2019        Osteoarthrosis, localized, primary, knee, right ICD-10-CM: M17.11  ICD-9-CM: 715.16  1/21/2019        PARDO (dyspnea on exertion) ICD-10-CM: R06.00  ICD-9-CM: 786.09  5/10/2018        Chest pain, exertional ICD-10-CM: R07.9  ICD-9-CM: 786.50  5/9/2018        Decreased libido ICD-10-CM: R68.82  ICD-9-CM: 799.81  10/3/2017        Epistaxis ICD-10-CM: R04.0  ICD-9-CM: 784.7  8/22/2017        Former tobacco use--stopped 2014 after >40 yrs smoking ICD-10-CM: Z87.891  ICD-9-CM: V15.82  11/15/2016        Obesity (BMI 30.0-34.9) ICD-10-CM: E66.9  ICD-9-CM: 278.00  11/15/2016        Reactive airway disease ICD-10-CM: J45.909  ICD-9-CM: 493.90  1/12/2016        Carpal tunnel syndrome of right wrist ICD-10-CM: G56.01  ICD-9-CM: 354.0  12/6/2015        Primary osteoarthritis of right knee ICD-10-CM: M17.11  ICD-9-CM: 715.16  7/12/2015        Dilated cardiomyopathy ([facility] ICD-10-CM: I42.0  ICD-9-CM: 425.4  9/9/2014    Overview Signed 11/24/2019  5:40 PM by Rupa Wiley MD     Nonischemic             Dyslipidemia ICD-10-CM: E78.5  ICD-9-CM: 272.4  9/9/2014        Gout ICD-10-CM: M10.9  ICD-9-CM: 274.9  9/9/2014        Alcohol abuse ICD-10-CM: F10.10  ICD-9-CM: 305.00  9/9/2014        Hypertension ICD-10-CM: I10  ICD-9-CM: 401.9  9/9/2014        Dermatitis ICD-10-CM: L30.9  ICD-9-CM: 692.9  9/9/2014              Current Outpatient Medications on File Prior to Visit   Medication Sig    bumetanide (BUMEX) 2 mg tablet TAKE 1 TABLET BY MOUTH EVERY DAY    insulin glargine (LANTUS,BASAGLAR) 100 unit/mL (3 mL) inpn 30 Units by SubCUTAneous route nightly. As discussed increase your insulin according to your morning blood sugar. Would like the morning blood sugar to be between 110 and 140. Please call the office nurse if you need assistance    predniSONE (DELTASONE) 10 mg tablet Take 20 mg by mouth daily.  isosorbide mononitrate ER (IMDUR) 30 mg tablet TAKE 1 TABLET BY MOUTH EVERY DAY IN THE MORNING    Farxiga 10 mg tab tablet TAKE 1 TABLET BY MOUTH EVERY DAY    magnesium oxide (MAG-OX) 400 mg tablet TAKE 2 TABLETS BY MOUTH TWO (2) TIMES A DAY.     Entresto 24-26 mg tablet TAKE 1 TABLET BY MOUTH TWICE A DAY    oxybutynin (DITROPAN) 5 mg tablet TAKE 1 TABLET BY MOUTH THREE TIMES A DAY    cholecalciferol (VITAMIN D3) (2,000 UNITS /50 MCG) cap capsule TAKE 1 CAPSULE BY MOUTH EVERY DAY    thiamine HCL (B-1) 100 mg tablet TAKE 1 TABLET BY MOUTH EVERY DAY    spironolactone (ALDACTONE) 25 mg tablet TAKE 1 TABLET BY MOUTH EVERY DAY    atorvastatin (LIPITOR) 40 mg tablet TAKE 1 TABLET BY MOUTH EVERY DAY    melatonin 3 mg tablet TAKE 1 TABLET BY MOUTH EVERY NIGHT AS NEEDED FOR INSOMNIA.  FreeStyle Nathaniel 14 Day Sensor kit USE TO CHECK BG 3X A DAY AND AS NEEDED. DX  E11.69    allopurinoL (ZYLOPRIM) 100 mg tablet Take 1 Tablet by mouth daily.  Insulin Needles, Disposable, (BD Ultra-Fine Short Pen Needle) 31 gauge x 5/16\" ndle USE WITH INSULIN PENS TWICE DAILY AS DIRECTED    calcitRIOL (RocaltroL) 0.25 mcg capsule Take 0.25 mcg by mouth daily.  diclofenac (VOLTAREN) 1 % gel Apply 2 g to affected area two (2) times daily as needed for Pain. thin layer to joint pain    budesonide (PULMICORT) 0.25 mg/2 mL nbsp 500 mcg by Nebulization route every six (6) hours as needed for Other (wheezing shortness of breath).  fluticasone propion-salmeteroL (ADVAIR/WIXELA) 250-50 mcg/dose diskus inhaler Take 2 Puffs by inhalation two (2) times a day.  fluticasone propionate (FLONASE) 50 mcg/actuation nasal spray 2 Sprays by Both Nostrils route daily. No current facility-administered medications on file prior to visit. CARDIOLOGY STUDIES TO DATE:  5/18 negative lexiscan   11/20/19   ECHO ADULT COMPLETE 11/20/2019 11/20/2019   Narrative   · Left Ventricle: Normal wall thickness. Mildly dilated left ventricle. Low normal systolic dysfunction. Estimated left ventricular ejection fraction is 51 - 55%. Visually measured ejection fraction. Inconclusive left ventricular diastolic function. · Mitral Valve: Mitral valve non-specific thickening. Trace mitral valve regurgitation is present. · Tricuspid Valve: Mild tricuspid valve regurgitation is present. · Pulmonary Artery: Pulmonary hypertension. · Left Atrium: Mildly dilated left atrium. Signed by: Chapito Viveros MD     01/29/21  echo lvef 20-25%, lae, mod decreased rvef, mild to mod mr, mild to mod tr, pa pressure 46mm    04/08/21 echo lvef 50-55%    11/21 normal echo    Chief Complaint   Patient presents with    Follow-up     5 mo appt. Patient denies chest pain and chest discomfort today. HPI :  She was recently back in the hospital with atypical chest pain and some sort of pulmonary problem. Her echo was repeated and her EF remains normal.  They sent her out on prednisone but she stopped it because her sugars were going too high. She is still having some cough which is intermittently productive, shortness of breath and sinus congestion. CARDIAC ROS:   negative for chest pain, palpitations, syncope, orthopnea, paroxysmal nocturnal dyspnea, exertional chest pressure/discomfort, claudication, lower extremity edema    Family History   Problem Relation Age of Onset    Diabetes Mother        Past Medical History:   Diagnosis Date    Arthritis     Diabetes [facility]     Heart failure ([facility]     Hypercholesterolemia     Hypertension        GENERAL ROS:  A comprehensive review of systems was negative except for that written in the HPI.     Visit Vitals  BP (!) 94/55 (BP 1 Location: Right upper arm, BP Patient Position: Sitting, BP Cuff Size: Large adult)   Pulse 75   Resp 19   Ht 5' 4\" (1.626 m)   Wt 183 lb 3.2 oz (83.1 kg)   LMP  (LMP Unknown)   SpO2 98%   BMI 31.45 kg/m²       Wt Readings from Last 3 Encounters:   12/14/21 183 lb 3.2 oz (83.1 kg)   12/03/21 182 lb 6.4 oz (82.7 kg)   11/29/21 178 lb 9.2 oz (81 kg)            BP Readings from Last 3 Encounters:   12/14/21 (!) 94/55   12/03/21 116/68   11/30/21 113/65       PHYSICAL EXAM  General appearance: alert, cooperative, no distress, appears stated age  Neurologic: Alert and oriented X 3  Neck: supple, symmetrical, trachea midline, no adenopathy, no carotid bruit and no JVD  Lungs: clear to auscultation bilaterally  Heart: regular rate and rhythm, S1, S2 normal, no murmur, click, rub or gallop  Extremities: extremities normal, atraumatic, no cyanosis or edema    Lab Results   Component Value Date/Time    Cholesterol, total 169 03/18/2021 05:31 PM    Cholesterol, total 143 10/08/2020 12:01 PM    Cholesterol, total 207 (H) 08/08/2019 02:31 PM    Cholesterol, total 175 04/10/2018 01:19 PM    Cholesterol, total 141 08/26/2016 11:48 AM    HDL Cholesterol 70 03/18/2021 05:31 PM    HDL Cholesterol 46 10/08/2020 12:01 PM    HDL Cholesterol 46 08/08/2019 02:31 PM    HDL Cholesterol 56 04/10/2018 01:19 PM    HDL Cholesterol 43 08/26/2016 11:48 AM    LDL, calculated 84.6 03/18/2021 05:31 PM    LDL, calculated 73 10/08/2020 12:01 PM    LDL, calculated 105 (H) 08/08/2019 02:31 PM    LDL, calculated 75 04/10/2018 01:19 PM    LDL, calculated 25 08/26/2016 11:48 AM    Triglyceride 72 03/18/2021 05:31 PM    Triglyceride 137 10/08/2020 12:01 PM    Triglyceride 280 (H) 08/08/2019 02:31 PM    Triglyceride 221 (H) 04/10/2018 01:19 PM    Triglyceride 364 (H) 08/26/2016 11:48 AM    CHOL/HDL Ratio 2.4 03/18/2021 05:31 PM     ASSESSMENT :      From a cardiac standpoint she is stable and asymptomatic, well compensated on a good medical regimen and needs no further cardiac testing. I reassured her that any chest pain she had or has would not be cardiac related given the fact that she had a normal cath recently and her shortness of breath is not cardiac related because her heart muscle remains strong and heart valves are all okay. current treatment plan is effective, no change in therapy  lab results and schedule of future lab studies reviewed with patient  reviewed diet, exercise and weight control    Encounter Diagnoses   Name Primary?     NICM (nonischemic cardiomyopathy) ([address] Yes    PARDO (dyspnea on exertion)     Dyslipidemia     Primary hypertension      No orders of the defined types were placed in this encounter. Follow-up and Dispositions    · Return in about 6 months (around 6/14/2022). [address], [place]  12/14/2021  Please note that this dictation was completed with [employer], the computer voice recognition software. Quite often unanticipated grammatical, syntax, homophones, and other interpretive errors are inadvertently transcribed by the computer software. Please disregard these errors. Please excuse any errors that have escaped final proofreading. Thank you.

## 2021-12-14 NOTE — PROGRESS NOTES
1. Have you been to the ER, urgent care clinic since your last visit? Hospitalized since your last visit? [facility] Gretchen on 11/28/21 d/t Angina at rest.    2. Have you seen or consulted any other health care providers outside of the [address] [place] [address] since your last visit? Include any pap smears or colon screening. No    Chief Complaint   Patient presents with    Follow-up     5 mo appt. Patient denies chest pain and chest discomfort today.

## 2021-12-22 ENCOUNTER — PATIENT OUTREACH (OUTPATIENT)
Dept: CASE MANAGEMENT | Age: 71
End: 2021-12-22

## 2021-12-22 NOTE — PROGRESS NOTES
Goals      Establish PCP relationships and regularly scheduled appointments. 11/30 Patient will attend  follow-up with PCP and specialist as directed, keep a list of her medications and FBS (12/1 -134) readings to bring to f/u appointments. Future Appointments   Date Time Provider   12/3/2021  1:00 PM Harini Meek MD   12/14/2021 10:20 AM Delon Stratton MD   12/23/2021  9:45 AM Sandra Orantes MD   1/4/2022 10:40 AM Rajendra Richard MD   CTN provided pt. information [employer] [phone]) explain briefly type of service; contact information provided, f/u with pt.in 5-7 days. -[address]       12/8 Pt.seen by Dr. Li Brandon MD 12/3; upcoming appts. listed above, CTN unsuccessful attempt to contact pt.,  LVM will follow up in 7-10 days. -[address]      12/22 Pt.seen by (Cardiology) Max Estrada MD 12/14/22, upcoming appt. with PCP, Dr. Diony Menezes MD 12/23/21. -                  Understands red flags post discharge. 12/1 Pt.will return to ED for symptoms of CP or pressure, strange feeling in the chest, back, neck, jaw, shoulders or arms, sweating, nausea or vomiting, fast irregular heartbeat, pt will  Call 911 or have someone drive her to the nearest hospital. CTN will f/u with pt.in 5-7 days. -[address]      12/8 CTN unsuccessful attempt to contact pt.,  LVM will follow up in 7-10 days. -[address]      12/22  Pt.without s/s listed above; Pt.reports she weigh daily, today weight 180 lbs, CTN discuss low sodium diet, and daily weights: pt will report wt gain consistently 2 to 3 lbs. in a day or 5 lbs. /week, or any new symptoms of SOB, cough; swollen ankles, feeling more tired than usual with normal activity, pt.will notify physician/cardiologist of symptoms. Pt.will report weights and any change to plan of care at next outreach in 7-14 days. -[address]

## 2021-12-23 ENCOUNTER — OFFICE VISIT (OUTPATIENT)
Dept: INTERNAL MEDICINE CLINIC | Age: 71
End: 2021-12-23
Payer: MEDICARE

## 2021-12-23 VITALS
HEART RATE: 76 BPM | SYSTOLIC BLOOD PRESSURE: 148 MMHG | RESPIRATION RATE: 18 BRPM | TEMPERATURE: 97.5 F | BODY MASS INDEX: 31.43 KG/M2 | WEIGHT: 184.1 LBS | OXYGEN SATURATION: 96 % | DIASTOLIC BLOOD PRESSURE: 73 MMHG | HEIGHT: 64 IN

## 2021-12-23 DIAGNOSIS — I10 ESSENTIAL HYPERTENSION: ICD-10-CM

## 2021-12-23 DIAGNOSIS — N31.9 NEUROGENIC BLADDER: ICD-10-CM

## 2021-12-23 DIAGNOSIS — E78.5 DYSLIPIDEMIA: ICD-10-CM

## 2021-12-23 DIAGNOSIS — E11.65 UNCONTROLLED TYPE 2 DIABETES MELLITUS WITH HYPERGLYCEMIA (HCC): Primary | ICD-10-CM

## 2021-12-23 DIAGNOSIS — J43.1 PANLOBULAR EMPHYSEMA (HCC): ICD-10-CM

## 2021-12-23 DIAGNOSIS — I10 PRIMARY HYPERTENSION: ICD-10-CM

## 2021-12-23 PROCEDURE — G8399 PT W/DXA RESULTS DOCUMENT: HCPCS | Performed by: INTERNAL MEDICINE

## 2021-12-23 PROCEDURE — G8754 DIAS BP LESS 90: HCPCS | Performed by: INTERNAL MEDICINE

## 2021-12-23 PROCEDURE — G8510 SCR DEP NEG, NO PLAN REQD: HCPCS | Performed by: INTERNAL MEDICINE

## 2021-12-23 PROCEDURE — G9899 SCRN MAM PERF RSLTS DOC: HCPCS | Performed by: INTERNAL MEDICINE

## 2021-12-23 PROCEDURE — G8536 NO DOC ELDER MAL SCRN: HCPCS | Performed by: INTERNAL MEDICINE

## 2021-12-23 PROCEDURE — G8753 SYS BP > OR = 140: HCPCS | Performed by: INTERNAL MEDICINE

## 2021-12-23 PROCEDURE — G8417 CALC BMI ABV UP PARAM F/U: HCPCS | Performed by: INTERNAL MEDICINE

## 2021-12-23 PROCEDURE — 2022F DILAT RTA XM EVC RTNOPTHY: CPT | Performed by: INTERNAL MEDICINE

## 2021-12-23 PROCEDURE — 3017F COLORECTAL CA SCREEN DOC REV: CPT | Performed by: INTERNAL MEDICINE

## 2021-12-23 PROCEDURE — 1090F PRES/ABSN URINE INCON ASSESS: CPT | Performed by: INTERNAL MEDICINE

## 2021-12-23 PROCEDURE — 1101F PT FALLS ASSESS-DOCD LE1/YR: CPT | Performed by: INTERNAL MEDICINE

## 2021-12-23 PROCEDURE — 3051F HG A1C>EQUAL 7.0%<8.0%: CPT | Performed by: INTERNAL MEDICINE

## 2021-12-23 PROCEDURE — 1111F DSCHRG MED/CURRENT MED MERGE: CPT | Performed by: INTERNAL MEDICINE

## 2021-12-23 PROCEDURE — G8427 DOCREV CUR MEDS BY ELIG CLIN: HCPCS | Performed by: INTERNAL MEDICINE

## 2021-12-23 PROCEDURE — 99214 OFFICE O/P EST MOD 30 MIN: CPT | Performed by: INTERNAL MEDICINE

## 2021-12-23 RX ORDER — SACUBITRIL AND VALSARTAN 24; 26 MG/1; MG/1
TABLET, FILM COATED ORAL
Qty: 60 TABLET | Refills: 1 | Status: SHIPPED | OUTPATIENT
Start: 2021-12-23 | End: 2022-02-22

## 2021-12-23 NOTE — PROGRESS NOTES
1. Have you been to the ER, urgent care clinic since your last visit? Hospitalized since your last visit? No    2. Have you seen or consulted any other health care providers outside of the [address] [place] [address] since your last visit? Include any pap smears or colon screening.  No     Wants to discuss her glucose monitor

## 2021-12-29 RX ORDER — FLASH GLUCOSE SENSOR
KIT MISCELLANEOUS
Qty: 2 KIT | Refills: 11 | Status: SHIPPED | OUTPATIENT
Start: 2021-12-29 | End: 2022-06-21 | Stop reason: ALTCHOICE

## 2021-12-29 NOTE — PROGRESS NOTES
[facility]  [place] [address]  [address] [address]  Phone:  [phone]  Fax: [phone]       Chief Complaint   Patient presents with    Diabetes   . SUBJECTIVE:    Dmitry Arango is a 73 y.o. female comes in for return visit stating that she has done reasonably well. She is no longer short of breath. Her cardiomyopathy improved significantly such that her ejection fraction is now back to baseline. She does have some intermittent bronchospasm and as a result coughs periodically. This is superimposed on her existing COPD. Fortunately she stopped smoking cigarettes. From a diabetic perspective blood sugars have been reasonable. She needs the [employer] sensors. She complains of increased urinary frequency. She has a past history of primary hypertension, dyslipidemia, as well as obesity. Current Outpatient Medications   Medication Sig Dispense Refill    flash glucose sensor (FreeStyle Nathaniel 14 Day Sensor) kit Blood sugar checks before meals at bedtime and as needed 2 Kit 11    Entresto 24-26 mg tablet TAKE 1 TABLET BY MOUTH TWICE A DAY 60 Tablet 1    magnesium oxide (MAG-OX) 400 mg tablet TAKE 2 TABLETS BY MOUTH TWO (2) TIMES A DAY. 360 Tablet 2    bumetanide (BUMEX) 2 mg tablet TAKE 1 TABLET BY MOUTH EVERY DAY 90 Tablet 2    insulin glargine (LANTUS,BASAGLAR) 100 unit/mL (3 mL) inpn 30 Units by SubCUTAneous route nightly. As discussed increase your insulin according to your morning blood sugar. Would like the morning blood sugar to be between 110 and 140.   Please call the office nurse if you need assistance 6 Adjustable Dose Pre-filled Pen Syringe 0    isosorbide mononitrate ER (IMDUR) 30 mg tablet TAKE 1 TABLET BY MOUTH EVERY DAY IN THE MORNING 90 Tablet 0    Farxiga 10 mg tab tablet TAKE 1 TABLET BY MOUTH EVERY DAY 30 Tablet 1    oxybutynin (DITROPAN) 5 mg tablet TAKE 1 TABLET BY MOUTH THREE TIMES A  Tablet 3    cholecalciferol (VITAMIN D3) (2,000 UNITS /50 MCG) cap capsule TAKE 1 CAPSULE BY MOUTH EVERY DAY 90 Capsule 2    thiamine HCL (B-1) 100 mg tablet TAKE 1 TABLET BY MOUTH EVERY DAY 90 Tablet 1    spironolactone (ALDACTONE) 25 mg tablet TAKE 1 TABLET BY MOUTH EVERY DAY 90 Tablet 1    atorvastatin (LIPITOR) 40 mg tablet TAKE 1 TABLET BY MOUTH EVERY DAY 90 Tablet 3    melatonin 3 mg tablet TAKE 1 TABLET BY MOUTH EVERY NIGHT AS NEEDED FOR INSOMNIA. 90 Tablet 0    FreeStyle Nathaniel 14 Day Sensor kit USE TO CHECK BG 3X A DAY AND AS NEEDED. DX  E11.69      allopurinoL (ZYLOPRIM) 100 mg tablet Take 1 Tablet by mouth daily. 30 Tablet 11    Insulin Needles, Disposable, (BD Ultra-Fine Short Pen Needle) 31 gauge x 5/16\" ndle USE WITH INSULIN PENS TWICE DAILY AS DIRECTED 100 Pen Needle 11    calcitRIOL (RocaltroL) 0.25 mcg capsule Take 0.25 mcg by mouth daily.  diclofenac (VOLTAREN) 1 % gel Apply 2 g to affected area two (2) times daily as needed for Pain. thin layer to joint pain      budesonide (PULMICORT) 0.25 mg/2 mL nbsp 500 mcg by Nebulization route every six (6) hours as needed for Other (wheezing shortness of breath).  fluticasone propion-salmeteroL (ADVAIR/WIXELA) 250-50 mcg/dose diskus inhaler Take 2 Puffs by inhalation two (2) times a day.  fluticasone propionate (FLONASE) 50 mcg/actuation nasal spray 2 Sprays by Both Nostrils route daily. 1 Bottle 0    predniSONE (DELTASONE) 10 mg tablet Take 20 mg by mouth daily.  (Patient not taking: Reported on 12/23/2021) 60 Tablet 0     Past Medical History:   Diagnosis Date    Arthritis     Diabetes ([address]     Heart failure ([address]     Hypertension      Past Surgical History:   Procedure Laterality Date    COLONOSCOPY N/A 3/19/2021    COLONOSCOPY performed by Edel Guerrero MD at [facility] ENDOSCOPY    HX ORTHOPAEDIC      Arthroscopy right knee    MI CARDIAC SURG PROCEDURE UNLIST      life vest     No Known Allergies      REVIEW OF SYSTEMS:  General: negative for - chills or fever  ENT: negative for - headaches, nasal congestion or tinnitus  Respiratory: negative for - cough, hemoptysis, shortness of breath or wheezing  Cardiovascular : negative for - chest pain, edema, palpitations or shortness of breath  Gastrointestinal: negative for - abdominal pain, blood in stools, heartburn or nausea/vomiting  Genito-Urinary: no dysuria, trouble voiding, or hematuria  Musculoskeletal: negative for - gait disturbance, joint pain, joint stiffness or joint swelling  Neurological: no TIA or stroke symptoms  Hematologic: no bruises, no bleeding, no swollen glands  Integument: no lumps, mole changes, nail changes or rash  Endocrine: no malaise/lethargy or unexpected weight changes      Social History     Socioeconomic History    Marital status:     Number of children: 3    Years of education: 15   Occupational History    Occupation: retired   Tobacco Use    Smoking status: Former Smoker     Packs/day: 0.25     Years: 20.00     Pack years: 5.00     Quit date: 2014     Years since quittin.0    Smokeless tobacco: Never Used   Vaping Use    Vaping Use: Never used   Substance and Sexual Activity    Alcohol use: Yes     Alcohol/week: 14.0 standard drinks     Types: 14 Cans of beer per week     Comment: occ    Drug use: No    Sexual activity: Yes     Partners: Male     Family History   Problem Relation Age of Onset    Diabetes Mother        OBJECTIVE:    Visit Vitals  BP (!) 148/73   Pulse 76   Temp 97.5 °F (36.4 °C) (Oral)   Resp 18   Ht 5' 4\" (1.626 m)   Wt 184 lb 1.6 oz (83.5 kg)   LMP  (LMP Unknown)   SpO2 96%   BMI 31.60 kg/m²     CONSTITUTIONAL: well , well nourished, appears age appropriate  EYES: perrla, eom intact  ENMT:moist mucous membranes, pharynx clear  NECK: supple.  Thyroid normal  RESPIRATORY: Chest: clear to ascultation and percussion   CARDIOVASCULAR: Heart: regular rate and rhythm  GASTROINTESTINAL: Abdomen: soft, bowel sounds active  HEMATOLOGIC: no pathological lymph nodes palpated  MUSCULOSKELETAL: Extremities: no edema, pulse 1+   INTEGUMENT: No unusual rashes or suspicious skin lesions noted. Nails appear normal.  NEUROLOGIC: non-focal exam   MENTAL STATUS: alert and oriented, appropriate affect      ASSESSMENT:  1. Uncontrolled type 2 diabetes mellitus with hyperglycemia ([address]    2. Neurogenic bladder    3. Primary hypertension    4. Panlobular emphysema ([address]    5. Dyslipidemia        PLAN:  1. From a diabetic perspective I will send her a prescription in for the sensors. I reminded her the importance of eating at the same time every day and minimizing her consumption of processed carbohydrates. 2. She does have a neurogenic bladder and I will try her on oxybutynin extended release 10 mg daily. 3. Her blood pressure is excellent, no adjustments are made. 4. She does have mild COPD and I suspect the coughing that she is doing is related to occult bronchospasm. She needs to use her bronchodilators on a regular basis. 5. She remains on her statin as prescribed. Orders Placed This Encounter    HEMOGLOBIN A1C WITH EAG    flash glucose sensor (FreeStyle Nathaniel 14 Day Sensor) kit         Follow-up and Dispositions    · Return in about 3 months (around 3/23/2022).            Carol Ann De Luna MD

## 2021-12-30 RX ORDER — PREDNISONE 10 MG/1
TABLET ORAL
Qty: 60 TABLET | Refills: 0 | Status: SHIPPED | OUTPATIENT
Start: 2021-12-30 | End: 2022-01-29

## 2022-01-03 RX ORDER — DAPAGLIFLOZIN 10 MG/1
TABLET, FILM COATED ORAL
Qty: 30 TABLET | Refills: 1 | Status: SHIPPED | OUTPATIENT
Start: 2022-01-03 | End: 2022-02-24

## 2022-01-13 ENCOUNTER — TRANSCRIBE ORDER (OUTPATIENT)
Dept: SCHEDULING | Age: 72
End: 2022-01-13

## 2022-01-13 DIAGNOSIS — R06.02 SOB (SHORTNESS OF BREATH): Primary | ICD-10-CM

## 2022-01-13 LAB
APPEARANCE UR: CLEAR
BACTERIA URNS QL MICRO: NEGATIVE /HPF
BILIRUB UR QL: NEGATIVE
COLOR UR: ABNORMAL
EPITH CASTS URNS QL MICRO: ABNORMAL /LPF
GLUCOSE UR STRIP.AUTO-MCNC: >1000 MG/DL
HGB UR QL STRIP: NEGATIVE
HYALINE CASTS URNS QL MICRO: ABNORMAL /LPF (ref 0–5)
KETONES UR QL STRIP.AUTO: NEGATIVE MG/DL
LEUKOCYTE ESTERASE UR QL STRIP.AUTO: ABNORMAL
NITRITE UR QL STRIP.AUTO: NEGATIVE
PH UR STRIP: 5 [PH] (ref 5–8)
PROT UR STRIP-MCNC: NEGATIVE MG/DL
RBC #/AREA URNS HPF: ABNORMAL /HPF (ref 0–5)
SP GR UR REFRACTOMETRY: 1.01 (ref 1–1.03)
UROBILINOGEN UR QL STRIP.AUTO: 0.2 EU/DL (ref 0.2–1)
WBC URNS QL MICRO: ABNORMAL /HPF (ref 0–4)

## 2022-01-14 ENCOUNTER — TELEPHONE (OUTPATIENT)
Dept: SLEEP MEDICINE | Age: 72
End: 2022-01-14

## 2022-01-14 NOTE — TELEPHONE ENCOUNTER
Called [facility]) to check the status on pap device. Order was on hold due to the Recall. [facility] has processed order and it has been sent to the scheduling dept. Called patient to inform.

## 2022-01-17 ENCOUNTER — OFFICE VISIT (OUTPATIENT)
Dept: INTERNAL MEDICINE CLINIC | Age: 72
End: 2022-01-17
Payer: MEDICARE

## 2022-01-17 VITALS
DIASTOLIC BLOOD PRESSURE: 72 MMHG | HEIGHT: 64 IN | RESPIRATION RATE: 16 BRPM | SYSTOLIC BLOOD PRESSURE: 149 MMHG | OXYGEN SATURATION: 95 % | WEIGHT: 184.4 LBS | TEMPERATURE: 98.5 F | HEART RATE: 54 BPM | BODY MASS INDEX: 31.48 KG/M2

## 2022-01-17 DIAGNOSIS — Z13.31 SCREENING FOR DEPRESSION: ICD-10-CM

## 2022-01-17 DIAGNOSIS — Z79.4 CONTROLLED TYPE 2 DIABETES MELLITUS WITHOUT COMPLICATION, WITH LONG-TERM CURRENT USE OF INSULIN (HCC): ICD-10-CM

## 2022-01-17 DIAGNOSIS — I10 PRIMARY HYPERTENSION: ICD-10-CM

## 2022-01-17 DIAGNOSIS — N31.9 NEUROGENIC BLADDER: Primary | ICD-10-CM

## 2022-01-17 DIAGNOSIS — G63 POLYNEUROPATHY ASSOCIATED WITH UNDERLYING DISEASE (HCC): ICD-10-CM

## 2022-01-17 DIAGNOSIS — Z00.00 WELLNESS EXAMINATION: ICD-10-CM

## 2022-01-17 DIAGNOSIS — I42.8 NICM (NONISCHEMIC CARDIOMYOPATHY) (HCC): ICD-10-CM

## 2022-01-17 DIAGNOSIS — E11.9 CONTROLLED TYPE 2 DIABETES MELLITUS WITHOUT COMPLICATION, WITH LONG-TERM CURRENT USE OF INSULIN (HCC): ICD-10-CM

## 2022-01-17 PROCEDURE — 3017F COLORECTAL CA SCREEN DOC REV: CPT | Performed by: INTERNAL MEDICINE

## 2022-01-17 PROCEDURE — 99214 OFFICE O/P EST MOD 30 MIN: CPT | Performed by: INTERNAL MEDICINE

## 2022-01-17 PROCEDURE — 3046F HEMOGLOBIN A1C LEVEL >9.0%: CPT | Performed by: INTERNAL MEDICINE

## 2022-01-17 PROCEDURE — 1090F PRES/ABSN URINE INCON ASSESS: CPT | Performed by: INTERNAL MEDICINE

## 2022-01-17 PROCEDURE — G8510 SCR DEP NEG, NO PLAN REQD: HCPCS | Performed by: INTERNAL MEDICINE

## 2022-01-17 PROCEDURE — 1101F PT FALLS ASSESS-DOCD LE1/YR: CPT | Performed by: INTERNAL MEDICINE

## 2022-01-17 PROCEDURE — G0439 PPPS, SUBSEQ VISIT: HCPCS | Performed by: INTERNAL MEDICINE

## 2022-01-17 PROCEDURE — 2022F DILAT RTA XM EVC RTNOPTHY: CPT | Performed by: INTERNAL MEDICINE

## 2022-01-17 PROCEDURE — G8753 SYS BP > OR = 140: HCPCS | Performed by: INTERNAL MEDICINE

## 2022-01-17 PROCEDURE — G8754 DIAS BP LESS 90: HCPCS | Performed by: INTERNAL MEDICINE

## 2022-01-17 PROCEDURE — G8427 DOCREV CUR MEDS BY ELIG CLIN: HCPCS | Performed by: INTERNAL MEDICINE

## 2022-01-17 PROCEDURE — G8399 PT W/DXA RESULTS DOCUMENT: HCPCS | Performed by: INTERNAL MEDICINE

## 2022-01-17 PROCEDURE — G8536 NO DOC ELDER MAL SCRN: HCPCS | Performed by: INTERNAL MEDICINE

## 2022-01-17 PROCEDURE — G8417 CALC BMI ABV UP PARAM F/U: HCPCS | Performed by: INTERNAL MEDICINE

## 2022-01-17 PROCEDURE — G9899 SCRN MAM PERF RSLTS DOC: HCPCS | Performed by: INTERNAL MEDICINE

## 2022-01-17 NOTE — PROGRESS NOTES
Chief Complaint   Patient presents with   t Annual Wellness Visit         1. Have you been to the ER, urgent care clinic since your last visit? Hospitalized since your last visit? No    2. Have you seen or consulted any other health care providers outside of the [address] [place] [address] since your last visit? Include any pap smears or colon screening.  No

## 2022-01-19 NOTE — TELEPHONE ENCOUNTER
Patient calling to ask when [facility] next visit will be. Anita GUTEIRREZ     Called [facility] nurse states that patients next visit is today around 1:30pm.       Called patient using two patient identifiers. Notified patient that [facility] nurse will come around 1:30 today. Patient stated understanding and had no further questions.  Reinaldo Wagner RN [[email or web address]] : Yuan Márquez is a 46 year old female with history of on and off hypertension not on medications comes for cardiac evaluation. Denies any chest pain or shortness of breath. No palpitations. Takes Calcium tablets and some Asthma medication, does not remember the name. Says eats healthy. Occasional on and off brief episodes of dizziness . No syncope. Since last visit she cut down on salt intake and BP gets to low 90' s in the evening and feels week. Does not drink much water.

## 2022-01-27 RX ORDER — CEFUROXIME AXETIL 500 MG/1
500 TABLET ORAL 2 TIMES DAILY
Qty: 14 TABLET | Refills: 0 | Status: SHIPPED | OUTPATIENT
Start: 2022-01-27 | End: 2022-06-28 | Stop reason: ALTCHOICE

## 2022-01-28 ENCOUNTER — TELEPHONE (OUTPATIENT)
Dept: INTERNAL MEDICINE CLINIC | Age: 72
End: 2022-01-28

## 2022-01-28 NOTE — TELEPHONE ENCOUNTER
----- Message from Matthew Red MD sent at 1/27/2022 10:31 PM EST -----  Possible urinary tract infection----antibiotic sent in

## 2022-01-29 RX ORDER — PREDNISONE 10 MG/1
TABLET ORAL
Qty: 60 TABLET | Refills: 0 | Status: SHIPPED | OUTPATIENT
Start: 2022-01-29 | End: 2022-02-24

## 2022-01-30 NOTE — PROGRESS NOTES
[address] [facility]  [place] [address]  [address] [place] [address]  Phone:  [phone]  Fax: [phone]       Chief Complaint   Patient presents with   [facility] Wellness Visit   . SUBJECTIVE:    Esther Guadalupe is a 73 y.o. female comes in for return visit complaining of increased urinary frequency. Her blood sugar has been excellent. In spite of this she voids almost every one to two hours. Sleep is disrupted because of this. Her last urinalysis did not demonstrate any evidence of urinary tract infection. Her diabetes has been doing quite well. She has had a cardiomyopathy which was not ischemic, which she has had for years, but it flared up on her most recently to the point where she had to have a chronic infusion with an inotrope. Fortunately over time function returned and she no longer is in heart failure with no suggestive symptoms of this. Her last echocardiogram demonstrated an ejection fraction of well over 50%. She remains on her cardioactive medications however, as would be appropriate given her history of recurring flare ups. She has a history of COPD, as well as dyslipidemia. Current Outpatient Medications   Medication Sig Dispense Refill    Farxiga 10 mg tab tablet TAKE 1 TABLET BY MOUTH EVERY DAY 30 Tablet 1    flash glucose sensor (FreeStyle Nathaniel 14 Day Sensor) kit Blood sugar checks before meals at bedtime and as needed 2 Kit 11    Entresto 24-26 mg tablet TAKE 1 TABLET BY MOUTH TWICE A DAY 60 Tablet 1    magnesium oxide (MAG-OX) 400 mg tablet TAKE 2 TABLETS BY MOUTH TWO (2) TIMES A DAY. 360 Tablet 2    bumetanide (BUMEX) 2 mg tablet TAKE 1 TABLET BY MOUTH EVERY DAY 90 Tablet 2    insulin glargine (LANTUS,BASAGLAR) 100 unit/mL (3 mL) inpn 30 Units by SubCUTAneous route nightly. As discussed increase your insulin according to your morning blood sugar. Would like the morning blood sugar to be between 110 and 140.   Please call the office nurse if you need assistance 6 Adjustable Dose Pre-filled Pen Syringe 0    isosorbide mononitrate ER (IMDUR) 30 mg tablet TAKE 1 TABLET BY MOUTH EVERY DAY IN THE MORNING 90 Tablet 0    oxybutynin (DITROPAN) 5 mg tablet TAKE 1 TABLET BY MOUTH THREE TIMES A  Tablet 3    cholecalciferol (VITAMIN D3) (2,000 UNITS /50 MCG) cap capsule TAKE 1 CAPSULE BY MOUTH EVERY DAY 90 Capsule 2    thiamine HCL (B-1) 100 mg tablet TAKE 1 TABLET BY MOUTH EVERY DAY 90 Tablet 1    spironolactone (ALDACTONE) 25 mg tablet TAKE 1 TABLET BY MOUTH EVERY DAY 90 Tablet 1    atorvastatin (LIPITOR) 40 mg tablet TAKE 1 TABLET BY MOUTH EVERY DAY 90 Tablet 3    melatonin 3 mg tablet TAKE 1 TABLET BY MOUTH EVERY NIGHT AS NEEDED FOR INSOMNIA. 90 Tablet 0    FreeStyle Nathaniel 14 Day Sensor kit USE TO CHECK BG 3X A DAY AND AS NEEDED. DX  E11.69      allopurinoL (ZYLOPRIM) 100 mg tablet Take 1 Tablet by mouth daily. 30 Tablet 11    Insulin Needles, Disposable, (BD Ultra-Fine Short Pen Needle) 31 gauge x 5/16\" ndle USE WITH INSULIN PENS TWICE DAILY AS DIRECTED 100 Pen Needle 11    calcitRIOL (RocaltroL) 0.25 mcg capsule Take 0.25 mcg by mouth daily.  diclofenac (VOLTAREN) 1 % gel Apply 2 g to affected area two (2) times daily as needed for Pain. thin layer to joint pain      budesonide (PULMICORT) 0.25 mg/2 mL nbsp 500 mcg by Nebulization route every six (6) hours as needed for Other (wheezing shortness of breath).  fluticasone propion-salmeteroL (ADVAIR/WIXELA) 250-50 mcg/dose diskus inhaler Take 2 Puffs by inhalation two (2) times a day.  fluticasone propionate (FLONASE) 50 mcg/actuation nasal spray 2 Sprays by Both Nostrils route daily. 1 Bottle 0    predniSONE (DELTASONE) 10 mg tablet TAKE 2 TABLETS BY MOUTH DAILY 60 Tablet 0    cefUROXime (CEFTIN) 500 mg tablet Take 1 Tablet by mouth two (2) times a day.  14 Tablet 0     Past Medical History:   Diagnosis Date    Arthritis     Diabetes ([address]     Heart failure [facility] [facility]     Hypertension      Past Surgical History:   Procedure Laterality Date    COLONOSCOPY N/A 3/19/2021    COLONOSCOPY performed by Caryle Almond., MD at [facility] ENDOSCOPY    HX ORTHOPAEDIC      Arthroscopy right knee    [place] CARDIAC SURG PROCEDURE UNLIST      life vest     No Known Allergies      REVIEW OF SYSTEMS:  General: negative for - chills or fever  ENT: negative for - headaches, nasal congestion or tinnitus  Respiratory: negative for - cough, hemoptysis, shortness of breath or wheezing  Cardiovascular : negative for - chest pain, edema, palpitations or shortness of breath  Gastrointestinal: negative for - abdominal pain, blood in stools, heartburn or nausea/vomiting  Genito-Urinary: no dysuria, trouble voiding, or hematuria  Musculoskeletal: negative for - gait disturbance, joint pain, joint stiffness or joint swelling  Neurological: no TIA or stroke symptoms  Hematologic: no bruises, no bleeding, no swollen glands  Integument: no lumps, mole changes, nail changes or rash  Endocrine: no malaise/lethargy or unexpected weight changes      Social History     Socioeconomic History    Marital status:     Number of children: 3    Years of education: 15   Occupational History    Occupation: retired   Tobacco Use    Smoking status: Former Smoker     Packs/day: 0.25     Years: 20.00     Pack years: 5.00     Quit date: 2014     Years since quittin.1    Smokeless tobacco: Never Used   Vaping Use    Vaping Use: Never used   Substance and Sexual Activity    Alcohol use:  Yes     Alcohol/week: 14.0 standard drinks     Types: 14 Cans of beer per week     Comment: occ    Drug use: No    Sexual activity: Yes     Partners: Male     Family History   Problem Relation Age of Onset    Diabetes Mother        OBJECTIVE:    Visit Vitals  BP (!) 149/72   Pulse (!) 54   Temp 98.5 °F (36.9 °C) (Oral)   Resp 16   Ht 5' 4\" (1.626 m)   Wt 184 lb 6.4 oz (83.6 kg)   LMP  (LMP Unknown)   SpO2 95%   BMI 31.65 kg/m²     CONSTITUTIONAL: well , well nourished, appears age appropriate  EYES: perrla, eom intact  ENMT:moist mucous membranes, pharynx clear  NECK: supple. Thyroid normal  RESPIRATORY: Chest: clear to ascultation and percussion   CARDIOVASCULAR: Heart: regular rate and rhythm  GASTROINTESTINAL: Abdomen: soft, bowel sounds active  HEMATOLOGIC: no pathological lymph nodes palpated  MUSCULOSKELETAL: Extremities: no edema, pulse 1+   INTEGUMENT: No unusual rashes or suspicious skin lesions noted. Nails appear normal.  NEUROLOGIC: non-focal exam   MENTAL STATUS: alert and oriented, appropriate affect      ASSESSMENT:  1. Neurogenic bladder    2. Polyneuropathy associated with underlying disease ([address]    3. Controlled type 2 diabetes mellitus without complication, with long-term current use of insulin ([address]    4. NICM (nonischemic cardiomyopathy) ([facility] [address]    5. Primary hypertension        PLAN:  1. The patient probably has some type of neurogenic bladder. In any event, she will be referred to urologist for continued evaluation. 2. She does have polyneuropathy. Presumably this is related to her diabetes. Symptomatic treatment for now. 3. Her diabetes has been doing quite well. No adjustment is made. 4. There are no overt signs of congestive heart failure. She does follow up with Cardiology. She has had resolution of her cardiac decompensation from her nonischemic cardiomyopathy. 5. Blood pressure is slightly elevated, but no adjustments are made for now. Johnny Angulo MD  This is the Subsequent Medicare Annual Wellness Exam, performed 12 months or more after the Initial AWV or the last Subsequent AWV    I have reviewed the patient's medical history in detail and updated the computerized patient record. Assessment/Plan   Education and counseling provided:  Are appropriate based on today's review and evaluation    1.  Neurogenic bladder  -     REFERRAL TO UROLOGY  2. Poly[facility]uropathy associated with underlying disease ([facility]  3. Controlled type 2 diabetes mellitus without complication, with long-term current use of insulin (HCC)  -     REFERRAL TO PODIATRY  4. NICM (nonischemic cardiomyopathy) ([facility]  5. Primary hypertension       Depression Risk Factor Screening     3 most recent PHQ Screens 1/17/2022   Little interest or pleasure in doing things Not at all   Feeling down, depressed, irritable, or hopeless Not at all   Total Score PHQ 2 0       Alcohol & Drug Abuse Risk Screen    Do you average more than 1 drink per night or more than 7 drinks a week:  No    On any one occasion in the past three months have you have had more than 3 drinks containing alcohol:  No          Functional Ability and Level of Safety    Hearing: Hearing is good. Activities of Daily Living: The home contains: no safety equipment. Patient does total self care      Ambulation: with no difficulty     Fall Risk:  Fall Risk Assessment, last 12 mths 1/17/2022   Able to walk? Yes   Fall in past 12 months? 0   Do you feel unsteady?  0   Are you worried about falling 0   Is the gait abnormal? -   Number of falls in past 12 months -      Abuse Screen:  Patient is not abused       Cognitive Screening    Has your family/caregiver stated any concerns about your memory: no     Cognitive Screening: Not applicable    Health Maintenance Due     Health Maintenance Due   Topic Date Due    Shingrix Vaccine Age 49> (1 of 2) Never done    Pneumococcal 65+ years (1 of 1 - PPSV23) Never done    Eye Exam Retinal or Dilated  03/16/2017    Flu Vaccine (1) 09/01/2021    Foot Exam Q1  10/08/2021    MICROALBUMIN Q1  10/08/2021       Patient Care Team   Patient Care Team:  Tony Wall MD as PCP - General (Internal Medicine)  Tony Wall [facility]  Parisa Dillon MD as Physician (Cardiology)  Nalini Vazquez MD (Inactive) as Surgeon (Orthopedic Surgery)  Cherie Wilson MD (Sleep Medicine)    History     Patient Active Problem List   Diagnosis Code    Dilated cardiomyopathy ([facility] [address] I42.0    Dyslipidemia E78.5    Gout M10.9    Alcohol abuse F10.10    Hypertension I10    Dermatitis L30.9    Primary osteoarthritis of right knee M17.11    Carpal tunnel syndrome of right wrist G56.01    Reactive airway disease J45.909    Former tobacco use--stopped 2014 after >40 yrs smoking Z87.891    Obesity (BMI 30.0-34. 9) E66.9    Epistaxis R04.0    Decreased libido R68.82    PARDO (dyspnea on exertion) R06.00    Osteoarthrosis, localized, primary, knee, right M17.11    Status post total right knee replacement Z96.651    CKD (chronic kidney disease) stage 3, GFR 30-59 ml/min ([facility]) N18.30    Uncontrolled type 2 diabetes mellitus with hyperglycemia ([facility]) U57.38    Diastolic heart failure, NYHA class 2 ([facility]) I50.30    CHF exacerbation ([facility]) I50.9    Acute on chronic systolic congestive heart failure, NYHA class 3 ([facility]) I50.23    Moderate pulmonary arterial systolic hypertension ([facility]) I27.21    Advance care planning Z71.89    Shortness of breath R06.02    NICM (nonischemic cardiomyopathy) ([facility]) I42.8    Receiving inotropic medication Z79.899    Prerenal azotemia R79.89    Panlobular emphysema ([facility]) J43.1    Angina at rest [facility]) I20.8    Controlled type 2 diabetes mellitus without complication, with long-term current use of insulin ([facility]) E11.9, Z79.4     Past Medical History:   Diagnosis Date    Arthritis     Diabetes ([facility] [facility]     Heart failure ([facility] [address]     Hypertension       Past Surgical History:   Procedure Laterality Date    COLONOSCOPY N/A 3/19/2021    COL[facility] by Darlene Boeck., MD at [facility] ENDOSCOPY    HX ORTHOPAEDIC      Arthroscopy right knee    [place] CARDIAC SURG PROCEDURE UNLIST      life vest     Current Outpatient Medications   Medication Sig Dispense Refill    Farxiga 10 mg tab tablet TAKE 1 TABLET BY MOUTH EVERY DAY 30 Tablet 1    flash glucose sensor (FreeStyle Nathaniel 14 Day Sensor) kit Blood sugar checks before meals at bedtime and as needed 2 Kit 11    Entresto 24-26 mg tablet TAKE 1 TABLET BY MOUTH TWICE A DAY 60 Tablet 1    magnesium oxide (MAG-OX) 400 mg tablet TAKE 2 TABLETS BY MOUTH TWO (2) TIMES A DAY. 360 Tablet 2    bumetanide (BUMEX) 2 mg tablet TAKE 1 TABLET BY MOUTH EVERY DAY 90 Tablet 2    insulin glargine (LANTUS,BASAGLAR) 100 unit/mL (3 mL) inpn 30 Units by SubCUTAneous route nightly. As discussed increase your insulin according to your morning blood sugar. Would like the morning blood sugar to be between 110 and 140. Please call the office nurse if you need assistance 6 Adjustable Dose Pre-filled Pen Syringe 0    isosorbide mononitrate ER (IMDUR) 30 mg tablet TAKE 1 TABLET BY MOUTH EVERY DAY IN THE MORNING 90 Tablet 0    oxybutynin (DITROPAN) 5 mg tablet TAKE 1 TABLET BY MOUTH THREE TIMES A  Tablet 3    cholecalciferol (VITAMIN D3) (2,000 UNITS /50 MCG) cap capsule TAKE 1 CAPSULE BY MOUTH EVERY DAY 90 Capsule 2    thiamine HCL (B-1) 100 mg tablet TAKE 1 TABLET BY MOUTH EVERY DAY 90 Tablet 1    spironolactone (ALDACTONE) 25 mg tablet TAKE 1 TABLET BY MOUTH EVERY DAY 90 Tablet 1    atorvastatin (LIPITOR) 40 mg tablet TAKE 1 TABLET BY MOUTH EVERY DAY 90 Tablet 3    melatonin 3 mg tablet TAKE 1 TABLET BY MOUTH EVERY NIGHT AS NEEDED FOR INSOMNIA. 90 Tablet 0    FreeStyle Nathaniel 14 Day Sensor kit USE TO CHECK BG 3X A DAY AND AS NEEDED. DX  E11.69      allopurinoL (ZYLOPRIM) 100 mg tablet Take 1 Tablet by mouth daily. 30 Tablet 11    Insulin Needles, Disposable, (BD Ultra-Fine Short Pen Needle) 31 gauge x 5/16\" ndle USE WITH INSULIN PENS TWICE DAILY AS DIRECTED 100 Pen Needle 11    calcitRIOL (RocaltroL) 0.25 mcg capsule Take 0.25 mcg by mouth daily.       diclofenac (VOLTAREN) 1 % gel Apply 2 g to affected area two (2) times daily as needed for Pain. thin layer to joint pain      budesonide (PULMICORT) 0.25 mg/2 mL nbsp 500 mcg by Nebulization route every six (6) hours as needed for Other (wheezing shortness of breath).  fluticasone propion-salmeteroL (ADVAIR/WIXELA) 250-50 mcg/dose diskus inhaler Take 2 Puffs by inhalation two (2) times a day.  fluticasone propionate (FLONASE) 50 mcg/actuation nasal spray 2 Sprays by Both Nostrils route daily. 1 Bottle 0    predniSONE (DELTASONE) 10 mg tablet TAKE 2 TABLETS BY MOUTH DAILY 60 Tablet 0    cefUROXime (CEFTIN) 500 mg tablet Take 1 Tablet by mouth two (2) times a day. 14 Tablet 0     No Known Allergies    Family History   Problem Relation Age of Onset    Diabetes Mother      Social History     Tobacco Use    Smoking status: Former Smoker     Packs/day: 0.25     Years: 20.00     Pack years: 5.00     Quit date: 2014     Years since quittin.1    Smokeless tobacco: Never Used   Substance Use Topics    Alcohol use:  Yes     Alcohol/week: 14.0 standard drinks     Types: 14 Cans of beer per week     Comment: Anup Villareal MD

## 2022-01-30 NOTE — PATIENT INSTRUCTIONS
Medicare Wellness Visit, Female     The best way to live healthy is to have a lifestyle where you eat a well-balanced diet, exercise regularly, limit alcohol use, and quit all forms of tobacco/nicotine, if applicable. Regular preventive services are another way to keep healthy. Preventive services (vaccines, screening tests, monitoring & exams) can help personalize your care plan, which helps you manage your own care. Screening tests can find health problems at the earliest stages, when they are easiest to treat. [facility] follows the current, evidence-based guidelines published by the [facility] ([facility]) when recommending preventive services for our patients. Because we follow these guidelines, sometimes recommendations change over time as research supports it. (For example, mammograms used to be recommended annually. Even though Medicare will still pay for an annual mammogram, the newer guidelines recommend a mammogram every two years for women of average risk). Of course, you and your doctor may decide to screen more often for some diseases, based on your risk and your co-morbidities (chronic disease you are already diagnosed with). Preventive services for you include:  - Medicare offers their members a free annual wellness visit, which is time for you and your primary care provider to discuss and plan for your preventive service needs. Take advantage of this benefit every year!  -All adults over the age of 72 should receive the recommended pneumonia vaccines. Current USPSTF guidelines recommend a series of two vaccines for the best pneumonia protection.   -All adults should have a flu vaccine yearly and a tetanus vaccine every 10 years.   -All adults age 48 and older should receive the shingles vaccines (series of two vaccines).       -All adults age 38-68 who are overweight should have a diabetes screening test once every three years.   -All adults born between [birth year] and 1963 should be screened once for Hepatitis C.  -Other screening tests and preventive services for persons with diabetes include: an eye exam to screen for diabetic retinopathy, a kidney function test, a foot exam, and stricter control over your cholesterol.   -Cardiovascular screening for adults with routine risk involves an electrocardiogram (ECG) at intervals determined by your doctor.   -Colorectal cancer screenings should be done for adults age 54-65 with no increased risk factors for colorectal cancer. There are a number of acceptable methods of screening for this type of cancer. Each test has its own benefits and drawbacks. Discuss with your doctor what is most appropriate for you during your annual wellness visit. The different tests include: colonoscopy (considered the best screening method), a fecal occult blood test, a fecal DNA test, and sigmoidoscopy.    -A bone mass density test is recommended when a woman turns 65 to screen for osteoporosis. This test is only recommended one time, as a screening. Some providers will use this same test as a disease monitoring tool if you already have osteoporosis. -Breast cancer screenings are recommended every other year for women of normal risk, age 54-69.  -Cervical cancer screenings for women over age 72 are only recommended with certain risk factors.      Here is a list of your current Health Maintenance items (your personalized list of preventive services) with a due date:  Health Maintenance Due   Topic Date Due    Shingles Vaccine (1 of 2) Never done    Pneumococcal Vaccine (1 of 1 - PPSV23) Never done    Eye Exam  03/16/2017    Yearly Flu Vaccine (1) 09/01/2021    Diabetic Foot Care  10/08/2021    Albumin Urine Test  10/08/2021

## 2022-02-04 RX ORDER — INSULIN GLARGINE 100 [IU]/ML
INJECTION, SOLUTION SUBCUTANEOUS
Qty: 15 ADJUSTABLE DOSE PRE-FILLED PEN SYRINGE | Refills: 1 | Status: SHIPPED | OUTPATIENT
Start: 2022-02-04

## 2022-02-22 RX ORDER — SACUBITRIL AND VALSARTAN 24; 26 MG/1; MG/1
TABLET, FILM COATED ORAL
Qty: 60 TABLET | Refills: 1 | Status: SHIPPED | OUTPATIENT
Start: 2022-02-22 | End: 2022-04-25 | Stop reason: SDUPTHER

## 2022-02-24 DIAGNOSIS — I50.20 NYHA CLASS 4 HEART FAILURE WITH REDUCED EJECTION FRACTION (HCC): ICD-10-CM

## 2022-02-24 RX ORDER — DAPAGLIFLOZIN 10 MG/1
TABLET, FILM COATED ORAL
Qty: 30 TABLET | Refills: 1 | Status: SHIPPED | OUTPATIENT
Start: 2022-02-24 | End: 2022-07-25

## 2022-02-27 NOTE — PROGRESS NOTES
Hospital Progress Note    NAME:  Frandy Couch   :   1950   MRN:  [id number]     Date/Time:  3/14/2021 8:07 PM    Plan:   1. Management /AHF  Risk of Deterioration: Low  []           Moderate  [x]           High  []                 Assessment:   # Acute on chronic severe systolic congestive heart failure, NYHA class 3 ([facility] [address] (3/11/2021). EF <15%, NYHA IV with cardiogenic shock, dobutamin gtt  # Dilated cardiomyopathy ([facility] [address] (2014), Nonischemic. S/p BiV AICD  -Cardiology and AHF teams on board  -Lasix gtt transitioned to IV Bumex 2mg BID per AHF team 3/13  -I/O -3760  -Continue Dobutamine gtt  -Monitor BMP/CMP closely, stable  -RHC early next week for further eval after diuresis  -Consider LVAD before discharge per AHF/ cardiology teams  -PFTs per AHF team  -Palliative care consult  -No BB while on Dobutamine GTT  -Holding ACE/ARB/ARNi/MRA per AHF team in anticipation of surgery  -Started on Spironolactone 12.5mg daily per AHF team 3/13  -KCl and MgOx supplementation uptitration per AHF team  -ASA defered to primary cardiology team  -IV Venofer infusions 200mg x 2 doses  -Hold statin for elevated LFTs  -High dose Vit D weekly  -6MWT per PT    # Dyslipidemia (2014)  -LDL 73 on 10/20  -Per cardiology    # Hypertension (2014)  -Currently on Lasix GTT with pressor support from Dobutamine GTT  -ACE/ARB/BB on hold.  Defer to cardiology    # Obesity (BMI 30.0-34.9) (11/15/2016)  -Lifestyle modifications    # CKD (chronic kidney disease) stage 3, GFR 30-59 ml/min (2019)  -Overview:  negative lexiscan  - LVVH, LAE, otherwise normal echo    # Uncontrolled type 2 diabetes mellitus with hyperglycemia ([facility] [address] (2020), better controlled  -A1c 7.6  -Continue lantus 30U daily  -SSU humalog AC-HS    # Moderate pulmonary arterial systolic hypertension ([address] (3/11/2021)    # Hypokalemia (3/11/2021)  -Replete and monitor as appropriate  -KCl PO 40mEq x 2, Q4h apart today    # Hypomagnesemia (3/11/2021)  -On PO Mag Ox  -Will give IV Magnesium Sulfate 2gm once  -Mg daily monitoring    Case discussed with Pt/ family and nurse  Dispo: >48hrs, pending RHC early next week and possible LVAD before discharge    Per AHF team, Recommend at discharge:   · Lifevest if patient full code  · Vaccinations for flu, pneumonia and covid  · Referral to sleep medicine for sleep study     Admitting notes:71 y.o. female who presents with CHF and transferred from [facility] for evaluation by [facility].      Alexia Aaron is a 73 y.o.   female with PMH of above mentioned problems who presents to ED from her cardiologist office with progressive shortness of breath and cough.  Patient states that she has been having progressive shortness of breath for past couple of weeks and for the past 10 days it has gotten to a point that she cannot walk more than 10 today before catching her breath.  She also endorsed that she is having more cough than usual.  Patient also endorsed PND and orthopnea.  Denies any swelling of her legs.  Patient states that she has been taking her medication as prescribed but she denied using any beta-blocker or Aldactone for heart failure.  Patient is unsure whether she had cardiac angiogram done in the past but she said she been diagnosed with heart failure since 2005.  Patient was a heavy smoker in the past and smoked pack a day for more than 15 years but she has quit smoking 6 years ago. Crissy Leon drink alcohol and denies any recent drug use.  Patient denies any fever chills diarrhea belly pain chest pain.        The patient denies any fever, chills, chest pain, cough, congestion, recent illness, palpitations, or dysuria. Subjective: Follow up NICM, severe acute on chronic systolic heart failure with fluid overload. Patient seen and examined at the bedside. Labs, images and notes reviewed  Discussed with nursing staff, orders reviewed. Plan discussed with patient/Family. Feeling ok. Diuresing well. Some feet cramps. No CP, SOB. No other concerns. No overnight events. 11 Point Review of Systems:   Negative except no cp,swelling    []            Unable to obtain ROS due to:       []            mental status change []            sedated []            intubated     Social History     Tobacco Use    Smoking status: Former Smoker     Packs/day: 0.25     Years: 20.00     Pack years: 5.00     Quit date: 2014     Years since quittin.2    Smokeless tobacco: Never Used   Substance Use Topics    Alcohol use:  Yes     Alcohol/week: 0.0 standard drinks     Comment: 21-24 Beers per week     Medications reviewed:  Current Facility-Administered Medications   Medication Dose Route Frequency    magnesium sulfate 1 g/100 ml IVPB (premix or compounded)  1 g IntraVENous ONCE    bumetanide (BUMEX) tablet 2 mg  2 mg Oral BID    potassium chloride SR (KLOR-CON 10) tablet 40 mEq  40 mEq Oral BID    digoxin (LANOXIN) tablet 0.125 mg  0.125 mg Oral DAILY    ergocalciferol capsule 50,000 Units  50,000 Units Oral Q7D    bumetanide (BUMEX) injection 2 mg  2 mg IntraVENous BID    spironolactone (ALDACTONE) tablet 12.5 mg  12.5 mg Oral DAILY    hydrALAZINE (APRESOLINE) tablet 10 mg  10 mg Oral TID    isosorbide dinitrate (ISORDIL) tablet 10 mg  10 mg Oral TID    DOBUTamine (DOBUTREX) 500 mg/250 mL (2,000 mcg/mL) infusion  5 mcg/kg/min IntraVENous CONTINUOUS    magnesium oxide (MAG-OX) tablet 800 mg  800 mg Oral BID    sodium chloride (NS) flush 5-40 mL  5-40 mL IntraVENous Q8H    sodium chloride (NS) flush 5-40 mL  5-40 mL IntraVENous PRN    acetaminophen (TYLENOL) tablet 650 mg  650 mg Oral Q6H PRN    Or    acetaminophen (TYLENOL) suppository 650 mg  650 mg Rectal Q6H PRN    polyethylene glycol (MIRALAX) packet 17 g  17 g Oral DAILY PRN    promethazine (PHENERGAN) tablet 12.5 mg  12.5 mg Oral Q6H PRN    Or    ondansetron (ZOFRAN) injection 4 mg  4 mg IntraVENous Q6H PRN    sodium chloride (NS) flush 5-40 mL  5-40 mL IntraVENous Q8H    glucose chewable tablet 16 g  4 Tab Oral PRN    dextrose (D50W) injection syrg 12.5-25 g  25-50 mL IntraVENous PRN    glucagon (GLUCAGEN) injection 1 mg  1 mg IntraMUSCular PRN    insulin glargine (LANTUS) injection 30 Units  30 Units SubCUTAneous DAILY    insulin lispro (HUMALOG) injection 1-10 Units  1-10 Units SubCUTAneous AC&HS    oxybutynin (DITROPAN) tablet 5 mg  5 mg Oral TID    budesonide (PULMICORT) 500 mcg/2 ml nebulizer suspension  500 mcg Nebulization BID RT    arformoteroL (BROVANA) neb solution 15 mcg  15 mcg Nebulization BID RT    albuterol-ipratropium (DUO-NEB) 2.5 MG-0.5 MG/3 ML  3 mL Nebulization Q4H PRN    guaiFENesin ER (MUCINEX) tablet 600 mg  600 mg Oral Q12H    thiamine HCL (B-1) tablet 100 mg  100 mg Oral DAILY    melatonin tablet 3 mg  3 mg Oral QHS PRN    zolpidem (AMBIEN) tablet 5 mg  5 mg Oral QHS PRN        Objective:   Vitals:  Visit Vitals  /70   Pulse (!) 101   Temp 97.6 °F (36.4 °C)   Resp 18   Ht 5' 4\" (1.626 m)   Wt 88.4 kg (194 lb 14.2 oz)   SpO2 96%   BMI 33.45 kg/m²     Temp (24hrs), Av.2 °F (36.8 °C), Min:97.6 °F (36.4 °C), Max:98.4 °F (36.9 °C)    O2 Flow Rate (L/min): 1 l/min O2 Device: Room air    Last 24hr Input/Output:    Intake/Output Summary (Last 24 hours) at 3/14/2021 1006  Last data filed at 3/13/2021 1811  Gross per 24 hour   Intake --   Output 2300 ml   Net -2300 ml        PHYSICAL EXAM:  General:          Alert, cooperative, no distress, appears stated age. HEENT:           Atraumatic, anicteric sclerae, pink conjunctivae                          No oral ulcers, mucosa moist, throat clear, dentition fair  Neck:               Supple, symmetrical  Lungs:             Clear to auscultation bilaterally. No Wheezing or Rhonchi. No rales. Chest wall:      No tenderness  No Accessory muscle use. Heart:              Regular  rhythm,  No  murmur   No edema  Abdomen:        Soft, non-tender.  Not distended. Bowel sounds normal  Extremities:     No cyanosis. No clubbing,                            Skin turgor normal, Capillary refill normal  Skin:                Not pale. Not Jaundiced  No rashes   Psych:             Not anxious or agitated. Neurologic:      Alert, moves all extremities, answers questions appropriately and responds to commands         Lab Data Reviewed:    Recent Labs     03/14/21  0346 03/13/21  0152 03/12/21  0415   WBC 6.2 6.1 5.5   HGB 11.2* 10.9* 10.4*   HCT 35.9 34.3* 33.2*    231 259     Recent Labs     03/14/21  0346 03/13/21  0152 03/12/21  0415    138 138   K 3.6 3.3* 3.9    101 103   CO2 27 27 25   * 155* 160*   BUN 27* 41* 52*   CREA 0.90 0.92 1.23*   CA 8.6 8.4* 8.6   MG 1.8 1.6 2.3   ALB 2.9* 3.0* 2.7*   TBILI 1.4* 1.1* 0.9   * 186* 159*     Lab Results   Component Value Date/Time    Glucose (POC) 119 (H) 03/14/2021 08:26 AM    Glucose (POC) 124 (H) 03/13/2021 09:28 PM    Glucose (POC) 259 (H) 03/13/2021 05:41 PM    Glucose (POC) 260 (H) 03/13/2021 11:43 AM    Glucose (POC) 126 (H) 03/13/2021 08:05 AM     No results for input(s): PH, PCO2, PO2, HCO3, FIO2 in the last 72 hours. No results for input(s): INR, INREXT, INREXT in the last 72 hours. Radiology  Cta Chest W Or W Wo Cont    Result Date: 3/10/2021  No pulmonary embolus. Mild patchy groundglass lung opacities predominantly dependently likely reflect atelectasis or mild edema.  Dilated cardiomegaly with contrast reflux into prominent inferior vena cava and hepatic veins consistent with elevated right heart pressures and right heart failure.      ___________________________________________________  ___________________________________________________    Attending Physician: Arlon Points, MD 6

## 2022-03-01 RX ORDER — ISOSORBIDE MONONITRATE 30 MG/1
TABLET, EXTENDED RELEASE ORAL
Qty: 90 TABLET | Refills: 0 | Status: SHIPPED | OUTPATIENT
Start: 2022-03-01 | End: 2022-06-02

## 2022-03-03 ENCOUNTER — PATIENT MESSAGE (OUTPATIENT)
Dept: CASE MANAGEMENT | Age: 72
End: 2022-03-03

## 2022-03-07 NOTE — TELEPHONE ENCOUNTER
----- Message from Zahraa Hoffman sent at 3/7/2022 10:59 AM EST -----  Subject: Message to Provider    QUESTIONS  Information for Provider? patient would like you to put in for a Free   Style Zayra 2 - her insurance will cover  ---------------------------------------------------------------------------  --------------  [address]  What is the best way for the office to contact you? OK to leave message on   voicemail  Preferred Call Back Phone Number? [phone]  ---------------------------------------------------------------------------  --------------  SCRIPT ANSWERS  Relationship to Patient?  Self

## 2022-03-14 ENCOUNTER — TELEPHONE (OUTPATIENT)
Dept: CARDIOLOGY CLINIC | Age: 72
End: 2022-03-14

## 2022-03-14 NOTE — TELEPHONE ENCOUNTER
Telephone Call RE:  Appointment reminder     Outcome:     [x] Patient confirmed appointment   [] Patient rescheduled appointment for    [] Unable to reach  [] Left message             [] Other:     ---------------------             [x] Screened for [facility]

## 2022-03-16 ENCOUNTER — OFFICE VISIT (OUTPATIENT)
Dept: CARDIOLOGY CLINIC | Age: 72
End: 2022-03-16
Payer: MEDICARE

## 2022-03-16 VITALS
DIASTOLIC BLOOD PRESSURE: 70 MMHG | HEIGHT: 65 IN | TEMPERATURE: 98 F | SYSTOLIC BLOOD PRESSURE: 112 MMHG | OXYGEN SATURATION: 96 % | BODY MASS INDEX: 31.82 KG/M2 | HEART RATE: 71 BPM | WEIGHT: 191 LBS

## 2022-03-16 DIAGNOSIS — I42.8 NICM (NONISCHEMIC CARDIOMYOPATHY) (HCC): ICD-10-CM

## 2022-03-16 DIAGNOSIS — I50.32 CHRONIC HEART FAILURE WITH PRESERVED EJECTION FRACTION (HCC): Primary | ICD-10-CM

## 2022-03-16 PROCEDURE — G8427 DOCREV CUR MEDS BY ELIG CLIN: HCPCS | Performed by: NURSE PRACTITIONER

## 2022-03-16 PROCEDURE — G8536 NO DOC ELDER MAL SCRN: HCPCS | Performed by: NURSE PRACTITIONER

## 2022-03-16 PROCEDURE — G8417 CALC BMI ABV UP PARAM F/U: HCPCS | Performed by: NURSE PRACTITIONER

## 2022-03-16 PROCEDURE — G8399 PT W/DXA RESULTS DOCUMENT: HCPCS | Performed by: NURSE PRACTITIONER

## 2022-03-16 PROCEDURE — 3017F COLORECTAL CA SCREEN DOC REV: CPT | Performed by: NURSE PRACTITIONER

## 2022-03-16 PROCEDURE — 1090F PRES/ABSN URINE INCON ASSESS: CPT | Performed by: NURSE PRACTITIONER

## 2022-03-16 PROCEDURE — G8432 DEP SCR NOT DOC, RNG: HCPCS | Performed by: NURSE PRACTITIONER

## 2022-03-16 PROCEDURE — 1101F PT FALLS ASSESS-DOCD LE1/YR: CPT | Performed by: NURSE PRACTITIONER

## 2022-03-16 PROCEDURE — G8752 SYS BP LESS 140: HCPCS | Performed by: NURSE PRACTITIONER

## 2022-03-16 PROCEDURE — G9899 SCRN MAM PERF RSLTS DOC: HCPCS | Performed by: NURSE PRACTITIONER

## 2022-03-16 PROCEDURE — G8754 DIAS BP LESS 90: HCPCS | Performed by: NURSE PRACTITIONER

## 2022-03-16 PROCEDURE — 99214 OFFICE O/P EST MOD 30 MIN: CPT | Performed by: NURSE PRACTITIONER

## 2022-03-16 RX ORDER — MIRABEGRON 25 MG/1
25 TABLET, FILM COATED, EXTENDED RELEASE ORAL DAILY
COMMUNITY
Start: 2022-03-05

## 2022-03-16 NOTE — PATIENT INSTRUCTIONS
Medication changes:    No changes to your medications      Testing Ordered: An order for an echocardiogram has been placed to be done in May. You will be receiving an automated call from Coordination of Care to schedule this test. If you are unavailable to receive the call or would like to contact coordination of care yourself you may contact [phone] to schedule. You will need to contact coordination of care yourself if you miss their calls as they will only make 3 attempts to reach you. Other Recommendations:      Ensure your drinking an adequate amount of water with a goal of 6-8 eight ounce glasses (1.5-2 liters) of fluid daily. Your urine should be clear and light yellow straw colored. If your blood pressure begins to consistently run below 90/60 and/or you begin to experience dizziness or lightheadedness, please contact the [facility] at [phone]. Please monitor your weights daily upon waking and after using the bathroom. Keep a written records of your weights and bring to your next appointment. If you have a weight gain of 3 or more pounds overnight OR 5 or more pounds in one week please contact our office. Follow up in 9 months with [facility]      Thank you for allowing us the privilege of being a part of your healthcare team! Please do not hesitate to contact our office at [phone] with any questions or concerns. Virtual Heart Failure [address] invites you to learn more about heart failure and to share your questions, ideas, and experiences with others. Each month, the Heart Failure Support Group features a new educational topic and a guest speaker, followed by an interactive discussion. Our Heart Failure Nurse Navigator will moderate each session. You will be able to participate by phone, tablet or computer through [address] [place] [address].  This support group takes place on the 3rd Thursday of each month from 6:00-7:30PM. All individuals living with heart failure and their caregivers are welcome to join. If you are interested in participating, please contact us at [email or web address] and you will be sent the link to join the [employer].

## 2022-03-16 NOTE — PROGRESS NOTES
[facility] in [place], [facility] Note    Patient name: Gerrianne Cranker  Patient : 1950  Patient MRN: [id number]  Date of service: 22    Primary care physician: Aidee Hawkins MD  Primary general cardiologist:  Dr. Roberth Fuchs  Primary [facility] cardiologist: Lina Kline MD    CHIEF COMPLAINT:  Chief Complaint   Patient presents with    CHF    Urinary Incontinence         PLAN:  Was not on beta blocker due to requirement for inotropic support at time of HF diagnosis, now with normal EF and no CAD, no indication for BB  Continue entresto 24/26mg BID  Cont hydralazine 50mg TID   Cont imdur 30mg daily  Continue spironolactone 25mg daily  Continue Farxiga 10mg daily  Continue bumex 1mg daily  Continue magnesium oxide 800mg BID  Cont calcitriol 0.25mcg daily for osteopenia  Continue cholecalciferol 2000 international units daily  Continue allopurinol 100mg daily  Does not take ASA  Does not take statins, LDL 84  Continue CPAP  Labs with next visit  Repeat TTE in May- order placed  Reinforced low salt diet  Reinforced fluid restriction to 6 x 8oz glasses per day  Recommend daily walking at least 30 minutes   Counseled on importance to remain abstinent from alcohol  Advanced care plan present on file  Follow-up with Dr. Roberth Fuchs- scheduled for , will try to alternate visits; we will see her again in Dec, and then annually as long as everything remains stable  Follow-up with PCP, podiatrist and ophthalmologist for diabetes  Recommend flu and PNA vaccines   Up to date covid vaccine     [facility]MD [facility], 9 months     IMPRESSION:  HFpEF  · Stage C, NYHA class I   · Non-ischemic cardiomyopathy, with recovery of severe biventricular failure while on inotropic support  · LVEF 55-60% (by echo 2021), LVEF 40-55% (by echo 2021) from LVEF 15-20% (by echo 3/9/21) from 20-25% (by echo 2021)  · RV dysfunction, moderate-severe on diagnosis; normalized by echo (5/2021)  · Weaned off inotropes (from 1/2021 to 5/2021); competed LVAD workup  · Normal cors (by [facility] 3/11/21)  · Severe pulmonary artery hypertension, PVR 4  Cardiac risk factors  · Morbid obesity (BMI 32)  · High risk for SHARLENE  · DM2 (hgba1c 6.8)  · HL  · HTN  · Former tobacco (stopped 2014 after > 40 years)  · H/o alcohol abuse  OA, right knee  Carpal tunnel syndrome  Gout  Anemia, iron deficiency  · C-scope (3/2021 s/p polyp removal, plan to repeat C-scope 2024)  Hypokalemia/hypomagensemia  Vitamin D deficincy    CARDIAC IMAGING:  RHC 3/17/21  PA 47/20 (29), RA 7, PCWP 10, CI 2.93  RHC (3/11/21) 25, PA 49/27/37, W 34, Allen CI 1.34     Echo (5/14/21) LVEF 55-60%, LVEDD 4.9cm  Echo (4/8/21) LVEF 50-55%, LIVDd 5.04cm, Tapse 2.38cm, RVIDd 2.99cm  Echo (3/9/21) LVEF 15-20%, mild-moderate MR, severely elevated CVP, IVC > 21mm  Echo (1/29/21) LVEF 20-25%, moderately to severely reduced RV function  Echo (11/20/19) LVEF 51-55%  Echo (12/18/18) LVEF 50-55%  Echo (11/23/16) LVEF 45-55%     EKG (3/9/21) ST 103bpm, QRS 84ms  NST (5/2018) no scar or ischemia, LVEF 48%  · Normal cors (by [facility] 3/11/21)      6 Min Walk Report 6/18/2021 5/17/2021 4/21/2021 3/15/2021   (PRE) HR 68 71 86 88   (PRE) O2 Sat 99 99 98 100   (POST) HR 97 82 102 87   (POST) O2 Sat 95 97 96 98   Distance in Meters 364.51 355.92 267.31 100.74         HISTORY OF PRESENT ILLNESS:  I had the pleasure of seeing Millican in [address] [place] [address] at [facility] in [place]. Briefly, Galen Najera is a 81 y.o. female with h/o obesity (BMI 30), high risk for SHARLENE, DM2, HL, HTN, former tobacco (stopped 2014 after > 40 years), h/o alcohol use, iron deficiency anemia, CKD stage 2, COPD/reactive airway disease.  Patient initial diagnosis of heart failure occurred 2007 she was previously followed by Dr. Mel Hicks at [facility] up until 2015 where she changed to Dr. Nigel Darden due to insurance coverage.  Her ejection fraction at [facility] in 2015 was 45-50%, at the time she had been maintained on high-dose diuretics to maintain a euvolemic state, metoprolol 100 mg daily as well as lisinopril 20 mg daily.  Ms. Jeannine Wolfe had been doing well on guideline directed medical therapy, she was on significant doses of diuretics to include upwards of 120 mg of furosemide along with 5 mg of metolazone daily. Hollis Scheuermann has been recommended since she had maintained a euvolemic state and essentially near normal ejection fraction that reducing and/or eliminating metolazone due to its potential for renal decline. Patient was admitted 3/10-3/20/21 with 2 weeks h/o severe dyspnea, hypotension and NYHA class IV symptoms. Patient was found to have LVEF < 15% and was diuresed and initiated on home inotrope therapy with dobutamine. Workup for LVAD-DT was completed on this admission and patient discharge home on inotropes and with lifevest for a 3 month period of observation. The plan was that if she does not recover LVEF within three months she will undergo LVAD implatnation. During this period of time has heart function has recovered to normal on GDMT. Symptoms improved to stage C, NYHA class II recovered from stage D class IV symptoms. She was confirmed to have non-ischemic cardiomyopathy, with recovery of severe biventricular failure while on inotropic support; LVEF 55-60% (by echo 5/2021), LVEF 40-55% (by echo 4/2021) from LVEF 15-20% (by echo 3/9/21) from 20-25% (by echo 1/2021) from 51-55% (by echo 11/2019). RV dysfunction, moderate-severe on diagnosis normalized by echo to normal LV function (5/2021). Hypotension with low cardiac index at rest 1.3 on chronic dobutamine recovered; GDMT was introduced and she was weaned off inotropes (from 1/2021 to 5/2021). Normal cors (by [address] 3/11/21). INTERVAL HISTORY:  Today, patient presents for routine clinic visit.     she reports doing well.  she is able to walk around her block, or around the grocery store without SOB, dizziness CP or fatigue. she walked to our clinic from parking garage without having to slow down or stop.  she can walk one flight of stairs without symptoms of fatigue or shortness of breath or chest pain. Patient can perform home activities without problem. she denies other cardiac symptoms such as chest pain or leg pain with walking. Patient denies symptoms of volume overload or leg edema. Reports a good appetite. she denies abdominal bloating, nausea or early satiety. Patient's weight remained stable. she denies orthopnea, PND or nocturia. Denies irregular heart rate or palpitations. No presyncope or syncope. she is compliant with fluid restriction and taking medications as prescribed. Patient manages his own medications. REVIEW OF SYSTEMS:  Review of Systems   Constitutional: Negative for chills, fever and weight loss. Respiratory: Negative for cough and shortness of breath. Cardiovascular: Negative for chest pain, palpitations, orthopnea and leg swelling. Gastrointestinal: Negative for abdominal pain, constipation, diarrhea, heartburn, nausea and vomiting. Neurological: Negative for dizziness and weakness. PHYSICAL EXAM:  Visit Vitals  /70   Pulse 71   Temp 98 °F (36.7 °C) (Oral)   Ht 5' 5\" (1.651 m)   Wt 191 lb (86.6 kg)   LMP  (LMP Unknown)   SpO2 96%   BMI 31.78 kg/m²     Physical Exam  Vitals reviewed. Constitutional:       General: She is not in acute distress. Appearance: Normal appearance. HENT:      Head: Normocephalic and atraumatic. Neck:      Vascular: No hepatojugular reflux or JVD. Cardiovascular:      Rate and Rhythm: Normal rate and regular rhythm. Pulses: Normal pulses. Heart sounds: Normal heart sounds. No murmur heard. No friction rub. No gallop. Pulmonary:      Effort: Pulmonary effort is normal. No respiratory distress. Breath sounds: Normal breath sounds.    Abdominal:      General: Bowel sounds are normal. There is no distension. Palpations: Abdomen is soft. Tenderness: There is no abdominal tenderness. Musculoskeletal:      Right lower leg: No edema. Left lower leg: No edema. Skin:     General: Skin is warm and dry. Capillary Refill: Capillary refill takes less than 2 seconds. Neurological:      General: No focal deficit present. Mental Status: She is alert and oriented to person, place, and time. Psychiatric:         Mood and Affect: Mood normal.         Behavior: Behavior normal.         Thought Content: Thought content normal.         Judgment: Judgment normal.            PAST MEDICAL HISTORY:  Past Medical History:   Diagnosis Date    Arthritis     Diabetes ([facility]     Heart failure ([facility]     Hypertension        PAST SURGICAL HISTORY:  Past Surgical History:   Procedure Laterality Date    COLONOSCOPY N/A 3/19/2021    COLON[facility] by [facility]ah [facility] at [facility]OSCOPY    HX ORTHOPAEDIC      Arthroscopy right knee    [place] CARDIAC SURG PROCEDURE UNLIST      life vest       FAMILY HISTORY:  Family History   Problem Relation Age of Onset    Diabetes Mother        SOCIAL HISTORY:  Social History     Socioeconomic History    Marital status:     Number of children: 3    Years of education: 15   Occupational History    Occupation: retired   Tobacco Use    Smoking status: Former Smoker     Packs/day: 0.25     Years: 20.00     Pack years: 5.00     Quit date: 2014     Years since quittin.2    Smokeless tobacco: Never Used   Vaping Use    Vaping Use: Never used   Substance and Sexual Activity    Alcohol use: Yes     Alcohol/week: 14.0 standard drinks     Types: 14 Cans of beer per week     Comment: occ    Drug use: No    Sexual activity: Yes     Partners: Male       LABORATORY RESULTS:  No flowsheet data found.     ALLERGY:  No Known Allergies     CURRENT MEDICATIONS:    Current Outpatient Medications:     Myrbetriq 25 mg ER tablet, Take 25 mg by mouth daily. , Disp: , Rfl:     isosorbide mononitrate ER (IMDUR) 30 mg tablet, TAKE 1 TABLET BY MOUTH EVERY DAY IN THE MORNING, Disp: 90 Tablet, Rfl: 0    Farxiga 10 mg tab tablet, TAKE 1 TABLET BY MOUTH EVERY DAY, Disp: 30 Tablet, Rfl: 1    Entresto 24-26 mg tablet, TAKE 1 TABLET BY MOUTH TWICE A DAY, Disp: 60 Tablet, Rfl: 1    cefUROXime (CEFTIN) 500 mg tablet, Take 1 Tablet by mouth two (2) times a day., Disp: 14 Tablet, Rfl: 0    magnesium oxide (MAG-OX) 400 mg tablet, TAKE 2 TABLETS BY MOUTH TWO (2) TIMES A DAY., Disp: 360 Tablet, Rfl: 2    bumetanide (BUMEX) 2 mg tablet, TAKE 1 TABLET BY MOUTH EVERY DAY, Disp: 90 Tablet, Rfl: 2    oxybutynin (DITROPAN) 5 mg tablet, TAKE 1 TABLET BY MOUTH THREE TIMES A DAY, Disp: 270 Tablet, Rfl: 3    cholecalciferol (VITAMIN D3) (2,000 UNITS /50 MCG) cap capsule, TAKE 1 CAPSULE BY MOUTH EVERY DAY, Disp: 90 Capsule, Rfl: 2    thiamine HCL (B-1) 100 mg tablet, TAKE 1 TABLET BY MOUTH EVERY DAY, Disp: 90 Tablet, Rfl: 1    spironolactone (ALDACTONE) 25 mg tablet, TAKE 1 TABLET BY MOUTH EVERY DAY, Disp: 90 Tablet, Rfl: 1    atorvastatin (LIPITOR) 40 mg tablet, TAKE 1 TABLET BY MOUTH EVERY DAY, Disp: 90 Tablet, Rfl: 3    melatonin 3 mg tablet, TAKE 1 TABLET BY MOUTH EVERY NIGHT AS NEEDED FOR INSOMNIA., Disp: 90 Tablet, Rfl: 0    allopurinoL (ZYLOPRIM) 100 mg tablet, Take 1 Tablet by mouth daily. , Disp: 30 Tablet, Rfl: 11    Insulin Needles, Disposable, (BD Ultra-Fine Short Pen Needle) 31 gauge x 5/16\" ndle, USE WITH INSULIN PENS TWICE DAILY AS DIRECTED, Disp: 100 Pen Needle, Rfl: 11    calcitRIOL (RocaltroL) 0.25 mcg capsule, Take 0.25 mcg by mouth daily. , Disp: , Rfl:     diclofenac (VOLTAREN) 1 % gel, Apply 2 g to affected area two (2) times daily as needed for Pain. thin layer to joint pain, Disp: , Rfl:     budesonide (PULMICORT) 0.25 mg/2 mL nbsp, 500 mcg by Nebulization route every six (6) hours as needed for Other (wheezing shortness of breath). , Disp: , Rfl:     fluticasone propion-salmeteroL (ADVAIR/WIXELA) 250-50 mcg/dose diskus inhaler, Take 2 Puffs by inhalation two (2) times a day., Disp: , Rfl:     fluticasone propionate (FLONASE) 50 mcg/actuation nasal spray, 2 Sprays by Both Nostrils route daily. , Disp: 1 Bottle, Rfl: 0    predniSONE (DELTASONE) 10 mg tablet, Take 20 mg by mouth daily. Further refills from Dr. Gerri Riojas (Patient not taking: Reported on 3/16/2022), Disp: 60 Tablet, Rfl: 0    Lantus Solostar U-100 Insulin 100 unit/mL (3 mL) inpn, 30 UNITS BY SUBCUTANEOUS ROUTE NIGHTLY. AS DISCUSSED INCREASE YOUR INSULIN ACCORDING TO YOUR MORNING BLOOD SUGAR. WOULD LIKE THE MORNING BLOOD SUGAR TO BE BETWEEN 110 . PLEASE CALL THE OFFICE NURSE IF YOU NEED ASSISTANCE (Patient taking differently: Patient reports taking 50 units nightly), Disp: 15 Adjustable Dose Pre-filled Pen Syringe, Rfl: 1    flash glucose sensor (FreeStyle Nathaniel 14 Day Sensor) kit, Blood sugar checks before meals at bedtime and as needed (Patient not taking: Reported on 3/16/2022), Disp: 2 Kit, Rfl: 11    FreeStyle Nathaniel 14 Day Sensor kit, USE TO CHECK BG 3X A DAY AND AS NEEDED. DX  E11.69 (Patient not taking: Reported on 3/16/2022), Disp: , Rfl:     Thank you for your referral and allowing me to participate in this patient's care.     Ricki Farias NP  [address] [place] [address], [address]  Phone: [phone]      PATIENT CARE TEAM:  Patient Care Team:  Helen Mars MD as PCP - General (Internal Medicine)  Helen Mars MD as PCP - [facility] Provider  Jenny Suresh MD as Physician (Cardiology)  Chelle Acevedo MD (Inactive) as Surgeon (Orthopedic Surgery)  Leny Loo MD (Sleep Medicine)

## 2022-03-18 PROBLEM — I50.9 CHF EXACERBATION (HCC): Status: ACTIVE | Noted: 2021-03-09

## 2022-03-18 PROBLEM — R06.09 DOE (DYSPNEA ON EXERTION): Status: ACTIVE | Noted: 2018-05-10

## 2022-03-18 PROBLEM — M17.11 OSTEOARTHROSIS, LOCALIZED, PRIMARY, KNEE, RIGHT: Status: ACTIVE | Noted: 2019-01-21

## 2022-03-18 PROBLEM — Z79.4 CONTROLLED TYPE 2 DIABETES MELLITUS WITHOUT COMPLICATION, WITH LONG-TERM CURRENT USE OF INSULIN (HCC): Status: ACTIVE | Noted: 2022-01-17

## 2022-03-18 PROBLEM — I20.89 ANGINA AT REST: Status: ACTIVE | Noted: 2021-11-28

## 2022-03-18 PROBLEM — I20.8 ANGINA AT REST (HCC): Status: ACTIVE | Noted: 2021-11-28

## 2022-03-18 PROBLEM — I42.8 NICM (NONISCHEMIC CARDIOMYOPATHY) (HCC): Status: ACTIVE | Noted: 2021-04-01

## 2022-03-18 PROBLEM — E11.9 CONTROLLED TYPE 2 DIABETES MELLITUS WITHOUT COMPLICATION, WITH LONG-TERM CURRENT USE OF INSULIN (HCC): Status: ACTIVE | Noted: 2022-01-17

## 2022-03-19 PROBLEM — J43.1 PANLOBULAR EMPHYSEMA (HCC): Status: ACTIVE | Noted: 2021-07-09

## 2022-03-19 PROBLEM — Z79.899 RECEIVING INOTROPIC MEDICATION: Status: ACTIVE | Noted: 2021-04-01

## 2022-03-19 PROBLEM — I50.23 ACUTE ON CHRONIC SYSTOLIC CONGESTIVE HEART FAILURE, NYHA CLASS 3 (HCC): Status: ACTIVE | Noted: 2021-03-11

## 2022-03-19 PROBLEM — R04.0 EPISTAXIS: Status: ACTIVE | Noted: 2017-08-22

## 2022-03-19 PROBLEM — I27.21 MODERATE PULMONARY ARTERIAL SYSTOLIC HYPERTENSION (HCC): Status: ACTIVE | Noted: 2021-03-11

## 2022-03-19 PROBLEM — R79.89 PRERENAL AZOTEMIA: Status: ACTIVE | Noted: 2021-07-09

## 2022-03-19 PROBLEM — E11.65 UNCONTROLLED TYPE 2 DIABETES MELLITUS WITH HYPERGLYCEMIA (HCC): Status: ACTIVE | Noted: 2020-01-26

## 2022-03-19 PROBLEM — N18.30 CKD (CHRONIC KIDNEY DISEASE) STAGE 3, GFR 30-59 ML/MIN (HCC): Status: ACTIVE | Noted: 2019-07-16

## 2022-03-19 PROBLEM — Z96.651 STATUS POST TOTAL RIGHT KNEE REPLACEMENT: Status: ACTIVE | Noted: 2019-02-05

## 2022-03-20 PROBLEM — R68.82 DECREASED LIBIDO: Status: ACTIVE | Noted: 2017-10-03

## 2022-03-23 RX ORDER — SPIRONOLACTONE 25 MG/1
TABLET ORAL
Qty: 90 TABLET | Refills: 1 | Status: SHIPPED | OUTPATIENT
Start: 2022-03-23 | End: 2022-09-30

## 2022-03-25 ENCOUNTER — OFFICE VISIT (OUTPATIENT)
Dept: INTERNAL MEDICINE CLINIC | Age: 72
End: 2022-03-25
Payer: MEDICARE

## 2022-03-25 VITALS
OXYGEN SATURATION: 96 % | TEMPERATURE: 98.5 F | BODY MASS INDEX: 31.69 KG/M2 | RESPIRATION RATE: 15 BRPM | HEART RATE: 82 BPM | WEIGHT: 190.2 LBS | HEIGHT: 65 IN | DIASTOLIC BLOOD PRESSURE: 67 MMHG | SYSTOLIC BLOOD PRESSURE: 104 MMHG

## 2022-03-25 DIAGNOSIS — E11.9 CONTROLLED TYPE 2 DIABETES MELLITUS WITHOUT COMPLICATION, WITH LONG-TERM CURRENT USE OF INSULIN (HCC): ICD-10-CM

## 2022-03-25 DIAGNOSIS — J43.1 PANLOBULAR EMPHYSEMA (HCC): ICD-10-CM

## 2022-03-25 DIAGNOSIS — I42.0 DILATED CARDIOMYOPATHY (HCC): Primary | ICD-10-CM

## 2022-03-25 DIAGNOSIS — I10 PRIMARY HYPERTENSION: ICD-10-CM

## 2022-03-25 DIAGNOSIS — E78.5 DYSLIPIDEMIA: ICD-10-CM

## 2022-03-25 DIAGNOSIS — Z79.4 CONTROLLED TYPE 2 DIABETES MELLITUS WITHOUT COMPLICATION, WITH LONG-TERM CURRENT USE OF INSULIN (HCC): ICD-10-CM

## 2022-03-25 PROBLEM — E11.22 TYPE 2 DIABETES MELLITUS WITH CHRONIC KIDNEY DISEASE (HCC): Status: ACTIVE | Noted: 2022-03-25

## 2022-03-25 PROCEDURE — G8427 DOCREV CUR MEDS BY ELIG CLIN: HCPCS | Performed by: INTERNAL MEDICINE

## 2022-03-25 PROCEDURE — 99214 OFFICE O/P EST MOD 30 MIN: CPT | Performed by: INTERNAL MEDICINE

## 2022-03-25 PROCEDURE — G8536 NO DOC ELDER MAL SCRN: HCPCS | Performed by: INTERNAL MEDICINE

## 2022-03-25 PROCEDURE — G8754 DIAS BP LESS 90: HCPCS | Performed by: INTERNAL MEDICINE

## 2022-03-25 PROCEDURE — 1090F PRES/ABSN URINE INCON ASSESS: CPT | Performed by: INTERNAL MEDICINE

## 2022-03-25 PROCEDURE — 3017F COLORECTAL CA SCREEN DOC REV: CPT | Performed by: INTERNAL MEDICINE

## 2022-03-25 PROCEDURE — 3046F HEMOGLOBIN A1C LEVEL >9.0%: CPT | Performed by: INTERNAL MEDICINE

## 2022-03-25 PROCEDURE — G8510 SCR DEP NEG, NO PLAN REQD: HCPCS | Performed by: INTERNAL MEDICINE

## 2022-03-25 PROCEDURE — G8417 CALC BMI ABV UP PARAM F/U: HCPCS | Performed by: INTERNAL MEDICINE

## 2022-03-25 PROCEDURE — G8752 SYS BP LESS 140: HCPCS | Performed by: INTERNAL MEDICINE

## 2022-03-25 PROCEDURE — G9899 SCRN MAM PERF RSLTS DOC: HCPCS | Performed by: INTERNAL MEDICINE

## 2022-03-25 PROCEDURE — 2022F DILAT RTA XM EVC RTNOPTHY: CPT | Performed by: INTERNAL MEDICINE

## 2022-03-25 PROCEDURE — G8399 PT W/DXA RESULTS DOCUMENT: HCPCS | Performed by: INTERNAL MEDICINE

## 2022-03-25 PROCEDURE — 1101F PT FALLS ASSESS-DOCD LE1/YR: CPT | Performed by: INTERNAL MEDICINE

## 2022-03-25 NOTE — PROGRESS NOTES
[facility]  [place] [address]  [address] [place] [address]  Phone:  [phone]  Fax: [phone]       Chief Complaint   Patient presents with    Diabetes   . SUBJECTIVE:    Joaquin Zaldivar is a 81 y.o. female comes in for return visit stating that she has done reasonably well. She is not checking blood sugars because she wants to get the [employer]; however, she does not qualify. She has been taking her Lantus 50 units daily. She is not taking prandial insulin currently. I have no idea how her diabetic control has been. From a cardiac perspective she has had a return of function to her left ventricle. She is no longer on inotrope and is well compensated. She follows up with her cardiologist every three to four months. She has a past history of obesity, primary hypertension and dyslipidemia, as well as COPD. Fortunately, she does not smoke cigarettes currently. Current Outpatient Medications   Medication Sig Dispense Refill    spironolactone (ALDACTONE) 25 mg tablet TAKE 1 TABLET BY MOUTH EVERY DAY 90 Tablet 1    Myrbetriq 25 mg ER tablet Take 25 mg by mouth daily.  isosorbide mononitrate ER (IMDUR) 30 mg tablet TAKE 1 TABLET BY MOUTH EVERY DAY IN THE MORNING 90 Tablet 0    Farxiga 10 mg tab tablet TAKE 1 TABLET BY MOUTH EVERY DAY 30 Tablet 1    Entresto 24-26 mg tablet TAKE 1 TABLET BY MOUTH TWICE A DAY 60 Tablet 1    cefUROXime (CEFTIN) 500 mg tablet Take 1 Tablet by mouth two (2) times a day. 14 Tablet 0    magnesium oxide (MAG-OX) 400 mg tablet TAKE 2 TABLETS BY MOUTH TWO (2) TIMES A DAY.  360 Tablet 2    bumetanide (BUMEX) 2 mg tablet TAKE 1 TABLET BY MOUTH EVERY DAY 90 Tablet 2    oxybutynin (DITROPAN) 5 mg tablet TAKE 1 TABLET BY MOUTH THREE TIMES A  Tablet 3    cholecalciferol (VITAMIN D3) (2,000 UNITS /50 MCG) cap capsule TAKE 1 CAPSULE BY MOUTH EVERY DAY 90 Capsule 2    thiamine HCL (B-1) 100 mg tablet TAKE 1 TABLET BY MOUTH EVERY DAY 90 Tablet 1    atorvastatin (LIPITOR) 40 mg tablet TAKE 1 TABLET BY MOUTH EVERY DAY 90 Tablet 3    melatonin 3 mg tablet TAKE 1 TABLET BY MOUTH EVERY NIGHT AS NEEDED FOR INSOMNIA. 90 Tablet 0    allopurinoL (ZYLOPRIM) 100 mg tablet Take 1 Tablet by mouth daily. 30 Tablet 11    Insulin Needles, Disposable, (BD Ultra-Fine Short Pen Needle) 31 gauge x 5/16\" ndle USE WITH INSULIN PENS TWICE DAILY AS DIRECTED 100 Pen Needle 11    calcitRIOL (RocaltroL) 0.25 mcg capsule Take 0.25 mcg by mouth daily.  diclofenac (VOLTAREN) 1 % gel Apply 2 g to affected area two (2) times daily as needed for Pain. thin layer to joint pain      budesonide (PULMICORT) 0.25 mg/2 mL nbsp 500 mcg by Nebulization route every six (6) hours as needed for Other (wheezing shortness of breath).  fluticasone propion-salmeteroL (ADVAIR/WIXELA) 250-50 mcg/dose diskus inhaler Take 2 Puffs by inhalation two (2) times a day.  fluticasone propionate (FLONASE) 50 mcg/actuation nasal spray 2 Sprays by Both Nostrils route daily. 1 Bottle 0    Lantus Solostar U-100 Insulin 100 unit/mL (3 mL) inpn 30 UNITS BY SUBCUTANEOUS ROUTE NIGHTLY. AS DISCUSSED INCREASE YOUR INSULIN ACCORDING TO YOUR MORNING BLOOD SUGAR. WOULD LIKE THE MORNING BLOOD SUGAR TO BE BETWEEN 110 . PLEASE CALL THE OFFICE NURSE IF YOU NEED ASSISTANCE (Patient taking differently: Patient reports taking 50 units nightly) 15 Adjustable Dose Pre-filled Pen Syringe 1    flash glucose sensor (FreeStyle Nathaniel 14 Day Sensor) kit Blood sugar checks before meals at bedtime and as needed (Patient not taking: Reported on 3/16/2022) 2 Kit 11    FreeStyle Nathaniel 14 Day Sensor kit USE TO CHECK BG 3X A DAY AND AS NEEDED.  DX  E11.69 (Patient not taking: Reported on 3/16/2022)       Past Medical History:   Diagnosis Date    Arthritis     Diabetes ([address]     Heart failure ([address]     Hypertension      Past Surgical History:   Procedure Laterality Date    COLONOSCOPY N/A 3/19/2021    COLONOSCOPY [facility] by Guadalupe Eisenmenger., [facility] at [facility] ENDOSCOPY    HX ORTHOPAEDIC      Arthroscopy right knee    [place] CARDIAC SURG PROCEDURE UNLIST      life vest     No Known Allergies      REVIEW OF SYSTEMS:  General: negative for - chills or fever  ENT: negative for - headaches, nasal congestion or tinnitus  Respiratory: negative for - cough, hemoptysis, shortness of breath or wheezing  Cardiovascular : negative for - chest pain, edema, palpitations or shortness of breath  Gastrointestinal: negative for - abdominal pain, blood in stools, heartburn or nausea/vomiting  Genito-Urinary: no dysuria, trouble voiding, or hematuria  Musculoskeletal: negative for - gait disturbance, joint pain, joint stiffness or joint swelling  Neurological: no TIA or stroke symptoms  Hematologic: no bruises, no bleeding, no swollen glands  Integument: no lumps, mole changes, nail changes or rash  Endocrine: no malaise/lethargy or unexpected weight changes      Social History     Socioeconomic History    Marital status:     Number of children: 3    Years of education: 15   Occupational History    Occupation: retired   Tobacco Use    Smoking status: Former Smoker     Packs/day: 0.25     Years: 20.00     Pack years: 5.00     Quit date: 2014     Years since quittin.2    Smokeless tobacco: Never Used   Vaping Use    Vaping Use: Never used   Substance and Sexual Activity    Alcohol use:  Yes     Alcohol/week: 14.0 standard drinks     Types: 14 Cans of beer per week     Comment: occ    Drug use: No    Sexual activity: Yes     Partners: Male     Family History   Problem Relation Age of Onset    Diabetes Mother        OBJECTIVE:    Visit Vitals  /67 (BP 1 Location: Left upper arm, BP Patient Position: Sitting, BP Cuff Size: Adult)   Pulse 82   Temp 98.5 °F (36.9 °C) (Oral)   Resp 15   Ht 5' 5\" (1.651 m)   Wt 190 lb 3.2 oz (86.3 kg)   LMP  (LMP Unknown)   SpO2 96%   BMI 31.65 kg/m² CONSTITUTIONAL: well , well nourished, appears age appropriate  EYES: perrla, eom intact  ENMT:moist mucous membranes, pharynx clear  NECK: supple. Thyroid normal  RESPIRATORY: Chest: clear to ascultation and percussion   CARDIOVASCULAR: Heart: regular rate and rhythm  GASTROINTESTINAL: Abdomen: soft, bowel sounds active  HEMATOLOGIC: no pathological lymph nodes palpated  MUSCULOSKELETAL: Extremities: no edema, pulse 1+   INTEGUMENT: No unusual rashes or suspicious skin lesions noted. Nails appear normal.  NEUROLOGIC: non-focal exam   MENTAL STATUS: alert and oriented, appropriate affect      ASSESSMENT:  1. Dilated cardiomyopathy ([address]    2. Primary hypertension    3. Controlled type 2 diabetes mellitus without complication, with long-term current use of insulin (HCC)    4. Panlobular emphysema ([address]    5. Dyslipidemia        PLAN:  1. The patient's cardiac status is doing quite well. She will follow up with Cardiology. 2. Her blood pressure is excellent today, no adjustments are made. 3. Her diabetes hopefully is doing reasonably well. She probably needs a second agent, specifically GLP-1 agonist.  She is currently taking Prem Brew as it relates to her heart and technically for diabetes. 4. Her emphysema is stable. I reminded her not to resume her cigarette smoking. 5. She remains on her statin and efficacy and compliance will be assessed. .  Orders Placed This Encounter    HEMOGLOBIN A1C WITH EAG    METABOLIC PANEL, BASIC    APOLIPOPROTEIN B         Follow-up and Dispositions    · Return in about 3 months (around 6/25/2022).            Ashleigh Jean MD

## 2022-03-25 NOTE — PROGRESS NOTES
Rm    Chief Complaint   Patient presents with    Diabetes        Visit Vitals  /67 (BP 1 Location: Left upper arm, BP Patient Position: Sitting, BP Cuff Size: Adult)   Pulse 82   Temp 98.5 °F (36.9 °C) (Oral)   Resp 15   Ht 5' 5\" (1.651 m)   Wt 190 lb 3.2 oz (86.3 kg)   LMP  (LMP Unknown)   SpO2 96%   BMI 31.65 kg/m²        1. Have you been to the ER, urgent care clinic since your last visit? Hospitalized since your last visit? No    2. Have you seen or consulted any other health care providers outside of the [address] [place] [address] since your last visit? Include any pap smears or colon screening. No     Health Maintenance Due   Topic Date Due    Shingrix Vaccine Age 49> (1 of 2) Never done    Pneumococcal 65+ years (1 of 1 - PPSV23) Never done    Eye Exam Retinal or Dilated  03/16/2017    Flu Vaccine (1) 09/01/2021    Foot Exam Q1  10/08/2021    MICROALBUMIN Q1  10/08/2021    Lipid Screen  03/18/2022    Breast Cancer Screen Mammogram  04/20/2022        3 most recent PHQ Screens 3/25/2022   Little interest or pleasure in doing things Not at all   Feeling down, depressed, irritable, or hopeless Not at all   Total Score PHQ 2 0        Fall Risk Assessment, last 12 mths 3/25/2022   Able to walk? Yes   Fall in past 12 months? 1   Do you feel unsteady? 0   Are you worried about falling 0   Is TUG test greater than 12 seconds? (No Data)   Is the gait abnormal? 0   Number of falls in past 12 months 1   Fall with injury?  0       Learning Assessment 1/9/2018   PRIMARY LEARNER Patient   HIGHEST LEVEL OF EDUCATION - PRIMARY LEARNER  -   BARRIERS PRIMARY LEARNER NONE   CO-LEARNER CAREGIVER -   PRIMARY LANGUAGE ENGLISH   LEARNER PREFERENCE PRIMARY DEMONSTRATION   ANSWERED BY self   RELATIONSHIP SELF

## 2022-03-26 LAB
ANION GAP SERPL CALC-SCNC: 4 MMOL/L (ref 5–15)
BUN SERPL-MCNC: 38 MG/DL (ref 6–20)
BUN/CREAT SERPL: 36 (ref 12–20)
CALCIUM SERPL-MCNC: 9.4 MG/DL (ref 8.5–10.1)
CHLORIDE SERPL-SCNC: 104 MMOL/L (ref 97–108)
CO2 SERPL-SCNC: 28 MMOL/L (ref 21–32)
CREAT SERPL-MCNC: 1.07 MG/DL (ref 0.55–1.02)
EST. AVERAGE GLUCOSE BLD GHB EST-MCNC: 169 MG/DL
GLUCOSE SERPL-MCNC: 197 MG/DL (ref 65–100)
HBA1C MFR BLD: 7.5 % (ref 4–5.6)
POTASSIUM SERPL-SCNC: 3.6 MMOL/L (ref 3.5–5.1)
SODIUM SERPL-SCNC: 136 MMOL/L (ref 136–145)

## 2022-03-27 LAB — APO B SERPL-MCNC: 70 MG/DL

## 2022-04-25 RX ORDER — SACUBITRIL AND VALSARTAN 24; 26 MG/1; MG/1
1 TABLET, FILM COATED ORAL 2 TIMES DAILY
Qty: 60 TABLET | Refills: 3 | Status: SHIPPED | OUTPATIENT
Start: 2022-04-25 | End: 2022-09-06

## 2022-04-25 NOTE — TELEPHONE ENCOUNTER
Requested Prescriptions     Signed Prescriptions Disp Refills    sacubitriL-valsartan (Entresto) 24-26 mg tablet 60 Tablet 3     Sig: Take 1 Tablet by mouth two (2) times a day.      Authorizing Provider: Vivienne Qureshi     Ordering User: Duy Cuellar

## 2022-05-02 RX ORDER — LANOLIN ALCOHOL/MO/W.PET/CERES
CREAM (GRAM) TOPICAL
Qty: 90 TABLET | Refills: 1 | Status: SHIPPED | OUTPATIENT
Start: 2022-05-02

## 2022-06-02 DIAGNOSIS — I50.20 NYHA CLASS 4 HEART FAILURE WITH REDUCED EJECTION FRACTION (HCC): ICD-10-CM

## 2022-06-02 RX ORDER — ISOSORBIDE MONONITRATE 30 MG/1
TABLET, EXTENDED RELEASE ORAL
Qty: 90 TABLET | Refills: 0 | Status: SHIPPED | OUTPATIENT
Start: 2022-06-02 | End: 2022-08-31

## 2022-06-21 ENCOUNTER — OFFICE VISIT (OUTPATIENT)
Dept: CARDIOLOGY CLINIC | Age: 72
End: 2022-06-21
Payer: MEDICARE

## 2022-06-21 VITALS
BODY MASS INDEX: 30.99 KG/M2 | HEIGHT: 65 IN | WEIGHT: 186 LBS | HEART RATE: 75 BPM | OXYGEN SATURATION: 98 % | DIASTOLIC BLOOD PRESSURE: 57 MMHG | RESPIRATION RATE: 18 BRPM | SYSTOLIC BLOOD PRESSURE: 109 MMHG

## 2022-06-21 DIAGNOSIS — R06.09 DOE (DYSPNEA ON EXERTION): ICD-10-CM

## 2022-06-21 DIAGNOSIS — I42.8 NICM (NONISCHEMIC CARDIOMYOPATHY) (HCC): ICD-10-CM

## 2022-06-21 DIAGNOSIS — I10 ESSENTIAL HYPERTENSION: Primary | ICD-10-CM

## 2022-06-21 DIAGNOSIS — E78.5 DYSLIPIDEMIA: ICD-10-CM

## 2022-06-21 PROBLEM — N18.30 CHRONIC RENAL DISEASE, STAGE III (HCC): Status: ACTIVE | Noted: 2022-06-21

## 2022-06-21 PROCEDURE — G8427 DOCREV CUR MEDS BY ELIG CLIN: HCPCS | Performed by: SPECIALIST

## 2022-06-21 PROCEDURE — 1123F ACP DISCUSS/DSCN MKR DOCD: CPT | Performed by: SPECIALIST

## 2022-06-21 PROCEDURE — G8752 SYS BP LESS 140: HCPCS | Performed by: SPECIALIST

## 2022-06-21 PROCEDURE — G8510 SCR DEP NEG, NO PLAN REQD: HCPCS | Performed by: SPECIALIST

## 2022-06-21 PROCEDURE — 3017F COLORECTAL CA SCREEN DOC REV: CPT | Performed by: SPECIALIST

## 2022-06-21 PROCEDURE — G9899 SCRN MAM PERF RSLTS DOC: HCPCS | Performed by: SPECIALIST

## 2022-06-21 PROCEDURE — G8536 NO DOC ELDER MAL SCRN: HCPCS | Performed by: SPECIALIST

## 2022-06-21 PROCEDURE — 99213 OFFICE O/P EST LOW 20 MIN: CPT | Performed by: SPECIALIST

## 2022-06-21 PROCEDURE — G8417 CALC BMI ABV UP PARAM F/U: HCPCS | Performed by: SPECIALIST

## 2022-06-21 PROCEDURE — 1090F PRES/ABSN URINE INCON ASSESS: CPT | Performed by: SPECIALIST

## 2022-06-21 PROCEDURE — 1101F PT FALLS ASSESS-DOCD LE1/YR: CPT | Performed by: SPECIALIST

## 2022-06-21 PROCEDURE — G8754 DIAS BP LESS 90: HCPCS | Performed by: SPECIALIST

## 2022-06-21 PROCEDURE — G8399 PT W/DXA RESULTS DOCUMENT: HCPCS | Performed by: SPECIALIST

## 2022-06-21 NOTE — PROGRESS NOTES
1. Have you been to the ER, urgent care clinic since your last visit? Hospitalized since your last visit? No    2. Have you seen or consulted any other health care providers outside of the [address] [place] [address] since your last visit? Include any pap smears or colon screening. No      Chief Complaint   Patient presents with    Follow-up     6 mo appt.

## 2022-06-21 NOTE — PROGRESS NOTES
HISTORY OF PRESENT ILLNESS  Jovon Hodge is a 81 y.o. female     SUMMARY:   Problem List  Date Reviewed: 6/21/2022          Codes Class Noted    Chronic renal disease, stage III ICD-10-CM: N18.30  ICD-9-CM: 585.3  6/21/2022        Type 2 diabetes mellitus with chronic kidney disease ([facility] ICD-10-CM: E11.22  ICD-9-CM: 250.40, 585.9  3/25/2022        Controlled type 2 diabetes mellitus without complication, with long-term current use of insulin ([facility] ICD-10-CM: E11.9, Z79.4  ICD-9-CM: 250.00, V58.67  1/17/2022        Angina at rest [facility]) ICD-10-CM: I20.8  ICD-9-CM: 413.9  11/28/2021        Prerenal azotemia ICD-10-CM: R79.89  ICD-9-CM: 790.6  7/9/2021        Panlobular emphysema ([facility] ICD-10-CM: J43.1  ICD-9-CM: 492.8  7/9/2021        NICM (nonischemic cardiomyopathy) ([facility] ICD-10-CM: I42.8  ICD-9-CM: 425.4  4/1/2021        Advance care planning ICD-10-CM: Z71.89  ICD-9-CM: V65.49  Unknown        Acute on chronic systolic congestive heart failure, NYHA class 3 ([facility] ICD-10-CM: I50.23  ICD-9-CM: 428.23, 428.0  3/11/2021        Moderate pulmonary arterial systolic hypertension ([facility] ICD-10-CM: I27.21  ICD-9-CM: 416.8  3/11/2021        CHF exacerbation ([facility] ICD-10-CM: I50.9  ICD-9-CM: 428.0  5/0/0832        Diastolic heart failure, NYHA class 2 (HCC) ICD-10-CM: I50.30  ICD-9-CM: 428.30  Unknown        Uncontrolled type 2 diabetes mellitus with hyperglycemia (HCC) ICD-10-CM: E11.65  ICD-9-CM: 250.02  1/26/2020        CKD (chronic kidney disease) stage 3, GFR 30-59 ml/min ([facility]) ICD-10-CM: N18.30  ICD-9-CM: 585.3  7/16/2019    Overview Addendum 9/17/2020  8:33 AM by Tianna Grande MD     5/18 negative lexiscan  11/19 lvh, lae, otherwise normal echo             Status post total right knee replacement ICD-10-CM: M13.264  ICD-9-CM: V43.65  2/5/2019        Osteoarthrosis, localized, primary, knee, right ICD-10-CM: M17.11  ICD-9-CM: 715.16  1/21/2019        Decreased libido ICD-10-CM: D82.86  ICD-9-CM: 799.81  10/3/2017        Epistaxis ICD-10-CM: R04.0  ICD-9-CM: 784.7  8/22/2017        Former tobacco use--stopped 2014 after >40 yrs smoking ICD-10-CM: Z87.891  ICD-9-CM: V15.82  11/15/2016        Obesity (BMI 30.0-34.9) ICD-10-CM: E66.9  ICD-9-CM: 278.00  11/15/2016        Reactive airway disease ICD-10-CM: J45.909  ICD-9-CM: 493.90  1/12/2016        Carpal tunnel syndrome of right wrist ICD-10-CM: G56.01  ICD-9-CM: 354.0  12/6/2015        Primary osteoarthritis of right knee ICD-10-CM: M17.11  ICD-9-CM: 715.16  7/12/2015        Dilated cardiomyopathy ([facility] ICD-10-CM: I42.0  ICD-9-CM: 425.4  9/9/2014    Overview Signed 11/24/2019  5:40 PM by Martinez Hernandez MD     Nonischemic             Dyslipidemia ICD-10-CM: E78.5  ICD-9-CM: 272.4  9/9/2014        Gout ICD-10-CM: M10.9  ICD-9-CM: 274.9  9/9/2014        Alcohol abuse ICD-10-CM: F10.10  ICD-9-CM: 305.00  9/9/2014        Hypertension ICD-10-CM: I10  ICD-9-CM: 401.9  9/9/2014        Dermatitis ICD-10-CM: L30.9  ICD-9-CM: 692.9  9/9/2014              Current Outpatient Medications on File Prior to Visit   Medication Sig    isosorbide mononitrate ER (IMDUR) 30 mg tablet TAKE 1 TABLET BY MOUTH EVERY DAY IN THE MORNING    thiamine HCL (B-1) 100 mg tablet TAKE 1 TABLET BY MOUTH EVERY DAY    sacubitriL-valsartan (Entresto) 24-26 mg tablet Take 1 Tablet by mouth two (2) times a day.  spironolactone (ALDACTONE) 25 mg tablet TAKE 1 TABLET BY MOUTH EVERY DAY    Myrbetriq 25 mg ER tablet Take 25 mg by mouth daily.  Farxiga 10 mg tab tablet TAKE 1 TABLET BY MOUTH EVERY DAY    Lantus Solostar U-100 Insulin 100 unit/mL (3 mL) inpn 30 UNITS BY SUBCUTANEOUS ROUTE NIGHTLY. AS DISCUSSED INCREASE YOUR INSULIN ACCORDING TO YOUR MORNING BLOOD SUGAR. WOULD LIKE THE MORNING BLOOD SUGAR TO BE BETWEEN 110 .  PLEASE CALL THE OFFICE NURSE IF YOU NEED ASSISTANCE (Patient taking differently: Patient reports taking 50 units nightly)    magnesium oxide (MAG-OX) 400 mg tablet TAKE 2 TABLETS BY MOUTH TWO (2) TIMES A DAY.  bumetanide (BUMEX) 2 mg tablet TAKE 1 TABLET BY MOUTH EVERY DAY    oxybutynin (DITROPAN) 5 mg tablet TAKE 1 TABLET BY MOUTH THREE TIMES A DAY    cholecalciferol (VITAMIN D3) (2,000 UNITS /50 MCG) cap capsule TAKE 1 CAPSULE BY MOUTH EVERY DAY    atorvastatin (LIPITOR) 40 mg tablet TAKE 1 TABLET BY MOUTH EVERY DAY    melatonin 3 mg tablet TAKE 1 TABLET BY MOUTH EVERY NIGHT AS NEEDED FOR INSOMNIA.  allopurinoL (ZYLOPRIM) 100 mg tablet Take 1 Tablet by mouth daily.  Insulin Needles, Disposable, (BD Ultra-Fine Short Pen Needle) 31 gauge x 5/16\" ndle USE WITH INSULIN PENS TWICE DAILY AS DIRECTED    calcitRIOL (RocaltroL) 0.25 mcg capsule Take 0.25 mcg by mouth daily.  diclofenac (VOLTAREN) 1 % gel Apply 2 g to affected area two (2) times daily as needed for Pain. thin layer to joint pain    budesonide (PULMICORT) 0.25 mg/2 mL nbsp 500 mcg by Nebulization route every six (6) hours as needed for Other (wheezing shortness of breath).  fluticasone propion-salmeteroL (ADVAIR/WIXELA) 250-50 mcg/dose diskus inhaler Take 2 Puffs by inhalation two (2) times a day.  fluticasone propionate (FLONASE) 50 mcg/actuation nasal spray 2 Sprays by Both Nostrils route daily.  cefUROXime (CEFTIN) 500 mg tablet Take 1 Tablet by mouth two (2) times a day. (Patient not taking: Reported on 6/21/2022)     No current facility-administered medications on file prior to visit. CARDIOLOGY STUDIES TO DATE:  5/18 negative lexiscan   11/20/19   ECHO ADULT COMPLETE 11/20/2019 11/20/2019   Narrative   · Left Ventricle: Normal wall thickness. Mildly dilated left ventricle. Low normal systolic dysfunction. Estimated left ventricular ejection fraction is 51 - 55%. Visually measured ejection fraction. Inconclusive left ventricular diastolic function. · Mitral Valve: Mitral valve non-specific thickening. Trace mitral valve regurgitation is present. · Tricuspid Valve: Mild tricuspid valve regurgitation is present. · Pulmonary Artery: Pulmonary hypertension. · Left Atrium: Mildly dilated left atrium. Signed by: Nelly Knox MD     01/29/21  echo lvef 20-25%, lae, mod decreased rvef, mild to mod mr, mild to mod tr, pa pressure 46mm    3/21 cardiac cath, normal coronaries    04/08/21 echo lvef 50-55%    11/21 normal echo    Chief Complaint   Patient presents with    Follow-up     6 mo appt. HPI :  She is doing really well. Her asthma has been under good control, her blood pressure is excellent and her weight is actually down a few pounds. Her primary care is following her diabetes and her lipids. She is getting some exercise but wants to do more. CARDIAC ROS:   negative for chest pain, dyspnea, palpitations, syncope, orthopnea, paroxysmal nocturnal dyspnea, exertional chest pressure/discomfort, claudication, lower extremity edema    Family History   Problem Relation Age of Onset    Diabetes Mother        Past Medical History:   Diagnosis Date    Arthritis     Diabetes ([facility]     Heart failure ([facility]     Hypertension        GENERAL ROS:  A comprehensive review of systems was negative except for that written in the HPI.     Visit Vitals  BP (!) 109/57 (BP 1 Location: Left upper arm, BP Patient Position: Sitting, BP Cuff Size: Large adult)   Pulse 75   Resp 18   Ht 5' 5\" (1.651 m)   Wt 186 lb (84.4 kg)   LMP  (LMP Unknown)   SpO2 98%   BMI 30.95 kg/m²       Wt Readings from Last 3 Encounters:   06/21/22 186 lb (84.4 kg)   03/25/22 190 lb 3.2 oz (86.3 kg)   03/16/22 191 lb (86.6 kg)            BP Readings from Last 3 Encounters:   06/21/22 (!) 109/57   03/25/22 104/67   03/16/22 112/70       PHYSICAL EXAM  General appearance: alert, cooperative, no distress, appears stated age  Neurologic: Alert and oriented X 3  Neck: supple, symmetrical, trachea midline, no adenopathy, no carotid bruit and no JVD  Lungs: clear to auscultation bilaterally  Heart: regular rate and rhythm, S1, S2 normal, no murmur, click, rub or gallop  Extremities: extremities normal, atraumatic, no cyanosis or edema    Lab Results   Component Value Date/Time    Cholesterol, total 169 03/18/2021 05:31 PM    Cholesterol, total 143 10/08/2020 12:01 PM    Cholesterol, total 207 (H) 08/08/2019 02:31 PM    Cholesterol, total 175 04/10/2018 01:19 PM    Cholesterol, total 141 08/26/2016 11:48 AM    HDL Cholesterol 70 03/18/2021 05:31 PM    HDL Cholesterol 46 10/08/2020 12:01 PM    HDL Cholesterol 46 08/08/2019 02:31 PM    HDL Cholesterol 56 04/10/2018 01:19 PM    HDL Cholesterol 43 08/26/2016 11:48 AM    LDL, calculated 84.6 03/18/2021 05:31 PM    LDL, calculated 73 10/08/2020 12:01 PM    LDL, calculated 105 (H) 08/08/2019 02:31 PM    LDL, calculated 75 04/10/2018 01:19 PM    LDL, calculated 25 08/26/2016 11:48 AM    Triglyceride 72 03/18/2021 05:31 PM    Triglyceride 137 10/08/2020 12:01 PM    Triglyceride 280 (H) 08/08/2019 02:31 PM    Triglyceride 221 (H) 04/10/2018 01:19 PM    Triglyceride 364 (H) 08/26/2016 11:48 AM    CHOL/HDL Ratio 2.4 03/18/2021 05:31 PM     ASSESSMENT :      She is stable and asymptomatic, well compensated on a good medical regimen and needs no cardiac testing at this time. current treatment plan is effective, no change in therapy  lab results and schedule of future lab studies reviewed with patient  reviewed diet, exercise and weight control    Encounter Diagnoses   Name Primary?  Essential hypertension Yes    NICM (nonischemic cardiomyopathy) ([address]     PARDO (dyspnea on exertion)     Dyslipidemia      No orders of the defined types were placed in this encounter. Follow-up and Dispositions    · Return in about 6 months (around 12/21/2022). Venkata Araujo MD  6/21/2022  Please note that this dictation was completed with [employer], the [employer] voice recognition software.   Quite often unanticipated grammatical, syntax, homophones, and other interpretive errors are inadvertently transcribed by the computer software. Please disregard these errors. Please excuse any errors that have escaped final proofreading. Thank you.

## 2022-06-23 ENCOUNTER — TRANSCRIBE ORDER (OUTPATIENT)
Dept: SCHEDULING | Age: 72
End: 2022-06-23

## 2022-06-23 DIAGNOSIS — Z12.31 SCREENING MAMMOGRAM FOR HIGH-RISK PATIENT: Primary | ICD-10-CM

## 2022-06-28 ENCOUNTER — OFFICE VISIT (OUTPATIENT)
Dept: INTERNAL MEDICINE CLINIC | Age: 72
End: 2022-06-28
Payer: MEDICARE

## 2022-06-28 VITALS
WEIGHT: 186.5 LBS | RESPIRATION RATE: 16 BRPM | HEIGHT: 65 IN | DIASTOLIC BLOOD PRESSURE: 53 MMHG | OXYGEN SATURATION: 96 % | TEMPERATURE: 97.6 F | BODY MASS INDEX: 31.07 KG/M2 | SYSTOLIC BLOOD PRESSURE: 92 MMHG | HEART RATE: 85 BPM

## 2022-06-28 DIAGNOSIS — R79.89 PRERENAL AZOTEMIA: ICD-10-CM

## 2022-06-28 DIAGNOSIS — E11.9 CONTROLLED TYPE 2 DIABETES MELLITUS WITHOUT COMPLICATION, WITH LONG-TERM CURRENT USE OF INSULIN (HCC): ICD-10-CM

## 2022-06-28 DIAGNOSIS — E78.5 DYSLIPIDEMIA: ICD-10-CM

## 2022-06-28 DIAGNOSIS — Z79.4 CONTROLLED TYPE 2 DIABETES MELLITUS WITHOUT COMPLICATION, WITH LONG-TERM CURRENT USE OF INSULIN (HCC): ICD-10-CM

## 2022-06-28 DIAGNOSIS — E66.9 OBESITY (BMI 30.0-34.9): ICD-10-CM

## 2022-06-28 DIAGNOSIS — I50.23 ACUTE ON CHRONIC SYSTOLIC CONGESTIVE HEART FAILURE, NYHA CLASS 3 (HCC): Primary | ICD-10-CM

## 2022-06-28 DIAGNOSIS — I10 PRIMARY HYPERTENSION: ICD-10-CM

## 2022-06-28 PROCEDURE — G8427 DOCREV CUR MEDS BY ELIG CLIN: HCPCS | Performed by: INTERNAL MEDICINE

## 2022-06-28 PROCEDURE — 3017F COLORECTAL CA SCREEN DOC REV: CPT | Performed by: INTERNAL MEDICINE

## 2022-06-28 PROCEDURE — 1101F PT FALLS ASSESS-DOCD LE1/YR: CPT | Performed by: INTERNAL MEDICINE

## 2022-06-28 PROCEDURE — 3046F HEMOGLOBIN A1C LEVEL >9.0%: CPT | Performed by: INTERNAL MEDICINE

## 2022-06-28 PROCEDURE — 99214 OFFICE O/P EST MOD 30 MIN: CPT | Performed by: INTERNAL MEDICINE

## 2022-06-28 PROCEDURE — G8754 DIAS BP LESS 90: HCPCS | Performed by: INTERNAL MEDICINE

## 2022-06-28 PROCEDURE — G8417 CALC BMI ABV UP PARAM F/U: HCPCS | Performed by: INTERNAL MEDICINE

## 2022-06-28 PROCEDURE — G9899 SCRN MAM PERF RSLTS DOC: HCPCS | Performed by: INTERNAL MEDICINE

## 2022-06-28 PROCEDURE — G8752 SYS BP LESS 140: HCPCS | Performed by: INTERNAL MEDICINE

## 2022-06-28 PROCEDURE — 1123F ACP DISCUSS/DSCN MKR DOCD: CPT | Performed by: INTERNAL MEDICINE

## 2022-06-28 PROCEDURE — 1090F PRES/ABSN URINE INCON ASSESS: CPT | Performed by: INTERNAL MEDICINE

## 2022-06-28 PROCEDURE — G8536 NO DOC ELDER MAL SCRN: HCPCS | Performed by: INTERNAL MEDICINE

## 2022-06-28 PROCEDURE — 2022F DILAT RTA XM EVC RTNOPTHY: CPT | Performed by: INTERNAL MEDICINE

## 2022-06-28 PROCEDURE — G8510 SCR DEP NEG, NO PLAN REQD: HCPCS | Performed by: INTERNAL MEDICINE

## 2022-06-28 PROCEDURE — G8399 PT W/DXA RESULTS DOCUMENT: HCPCS | Performed by: INTERNAL MEDICINE

## 2022-06-28 NOTE — PROGRESS NOTES
Identified pt with two pt identifiers(name and ). Reviewed record in preparation for visit and have obtained necessary documentation. Chief Complaint   Patient presents with    Follow-up     3 mo, DM    Numbness     L pointer finger numbness, yesterday L hand tingling        Health Maintenance Due   Topic    Pneumococcal 65+ years (1 - PCV)    Shingrix Vaccine Age 49> (1 of 2)    Eye Exam Retinal or Dilated     Foot Exam Q1     MICROALBUMIN Q1     Lipid Screen     Breast Cancer Screen Mammogram      Vitals:    22 1217   BP: (!) 92/53   Pulse: 85   Resp: 16   Temp: 97.6 °F (36.4 °C)   TempSrc: Oral   SpO2: 96%   Weight: 186 lb 8 oz (84.6 kg)   Height: 5' 5\" (1.651 m)   PainSc:   6   PainLoc: Hand       Pain Scale: 6/10        Coordination of Care Questionnaire:  :     1. Have you been to the ER, urgent care clinic since your last visit? Hospitalized since your last visit? No    2. Have you seen or consulted any other health care providers outside of the [address] [place] [address] since your last visit? Include any pap smears or colon screening. No    3. For patients aged 39-70: Has the patient had a colonoscopy / FIT/ Cologuard? Yes - no Care Gap present      If the patient is female:    4. For patients aged 41-77: Has the patient had a mammogram within the past 2 years? No      5. For patients aged 21-65: Has the patient had a pap smear?  NA - based on age or sex

## 2022-06-28 NOTE — PROGRESS NOTES
[facility]  [place] [address]  [address] [place] [address]  Phone:  [phone]  Fax: [phone]       Chief Complaint   Patient presents with    Follow-up     3 mo, DM    Numbness     L pointer finger numbness, yesterday L hand tingling   . SUBJECTIVE:    Loren Osborn is a 81 y.o. female comes in for return visit stating that she has done reasonably well. She has been complaining of numbness in her left hand which is intermittent. She has no problems using the extremity however. She has not been checking blood sugars, so she has no idea how her diabetes is doing. She remains on 50 units. She no longer has the [employer] device or a glucometer. She has had no meaningful weight loss. She has a past history of primary hypertension, dyslipidemia, as well as nonischemic cardiomyopathy with resolution. Her ejection fraction is now normal.  She follows up with Cardiology having seen the physician about a month ago. No new medications were added. Current Outpatient Medications   Medication Sig Dispense Refill    thiamine HCL (B-1) 100 mg tablet TAKE 1 TABLET BY MOUTH EVERY DAY 90 Tablet 1    sacubitriL-valsartan (Entresto) 24-26 mg tablet Take 1 Tablet by mouth two (2) times a day. 60 Tablet 3    spironolactone (ALDACTONE) 25 mg tablet TAKE 1 TABLET BY MOUTH EVERY DAY 90 Tablet 1    Myrbetriq 25 mg ER tablet Take 25 mg by mouth daily.  Farxiga 10 mg tab tablet TAKE 1 TABLET BY MOUTH EVERY DAY 30 Tablet 1    Lantus Solostar U-100 Insulin 100 unit/mL (3 mL) inpn 30 UNITS BY SUBCUTANEOUS ROUTE NIGHTLY. AS DISCUSSED INCREASE YOUR INSULIN ACCORDING TO YOUR MORNING BLOOD SUGAR. WOULD LIKE THE MORNING BLOOD SUGAR TO BE BETWEEN 110 .  PLEASE CALL THE OFFICE NURSE IF YOU NEED ASSISTANCE (Patient taking differently: Patient reports taking 50 units nightly) 15 Adjustable Dose Pre-filled Pen Syringe 1    magnesium oxide (MAG-OX) 400 mg tablet TAKE 2 TABLETS BY MOUTH TWO (2) TIMES A DAY. 360 Tablet 2    bumetanide (BUMEX) 2 mg tablet TAKE 1 TABLET BY MOUTH EVERY DAY 90 Tablet 2    oxybutynin (DITROPAN) 5 mg tablet TAKE 1 TABLET BY MOUTH THREE TIMES A  Tablet 3    cholecalciferol (VITAMIN D3) (2,000 UNITS /50 MCG) cap capsule TAKE 1 CAPSULE BY MOUTH EVERY DAY 90 Capsule 2    atorvastatin (LIPITOR) 40 mg tablet TAKE 1 TABLET BY MOUTH EVERY DAY 90 Tablet 3    melatonin 3 mg tablet TAKE 1 TABLET BY MOUTH EVERY NIGHT AS NEEDED FOR INSOMNIA. 90 Tablet 0    allopurinoL (ZYLOPRIM) 100 mg tablet Take 1 Tablet by mouth daily. 30 Tablet 11    Insulin Needles, Disposable, (BD Ultra-Fine Short Pen Needle) 31 gauge x 5/16\" ndle USE WITH INSULIN PENS TWICE DAILY AS DIRECTED 100 Pen Needle 11    calcitRIOL (RocaltroL) 0.25 mcg capsule Take 0.25 mcg by mouth daily.  diclofenac (VOLTAREN) 1 % gel Apply 2 g to affected area two (2) times daily as needed for Pain. thin layer to joint pain      budesonide (PULMICORT) 0.25 mg/2 mL nbsp 500 mcg by Nebulization route every six (6) hours as needed for Other (wheezing shortness of breath).  fluticasone propion-salmeteroL (ADVAIR/WIXELA) 250-50 mcg/dose diskus inhaler Take 2 Puffs by inhalation two (2) times a day.  fluticasone propionate (FLONASE) 50 mcg/actuation nasal spray 2 Sprays by Both Nostrils route daily.  1 Bottle 0    isosorbide mononitrate ER (IMDUR) 30 mg tablet TAKE 1 TABLET BY MOUTH EVERY DAY IN THE MORNING 90 Tablet 0     Past Medical History:   Diagnosis Date    Arthritis     Diabetes ([address]     Heart failure ([address]     Hypertension      Past Surgical History:   Procedure Laterality Date    COLONOSCOPY N/A 3/19/2021    COLONOSCOPY performed by Gloria [facility]rizarry MD at [facility] ENDOSCOPY    HX ORTHOPAEDIC      Arthroscopy right knee    [place] CARDIAC SURG PROCEDURE UNLIST      life vest     No Known Allergies      REVIEW OF SYSTEMS:  General: negative for - chills or fever  ENT: negative for - headaches, nasal congestion or tinnitus  Respiratory: negative for - cough, hemoptysis, shortness of breath or wheezing  Cardiovascular : negative for - chest pain, edema, palpitations or shortness of breath  Gastrointestinal: negative for - abdominal pain, blood in stools, heartburn or nausea/vomiting  Genito-Urinary: no dysuria, trouble voiding, or hematuria  Musculoskeletal: negative for - gait disturbance, joint pain, joint stiffness or joint swelling  Neurological: no TIA or stroke symptoms  Hematologic: no bruises, no bleeding, no swollen glands  Integument: no lumps, mole changes, nail changes or rash  Endocrine: no malaise/lethargy or unexpected weight changes      Social History     Socioeconomic History    Marital status:     Number of children: 3    Years of education: 15   Occupational History    Occupation: retired   Tobacco Use    Smoking status: Former Smoker     Packs/day: 0.25     Years: 20.00     Pack years: 5.00     Quit date: 2014     Years since quittin.5    Smokeless tobacco: Never Used   Vaping Use    Vaping Use: Never used   Substance and Sexual Activity    Alcohol use: Yes     Comment: occ    Drug use: No    Sexual activity: Yes     Partners: Male     Family History   Problem Relation Age of Onset    Diabetes Mother        OBJECTIVE:    Visit Vitals  BP (!) 92/53 (BP 1 Location: Right upper arm, BP Patient Position: Sitting, BP Cuff Size: Large adult)   Pulse 85   Temp 97.6 °F (36.4 °C) (Oral)   Resp 16   Ht 5' 5\" (1.651 m)   Wt 186 lb 8 oz (84.6 kg)   LMP  (LMP Unknown)   SpO2 96%   BMI 31.04 kg/m²     CONSTITUTIONAL: well , well nourished, appears age appropriate  EYES: perrla, eom intact  ENMT:moist mucous membranes, pharynx clear  NECK: supple.  Thyroid normal  RESPIRATORY: Chest: clear to ascultation and percussion   CARDIOVASCULAR: Heart: regular rate and rhythm  GASTROINTESTINAL: Abdomen: soft, bowel sounds active  HEMATOLOGIC: no pathological lymph nodes palpated  MUSCULOSKELETAL: Extremities: no edema, pulse 1+   INTEGUMENT: No unusual rashes or suspicious skin lesions noted. Nails appear normal.  NEUROLOGIC: non-focal exam   MENTAL STATUS: alert and oriented, appropriate affect      ASSESSMENT:  1. Acute on chronic systolic congestive heart failure, NYHA class 3 ([facility]    2. Primary hypertension    3. Controlled type 2 diabetes mellitus without complication, with long-term current use of insulin ([facility]    4. Dyslipidemia    5. Obesity (BMI 30.0-34.9)    6. Prerenal azotemia        PLAN:  1. As far as her diabetes is concerned I will attempt to get her a Nathaniel device again. Otherwise she will have a glucometer in the intervening time. I will await the results of her hemoglobin A1c. I reminded her the importance of eating at the same time every day and minimizing her consumption of processed carbohydrates. 2. She will follow up with Cardiology for her nonischemic cardiomyopathy with essential normalization of the ejection fraction. 3. Blood pressure is excellent, no adjustments are made. 4. She will continue her statin as prescribed. 5. She needs to lose weight. This can be accomplished by eating meals, eliminating snacks, and avoiding sweets, white breads and white potatoes. Follow-up and Dispositions    · Return in about 3 months (around 9/28/2022).            Silvana Cole MD

## 2022-06-29 LAB
ANION GAP SERPL CALC-SCNC: 10 MMOL/L (ref 5–15)
BUN SERPL-MCNC: 57 MG/DL (ref 6–20)
BUN/CREAT SERPL: 41 (ref 12–20)
CALCIUM SERPL-MCNC: 9.8 MG/DL (ref 8.5–10.1)
CHLORIDE SERPL-SCNC: 99 MMOL/L (ref 97–108)
CO2 SERPL-SCNC: 26 MMOL/L (ref 21–32)
CREAT SERPL-MCNC: 1.39 MG/DL (ref 0.55–1.02)
EST. AVERAGE GLUCOSE BLD GHB EST-MCNC: 237 MG/DL
GLUCOSE SERPL-MCNC: 297 MG/DL (ref 65–100)
HBA1C MFR BLD: 9.9 % (ref 4–5.6)
POTASSIUM SERPL-SCNC: 3.8 MMOL/L (ref 3.5–5.1)
SODIUM SERPL-SCNC: 135 MMOL/L (ref 136–145)

## 2022-07-06 RX ORDER — FLASH GLUCOSE SCANNING READER
EACH MISCELLANEOUS
Qty: 1 EACH | Refills: 0 | Status: SHIPPED | OUTPATIENT
Start: 2022-07-06

## 2022-07-06 RX ORDER — FLASH GLUCOSE SENSOR
KIT MISCELLANEOUS
Qty: 1 KIT | Refills: 11 | Status: SHIPPED | OUTPATIENT
Start: 2022-07-06

## 2022-07-19 NOTE — DISCHARGE INSTRUCTIONS
Take 3 tablets in the morning and 1 tablet in the afternoon.  Follow up with Dr. Maye Rubio
Detail Level: Simple
Instructions: This plan will send the code FBSE to the PM system.  DO NOT or CHANGE the price.
Price (Do Not Change): 0.00

## 2022-09-06 RX ORDER — SACUBITRIL AND VALSARTAN 24; 26 MG/1; MG/1
TABLET, FILM COATED ORAL
Qty: 60 TABLET | Refills: 3 | Status: SHIPPED | OUTPATIENT
Start: 2022-09-06

## 2022-09-15 RX ORDER — ATORVASTATIN CALCIUM 40 MG/1
TABLET, FILM COATED ORAL
Qty: 90 TABLET | Refills: 3 | Status: SHIPPED | OUTPATIENT
Start: 2022-09-15

## 2022-09-30 RX ORDER — SPIRONOLACTONE 25 MG/1
TABLET ORAL
Qty: 90 TABLET | Refills: 1 | Status: SHIPPED | OUTPATIENT
Start: 2022-09-30

## 2022-10-04 ENCOUNTER — OFFICE VISIT (OUTPATIENT)
Dept: INTERNAL MEDICINE CLINIC | Age: 72
End: 2022-10-04
Payer: MEDICARE

## 2022-10-04 VITALS
HEART RATE: 65 BPM | SYSTOLIC BLOOD PRESSURE: 107 MMHG | WEIGHT: 186.7 LBS | RESPIRATION RATE: 16 BRPM | BODY MASS INDEX: 31.11 KG/M2 | TEMPERATURE: 98.4 F | HEIGHT: 65 IN | DIASTOLIC BLOOD PRESSURE: 64 MMHG | OXYGEN SATURATION: 97 %

## 2022-10-04 DIAGNOSIS — E78.5 DYSLIPIDEMIA: ICD-10-CM

## 2022-10-04 DIAGNOSIS — E11.65 UNCONTROLLED TYPE 2 DIABETES MELLITUS WITH HYPERGLYCEMIA (HCC): ICD-10-CM

## 2022-10-04 DIAGNOSIS — E66.9 OBESITY (BMI 30.0-34.9): ICD-10-CM

## 2022-10-04 DIAGNOSIS — I50.30 DIASTOLIC HEART FAILURE, NYHA CLASS 2 (HCC): Primary | ICD-10-CM

## 2022-10-04 DIAGNOSIS — I10 PRIMARY HYPERTENSION: ICD-10-CM

## 2022-10-04 PROCEDURE — 3046F HEMOGLOBIN A1C LEVEL >9.0%: CPT | Performed by: INTERNAL MEDICINE

## 2022-10-04 PROCEDURE — 3017F COLORECTAL CA SCREEN DOC REV: CPT | Performed by: INTERNAL MEDICINE

## 2022-10-04 PROCEDURE — 1090F PRES/ABSN URINE INCON ASSESS: CPT | Performed by: INTERNAL MEDICINE

## 2022-10-04 PROCEDURE — 2022F DILAT RTA XM EVC RTNOPTHY: CPT | Performed by: INTERNAL MEDICINE

## 2022-10-04 PROCEDURE — G8752 SYS BP LESS 140: HCPCS | Performed by: INTERNAL MEDICINE

## 2022-10-04 PROCEDURE — G8536 NO DOC ELDER MAL SCRN: HCPCS | Performed by: INTERNAL MEDICINE

## 2022-10-04 PROCEDURE — 1123F ACP DISCUSS/DSCN MKR DOCD: CPT | Performed by: INTERNAL MEDICINE

## 2022-10-04 PROCEDURE — 1101F PT FALLS ASSESS-DOCD LE1/YR: CPT | Performed by: INTERNAL MEDICINE

## 2022-10-04 PROCEDURE — 99214 OFFICE O/P EST MOD 30 MIN: CPT | Performed by: INTERNAL MEDICINE

## 2022-10-04 PROCEDURE — G8417 CALC BMI ABV UP PARAM F/U: HCPCS | Performed by: INTERNAL MEDICINE

## 2022-10-04 PROCEDURE — G9899 SCRN MAM PERF RSLTS DOC: HCPCS | Performed by: INTERNAL MEDICINE

## 2022-10-04 PROCEDURE — G8427 DOCREV CUR MEDS BY ELIG CLIN: HCPCS | Performed by: INTERNAL MEDICINE

## 2022-10-04 PROCEDURE — G8754 DIAS BP LESS 90: HCPCS | Performed by: INTERNAL MEDICINE

## 2022-10-04 PROCEDURE — G8510 SCR DEP NEG, NO PLAN REQD: HCPCS | Performed by: INTERNAL MEDICINE

## 2022-10-04 PROCEDURE — G8399 PT W/DXA RESULTS DOCUMENT: HCPCS | Performed by: INTERNAL MEDICINE

## 2022-10-04 NOTE — PROGRESS NOTES
[address] [facility]  [place] [address]  [address] [place] [id number]  Phone:  [phone]  Fax: [phone]       Chief Complaint   Patient presents with    Follow-up    Hypertension    Diabetes     Patient here for follow up high blood pressure and diabetes. .      SUBJECTIVE:    Rajendra Michael is a 81 y.o. female comes in for return visit stating that she has done well. She states that she has been taking her insulin, but has not been able to check her sugars because she does not have a glucometer. I have had this complaint on multiple occasions. She has a nonischemic cardiomyopathy which improved. Her ejection fraction normalized. It is quite possible this could have been related to excessive alcohol consumption and I reminded her the importance of not resuming this activity. She has a past history of primary hypertension, dyslipidemia and obesity. She also smokes cigarettes, but not of late. Current Outpatient Medications   Medication Sig Dispense Refill    spironolactone (ALDACTONE) 25 mg tablet TAKE 1 TABLET BY MOUTH EVERY DAY 90 Tablet 1    atorvastatin (LIPITOR) 40 mg tablet TAKE 1 TABLET BY MOUTH EVERY DAY 90 Tablet 3    Entresto 24-26 mg tablet TAKE 1 TABLET BY MOUTH TWO TIMES A DAY. 60 Tablet 3    isosorbide mononitrate ER (IMDUR) 30 mg tablet TAKE 1 TABLET BY MOUTH EVERY DAY IN THE MORNING 90 Tablet 0    Farxiga 10 mg tab tablet TAKE 1 TABLET BY MOUTH EVERY DAY 30 Tablet 1    flash glucose sensor (FreeStyle Nathaniel 14 Day Sensor) kit Blood sugar checks before meals and at bedtime and as needed 1 Kit 11    flash glucose scanning reader (FreeStyle Nathaniel 14 Day [place]) misc Blood sugar checks before meals and at bedtime and as needed 1 Each 0    thiamine HCL (B-1) 100 mg tablet TAKE 1 TABLET BY MOUTH EVERY DAY 90 Tablet 1    Myrbetriq 25 mg ER tablet Take 25 mg by mouth daily.       Lantus Solostar U-100 Insulin 100 unit/mL (3 mL) inpn 30 UNITS BY SUBCUTANEOUS ROUTE NIGHTLY. AS DISCUSSED INCREASE YOUR INSULIN ACCORDING TO YOUR MORNING BLOOD SUGAR. WOULD LIKE THE MORNING BLOOD SUGAR TO BE BETWEEN 110 . PLEASE CALL THE OFFICE NURSE IF YOU NEED ASSISTANCE (Patient taking differently: Patient reports taking 50 units nightly) 15 Adjustable Dose Pre-filled Pen Syringe 1    magnesium oxide (MAG-OX) 400 mg tablet TAKE 2 TABLETS BY MOUTH TWO (2) TIMES A DAY. 360 Tablet 2    bumetanide (BUMEX) 2 mg tablet TAKE 1 TABLET BY MOUTH EVERY DAY 90 Tablet 2    oxybutynin (DITROPAN) 5 mg tablet TAKE 1 TABLET BY MOUTH THREE TIMES A  Tablet 3    cholecalciferol (VITAMIN D3) (2,000 UNITS /50 MCG) cap capsule TAKE 1 CAPSULE BY MOUTH EVERY DAY 90 Capsule 2    melatonin 3 mg tablet TAKE 1 TABLET BY MOUTH EVERY NIGHT AS NEEDED FOR INSOMNIA. 90 Tablet 0    allopurinoL (ZYLOPRIM) 100 mg tablet Take 1 Tablet by mouth daily. 30 Tablet 11    Insulin Needles, Disposable, (BD Ultra-Fine Short Pen Needle) 31 gauge x 5/16\" ndle USE WITH INSULIN PENS TWICE DAILY AS DIRECTED 100 Pen Needle 11    calcitRIOL (RocaltroL) 0.25 mcg capsule Take 0.25 mcg by mouth daily. diclofenac (VOLTAREN) 1 % gel Apply 2 g to affected area two (2) times daily as needed for Pain. thin layer to joint pain      budesonide (PULMICORT) 0.25 mg/2 mL nbsp 500 mcg by Nebulization route every six (6) hours as needed for Other (wheezing shortness of breath). fluticasone propion-salmeteroL (ADVAIR/WIXELA) 250-50 mcg/dose diskus inhaler Take 2 Puffs by inhalation two (2) times a day. fluticasone propionate (FLONASE) 50 mcg/actuation nasal spray 2 Sprays by Both Nostrils route daily.  1 Bottle 0     Past Medical History:   Diagnosis Date    Arthritis     Diabetes ([address]     Heart failure ([address]     Hypertension      Past Surgical History:   Procedure Laterality Date    COLONOSCOPY N/A 3/19/2021    COLONOSCOPY performed by Neelam Adler MD at [facility] ENDOSCOPY    HX ORTHOPAEDIC      Arthroscopy right knee    RI CARDIAC SURG PROCEDURE UNLIST      life vest     No Known Allergies      REVIEW OF SYSTEMS:  General: negative for - chills or fever  ENT: negative for - headaches, nasal congestion or tinnitus  Respiratory: negative for - cough, hemoptysis, shortness of breath or wheezing  Cardiovascular : negative for - chest pain, edema, palpitations or shortness of breath  Gastrointestinal: negative for - abdominal pain, blood in stools, heartburn or nausea/vomiting  Genito-Urinary: no dysuria, trouble voiding, or hematuria  Musculoskeletal: negative for - gait disturbance, joint pain, joint stiffness or joint swelling  Neurological: no TIA or stroke symptoms  Hematologic: no bruises, no bleeding, no swollen glands  Integument: no lumps, mole changes, nail changes or rash  Endocrine: no malaise/lethargy or unexpected weight changes      Social History     Socioeconomic History    Marital status:     Number of children: 3    Years of education: 15   Occupational History    Occupation: retired   Tobacco Use    Smoking status: Former     Packs/day: 0.25     Years: 20.00     Pack years: 5.00     Types: Cigarettes     Quit date: 2014     Years since quittin.8    Smokeless tobacco: Never   Vaping Use    Vaping Use: Never used   Substance and Sexual Activity    Alcohol use: Yes     Comment: occ    Drug use: No    Sexual activity: Yes     Partners: Male     Family History   Problem Relation Age of Onset    Diabetes Mother        OBJECTIVE:    Visit Vitals  /64   Pulse 65   Temp 98.4 °F (36.9 °C) (Oral)   Resp 16   Ht 5' 5\" (1.651 m)   Wt 186 lb 11.2 oz (84.7 kg)   LMP  (LMP Unknown)   SpO2 97%   BMI 31.07 kg/m²     CONSTITUTIONAL: well , well nourished, appears age appropriate  EYES: perrla, eom intact  ENMT:moist mucous membranes, pharynx clear  NECK: supple.  Thyroid normal  RESPIRATORY: Chest: clear to ascultation and percussion   CARDIOVASCULAR: Heart: regular rate and rhythm  GASTROINTESTINAL: Abdomen: soft, bowel sounds active  HEMATOLOGIC: no pathological lymph nodes palpated  MUSCULOSKELETAL: Extremities: no edema, pulse 1+   INTEGUMENT: No unusual rashes or suspicious skin lesions noted. Nails appear normal.  NEUROLOGIC: non-focal exam   MENTAL STATUS: alert and oriented, appropriate affect      ASSESSMENT:  1. Diastolic heart failure, NYHA class 2 ([address]    2. Uncontrolled type 2 diabetes mellitus with hyperglycemia ([address]    3. Primary hypertension    4. Dyslipidemia    5. Obesity (BMI 30.0-34. 9)        PLAN:  1. The patient is no longer in congestive heart failure. She has had normalization of her ejection fraction. There are no overt signs of cardiac decompensation. She does followup with Cardiology. 2. Her diabetes is now well controlled and I will await the results of her hemoglobin A1c. One of the reasons for this is because the patient does not check her blood sugars. She was formerly using a Nathaniel device, but she cannot afford this currently and I have given her prescriptions for glucometers and she frequently misplaces them. We will attempt to get her another glucometer. 3. Blood pressure is reasonable, no adjustments are made. 4. She is in a primary cardiovascular risk prevention status and I will await the results of her lipid profile. 5. She does need to lose weight. This can be accomplished by eating meals, eliminating snacks, and avoiding the consumption of processed carbohydrates. 6. I also reminded her the importance of reducing her alcohol consumption. It is quite possible her cardiac decompensation could be on the basis of alcohol excess. I repeated this to her on several occasions. She continues to drink moderate amounts of beer and other alcoholic products. Orders Placed This Encounter    HEMOGLOBIN A1C WITH EAG    APOLIPOPROTEIN B    NT-PRO BNP         Follow-up and Dispositions    Return in about 3 months (around 1/4/2023).            Rika Murcia MD

## 2022-10-04 NOTE — PROGRESS NOTES
Lee Mathis is a 81 y.o. female  Chief Complaint   Patient presents with    Follow-up    Hypertension    Diabetes     Patient here for follow up high blood pressure and diabetes. 1. Have you been to the ER, urgent care clinic since your last visit? Hospitalized since your last visit? No    2. Have you seen or consulted any other health care providers outside of the [address] [place] [address] since your last visit? Include any pap smears or colon screening.  No

## 2022-10-05 LAB
BNP SERPL-MCNC: 80 PG/ML
EST. AVERAGE GLUCOSE BLD GHB EST-MCNC: 217 MG/DL
HBA1C MFR BLD: 9.2 % (ref 4–5.6)

## 2022-10-05 RX ORDER — LANOLIN ALCOHOL/MO/W.PET/CERES
CREAM (GRAM) TOPICAL
Qty: 90 TABLET | Refills: 0 | Status: SHIPPED | OUTPATIENT
Start: 2022-10-05

## 2022-10-06 LAB — APO B SERPL-MCNC: 110 MG/DL

## 2022-10-08 RX ORDER — DAPAGLIFLOZIN 10 MG/1
TABLET, FILM COATED ORAL
Qty: 30 TABLET | Refills: 1 | Status: SHIPPED | OUTPATIENT
Start: 2022-10-08

## 2022-10-21 ENCOUNTER — TELEPHONE (OUTPATIENT)
Dept: INTERNAL MEDICINE CLINIC | Age: 72
End: 2022-10-21

## 2022-10-21 NOTE — TELEPHONE ENCOUNTER
Patient notified by phone and ask to increase her insulin to 58 units and we tell her Dr. Lizette Estes ask that she eat at same time everyday and bedtime snacks

## 2022-10-21 NOTE — TELEPHONE ENCOUNTER
----- Message from Eyad Warner MD sent at 10/9/2022 12:27 PM EDT -----  Increase insulin by 8 units------- reminded patient the importance of eating at the same time every day including her bedtime snack

## 2022-11-05 ENCOUNTER — HOSPITAL ENCOUNTER (EMERGENCY)
Age: 72
Discharge: HOME | End: 2022-11-05
Attending: EMERGENCY MEDICINE
Payer: MEDICARE

## 2022-11-05 ENCOUNTER — APPOINTMENT (OUTPATIENT)
Dept: GENERAL RADIOLOGY | Age: 72
End: 2022-11-05
Attending: EMERGENCY MEDICINE
Payer: MEDICARE

## 2022-11-05 VITALS
RESPIRATION RATE: 18 BRPM | TEMPERATURE: 97.6 F | OXYGEN SATURATION: 97 % | HEART RATE: 67 BPM | DIASTOLIC BLOOD PRESSURE: 97 MMHG | SYSTOLIC BLOOD PRESSURE: 114 MMHG

## 2022-11-05 DIAGNOSIS — R07.9 CHEST PAIN, UNSPECIFIED TYPE: Primary | ICD-10-CM

## 2022-11-05 DIAGNOSIS — R05.9 COUGH, UNSPECIFIED TYPE: ICD-10-CM

## 2022-11-05 LAB
ALBUMIN SERPL-MCNC: 3.9 G/DL (ref 3.5–5)
ALBUMIN/GLOB SERPL: 0.8 {RATIO} (ref 1.1–2.2)
ALP SERPL-CCNC: 87 U/L (ref 45–117)
ALT SERPL-CCNC: 37 U/L (ref 12–78)
ANION GAP SERPL CALC-SCNC: 7 MMOL/L (ref 5–15)
AST SERPL-CCNC: 24 U/L (ref 15–37)
BASOPHILS # BLD: 0 K/UL (ref 0–0.1)
BASOPHILS NFR BLD: 1 % (ref 0–1)
BILIRUB SERPL-MCNC: 0.5 MG/DL (ref 0.2–1)
BNP SERPL-MCNC: 33 PG/ML
BUN SERPL-MCNC: 49 MG/DL (ref 6–20)
BUN/CREAT SERPL: 39 (ref 12–20)
CALCIUM SERPL-MCNC: 9.6 MG/DL (ref 8.5–10.1)
CHLORIDE SERPL-SCNC: 102 MMOL/L (ref 97–108)
CO2 SERPL-SCNC: 27 MMOL/L (ref 21–32)
COMMENT, HOLDF: NORMAL
COVID-19 RAPID TEST, COVR: NOT DETECTED
CREAT SERPL-MCNC: 1.27 MG/DL (ref 0.55–1.02)
DIFFERENTIAL METHOD BLD: ABNORMAL
EOSINOPHIL # BLD: 0.2 K/UL (ref 0–0.4)
EOSINOPHIL NFR BLD: 5 % (ref 0–7)
ERYTHROCYTE [DISTWIDTH] IN BLOOD BY AUTOMATED COUNT: 12.4 % (ref 11.5–14.5)
GLOBULIN SER CALC-MCNC: 4.9 G/DL (ref 2–4)
GLUCOSE SERPL-MCNC: 170 MG/DL (ref 65–100)
HCT VFR BLD AUTO: 36.7 % (ref 35–47)
HGB BLD-MCNC: 12.1 G/DL (ref 11.5–16)
IMM GRANULOCYTES # BLD AUTO: 0 K/UL (ref 0–0.04)
IMM GRANULOCYTES NFR BLD AUTO: 1 % (ref 0–0.5)
LYMPHOCYTES # BLD: 1.2 K/UL (ref 0.8–3.5)
LYMPHOCYTES NFR BLD: 29 % (ref 12–49)
MCH RBC QN AUTO: 28.5 PG (ref 26–34)
MCHC RBC AUTO-ENTMCNC: 33 G/DL (ref 30–36.5)
MCV RBC AUTO: 86.4 FL (ref 80–99)
MONOCYTES # BLD: 0.5 K/UL (ref 0–1)
MONOCYTES NFR BLD: 11 % (ref 5–13)
NEUTS SEG # BLD: 2.3 K/UL (ref 1.8–8)
NEUTS SEG NFR BLD: 53 % (ref 32–75)
NRBC # BLD: 0 K/UL (ref 0–0.01)
NRBC BLD-RTO: 0 PER 100 WBC
PLATELET # BLD AUTO: 190 K/UL (ref 150–400)
PMV BLD AUTO: 9.6 FL (ref 8.9–12.9)
POTASSIUM SERPL-SCNC: 3.9 MMOL/L (ref 3.5–5.1)
PROT SERPL-MCNC: 8.8 G/DL (ref 6.4–8.2)
RBC # BLD AUTO: 4.25 M/UL (ref 3.8–5.2)
SAMPLES BEING HELD,HOLD: NORMAL
SODIUM SERPL-SCNC: 136 MMOL/L (ref 136–145)
SOURCE, COVRS: NORMAL
TROPONIN-HIGH SENSITIVITY: 10 NG/L (ref 0–51)
WBC # BLD AUTO: 4.3 K/UL (ref 3.6–11)

## 2022-11-05 PROCEDURE — 71045 X-RAY EXAM CHEST 1 VIEW: CPT

## 2022-11-05 PROCEDURE — 93005 ELECTROCARDIOGRAM TRACING: CPT

## 2022-11-05 PROCEDURE — 36415 COLL VENOUS BLD VENIPUNCTURE: CPT

## 2022-11-05 PROCEDURE — 84484 ASSAY OF TROPONIN QUANT: CPT

## 2022-11-05 PROCEDURE — 99285 EMERGENCY DEPT VISIT HI MDM: CPT

## 2022-11-05 PROCEDURE — 80053 COMPREHEN METABOLIC PANEL: CPT

## 2022-11-05 PROCEDURE — 87635 SARS-COV-2 COVID-19 AMP PRB: CPT

## 2022-11-05 PROCEDURE — 83880 ASSAY OF NATRIURETIC PEPTIDE: CPT

## 2022-11-05 PROCEDURE — 85025 COMPLETE CBC W/AUTO DIFF WBC: CPT

## 2022-11-05 NOTE — ED PROVIDER NOTES
HPI  68-year-old woman who presents to the emergency department due to chest pain as well as nasal congestion. She developed some sharp chest pain about 3 days ago. No exacerbating or alleviating factors. Her pain is intermittent. She also had some nasal congestion overnight she woke up a little bit sweaty. No fever that she is aware of. She tells me she is not short of breath. She says her weight has been stable and she has had no lower extremity edema. She says she is compliant with her diuretics. She endorses a dry cough. No sick contacts at home.   Past Medical History:   Diagnosis Date    Arthritis     Diabetes ([address]     Heart failure ([address]     Hypertension        Past Surgical History:   Procedure Laterality Date    COLONOSCOPY N/A 3/19/2021    COLONOSCOPY performed by Francetta Phalen., MD at [facility] ENDOSCOPY    HX ORTHOPAEDIC      Arthroscopy right knee    [place] CARDIAC SURG PROCEDURE UNLIST      life vest         Family History:   Problem Relation Age of Onset    Diabetes Mother        Social History     Socioeconomic History    Marital status:      Spouse name: Not on file    Number of children: 3    Years of education: 12    Highest education level: Not on file   Occupational History    Occupation: retired   Tobacco Use    Smoking status: Former     Packs/day: 0.25     Years: 20.00     Pack years: 5.00     Types: Cigarettes     Quit date: 2014     Years since quittin.9    Smokeless tobacco: Never   Vaping Use    Vaping Use: Never used   Substance and Sexual Activity    Alcohol use: Yes     Comment: occ    Drug use: No    Sexual activity: Yes     Partners: Male   Other Topics Concern     Service Not Asked    Blood Transfusions Not Asked    Caffeine Concern Not Asked    Occupational Exposure Not Asked    Hobby Hazards Not Asked    Sleep Concern Not Asked    Stress Concern Not Asked    Weight Concern Not Asked    Special Diet Not Asked    Back Care Not Asked    Exercise Not Asked    Bike Helmet Not Asked    Seat Belt Not Asked    Self-Exams Not Asked   Social History Narrative    Not on file     Social Determinants of Health     Financial Resource Strain: Not on file   Food Insecurity: Not on file   Transportation Needs: Not on file   Physical Activity: Not on file   Stress: Not on file   Social Connections: Not on file   Intimate Partner Violence: Not on file   Housing Stability: Not on file         ALLERGIES: Patient has no known allergies. Review of Systems  A complete review of systems was performed and all systems reviewed are negative unless otherwise documented in HPI  Vitals:    11/05/22 1722 11/05/22 1737 11/05/22 1752 11/05/22 1807   BP: 106/62 (!) 116/58 124/70 130/69   Pulse: 67 61 65 66   Resp: 16 19 18 17   Temp:       SpO2: 98% 97% 97% 97%            Physical Exam  Constitutional:       Comments: Elderly woman resting comfortably no acute distress   Eyes:      Extraocular Movements: Extraocular movements intact. Comments: No scleral icterus   Neck:      Vascular: No JVD. Cardiovascular:      Comments: Regular rate and rhythm without murmurs. 2+ radial pulses bilaterally. Pulmonary:      Comments: Lungs are clear. No respiratory distress. No wheezing or rales. No chest wall tenderness noted  Abdominal:      Comments: Soft and nontender. No epigastric or right upper quadrant tenderness noted   Musculoskeletal:         General: Normal range of motion. Cervical back: Normal range of motion. Right lower leg: No edema. Left lower leg: No edema. Skin:     General: Skin is warm and dry. Neurological:      Comments: Awake and alert. Speech is normal.  GCS 15        MDM  82-year-old woman who presents with the above chief complaint. Vitals are stable. She is nontoxic on exam.  EKG shows normal sinus rhythm with a rate of 73. She has some nonspecific T wave abnormalities. No concerning ST elevations. QTc 405. No STEMI.   Work-up will include cardiac enzymes as well as a chest x-ray and COVID-19 testing. Labs are all reassuring. No troponin elevation. BNP normal.  Chest x-ray clear. Creatinine is at baseline. COVID testing negative. She continued to decline symptomatic treatment on multiple reevaluations. She continues to appear well and was deemed appropriate for discharge. I encouraged her to follow-up with her cardiologist and return with any new or worsening symptoms.        Procedures

## 2022-11-05 NOTE — DISCHARGE INSTRUCTIONS
Please follow-up with your cardiologist and primary care doctor soon as possible. Return to the emergency department with any new or worsening symptoms.

## 2022-11-06 LAB
ATRIAL RATE: 73 BPM
CALCULATED P AXIS, ECG09: 71 DEGREES
CALCULATED R AXIS, ECG10: 24 DEGREES
CALCULATED T AXIS, ECG11: 24 DEGREES
DIAGNOSIS, 93000: NORMAL
P-R INTERVAL, ECG05: 204 MS
Q-T INTERVAL, ECG07: 368 MS
QRS DURATION, ECG06: 82 MS
QTC CALCULATION (BEZET), ECG08: 405 MS
VENTRICULAR RATE, ECG03: 73 BPM

## 2022-11-17 RX ORDER — LANOLIN ALCOHOL/MO/W.PET/CERES
CREAM (GRAM) TOPICAL
Qty: 90 TABLET | Refills: 1 | Status: SHIPPED | OUTPATIENT
Start: 2022-11-17

## 2022-12-08 DIAGNOSIS — I50.20 NYHA CLASS 4 HEART FAILURE WITH REDUCED EJECTION FRACTION (HCC): ICD-10-CM

## 2022-12-08 RX ORDER — ISOSORBIDE MONONITRATE 30 MG/1
TABLET, EXTENDED RELEASE ORAL
Qty: 90 TABLET | Refills: 0 | Status: SHIPPED | OUTPATIENT
Start: 2022-12-08

## 2022-12-12 ENCOUNTER — OFFICE VISIT (OUTPATIENT)
Dept: CARDIOLOGY CLINIC | Age: 72
End: 2022-12-12
Payer: MEDICARE

## 2022-12-12 VITALS
WEIGHT: 186 LBS | TEMPERATURE: 97.9 F | HEART RATE: 74 BPM | DIASTOLIC BLOOD PRESSURE: 78 MMHG | RESPIRATION RATE: 18 BRPM | BODY MASS INDEX: 30.99 KG/M2 | HEIGHT: 65 IN | OXYGEN SATURATION: 97 % | SYSTOLIC BLOOD PRESSURE: 118 MMHG

## 2022-12-12 DIAGNOSIS — E61.1 IRON DEFICIENCY: ICD-10-CM

## 2022-12-12 DIAGNOSIS — I50.30 DIASTOLIC HEART FAILURE, NYHA CLASS 2 (HCC): Primary | ICD-10-CM

## 2022-12-12 DIAGNOSIS — R53.83 FATIGUE, UNSPECIFIED TYPE: ICD-10-CM

## 2022-12-12 DIAGNOSIS — E55.9 VITAMIN D DEFICIENCY: ICD-10-CM

## 2022-12-12 PROCEDURE — G9899 SCRN MAM PERF RSLTS DOC: HCPCS | Performed by: NURSE PRACTITIONER

## 2022-12-12 PROCEDURE — G8432 DEP SCR NOT DOC, RNG: HCPCS | Performed by: NURSE PRACTITIONER

## 2022-12-12 PROCEDURE — 99214 OFFICE O/P EST MOD 30 MIN: CPT | Performed by: NURSE PRACTITIONER

## 2022-12-12 PROCEDURE — 1101F PT FALLS ASSESS-DOCD LE1/YR: CPT | Performed by: NURSE PRACTITIONER

## 2022-12-12 PROCEDURE — G8536 NO DOC ELDER MAL SCRN: HCPCS | Performed by: NURSE PRACTITIONER

## 2022-12-12 PROCEDURE — 94618 PULMONARY STRESS TESTING: CPT | Performed by: NURSE PRACTITIONER

## 2022-12-12 PROCEDURE — G8754 DIAS BP LESS 90: HCPCS | Performed by: NURSE PRACTITIONER

## 2022-12-12 PROCEDURE — G8427 DOCREV CUR MEDS BY ELIG CLIN: HCPCS | Performed by: NURSE PRACTITIONER

## 2022-12-12 PROCEDURE — G8417 CALC BMI ABV UP PARAM F/U: HCPCS | Performed by: NURSE PRACTITIONER

## 2022-12-12 PROCEDURE — 1090F PRES/ABSN URINE INCON ASSESS: CPT | Performed by: NURSE PRACTITIONER

## 2022-12-12 PROCEDURE — 3017F COLORECTAL CA SCREEN DOC REV: CPT | Performed by: NURSE PRACTITIONER

## 2022-12-12 PROCEDURE — 1123F ACP DISCUSS/DSCN MKR DOCD: CPT | Performed by: NURSE PRACTITIONER

## 2022-12-12 PROCEDURE — G8752 SYS BP LESS 140: HCPCS | Performed by: NURSE PRACTITIONER

## 2022-12-12 PROCEDURE — G8399 PT W/DXA RESULTS DOCUMENT: HCPCS | Performed by: NURSE PRACTITIONER

## 2022-12-12 PROCEDURE — 3078F DIAST BP <80 MM HG: CPT | Performed by: NURSE PRACTITIONER

## 2022-12-12 PROCEDURE — 3074F SYST BP LT 130 MM HG: CPT | Performed by: NURSE PRACTITIONER

## 2022-12-12 RX ORDER — BUMETANIDE 2 MG/1
2 TABLET ORAL
Qty: 90 TABLET | Refills: 2
Start: 2022-12-12

## 2022-12-12 RX ORDER — AMMONIUM LACTATE 12 G/100G
LOTION TOPICAL
COMMUNITY
Start: 2022-11-09

## 2022-12-12 NOTE — LETTER
12/12/2022    Patient: Linden Douglass   YOB: 1950   Date of Visit: 12/12/2022     Melissa Reid MD  [facility]  [address]  [place] [address]  [address] [facility] [address]    Dear Melissa Reid MD,      Thank you for referring Ms. Sue Carroll to [address] [place] [address] for evaluation. My notes for this consultation are attached. If you have questions, please do not hesitate to call me. I look forward to following your patient along with you.       Sincerely,    Mir Recinos, NP

## 2022-12-12 NOTE — PROGRESS NOTES
[facility] in [place], [facility] Note    Patient name: Joaquin Zaldivar  Patient : 1950  Patient MRN: [id number]  Date of service: 22    Primary care physician: Yenni Preston MD  Primary general cardiologist:  Dr. Isis Rivera  Primary F cardiologist: Yesy Ledezma MD    CHIEF COMPLAINT:  Chief Complaint   Patient presents with    CHF     Follow up for CHF         PLAN:  Was not on beta blocker due to requirement for inotropic support at time of HF diagnosis, now with normal EF and no CAD, no indication for BB  Continue entresto 24/26mg BID  Cont hydralazine 50mg TID   Cont imdur 30mg daily  Continue spironolactone 25mg daily  Continue Farxiga 10mg daily  Continue bumex 1mg daily  Continue magnesium oxide 800mg BID  Cont calcitriol 0.25mcg daily for osteopenia  Continue cholecalciferol 2000 international units daily  Continue allopurinol 100mg daily  Does not take ASA  Does not take statins, LDL 84  Continue CPAP- needs sleep medicine follow up   6 MW today   HF labs quarterly- next due in 2023  Repeat TTE annually, ordered but not scheduled   Reinforced low salt diet  Reinforced fluid restriction to 6 x 8oz glasses per day  Recommend daily walking at least 30 minutes   Counseled on importance to remain abstinent from alcohol  Advanced care plan present on file  Follow-up with Dr. Isis Rivera as directed, will alternate visits and see annually unless EF declines   Follow-up with PCP, podiatrist and ophthalmologist for diabetes  Recommend flu and PNA vaccines - has appt this week   Up to date covid vaccine     Follow-up and Dispositions    Return in about 1 year (around 2023).           IMPRESSION:  HFpEF  Stage C, NYHA class I   Non-ischemic cardiomyopathy, with recovery of severe biventricular failure while on inotropic support  LVEF 55-60% (by echo 2021), LVEF 40-55% (by echo 2021) from LVEF 15-20% (by echo 3/9/21) from 20-25% (by echo 1/2021)  RV dysfunction, moderate-severe on diagnosis; normalized by echo (5/2021)  Weaned off inotropes (from 1/2021 to 5/2021); competed LVAD workup  Normal cors (by [facility] 3/11/21)  Severe pulmonary artery hypertension, PVR 4  Cardiac risk factors  Morbid obesity (BMI Body mass index is 30.95 kg/m². High risk for SHARLENE  DM2 (hgba1c 6.8)  HL  HTN  Former tobacco (stopped 2014 after > 40 years)  H/o alcohol abuse  OA, right knee  Carpal tunnel syndrome  Gout  Anemia, iron deficiency  C-scope (3/2021 s/p polyp removal, plan to repeat C-scope 2024)  Hypokalemia/hypomagensemia  Vitamin D deficincy    CARDIAC IMAGING:  RHC 3/17/21  PA 47/20 (29), RA 7, PCWP 10, CI 2.93  RHC (3/11/21) 25, PA 49/27/37, W 34, Allen CI 1.34     Echo 11/2021 EF 55-60%  Echo (5/14/21) LVEF 55-60%, LVEDD 4.9cm  Echo (4/8/21) LVEF 50-55%, LIVDd 5.04cm, Tapse 2.38cm, RVIDd 2.99cm  Echo (3/9/21) LVEF 15-20%, mild-moderate MR, severely elevated CVP, IVC > 21mm  Echo (1/29/21) LVEF 20-25%, moderately to severely reduced RV function  Echo (11/20/19) LVEF 51-55%  Echo (12/18/18) LVEF 50-55%  Echo (11/23/16) LVEF 45-55%     EKG (3/9/21) ST 103bpm, QRS 84ms  NST (5/2018) no scar or ischemia, LVEF 48%  Normal cors (by [facility] 3/11/21)      6 Min Walk Report 12/12/2022 6/18/2021 5/17/2021 4/21/2021 3/15/2021   (PRE) HR 92 68 71 86 88   (PRE) O2 Sat 96 99 99 98 100   (POST) HR 75 97 82 102 87   (POST) O2 Sat 100 95 97 96 98   Distance in Meters 318.52 364.51 355.92 267.31 100.74         HISTORY OF PRESENT ILLNESS:  I had the pleasure of seeing Marci Schwab in [address] [place] [address] at [address] [place] [address] in [place]. Briefly, Marci Schwab is a 81 y.o. female with h/o obesity (BMI 30), high risk for SHARLENE, DM2, HL, HTN, former tobacco (stopped 2014 after > 40 years), h/o alcohol use, iron deficiency anemia, CKD stage 2, COPD/reactive airway disease.  Patient initial diagnosis of heart failure occurred 2007 she was previously followed by Dr. Dwight Meneses at [facility] up until 2015 where she changed to Dr. Betty Gregorio due to insurance coverage. Her ejection fraction at [facility] in 2015 was 45-50%, at the time she had been maintained on high-dose diuretics to maintain a euvolemic state, metoprolol 100 mg daily as well as lisinopril 20 mg daily. Ms. Dalia Ruiz had been doing well on guideline directed medical therapy, she was on significant doses of diuretics to include upwards of 120 mg of furosemide along with 5 mg of metolazone daily. It has been recommended since she had maintained a euvolemic state and essentially near normal ejection fraction that reducing and/or eliminating metolazone due to its potential for renal decline. Patient was admitted 3/10-3/20/21 with 2 weeks h/o severe dyspnea, hypotension and NYHA class IV symptoms. Patient was found to have LVEF < 15% and was diuresed and initiated on home inotrope therapy with dobutamine. Workup for LVAD-DT was completed on this admission and patient discharge home on inotropes and with lifevest for a 3 month period of observation. The plan was that if she does not recover LVEF within three months she will undergo LVAD implatnation. During this period of time has heart function has recovered to normal on GDMT. Symptoms improved to stage C, NYHA class II recovered from stage D class IV symptoms. She was confirmed to have non-ischemic cardiomyopathy, with recovery of severe biventricular failure while on inotropic support; LVEF 55-60% (by echo 5/2021), LVEF 40-55% (by echo 4/2021) from LVEF 15-20% (by echo 3/9/21) from 20-25% (by echo 1/2021) from 51-55% (by echo 11/2019). RV dysfunction, moderate-severe on diagnosis normalized by echo to normal LV function (5/2021). Hypotension with low cardiac index at rest 1.3 on chronic dobutamine recovered; GDMT was introduced and she was weaned off inotropes (from 1/2021 to 5/2021). Normal cors (by [address] 3/11/21).        INTERVAL HISTORY:  Today, patient presents for HF clinic visit. She states she feels well, denies acute HF symptoms, is able to do all of her normal activities and she walks daily without PARDO. She notes she is going to the bathroom all day long on her bumex and her weight is trending down, no dizziness. She is compliant with her medications, she has not been using her cpap due to making her gag. She does need to scheduled her annual TTE. REVIEW OF SYSTEMS:  Review of Systems   Constitutional:  Negative for chills, fever and weight loss. Respiratory:  Negative for cough and shortness of breath. Cardiovascular:  Negative for chest pain, palpitations, orthopnea and leg swelling. Gastrointestinal:  Negative for abdominal pain, constipation, diarrhea, heartburn, nausea and vomiting. Musculoskeletal:  Negative for falls and myalgias. Neurological:  Negative for dizziness and weakness. Psychiatric/Behavioral:  Negative for depression. PHYSICAL EXAM:  Visit Vitals  /78 (BP 1 Location: Left upper arm, BP Patient Position: Sitting, BP Cuff Size: Adult)   Pulse 74   Temp 97.9 °F (36.6 °C) (Oral)   Resp 18   Ht 5' 5\" (1.651 m)   Wt 186 lb (84.4 kg)   LMP  (LMP Unknown)   SpO2 97%   BMI 30.95 kg/m²     Physical Exam  Vitals reviewed. Constitutional:       General: She is not in acute distress. Appearance: Normal appearance. HENT:      Head: Normocephalic and atraumatic. Neck:      Vascular: No hepatojugular reflux or JVD. Cardiovascular:      Rate and Rhythm: Normal rate and regular rhythm. Pulses: Normal pulses. Heart sounds: Normal heart sounds. No murmur heard. No friction rub. No gallop. Pulmonary:      Effort: Pulmonary effort is normal. No respiratory distress. Breath sounds: Normal breath sounds. Abdominal:      General: Bowel sounds are normal. There is no distension. Palpations: Abdomen is soft. Tenderness: There is no abdominal tenderness. Musculoskeletal:      Right lower leg: No edema. Left lower leg: No edema. Skin:     General: Skin is warm and dry. Capillary Refill: Capillary refill takes less than 2 seconds. Neurological:      General: No focal deficit present. Mental Status: She is alert and oriented to person, place, and time. Psychiatric:         Mood and Affect: Mood normal.         Behavior: Behavior normal.         Thought Content:  Thought content normal.         Judgment: Judgment normal.          PAST MEDICAL HISTORY:  Past Medical History:   Diagnosis Date    Arthritis     Diabetes ([address]     Heart failure (HCC)     Hypertension        PAST SURGICAL HISTORY:  Past Surgical History:   Procedure Laterality Date    COLONOSCOPY N/A 3/19/2021    COLONOSCOPY performed by Rj Culp MD at [facility] ENDOSCOPY    HX ORTHOPAEDIC      Arthroscopy right knee    [place] CARDIAC SURG PROCEDURE UNLIST      life vest       FAMILY HISTORY:  Family History   Problem Relation Age of Onset    Diabetes Mother        SOCIAL HISTORY:  Social History     Socioeconomic History    Marital status:     Number of children: 3    Years of education: 15   Occupational History    Occupation: retired   Tobacco Use    Smoking status: Former     Packs/day: 0.25     Years: 20.00     Pack years: 5.00     Types: Cigarettes     Quit date: 2014     Years since quittin.0    Smokeless tobacco: Never   Vaping Use    Vaping Use: Never used   Substance and Sexual Activity    Alcohol use: Yes     Comment: Beer 2-3 times monthly    Drug use: No    Sexual activity: Yes     Partners: Male       LABORATORY RESULTS:  Labs Latest Ref Rng & Units 2022   WBC 3.6 - 11.0 K/uL 4.3   RBC 3.80 - 5.20 M/uL 4.25   Hemoglobin 11.5 - 16.0 g/dL 12.1   Hematocrit 35.0 - 47.0 % 36.7   MCV 80.0 - 99.0 FL 86.4   Platelets 030 - 637 K/uL 190   Lymphocytes 12 - 49 % 29   Monocytes 5 - 13 % 11   Eosinophils 0 - 7 % 5   Basophils 0 - 1 % 1   Albumin 3.5 - 5.0 g/dL 3.9   Calcium 8.5 - 10.1 MG/DL 9.6   Glucose 65 - 100 mg/dL 170(H)   BUN 6 - 20 MG/DL 49(H)   Creatinine 0.55 - 1.02 MG/DL 1.27(H)   Sodium 136 - 145 mmol/L 136   Potassium 3.5 - 5.1 mmol/L 3.9   Some recent data might be hidden       ALLERGY:  No Known Allergies     CURRENT MEDICATIONS:    Current Outpatient Medications:     Farxiga 10 mg tab tablet, TAKE 1 TABLET BY MOUTH EVERY DAY, Disp: 30 Tablet, Rfl: 2    ammonium lactate (LAC-HYDRIN) 12 % lotion, APPLY TO FEET TWICE A DAY, Disp: , Rfl:     bumetanide (BUMEX) 2 mg tablet, Take 1 Tablet by mouth daily as needed for PRN Reason (Other) (if weight gain of 3lb overnight or 5lb in one week). , Disp: 90 Tablet, Rfl: 2    isosorbide mononitrate ER (IMDUR) 30 mg tablet, TAKE 1 TABLET BY MOUTH EVERY DAY IN THE MORNING, Disp: 90 Tablet, Rfl: 0    thiamine HCL (B-1) 100 mg tablet, TAKE 1 TABLET BY MOUTH EVERY DAY, Disp: 90 Tablet, Rfl: 1    melatonin 3 mg tablet, TAKE 1 TABLET BY MOUTH EVERY NIGHT AS NEEDED FOR INSOMNIA., Disp: 90 Tablet, Rfl: 0    spironolactone (ALDACTONE) 25 mg tablet, TAKE 1 TABLET BY MOUTH EVERY DAY, Disp: 90 Tablet, Rfl: 1    atorvastatin (LIPITOR) 40 mg tablet, TAKE 1 TABLET BY MOUTH EVERY DAY, Disp: 90 Tablet, Rfl: 3    Entresto 24-26 mg tablet, TAKE 1 TABLET BY MOUTH TWO TIMES A DAY., Disp: 60 Tablet, Rfl: 3    Myrbetriq 25 mg ER tablet, Take 25 mg by mouth daily. , Disp: , Rfl:     Lantus Solostar U-100 Insulin 100 unit/mL (3 mL) inpn, 30 UNITS BY SUBCUTANEOUS ROUTE NIGHTLY. AS DISCUSSED INCREASE YOUR INSULIN ACCORDING TO YOUR MORNING BLOOD SUGAR. WOULD LIKE THE MORNING BLOOD SUGAR TO BE BETWEEN 110 .  PLEASE CALL THE OFFICE NURSE IF YOU NEED ASSISTANCE (Patient taking differently: Patient reports taking 58 units nightly), Disp: 15 Adjustable Dose Pre-filled Pen Syringe, Rfl: 1    magnesium oxide (MAG-OX) 400 mg tablet, TAKE 2 TABLETS BY MOUTH TWO (2) TIMES A DAY., Disp: 360 Tablet, Rfl: 2    oxybutynin (DITROPAN) 5 mg tablet, TAKE 1 TABLET BY MOUTH THREE TIMES A DAY, Disp: 270 Tablet, Rfl: 3    cholecalciferol (VITAMIN D3) (2,000 UNITS /50 MCG) cap capsule, TAKE 1 CAPSULE BY MOUTH EVERY DAY, Disp: 90 Capsule, Rfl: 2    allopurinoL (ZYLOPRIM) 100 mg tablet, Take 1 Tablet by mouth daily. , Disp: 30 Tablet, Rfl: 11    Insulin Needles, Disposable, (BD Ultra-Fine Short Pen Needle) 31 gauge x 5/16\" ndle, USE WITH INSULIN PENS TWICE DAILY AS DIRECTED, Disp: 100 Pen Needle, Rfl: 11    calcitRIOL (RocaltroL) 0.25 mcg capsule, Take 0.25 mcg by mouth daily. , Disp: , Rfl:     diclofenac (VOLTAREN) 1 % gel, Apply 2 g to affected area two (2) times daily as needed for Pain. thin layer to joint pain, Disp: , Rfl:     budesonide (PULMICORT) 0.25 mg/2 mL nbsp, 500 mcg by Nebulization route every six (6) hours as needed for Other (wheezing shortness of breath). , Disp: , Rfl:     fluticasone propion-salmeteroL (ADVAIR/WIXELA) 250-50 mcg/dose diskus inhaler, Take 2 Puffs by inhalation two (2) times a day., Disp: , Rfl:     fluticasone propionate (FLONASE) 50 mcg/actuation nasal spray, 2 Sprays by Both Nostrils route daily. (Patient taking differently: 2 Sprays by Both Nostrils route as needed.), Disp: 1 Bottle, Rfl: 0    flash glucose sensor (FreeStyle Nahtaniel 14 Day Sensor) kit, Blood sugar checks before meals and at bedtime and as needed (Patient not taking: Reported on 12/12/2022), Disp: 1 Kit, Rfl: 11    flash glucose scanning reader (FreeStyle Nathaniel 14 Day [place]) misc, Blood sugar checks before meals and at bedtime and as needed (Patient not taking: Reported on 12/12/2022), Disp: 1 Each, Rfl: 0    Thank you for your referral and allowing me to participate in this patient's care.     Gregory Beckham NP  [facility] [address], [address]  Phone: [phone]      PATIENT CARE TEAM:  Patient Care Team:  Sreedhar Quijano MD as PCP - General (Internal Medicine Physician)  Sreedhar Quijano MD as PCP - [facility] Provider  John Sanz MD as Physician (Cardiovascular Disease Physician)  Eben Sweeney MD (Inactive) as Surgeon (Orthopedic Surgery)  Narcisa Méndez MD (Sleep Medicine Physician)

## 2022-12-12 NOTE — PATIENT INSTRUCTIONS
Medication changes:    Please only take Bumex as needed. This means if weight gain of 3lbs overnight or 5lbs in a week. Other signs symptoms to watch for are swelling and shortness of breath. Please call us at [phone] option 2 if you begin to develop any of these symptoms. Please take this to your pharmacy to notify them of the change in medications. Testing Ordered:    Lab work has been ordered for February. Please present to a [facility] location of your choice with the orders provided to have lab work done. Our office will notify you of any abnormal results requiring changes to your current plan of care. Please call to schedule your echo at [phone] as soon as possible       Please call and schedule your appointment with Sleep Medicine. You can call one of the numbers listed below. [facility]., [facility]. [place], [facility]  Tel.  [phone]  Fax. 100 [facility] [address]  [address]  Tel.  [phone]  Fax. [phone] [address] [place] [address]  Tel.  [phone]  Fax. [phone]     6 minute walk performed today in clinic. Other Recommendations:      Ensure your drinking an adequate amount of water with a goal of 6-8 eight ounce glasses (1.5-2 liters) of fluid daily. Your urine should be clear and light yellow straw colored. If your blood pressure begins to consistently run below 90/60 and/or you begin to experience dizziness or lightheadedness, please contact the [facility] at [phone]. Follow up in 1 year with [facility]      Please monitor your weights daily upon waking and after using the bathroom. Keep a written records of your weights and bring to your next appointment. If you have a weight gain of 3 or more pounds overnight OR 5 or more pounds in one week please contact our office.        Thank you for allowing us the privilege of being a part of your healthcare team! Please do not hesitate to contact our office at [phone] with any questions or concerns. Virtual Heart Failure [address] invites you to learn more about heart failure and to share your questions, ideas, and experiences with others. Each month, the Heart Failure Support Group features a new educational topic and a guest speaker, followed by an interactive discussion. Our Heart Failure Nurse Navigator will moderate each session. You will be able to participate by phone, tablet or computer through [address] [place] [address]. This support group takes place on the 3rd Thursday of each month from 6:00-7:30PM. All individuals living with heart failure and their caregivers are welcome to join. If you are interested in participating, please contact us at [email or web address] and you will be sent the link to join the [employer].

## 2022-12-20 ENCOUNTER — OFFICE VISIT (OUTPATIENT)
Dept: SLEEP MEDICINE | Age: 72
End: 2022-12-20
Payer: MEDICARE

## 2022-12-20 VITALS
DIASTOLIC BLOOD PRESSURE: 56 MMHG | SYSTOLIC BLOOD PRESSURE: 115 MMHG | OXYGEN SATURATION: 97 % | BODY MASS INDEX: 31.52 KG/M2 | HEIGHT: 65 IN | TEMPERATURE: 97.8 F | WEIGHT: 189.2 LBS | HEART RATE: 81 BPM

## 2022-12-20 DIAGNOSIS — I10 PRIMARY HYPERTENSION: ICD-10-CM

## 2022-12-20 DIAGNOSIS — Z86.79 H/O CONGESTIVE HEART FAILURE: ICD-10-CM

## 2022-12-20 DIAGNOSIS — G47.33 OSA (OBSTRUCTIVE SLEEP APNEA): Primary | ICD-10-CM

## 2022-12-20 PROCEDURE — G8752 SYS BP LESS 140: HCPCS | Performed by: INTERNAL MEDICINE

## 2022-12-20 PROCEDURE — 1101F PT FALLS ASSESS-DOCD LE1/YR: CPT | Performed by: INTERNAL MEDICINE

## 2022-12-20 PROCEDURE — 3074F SYST BP LT 130 MM HG: CPT | Performed by: INTERNAL MEDICINE

## 2022-12-20 PROCEDURE — 99214 OFFICE O/P EST MOD 30 MIN: CPT | Performed by: INTERNAL MEDICINE

## 2022-12-20 PROCEDURE — G8427 DOCREV CUR MEDS BY ELIG CLIN: HCPCS | Performed by: INTERNAL MEDICINE

## 2022-12-20 PROCEDURE — G8417 CALC BMI ABV UP PARAM F/U: HCPCS | Performed by: INTERNAL MEDICINE

## 2022-12-20 PROCEDURE — G9899 SCRN MAM PERF RSLTS DOC: HCPCS | Performed by: INTERNAL MEDICINE

## 2022-12-20 PROCEDURE — G8432 DEP SCR NOT DOC, RNG: HCPCS | Performed by: INTERNAL MEDICINE

## 2022-12-20 PROCEDURE — 1090F PRES/ABSN URINE INCON ASSESS: CPT | Performed by: INTERNAL MEDICINE

## 2022-12-20 PROCEDURE — G8536 NO DOC ELDER MAL SCRN: HCPCS | Performed by: INTERNAL MEDICINE

## 2022-12-20 PROCEDURE — 1123F ACP DISCUSS/DSCN MKR DOCD: CPT | Performed by: INTERNAL MEDICINE

## 2022-12-20 PROCEDURE — G8754 DIAS BP LESS 90: HCPCS | Performed by: INTERNAL MEDICINE

## 2022-12-20 PROCEDURE — 3078F DIAST BP <80 MM HG: CPT | Performed by: INTERNAL MEDICINE

## 2022-12-20 PROCEDURE — G8399 PT W/DXA RESULTS DOCUMENT: HCPCS | Performed by: INTERNAL MEDICINE

## 2022-12-20 PROCEDURE — 3017F COLORECTAL CA SCREEN DOC REV: CPT | Performed by: INTERNAL MEDICINE

## 2022-12-20 NOTE — PATIENT INSTRUCTIONS
[address] [place]. [place], [address]  Tel.  [phone]  Fax. [address] [facility] 60  [place], [address] [facility]  Tel.  [phone]  Fax. [phone] [address]  Tel.  [phone]  Fax. [phone]     Learning About CPAP for Sleep Apnea  What is CPAP? CPAP is a small machine that you use at home every night while you sleep. It increases air pressure in your throat to keep your airway open. When you have sleep apnea, this can help you sleep better so you feel much better. CPAP stands for \"continuous positive airway pressure. \"  The CPAP machine will have one of the following:  A mask that covers your nose and mouth  Prongs that fit into your nose  A mask that covers your nose only, the most common type. This type is called NCPAP. The N stands for \"nasal.\"  Why is it done? CPAP is usually the best treatment for obstructive sleep apnea. It is the first treatment choice and the most widely used. Your doctor may suggest CPAP if you have: Moderate to severe sleep apnea. Sleep apnea and coronary artery disease (CAD) or heart failure. How does it help? CPAP can help you have more normal sleep, so you feel less sleepy and more alert during the daytime. CPAP may help keep heart failure or other heart problems from getting worse. NCPAP may help lower your blood pressure. If you use CPAP, your bed partner may also sleep better because you are not snoring or restless. What are the side effects? Some people who use CPAP have:  A dry or stuffy nose and a sore throat. Irritated skin on the face. Sore eyes. Bloating. If you have any of these problems, work with your doctor to fix them. Here are some things you can try:  Be sure the mask or nasal prongs fit well. See if your doctor can adjust the pressure of your CPAP. If your nose is dry, try a humidifier.   If your nose is runny or stuffy, try decongestant medicine or a steroid nasal spray. If these things do not help, you might try a different type of machine. Some machines have air pressure that adjusts on its own. Others have air pressures that are different when you breathe in than when you breathe out. This may reduce discomfort caused by too much pressure in your nose. Where can you learn more? Go to MyMiniLife.be  Enter Conrad James in the search box to learn more about \"Learning About CPAP for Sleep Apnea. \"   © 4007-8561 Healthwise, Incorporated. Care instructions adapted under license by [facility] (which disclaims liability or warranty for this information). This care instruction is for use with your licensed healthcare professional. If you have questions about a medical condition or this instruction, always ask your healthcare professional. [address] any warranty or liability for your use of this information. Content Version: 5.0.51862; Last Revised: January 11, 2010  PROPER SLEEP HYGIENE    What to avoid  Do not have drinks with caffeine, such as coffee or black tea, for 8 hours before bed. Do not smoke or use other types of tobacco near bedtime. Nicotine is a stimulant and can keep you awake. Avoid drinking alcohol late in the evening, because it can cause you to wake in the middle of the night. Do not eat a big meal close to bedtime. If you are hungry, eat a light snack. Do not drink a lot of water close to bedtime, because the need to urinate may wake you up during the night. Do not read or watch TV in bed. Use the bed only for sleeping and sexual activity. What to try  Go to bed at the same time every night, and wake up at the same time every morning. Do not take naps during the day. Keep your bedroom quiet, dark, and cool. Get regular exercise, but not within 3 to 4 hours of your bedtime. .  Sleep on a comfortable pillow and mattress.   If watching the clock makes you anxious, turn it facing away from you so you cannot see the time. If you worry when you lie down, start a worry book. Well before bedtime, write down your worries, and then set the book and your concerns aside. Try meditation or other relaxation techniques before you go to bed. If you cannot fall asleep, get up and go to another room until you feel sleepy. Do something relaxing. Repeat your bedtime routine before you go to bed again. Make your house quiet and calm about an hour before bedtime. Turn down the lights, turn off the TV, log off the computer, and turn down the volume on music. This can help you relax after a busy day. Drowsy Driving: The [facility] 54 cites drowsiness as a causing factor in more than 263,622 police reported crashes annually, resulting in 76,000 injuries and 1,500 deaths. Other surveys suggest 55% of people polled have driven while drowsy in the past year, 23% had fallen asleep but not crashed, 3% crashed, and 2% had and accident due to drowsy driving. Who is at risk? Young Drivers: One study of drowsy driving accidents states that 55% of the drivers were under 25 years. Of those, 75% were male. Shift Workers and Travelers: People who work overnight or travel across time zones frequently are at higher risk of experiencing Circadian Rhythm Disorders. They are trying to work and function when their body is programed to sleep. Sleep Deprived: Lack of sleep has a serious impact on your ability to pay attention or focus on a task. Consistently getting less than the average of 8 hours your body needs creates partial or cumulative sleep deprivation. Untreated Sleep Disorders: Sleep Apnea, Narcolepsy, R.L.S., and other sleep disorders (untreated) prevent a person from getting enough restful sleep. This leads to excessive daytime sleepiness and increases the risk for drowsy driving accidents by up to 7 times.   Medications / Alcohol: Even over the counter medications can cause drowsiness. Medications that impair a drivers attention should have a warning label. Alcohol naturally makes you sleepy and on its own can cause accidents. Combined with excessive drowsiness its effects are amplified. Signs of Drowsy Driving:   * You don't remember driving the last few miles   * You may drift out of your thaddeus   * You are unable to focus and your thoughts wander   * You may yawn more often than normal   * You have difficulty keeping your eyes open / nodding off   * Missing traffic signs, speeding, or tailgating  Prevention-   Good sleep hygiene, lifestyle and behavioral choices have the most impact on drowsy driving. There is no substitute for sleep and the average person requires 8 hours nightly. If you find yourself driving drowsy, stop and sleep. Consider the sleep hygiene tips provided during your visit as well. Medication Refill Policy: Refills for all medications require 1 week advance notice. Please have your pharmacy fax a refill request. We are unable to fax, or call in \"controled substance\" medications and you will need to pick these prescriptions up from our office. TurnTide Activation    Thank you for requesting access to TurnTide. Please follow the instructions below to securely access and download your online medical record. TurnTide allows you to send messages to your doctor, view your test results, renew your prescriptions, schedule appointments, and more. How Do I Sign Up? In your internet browser, go to [email or web address]. Click on the First Time User? Click Here link in the Sign In box. You will see the New Member Sign Up page. Enter your TurnTide Access Code exactly as it appears below. You will not need to use this code after youve completed the sign-up process. If you do not sign up before the expiration date, you must request a new code. TurnTide Access Code:  Activation code not generated  Current TurnTide Status: Active (This is the date your Biottery access code will )    Enter the last four digits of your Social Security Number (xxxx) and Date of Birth (mm/dd/yyyy) as indicated and click Submit. You will be taken to the next sign-up page. Create a Biottery ID. This will be your Biottery login ID and cannot be changed, so think of one that is secure and easy to remember. Create a Biottery password. You can change your password at any time. Enter your Password Reset Question and Answer. This can be used at a later time if you forget your password. Enter your e-mail address. You will receive e-mail notification when new information is available in [address] Click Sign Up. You can now view and download portions of your medical record. Click the YongChe link to download a portable copy of your medical information. Additional Information    If you have questions, please call [phone]. Remember, Biottery is NOT to be used for urgent needs. For medical emergencies, dial 911.

## 2022-12-20 NOTE — PROGRESS NOTES
[address] [place]. [place], [address]  Tel.  [phone]  Fax. 100 [facility] 60  [place], [address]  Tel.  [phone]  Fax. [phone] [address]  Tel.  [phone]  Fax. Terrance Brown (: 1950) is a 81 y.o. female, established patient, seen for positive airway pressure follow-up and evaluation of the following chief complaint(s):   Sleep Problem (Need Download, no modem, yearly follow up )       Winnie Closs was last seen by me on 21, prior notes reviewed in detail. Polysomnogram (PSG) performed on 21  was indicative of an average AHI of 17.7 per hour with an SpO2 vinh of 78%, the duration of SpO2 <88% was 3.30 minutes. ASSESSMENT/PLAN:    ICD-10-CM ICD-9-CM    1. SHARLENE (obstructive sleep apnea)  G47.33 327.23 SLEEP LAB (PAP TITRATION)      2. H/O congestive heart failure  Z86.79 V12.59       3. Primary hypertension  I10 401.9       4. BMI 31.0-31.9,adult  Z68.31 V85.31           On ResMed:  AirSense 10 AutoSet      Settings:  CPAP: 09 cmH2O    EPR: 1    Sleep Apnea -   * She is not adherent with PAP therapy but PAP continues to benefit patient and remains necessary for control of her sleep apnea. Change pressure setting to 06-09 cmH2O.    * PAP card download in 4 weeks. * Mask evaluation done in the office - see tech notes. * Supplies for his device were ordered as noted below: Declined by patient. * She is familiar with the telephone monitoring application, is willing to track therapy and agrees to notify use if AHI is >5 per hour. * Counseling was provided regarding the importance of regular PAP use (particularly in presence of HTN and Heart Disease) with emphasis on ensuring sufficient total sleep time, proper sleep hygiene, and safe driving. * Re-enforced proper and regular cleaning for the device.     * She was asked to contact our office for any problems regarding PAP therapy. Recommended a dedicated weight loss program through appropriate diet and exercise regimen as significant weight reduction has been shown to reduce severity of obstructive sleep apnea. Follow-up and Dispositions    Return for for device download in 1 month and with nurse practioner in 3 months. SUBJECTIVE/OBJECTIVE:    She  is seen today for follow up on PAP device and reports problems using the device. The following concerns identified:    Drowsiness no Problems exhaling no   Snoring no Forget to put on no   Mask Comfortable no Can't fall asleep no   Dry Mouth yes Mask falls off no   Air Leaking yes Frequent awakenings no       She admits that her sleep has worsened on PAP therapy using nasal mask and heated tubing. She feels that the device gags her. The estimated EF is 55 - 60%. Review of device download indicated:    Usage Days >= 4 hours 2 % in past 90 days  Median Usage  2 hour 26 minutes    Set Device Pressure 9 cmH2O    Median Leak 8.4 L/min  95% Leak 56.2 L/min    Average AHI: 1.4 /hr which reflects improved sleep breathing condition. [place] Sleepiness Score: 10   Modified F.O.S.Q. Score Total / 2: 16       Sleep Review of Systems: notable for Negative difficulty falling asleep; Positive awakenings at night to urinate; Negative  early morning headaches; Negative  memory problems; Negative  concentration issues; Negative  chest pain; Negative  shortness of breath;  Negative rashes or itching; Negative heartburn / belching / flatulence; Negative  significant mood issues; no afternoon naps per week.     Visit Vitals  BP (!) 115/56   Pulse 81   Temp 97.8 °F (36.6 °C)   Ht 5' 5\" (1.651 m)   Wt 189 lb 3.2 oz (85.8 kg)   LMP  (LMP Unknown)   SpO2 97%   BMI 31.48 kg/m²         General:   Not in acute distress   Eyes:  Anicteric sclerae, no obvious strabismus   Nose:  No obvious nasal septum deviation    Oropharynx:   Class 4 oropharyngeal outlet, thick tongue base, uvula not seen due to low-lying soft palate, narrow tonsilo-pharyngeal pilars   Tonsils:   tonsils are not visualized due to low-lying soft palate   Neck:   midline trachea   Chest/Lungs:  Equal lung expansion, clear on auscultation    CVS:  Normal rate, regular rhythm; no JVD   Skin:  Warm to touch; no obvious rashes   Neuro:  No focal deficits ; no obvious tremor    Psych:  Normal affect,  normal countenance; An electronic signature was used to authenticate this note. Chichi Stockton MD, FAASM  Electronically signed.  12/20/22

## 2022-12-20 NOTE — PROGRESS NOTES
Tech in room to review mask fit and change pressure settings on device per Dr. Sky Ivey. Pressure changed APAP 6-9 cmH2O. Patient advised to try the medium cushion at home on her N20 mask for better comfort. Patient showed how to properly put on and take off mask and to not make adjustments with the Velcro straps. She will return for a download in a month.

## 2022-12-30 ENCOUNTER — HOSPITAL ENCOUNTER (OUTPATIENT)
Dept: NON INVASIVE DIAGNOSTICS | Age: 72
Discharge: HOME | End: 2022-12-30
Attending: NURSE PRACTITIONER
Payer: MEDICARE

## 2022-12-30 DIAGNOSIS — I42.8 NICM (NONISCHEMIC CARDIOMYOPATHY) (HCC): ICD-10-CM

## 2022-12-30 LAB
ECHO AO ROOT DIAM: 2 CM
ECHO AV AREA PEAK VELOCITY: 1.2 CM2
ECHO AV AREA PLAN: 1.2 CM2
ECHO AV PEAK GRADIENT: 8 MMHG
ECHO AV PEAK VELOCITY: 1.4 M/S
ECHO AV VELOCITY RATIO: 0.5
ECHO LA DIAMETER: 3.2 CM
ECHO LA TO AORTIC ROOT RATIO: 1.6
ECHO LA VOL 4C: 47 ML (ref 22–52)
ECHO LV EDV A2C: 108 ML
ECHO LV EDV A4C: 118 ML
ECHO LV EDV BP: 111 ML (ref 56–104)
ECHO LV EJECTION FRACTION A2C: 40 %
ECHO LV EJECTION FRACTION A4C: 46 %
ECHO LV EJECTION FRACTION BIPLANE: 46 % (ref 55–100)
ECHO LV ESV A2C: 64 ML
ECHO LV ESV A4C: 63 ML
ECHO LV ESV BP: 60 ML (ref 19–49)
ECHO LV FRACTIONAL SHORTENING: 31 % (ref 28–44)
ECHO LV INTERNAL DIMENSION DIASTOLIC: 4.8 CM (ref 3.9–5.3)
ECHO LV INTERNAL DIMENSION SYSTOLIC: 3.3 CM
ECHO LV IVSD: 1 CM (ref 0.6–0.9)
ECHO LV MASS 2D: 194 G (ref 67–162)
ECHO LV POSTERIOR WALL DIASTOLIC: 1.2 CM (ref 0.6–0.9)
ECHO LV RELATIVE WALL THICKNESS RATIO: 0.5
ECHO LVOT AREA: 2.5 CM2
ECHO LVOT DIAM: 1.8 CM
ECHO LVOT PEAK GRADIENT: 2 MMHG
ECHO LVOT PEAK VELOCITY: 0.7 M/S
ECHO MV A VELOCITY: 0.68 M/S
ECHO MV AREA PHT: 3.5 CM2
ECHO MV AREA PLAN: 3.4 CM2
ECHO MV E DECELERATION TIME (DT): 216.5 MS
ECHO MV E VELOCITY: 0.38 M/S
ECHO MV E/A RATIO: 0.56
ECHO MV MAX VELOCITY: 1.1 M/S
ECHO MV MEAN GRADIENT: 2 MMHG
ECHO MV MEAN VELOCITY: 0.6 M/S
ECHO MV PEAK GRADIENT: 5 MMHG
ECHO MV PRESSURE HALF TIME (PHT): 62.8 MS
ECHO MV REGURGITANT PEAK GRADIENT: 34 MMHG
ECHO MV REGURGITANT PEAK VELOCITY: 2.9 M/S
ECHO MV VTI: 27.2 CM
ECHO PULMONARY ARTERY END DIASTOLIC PRESSURE: 14 MMHG
ECHO PV MAX VELOCITY: 1.1 M/S
ECHO PV PEAK GRADIENT: 5 MMHG

## 2022-12-30 PROCEDURE — 93306 TTE W/DOPPLER COMPLETE: CPT

## 2023-01-03 ENCOUNTER — OFFICE VISIT (OUTPATIENT)
Dept: CARDIOLOGY CLINIC | Age: 73
End: 2023-01-03
Payer: MEDICARE

## 2023-01-03 VITALS
DIASTOLIC BLOOD PRESSURE: 63 MMHG | HEIGHT: 64 IN | HEART RATE: 83 BPM | WEIGHT: 188 LBS | BODY MASS INDEX: 32.1 KG/M2 | SYSTOLIC BLOOD PRESSURE: 99 MMHG | RESPIRATION RATE: 17 BRPM | OXYGEN SATURATION: 96 %

## 2023-01-03 DIAGNOSIS — E78.5 DYSLIPIDEMIA: ICD-10-CM

## 2023-01-03 DIAGNOSIS — I10 PRIMARY HYPERTENSION: ICD-10-CM

## 2023-01-03 DIAGNOSIS — I42.0 DILATED CARDIOMYOPATHY (HCC): Primary | ICD-10-CM

## 2023-01-03 PROCEDURE — G8427 DOCREV CUR MEDS BY ELIG CLIN: HCPCS | Performed by: SPECIALIST

## 2023-01-03 PROCEDURE — 3078F DIAST BP <80 MM HG: CPT | Performed by: SPECIALIST

## 2023-01-03 PROCEDURE — 99213 OFFICE O/P EST LOW 20 MIN: CPT | Performed by: SPECIALIST

## 2023-01-03 PROCEDURE — G8399 PT W/DXA RESULTS DOCUMENT: HCPCS | Performed by: SPECIALIST

## 2023-01-03 PROCEDURE — 1090F PRES/ABSN URINE INCON ASSESS: CPT | Performed by: SPECIALIST

## 2023-01-03 PROCEDURE — 3017F COLORECTAL CA SCREEN DOC REV: CPT | Performed by: SPECIALIST

## 2023-01-03 PROCEDURE — 3074F SYST BP LT 130 MM HG: CPT | Performed by: SPECIALIST

## 2023-01-03 PROCEDURE — 1101F PT FALLS ASSESS-DOCD LE1/YR: CPT | Performed by: SPECIALIST

## 2023-01-03 PROCEDURE — G8536 NO DOC ELDER MAL SCRN: HCPCS | Performed by: SPECIALIST

## 2023-01-03 PROCEDURE — G9899 SCRN MAM PERF RSLTS DOC: HCPCS | Performed by: SPECIALIST

## 2023-01-03 PROCEDURE — 1123F ACP DISCUSS/DSCN MKR DOCD: CPT | Performed by: SPECIALIST

## 2023-01-03 PROCEDURE — G8510 SCR DEP NEG, NO PLAN REQD: HCPCS | Performed by: SPECIALIST

## 2023-01-03 PROCEDURE — G8417 CALC BMI ABV UP PARAM F/U: HCPCS | Performed by: SPECIALIST

## 2023-01-03 NOTE — PROGRESS NOTES
HISTORY OF PRESENT ILLNESS  Faustina Alba is a 81 y.o. female     SUMMARY:   Problem List  Date Reviewed: 1/2/2023            Codes Class Noted    Chronic renal disease, stage III ICD-10-CM: N18.30  ICD-9-CM: 585.3  6/21/2022        Type 2 diabetes mellitus with chronic kidney disease ([facility] ICD-10-CM: E11.22  ICD-9-CM: 250.40, 585.9  3/25/2022        Controlled type 2 diabetes mellitus without complication, with long-term current use of insulin ([facility] ICD-10-CM: E11.9, Z79.4  ICD-9-CM: 250.00, V58.67  1/17/2022        Angina at rest [facility]) ICD-10-CM: I20.8  ICD-9-CM: 413.9  11/28/2021        Prerenal azotemia ICD-10-CM: R79.89  ICD-9-CM: 790.6  7/9/2021        Panlobular emphysema ([facility] ICD-10-CM: J43.1  ICD-9-CM: 492.8  7/9/2021        NICM (nonischemic cardiomyopathy) ([facility] ICD-10-CM: I42.8  ICD-9-CM: 425.4  4/1/2021        Advance care planning ICD-10-CM: Z71.89  ICD-9-CM: V65.49  Unknown        Acute on chronic systolic congestive heart failure, NYHA class 3 ([facility] ICD-10-CM: I50.23  ICD-9-CM: 428.23, 428.0  3/11/2021        Moderate pulmonary arterial systolic hypertension ([facility] ICD-10-CM: I27.21  ICD-9-CM: 416.8  3/11/2021        CHF exacerbation ([facility] ICD-10-CM: I50.9  ICD-9-CM: 428.0  1/9/5286        Diastolic heart failure, NYHA class 2 ([facility] ICD-10-CM: I50.30  ICD-9-CM: 428.30  Unknown        Uncontrolled type 2 diabetes mellitus with hyperglycemia ([facility] ICD-10-CM: E11.65  ICD-9-CM: 250.02  1/26/2020        Status post total right knee replacement ICD-10-CM: Z96.651  ICD-9-CM: V43.65  2/5/2019        Osteoarthrosis, localized, primary, knee, right ICD-10-CM: M17.11  ICD-9-CM: 715.16  1/21/2019        Decreased libido ICD-10-CM: R68.82  ICD-9-CM: 799.81  10/3/2017        Epistaxis ICD-10-CM: R04.0  ICD-9-CM: 784.7  8/22/2017        Former tobacco use--stopped 2014 after >40 yrs smoking ICD-10-CM: Z87.891  ICD-9-CM: V15.82  11/15/2016        Obesity (BMI 30.0-34.9) ICD-10-CM: E66.9  ICD-9-CM: 278.00 11/15/2016        Reactive airway disease ICD-10-CM: J45.909  ICD-9-CM: 493.90  1/12/2016        Carpal tunnel syndrome of right wrist ICD-10-CM: G56.01  ICD-9-CM: 354.0  12/6/2015        Primary osteoarthritis of right knee ICD-10-CM: M17.11  ICD-9-CM: 715.16  7/12/2015        Dilated cardiomyopathy ([facility] ICD-10-CM: I42.0  ICD-9-CM: 425.4  9/9/2014    Overview Signed 11/24/2019  5:40 PM by Jan Lozano MD     Nonischemic             Dyslipidemia ICD-10-CM: E78.5  ICD-9-CM: 272.4  9/9/2014        Gout ICD-10-CM: M10.9  ICD-9-CM: 274.9  9/9/2014        Alcohol abuse ICD-10-CM: F10.10  ICD-9-CM: 305.00  9/9/2014        Hypertension ICD-10-CM: I10  ICD-9-CM: 401.9  9/9/2014        Dermatitis ICD-10-CM: L30.9  ICD-9-CM: 692.9  9/9/2014           Current Outpatient Medications on File Prior to Visit   Medication Sig    Farxiga 10 mg tab tablet TAKE 1 TABLET BY MOUTH EVERY DAY    ammonium lactate (LAC-HYDRIN) 12 % lotion APPLY TO FEET TWICE A DAY    bumetanide (BUMEX) 2 mg tablet Take 1 Tablet by mouth daily as needed for PRN Reason (Other) (if weight gain of 3lb overnight or 5lb in one week). isosorbide mononitrate ER (IMDUR) 30 mg tablet TAKE 1 TABLET BY MOUTH EVERY DAY IN THE MORNING    thiamine HCL (B-1) 100 mg tablet TAKE 1 TABLET BY MOUTH EVERY DAY    melatonin 3 mg tablet TAKE 1 TABLET BY MOUTH EVERY NIGHT AS NEEDED FOR INSOMNIA.    spironolactone (ALDACTONE) 25 mg tablet TAKE 1 TABLET BY MOUTH EVERY DAY    atorvastatin (LIPITOR) 40 mg tablet TAKE 1 TABLET BY MOUTH EVERY DAY    Entresto 24-26 mg tablet TAKE 1 TABLET BY MOUTH TWO TIMES A DAY. flash glucose sensor (MofangStyle Nathaniel 14 Day Sensor) kit Blood sugar checks before meals and at bedtime and as needed    flash glucose scanning reader (FreeStyle Nathaniel 14 Day [place]) misc Blood sugar checks before meals and at bedtime and as needed    Myrbetriq 25 mg ER tablet Take 25 mg by mouth daily.     Lant Solostar U-100 Insulin 100 unit/mL (3 mL) inpn 30 UNITS BY SUBCUTANEOUS ROUTE NIGHTLY. AS DISCUSSED INCREASE YOUR INSULIN ACCORDING TO YOUR MORNING BLOOD SUGAR. WOULD LIKE THE MORNING BLOOD SUGAR TO BE BETWEEN 110 . PLEASE CALL THE OFFICE NURSE IF YOU NEED ASSISTANCE (Patient taking differently: Patient reports taking 58 units nightly)    magnesium oxide (MAG-OX) 400 mg tablet TAKE 2 TABLETS BY MOUTH TWO (2) TIMES A DAY. oxybutynin (DITROPAN) 5 mg tablet TAKE 1 TABLET BY MOUTH THREE TIMES A DAY    cholecalciferol (VITAMIN D3) (2,000 UNITS /50 MCG) cap capsule TAKE 1 CAPSULE BY MOUTH EVERY DAY    allopurinoL (ZYLOPRIM) 100 mg tablet Take 1 Tablet by mouth daily. Insulin Needles, Disposable, (BD Ultra-Fine Short Pen Needle) 31 gauge x 5/16\" ndle USE WITH INSULIN PENS TWICE DAILY AS DIRECTED    calcitRIOL (RocaltroL) 0.25 mcg capsule Take 0.25 mcg by mouth daily. diclofenac (VOLTAREN) 1 % gel Apply 2 g to affected area two (2) times daily as needed for Pain. thin layer to joint pain    budesonide (PULMICORT) 0.25 mg/2 mL nbsp 500 mcg by Nebulization route every six (6) hours as needed for Other (wheezing shortness of breath). fluticasone propion-salmeteroL (ADVAIR/WIXELA) 250-50 mcg/dose diskus inhaler Take 2 Puffs by inhalation two (2) times a day. fluticasone propionate (FLONASE) 50 mcg/actuation nasal spray 2 Sprays by Both Nostrils route daily. No current facility-administered medications on file prior to visit. CARDIOLOGY STUDIES TO DATE:  5/18 negative lexiscan   11/20/19   ECHO ADULT COMPLETE 11/20/2019 11/20/2019   Narrative   · Left Ventricle: Normal wall thickness. Mildly dilated left ventricle. Low normal systolic dysfunction. Estimated left ventricular ejection fraction is 51 - 55%. Visually measured ejection fraction. Inconclusive left ventricular diastolic function. · Mitral Valve: Mitral valve non-specific thickening. Trace mitral valve regurgitation is present. · Tricuspid Valve: Mild tricuspid valve regurgitation is present. · Pulmonary Artery: Pulmonary hypertension. · Left Atrium: Mildly dilated left atrium. Signed by: Chase Parada MD     01/29/21  echo lvef 20-25%, lae, mod decreased rvef, mild to mod mr, mild to mod tr, pa pressure 46mm    3/21 cardiac cath, normal coronaries    04/08/21 echo lvef 50-55%    11/21 normal echo    Chief Complaint   Patient presents with    Follow-up     6 mo appt. HPI :  She is doing great with no cardiac complaints or problems with her medications. She is walking some but not as much as she should and she has not lost any more weight. Recent lipid profile looked great but her A1c is up over 9  CARDIAC ROS:   negative for chest pain, dyspnea, palpitations, syncope, orthopnea, paroxysmal nocturnal dyspnea, exertional chest pressure/discomfort, claudication, lower extremity edema    Family History   Problem Relation Age of Onset    Diabetes Mother        Past Medical History:   Diagnosis Date    Arthritis     Diabetes ([facility]     Heart failure ([facility]     Hypertension        GENERAL ROS:  A comprehensive review of systems was negative except for that written in the HPI.     Visit Vitals  BP 99/63 (BP 1 Location: Right upper arm, BP Patient Position: Sitting, BP Cuff Size: Large adult)   Pulse 83   Resp 17   Ht 5' 4\" (1.626 m)   Wt 188 lb (85.3 kg)   LMP  (LMP Unknown)   SpO2 96%   BMI 32.27 kg/m²       Wt Readings from Last 3 Encounters:   01/03/23 188 lb (85.3 kg)   12/20/22 189 lb 3.2 oz (85.8 kg)   12/12/22 186 lb (84.4 kg)            BP Readings from Last 3 Encounters:   01/03/23 99/63   12/20/22 (!) 115/56   12/12/22 118/78       PHYSICAL EXAM  General appearance: alert, cooperative, no distress, appears stated age  Neurologic: Alert and oriented X 3  Neck: supple, symmetrical, trachea midline, no adenopathy, no carotid bruit, and no JVD  Lungs: clear to auscultation bilaterally  Heart: regular rate and rhythm, S1, S2 normal, no murmur, click, rub or gallop  Extremities: extremities normal, atraumatic, no cyanosis or edema    Lab Results   Component Value Date/Time    Cholesterol, total 169 03/18/2021 05:31 PM    Cholesterol, total 143 10/08/2020 12:01 PM    Cholesterol, total 207 (H) 08/08/2019 02:31 PM    Cholesterol, total 175 04/10/2018 01:19 PM    Cholesterol, total 141 08/26/2016 11:48 AM    HDL Cholesterol 70 03/18/2021 05:31 PM    HDL Cholesterol 46 10/08/2020 12:01 PM    HDL Cholesterol 46 08/08/2019 02:31 PM    HDL Cholesterol 56 04/10/2018 01:19 PM    HDL Cholesterol 43 08/26/2016 11:48 AM    LDL, calculated 84.6 03/18/2021 05:31 PM    LDL, calculated 73 10/08/2020 12:01 PM    LDL, calculated 105 (H) 08/08/2019 02:31 PM    LDL, calculated 75 04/10/2018 01:19 PM    LDL, calculated 25 08/26/2016 11:48 AM    Triglyceride 72 03/18/2021 05:31 PM    Triglyceride 137 10/08/2020 12:01 PM    Triglyceride 280 (H) 08/08/2019 02:31 PM    Triglyceride 221 (H) 04/10/2018 01:19 PM    Triglyceride 364 (H) 08/26/2016 11:48 AM    CHOL/HDL Ratio 2.4 03/18/2021 05:31 PM     ASSESSMENT :      She is stable and asymptomatic, well compensated on a good medical regimen and needs no cardiac testing at this time. current treatment plan is effective, no change in therapy  lab results and schedule of future lab studies reviewed with patient  reviewed diet, exercise and weight control    Encounter Diagnoses   Name Primary? Dilated cardiomyopathy ([facility] Yes    Dyslipidemia     Primary hypertension      No orders of the defined types were placed in this encounter. Follow-up and Dispositions    Return in about 6 months (around 7/3/2023). Richmond Caldwell MD  1/3/2023  Please note that this dictation was completed with [employer] the [employer] voice recognition software. Quite often unanticipated grammatical, syntax, homophones, and other interpretive errors are inadvertently transcribed by the computer software. Please disregard these errors.   Please excuse any errors that have escaped final proofreading. Thank you.

## 2023-01-04 ENCOUNTER — OFFICE VISIT (OUTPATIENT)
Dept: INTERNAL MEDICINE CLINIC | Age: 73
End: 2023-01-04
Payer: MEDICARE

## 2023-01-04 VITALS
HEART RATE: 82 BPM | SYSTOLIC BLOOD PRESSURE: 107 MMHG | TEMPERATURE: 98.4 F | OXYGEN SATURATION: 98 % | WEIGHT: 188.4 LBS | DIASTOLIC BLOOD PRESSURE: 59 MMHG | RESPIRATION RATE: 16 BRPM | BODY MASS INDEX: 32.17 KG/M2 | HEIGHT: 64 IN

## 2023-01-04 DIAGNOSIS — M17.11 OSTEOARTHROSIS, LOCALIZED, PRIMARY, KNEE, RIGHT: ICD-10-CM

## 2023-01-04 DIAGNOSIS — E11.9 CONTROLLED TYPE 2 DIABETES MELLITUS WITHOUT COMPLICATION, WITH LONG-TERM CURRENT USE OF INSULIN (HCC): ICD-10-CM

## 2023-01-04 DIAGNOSIS — I42.0 DILATED CARDIOMYOPATHY (HCC): Primary | ICD-10-CM

## 2023-01-04 DIAGNOSIS — E66.9 OBESITY (BMI 30.0-34.9): ICD-10-CM

## 2023-01-04 DIAGNOSIS — Z79.4 CONTROLLED TYPE 2 DIABETES MELLITUS WITHOUT COMPLICATION, WITH LONG-TERM CURRENT USE OF INSULIN (HCC): ICD-10-CM

## 2023-01-04 DIAGNOSIS — I10 PRIMARY HYPERTENSION: ICD-10-CM

## 2023-01-04 DIAGNOSIS — J43.1 PANLOBULAR EMPHYSEMA (HCC): ICD-10-CM

## 2023-01-04 PROCEDURE — 93306 TTE W/DOPPLER COMPLETE: CPT | Performed by: INTERNAL MEDICINE

## 2023-01-04 NOTE — PROGRESS NOTES
Momo Ventura is a 81 y.o. female  Chief Complaint   Patient presents with    Follow-up    Diabetes    Hypertension     Patient here for follow up high blood pressure and diabetes. 1. Have you been to the ER, urgent care clinic since your last visit? Hospitalized since your last visit? Yes When: 11/05/22 Where: [facility] Reason for visit: Chest Pain    2. Have you seen or consulted any other health care providers outside of the [address] [place] [address] since your last visit? Include any pap smears or colon screening. Yes When: 01/03/2023 Where: Cardiologist Reason for visit: Chest Pain.

## 2023-01-08 ENCOUNTER — HOSPITAL ENCOUNTER (OUTPATIENT)
Dept: SLEEP MEDICINE | Age: 73
Discharge: HOME | End: 2023-01-08
Payer: MEDICARE

## 2023-01-08 VITALS
HEIGHT: 64 IN | BODY MASS INDEX: 32.1 KG/M2 | TEMPERATURE: 98.2 F | DIASTOLIC BLOOD PRESSURE: 70 MMHG | WEIGHT: 188 LBS | HEART RATE: 76 BPM | SYSTOLIC BLOOD PRESSURE: 120 MMHG | OXYGEN SATURATION: 98 %

## 2023-01-08 DIAGNOSIS — G47.33 OSA (OBSTRUCTIVE SLEEP APNEA): ICD-10-CM

## 2023-01-08 PROCEDURE — 95811 POLYSOM 6/>YRS CPAP 4/> PARM: CPT | Performed by: INTERNAL MEDICINE

## 2023-01-09 ENCOUNTER — DOCUMENTATION ONLY (OUTPATIENT)
Dept: SLEEP MEDICINE | Age: 73
End: 2023-01-09

## 2023-01-09 RX ORDER — SACUBITRIL AND VALSARTAN 24; 26 MG/1; MG/1
TABLET, FILM COATED ORAL
Qty: 60 TABLET | Refills: 3 | Status: SHIPPED | OUTPATIENT
Start: 2023-01-09

## 2023-01-09 NOTE — PROGRESS NOTES
[address] [facility]. [place], [address]  Tel.  [phone]  Fax. [address] [facility] [address]  [place], [address] [facility]  Tel.  [phone]  Fax. [phone] [address] [facility] [address]  Tel.  [phone]  Fax. [phone]     Sleep Study Technical Notes        PRE-Test:  Dmitry Arango (: 1950) arrived in the lobby. Patient was greeted and screening questions asked. The patient was taken to the Sleep Center and taken directly to his/her room. Vitals:  Temperature (98.2)   BP (120/70)   SaO2 (98)   Weight per patient (188)  Procedure explained to the patient and questions were answered. The patient expressed understanding of the procedure. Electrodes were applied without incident. The patient was placed in bed and the study was started. Pt stated that she would like to use a nasal mask. Sleep aid taken:  No      Acquisition Notes:  Lights off: 9:32 pm    Respiratory events: Hypopneas, OA  ECG:  NSR  Snoring:  Mild   PAP titration: CPAP  Eliminated events: Hypopneas, OA  Reduced events:  Events at  final pressure 0  Leak: 0  Desensitization Mask(s) Used: ResMed AirFit P10 for her pillows medium  Positional therapy with: Other comments:   Patient had caregiver in attendance:  No  Patient watched TV or on phone after lights out for 0 hours  Patient slept with positional therapy: No  Patient slept with head of bed elevated:  No  Patient wore an oral appliance:  No  Patient to bathroom 3 times        POST Test:  Patient was awakened. Electrodes were removed. The patient was discharged after answering the Post Questionnaire. Patient stated that she was alert and ok to drive. Equipment and room cleaned per infection control policy.

## 2023-01-10 ENCOUNTER — TELEPHONE (OUTPATIENT)
Dept: SLEEP MEDICINE | Age: 73
End: 2023-01-10

## 2023-01-10 DIAGNOSIS — G47.33 OSA (OBSTRUCTIVE SLEEP APNEA): Primary | ICD-10-CM

## 2023-01-23 ENCOUNTER — TELEPHONE (OUTPATIENT)
Dept: INTERNAL MEDICINE CLINIC | Age: 73
End: 2023-01-23

## 2023-01-24 RX ORDER — FLASH GLUCOSE SENSOR
KIT MISCELLANEOUS
Qty: 2 KIT | Refills: 11 | Status: SHIPPED | OUTPATIENT
Start: 2023-01-24

## 2023-01-24 RX ORDER — FLASH GLUCOSE SCANNING READER
EACH MISCELLANEOUS
Qty: 1 EACH | Refills: 0 | Status: SHIPPED | OUTPATIENT
Start: 2023-01-24

## 2023-01-26 NOTE — TELEPHONE ENCOUNTER
Orders Placed This Encounter    AMB SUPPLY ORDER     Diagnosis: Obstructive Sleep Apnea ICD-10 Code (G47.33)    Positive Airway Pressure Therapy: Duration of need: 99 months. APAP Device with Heated Humidifer [id number] / [id number]. Device Pressure: 11 cmH2O.     Nasal Pillows Combo Mask (Replace) 2 per month.  Nasal Pillows (Replace) 2 per month.  Full Face Mask 1 every 3 months.  Full Face Mask Cushion 1 per month.  Nasal Cushion (Replace) 2 per month.  Nasal Interface Mask 1 every 3 months.  Headgear 1 every 6 months.  Chinstrap 1 every 6 months.  Tubing 1 every 3 months.  Filter(s) Disposable 2 per month.  Filter(s) Non-Disposable 1 every 6 months. .   [address] for Humidifier (Replace) 1 every 6 months. Perform Mask Fitting per patient preference and comfort - replace as above. Alan Justice MD, FAASM; NPI: [id number]  Electronically signed. 01/26/23

## 2023-01-27 ENCOUNTER — DOCUMENTATION ONLY (OUTPATIENT)
Dept: SLEEP MEDICINE | Age: 73
End: 2023-01-27

## 2023-01-27 NOTE — PROGRESS NOTES
PAP order with sleep documents faxed to [address] Patient informed and to give office a call to schedule follow-up once she receives her new device. Patient agreed and expressed understanding.

## 2023-02-11 ENCOUNTER — HOSPITAL ENCOUNTER (EMERGENCY)
Age: 73
Discharge: HOME | End: 2023-02-11
Attending: STUDENT IN AN ORGANIZED HEALTH CARE EDUCATION/TRAINING PROGRAM
Payer: MEDICARE

## 2023-02-11 VITALS
SYSTOLIC BLOOD PRESSURE: 128 MMHG | RESPIRATION RATE: 24 BRPM | BODY MASS INDEX: 30.73 KG/M2 | OXYGEN SATURATION: 97 % | WEIGHT: 180 LBS | DIASTOLIC BLOOD PRESSURE: 70 MMHG | HEART RATE: 83 BPM | HEIGHT: 64 IN | TEMPERATURE: 97.5 F

## 2023-02-11 DIAGNOSIS — R04.0 EPISTAXIS: Primary | ICD-10-CM

## 2023-02-11 LAB
ALBUMIN SERPL-MCNC: 4.1 G/DL (ref 3.5–5)
ALBUMIN/GLOB SERPL: 0.8 (ref 1.1–2.2)
ALP SERPL-CCNC: 61 U/L (ref 45–117)
ALT SERPL-CCNC: 36 U/L (ref 12–78)
ANION GAP SERPL CALC-SCNC: 9 MMOL/L (ref 5–15)
APTT PPP: 24.8 SEC (ref 22.1–31)
AST SERPL-CCNC: 23 U/L (ref 15–37)
BASOPHILS # BLD: 0 K/UL (ref 0–0.1)
BASOPHILS NFR BLD: 0 % (ref 0–1)
BILIRUB SERPL-MCNC: 0.6 MG/DL (ref 0.2–1)
BUN SERPL-MCNC: 30 MG/DL (ref 6–20)
BUN/CREAT SERPL: 29 (ref 12–20)
CALCIUM SERPL-MCNC: 9.4 MG/DL (ref 8.5–10.1)
CHLORIDE SERPL-SCNC: 104 MMOL/L (ref 97–108)
CO2 SERPL-SCNC: 27 MMOL/L (ref 21–32)
CREAT SERPL-MCNC: 1.03 MG/DL (ref 0.55–1.02)
DIFFERENTIAL METHOD BLD: NORMAL
EOSINOPHIL # BLD: 0.3 K/UL (ref 0–0.4)
EOSINOPHIL NFR BLD: 5 % (ref 0–7)
ERYTHROCYTE [DISTWIDTH] IN BLOOD BY AUTOMATED COUNT: 13.2 % (ref 11.5–14.5)
GLOBULIN SER CALC-MCNC: 5 G/DL (ref 2–4)
GLUCOSE SERPL-MCNC: 212 MG/DL (ref 65–100)
HCT VFR BLD AUTO: 38.8 % (ref 35–47)
HGB BLD-MCNC: 12.9 G/DL (ref 11.5–16)
IMM GRANULOCYTES # BLD AUTO: 0 K/UL (ref 0–0.04)
IMM GRANULOCYTES NFR BLD AUTO: 0 % (ref 0–0.5)
INR PPP: 1.1 (ref 0.9–1.1)
LYMPHOCYTES # BLD: 1.5 K/UL (ref 0.8–3.5)
LYMPHOCYTES NFR BLD: 27 % (ref 12–49)
MCH RBC QN AUTO: 29.7 PG (ref 26–34)
MCHC RBC AUTO-ENTMCNC: 33.2 G/DL (ref 30–36.5)
MCV RBC AUTO: 89.4 FL (ref 80–99)
MONOCYTES # BLD: 0.3 K/UL (ref 0–1)
MONOCYTES NFR BLD: 6 % (ref 5–13)
NEUTS SEG # BLD: 3.3 K/UL (ref 1.8–8)
NEUTS SEG NFR BLD: 62 % (ref 32–75)
NRBC # BLD: 0 K/UL (ref 0–0.01)
NRBC BLD-RTO: 0 PER 100 WBC
PLATELET # BLD AUTO: 234 K/UL (ref 150–400)
PMV BLD AUTO: 10.2 FL (ref 8.9–12.9)
POTASSIUM SERPL-SCNC: 3.5 MMOL/L (ref 3.5–5.1)
PROT SERPL-MCNC: 9.1 G/DL (ref 6.4–8.2)
PROTHROMBIN TIME: 11.2 SEC (ref 9–11.1)
RBC # BLD AUTO: 4.34 M/UL (ref 3.8–5.2)
SODIUM SERPL-SCNC: 140 MMOL/L (ref 136–145)
THERAPEUTIC RANGE,PTTT: NORMAL SECS (ref 58–77)
WBC # BLD AUTO: 5.4 K/UL (ref 3.6–11)

## 2023-02-11 PROCEDURE — 99283 EMERGENCY DEPT VISIT LOW MDM: CPT

## 2023-02-11 PROCEDURE — 85730 THROMBOPLASTIN TIME PARTIAL: CPT

## 2023-02-11 PROCEDURE — 85610 PROTHROMBIN TIME: CPT

## 2023-02-11 PROCEDURE — 80053 COMPREHEN METABOLIC PANEL: CPT

## 2023-02-11 PROCEDURE — 36415 COLL VENOUS BLD VENIPUNCTURE: CPT

## 2023-02-11 PROCEDURE — 74011250637 HC RX REV CODE- 250/637: Performed by: STUDENT IN AN ORGANIZED HEALTH CARE EDUCATION/TRAINING PROGRAM

## 2023-02-11 PROCEDURE — 86900 BLOOD TYPING SEROLOGIC ABO: CPT

## 2023-02-11 PROCEDURE — 85025 COMPLETE CBC W/AUTO DIFF WBC: CPT

## 2023-02-11 RX ORDER — FLUTICASONE PROPIONATE 50 MCG
2 SPRAY, SUSPENSION (ML) NASAL DAILY
Qty: 1 EACH | Refills: 0 | Status: SHIPPED | OUTPATIENT
Start: 2023-02-11 | End: 2023-02-25

## 2023-02-11 RX ORDER — SODIUM CHLORIDE 0.65 %
2 AEROSOL, SPRAY (ML) NASAL
Qty: 50 ML | Refills: 0 | Status: SHIPPED | OUTPATIENT
Start: 2023-02-11

## 2023-02-11 RX ORDER — OXYMETAZOLINE HCL 0.05 %
2 SPRAY, NON-AEROSOL (ML) NASAL ONCE
Status: COMPLETED | OUTPATIENT
Start: 2023-02-11 | End: 2023-02-11

## 2023-02-11 RX ORDER — OXYMETAZOLINE HCL 0.05 %
2 SPRAY, NON-AEROSOL (ML) NASAL
Qty: 1 EACH | Refills: 0 | Status: SHIPPED | OUTPATIENT
Start: 2023-02-11 | End: 2023-02-14

## 2023-02-11 RX ADMIN — OXYMETAZOLINE HCL 2 SPRAY: 0.05 SPRAY NASAL at 19:03

## 2023-02-12 LAB
ABO + RH BLD: NORMAL
BLOOD BANK CMNT PATIENT-IMP: NORMAL
BLOOD GROUP ANTIBODIES SERPL: NORMAL
BLOOD GROUP ANTIBODIES SERPL: NORMAL
SPECIMEN EXP DATE BLD: NORMAL

## 2023-02-12 NOTE — ED PROVIDER NOTES
EMERGENCY DEPARTMENT HISTORY AND PHYSICAL EXAM      Date: 2/11/2023  Patient Name: Niels Toussaint    History of Presenting Illness     Chief Complaint   Patient presents with    Epistaxis     Having a nose bleed , started at 230pm or 3pm today and got it to stop it earlier. Pt is actively bleeding around the nose pincher, with her coughing and spitting blood in the emesis bag; she is not on a blood thinner       History Provided By: Patient    HPI: Niels Toussaint, 81 y.o. female with a past medical history significant for medical problems as stated below presents to the ED with cc of nosebleed. She states that began around 2:30 in the afternoon earlier today and then she got it to stop. Then it started again a few hours prior to arrival.  She has not been able to get her bleeding to stop. She denies any other recent symptoms. She does not have any lightheadedness, shortness of breath, or chest pain. A pincher for her nose was placed in triage, but was on the bony part of her nose mostly and not applying much pressure and the bleeding had not stopped. She has been congested for 3 days, but no other symptoms of shortness of breath, fever, chills, or sick contacts. PCP: Anastasia Lopes MD    No current facility-administered medications on file prior to encounter. Current Outpatient Medications on File Prior to Encounter   Medication Sig Dispense Refill    flash glucose scanning reader (FreeStyle Nathaniel 2 [place]) misc Use to read blood sugar daily dx E11.9 1 Each 0    flash glucose sensor (FreeStyle Nathaniel 2 Sensor) kit Use to read blood sugar daily. Dx e11.9 2 Kit 11    Entresto 24-26 mg tablet TAKE 1 TABLET BY MOUTH TWICE A DAY 60 Tablet 3    magnesium oxide (MAG-OX) 400 mg tablet TAKE 2 TABLETS BY MOUTH TWO (2) TIMES A DAY.  120 Tablet 5    Farxiga 10 mg tab tablet TAKE 1 TABLET BY MOUTH EVERY DAY 30 Tablet 2    ammonium lactate (LAC-HYDRIN) 12 % lotion APPLY TO FEET TWICE A DAY      bumetanide (BUMEX) 2 mg tablet Take 1 Tablet by mouth daily as needed for PRN Reason (Other) (if weight gain of 3lb overnight or 5lb in one week). (Patient not taking: Reported on 1/4/2023) 90 Tablet 2    isosorbide mononitrate ER (IMDUR) 30 mg tablet TAKE 1 TABLET BY MOUTH EVERY DAY IN THE MORNING 90 Tablet 0    thiamine HCL (B-1) 100 mg tablet TAKE 1 TABLET BY MOUTH EVERY DAY 90 Tablet 1    melatonin 3 mg tablet TAKE 1 TABLET BY MOUTH EVERY NIGHT AS NEEDED FOR INSOMNIA. 90 Tablet 0    spironolactone (ALDACTONE) 25 mg tablet TAKE 1 TABLET BY MOUTH EVERY DAY 90 Tablet 1    atorvastatin (LIPITOR) 40 mg tablet TAKE 1 TABLET BY MOUTH EVERY DAY 90 Tablet 3    flash glucose sensor (FreeStyle Nathaniel 14 Day Sensor) kit Blood sugar checks before meals and at bedtime and as needed 1 Kit 11    flash glucose scanning reader (FreeStyle Nathaniel 14 Day [place]) misc Blood sugar checks before meals and at bedtime and as needed 1 Each 0    Myrbetriq 25 mg ER tablet Take 25 mg by mouth daily. Marcia Smithar U-100 Insulin 100 unit/mL (3 mL) inpn 30 UNITS BY SUBCUTANEOUS ROUTE NIGHTLY. AS DISCUSSED INCREASE YOUR INSULIN ACCORDING TO YOUR MORNING BLOOD SUGAR. WOULD LIKE THE MORNING BLOOD SUGAR TO BE BETWEEN 110 . PLEASE CALL THE OFFICE NURSE IF YOU NEED ASSISTANCE (Patient taking differently: Patient reports taking 58 units nightly) 15 Adjustable Dose Pre-filled Pen Syringe 1    oxybutynin (DITROPAN) 5 mg tablet TAKE 1 TABLET BY MOUTH THREE TIMES A  Tablet 3    cholecalciferol (VITAMIN D3) (2,000 UNITS /50 MCG) cap capsule TAKE 1 CAPSULE BY MOUTH EVERY DAY 90 Capsule 2    allopurinoL (ZYLOPRIM) 100 mg tablet Take 1 Tablet by mouth daily. 30 Tablet 11    Insulin Needles, Disposable, (BD Ultra-Fine Short Pen Needle) 31 gauge x 5/16\" ndle USE WITH INSULIN PENS TWICE DAILY AS DIRECTED 100 Pen Needle 11    calcitRIOL (RocaltroL) 0.25 mcg capsule Take 0.25 mcg by mouth daily.       diclofenac (VOLTAREN) 1 % gel Apply 2 g to affected area two (2) times daily as needed for Pain. thin layer to joint pain      budesonide (PULMICORT) 0.25 mg/2 mL nbsp 500 mcg by Nebulization route every six (6) hours as needed for Other (wheezing shortness of breath). fluticasone propion-salmeteroL (ADVAIR/WIXELA) 250-50 mcg/dose diskus inhaler Take 2 Puffs by inhalation two (2) times a day. fluticasone propionate (FLONASE) 50 mcg/actuation nasal spray 2 Sprays by Both Nostrils route daily. 1 Bottle 0     Past History     Past Medical History:  Past Medical History:   Diagnosis Date    Arthritis     Diabetes ([address]     Heart failure ([address]     Hypertension      Past Surgical History:  Past Surgical History:   Procedure Laterality Date    COLONOSCOPY N/A 3/19/2021    COLONOSCOPY performed by Alfonzo Escalona MD at [facility] ENDOSCOPY    HX ORTHOPAEDIC      Arthroscopy right knee    [place] UNLISTED PROCEDURE CARDIAC SURGERY      life vest     Family History:  Family History   Problem Relation Age of Onset    Diabetes Mother      Social History:  Social History     Tobacco Use    Smoking status: Former     Packs/day: 0.25     Years: 20.00     Pack years: 5.00     Types: Cigarettes     Quit date: 2014     Years since quittin.1    Smokeless tobacco: Never   Vaping Use    Vaping Use: Never used   Substance Use Topics    Alcohol use: Yes     Comment: Beer 2-3 times monthly    Drug use: No     Allergies:  No Known Allergies    Review of Systems   Review of Systems  Per HPI    Physical Exam   Physical Exam  Vital signs reviewed and documented in EMR  No acute distress  Active bleeding from both nares  Spitting up blood and gagging  No tachycardia  Abdomen nondistended  No focal motor deficits    Diagnostic Study Results     Labs -   No results found for this or any previous visit (from the past 24 hour(s)).     Radiologic Studies -   No orders to display     CT Results  (Last 48 hours)      None          CXR Results  (Last 48 hours)      None            Medical Decision Making   I am the first provider for this patient. I reviewed the vital signs, available nursing notes, past medical history, past surgical history, family history and social history. Vital Signs-Reviewed the patient's vital signs. Patient Vitals for the past 12 hrs:   Temp Pulse Resp BP SpO2   02/11/23 1841 97.5 °F (36.4 °C) 83 24 137/77 100 %     Records Reviewed: Nursing records and medical records reviewed    Medical Decision Making  Patient presents to the emergency department with epistaxis. Unclear if this is anterior posterior. She is bleeding too much to tell the actual source of bleeding. Ensure that was placed in triage was not effective. We will have the patient hold pressure aggressively for 30 minutes. If bleeding does not stop will use Afrin nasal spray and hold pressure again. If bleeding does not stop after this will apply packing. Will reinvestigate closely. Patient does not have any symptoms of blood loss anemia so think this is unlikely. Suspect this is from congestion with viral syndrome over past 3 days. Doubt bacterial infection. Amount and/or Complexity of Data Reviewed  Labs: ordered. Risk  OTC drugs. ED Course:   Initial assessment performed. The patients presenting problems have been discussed, and they are in agreement with the care plan formulated and outlined with them. I have encouraged them to ask questions as they arise throughout their visit. ED Course as of 02/11/23 2310   Sat Feb 11, 2023   1925 Patient bleeding more rapidly. Sprayed Afrin nasal spray and holding pressure now. Bleeding may be slowed, but may need rhino rocket packing. Will obtain labworkup as well. [WB]   1553 Bleeding stopped after Afrin and holding pressure. Labs unremarkable. Patient has been observed now in the emergency department for 2 hours after bleeding is stopped. No obvious source of bleeding noted, but she has had no recurrent bleeding.   Will give ENT followup and discussed ways to stop recurrent bleeding. Discussed customary return precautions. She is stable for discharge at this time. [WB]      ED Course User Index  [WB] Sadia Gruber MD     Critical Care:  None    Disposition:  Home    DISCHARGE PLAN:  1. Current Discharge Medication List        2. Follow-up Information       Follow up With Specialties Details Why Contact Info    Nery Alford MD Internal Medicine Physician Schedule an appointment as soon as possible for a visit   [facility]  [address] [facility] [phone]      [address] [place] [address] EMERGENCY DEPT Emergency Medicine  As needed, If symptoms worsen [address]  [facility] [address]   [address] [address] [phone]    [address] [facility] DEPT OF OTOLARYNGOLOGY    [address] [place] [address] [address]  [phone]          3. Return to ED if worse     Diagnosis     Clinical Impression:   1. Epistaxis      Attestations:    Delphina Gottron, MD    Please note that this dictation was completed with [email or web address], the computer voice recognition software. Quite often unanticipated grammatical, syntax, homophones, and other interpretive errors are inadvertently transcribed by the computer software. Please disregard these errors. Please excuse any errors that have escaped final proofreading. Thank you.

## 2023-02-12 NOTE — ED NOTES
DC info and education reviewed with Patient, all questions answered. Patient well-appearing at time of discharge, vital signs stable, no acute distress observed or reported. Ambulatory and steady on feet out of ED with  at this time.

## 2023-02-12 NOTE — DISCHARGE INSTRUCTIONS
Please make a followup with your primary care physician and call an ENT doctor for followup. Please use nasal saline and flonase for your congestion. If you have repeat bleeding please use Afrin as we discussed and hold pressure. DO NOT use Afrin more than 3 days as this will cause worsened congestion and nosebleeds - this should only be used if needed. If you have any new or worsening concerning medical symptoms please return to the emergency department.

## 2023-02-12 NOTE — ED NOTES
Pt reports that she continues to feel fine otherwise. Head of bed adjusted per Pt request. Some intermittent throat clearing and spitting up.  Water provided at Pt request.

## 2023-02-20 NOTE — TELEPHONE ENCOUNTER
----- Message from Lisa Yousif MD sent at 2/17/2023 10:37 AM EST -----  Stress test was fine. Details will be discussed during follow-up visit. I called the patient and two person ID done. I told her that you just had a test done and Dr. Leta Lee wanted me to call and let you know that is was normal essentially. She acknowledged understanding and thanked me for the call.

## 2023-02-23 ENCOUNTER — OFFICE VISIT (OUTPATIENT)
Dept: INTERNAL MEDICINE CLINIC | Age: 73
End: 2023-02-23
Payer: MEDICARE

## 2023-02-23 VITALS
DIASTOLIC BLOOD PRESSURE: 65 MMHG | RESPIRATION RATE: 18 BRPM | WEIGHT: 186 LBS | SYSTOLIC BLOOD PRESSURE: 107 MMHG | HEIGHT: 64 IN | OXYGEN SATURATION: 96 % | BODY MASS INDEX: 31.76 KG/M2 | TEMPERATURE: 98 F | HEART RATE: 82 BPM

## 2023-02-23 DIAGNOSIS — E78.5 DYSLIPIDEMIA: ICD-10-CM

## 2023-02-23 DIAGNOSIS — Z00.00 WELLNESS EXAMINATION: ICD-10-CM

## 2023-02-23 DIAGNOSIS — J43.1 PANLOBULAR EMPHYSEMA (HCC): ICD-10-CM

## 2023-02-23 DIAGNOSIS — I50.23 ACUTE ON CHRONIC SYSTOLIC CONGESTIVE HEART FAILURE, NYHA CLASS 3 (HCC): Primary | ICD-10-CM

## 2023-02-23 DIAGNOSIS — R04.0 EPISTAXIS: ICD-10-CM

## 2023-02-23 DIAGNOSIS — Z13.31 SCREENING FOR DEPRESSION: ICD-10-CM

## 2023-02-23 DIAGNOSIS — Z79.4 CONTROLLED TYPE 2 DIABETES MELLITUS WITHOUT COMPLICATION, WITH LONG-TERM CURRENT USE OF INSULIN (HCC): ICD-10-CM

## 2023-02-23 DIAGNOSIS — I10 PRIMARY HYPERTENSION: ICD-10-CM

## 2023-02-23 DIAGNOSIS — E11.9 CONTROLLED TYPE 2 DIABETES MELLITUS WITHOUT COMPLICATION, WITH LONG-TERM CURRENT USE OF INSULIN (HCC): ICD-10-CM

## 2023-02-23 DIAGNOSIS — M10.00 IDIOPATHIC GOUT, UNSPECIFIED CHRONICITY, UNSPECIFIED SITE: ICD-10-CM

## 2023-02-23 NOTE — PROGRESS NOTES
[facility]  [facility]  [address] [place] [address]  Phone:  [phone]  Fax: [phone]       Chief Complaint   Patient presents with    Annual Wellness Visit     Patient here for Annual Wellness Exam.    .      SUBJECTIVE:    Barbie Case is a 81 y.o. female  Dictation on: 02/23/2023 10:33 AM by: Jemima Mares [[id number]]          Current Outpatient Medications   Medication Sig Dispense Refill    insulin glargine (Lantus Solostar U-100 Insulin) 100 unit/mL (3 mL) inpn Patient reports taking 58 units nightly 6 Adjustable Dose Pre-filled Pen Syringe 11    allopurinoL (ZYLOPRIM) 100 mg tablet Take 1 Tablet by mouth daily. 30 Tablet 11    spironolactone (ALDACTONE) 25 mg tablet TAKE 1 TABLET BY MOUTH EVERY DAY 90 Tablet 1    bumetanide (BUMEX) 2 mg tablet Take 1 Tablet by mouth daily as needed (for weight gain of 2-3 lb overnight). 90 Tablet 0    isosorbide mononitrate ER (IMDUR) 30 mg tablet TAKE 1 TABLET BY MOUTH EVERY DAY IN THE MORNING 90 Tablet 1    sodium chloride (Ayr Saline) 0.65 % nasal squeeze bottle 0.1 mL by Both Nostrils route every four to six (4-6) hours as needed for Congestion. 50 mL 0    fluticasone propionate (FLONASE) 50 mcg/actuation nasal spray 2 Sprays by Both Nostrils route daily for 14 days. 1 Each 0    Entresto 24-26 mg tablet TAKE 1 TABLET BY MOUTH TWICE A DAY 60 Tablet 3    magnesium oxide (MAG-OX) 400 mg tablet TAKE 2 TABLETS BY MOUTH TWO (2) TIMES A DAY. 120 Tablet 5    Farxiga 10 mg tab tablet TAKE 1 TABLET BY MOUTH EVERY DAY 30 Tablet 2    ammonium lactate (LAC-HYDRIN) 12 % lotion APPLY TO FEET TWICE A DAY      thiamine HCL (B-1) 100 mg tablet TAKE 1 TABLET BY MOUTH EVERY DAY 90 Tablet 1    melatonin 3 mg tablet TAKE 1 TABLET BY MOUTH EVERY NIGHT AS NEEDED FOR INSOMNIA.  90 Tablet 0    atorvastatin (LIPITOR) 40 mg tablet TAKE 1 TABLET BY MOUTH EVERY DAY 90 Tablet 3    flash glucose scanning reader ([place] Nathaniel 14 Day [place]) misc Blood sugar checks before meals and at bedtime and as needed 1 Each 0    Myrbetriq 25 mg ER tablet Take 25 mg by mouth daily. oxybutynin (DITROPAN) 5 mg tablet TAKE 1 TABLET BY MOUTH THREE TIMES A  Tablet 3    cholecalciferol (VITAMIN D3) (2,000 UNITS /50 MCG) cap capsule TAKE 1 CAPSULE BY MOUTH EVERY DAY 90 Capsule 2    Insulin Needles, Disposable, (BD Ultra-Fine Short Pen Needle) 31 gauge x 5/16\" ndle USE WITH INSULIN PENS TWICE DAILY AS DIRECTED 100 Pen Needle 11    calcitRIOL (RocaltroL) 0.25 mcg capsule Take 0.25 mcg by mouth daily. diclofenac (VOLTAREN) 1 % gel Apply 2 g to affected area two (2) times daily as needed for Pain. thin layer to joint pain      budesonide (PULMICORT) 0.25 mg/2 mL nbsp 500 mcg by Nebulization route every six (6) hours as needed for Other (wheezing shortness of breath). fluticasone propion-salmeteroL (ADVAIR/WIXELA) 250-50 mcg/dose diskus inhaler Take 2 Puffs by inhalation two (2) times a day. flash glucose scanning reader (FreeStyle Nathaniel 2 [place]) misc Use to read blood sugar daily dx E11.9 (Patient not taking: Reported on 2/23/2023) 1 Each 0    flash glucose sensor (FreeStyle Nathaniel 2 Sensor) kit Use to read blood sugar daily.  Dx e11.9 (Patient not taking: Reported on 2/23/2023) 2 Kit 11    flash glucose sensor (FreeStyle Nathaniel 14 Day Sensor) kit Blood sugar checks before meals and at bedtime and as needed (Patient not taking: Reported on 2/23/2023) 1 Kit 11     Past Medical History:   Diagnosis Date    Arthritis     Diabetes ([facility] [facility]     Heart failure ([facility]     Hypertension      Past Surgical History:   Procedure Laterality Date    COLONOSCOPY N/A 3/19/2021    COLONOSCOPY performed by Nupur Chavez MD at [facility] ENDOSCOPY    HX ORTHOPAEDIC      Arthroscopy right knee    CO UNLISTED PROCEDURE CARDIAC SURGERY      life vest     No Known Allergies      REVIEW OF SYSTEMS:  General: negative for - chills or fever  ENT: negative for - headaches, nasal congestion or tinnitus  Respiratory: negative for - cough, hemoptysis, shortness of breath or wheezing  Cardiovascular : negative for - chest pain, edema, palpitations or shortness of breath  Gastrointestinal: negative for - abdominal pain, blood in stools, heartburn or nausea/vomiting  Genito-Urinary: no dysuria, trouble voiding, or hematuria  Musculoskeletal: negative for - gait disturbance, joint pain, joint stiffness or joint swelling  Neurological: no TIA or stroke symptoms  Hematologic: no bruises, no bleeding, no swollen glands  Integument: no lumps, mole changes, nail changes or rash  Endocrine: no malaise/lethargy or unexpected weight changes      Social History     Socioeconomic History    Marital status:     Number of children: 3    Years of education: 15   Occupational History    Occupation: retired   Tobacco Use    Smoking status: Former     Packs/day: 0.25     Years: 20.00     Pack years: 5.00     Types: Cigarettes     Quit date: 2014     Years since quittin.2    Smokeless tobacco: Never   Vaping Use    Vaping Use: Never used   Substance and Sexual Activity    Alcohol use: Yes     Comment: Beer 2-3 times monthly    Drug use: No    Sexual activity: Yes     Partners: Male     Family History   Problem Relation Age of Onset    Diabetes Mother        OBJECTIVE:    Visit Vitals  /65   Pulse 82   Temp 98 °F (36.7 °C) (Oral)   Resp 18   Ht 5' 4\" (1.626 m)   Wt 186 lb (84.4 kg)   LMP  (LMP Unknown)   SpO2 96%   BMI 31.93 kg/m²     CONSTITUTIONAL: well , well nourished, appears age appropriate  EYES: perrla, eom intact  ENMT:moist mucous membranes, pharynx clear  NECK: supple.  Thyroid normal  RESPIRATORY: Chest: clear to ascultation and percussion   CARDIOVASCULAR: Heart: regular rate and rhythm  GASTROINTESTINAL: Abdomen: soft, bowel sounds active  HEMATOLOGIC: no pathological lymph nodes palpated  MUSCULOSKELETAL: Extremities: no edema, pulse 1+   INTEGUMENT: No unusual rashes or suspicious skin lesions noted. Nails appear normal.  NEUROLOGIC: non-focal exam   MENTAL STATUS: alert and oriented, appropriate affect      ASSESSMENT:  1. Acute on chronic systolic congestive heart failure, NYHA class 3 ([address]    2. Idiopathic gout, unspecified chronicity, unspecified site    3. Primary hypertension    4. Controlled type 2 diabetes mellitus without complication, with long-term current use of insulin ([address]    5. Type 2 diabetes mellitus with hyperglycemia, with long-term current use of insulin (HCC)    6. Panlobular emphysema ([facility] [address]    7. Dyslipidemia    8. Epistaxis        PLAN:   Dictation on: 02/25/2023  3:20 PM by: Julia Duran [[id number]]   . Orders Placed This Encounter    HEMOGLOBIN A1C WITH EAG    MICROALBUMIN, UR, RAND W/ MICROALB/CREAT RATIO    METABOLIC PANEL, BASIC    TSH 3RD GENERATION    NT-PRO BNP    SAMPLES BEING HELD    insulin glargine (Lantus Solostar U-100 Insulin) 100 unit/mL (3 mL) inpn    allopurinoL (ZYLOPRIM) 100 mg tablet               Carol Ann Delgado MD   This is the Subsequent Medicare Annual Wellness Exam, performed 12 months or more after the Initial AWV or the last Subsequent AWV    I have reviewed the patient's medical history in detail and updated the computerized patient record. Assessment/Plan   Education and counseling provided:  Are appropriate based on today's review and evaluation    1. Acute on chronic systolic congestive heart failure, NYHA class 3 (HCC)  -     METABOLIC PANEL, BASIC; Future  -     NT-PRO BNP; Future  2. Idiopathic gout, unspecified chronicity, unspecified site  -     allopurinoL (ZYLOPRIM) 100 mg tablet; Take 1 Tablet by mouth daily. , Normal, Disp-30 Tablet, R-11  3. Primary hypertension  4. Controlled type 2 diabetes mellitus without complication, with long-term current use of insulin (HCC)  -     HEMOGLOBIN A1C WITH EAG; Future  -     MICROALBUMIN, UR, RAND W/ MICROALB/CREAT RATIO; Future  5. Panlobular emphysema ([facility] [address]  6.  Dyslipidemia  -     TSH 3RD GENERATION; Future  7. Epistaxis       Depression Risk Factor Screening     3 most recent PHQ Screens 2/23/2023   Little interest or pleasure in doing things Not at all   Feeling down, depressed, irritable, or hopeless Not at all   Total Score PHQ 2 0   Trouble falling or staying asleep, or sleeping too much Not at all   Feeling tired or having little energy Not at all   Poor appetite, weight loss, or overeating Not at all   Feeling bad about yourself - or that you are a failure or have let yourself or your family down Not at all   Trouble concentrating on things such as school, work, reading, or watching TV Not at all   Moving or speaking so slowly that other people could have noticed; or the opposite being so fidgety that others notice Not at all   Thoughts of being better off dead, or hurting yourself in some way Not at all   PHQ 9 Score 0   How difficult have these problems made it for you to do your work, take care of your home and get along with others Not difficult at all       Alcohol & Drug Abuse Risk Screen    Do you average more than 1 drink per night or more than 7 drinks a week:  No    On any one occasion in the past three months have you have had more than 3 drinks containing alcohol:  No          Functional Ability and Level of Safety    Hearing: Hearing is good. Activities of Daily Living: The home contains: no safety equipment. Patient does total self care      Ambulation: with no difficulty     Fall Risk:  Fall Risk Assessment, last 12 mths 2/23/2023   Able to walk? Yes   Fall in past 12 months? 0   Do you feel unsteady? 0   Are you worried about falling 0   Is TUG test greater than 12 seconds? -   Is the gait abnormal? -   Number of falls in past 12 months -   Fall with injury? -      Abuse Screen:  Patient is not abused       Cognitive Screening    Has your family/caregiver stated any concerns about your memory: no     Cognitive screening: Not applicable.     Health Maintenance Due Health Maintenance Due   Topic Date Due    Pneumococcal 65+ years (1 - PCV) Never done    Shingles Vaccine (1 of 2) Never done    Eye Exam Retinal or Dilated  03/16/2017    Foot Exam Q1  10/08/2021    COVID-19 Vaccine (4 - Booster for Constancia Kiang series) 02/25/2022    Lipid Screen  03/18/2022    Breast Cancer Screen Mammogram  04/20/2022       Patient Care Team   Patient Care Team:  Leonel Dumas MD as PCP - General (Internal Medicine Physician)  Leonel Dumas MD as PCP - [facility] Provider  Kelsy Mcintyre MD as Physician (Cardiovascular Disease Physician)  Arminda Cosby MD (Inactive) as Surgeon (Orthopedic Surgery)  Mary Richmond MD (Sleep Medicine Physician)    History     Patient Active Problem List   Diagnosis Code    Dilated cardiomyopathy ([facility] [address] I42.0    Dyslipidemia E78.5    Gout M10.9    Alcohol abuse F10.10    Hypertension I10    Dermatitis L30.9    Primary osteoarthritis of right knee M17.11    Carpal tunnel syndrome of right wrist G56.01    Reactive airway disease J45.909    Former tobacco use--stopped 2014 after >40 yrs smoking Z87.891    Obesity (BMI 30.0-34. 9) E66.9    Epistaxis R04.0    Decreased libido R68.82    Osteoarthrosis, localized, primary, knee, right M17.11    Status post total right knee replacement Z96.651    Uncontrolled type 2 diabetes mellitus with hyperglycemia ([facility]) B62.00    Diastolic heart failure, NYHA class 2 ([facility]) I50.30    CHF exacerbation ([facility]) I50.9    Acute on chronic systolic congestive heart failure, NYHA class 3 ([facility]) I50.23    Moderate pulmonary arterial systolic hypertension ([facility]) I27.21    Advance care planning Z71.89    NICM (nonischemic cardiomyopathy) ([facility]) I42.8    Prerenal azotemia R79.89    Panlobular emphysema ([facility]) J43.1    Angina at rest [facility]) I20.8    Controlled type 2 diabetes mellitus without complication, with long-term current use of insulin ([facility]) E11.9, Z79.4    Type 2 diabetes mellitus with hyperglycemia, with long-term current use of insulin ([facility]) E11.65, Z79.4    Chronic renal disease, stage III N18.30     Past Medical History:   Diagnosis Date    Arthritis     Diabetes ([facility] [address]     Heart failure ([facility] [address]     Hypertension       Past Surgical History:   Procedure Laterality Date    COLONOSCOPY N/A 3/19/2021    COLONOSCOPY performed by Larry Gonzales MD at [facility] ENDOSCOPY    HX ORTHOPAEDIC      Arthroscopy right knee    NE UNLISTED PROCEDURE CARDIAC SURGERY      life vest     Current Outpatient Medications   Medication Sig Dispense Refill    insulin glargine (Lantus Solostar U-100 Insulin) 100 unit/mL (3 mL) inpn Patient reports taking 58 units nightly 6 Adjustable Dose Pre-filled Pen Syringe 11    allopurinoL (ZYLOPRIM) 100 mg tablet Take 1 Tablet by mouth daily. 30 Tablet 11    spironolactone (ALDACTONE) 25 mg tablet TAKE 1 TABLET BY MOUTH EVERY DAY 90 Tablet 1    bumetanide (BUMEX) 2 mg tablet Take 1 Tablet by mouth daily as needed (for weight gain of 2-3 lb overnight). 90 Tablet 0    isosorbide mononitrate ER (IMDUR) 30 mg tablet TAKE 1 TABLET BY MOUTH EVERY DAY IN THE MORNING 90 Tablet 1    sodium chloride (Ayr Saline) 0.65 % nasal squeeze bottle 0.1 mL by Both Nostrils route every four to six (4-6) hours as needed for Congestion. 50 mL 0    fluticasone propionate (FLONASE) 50 mcg/actuation nasal spray 2 Sprays by Both Nostrils route daily for 14 days. 1 Each 0    Entresto 24-26 mg tablet TAKE 1 TABLET BY MOUTH TWICE A DAY 60 Tablet 3    magnesium oxide (MAG-OX) 400 mg tablet TAKE 2 TABLETS BY MOUTH TWO (2) TIMES A DAY. 120 Tablet 5    Farxiga 10 mg tab tablet TAKE 1 TABLET BY MOUTH EVERY DAY 30 Tablet 2    ammonium lactate (LAC-HYDRIN) 12 % lotion APPLY TO FEET TWICE A DAY      thiamine HCL (B-1) 100 mg tablet TAKE 1 TABLET BY MOUTH EVERY DAY 90 Tablet 1    melatonin 3 mg tablet TAKE 1 TABLET BY MOUTH EVERY NIGHT AS NEEDED FOR INSOMNIA.  90 Tablet 0    atorvastatin (LIPITOR) 40 mg tablet TAKE 1 TABLET BY MOUTH EVERY DAY 90 Tablet 3 flash glucose scanning reader (FreeStyle Nathaniel 14 Day [place]) misc Blood sugar checks before meals and at bedtime and as needed 1 Each 0    Myrbetriq 25 mg ER tablet Take 25 mg by mouth daily. oxybutynin (DITROPAN) 5 mg tablet TAKE 1 TABLET BY MOUTH THREE TIMES A  Tablet 3    cholecalciferol (VITAMIN D3) (2,000 UNITS /50 MCG) cap capsule TAKE 1 CAPSULE BY MOUTH EVERY DAY 90 Capsule 2    Insulin Needles, Disposable, (BD Ultra-Fine Short Pen Needle) 31 gauge x 5/16\" ndle USE WITH INSULIN PENS TWICE DAILY AS DIRECTED 100 Pen Needle 11    calcitRIOL (RocaltroL) 0.25 mcg capsule Take 0.25 mcg by mouth daily. diclofenac (VOLTAREN) 1 % gel Apply 2 g to affected area two (2) times daily as needed for Pain. thin layer to joint pain      budesonide (PULMICORT) 0.25 mg/2 mL nbsp 500 mcg by Nebulization route every six (6) hours as needed for Other (wheezing shortness of breath). fluticasone propion-salmeteroL (ADVAIR/WIXELA) 250-50 mcg/dose diskus inhaler Take 2 Puffs by inhalation two (2) times a day. flash glucose scanning reader (FreeStyle Nathaniel 2 [place]) misc Use to read blood sugar daily dx E11.9 (Patient not taking: Reported on 2023) 1 Each 0    flash glucose sensor (FreeStyle Nathaniel 2 Sensor) kit Use to read blood sugar daily.  Dx e11.9 (Patient not taking: Reported on 2023) 2 Kit 11    flash glucose sensor (FreeStyle Nathaniel 14 Day Sensor) kit Blood sugar checks before meals and at bedtime and as needed (Patient not taking: Reported on 2023) 1 Kit 11     No Known Allergies    Family History   Problem Relation Age of Onset    Diabetes Mother      Social History     Tobacco Use    Smoking status: Former     Packs/day: 0.25     Years: 20.00     Pack years: 5.00     Types: Cigarettes     Quit date: 2014     Years since quittin.2    Smokeless tobacco: Never   Substance Use Topics    Alcohol use: Yes     Comment: Beer 2-3 times monthly         Mirian Jenkins MD

## 2023-02-23 NOTE — PROGRESS NOTES
Yasmani Landeros is a 81 y.o. female  Chief Complaint   Patient presents with    Annual Wellness Visit     Patient here for Annual Wellness Exam.      1. Have you been to the ER, urgent care clinic since your last visit? Hospitalized since your last visit? Yes When: 2/11/2023 Where: [facility] Reason for visit: Epistaxis. 2. Have you seen or consulted any other health care providers outside of the [address] [place] [address] since your last visit? Include any pap smears or colon screening.  No

## 2023-02-24 LAB
ANION GAP SERPL CALC-SCNC: 5 MMOL/L (ref 5–15)
BNP SERPL-MCNC: 55 PG/ML
BUN SERPL-MCNC: 19 MG/DL (ref 6–20)
BUN/CREAT SERPL: 18 (ref 12–20)
CALCIUM SERPL-MCNC: 10.6 MG/DL (ref 8.5–10.1)
CHLORIDE SERPL-SCNC: 103 MMOL/L (ref 97–108)
CO2 SERPL-SCNC: 31 MMOL/L (ref 21–32)
COMMENT, HOLDF: NORMAL
CREAT SERPL-MCNC: 1.05 MG/DL (ref 0.55–1.02)
CREAT UR-MCNC: 14.2 MG/DL
EST. AVERAGE GLUCOSE BLD GHB EST-MCNC: 151 MG/DL
GLUCOSE SERPL-MCNC: 156 MG/DL (ref 65–100)
HBA1C MFR BLD: 6.9 % (ref 4–5.6)
MICROALBUMIN UR-MCNC: <0.5 MG/DL
MICROALBUMIN/CREAT UR-RTO: <35 MG/G (ref 0–30)
POTASSIUM SERPL-SCNC: 4.1 MMOL/L (ref 3.5–5.1)
SAMPLES BEING HELD,HOLD: NORMAL
SODIUM SERPL-SCNC: 139 MMOL/L (ref 136–145)
TSH SERPL DL<=0.05 MIU/L-ACNC: 1.88 UIU/ML (ref 0.36–3.74)

## 2023-02-24 NOTE — PROGRESS NOTES
comes in for return visit stating that she has done reasonably well. She has not been checking blood sugars because she did not have a Nathaniel device which was present when she had a different insurance. She changed that insurance and in that insurance the cost of getting a Nathaniel device is high. She has had several episodes of epistaxis and she is concerned about this. She states that her diabetes has indeed been doing quite well. She is taking her Lantus at 58 units daily and has not had any symptomatic hypoglycemia. She follows up with Cardiology. Her ejection fraction did improve however she remains on her cardioactive medications which I think is great. She has a past history of primary hypertension, dyslipidemia and obesity.

## 2023-02-25 PROBLEM — E11.65 TYPE 2 DIABETES MELLITUS WITH HYPERGLYCEMIA, WITH LONG-TERM CURRENT USE OF INSULIN (HCC): Status: ACTIVE | Noted: 2022-03-25

## 2023-02-25 PROBLEM — Z79.4 TYPE 2 DIABETES MELLITUS WITH HYPERGLYCEMIA, WITH LONG-TERM CURRENT USE OF INSULIN (HCC): Status: ACTIVE | Noted: 2022-03-25

## 2023-02-25 RX ORDER — INSULIN GLARGINE 100 [IU]/ML
INJECTION, SOLUTION SUBCUTANEOUS
Qty: 6 ADJUSTABLE DOSE PRE-FILLED PEN SYRINGE | Refills: 11 | Status: SHIPPED | OUTPATIENT
Start: 2023-02-25

## 2023-02-25 RX ORDER — ALLOPURINOL 100 MG/1
100 TABLET ORAL DAILY
Qty: 30 TABLET | Refills: 11 | Status: SHIPPED | OUTPATIENT
Start: 2023-02-25

## 2023-02-25 NOTE — PATIENT INSTRUCTIONS
Medicare Wellness Visit, Female     The best way to live healthy is to have a lifestyle where you eat a well-balanced diet, exercise regularly, limit alcohol use, and quit all forms of tobacco/nicotine, if applicable. Regular preventive services are another way to keep healthy. Preventive services (vaccines, screening tests, monitoring & exams) can help personalize your care plan, which helps you manage your own care. Screening tests can find health problems at the earliest stages, when they are easiest to treat. [facility] follows the current, evidence-based guidelines published by the [facility] ([facility]) when recommending preventive services for our patients. Because we follow these guidelines, sometimes recommendations change over time as research supports it. (For example, mammograms used to be recommended annually. Even though Medicare will still pay for an annual mammogram, the newer guidelines recommend a mammogram every two years for women of average risk). Of course, you and your doctor may decide to screen more often for some diseases, based on your risk and your co-morbidities (chronic disease you are already diagnosed with). Preventive services for you include:  - Medicare offers their members a free annual wellness visit, which is time for you and your primary care provider to discuss and plan for your preventive service needs.  Take advantage of this benefit every year!    -Over the age of 72 should receive the recommended pneumonia vaccines.    -All adults should have a flu vaccine yearly.  -All adults should have a tetanus vaccine every 10 years.   -Over the age 48 should receive the shingles vaccines.        -All adults should be screened once for Hepatitis C.  -All adults age 38-68 who are overweight should have a diabetes screening test once every three years.   -Other screening tests and preventive services for persons with diabetes include: an eye exam to screen for diabetic retinopathy, a kidney function test, a foot exam, and stricter control over your cholesterol.   -Cardiovascular screening for adults with routine risk involves an electrocardiogram (ECG) at intervals determined by your doctor.     -Colorectal cancer screenings should be done for adults age 39-70 with no increased risk factors for colorectal cancer. There are a number of acceptable methods of screening for this type of cancer. Each test has its own benefits and drawbacks. Discuss with your doctor what is most appropriate for you during your annual wellness visit. The different tests include: colonoscopy (considered the best screening method), a fecal occult blood test, a fecal DNA test, and sigmoidoscopy.    -Lung cancer screening is recommended annually with a low dose CT scan for adults between age 54 and 68, who have smoked at least 30 pack years (equivalent of 1 pack per day for 30 days), and who is a current smoker or quit less than 15 years ago.    -A bone mass density test is recommended when a woman turns 65 to screen for osteoporosis. This test is only recommended one time, as a screening. Some providers will use this same test as a disease monitoring tool if you already have osteoporosis. -Breast cancer screenings are recommended every other year for women of normal risk, age 54-69.    -Cervical cancer screenings for women over age 72 are only recommended with certain risk factors.      Here is a list of your current Health Maintenance items (your personalized list of preventive services) with a due date:  Health Maintenance Due   Topic Date Due    Pneumococcal Vaccine (1 - PCV) Never done    Shingles Vaccine (1 of 2) Never done    Eye Exam  03/16/2017    Diabetic Foot Care  10/08/2021    COVID-19 Vaccine (4 - Booster for Moderna series) 02/25/2022    Cholesterol Test   03/18/2022    Mammogram  04/20/2022

## 2023-02-26 NOTE — PROGRESS NOTES
1. The patient has compensated congestive heart failure. There are no overt signs of decompensation currently. Apparently her ejection fraction has improved significantly. 2. She does have a history of gout which is reasonably stable as long as she takes her allopurinol. 3. Blood pressure is excellent, no adjustments are made. 4. Her diabetes hopefully is doing well, but I will await the results of her hemoglobin A1c. I reminded her the importance of eating at the same time every day and minimizing her consumption of processed carbohydrates. 5. She does have existing COPD and needs to discontinue cigarette smoking completely. 6. She has an increased cardiovascular risk and remains on her statin. 7.  Finally she did have an episode of epistaxis, but this resolved.   At this point no further work up is needed, but if this becomes a recurring problem she will have to see an otolaryngologist.

## 2023-03-07 RX ORDER — CALCIUM CITRATE/VITAMIN D3 200MG-6.25
TABLET ORAL
Qty: 300 STRIP | Refills: 3 | Status: SHIPPED | OUTPATIENT
Start: 2023-03-07

## 2023-03-07 RX ORDER — LANCETS 33 GAUGE
EACH MISCELLANEOUS
Qty: 300 LANCET | Refills: 3 | Status: SHIPPED | OUTPATIENT
Start: 2023-03-07

## 2023-03-07 RX ORDER — BLOOD-GLUCOSE METER
EACH MISCELLANEOUS
Qty: 1 EACH | Refills: 0 | Status: SHIPPED | OUTPATIENT
Start: 2023-03-07

## 2023-03-07 RX ORDER — BLOOD-GLUCOSE METER
EACH MISCELLANEOUS
Qty: 1 EACH | Refills: 3 | Status: SHIPPED | OUTPATIENT
Start: 2023-03-07

## 2023-03-10 RX ORDER — DAPAGLIFLOZIN 10 MG/1
TABLET, FILM COATED ORAL
Qty: 30 TABLET | Refills: 2 | Status: SHIPPED | OUTPATIENT
Start: 2023-03-10

## 2023-03-19 RX ORDER — INSULIN GLARGINE 100 [IU]/ML
INJECTION, SOLUTION SUBCUTANEOUS
Qty: 6 ADJUSTABLE DOSE PRE-FILLED PEN SYRINGE | Refills: 11 | Status: SHIPPED | OUTPATIENT
Start: 2023-03-19

## 2023-03-19 RX ORDER — MIRABEGRON 25 MG/1
25 TABLET, FILM COATED, EXTENDED RELEASE ORAL DAILY
Qty: 30 TABLET | Refills: 11 | Status: SHIPPED | OUTPATIENT
Start: 2023-03-19

## 2023-04-21 DIAGNOSIS — Z12.31 SCREENING MAMMOGRAM FOR HIGH-RISK PATIENT: Primary | ICD-10-CM

## 2023-05-04 ENCOUNTER — TELEPHONE (OUTPATIENT)
Dept: SLEEP MEDICINE | Age: 73
End: 2023-05-04

## 2023-05-05 ENCOUNTER — TELEPHONE (OUTPATIENT)
Age: 73
End: 2023-05-05

## 2023-05-05 NOTE — TELEPHONE ENCOUNTER
Called pt to confirm upcoming appointment, also reminded patient to bring device, power cord, mask, list of medications and to arrive 15-20 minutes early. All due to transition to new system.

## 2023-05-11 ENCOUNTER — CLINICAL DOCUMENTATION (OUTPATIENT)
Facility: HOSPITAL | Age: 73
End: 2023-05-11

## 2023-05-11 ENCOUNTER — OFFICE VISIT (OUTPATIENT)
Age: 73
End: 2023-05-11
Payer: MEDICARE

## 2023-05-11 VITALS
HEIGHT: 64 IN | HEART RATE: 86 BPM | OXYGEN SATURATION: 94 % | SYSTOLIC BLOOD PRESSURE: 135 MMHG | TEMPERATURE: 96.8 F | DIASTOLIC BLOOD PRESSURE: 71 MMHG | WEIGHT: 191.8 LBS | BODY MASS INDEX: 32.74 KG/M2

## 2023-05-11 DIAGNOSIS — G47.33 OBSTRUCTIVE SLEEP APNEA (ADULT) (PEDIATRIC): Primary | ICD-10-CM

## 2023-05-11 PROCEDURE — 1036F TOBACCO NON-USER: CPT | Performed by: NURSE PRACTITIONER

## 2023-05-11 PROCEDURE — 3075F SYST BP GE 130 - 139MM HG: CPT | Performed by: NURSE PRACTITIONER

## 2023-05-11 PROCEDURE — 1123F ACP DISCUSS/DSCN MKR DOCD: CPT | Performed by: NURSE PRACTITIONER

## 2023-05-11 PROCEDURE — 99214 OFFICE O/P EST MOD 30 MIN: CPT | Performed by: NURSE PRACTITIONER

## 2023-05-11 PROCEDURE — 1090F PRES/ABSN URINE INCON ASSESS: CPT | Performed by: NURSE PRACTITIONER

## 2023-05-11 PROCEDURE — 3078F DIAST BP <80 MM HG: CPT | Performed by: NURSE PRACTITIONER

## 2023-05-11 PROCEDURE — G8417 CALC BMI ABV UP PARAM F/U: HCPCS | Performed by: NURSE PRACTITIONER

## 2023-05-11 PROCEDURE — G8427 DOCREV CUR MEDS BY ELIG CLIN: HCPCS | Performed by: NURSE PRACTITIONER

## 2023-05-11 PROCEDURE — G8399 PT W/DXA RESULTS DOCUMENT: HCPCS | Performed by: NURSE PRACTITIONER

## 2023-05-11 PROCEDURE — 3017F COLORECTAL CA SCREEN DOC REV: CPT | Performed by: NURSE PRACTITIONER

## 2023-05-11 ASSESSMENT — SLEEP AND FATIGUE QUESTIONNAIRES
HOW LIKELY ARE YOU TO NOD OFF OR FALL ASLEEP IN A CAR, WHILE STOPPED FOR A FEW MINUTES IN TRAFFIC: 0
HOW LIKELY ARE YOU TO NOD OFF OR FALL ASLEEP WHILE SITTING AND READING: 2
HOW LIKELY ARE YOU TO NOD OFF OR FALL ASLEEP WHILE SITTING INACTIVE IN A PUBLIC PLACE: 1
HOW LIKELY ARE YOU TO NOD OFF OR FALL ASLEEP WHEN YOU ARE A PASSENGER IN A CAR FOR AN HOUR WITHOUT A BREAK: 0
ESS TOTAL SCORE: 9
HOW LIKELY ARE YOU TO NOD OFF OR FALL ASLEEP WHILE LYING DOWN TO REST IN THE AFTERNOON WHEN CIRCUMSTANCES PERMIT: 2
HOW LIKELY ARE YOU TO NOD OFF OR FALL ASLEEP WHILE WATCHING TV: 3
HOW LIKELY ARE YOU TO NOD OFF OR FALL ASLEEP WHILE SITTING QUIETLY AFTER LUNCH WITHOUT ALCOHOL: 1
HOW LIKELY ARE YOU TO NOD OFF OR FALL ASLEEP WHILE SITTING AND TALKING TO SOMEONE: 0

## 2023-05-11 NOTE — PATIENT INSTRUCTIONS
217 [facility] Juan. [place], [address]  Tel.  [phone]  Fax. 100 [facility] 60  [place], [address] [facility]  Tel.  [phone]  Fax. [phone] [address]  Tel.  [phone]  Fax. [phone]     Learning About CPAP for Sleep Apnea  What is CPAP? CPAP is a small machine that you use at home every night while you sleep. It increases air pressure in your throat to keep your airway open. When you have sleep apnea, this can help you sleep better so you feel much better. CPAP stands for \"continuous positive airway pressure. \"  The CPAP machine will have one of the following:  A mask that covers your nose and mouth  Prongs that fit into your nose  A mask that covers your nose only, the most common type. This type is called NCPAP. The N stands for \"nasal.\"  Why is it done? CPAP is usually the best treatment for obstructive sleep apnea. It is the first treatment choice and the most widely used. Your doctor may suggest CPAP if you have: Moderate to severe sleep apnea. Sleep apnea and coronary artery disease (CAD) or heart failure. How does it help? CPAP can help you have more normal sleep, so you feel less sleepy and more alert during the daytime. CPAP may help keep heart failure or other heart problems from getting worse. NCPAP may help lower your blood pressure. If you use CPAP, your bed partner may also sleep better because you are not snoring or restless. What are the side effects? Some people who use CPAP have:  A dry or stuffy nose and a sore throat. Irritated skin on the face. Sore eyes. Bloating. If you have any of these problems, work with your doctor to fix them. Here are some things you can try:  Be sure the mask or nasal prongs fit well. See if your doctor can adjust the pressure of your CPAP. If your nose is dry, try a humidifier.   If your nose is runny or stuffy, try decongestant medicine or a steroid

## 2023-05-11 NOTE — PROGRESS NOTES
217 [facility] Juan. [place], [address]   Tel.  [phone]   Fax. 10[address] [facility]   [place], [address] [facility]   Tel.  [phone]   Fax. [phone] [address]   Tel.  [phone]   Fax. [address] (: 1950) is a 72 y.o. female, established patient, seen for positive airway pressure follow-up and evaluation. She was last seen by Dr. Maribel Nova on 2022, prior notes and sleep testing reviewed in detail. In lab sleep test 2021 showed AHI of 17.7/hr with a lowest SpO2 of 78%, duration of SpO2 < 88% 3.3 min. Weight at time of sleep testing 176 pounds. Subsequent titration 2023 showed adequate treatment at a pressure of 11 cmH2O.      ASSESSMENT/PLAN:   Diagnosis Orders   1. Obstructive sleep apnea (adult) (pediatric)  DME Order for (Specify) as OP      2. BMI 32.0-32.9,adult            AHI = 17.7(2021). On Resmed PAP :  11 cmH2O. Set up 3/2/2023. She is not compliant with PAP therapy although when used PAP shows benefit to the patient and remains necessary for control of her sleep apnea. There is continued clinical benefit from the hours of use demonstrated by AHI reduced from 17.7/hr to 1.2/hr. Mask size is incorrect and causing air leak. Follow-up and Dispositions    Return in about 4 weeks (around 2023) for 1 month compliance follow up - ok to book 2:30 slot. Sleep Apnea -  Sleep apnea treatment is causing negative side effects. Change in treatment plan is needed. Change current pressure settings to  EPR 2, increase humidity, change mask as needed or refit current wisp mask . Changes made by provider in [facility] system and [facility] to [facility]     * Supplies ordered - medium nasal mask and heated tubing    Orders Placed This Encounter   Procedures    DME Order for (Specify) as OP     Diagnosis: (G47.33) JOSE JUAN (obstructive sleep apnea)  (primary encounter diagnosis)     Replacement

## 2023-05-11 NOTE — PROGRESS NOTES
CPAP mask evaluation. Patient's RESMED N20 small mask seal is too small. Patient needs a medium seal.  Patient was fitted with a Resmed AirFit P10 pillows, medium during sleep study.     Order to be sent for medium seal.

## 2023-05-16 DIAGNOSIS — E55.9 VITAMIN D DEFICIENCY, UNSPECIFIED: ICD-10-CM

## 2023-05-16 DIAGNOSIS — I50.30 UNSPECIFIED DIASTOLIC (CONGESTIVE) HEART FAILURE (HCC): ICD-10-CM

## 2023-05-16 RX ORDER — BUMETANIDE 2 MG/1
TABLET ORAL
Qty: 90 TABLET | Refills: 0 | Status: SHIPPED | OUTPATIENT
Start: 2023-05-16

## 2023-05-25 ENCOUNTER — OFFICE VISIT (OUTPATIENT)
Facility: CLINIC | Age: 73
End: 2023-05-25

## 2023-05-25 VITALS
SYSTOLIC BLOOD PRESSURE: 119 MMHG | HEART RATE: 79 BPM | DIASTOLIC BLOOD PRESSURE: 67 MMHG | TEMPERATURE: 98.1 F | RESPIRATION RATE: 18 BRPM | WEIGHT: 190.5 LBS | OXYGEN SATURATION: 96 % | HEIGHT: 64 IN | BODY MASS INDEX: 32.52 KG/M2

## 2023-05-25 DIAGNOSIS — I42.0 DILATED CARDIOMYOPATHY (HCC): ICD-10-CM

## 2023-05-25 DIAGNOSIS — Z79.4 CONTROLLED TYPE 2 DIABETES MELLITUS WITHOUT COMPLICATION, WITH LONG-TERM CURRENT USE OF INSULIN (HCC): Primary | ICD-10-CM

## 2023-05-25 DIAGNOSIS — E66.9 OBESITY (BMI 30.0-34.9): ICD-10-CM

## 2023-05-25 DIAGNOSIS — E78.5 DYSLIPIDEMIA: ICD-10-CM

## 2023-05-25 DIAGNOSIS — E11.9 CONTROLLED TYPE 2 DIABETES MELLITUS WITHOUT COMPLICATION, WITH LONG-TERM CURRENT USE OF INSULIN (HCC): Primary | ICD-10-CM

## 2023-05-25 DIAGNOSIS — I10 PRIMARY HYPERTENSION: ICD-10-CM

## 2023-05-25 DIAGNOSIS — J43.1 PANLOBULAR EMPHYSEMA (HCC): ICD-10-CM

## 2023-05-25 LAB
ANION GAP SERPL CALC-SCNC: 5 MMOL/L (ref 5–15)
BUN SERPL-MCNC: 24 MG/DL (ref 6–20)
BUN/CREAT SERPL: 24 (ref 12–20)
CALCIUM SERPL-MCNC: 9.5 MG/DL (ref 8.5–10.1)
CHLORIDE SERPL-SCNC: 106 MMOL/L (ref 97–108)
CO2 SERPL-SCNC: 28 MMOL/L (ref 21–32)
CREAT SERPL-MCNC: 1 MG/DL (ref 0.55–1.02)
EST. AVERAGE GLUCOSE BLD GHB EST-MCNC: 171 MG/DL
GLUCOSE SERPL-MCNC: 251 MG/DL (ref 65–100)
HBA1C MFR BLD: 7.6 % (ref 4–5.6)
POTASSIUM SERPL-SCNC: 4.2 MMOL/L (ref 3.5–5.1)
SODIUM SERPL-SCNC: 139 MMOL/L (ref 136–145)

## 2023-05-25 ASSESSMENT — PATIENT HEALTH QUESTIONNAIRE - PHQ9
SUM OF ALL RESPONSES TO PHQ QUESTIONS 1-9: 0
2. FEELING DOWN, DEPRESSED OR HOPELESS: 0
SUM OF ALL RESPONSES TO PHQ9 QUESTIONS 1 & 2: 0
SUM OF ALL RESPONSES TO PHQ QUESTIONS 1-9: 0
1. LITTLE INTEREST OR PLEASURE IN DOING THINGS: 0
SUM OF ALL RESPONSES TO PHQ QUESTIONS 1-9: 0
SUM OF ALL RESPONSES TO PHQ QUESTIONS 1-9: 0

## 2023-05-25 NOTE — PROGRESS NOTES
Chief Complaint   Patient presents with    Cholesterol Problem    Diabetes    Hypertension     [facility] is a 72 y.o. female  Chief Complaint   Patient presents with    Cholesterol Problem    Diabetes    Hypertension         YES Answers must have Comments  1. \"Have you been to the ER, urgent care clinic since your last visit? Hospitalized since your last visit? \"    [] YES   [x] NO       2. Have you seen or consulted any other health care providers outside of [address] [place] [address] since your last visit?     [] YES   [x] NO       3. For patients aged 39-70: Have you had a colorectal cancer screening such as a colonoscopy/FIT/Cologuard? Nurse/CMA to request records if not in chart   [x] YES 2021, [facility]  [] NO   [] NA, based on age    If the patient is female:      4. For female patients aged 41-77: Joslyn Rhein you had a mammogram in the last two years?  Nurse/CMA to request records if not in chart   [] YES   [x] NO   [] NA, based on age    11. For female patients aged 21-65: Joslyn Rhein you had a pap smear?   Nurse/CMA to request records if not in chart   [] YES   [] NO  [x] NA, based on age
magnesium oxide (MAG-OX) 400 MG tablet TAKE 2 TABLETS BY MOUTH TWO (2) TIMES A DAY. melatonin 3 MG TABS tablet TAKE 1 TABLET BY MOUTH EVERY NIGHT AS NEEDED FOR INSOMNIA.      mirabegron (MYRBETRIQ) 25 MG TB24 Take by mouth daily      oxybutynin (DITROPAN) 5 MG tablet TAKE 1 TABLET BY MOUTH THREE TIMES A DAY      sacubitril-valsartan (ENTRESTO) 24-26 MG per tablet TAKE 1 TABLET BY MOUTH TWICE A DAY      sodium chloride (OCEAN) 0.65 % nasal spray 2 sprays by Nasal route      spironolactone (ALDACTONE) 25 MG tablet TAKE 1 TABLET BY MOUTH EVERY DAY      thiamine 100 MG tablet TAKE 1 TABLET BY MOUTH EVERY DAY       No current facility-administered medications for this visit.      Past Medical History:   Diagnosis Date    Arthritis     Diabetes ([facility]     Heart failure ([facility]     Hypertension      Past Surgical History:   Procedure Laterality Date    COLONOSCOPY N/A 3/19/2021    COLONOSCOPY performed by Nicola Reynoso MD at [facility] ENDOSCOPY    ORTHOPEDIC SURGERY      Arthroscopy right knee    RI UNLISTED PROCEDURE CARDIAC SURGERY      life vest     No Known Allergies      REVIEW OF SYSTEMS:  General: negative for - chills or fever  ENT: negative for - headaches, nasal congestion or tinnitus  Respiratory: negative for - cough, hemoptysis, shortness of breath or wheezing  Cardiovascular : negative for - chest pain, edema, palpitations or shortness of breath  Gastrointestinal: negative for - abdominal pain, blood in stools, heartburn or nausea/vomiting  Genito-Urinary: no dysuria, trouble voiding, or hematuria  Musculoskeletal: negative for - gait disturbance, joint pain, joint stiffness or joint swelling  Neurological: no TIA or stroke symptoms  Hematologic: no bruises, no bleeding, no swollen glands  Integument: no lumps, mole changes, nail changes or rash  Endocrine: no malaise/lethargy or unexpected weight changes      Social History     Socioeconomic History    Marital status:      Spouse name: None

## 2023-05-30 RX ORDER — LANOLIN ALCOHOL/MO/W.PET/CERES
CREAM (GRAM) TOPICAL
Qty: 90 TABLET | Refills: 1 | OUTPATIENT
Start: 2023-05-30

## 2023-06-07 RX ORDER — DAPAGLIFLOZIN 10 MG/1
TABLET, FILM COATED ORAL
Qty: 30 TABLET | Refills: 3 | Status: SHIPPED | OUTPATIENT
Start: 2023-06-07

## 2023-07-26 ENCOUNTER — TELEPHONE (OUTPATIENT)
Age: 73
End: 2023-07-26

## 2023-07-26 NOTE — TELEPHONE ENCOUNTER
Called patient to see if she wanted to come in to an earlier appointment but she already has another appointment to go to.  However she still wants to be on the wait list

## 2023-08-25 ENCOUNTER — OFFICE VISIT (OUTPATIENT)
Facility: CLINIC | Age: 73
End: 2023-08-25
Payer: MEDICARE

## 2023-08-25 VITALS
TEMPERATURE: 98.2 F | RESPIRATION RATE: 18 BRPM | BODY MASS INDEX: 32.46 KG/M2 | OXYGEN SATURATION: 97 % | WEIGHT: 190.1 LBS | SYSTOLIC BLOOD PRESSURE: 104 MMHG | DIASTOLIC BLOOD PRESSURE: 61 MMHG | HEART RATE: 72 BPM | HEIGHT: 64 IN

## 2023-08-25 DIAGNOSIS — Z79.4 CONTROLLED TYPE 2 DIABETES MELLITUS WITHOUT COMPLICATION, WITH LONG-TERM CURRENT USE OF INSULIN (HCC): Primary | ICD-10-CM

## 2023-08-25 DIAGNOSIS — M17.11 PRIMARY OSTEOARTHRITIS OF RIGHT KNEE: ICD-10-CM

## 2023-08-25 DIAGNOSIS — I42.8 NICM (NONISCHEMIC CARDIOMYOPATHY) (HCC): ICD-10-CM

## 2023-08-25 DIAGNOSIS — I10 PRIMARY HYPERTENSION: ICD-10-CM

## 2023-08-25 DIAGNOSIS — E66.9 OBESITY (BMI 30.0-34.9): ICD-10-CM

## 2023-08-25 DIAGNOSIS — E78.5 DYSLIPIDEMIA: ICD-10-CM

## 2023-08-25 DIAGNOSIS — J43.1 PANLOBULAR EMPHYSEMA (HCC): ICD-10-CM

## 2023-08-25 DIAGNOSIS — E11.9 CONTROLLED TYPE 2 DIABETES MELLITUS WITHOUT COMPLICATION, WITH LONG-TERM CURRENT USE OF INSULIN (HCC): Primary | ICD-10-CM

## 2023-08-25 PROCEDURE — 1036F TOBACCO NON-USER: CPT | Performed by: INTERNAL MEDICINE

## 2023-08-25 PROCEDURE — 3051F HG A1C>EQUAL 7.0%<8.0%: CPT | Performed by: INTERNAL MEDICINE

## 2023-08-25 PROCEDURE — 1123F ACP DISCUSS/DSCN MKR DOCD: CPT | Performed by: INTERNAL MEDICINE

## 2023-08-25 PROCEDURE — 99214 OFFICE O/P EST MOD 30 MIN: CPT | Performed by: INTERNAL MEDICINE

## 2023-08-25 PROCEDURE — 3078F DIAST BP <80 MM HG: CPT | Performed by: INTERNAL MEDICINE

## 2023-08-25 PROCEDURE — 3074F SYST BP LT 130 MM HG: CPT | Performed by: INTERNAL MEDICINE

## 2023-08-25 PROCEDURE — 3023F SPIROM DOC REV: CPT | Performed by: INTERNAL MEDICINE

## 2023-08-25 PROCEDURE — 2022F DILAT RTA XM EVC RTNOPTHY: CPT | Performed by: INTERNAL MEDICINE

## 2023-08-25 PROCEDURE — G8427 DOCREV CUR MEDS BY ELIG CLIN: HCPCS | Performed by: INTERNAL MEDICINE

## 2023-08-25 PROCEDURE — 1090F PRES/ABSN URINE INCON ASSESS: CPT | Performed by: INTERNAL MEDICINE

## 2023-08-25 PROCEDURE — 3017F COLORECTAL CA SCREEN DOC REV: CPT | Performed by: INTERNAL MEDICINE

## 2023-08-25 PROCEDURE — G8399 PT W/DXA RESULTS DOCUMENT: HCPCS | Performed by: INTERNAL MEDICINE

## 2023-08-25 PROCEDURE — G8417 CALC BMI ABV UP PARAM F/U: HCPCS | Performed by: INTERNAL MEDICINE

## 2023-08-25 ASSESSMENT — ANXIETY QUESTIONNAIRES
1. FEELING NERVOUS, ANXIOUS, OR ON EDGE: 0
5. BEING SO RESTLESS THAT IT IS HARD TO SIT STILL: 0
3. WORRYING TOO MUCH ABOUT DIFFERENT THINGS: 0
2. NOT BEING ABLE TO STOP OR CONTROL WORRYING: 0
IF YOU CHECKED OFF ANY PROBLEMS ON THIS QUESTIONNAIRE, HOW DIFFICULT HAVE THESE PROBLEMS MADE IT FOR YOU TO DO YOUR WORK, TAKE CARE OF THINGS AT HOME, OR GET ALONG WITH OTHER PEOPLE: NOT DIFFICULT AT ALL
4. TROUBLE RELAXING: 0
6. BECOMING EASILY ANNOYED OR IRRITABLE: 0
7. FEELING AFRAID AS IF SOMETHING AWFUL MIGHT HAPPEN: 0
GAD7 TOTAL SCORE: 0

## 2023-08-25 ASSESSMENT — PATIENT HEALTH QUESTIONNAIRE - PHQ9
1. LITTLE INTEREST OR PLEASURE IN DOING THINGS: 0
SUM OF ALL RESPONSES TO PHQ9 QUESTIONS 1 & 2: 0
SUM OF ALL RESPONSES TO PHQ QUESTIONS 1-9: 0
2. FEELING DOWN, DEPRESSED OR HOPELESS: 0

## 2023-08-25 NOTE — PROGRESS NOTES
Renaldo Marsh is a 72 y.o. female    Chief Complaint   Patient presents with    3 Month Follow-Up     1. Have you been to the ER, urgent care clinic since your last visit? Hospitalized since your last visit? No    2. Have you seen or consulted any other health care providers outside of the [address] [place] [address] Avenue since your last visit? Include any pap smears or colon screening.  No

## 2023-08-25 NOTE — PROGRESS NOTES
[address] [facility]  [address] [address]  Phone:  [phone]  Fax: [phone]       Chief Complaint   Patient presents with    3 Month Follow-Up   . SUBJECTIVE:    Edda Lopez is a 72 y.o. female  Dictation on: 08/25/2023 11:12 AM by: Lucia Morales [[id number]]          Current Outpatient Medications   Medication Sig Dispense Refill    insulin glargine (LANTUS SOLOSTAR) 100 UNIT/ML injection pen Patient reports taking 58 units nightly 6 Adjustable Dose Pre-filled Pen Syringe 11    Insulin Pen Needle ([employer] PEN NEEDLES) 31G X 6 MM MISC 1 each by Does not apply route daily 100 each 11    Continuous Blood Gluc Sensor (FREESTYLE SHAW 2 SENSOR) MISC Blood sugar checks before meals, at bedtime and as needed1 1 each 11    Continuous Blood Gluc  (FREESTYLE SHAW 2 READER) MONICA Blood sugar checks before meals and at bedtime and as needed 1 each 0    FARXIGA 10 MG tablet TAKE 1 TABLET BY MOUTH EVERY DAY 30 tablet 3    Continuous Blood Gluc Sensor (FREESTYLE SHAW 2 SENSOR) MISC Blood sugar checks before meals and at bedtime as well as as needed 1 each 11    Continuous Blood Gluc  (FREESTYLE SHAW 2 READER) MONICA Blood sugar checks before meals, at bedtime, and as needed 1 each 0    bumetanide (BUMEX) 2 MG tablet TAKE 1 TABLET BY MOUTH DAILY AS NEEDED (FOR WEIGHT GAIN OF 2-3 LB OVERNIGHT).  90 tablet 0    allopurinol (ZYLOPRIM) 100 MG tablet Take by mouth daily      ammonium lactate (LAC-HYDRIN) 12 % lotion APPLY TO FEET TWICE A DAY      atorvastatin (LIPITOR) 40 MG tablet TAKE 1 TABLET BY MOUTH EVERY DAY      budesonide (PULMICORT) 0.25 MG/2ML nebulizer suspension Inhale into the lungs every 6 hours as needed      calcitRIOL (ROCALTROL) 0.25 MCG capsule Take by mouth daily      Cholecalciferol 50 MCG (2000 UT) CAPS TAKE 1 CAPSULE BY MOUTH EVERY DAY      diclofenac sodium (VOLTAREN) 1 % GEL Apply topically 2 times daily as needed      isosorbide

## 2023-08-26 LAB
ANION GAP SERPL CALC-SCNC: 4 MMOL/L (ref 5–15)
BUN SERPL-MCNC: 24 MG/DL (ref 6–20)
BUN/CREAT SERPL: 21 (ref 12–20)
CALCIUM SERPL-MCNC: 9.3 MG/DL (ref 8.5–10.1)
CHLORIDE SERPL-SCNC: 108 MMOL/L (ref 97–108)
CO2 SERPL-SCNC: 28 MMOL/L (ref 21–32)
CREAT SERPL-MCNC: 1.16 MG/DL (ref 0.55–1.02)
EST. AVERAGE GLUCOSE BLD GHB EST-MCNC: 174 MG/DL
GLUCOSE SERPL-MCNC: 216 MG/DL (ref 65–100)
HBA1C MFR BLD: 7.7 % (ref 4–5.6)
POTASSIUM SERPL-SCNC: 4.1 MMOL/L (ref 3.5–5.1)
SODIUM SERPL-SCNC: 140 MMOL/L (ref 136–145)

## 2023-08-26 RX ORDER — INSULIN GLARGINE 100 [IU]/ML
INJECTION, SOLUTION SUBCUTANEOUS
Qty: 6 ADJUSTABLE DOSE PRE-FILLED PEN SYRINGE | Refills: 11 | Status: SHIPPED | OUTPATIENT
Start: 2023-08-26

## 2023-08-26 RX ORDER — PEN NEEDLE, DIABETIC 31 G X1/4"
1 NEEDLE, DISPOSABLE MISCELLANEOUS DAILY
Qty: 100 EACH | Refills: 11 | Status: SHIPPED | OUTPATIENT
Start: 2023-08-26

## 2023-08-26 NOTE — PROGRESS NOTES
comes in for return visit stating that she has done well. She is not checking her blood sugars because she is waiting on getting a James device. Insurance to date will not pay. She ideally needs this. She is currently taking her insulin at 58 units daily. She does need pin needles also. From a cardiac perspective she follows up with Cardiology on a regular basis. She remains on all of her cardioactive medications which is of utmost importance because of the past history that she has had with her cardiomyopathy which is nonischemic. Her alcohol consumption has decreased significantly. She has a past history of primary hypertension, dyslipidemia, and obesity.

## 2023-08-28 LAB — APO B SERPL-MCNC: 64 MG/DL

## 2023-09-15 DIAGNOSIS — I50.20 UNSPECIFIED SYSTOLIC (CONGESTIVE) HEART FAILURE (HCC): ICD-10-CM

## 2023-09-15 RX ORDER — ISOSORBIDE MONONITRATE 30 MG/1
TABLET, EXTENDED RELEASE ORAL
Qty: 90 TABLET | Refills: 1 | Status: SHIPPED | OUTPATIENT
Start: 2023-09-15

## 2023-09-15 RX ORDER — SPIRONOLACTONE 25 MG/1
TABLET ORAL
Qty: 90 TABLET | Refills: 1 | Status: SHIPPED | OUTPATIENT
Start: 2023-09-15

## 2023-09-18 RX ORDER — DAPAGLIFLOZIN 10 MG/1
TABLET, FILM COATED ORAL
Qty: 30 TABLET | Refills: 3 | Status: SHIPPED | OUTPATIENT
Start: 2023-09-18

## 2023-10-03 RX ORDER — DIPHENHYDRAMINE HCL 25 MG
TABLET ORAL
Qty: 1 KIT | Refills: 0 | Status: SHIPPED | OUTPATIENT
Start: 2023-10-03

## 2023-11-08 VITALS — WEIGHT: 190 LBS | BODY MASS INDEX: 32.61 KG/M2

## 2023-11-08 ASSESSMENT — SLEEP AND FATIGUE QUESTIONNAIRES
ESS TOTAL SCORE: 7
HOW LIKELY ARE YOU TO NOD OFF OR FALL ASLEEP WHILE SITTING AND READING: 1
HOW LIKELY ARE YOU TO NOD OFF OR FALL ASLEEP WHILE SITTING INACTIVE IN A PUBLIC PLACE: 0
HOW LIKELY ARE YOU TO NOD OFF OR FALL ASLEEP WHILE SITTING QUIETLY AFTER LUNCH WITHOUT ALCOHOL: 0
HOW LIKELY ARE YOU TO NOD OFF OR FALL ASLEEP WHILE LYING DOWN TO REST IN THE AFTERNOON WHEN CIRCUMSTANCES PERMIT: 3
HOW LIKELY ARE YOU TO NOD OFF OR FALL ASLEEP WHILE WATCHING TV: 3
HOW LIKELY ARE YOU TO NOD OFF OR FALL ASLEEP WHILE SITTING AND TALKING TO SOMEONE: 0
HOW LIKELY ARE YOU TO NOD OFF OR FALL ASLEEP IN A CAR, WHILE STOPPED FOR A FEW MINUTES IN TRAFFIC: 0
HOW LIKELY ARE YOU TO NOD OFF OR FALL ASLEEP WHEN YOU ARE A PASSENGER IN A CAR FOR AN HOUR WITHOUT A BREAK: 0

## 2023-11-09 ENCOUNTER — OFFICE VISIT (OUTPATIENT)
Age: 73
End: 2023-11-09
Payer: MEDICARE

## 2023-11-09 DIAGNOSIS — G47.33 OBSTRUCTIVE SLEEP APNEA (ADULT) (PEDIATRIC): Primary | ICD-10-CM

## 2023-11-09 DIAGNOSIS — I10 PRIMARY HYPERTENSION: ICD-10-CM

## 2023-11-09 PROCEDURE — G8427 DOCREV CUR MEDS BY ELIG CLIN: HCPCS | Performed by: NURSE PRACTITIONER

## 2023-11-09 PROCEDURE — 3017F COLORECTAL CA SCREEN DOC REV: CPT | Performed by: NURSE PRACTITIONER

## 2023-11-09 PROCEDURE — G8399 PT W/DXA RESULTS DOCUMENT: HCPCS | Performed by: NURSE PRACTITIONER

## 2023-11-09 PROCEDURE — 1123F ACP DISCUSS/DSCN MKR DOCD: CPT | Performed by: NURSE PRACTITIONER

## 2023-11-09 PROCEDURE — 1036F TOBACCO NON-USER: CPT | Performed by: NURSE PRACTITIONER

## 2023-11-09 PROCEDURE — 1090F PRES/ABSN URINE INCON ASSESS: CPT | Performed by: NURSE PRACTITIONER

## 2023-11-09 PROCEDURE — 99213 OFFICE O/P EST LOW 20 MIN: CPT | Performed by: NURSE PRACTITIONER

## 2023-11-09 PROCEDURE — G8484 FLU IMMUNIZE NO ADMIN: HCPCS | Performed by: NURSE PRACTITIONER

## 2023-11-09 PROCEDURE — G8417 CALC BMI ABV UP PARAM F/U: HCPCS | Performed by: NURSE PRACTITIONER

## 2023-11-09 NOTE — PROGRESS NOTES
Grzegorz Noyola (: 1950) is a 72 y.o. female, established patient, seen for positive airway pressure follow-up, she was last seen by me on 2023, previously seen by Dr. Romana Carmen on 2022, prior notes and sleep testing reviewed in detail. In lab sleep test 2021 showed AHI of 17.7/hr with a lowest SpO2 of 78%, duration of SpO2 < 88% 3.3 min. Weight at time of sleep testing 176 pounds. Subsequent titration 2023 showed adequate treatment at a pressure of 11 cmH2O.      ASSESSMENT/PLAN:   Diagnosis Orders   1. Obstructive sleep apnea (adult) (pediatric)  SLEEP LAB (PAP TITRATION)      2. Primary hypertension        3. BMI 32.0-32.9,adult            AHI = 17.7(2021). On Resmed PAP :  11 cmH2O. Set up 3/2/2023. She is not compliant with PAP therapy. She has tried to use CPAP but is not able to tolerate. CPAP failure, she is willing to come in for BiPAP titration. Follow-up and Dispositions    Return if symptoms worsen or fail to improve. Sleep Apnea -   Sleep apnea condition has been exacerbated and treatment is causing negative side effects. Change in treatment plan is needed. Her clinical response has not been adequate to date, patient continues to have symptoms despite efforts to use PAP device. In lab sleep study titration to determine optimal BiPAP device pressure and complete mask desensitization. Orders Placed This Encounter   Procedures    SLEEP LAB (PAP TITRATION)     Standing Status:   Future     Standing Expiration Date:   2024     Scheduling Instructions:      Please route to Dr. Romana Carmen for Interp. BiPAP titration       * Counseling was provided regarding the importance of regular PAP use with emphasis on ensuring sufficient total sleep time, proper sleep hygiene, and safe driving. * She was asked to contact our office for any problems regarding PAP therapy.     2. Hypertension -  continue on her current regimen, she will continue to monitor

## 2023-11-09 NOTE — PATIENT INSTRUCTIONS
17[address] [facility] [place], [address]  Tel.  [phone]  Fax. [address] [facility], [address] [place] [address]  Tel.  [phone]  Fax. [phone] [facility], [address] [facility]  Tel.  [phone]  Fax. [phone]     Learning About CPAP for Sleep Apnea  What is CPAP? CPAP is a small machine that you use at home every night while you sleep. It increases air pressure in your throat to keep your airway open. When you have sleep apnea, this can help you sleep better so you feel much better. CPAP stands for \"continuous positive airway pressure. \"  The CPAP machine will have one of the following:  A mask that covers your nose and mouth  Prongs that fit into your nose  A mask that covers your nose only, the most common type. This type is called NCPAP. The N stands for \"nasal.\"  Why is it done? CPAP is usually the best treatment for obstructive sleep apnea. It is the first treatment choice and the most widely used. Your doctor may suggest CPAP if you have: Moderate to severe sleep apnea. Sleep apnea and coronary artery disease (CAD) or heart failure. How does it help? CPAP can help you have more normal sleep, so you feel less sleepy and more alert during the daytime. CPAP may help keep heart failure or other heart problems from getting worse. NCPAP may help lower your blood pressure. If you use CPAP, your bed partner may also sleep better because you are not snoring or restless. What are the side effects? Some people who use CPAP have:  A dry or stuffy nose and a sore throat. Irritated skin on the face. Sore eyes. Bloating. If you have any of these problems, work with your doctor to fix them. Here are some things you can try:  Be sure the mask or nasal prongs fit well. See if your doctor can adjust the pressure of your CPAP. If your nose is dry, try a humidifier.   If your nose is runny or stuffy, try decongestant medicine or a steroid

## 2023-11-27 ENCOUNTER — OFFICE VISIT (OUTPATIENT)
Facility: CLINIC | Age: 73
End: 2023-11-27
Payer: MEDICARE

## 2023-11-27 VITALS
BODY MASS INDEX: 25.56 KG/M2 | DIASTOLIC BLOOD PRESSURE: 54 MMHG | WEIGHT: 188.7 LBS | HEIGHT: 72 IN | SYSTOLIC BLOOD PRESSURE: 130 MMHG | OXYGEN SATURATION: 92 % | HEART RATE: 72 BPM | RESPIRATION RATE: 17 BRPM | TEMPERATURE: 97.6 F

## 2023-11-27 DIAGNOSIS — J45.40 MODERATE PERSISTENT REACTIVE AIRWAY DISEASE WITHOUT COMPLICATION: ICD-10-CM

## 2023-11-27 DIAGNOSIS — I10 PRIMARY HYPERTENSION: ICD-10-CM

## 2023-11-27 DIAGNOSIS — Z79.4 CONTROLLED TYPE 2 DIABETES MELLITUS WITHOUT COMPLICATION, WITH LONG-TERM CURRENT USE OF INSULIN (HCC): ICD-10-CM

## 2023-11-27 DIAGNOSIS — E11.9 CONTROLLED TYPE 2 DIABETES MELLITUS WITHOUT COMPLICATION, WITH LONG-TERM CURRENT USE OF INSULIN (HCC): ICD-10-CM

## 2023-11-27 DIAGNOSIS — R79.89 PRERENAL AZOTEMIA: ICD-10-CM

## 2023-11-27 DIAGNOSIS — I42.8 NICM (NONISCHEMIC CARDIOMYOPATHY) (HCC): Primary | ICD-10-CM

## 2023-11-27 DIAGNOSIS — J43.1 PANLOBULAR EMPHYSEMA (HCC): ICD-10-CM

## 2023-11-27 PROBLEM — N18.30 CHRONIC RENAL DISEASE, STAGE III (HCC): Status: RESOLVED | Noted: 2022-06-21 | Resolved: 2023-11-27

## 2023-11-27 PROCEDURE — G8427 DOCREV CUR MEDS BY ELIG CLIN: HCPCS | Performed by: INTERNAL MEDICINE

## 2023-11-27 PROCEDURE — 3017F COLORECTAL CA SCREEN DOC REV: CPT | Performed by: INTERNAL MEDICINE

## 2023-11-27 PROCEDURE — G8484 FLU IMMUNIZE NO ADMIN: HCPCS | Performed by: INTERNAL MEDICINE

## 2023-11-27 PROCEDURE — 1090F PRES/ABSN URINE INCON ASSESS: CPT | Performed by: INTERNAL MEDICINE

## 2023-11-27 PROCEDURE — 3075F SYST BP GE 130 - 139MM HG: CPT | Performed by: INTERNAL MEDICINE

## 2023-11-27 PROCEDURE — 3078F DIAST BP <80 MM HG: CPT | Performed by: INTERNAL MEDICINE

## 2023-11-27 PROCEDURE — 1123F ACP DISCUSS/DSCN MKR DOCD: CPT | Performed by: INTERNAL MEDICINE

## 2023-11-27 PROCEDURE — G8399 PT W/DXA RESULTS DOCUMENT: HCPCS | Performed by: INTERNAL MEDICINE

## 2023-11-27 PROCEDURE — 3023F SPIROM DOC REV: CPT | Performed by: INTERNAL MEDICINE

## 2023-11-27 PROCEDURE — 3044F HG A1C LEVEL LT 7.0%: CPT | Performed by: INTERNAL MEDICINE

## 2023-11-27 PROCEDURE — G8420 CALC BMI NORM PARAMETERS: HCPCS | Performed by: INTERNAL MEDICINE

## 2023-11-27 PROCEDURE — 1036F TOBACCO NON-USER: CPT | Performed by: INTERNAL MEDICINE

## 2023-11-27 PROCEDURE — 99214 OFFICE O/P EST MOD 30 MIN: CPT | Performed by: INTERNAL MEDICINE

## 2023-11-27 PROCEDURE — 2022F DILAT RTA XM EVC RTNOPTHY: CPT | Performed by: INTERNAL MEDICINE

## 2023-11-27 ASSESSMENT — PATIENT HEALTH QUESTIONNAIRE - PHQ9
SUM OF ALL RESPONSES TO PHQ9 QUESTIONS 1 & 2: 0
SUM OF ALL RESPONSES TO PHQ QUESTIONS 1-9: 0
1. LITTLE INTEREST OR PLEASURE IN DOING THINGS: 0
SUM OF ALL RESPONSES TO PHQ QUESTIONS 1-9: 0
2. FEELING DOWN, DEPRESSED OR HOPELESS: 0
SUM OF ALL RESPONSES TO PHQ QUESTIONS 1-9: 0
SUM OF ALL RESPONSES TO PHQ QUESTIONS 1-9: 0

## 2023-11-27 NOTE — PROGRESS NOTES
150 [facility]  [address] [facility] [address]  Phone:  [phone]  Fax: [phone]       Chief Complaint   Patient presents with    Diabetes    Cholesterol Problem   . SUBJECTIVE:    Josafat Harrison is a 72 y.o. female  Dictation on: 11/27/2023  1:05 PM by: Smitha Chaudhry [[id number]]          Current Outpatient Medications   Medication Sig Dispense Refill    ENTRESTO 24-26 MG per tablet TAKE 1 TABLET BY MOUTH TWICE A DAY 60 tablet 2    FARXIGA 10 MG tablet TAKE 1 TABLET BY MOUTH EVERY DAY 30 tablet 3    isosorbide mononitrate (IMDUR) 30 MG extended release tablet TAKE 1 TABLET BY MOUTH EVERY DAY IN THE MORNING 90 tablet 1    spironolactone (ALDACTONE) 25 MG tablet TAKE 1 TABLET BY MOUTH EVERY DAY 90 tablet 1    insulin glargine (LANTUS SOLOSTAR) 100 UNIT/ML injection pen Patient reports taking 58 units nightly 6 Adjustable Dose Pre-filled Pen Syringe 11    Insulin Pen Needle ([employer] PEN NEEDLES) 31G X 6 MM MISC 1 each by Does not apply route daily 100 each 11    bumetanide (BUMEX) 2 MG tablet TAKE 1 TABLET BY MOUTH DAILY AS NEEDED (FOR WEIGHT GAIN OF 2-3 LB OVERNIGHT). 90 tablet 0    allopurinol (ZYLOPRIM) 100 MG tablet Take by mouth daily      ammonium lactate (LAC-HYDRIN) 12 % lotion APPLY TO FEET TWICE A DAY      atorvastatin (LIPITOR) 40 MG tablet TAKE 1 TABLET BY MOUTH EVERY DAY      budesonide (PULMICORT) 0.25 MG/2ML nebulizer suspension Inhale into the lungs every 6 hours as needed      diclofenac sodium (VOLTAREN) 1 % GEL Apply topically 2 times daily as needed      magnesium oxide (MAG-OX) 400 MG tablet TAKE 2 TABLETS BY MOUTH TWO (2) TIMES A DAY.       melatonin 3 MG TABS tablet TAKE 1 TABLET BY MOUTH EVERY NIGHT AS NEEDED FOR INSOMNIA.      mirabegron (MYRBETRIQ) 25 MG TB24 Take by mouth daily      oxybutynin (DITROPAN) 5 MG tablet TAKE 1 TABLET BY MOUTH THREE TIMES A DAY      sodium chloride (OCEAN) 0.65 % nasal spray 2 sprays by Nasal route

## 2023-11-28 LAB
ANION GAP SERPL CALC-SCNC: 6 MMOL/L (ref 5–15)
BUN SERPL-MCNC: 22 MG/DL (ref 6–20)
BUN/CREAT SERPL: 28 (ref 12–20)
CALCIUM SERPL-MCNC: 9.1 MG/DL (ref 8.5–10.1)
CHLORIDE SERPL-SCNC: 107 MMOL/L (ref 97–108)
CO2 SERPL-SCNC: 28 MMOL/L (ref 21–32)
CREAT SERPL-MCNC: 0.78 MG/DL (ref 0.55–1.02)
EST. AVERAGE GLUCOSE BLD GHB EST-MCNC: 148 MG/DL
GLUCOSE SERPL-MCNC: 80 MG/DL (ref 65–100)
HBA1C MFR BLD: 6.8 % (ref 4–5.6)
POTASSIUM SERPL-SCNC: 3.7 MMOL/L (ref 3.5–5.1)
SODIUM SERPL-SCNC: 141 MMOL/L (ref 136–145)

## 2023-11-28 NOTE — PROGRESS NOTES
comes in for return visit stating that she has done reasonably well. She is not checking her blood sugars because she does not have a continuous glucose monitor which her insurance will not pay for. I suggest I check finger sticks. She follows up with the cardiologist for her nonischemic cardiomyopathy. She has had significant improvement in her function. She has a past history of primary hypertension, as well as dyslipidemia, and obesity.

## 2023-12-01 ENCOUNTER — TELEPHONE (OUTPATIENT)
Age: 73
End: 2023-12-01

## 2023-12-01 NOTE — PROGRESS NOTES
checks before meals and at bedtime as well as as needed (Patient not taking: Reported on 11/27/2023), Disp: 1 each, Rfl: 11    Continuous Blood Gluc  (FREESTYLE SHAW 2 READER) MONICA, Blood sugar checks before meals, at bedtime, and as needed (Patient not taking: Reported on 11/27/2023), Disp: 1 each, Rfl: 0    Thank you for your referral and allowing me to participate in this patient's care.     BRY Porter - NP  [address]  [address] [place] [address], [address]  Phone: [phone]      PATIENT CARE TEAM:  Patient Care Team:  Gabby Burrell MD as PCP - General  Gabby Burrell MD as PCP - Empaneled Provider  Basim Figueroa MD as Physician  Ernie Degroot MD as Surgeon     Total visit time: 35 minutes  (> 50% spent face-to-face counseling)

## 2023-12-04 ENCOUNTER — OFFICE VISIT (OUTPATIENT)
Age: 73
End: 2023-12-04
Payer: MEDICARE

## 2023-12-04 VITALS
RESPIRATION RATE: 20 BRPM | TEMPERATURE: 97.5 F | DIASTOLIC BLOOD PRESSURE: 70 MMHG | BODY MASS INDEX: 21.41 KG/M2 | OXYGEN SATURATION: 96 % | SYSTOLIC BLOOD PRESSURE: 130 MMHG | HEART RATE: 74 BPM | WEIGHT: 188.8 LBS

## 2023-12-04 DIAGNOSIS — E55.9 VITAMIN D DEFICIENCY, UNSPECIFIED: ICD-10-CM

## 2023-12-04 DIAGNOSIS — I50.22 CHRONIC SYSTOLIC CONGESTIVE HEART FAILURE (HCC): Primary | ICD-10-CM

## 2023-12-04 LAB
DISTANCE WALKED: NORMAL
SPO2: NORMAL

## 2023-12-04 PROCEDURE — 94618 PULMONARY STRESS TESTING: CPT | Performed by: NURSE PRACTITIONER

## 2023-12-04 PROCEDURE — G8420 CALC BMI NORM PARAMETERS: HCPCS | Performed by: NURSE PRACTITIONER

## 2023-12-04 PROCEDURE — 3078F DIAST BP <80 MM HG: CPT | Performed by: NURSE PRACTITIONER

## 2023-12-04 PROCEDURE — 1036F TOBACCO NON-USER: CPT | Performed by: NURSE PRACTITIONER

## 2023-12-04 PROCEDURE — G8427 DOCREV CUR MEDS BY ELIG CLIN: HCPCS | Performed by: NURSE PRACTITIONER

## 2023-12-04 PROCEDURE — 1090F PRES/ABSN URINE INCON ASSESS: CPT | Performed by: NURSE PRACTITIONER

## 2023-12-04 PROCEDURE — G8484 FLU IMMUNIZE NO ADMIN: HCPCS | Performed by: NURSE PRACTITIONER

## 2023-12-04 PROCEDURE — 3017F COLORECTAL CA SCREEN DOC REV: CPT | Performed by: NURSE PRACTITIONER

## 2023-12-04 PROCEDURE — G8399 PT W/DXA RESULTS DOCUMENT: HCPCS | Performed by: NURSE PRACTITIONER

## 2023-12-04 PROCEDURE — 3075F SYST BP GE 130 - 139MM HG: CPT | Performed by: NURSE PRACTITIONER

## 2023-12-04 PROCEDURE — 99214 OFFICE O/P EST MOD 30 MIN: CPT | Performed by: NURSE PRACTITIONER

## 2023-12-04 PROCEDURE — 1123F ACP DISCUSS/DSCN MKR DOCD: CPT | Performed by: NURSE PRACTITIONER

## 2023-12-04 RX ORDER — LANOLIN ALCOHOL/MO/W.PET/CERES
100 CREAM (GRAM) TOPICAL DAILY
Qty: 30 TABLET | Refills: 3 | Status: SHIPPED | OUTPATIENT
Start: 2023-12-04

## 2023-12-04 ASSESSMENT — ENCOUNTER SYMPTOMS
NAUSEA: 0
COUGH: 0
VOMITING: 0
SHORTNESS OF BREATH: 0
ABDOMINAL DISTENTION: 0

## 2023-12-04 ASSESSMENT — PATIENT HEALTH QUESTIONNAIRE - PHQ9
SUM OF ALL RESPONSES TO PHQ9 QUESTIONS 1 & 2: 0
SUM OF ALL RESPONSES TO PHQ QUESTIONS 1-9: 0
SUM OF ALL RESPONSES TO PHQ QUESTIONS 1-9: 0
2. FEELING DOWN, DEPRESSED OR HOPELESS: 0
SUM OF ALL RESPONSES TO PHQ QUESTIONS 1-9: 0
SUM OF ALL RESPONSES TO PHQ QUESTIONS 1-9: 0
1. LITTLE INTEREST OR PLEASURE IN DOING THINGS: 0

## 2023-12-04 NOTE — PATIENT INSTRUCTIONS
Medication changes:    No medication changes     Please take this to your pharmacy to notify them of the change in medications. Testing Ordered:    Please call [phone] to schedule echo    Please present to have labs drawn at a local lab [place] in February. You will be notified of any abnormal results that requires a change in medication regimen. 6 min walk completed in clinic today     Other Recommendations:      Please record blood pressure daily before medication and two hours after medication, please record weight daily upon waking/after using the bathroom. Keep a written records of your weights and blood pressure and bring to your next appointment. If you have a weight gain of 3 or more pounds overnight OR 5 or more pounds in one week, new/worsened shortness of breath or swelling, or if your blood pressure begins to consistently run below 90/60 and/or you begin to experience dizziness or lightheadedness please contact our office at [phone] option 2. Ensure your drinking an adequate amount of water with a goal of 6-8 eight ounce glasses (1.5-2 liters) of fluid daily. Your urine should be clear and light yellow straw colored. Follow up 1 year MD with [address]    Thank you for allowing us the privilege of being a part of your healthcare team! Please do not hesitate to contact our office at [phone] option 2 with any questions or concerns.

## 2023-12-05 NOTE — PROGRESS NOTES
1. The patient has a nonischemic cardiomyopathy and is well compensated currently. She had a markedly low ejection fraction for a period of time and was on ionotropic. This improved to the point where her LV function has improved near normal.  She follows up with Cardiology on a regular basis and remains on all of her cardioactive medications. 2. Her diabetes is doing quite well presumably. I reminded her the importance of minimizing her consumption of processed carbohydrates. I have attempted to get her a James device but it has been unsuccessful. 3. She does have existing COPD, although she does not smoke currently. 4. Complicating the COPD is reactive airway disease which is reasonably stable. She does have a rescue inhaler which she uses quite sparingly. 5. She has a mild pre-renal azotemia secondary to her diuretic, but this is not causing any immediate problems. 6. Blood pressure is excellent, no adjustments are made.

## 2023-12-17 ENCOUNTER — TELEPHONE (OUTPATIENT)
Age: 73
End: 2023-12-17

## 2024-01-03 ENCOUNTER — HOSPITAL ENCOUNTER (OUTPATIENT)
Facility: HOSPITAL | Age: 74
Discharge: HOME | End: 2024-01-06
Payer: MEDICARE

## 2024-01-03 VITALS
DIASTOLIC BLOOD PRESSURE: 72 MMHG | HEIGHT: 64 IN | BODY MASS INDEX: 31.76 KG/M2 | OXYGEN SATURATION: 95 % | TEMPERATURE: 96.9 F | SYSTOLIC BLOOD PRESSURE: 129 MMHG | WEIGHT: 186 LBS | HEART RATE: 51 BPM

## 2024-01-03 DIAGNOSIS — G47.33 OBSTRUCTIVE SLEEP APNEA (ADULT) (PEDIATRIC): ICD-10-CM

## 2024-01-03 PROCEDURE — 95811 POLYSOM 6/>YRS CPAP 4/> PARM: CPT | Performed by: INTERNAL MEDICINE

## 2024-01-04 NOTE — PROGRESS NOTES
Sleep Study Technical Notes   Disclaimer:  all notes have not been confirmed by interpreting physician      PRE-Test:  Zuleyma Sharp (: 1950) is her for a BIPAP Titration study.  Order and chart reviewed by tech.  Patient is a 73 year old female with history of known JOSE JUAN, previous CPAP user, and CPAP intolerance.  She was diagnosed with Moderate JOSE JUAN after a study done 2021.   The Overall AHI was 17.7.  She had a titration study done 2023 and CPAP 9.0 cm was ordered.  She had a repeat study done 2023 and CPAP 11.0 cm was ordered.  She stopped using her machine about 4 months ago.  The patient was taken directly to his/her room.   Procedure explained to the patient and questions were answered. The patient expressed understanding of the procedure. Electrodes were applied without incident. The patient was placed in bed and the study was started.    Acquisition Notes:  Lights off: 9:34 PM    Respiratory events:   RERAS, OBSTRUCTIVE APNEAS, HYPOPNEAS  ECG:  NORMAL SINUS RHYTHM  Snoring:  MILD/MODERATE  TO LOUD  Sleep Stages:  1, 2, AND REM  Sleep Positions:  SUPINE AND ON BOTH HER LEFT AND RIGHT SIDES  PAP titration information in Sleep Study CPAP Evaluation  Patient slept with positional therapy:  NO  Patient slept with head of bed elevated:  NO  Supine sleep assessed per physician order:  NO  If not, why?? NOT ORDERED  Patient wore an oral appliance:  NO  Other comments: OVERALL AHI AT THE FINAL PRESSURE = 0.0                                    AVERAGE SA02 IS 95%                                    LMI = 0.0  Patient had caregiver in attendance:  NO  Patient watched TV or on phone after lights out:  NO  Patient to bathroom 1 time.  Pediatric Patient:  NA  Parent accompanied patient: NA  Parent slept in bed with patient: NA    POST Test:  Patient was awakened at 6:21 am.  Electrodes were removed.    The patient was discharged after answering the Post Questionnaire.    Equipment and room

## 2024-01-21 ENCOUNTER — TELEPHONE (OUTPATIENT)
Age: 74
End: 2024-01-21

## 2024-01-21 DIAGNOSIS — G47.33 OSA (OBSTRUCTIVE SLEEP APNEA): Primary | ICD-10-CM

## 2024-01-26 ENCOUNTER — TELEPHONE (OUTPATIENT)
Age: 74
End: 2024-01-26

## 2024-01-26 ENCOUNTER — CLINICAL DOCUMENTATION (OUTPATIENT)
Age: 74
End: 2024-01-26

## 2024-01-26 NOTE — TELEPHONE ENCOUNTER
Reviewed sleep study results with patient. She expressed understanding and is willing to proceed with a trial of VPAP.

## 2024-01-26 NOTE — PROGRESS NOTES
Order faxed to [facility] due to [employer].  Patient given new DME information.  She will call back to schedule 1st adh appointment after set up.

## 2024-01-26 NOTE — TELEPHONE ENCOUNTER
I had called patient to let her know about order for VPAP device and new DME information and patient said she would like result of titration sleep study.  Please call her back.

## 2024-02-06 RX ORDER — ATORVASTATIN CALCIUM 40 MG/1
TABLET, FILM COATED ORAL
Qty: 90 TABLET | Refills: 3 | Status: SHIPPED | OUTPATIENT
Start: 2024-02-06

## 2024-02-13 RX ORDER — SACUBITRIL AND VALSARTAN 24; 26 MG/1; MG/1
TABLET, FILM COATED ORAL
Qty: 60 TABLET | Refills: 2 | Status: SHIPPED | OUTPATIENT
Start: 2024-02-13

## 2024-02-13 NOTE — TELEPHONE ENCOUNTER
Requested Prescriptions     Signed Prescriptions Disp Refills    ENTRESTO 24-26 MG per tablet 60 tablet 2     Sig: TAKE 1 TABLET BY MOUTH TWICE A DAY     Authorizing Provider: OC MARTIN     Ordering User: SHEILA HUYNH

## 2024-02-19 RX ORDER — DAPAGLIFLOZIN 10 MG/1
TABLET, FILM COATED ORAL
Qty: 30 TABLET | Refills: 3 | Status: SHIPPED | OUTPATIENT
Start: 2024-02-19

## 2024-02-19 NOTE — TELEPHONE ENCOUNTER
Requested Prescriptions     Pending Prescriptions Disp Refills    FARXIGA 10 MG tablet [Pharmacy Med Name: FARXIGA 10 MG TABLET] 30 tablet 3     Sig: TAKE 1 TABLET BY MOUTH EVERY DAY

## 2024-02-27 ENCOUNTER — OFFICE VISIT (OUTPATIENT)
Facility: CLINIC | Age: 74
End: 2024-02-27
Payer: MEDICARE

## 2024-02-27 VITALS
TEMPERATURE: 97.6 F | DIASTOLIC BLOOD PRESSURE: 53 MMHG | HEART RATE: 67 BPM | WEIGHT: 182.5 LBS | SYSTOLIC BLOOD PRESSURE: 100 MMHG | HEIGHT: 64 IN | BODY MASS INDEX: 31.16 KG/M2

## 2024-02-27 DIAGNOSIS — Z00.00 MEDICARE ANNUAL WELLNESS VISIT, SUBSEQUENT: ICD-10-CM

## 2024-02-27 DIAGNOSIS — I10 PRIMARY HYPERTENSION: ICD-10-CM

## 2024-02-27 DIAGNOSIS — G63 POLYNEUROPATHY IN DISEASES CLASSIFIED ELSEWHERE (HCC): ICD-10-CM

## 2024-02-27 DIAGNOSIS — Z79.4 CONTROLLED TYPE 2 DIABETES MELLITUS WITHOUT COMPLICATION, WITH LONG-TERM CURRENT USE OF INSULIN (HCC): Primary | ICD-10-CM

## 2024-02-27 DIAGNOSIS — J45.40 MODERATE PERSISTENT REACTIVE AIRWAY DISEASE WITHOUT COMPLICATION: ICD-10-CM

## 2024-02-27 DIAGNOSIS — E78.5 DYSLIPIDEMIA: ICD-10-CM

## 2024-02-27 DIAGNOSIS — I50.30 DIASTOLIC HEART FAILURE, NYHA CLASS 2 (HCC): ICD-10-CM

## 2024-02-27 DIAGNOSIS — M10.9 GOUT, UNSPECIFIED CAUSE, UNSPECIFIED CHRONICITY, UNSPECIFIED SITE: ICD-10-CM

## 2024-02-27 DIAGNOSIS — E11.9 CONTROLLED TYPE 2 DIABETES MELLITUS WITHOUT COMPLICATION, WITH LONG-TERM CURRENT USE OF INSULIN (HCC): Primary | ICD-10-CM

## 2024-02-27 PROBLEM — I50.23 ACUTE ON CHRONIC SYSTOLIC CONGESTIVE HEART FAILURE, NYHA CLASS 3 (HCC): Status: RESOLVED | Noted: 2021-03-11 | Resolved: 2024-02-27

## 2024-02-27 PROBLEM — E11.65 UNCONTROLLED TYPE 2 DIABETES MELLITUS WITH HYPERGLYCEMIA (HCC): Status: RESOLVED | Noted: 2020-01-26 | Resolved: 2024-02-27

## 2024-02-27 PROBLEM — E11.65 TYPE 2 DIABETES MELLITUS WITH HYPERGLYCEMIA, WITH LONG-TERM CURRENT USE OF INSULIN (HCC): Status: RESOLVED | Noted: 2022-03-25 | Resolved: 2024-02-27

## 2024-02-27 PROBLEM — I50.9 CHF EXACERBATION (HCC): Status: RESOLVED | Noted: 2021-03-09 | Resolved: 2024-02-27

## 2024-02-27 PROBLEM — J43.1 PANLOBULAR EMPHYSEMA (HCC): Status: RESOLVED | Noted: 2021-07-09 | Resolved: 2024-02-27

## 2024-02-27 LAB
ANION GAP SERPL CALC-SCNC: 2 MMOL/L (ref 5–15)
BUN SERPL-MCNC: 40 MG/DL (ref 6–20)
BUN/CREAT SERPL: 35 (ref 12–20)
CALCIUM SERPL-MCNC: 9.5 MG/DL (ref 8.5–10.1)
CHLORIDE SERPL-SCNC: 112 MMOL/L (ref 97–108)
CO2 SERPL-SCNC: 23 MMOL/L (ref 21–32)
CREAT SERPL-MCNC: 1.14 MG/DL (ref 0.55–1.02)
EST. AVERAGE GLUCOSE BLD GHB EST-MCNC: 160 MG/DL
GLUCOSE SERPL-MCNC: 151 MG/DL (ref 65–100)
HBA1C MFR BLD: 7.2 % (ref 4–5.6)
POTASSIUM SERPL-SCNC: 4.7 MMOL/L (ref 3.5–5.1)
SODIUM SERPL-SCNC: 137 MMOL/L (ref 136–145)

## 2024-02-27 PROCEDURE — 1123F ACP DISCUSS/DSCN MKR DOCD: CPT | Performed by: INTERNAL MEDICINE

## 2024-02-27 PROCEDURE — G8484 FLU IMMUNIZE NO ADMIN: HCPCS | Performed by: INTERNAL MEDICINE

## 2024-02-27 PROCEDURE — 3017F COLORECTAL CA SCREEN DOC REV: CPT | Performed by: INTERNAL MEDICINE

## 2024-02-27 PROCEDURE — 3074F SYST BP LT 130 MM HG: CPT | Performed by: INTERNAL MEDICINE

## 2024-02-27 PROCEDURE — 3051F HG A1C>EQUAL 7.0%<8.0%: CPT | Performed by: INTERNAL MEDICINE

## 2024-02-27 PROCEDURE — G0439 PPPS, SUBSEQ VISIT: HCPCS | Performed by: INTERNAL MEDICINE

## 2024-02-27 PROCEDURE — 3078F DIAST BP <80 MM HG: CPT | Performed by: INTERNAL MEDICINE

## 2024-02-27 ASSESSMENT — PATIENT HEALTH QUESTIONNAIRE - PHQ9
SUM OF ALL RESPONSES TO PHQ9 QUESTIONS 1 & 2: 0
SUM OF ALL RESPONSES TO PHQ QUESTIONS 1-9: 0
SUM OF ALL RESPONSES TO PHQ QUESTIONS 1-9: 0
1. LITTLE INTEREST OR PLEASURE IN DOING THINGS: 0
SUM OF ALL RESPONSES TO PHQ QUESTIONS 1-9: 0
2. FEELING DOWN, DEPRESSED OR HOPELESS: 0
SUM OF ALL RESPONSES TO PHQ QUESTIONS 1-9: 0

## 2024-02-27 ASSESSMENT — LIFESTYLE VARIABLES
HOW MANY STANDARD DRINKS CONTAINING ALCOHOL DO YOU HAVE ON A TYPICAL DAY: 1 OR 2
HOW OFTEN DO YOU HAVE A DRINK CONTAINING ALCOHOL: MONTHLY OR LESS

## 2024-02-27 NOTE — PROGRESS NOTES
Chief Complaint   Patient presents with    Medicare AWV     \"Have you been to the ER, urgent care clinic since your last visit?  Hospitalized since your last visit?\"    NO    “Have you seen or consulted any other health care providers outside of [facility] since your last visit?”    NO       Have you had a mammogram?”   NO

## 2024-02-27 NOTE — PROGRESS NOTES
[facility]  [address]  [address]  [place] [address]  Phone:  [phone]  Fax: [phone]       Chief Complaint   Patient presents with    Medicare AW   .      SUBJECTIVE:    Zuleyma Sharp is a 73 y.o. female Comes in for return visit, stating that she has done reasonably well.  Her diabetes is hopefully reasonable, but the patient does not know because she has not been checking her sugars.  I have been attempting to get her a James device.    She follows with cardiology on a regular basis.  She has a nonischemic cardiomyopathy, which is reasonably stable with no overt signs of congestive heart failure currently.    She has a past history of primary hypertension, as well as dyslipidemia and obesity.             Current Outpatient Medications   Medication Sig Dispense Refill    FARXIGA 10 MG tablet TAKE 1 TABLET BY MOUTH EVERY DAY 30 tablet 3    ENTRESTO 24-26 MG per tablet TAKE 1 TABLET BY MOUTH TWICE A DAY 60 tablet 2    atorvastatin (LIPITOR) 40 MG tablet TAKE 1 TABLET BY MOUTH EVERY DAY 90 tablet 3    thiamine 100 MG tablet Take 1 tablet by mouth daily 30 tablet 3    vitamin D (CHOLECALCIFEROL) 50 MCG (2000 UT) CAPS capsule TAKE 1 CAPSULE BY MOUTH EVERY DAY 30 capsule 3    isosorbide mononitrate (IMDUR) 30 MG extended release tablet TAKE 1 TABLET BY MOUTH EVERY DAY IN THE MORNING 90 tablet 1    spironolactone (ALDACTONE) 25 MG tablet TAKE 1 TABLET BY MOUTH EVERY DAY 90 tablet 1    insulin glargine (LANTUS SOLOSTAR) 100 UNIT/ML injection pen Patient reports taking 58 units nightly 6 Adjustable Dose Pre-filled Pen Syringe 11    Insulin Pen Needle ([employer] PEN NEEDLES) 31G X 6 MM MISC 1 each by Does not apply route daily 100 each 11    Continuous Blood Gluc Sensor (FREESTYLE JAMES 2 SENSOR) MISC Blood sugar checks before meals and at bedtime as well as as needed 1 each 11    Continuous Blood Gluc  (FREESTYLE JAMES 2 READER) MONICA Blood sugar checks before meals,

## 2024-02-29 LAB — APO B SERPL-MCNC: 89 MG/DL

## 2024-03-04 RX ORDER — ACETAMINOPHEN 160 MG
TABLET,DISINTEGRATING ORAL
Qty: 90 CAPSULE | Refills: 3 | Status: SHIPPED | OUTPATIENT
Start: 2024-03-04

## 2024-03-04 NOTE — TELEPHONE ENCOUNTER
Requested Prescriptions     Pending Prescriptions Disp Refills    vitamin D (VITAMIN D3) 50 MCG (2000 UT) CAPS capsule [Pharmacy Med Name: VITAMIN D3 2,000 UNIT SOFTGEL] 90 capsule 3     Sig: TAKE 1 CAPSULE BY MOUTH EVERY DAY

## 2024-03-05 ENCOUNTER — TELEPHONE (OUTPATIENT)
Age: 74
End: 2024-03-05

## 2024-03-05 ENCOUNTER — CLINICAL DOCUMENTATION (OUTPATIENT)
Age: 74
End: 2024-03-05

## 2024-03-05 RX ORDER — ALLOPURINOL 100 MG/1
100 TABLET ORAL DAILY
Qty: 30 TABLET | Refills: 11 | Status: SHIPPED | OUTPATIENT
Start: 2024-03-05

## 2024-03-05 NOTE — TELEPHONE ENCOUNTER
Received message from patient through answering service requesting status of new PAP order.  Review of chart shows that order was faxed to [employer] on 1/26/2024.  Patient stated that she has not received her new device to date.    Secure email sent to local , Amish Chapman to check on order status.  Once he responds, office will contact patient again.

## 2024-03-06 NOTE — TELEPHONE ENCOUNTER
Patient informed that PAP order was sent directly to [facility]'s local  and confirmed that it was received.  Requested to expedite her order.  Once prior [facility] obtained [facility], patient was told to expect a call from them.

## 2024-03-11 DIAGNOSIS — I50.20 UNSPECIFIED SYSTOLIC (CONGESTIVE) HEART FAILURE (HCC): ICD-10-CM

## 2024-03-11 RX ORDER — ISOSORBIDE MONONITRATE 30 MG/1
TABLET, EXTENDED RELEASE ORAL
Qty: 90 TABLET | Refills: 1 | Status: SHIPPED | OUTPATIENT
Start: 2024-03-11

## 2024-03-11 RX ORDER — SPIRONOLACTONE 25 MG/1
TABLET ORAL
Qty: 90 TABLET | Refills: 1 | Status: SHIPPED | OUTPATIENT
Start: 2024-03-11

## 2024-03-11 NOTE — TELEPHONE ENCOUNTER
Requested Prescriptions     Pending Prescriptions Disp Refills    spironolactone (ALDACTONE) 25 MG tablet [Pharmacy Med Name: SPIRONOLACTONE 25 MG TABLET] 90 tablet 1     Sig: TAKE 1 TABLET BY MOUTH EVERY DAY    isosorbide mononitrate (IMDUR) 30 MG extended release tablet [Pharmacy Med Name: ISOSORBIDE MONONIT ER 30 MG TB] 90 tablet 1     Sig: TAKE 1 TABLET BY MOUTH EVERY DAY IN THE MORNING

## 2024-03-13 RX ORDER — MAGNESIUM OXIDE 400 MG/1
TABLET ORAL
Qty: 180 TABLET | Refills: 3 | OUTPATIENT
Start: 2024-03-13

## 2024-03-18 RX ORDER — LANOLIN ALCOHOL/MO/W.PET/CERES
100 CREAM (GRAM) TOPICAL DAILY
Qty: 90 TABLET | Refills: 1 | Status: SHIPPED | OUTPATIENT
Start: 2024-03-18

## 2024-03-18 NOTE — TELEPHONE ENCOUNTER
Requested Prescriptions     Signed Prescriptions Disp Refills    thiamine 100 MG tablet 90 tablet 1     Sig: TAKE 1 TABLET BY MOUTH EVERY DAY     Authorizing Provider: OC MARTIN     Ordering User: NONA RECIO

## 2024-03-20 ENCOUNTER — OFFICE VISIT (OUTPATIENT)
Age: 74
End: 2024-03-20
Payer: MEDICARE

## 2024-03-20 VITALS
WEIGHT: 187 LBS | HEIGHT: 64 IN | SYSTOLIC BLOOD PRESSURE: 108 MMHG | BODY MASS INDEX: 31.92 KG/M2 | DIASTOLIC BLOOD PRESSURE: 62 MMHG

## 2024-03-20 DIAGNOSIS — I42.0 DILATED CARDIOMYOPATHY (HCC): Primary | ICD-10-CM

## 2024-03-20 DIAGNOSIS — E78.2 MIXED HYPERLIPIDEMIA: ICD-10-CM

## 2024-03-20 DIAGNOSIS — G47.33 OBSTRUCTIVE SLEEP APNEA: ICD-10-CM

## 2024-03-20 DIAGNOSIS — I10 PRIMARY HYPERTENSION: ICD-10-CM

## 2024-03-20 PROCEDURE — G8484 FLU IMMUNIZE NO ADMIN: HCPCS | Performed by: SPECIALIST

## 2024-03-20 PROCEDURE — 3074F SYST BP LT 130 MM HG: CPT | Performed by: SPECIALIST

## 2024-03-20 PROCEDURE — 1090F PRES/ABSN URINE INCON ASSESS: CPT | Performed by: SPECIALIST

## 2024-03-20 PROCEDURE — G8417 CALC BMI ABV UP PARAM F/U: HCPCS | Performed by: SPECIALIST

## 2024-03-20 PROCEDURE — 3078F DIAST BP <80 MM HG: CPT | Performed by: SPECIALIST

## 2024-03-20 PROCEDURE — 1036F TOBACCO NON-USER: CPT | Performed by: SPECIALIST

## 2024-03-20 PROCEDURE — 3017F COLORECTAL CA SCREEN DOC REV: CPT | Performed by: SPECIALIST

## 2024-03-20 PROCEDURE — G8427 DOCREV CUR MEDS BY ELIG CLIN: HCPCS | Performed by: SPECIALIST

## 2024-03-20 PROCEDURE — 99214 OFFICE O/P EST MOD 30 MIN: CPT | Performed by: SPECIALIST

## 2024-03-20 PROCEDURE — 1123F ACP DISCUSS/DSCN MKR DOCD: CPT | Performed by: SPECIALIST

## 2024-03-20 PROCEDURE — G8399 PT W/DXA RESULTS DOCUMENT: HCPCS | Performed by: SPECIALIST

## 2024-03-20 RX ORDER — MAGNESIUM OXIDE 400 MG/1
2 TABLET ORAL 2 TIMES DAILY
Qty: 120 TABLET | Refills: 5 | Status: SHIPPED | OUTPATIENT
Start: 2024-03-20

## 2024-03-20 RX ORDER — BLOOD-GLUCOSE CONTROL, LOW
EACH MISCELLANEOUS
COMMUNITY
Start: 2024-01-31

## 2024-03-20 NOTE — PROGRESS NOTES
the lungs every 6 hours as needed      diclofenac sodium (VOLTAREN) 1 % GEL Apply topically 2 times daily as needed      melatonin 3 MG TABS tablet TAKE 1 TABLET BY MOUTH EVERY NIGHT AS NEEDED FOR INSOMNIA.      mirabegron (MYRBETRIQ) 25 MG TB24 Take by mouth daily      sodium chloride (OCEAN) 0.65 % nasal spray 2 sprays by Nasal route      magnesium oxide (MAG-OX) 400 MG tablet TAKE 2 TABLETS BY MOUTH TWO (2) TIMES A DAY. (Patient not taking: Reported on 3/20/2024)      oxybutynin (DITROPAN) 5 MG tablet TAKE 1 TABLET BY MOUTH THREE TIMES A DAY       No current facility-administered medications for this visit.       ASSESSMENT & PLAN:      She is stable and asymptomatic, well compensated on a good medical regimen and needs no cardiac testing at this time.  She is going to have her lipids checked in May when she gets her diabetes follow-up.  She will remain on spironolactone, isosorbide, Entresto, Lipitor, Bumex and magnesium oxide.    Current treatment plan is effective,reviewed diet, exercise and weight control     1. Dilated cardiomyopathy (HCC)  2. Mixed hyperlipidemia  3. Primary hypertension  4. Obstructive sleep apnea       Future Appointments   Date Time Provider Department Center   5/28/2024  8:50 AM Ligia Spence APRN - [facility] AMB   5/28/2024 10:45 AM Eliot Leon MD [facility]        Delonte Rutherford MD  3/20/2024  Please note that this dictation was completed with [employer], the computer voice recognition software.  Quite often unanticipated grammatical, syntax, homophones, and other interpretive errors are inadvertently transcribed by the computer software.  Please disregard these errors.  Please excuse any errors that have escaped final proofreading.  Thank you.

## 2024-04-22 ENCOUNTER — HOSPITAL ENCOUNTER (EMERGENCY)
Facility: HOSPITAL | Age: 74
Discharge: HOME | End: 2024-04-22
Payer: MEDICARE

## 2024-04-22 VITALS
DIASTOLIC BLOOD PRESSURE: 72 MMHG | BODY MASS INDEX: 30.9 KG/M2 | TEMPERATURE: 98.4 F | WEIGHT: 180 LBS | SYSTOLIC BLOOD PRESSURE: 139 MMHG | HEART RATE: 82 BPM | OXYGEN SATURATION: 99 % | RESPIRATION RATE: 17 BRPM

## 2024-04-22 DIAGNOSIS — S00.81XA ABRASION OF FACE, INITIAL ENCOUNTER: Primary | ICD-10-CM

## 2024-04-22 DIAGNOSIS — Z23 NEED FOR DIPHTHERIA-TETANUS-PERTUSSIS (TDAP) VACCINE: ICD-10-CM

## 2024-04-22 PROCEDURE — 6360000002 HC RX W HCPCS: Performed by: PHYSICIAN ASSISTANT

## 2024-04-22 PROCEDURE — 99284 EMERGENCY DEPT VISIT MOD MDM: CPT

## 2024-04-22 PROCEDURE — 90715 TDAP VACCINE 7 YRS/> IM: CPT | Performed by: PHYSICIAN ASSISTANT

## 2024-04-22 PROCEDURE — 90471 IMMUNIZATION ADMIN: CPT | Performed by: PHYSICIAN ASSISTANT

## 2024-04-22 RX ADMIN — TETANUS TOXOID, REDUCED DIPHTHERIA TOXOID AND ACELLULAR PERTUSSIS VACCINE, ADSORBED 0.5 ML: 5; 2.5; 8; 8; 2.5 SUSPENSION INTRAMUSCULAR at 21:40

## 2024-04-23 NOTE — ED NOTES
Patient given washcloths and soap to wash face in sink.     Patient reports feeling safe to go back to her house tonight. I urged her to try and sleep at a friends house this evening, but she is adamant that she has people at home to protect her.

## 2024-04-23 NOTE — ED NOTES
Patient refused forensics exam because she does not want to wait in ED for RN to arrive here.   RN reports she gave patient information to contact a  that handles cases involving community violence. Also advised patient come back to ED tomorrow if she changes mind about forensic exam.   Forensic RN reports she is ready for DC att.

## 2024-04-23 NOTE — ED PROVIDER NOTES
ALDACTONE  TAKE 1 TABLET BY MOUTH EVERY DAY     thiamine 100 MG tablet  TAKE 1 TABLET BY MOUTH EVERY DAY     Vitamin D3 50 MCG (2000 UT) Caps capsule  Generic drug: vitamin D  TAKE 1 CAPSULE BY MOUTH EVERY DAY                DISCONTINUED MEDICATIONS:  Current Discharge Medication List        I have seen and evaluated the patient autonomously. My supervision physician was on site and available for consultation if needed.     I am the Primary Clinician of Record.   ANITRA Barros (electronically signed)    (Please note that parts of this dictation were completed with voice recognition software. Quite often unanticipated grammatical, syntax, homophones, and other interpretive errors are inadvertently transcribed by the computer software. Please disregards these errors. Please excuse any errors that have escaped final proofreading.)       Kenisha Rutledge PA  04/22/24 [id number]       Kenisha Rutledge PA  04/22/24 2526

## 2024-04-23 NOTE — DISCHARGE INSTRUCTIONS
It was a pleasure taking care of you at [facility] Emergency Department today.  We know that when you come to [facility], you are entrusting us with your health, comfort, and safety.  Our physicians and nurses honor that trust, and we truly appreciate the opportunity to care for you and your loved ones.      We also value your feedback.  If you receive a survey about your Emergency Department experience today, please fill it out.  We care about our patients' feedback, and we listen to what you have to say.  Thank you!

## 2024-04-23 NOTE — ED NOTES
Forensic RN Cha sent message via [email or web address] att, regarding patients situation.   She messaged back and told me she is busy with another patient and will call this RN once available.

## 2024-05-03 RX ORDER — ATORVASTATIN CALCIUM 40 MG/1
40 TABLET, FILM COATED ORAL DAILY
Qty: 90 TABLET | Refills: 3 | Status: SHIPPED | OUTPATIENT
Start: 2024-05-03

## 2024-05-05 ENCOUNTER — APPOINTMENT (OUTPATIENT)
Facility: HOSPITAL | Age: 74
End: 2024-05-05
Payer: MEDICARE

## 2024-05-05 ENCOUNTER — HOSPITAL ENCOUNTER (INPATIENT)
Facility: HOSPITAL | Age: 74
LOS: 5 days | Discharge: HOME | End: 2024-05-10
Attending: EMERGENCY MEDICINE | Admitting: HOSPITALIST
Payer: MEDICARE

## 2024-05-05 DIAGNOSIS — N17.9 ACUTE RENAL FAILURE, UNSPECIFIED ACUTE RENAL FAILURE TYPE (HCC): ICD-10-CM

## 2024-05-05 DIAGNOSIS — N17.9 SEPSIS WITH ACUTE RENAL FAILURE AND SEPTIC SHOCK, DUE TO UNSPECIFIED ORGANISM, UNSPECIFIED ACUTE RENAL FAILURE TYPE (HCC): Primary | ICD-10-CM

## 2024-05-05 DIAGNOSIS — R65.21 SEPSIS WITH ACUTE RENAL FAILURE AND SEPTIC SHOCK, DUE TO UNSPECIFIED ORGANISM, UNSPECIFIED ACUTE RENAL FAILURE TYPE (HCC): Primary | ICD-10-CM

## 2024-05-05 DIAGNOSIS — A41.9 SEPSIS WITH ACUTE RENAL FAILURE AND SEPTIC SHOCK, DUE TO UNSPECIFIED ORGANISM, UNSPECIFIED ACUTE RENAL FAILURE TYPE (HCC): Primary | ICD-10-CM

## 2024-05-05 DIAGNOSIS — R51.9 NONINTRACTABLE EPISODIC HEADACHE, UNSPECIFIED HEADACHE TYPE: ICD-10-CM

## 2024-05-05 LAB
ALBUMIN SERPL-MCNC: 2.8 G/DL (ref 3.5–5)
ALBUMIN/GLOB SERPL: 0.5 (ref 1.1–2.2)
ALP SERPL-CCNC: 104 U/L (ref 45–117)
ALT SERPL-CCNC: 60 U/L (ref 12–78)
ANION GAP SERPL CALC-SCNC: 11 MMOL/L (ref 5–15)
APPEARANCE UR: ABNORMAL
AST SERPL-CCNC: 52 U/L (ref 15–37)
B PERT DNA SPEC QL NAA+PROBE: NOT DETECTED
BACTERIA URNS QL MICRO: ABNORMAL /HPF
BASOPHILS # BLD: 0 K/UL (ref 0–0.1)
BASOPHILS NFR BLD: 0 % (ref 0–1)
BILIRUB SERPL-MCNC: 1.6 MG/DL (ref 0.2–1)
BILIRUB UR QL CFM: NEGATIVE
BORDETELLA PARAPERTUSSIS BY PCR: NOT DETECTED
BUN SERPL-MCNC: 70 MG/DL (ref 6–20)
BUN/CREAT SERPL: 14 (ref 12–20)
C PNEUM DNA SPEC QL NAA+PROBE: NOT DETECTED
CALCIUM SERPL-MCNC: 9 MG/DL (ref 8.5–10.1)
CHLORIDE SERPL-SCNC: 96 MMOL/L (ref 97–108)
CO2 SERPL-SCNC: 22 MMOL/L (ref 21–32)
COLOR UR: ABNORMAL
COMMENT:: NORMAL
CREAT SERPL-MCNC: 4.9 MG/DL (ref 0.55–1.02)
DIFFERENTIAL METHOD BLD: ABNORMAL
EOSINOPHIL # BLD: 0.2 K/UL (ref 0–0.4)
EOSINOPHIL NFR BLD: 2 % (ref 0–7)
EPITH CASTS URNS QL MICRO: ABNORMAL /LPF
ERYTHROCYTE [DISTWIDTH] IN BLOOD BY AUTOMATED COUNT: 13.2 % (ref 11.5–14.5)
EST. AVERAGE GLUCOSE BLD GHB EST-MCNC: 151 MG/DL
FLUAV RNA SPEC QL NAA+PROBE: NOT DETECTED
FLUAV SUBTYP SPEC NAA+PROBE: NOT DETECTED
FLUBV RNA SPEC QL NAA+PROBE: NOT DETECTED
FLUBV RNA SPEC QL NAA+PROBE: NOT DETECTED
GLOBULIN SER CALC-MCNC: 6.1 G/DL (ref 2–4)
GLUCOSE BLD STRIP.AUTO-MCNC: 185 MG/DL (ref 65–117)
GLUCOSE BLD STRIP.AUTO-MCNC: 249 MG/DL (ref 65–117)
GLUCOSE BLD STRIP.AUTO-MCNC: 278 MG/DL (ref 65–117)
GLUCOSE SERPL-MCNC: 265 MG/DL (ref 65–100)
GLUCOSE UR STRIP.AUTO-MCNC: 100 MG/DL
HADV DNA SPEC QL NAA+PROBE: NOT DETECTED
HBA1C MFR BLD: 6.9 % (ref 4–5.6)
HCOV 229E RNA SPEC QL NAA+PROBE: NOT DETECTED
HCOV HKU1 RNA SPEC QL NAA+PROBE: NOT DETECTED
HCOV NL63 RNA SPEC QL NAA+PROBE: NOT DETECTED
HCOV OC43 RNA SPEC QL NAA+PROBE: NOT DETECTED
HCT VFR BLD AUTO: 34.8 % (ref 35–47)
HGB BLD-MCNC: 11.5 G/DL (ref 11.5–16)
HGB UR QL STRIP: NEGATIVE
HMPV RNA SPEC QL NAA+PROBE: NOT DETECTED
HPIV1 RNA SPEC QL NAA+PROBE: NOT DETECTED
HPIV2 RNA SPEC QL NAA+PROBE: NOT DETECTED
HPIV3 RNA SPEC QL NAA+PROBE: NOT DETECTED
HPIV4 RNA SPEC QL NAA+PROBE: NOT DETECTED
HYALINE CASTS URNS QL MICRO: ABNORMAL /LPF (ref 0–5)
IMM GRANULOCYTES # BLD AUTO: 0 K/UL
IMM GRANULOCYTES NFR BLD AUTO: 0 %
KETONES UR QL STRIP.AUTO: ABNORMAL MG/DL
LACTATE SERPL-SCNC: 1.4 MMOL/L (ref 0.4–2)
LACTATE SERPL-SCNC: 2.1 MMOL/L (ref 0.4–2)
LACTATE SERPL-SCNC: 2.7 MMOL/L (ref 0.4–2)
LEUKOCYTE ESTERASE UR QL STRIP.AUTO: ABNORMAL
LYMPHOCYTES # BLD: 0.3 K/UL (ref 0.8–3.5)
LYMPHOCYTES NFR BLD: 3 % (ref 12–49)
M PNEUMO DNA SPEC QL NAA+PROBE: NOT DETECTED
MCH RBC QN AUTO: 28.6 PG (ref 26–34)
MCHC RBC AUTO-ENTMCNC: 33 G/DL (ref 30–36.5)
MCV RBC AUTO: 86.6 FL (ref 80–99)
MONOCYTES # BLD: 0.9 K/UL (ref 0–1)
MONOCYTES NFR BLD: 8 % (ref 5–13)
NEUTS SEG # BLD: 9.5 K/UL (ref 1.8–8)
NEUTS SEG NFR BLD: 87 % (ref 32–75)
NITRITE UR QL STRIP.AUTO: NEGATIVE
NRBC # BLD: 0 K/UL (ref 0–0.01)
NRBC BLD-RTO: 0 PER 100 WBC
NT PRO BNP: 886 PG/ML
OSMOLALITY UR: 347 MOSM/KG H2O
PH UR STRIP: 5 (ref 5–8)
PLATELET # BLD AUTO: 143 K/UL (ref 150–400)
PMV BLD AUTO: 11.7 FL (ref 8.9–12.9)
POTASSIUM SERPL-SCNC: 3.7 MMOL/L (ref 3.5–5.1)
PROCALCITONIN SERPL-MCNC: 12.51 NG/ML
PROT SERPL-MCNC: 8.9 G/DL (ref 6.4–8.2)
PROT UR STRIP-MCNC: 30 MG/DL
RBC # BLD AUTO: 4.02 M/UL (ref 3.8–5.2)
RBC #/AREA URNS HPF: ABNORMAL /HPF (ref 0–5)
RBC MORPH BLD: ABNORMAL
RSV RNA SPEC QL NAA+PROBE: NOT DETECTED
RV+EV RNA SPEC QL NAA+PROBE: NOT DETECTED
SARS-COV-2 RNA RESP QL NAA+PROBE: NOT DETECTED
SARS-COV-2 RNA RESP QL NAA+PROBE: NOT DETECTED
SERVICE CMNT-IMP: ABNORMAL
SODIUM SERPL-SCNC: 129 MMOL/L (ref 136–145)
SODIUM UR-SCNC: 30 MMOL/L
SP GR UR REFRACTOMETRY: 1.02 (ref 1–1.03)
SPECIMEN HOLD: NORMAL
T4 FREE SERPL-MCNC: 1 NG/DL (ref 0.8–1.5)
TROPONIN I SERPL HS-MCNC: 26 NG/L (ref 0–51)
TSH SERPL DL<=0.05 MIU/L-ACNC: 3.53 UIU/ML (ref 0.36–3.74)
URINE CULTURE IF INDICATED: ABNORMAL
UROBILINOGEN UR QL STRIP.AUTO: 1 EU/DL (ref 0.2–1)
WBC # BLD AUTO: 10.9 K/UL (ref 3.6–11)
WBC URNS QL MICRO: ABNORMAL /HPF (ref 0–4)

## 2024-05-05 PROCEDURE — 99285 EMERGENCY DEPT VISIT HI MDM: CPT

## 2024-05-05 PROCEDURE — 87040 BLOOD CULTURE FOR BACTERIA: CPT

## 2024-05-05 PROCEDURE — 36415 COLL VENOUS BLD VENIPUNCTURE: CPT

## 2024-05-05 PROCEDURE — 80053 COMPREHEN METABOLIC PANEL: CPT

## 2024-05-05 PROCEDURE — 70450 CT HEAD/BRAIN W/O DYE: CPT

## 2024-05-05 PROCEDURE — 87636 SARSCOV2 & INF A&B AMP PRB: CPT

## 2024-05-05 PROCEDURE — 2580000003 HC RX 258: Performed by: EMERGENCY MEDICINE

## 2024-05-05 PROCEDURE — 96374 THER/PROPH/DIAG INJ IV PUSH: CPT

## 2024-05-05 PROCEDURE — 84443 ASSAY THYROID STIM HORMONE: CPT

## 2024-05-05 PROCEDURE — 6360000002 HC RX W HCPCS

## 2024-05-05 PROCEDURE — 96361 HYDRATE IV INFUSION ADD-ON: CPT

## 2024-05-05 PROCEDURE — 2580000003 HC RX 258

## 2024-05-05 PROCEDURE — 83935 ASSAY OF URINE OSMOLALITY: CPT

## 2024-05-05 PROCEDURE — 84484 ASSAY OF TROPONIN QUANT: CPT

## 2024-05-05 PROCEDURE — 0202U NFCT DS 22 TRGT SARS-COV-2: CPT

## 2024-05-05 PROCEDURE — 94640 AIRWAY INHALATION TREATMENT: CPT

## 2024-05-05 PROCEDURE — 6370000000 HC RX 637 (ALT 250 FOR IP): Performed by: HOSPITALIST

## 2024-05-05 PROCEDURE — 84300 ASSAY OF URINE SODIUM: CPT

## 2024-05-05 PROCEDURE — 76770 US EXAM ABDO BACK WALL COMP: CPT

## 2024-05-05 PROCEDURE — 2060000000 HC ICU INTERMEDIATE R&B

## 2024-05-05 PROCEDURE — 83036 HEMOGLOBIN GLYCOSYLATED A1C: CPT

## 2024-05-05 PROCEDURE — 84439 ASSAY OF FREE THYROXINE: CPT

## 2024-05-05 PROCEDURE — 81001 URINALYSIS AUTO W/SCOPE: CPT

## 2024-05-05 PROCEDURE — 6360000002 HC RX W HCPCS: Performed by: HOSPITALIST

## 2024-05-05 PROCEDURE — P9047 ALBUMIN (HUMAN), 25%, 50ML: HCPCS | Performed by: HOSPITALIST

## 2024-05-05 PROCEDURE — 93005 ELECTROCARDIOGRAM TRACING: CPT

## 2024-05-05 PROCEDURE — 82962 GLUCOSE BLOOD TEST: CPT

## 2024-05-05 PROCEDURE — 84145 PROCALCITONIN (PCT): CPT

## 2024-05-05 PROCEDURE — 6370000000 HC RX 637 (ALT 250 FOR IP)

## 2024-05-05 PROCEDURE — 71045 X-RAY EXAM CHEST 1 VIEW: CPT

## 2024-05-05 PROCEDURE — 2580000003 HC RX 258: Performed by: HOSPITALIST

## 2024-05-05 PROCEDURE — 83880 ASSAY OF NATRIURETIC PEPTIDE: CPT

## 2024-05-05 PROCEDURE — 85025 COMPLETE CBC W/AUTO DIFF WBC: CPT

## 2024-05-05 PROCEDURE — 83605 ASSAY OF LACTIC ACID: CPT

## 2024-05-05 RX ORDER — HEPARIN SODIUM 5000 [USP'U]/ML
5000 INJECTION, SOLUTION INTRAVENOUS; SUBCUTANEOUS EVERY 8 HOURS
Status: DISCONTINUED | OUTPATIENT
Start: 2024-05-05 | End: 2024-05-08

## 2024-05-05 RX ORDER — SODIUM CHLORIDE 0.9 % (FLUSH) 0.9 %
5-40 SYRINGE (ML) INJECTION EVERY 12 HOURS SCHEDULED
Status: DISCONTINUED | OUTPATIENT
Start: 2024-05-05 | End: 2024-05-10 | Stop reason: HOSPADM

## 2024-05-05 RX ORDER — ISOSORBIDE MONONITRATE 30 MG/1
30 TABLET, EXTENDED RELEASE ORAL EVERY MORNING
Status: DISCONTINUED | OUTPATIENT
Start: 2024-05-06 | End: 2024-05-10 | Stop reason: HOSPADM

## 2024-05-05 RX ORDER — LANOLIN ALCOHOL/MO/W.PET/CERES
100 CREAM (GRAM) TOPICAL DAILY
Status: DISCONTINUED | OUTPATIENT
Start: 2024-05-06 | End: 2024-05-05

## 2024-05-05 RX ORDER — ACETAMINOPHEN 325 MG/1
650 TABLET ORAL EVERY 6 HOURS PRN
Status: DISCONTINUED | OUTPATIENT
Start: 2024-05-05 | End: 2024-05-10 | Stop reason: HOSPADM

## 2024-05-05 RX ORDER — DEXTROSE MONOHYDRATE 100 MG/ML
INJECTION, SOLUTION INTRAVENOUS CONTINUOUS PRN
Status: DISCONTINUED | OUTPATIENT
Start: 2024-05-05 | End: 2024-05-10 | Stop reason: HOSPADM

## 2024-05-05 RX ORDER — ACETAMINOPHEN 500 MG
1000 TABLET ORAL ONCE
Status: COMPLETED | OUTPATIENT
Start: 2024-05-05 | End: 2024-05-05

## 2024-05-05 RX ORDER — LANOLIN ALCOHOL/MO/W.PET/CERES
6 CREAM (GRAM) TOPICAL NIGHTLY PRN
Status: DISCONTINUED | OUTPATIENT
Start: 2024-05-05 | End: 2024-05-10 | Stop reason: HOSPADM

## 2024-05-05 RX ORDER — 0.9 % SODIUM CHLORIDE 0.9 %
250 INTRAVENOUS SOLUTION INTRAVENOUS ONCE
Status: COMPLETED | OUTPATIENT
Start: 2024-05-05 | End: 2024-05-05

## 2024-05-05 RX ORDER — INSULIN LISPRO 100 [IU]/ML
0-8 INJECTION, SOLUTION INTRAVENOUS; SUBCUTANEOUS
Status: DISCONTINUED | OUTPATIENT
Start: 2024-05-05 | End: 2024-05-10 | Stop reason: HOSPADM

## 2024-05-05 RX ORDER — LORAZEPAM 2 MG/ML
4 INJECTION INTRAMUSCULAR
Status: DISCONTINUED | OUTPATIENT
Start: 2024-05-05 | End: 2024-05-10 | Stop reason: HOSPADM

## 2024-05-05 RX ORDER — LORAZEPAM 1 MG/1
3 TABLET ORAL
Status: DISCONTINUED | OUTPATIENT
Start: 2024-05-05 | End: 2024-05-10 | Stop reason: HOSPADM

## 2024-05-05 RX ORDER — 0.9 % SODIUM CHLORIDE 0.9 %
500 INTRAVENOUS SOLUTION INTRAVENOUS PRN
Status: DISCONTINUED | OUTPATIENT
Start: 2024-05-05 | End: 2024-05-10 | Stop reason: HOSPADM

## 2024-05-05 RX ORDER — LORAZEPAM 2 MG/ML
3 INJECTION INTRAMUSCULAR
Status: DISCONTINUED | OUTPATIENT
Start: 2024-05-05 | End: 2024-05-10 | Stop reason: HOSPADM

## 2024-05-05 RX ORDER — LORAZEPAM 1 MG/1
4 TABLET ORAL
Status: DISCONTINUED | OUTPATIENT
Start: 2024-05-05 | End: 2024-05-10 | Stop reason: HOSPADM

## 2024-05-05 RX ORDER — ATORVASTATIN CALCIUM 40 MG/1
40 TABLET, FILM COATED ORAL DAILY
Status: DISCONTINUED | OUTPATIENT
Start: 2024-05-05 | End: 2024-05-10 | Stop reason: HOSPADM

## 2024-05-05 RX ORDER — SODIUM CHLORIDE, SODIUM LACTATE, POTASSIUM CHLORIDE, CALCIUM CHLORIDE 600; 310; 30; 20 MG/100ML; MG/100ML; MG/100ML; MG/100ML
INJECTION, SOLUTION INTRAVENOUS CONTINUOUS
Status: DISPENSED | OUTPATIENT
Start: 2024-05-05 | End: 2024-05-07

## 2024-05-05 RX ORDER — LANOLIN ALCOHOL/MO/W.PET/CERES
100 CREAM (GRAM) TOPICAL DAILY
Status: DISCONTINUED | OUTPATIENT
Start: 2024-05-06 | End: 2024-05-10 | Stop reason: HOSPADM

## 2024-05-05 RX ORDER — IPRATROPIUM BROMIDE AND ALBUTEROL SULFATE 2.5; .5 MG/3ML; MG/3ML
1 SOLUTION RESPIRATORY (INHALATION)
Status: DISCONTINUED | OUTPATIENT
Start: 2024-05-05 | End: 2024-05-07

## 2024-05-05 RX ORDER — LORAZEPAM 1 MG/1
2 TABLET ORAL
Status: DISCONTINUED | OUTPATIENT
Start: 2024-05-05 | End: 2024-05-10 | Stop reason: HOSPADM

## 2024-05-05 RX ORDER — LORAZEPAM 1 MG/1
1 TABLET ORAL
Status: DISCONTINUED | OUTPATIENT
Start: 2024-05-05 | End: 2024-05-10 | Stop reason: HOSPADM

## 2024-05-05 RX ORDER — SODIUM CHLORIDE 9 MG/ML
INJECTION, SOLUTION INTRAVENOUS PRN
Status: DISCONTINUED | OUTPATIENT
Start: 2024-05-05 | End: 2024-05-10 | Stop reason: HOSPADM

## 2024-05-05 RX ORDER — MULTIVITAMIN WITH IRON
1 TABLET ORAL DAILY
Status: DISCONTINUED | OUTPATIENT
Start: 2024-05-06 | End: 2024-05-10 | Stop reason: HOSPADM

## 2024-05-05 RX ORDER — SODIUM CHLORIDE 0.9 % (FLUSH) 0.9 %
5-40 SYRINGE (ML) INJECTION PRN
Status: DISCONTINUED | OUTPATIENT
Start: 2024-05-05 | End: 2024-05-10 | Stop reason: HOSPADM

## 2024-05-05 RX ORDER — LORAZEPAM 2 MG/ML
1 INJECTION INTRAMUSCULAR
Status: DISCONTINUED | OUTPATIENT
Start: 2024-05-05 | End: 2024-05-10 | Stop reason: HOSPADM

## 2024-05-05 RX ORDER — INSULIN GLARGINE 100 [IU]/ML
58 INJECTION, SOLUTION SUBCUTANEOUS EVERY MORNING
Status: DISCONTINUED | OUTPATIENT
Start: 2024-05-06 | End: 2024-05-10 | Stop reason: HOSPADM

## 2024-05-05 RX ORDER — LORAZEPAM 2 MG/ML
2 INJECTION INTRAMUSCULAR
Status: DISCONTINUED | OUTPATIENT
Start: 2024-05-05 | End: 2024-05-10 | Stop reason: HOSPADM

## 2024-05-05 RX ORDER — ACETAMINOPHEN 650 MG/1
650 SUPPOSITORY RECTAL EVERY 6 HOURS PRN
Status: DISCONTINUED | OUTPATIENT
Start: 2024-05-05 | End: 2024-05-10 | Stop reason: HOSPADM

## 2024-05-05 RX ORDER — ALLOPURINOL 100 MG/1
100 TABLET ORAL DAILY
Status: DISCONTINUED | OUTPATIENT
Start: 2024-05-06 | End: 2024-05-10 | Stop reason: HOSPADM

## 2024-05-05 RX ORDER — 0.9 % SODIUM CHLORIDE 0.9 %
1000 INTRAVENOUS SOLUTION INTRAVENOUS ONCE
Status: COMPLETED | OUTPATIENT
Start: 2024-05-05 | End: 2024-05-05

## 2024-05-05 RX ORDER — ALBUMIN (HUMAN) 12.5 G/50ML
25 SOLUTION INTRAVENOUS EVERY 6 HOURS PRN
Status: DISCONTINUED | OUTPATIENT
Start: 2024-05-05 | End: 2024-05-10 | Stop reason: HOSPADM

## 2024-05-05 RX ORDER — 0.9 % SODIUM CHLORIDE 0.9 %
500 INTRAVENOUS SOLUTION INTRAVENOUS ONCE
Status: COMPLETED | OUTPATIENT
Start: 2024-05-05 | End: 2024-05-05

## 2024-05-05 RX ORDER — INSULIN LISPRO 100 [IU]/ML
0-4 INJECTION, SOLUTION INTRAVENOUS; SUBCUTANEOUS NIGHTLY
Status: DISCONTINUED | OUTPATIENT
Start: 2024-05-05 | End: 2024-05-10 | Stop reason: HOSPADM

## 2024-05-05 RX ADMIN — ARFORMOTEROL TARTRATE: 15 SOLUTION RESPIRATORY (INHALATION) at 20:37

## 2024-05-05 RX ADMIN — VANCOMYCIN HYDROCHLORIDE 2000 MG: 10 INJECTION, POWDER, LYOPHILIZED, FOR SOLUTION INTRAVENOUS at 17:51

## 2024-05-05 RX ADMIN — SODIUM CHLORIDE 500 ML: 9 INJECTION, SOLUTION INTRAVENOUS at 17:42

## 2024-05-05 RX ADMIN — SODIUM CHLORIDE 250 ML: 9 INJECTION, SOLUTION INTRAVENOUS at 17:30

## 2024-05-05 RX ADMIN — ACETAMINOPHEN 1000 MG: 500 TABLET ORAL at 14:11

## 2024-05-05 RX ADMIN — ACETAMINOPHEN 650 MG: 325 TABLET ORAL at 22:39

## 2024-05-05 RX ADMIN — WATER 2000 MG: 1 INJECTION INTRAMUSCULAR; INTRAVENOUS; SUBCUTANEOUS at 17:29

## 2024-05-05 RX ADMIN — SODIUM CHLORIDE 1000 ML: 9 INJECTION, SOLUTION INTRAVENOUS at 15:46

## 2024-05-05 RX ADMIN — WATER 1000 MG: 1 INJECTION INTRAMUSCULAR; INTRAVENOUS; SUBCUTANEOUS at 15:16

## 2024-05-05 RX ADMIN — INSULIN LISPRO 2 UNITS: 100 INJECTION, SOLUTION INTRAVENOUS; SUBCUTANEOUS at 17:47

## 2024-05-05 RX ADMIN — SODIUM CHLORIDE, POTASSIUM CHLORIDE, SODIUM LACTATE AND CALCIUM CHLORIDE: 600; 310; 30; 20 INJECTION, SOLUTION INTRAVENOUS at 17:37

## 2024-05-05 RX ADMIN — SODIUM CHLORIDE 250 ML: 9 INJECTION, SOLUTION INTRAVENOUS at 14:41

## 2024-05-05 RX ADMIN — SODIUM CHLORIDE 500 ML: 9 INJECTION, SOLUTION INTRAVENOUS at 15:40

## 2024-05-05 RX ADMIN — IPRATROPIUM BROMIDE AND ALBUTEROL SULFATE 1 DOSE: .5; 3 SOLUTION RESPIRATORY (INHALATION) at 20:37

## 2024-05-05 RX ADMIN — HEPARIN SODIUM 5000 UNITS: 5000 INJECTION INTRAVENOUS; SUBCUTANEOUS at 17:29

## 2024-05-05 RX ADMIN — ATORVASTATIN CALCIUM 40 MG: 40 TABLET, FILM COATED ORAL at 17:47

## 2024-05-05 RX ADMIN — ALBUMIN (HUMAN) 25 G: 0.25 INJECTION, SOLUTION INTRAVENOUS at 18:19

## 2024-05-05 ASSESSMENT — PAIN DESCRIPTION - FREQUENCY: FREQUENCY: INTERMITTENT

## 2024-05-05 ASSESSMENT — PAIN DESCRIPTION - LOCATION
LOCATION: HEAD
LOCATION: HEAD

## 2024-05-05 ASSESSMENT — PAIN DESCRIPTION - ORIENTATION
ORIENTATION: MID
ORIENTATION: RIGHT;POSTERIOR

## 2024-05-05 ASSESSMENT — PAIN - FUNCTIONAL ASSESSMENT
PAIN_FUNCTIONAL_ASSESSMENT: PREVENTS OR INTERFERES SOME ACTIVE ACTIVITIES AND ADLS
PAIN_FUNCTIONAL_ASSESSMENT: PREVENTS OR INTERFERES SOME ACTIVE ACTIVITIES AND ADLS

## 2024-05-05 ASSESSMENT — PAIN SCALES - GENERAL
PAINLEVEL_OUTOF10: 8
PAINLEVEL_OUTOF10: 10
PAINLEVEL_OUTOF10: 10

## 2024-05-05 ASSESSMENT — PAIN DESCRIPTION - PAIN TYPE: TYPE: ACUTE PAIN

## 2024-05-05 ASSESSMENT — PAIN DESCRIPTION - ONSET: ONSET: ON-GOING

## 2024-05-05 ASSESSMENT — PAIN DESCRIPTION - DESCRIPTORS
DESCRIPTORS: ACHING
DESCRIPTORS: SHOOTING

## 2024-05-05 NOTE — ED NOTES
Provider paged-     Patient's BP is 80/59 despite aprx 2 liters of fluid bolus. Patient is awake and alert. Previous BP read 74 systolic.

## 2024-05-05 NOTE — PROGRESS NOTES
Day #1 of cefepime  Indication:  sepsis  Current regimen:  2 gm q8h  Abx regimen: cefepime  Recent Labs     24  1408   WBC 10.9   CREATININE 4.90*   BUN 70*     Est CrCl: 11 ml/min  Temp (24hrs), Av.9 °F (36.6 °C), Min:97.9 °F (36.6 °C), Max:97.9 °F (36.6 °C)      Plan: Change to 1 gm q12h per protocol for sepsis and creatinine clearance 11-29 ml/hr

## 2024-05-05 NOTE — ED PROVIDER NOTES
[facility] EMERGENCY DEP  EMERGENCY DEPARTMENT ENCOUNTER      Date: 5/5/2024  Patient Name: Zuleyma Sharp  MRN: [id number]  Birthdate 1950  Date of evaluation: 5/5/2024  Provider: BRY Mcdermott NP   Note Started: 12:43 PM EDT 5/5/24    CHIEF COMPLAINT     Chief Complaint   Patient presents with    Headache       HISTORY OF PRESENT ILLNESS  (Onset, Location, Duration, Character, Alleviating/Aggravating, Radiation, Timing, Severity)   Note limiting factors.   History Provided By: Patient     HPI: Zuleyma Sharp is a 74 y.o. female with a history of CAD, DM, heart failure, arthritis, and hypertension presents with \"head pain.\"  Patient states she has had similar pain in the past that she relieved with aspirin.  Described as sharp, nonradiating, intermittent, right posterior behind ear.  Gradual worsening. No photophobia. Took ibuprofen at 0300. Denies fevers, vision/speech disturbance, numbness/weakness, chest pain, dyspnea, neck pain, jaw pain.     No blood thinner use.  No recent trauma.  No recent antibiotics, hospitalizations, or surgeries.    Nursing Notes and triage vitals were reviewed.  PCP: Eliot Leon MD      PAST MEDICAL HISTORY   Past Medical History:  Past Medical History:   Diagnosis Date    Arthritis     CHF exacerbation (HCC) 03/09/2021    Diabetes (HCC)     Dilated cardiomyopathy (HCC) 09/09/2014    Nonischemic       Heart failure (HCC)     Hypertension        Past Surgical History:  Past Surgical History:   Procedure Laterality Date    COLONOSCOPY N/A 3/19/2021    COLONOSCOPY performed by David Ramirez MD at [facility] ENDOSCOPY    ORTHOPEDIC SURGERY      Arthroscopy right knee    RI UNLISTED PROCEDURE CARDIAC SURGERY      life vest       Family History:  Family History   Problem Relation Age of Onset    Diabetes Mother        Social History:  Social History     Tobacco Use    Smoking status: Former     Current packs/day: 0.00     Types: Cigarettes     Quit date: 12/11/2014      any other complaints.  Lung sounds are clear with minimal heart murmur and no lower extremity edema.  Awaiting imaging.  Will consult hospitalist for admission.  Reduced fluid bolus given for hemodynamic management in the presence of advanced heart failure. [KW]   1520 Lactic Acid, Plasma(!!): 2.7 [KW]   1520 NT Pro-BNP(!): 886 [KW]   1520 Procalcitonin: 12.51 [KW]   1520 Additional 250 fluid cc bolus added. [KW]   1521 CT without acute abnormality. [KW]   1527 Sodium(!): 129 [KW]   1527 Chloride(!): 96 [KW]   1527 Potassium: 3.7 [KW]   1527 Glucose(!): 265 [KW]   1527 BUN,BUNPL(!): 70 [KW]   1527 Creatinine(!): 4.90 [KW]   1527 Total Bilirubin(!): 1.6 [KW]      ED Course User Index  [CH] Mimi Jones MD  [KW] Jennifer Garg, APRN - NP     Perfect Serve Consult for Admission  6:22 PM    ED Room Number: ER24/24  Patient Name and age:  Zuleyma Sharp 74 y.o.  female  Working Diagnosis:   1. Sepsis with acute renal failure and septic shock, due to unspecified organism, unspecified acute renal failure type (HCC)    2. Acute renal failure, unspecified acute renal failure type (HCC)    3. Nonintractable episodic headache, unspecified headache type        COVID-19 Suspicion: Yes  Sepsis present:  Yes  Reassessment needed: Yes  Code Status:  Full Code  Readmission: No  Isolation Requirements:   Recommended Level of Care: telemetry  Department: [facility] Adult ED - [phone]  Consulting Provider: Jennifer Garg ENP    74-year-old female with a history of DM, cardiomyopathy, hypertension, and additional presents with right posterior head pain.  Atraumatic, afebrile, neuro intact.  No meningeal signs.  Hypotensive and tachycardic.  Sepsis alerted.  Lactic acidosis, elevated BNP, hyponatremia, hypochloremia, CT negative, improved vitals with 500 cc fluid bolus.  Rocephin initiated.  Admission for sepsis of unknown origin.    Total critical care time spent exclusive of procedures:  35 minutes.       Sepsis Reassessment:

## 2024-05-05 NOTE — H&P
History and Physical    Date of Service:  5/5/2024  Primary Care Provider: Eliot Leon MD  Source of information: The patient and Chart review    Chief Complaint: Headache      History of Presenting Illness:   Zuleyma Sharp is a 74 y.o. female with PMH significant for type 2 insulin-dependent diabetes, dilated cardiomyopathy/CHF recovered EF presented to the ED for occipital headache/pain behind the right ear.  Patient endorses cough productive of whitish sputum, subjective fever and chills for the last few days.  She also endorses loose bowel movements but denies nausea, vomiting, abdominal pain.  She denies dysuria, urgency or frequency.  She denied sick contact with someone with febrile illness.  She denies recent infection or antibiotic use.    On evaluation in the ED she was found hypotensive BP 82/52, tachycardic   And blood work was significant for , creatinine 4.9, lactic acid 2.7, POC glucose 278, procalcitonin 4.51, proBNP 886, total bilirubin 1.6, AST 52, WBC normal but left shift neutrophils 87%.  UA pending.  Chest x-ray negative.  Head CT negative          The patient denies any headache, blurry vision, sore throat, trouble swallowing, trouble with speech, chest pain, SOB, cough, fever, chills, N/V/D, abd pain, urinary symptoms, constipation, recent travels, sick contacts, focal or generalized neurological symptoms, falls, injuries, rashes, contact with COVID-19 diagnosed patients, hematemesis, melena, hemoptysis, hematuria, rashes, denies starting any new medications and denies any other concerns or problems besides as mentioned above.         REVIEW OF SYSTEMS:  A comprehensive review of systems was negative except for that written in the History of Present Illness.     Past Medical History:   Diagnosis Date    Arthritis     CHF exacerbation (HCC) 03/09/2021    Diabetes (HCC)     Dilated cardiomyopathy (HCC) 09/09/2014    Nonischemic       Heart failure (HCC)      Information  Primary Emergency Contact: Rm Solorzano   [facility]  Home Phone: [phone]  Mobile Phone: [phone]  Relation: Other  Secondary Emergency Contact: Hui Sharp  Home Phone: [phone]  Mobile Phone: [phone]  Relation: Child      CRITICAL CARE WAS PERFORMED FOR THIS ENCOUNTER: YES. I had a face to face encounter with the patient, reviewed and interpreted patient data including clinical events, labs, images, vital signs, I/O's, and examined patient.  I have discussed the case and the plan and management of the patient's care with the consulting services, the bedside nurses and necessary ancillary providers.  This patient has a high probability of imminent, clinically significant deterioration, which requires the highest level of preparedness to intervene urgently. I participated in the decision-making and personally managed or directed the management of the following life and organ supporting interventions that required my frequent assessment to treat or prevent imminent deterioration.  I personally spent 40 minutes of critical care time.  This is time spent at this critically ill patient's bedside actively involved in patient care as well as the coordination of care and discussions with the patient's family.  This does not include any procedural time which has been billed separately.      Signed By: Mike Wright MD     May 5, 2024         Please note that this dictation may have been completed with Dragon, the computer voice recognition software.  Quite often unanticipated grammatical, syntax, homophones, and other interpretive errors are inadvertently transcribed by the computer software.  Please disregard these errors.  Please excuse any errors that have escaped final proofreading.

## 2024-05-06 ENCOUNTER — APPOINTMENT (OUTPATIENT)
Facility: HOSPITAL | Age: 74
End: 2024-05-06
Payer: MEDICARE

## 2024-05-06 LAB
ALBUMIN SERPL-MCNC: 2.4 G/DL (ref 3.5–5)
ALBUMIN/GLOB SERPL: 0.5 (ref 1.1–2.2)
ALP SERPL-CCNC: 79 U/L (ref 45–117)
ALT SERPL-CCNC: 46 U/L (ref 12–78)
ANION GAP SERPL CALC-SCNC: 9 MMOL/L (ref 5–15)
ANION GAP SERPL CALC-SCNC: 9 MMOL/L (ref 5–15)
AST SERPL-CCNC: 45 U/L (ref 15–37)
BILIRUB SERPL-MCNC: 1 MG/DL (ref 0.2–1)
BUN SERPL-MCNC: 61 MG/DL (ref 6–20)
BUN SERPL-MCNC: 70 MG/DL (ref 6–20)
BUN/CREAT SERPL: 21 (ref 12–20)
BUN/CREAT SERPL: 23 (ref 12–20)
CALCIUM SERPL-MCNC: 8.3 MG/DL (ref 8.5–10.1)
CALCIUM SERPL-MCNC: 8.3 MG/DL (ref 8.5–10.1)
CHLORIDE SERPL-SCNC: 105 MMOL/L (ref 97–108)
CHLORIDE SERPL-SCNC: 106 MMOL/L (ref 97–108)
CO2 SERPL-SCNC: 17 MMOL/L (ref 21–32)
CO2 SERPL-SCNC: 20 MMOL/L (ref 21–32)
CREAT SERPL-MCNC: 2.62 MG/DL (ref 0.55–1.02)
CREAT SERPL-MCNC: 3.31 MG/DL (ref 0.55–1.02)
EKG ATRIAL RATE: 116 BPM
EKG DIAGNOSIS: NORMAL
EKG P AXIS: 58 DEGREES
EKG P-R INTERVAL: 176 MS
EKG Q-T INTERVAL: 282 MS
EKG QRS DURATION: 70 MS
EKG QTC CALCULATION (BAZETT): 391 MS
EKG R AXIS: 13 DEGREES
EKG T AXIS: 25 DEGREES
EKG VENTRICULAR RATE: 116 BPM
GLOBULIN SER CALC-MCNC: 4.7 G/DL (ref 2–4)
GLUCOSE BLD STRIP.AUTO-MCNC: 146 MG/DL (ref 65–117)
GLUCOSE BLD STRIP.AUTO-MCNC: 188 MG/DL (ref 65–117)
GLUCOSE BLD STRIP.AUTO-MCNC: 204 MG/DL (ref 65–117)
GLUCOSE BLD STRIP.AUTO-MCNC: 239 MG/DL (ref 65–117)
GLUCOSE SERPL-MCNC: 179 MG/DL (ref 65–100)
GLUCOSE SERPL-MCNC: 205 MG/DL (ref 65–100)
MAGNESIUM SERPL-MCNC: 1.5 MG/DL (ref 1.6–2.4)
POTASSIUM SERPL-SCNC: 3.1 MMOL/L (ref 3.5–5.1)
POTASSIUM SERPL-SCNC: 3.4 MMOL/L (ref 3.5–5.1)
PROCALCITONIN SERPL-MCNC: 11.75 NG/ML
PROT SERPL-MCNC: 7.1 G/DL (ref 6.4–8.2)
SERVICE CMNT-IMP: ABNORMAL
SODIUM SERPL-SCNC: 132 MMOL/L (ref 136–145)
SODIUM SERPL-SCNC: 134 MMOL/L (ref 136–145)
VANCOMYCIN SERPL-MCNC: 15.7 UG/ML

## 2024-05-06 PROCEDURE — 6370000000 HC RX 637 (ALT 250 FOR IP): Performed by: HOSPITALIST

## 2024-05-06 PROCEDURE — 36415 COLL VENOUS BLD VENIPUNCTURE: CPT

## 2024-05-06 PROCEDURE — 82962 GLUCOSE BLOOD TEST: CPT

## 2024-05-06 PROCEDURE — 80053 COMPREHEN METABOLIC PANEL: CPT

## 2024-05-06 PROCEDURE — 2580000003 HC RX 258: Performed by: HOSPITALIST

## 2024-05-06 PROCEDURE — 6360000002 HC RX W HCPCS: Performed by: HOSPITALIST

## 2024-05-06 PROCEDURE — 2580000003 HC RX 258

## 2024-05-06 PROCEDURE — 94640 AIRWAY INHALATION TREATMENT: CPT

## 2024-05-06 PROCEDURE — 71250 CT THORAX DX C-: CPT

## 2024-05-06 PROCEDURE — 74176 CT ABD & PELVIS W/O CONTRAST: CPT

## 2024-05-06 PROCEDURE — 80202 ASSAY OF VANCOMYCIN: CPT

## 2024-05-06 PROCEDURE — 2060000000 HC ICU INTERMEDIATE R&B

## 2024-05-06 PROCEDURE — 6370000000 HC RX 637 (ALT 250 FOR IP)

## 2024-05-06 PROCEDURE — 83735 ASSAY OF MAGNESIUM: CPT

## 2024-05-06 PROCEDURE — 84145 PROCALCITONIN (PCT): CPT

## 2024-05-06 RX ORDER — SODIUM CHLORIDE, SODIUM LACTATE, POTASSIUM CHLORIDE, AND CALCIUM CHLORIDE .6; .31; .03; .02 G/100ML; G/100ML; G/100ML; G/100ML
250 INJECTION, SOLUTION INTRAVENOUS ONCE
Status: COMPLETED | OUTPATIENT
Start: 2024-05-06 | End: 2024-05-06

## 2024-05-06 RX ORDER — 0.9 % SODIUM CHLORIDE 0.9 %
250 INTRAVENOUS SOLUTION INTRAVENOUS ONCE
Status: DISCONTINUED | OUTPATIENT
Start: 2024-05-06 | End: 2024-05-06

## 2024-05-06 RX ORDER — SODIUM CHLORIDE, SODIUM LACTATE, POTASSIUM CHLORIDE, AND CALCIUM CHLORIDE .6; .31; .03; .02 G/100ML; G/100ML; G/100ML; G/100ML
250 INJECTION, SOLUTION INTRAVENOUS ONCE
Status: DISCONTINUED | OUTPATIENT
Start: 2024-05-06 | End: 2024-05-09

## 2024-05-06 RX ORDER — LANOLIN ALCOHOL/MO/W.PET/CERES
400 CREAM (GRAM) TOPICAL 2 TIMES DAILY
Status: DISCONTINUED | OUTPATIENT
Start: 2024-05-06 | End: 2024-05-10 | Stop reason: HOSPADM

## 2024-05-06 RX ORDER — HYDROMORPHONE HYDROCHLORIDE 1 MG/ML
0.5 INJECTION, SOLUTION INTRAMUSCULAR; INTRAVENOUS; SUBCUTANEOUS EVERY 6 HOURS PRN
Status: DISCONTINUED | OUTPATIENT
Start: 2024-05-06 | End: 2024-05-10 | Stop reason: HOSPADM

## 2024-05-06 RX ORDER — OXYCODONE HYDROCHLORIDE AND ACETAMINOPHEN 5; 325 MG/1; MG/1
1 TABLET ORAL EVERY 4 HOURS PRN
Status: DISCONTINUED | OUTPATIENT
Start: 2024-05-06 | End: 2024-05-10 | Stop reason: HOSPADM

## 2024-05-06 RX ORDER — POTASSIUM CHLORIDE 750 MG/1
40 TABLET, FILM COATED, EXTENDED RELEASE ORAL 2 TIMES DAILY
Status: DISCONTINUED | OUTPATIENT
Start: 2024-05-06 | End: 2024-05-10 | Stop reason: HOSPADM

## 2024-05-06 RX ORDER — MAGNESIUM SULFATE IN WATER 40 MG/ML
2000 INJECTION, SOLUTION INTRAVENOUS ONCE
Status: COMPLETED | OUTPATIENT
Start: 2024-05-06 | End: 2024-05-06

## 2024-05-06 RX ORDER — GUAIFENESIN 600 MG/1
600 TABLET, EXTENDED RELEASE ORAL 2 TIMES DAILY
Status: DISCONTINUED | OUTPATIENT
Start: 2024-05-06 | End: 2024-05-10 | Stop reason: HOSPADM

## 2024-05-06 RX ADMIN — CEFEPIME 1000 MG: 1 INJECTION, POWDER, FOR SOLUTION INTRAMUSCULAR; INTRAVENOUS at 04:03

## 2024-05-06 RX ADMIN — ATORVASTATIN CALCIUM 40 MG: 40 TABLET, FILM COATED ORAL at 11:11

## 2024-05-06 RX ADMIN — GUAIFENESIN 600 MG: 600 TABLET, EXTENDED RELEASE ORAL at 21:01

## 2024-05-06 RX ADMIN — HEPARIN SODIUM 5000 UNITS: 5000 INJECTION INTRAVENOUS; SUBCUTANEOUS at 18:14

## 2024-05-06 RX ADMIN — INSULIN LISPRO 2 UNITS: 100 INJECTION, SOLUTION INTRAVENOUS; SUBCUTANEOUS at 12:37

## 2024-05-06 RX ADMIN — ARFORMOTEROL TARTRATE: 15 SOLUTION RESPIRATORY (INHALATION) at 07:20

## 2024-05-06 RX ADMIN — ACETAMINOPHEN 650 MG: 325 TABLET ORAL at 04:12

## 2024-05-06 RX ADMIN — IPRATROPIUM BROMIDE AND ALBUTEROL SULFATE 1 DOSE: .5; 3 SOLUTION RESPIRATORY (INHALATION) at 07:19

## 2024-05-06 RX ADMIN — MAGNESIUM SULFATE HEPTAHYDRATE 2000 MG: 40 INJECTION, SOLUTION INTRAVENOUS at 07:12

## 2024-05-06 RX ADMIN — INSULIN GLARGINE 58 UNITS: 100 INJECTION, SOLUTION SUBCUTANEOUS at 11:12

## 2024-05-06 RX ADMIN — SODIUM CHLORIDE, POTASSIUM CHLORIDE, SODIUM LACTATE AND CALCIUM CHLORIDE 250 ML: 600; 310; 30; 20 INJECTION, SOLUTION INTRAVENOUS at 06:10

## 2024-05-06 RX ADMIN — SODIUM CHLORIDE, POTASSIUM CHLORIDE, SODIUM LACTATE AND CALCIUM CHLORIDE: 600; 310; 30; 20 INJECTION, SOLUTION INTRAVENOUS at 21:03

## 2024-05-06 RX ADMIN — ALLOPURINOL 100 MG: 100 TABLET ORAL at 11:10

## 2024-05-06 RX ADMIN — INSULIN LISPRO 2 UNITS: 100 INJECTION, SOLUTION INTRAVENOUS; SUBCUTANEOUS at 18:14

## 2024-05-06 RX ADMIN — Medication 100 MG: at 11:11

## 2024-05-06 RX ADMIN — MAGNESIUM OXIDE TAB 400 MG (241.3 MG ELEMENTAL MG) 400 MG: 400 (241.3 MG) TAB at 21:01

## 2024-05-06 RX ADMIN — ACETAMINOPHEN 650 MG: 325 TABLET ORAL at 19:13

## 2024-05-06 RX ADMIN — SODIUM CHLORIDE, POTASSIUM CHLORIDE, SODIUM LACTATE AND CALCIUM CHLORIDE: 600; 310; 30; 20 INJECTION, SOLUTION INTRAVENOUS at 12:40

## 2024-05-06 RX ADMIN — SODIUM CHLORIDE, PRESERVATIVE FREE 10 ML: 5 INJECTION INTRAVENOUS at 11:13

## 2024-05-06 RX ADMIN — GUAIFENESIN 600 MG: 600 TABLET, EXTENDED RELEASE ORAL at 05:56

## 2024-05-06 RX ADMIN — IPRATROPIUM BROMIDE AND ALBUTEROL SULFATE 1 DOSE: .5; 3 SOLUTION RESPIRATORY (INHALATION) at 21:51

## 2024-05-06 RX ADMIN — OXYCODONE HYDROCHLORIDE AND ACETAMINOPHEN 1 TABLET: 5; 325 TABLET ORAL at 19:21

## 2024-05-06 RX ADMIN — SODIUM CHLORIDE 1250 MG: 9 INJECTION, SOLUTION INTRAVENOUS at 22:48

## 2024-05-06 RX ADMIN — IPRATROPIUM BROMIDE AND ALBUTEROL SULFATE 1 DOSE: .5; 3 SOLUTION RESPIRATORY (INHALATION) at 15:08

## 2024-05-06 RX ADMIN — ACETAMINOPHEN 650 MG: 325 TABLET ORAL at 11:29

## 2024-05-06 RX ADMIN — CEFEPIME 1000 MG: 1 INJECTION, POWDER, FOR SOLUTION INTRAMUSCULAR; INTRAVENOUS at 18:11

## 2024-05-06 RX ADMIN — POTASSIUM CHLORIDE 40 MEQ: 750 TABLET, FILM COATED, EXTENDED RELEASE ORAL at 21:01

## 2024-05-06 RX ADMIN — ISOSORBIDE MONONITRATE 30 MG: 30 TABLET, EXTENDED RELEASE ORAL at 11:11

## 2024-05-06 RX ADMIN — IPRATROPIUM BROMIDE AND ALBUTEROL SULFATE 1 DOSE: .5; 3 SOLUTION RESPIRATORY (INHALATION) at 11:13

## 2024-05-06 RX ADMIN — THERA TABS 1 TABLET: TAB at 11:11

## 2024-05-06 RX ADMIN — HEPARIN SODIUM 5000 UNITS: 5000 INJECTION INTRAVENOUS; SUBCUTANEOUS at 11:13

## 2024-05-06 RX ADMIN — MAGNESIUM OXIDE TAB 400 MG (241.3 MG ELEMENTAL MG) 400 MG: 400 (241.3 MG) TAB at 11:11

## 2024-05-06 RX ADMIN — HEPARIN SODIUM 5000 UNITS: 5000 INJECTION INTRAVENOUS; SUBCUTANEOUS at 03:59

## 2024-05-06 RX ADMIN — ARFORMOTEROL TARTRATE: 15 SOLUTION RESPIRATORY (INHALATION) at 21:51

## 2024-05-06 ASSESSMENT — PAIN SCALES - GENERAL
PAINLEVEL_OUTOF10: 10
PAINLEVEL_OUTOF10: 7

## 2024-05-06 ASSESSMENT — PAIN DESCRIPTION - LOCATION
LOCATION: HEAD
LOCATION: HEAD

## 2024-05-06 ASSESSMENT — PAIN DESCRIPTION - DESCRIPTORS: DESCRIPTORS: ACHING

## 2024-05-06 ASSESSMENT — PAIN DESCRIPTION - ORIENTATION: ORIENTATION: RIGHT;POSTERIOR

## 2024-05-06 NOTE — PROGRESS NOTES
[facility] Hospitalist cross coverage (7p-7a) progress note:    Evaluated patient at bedside due to hypotension. Patient is asymptomatic, resting in bed without dizziness, CP or SOB. She is on 2L NC, receiving maintenance fluids and abx. BP improved to 97/59 and 105/58. Will monitor on step down, please inform provider if BP is low again to consider fluid/albumin/higher level of care.       Joanne Fisher [facility]

## 2024-05-06 NOTE — PROGRESS NOTES
Pharmacist Note - Vancomycin Dosing    Consult provided for this 74 y.o. female for indication of Sepsis.  Antibiotic regimen(s): Vanc+Cefepime   Patient on vancomycin PTA? NO     Recent Labs     24  1408   WBC 10.9   CREATININE 4.90*   BUN 70*     Frequency of BMP: Daily   Height: 162.6 cm  Weight: 83.8 kg  Est CrCl: 11 ml/min; UO: --- ml/kg/hr  Temp (24hrs), Av.9 °F (36.6 °C), Min:97.9 °F (36.6 °C), Max:97.9 °F (36.6 °C)    Cultures:  Blood x 2 - NGTD     MRSA Swab ordered (if applicable)? N/A    The plan below is expected to result in a target range of Trough 10-15 mcg/mL    Therapy will be initiated with a loading dose of 2000 mg IV x 1 then hold for further orders. Dosing by levels until renal fxn improves.  Pharmacy to follow patient daily and order levels / make dose adjustments as appropriate.    Riaz Shelby, SwetaD

## 2024-05-06 NOTE — CARE COORDINATION
Care Management Initial Assessment       RUR:15%  Readmission? No  1st IM letter given? Yes   1st  letter given: No      05/06/24 0903   Service Assessment   Patient Orientation Alert and Oriented;Person;Place;Situation;Self   Cognition Alert   History Provided By Patient;Medical Record   Primary Caregiver Self   Support Systems Spouse/Significant Other;Children   PCP Verified by CM Yes   Last Visit to PCP Within last 3 months   Prior Functional Level Independent in ADLs/IADLs   Current Functional Level Other (see comment)  (TBD)   Can patient return to prior living arrangement Yes   Ability to make needs known: Good   Family able to assist with home care needs: Yes   Would you like for me to discuss the discharge plan with any other family members/significant others, and if so, who? No   Financial Resources None     CM met with pt to introduce him to the role of CM and transition of care. This pt lives with her daughter, Aubrie and her significant other, Rm in a one story home with 4 steps to enter with handrails on both sides. Per pt, she uses a cane when ambulating. She stated that she is independent with all her ADL's and IADL's. She is also a . She said that she feels safe returning to her home at d/c. CM will follow for d/c needs. Sasha Loo,CARLEEW,ACM-SW

## 2024-05-06 NOTE — PROGRESS NOTES
[facility] Adult  Hospitalist Group                                                                                          Hospitalist Progress Note  Mike Wright MD  Office Phone: [phone]        Date of Service:  2024  NAME:  Zuleyma Sharp  :  1950  MRN:  [id number]       Admission Summary:   Zuleyma Sharp is a 74 y.o. female with PMH significant for type 2 insulin-dependent diabetes, dilated cardiomyopathy/CHF recovered EF presented to the ED for occipital headache/pain behind the right ear.  Patient endorses cough productive of whitish sputum, subjective fever and chills for the last few days.  She also endorses loose bowel movements but denies nausea, vomiting, abdominal pain.  She denies dysuria, urgency or frequency.  She denied sick contact with someone with febrile illness.  She denies recent infection or antibiotic use.     On evaluation in the ED she was found hypotensive BP 82/52, tachycardic   And blood work was significant for , creatinine 4.9, lactic acid 2.7, POC glucose 278, procalcitonin 4.51, proBNP 886, total bilirubin 1.6, AST 52, WBC normal but left shift neutrophils 87%.  UA pending.  Chest x-ray negative.  Head CT negative       Interval history / Subjective:   Admitted on 2024 for severe sepsis  -Patient notes feeling a lot better     Assessment & Plan:      Severe sepsis as manifested by hypotension, tachycardia, lactic acidosis, left shift with organ damage as reflected by acute on chronic kidney failure.    Source of infection undetermined.  Workup thus far negative.  - Fluid resuscitated, hypotension resolved.  Continue broad-spectrum coverage with antibiotics with cefepime and vancomycin  - UA positive for bacteriuria and leukocyte esterase, no pyuria.  Blood cultures remain negative.  Respiratory viral panel negative.  CT of the chest, abdomen and pelvis negative for any source of infection.     Acute hyponatremia due to  hypovolemia  -Fluid resuscitation completed.  Sodium improved from 129...> 134          REVA on CKD 3.  REVA most probably due to prerenal in the setting of severe sepsis.  Creatinine significantly improved  -Full resuscitation and sepsis management as above  - Abdominal imaging negative for obstructive pathology, hydronephrosis  -Strict I's and O's          Type 2 insulin-dependent DM with hyperglycemia.  A1c 6.9  -Initiate hyperglycemic management with AC&HS Accu-Cheks, Humalog correction, hypoglycemic protocol  -Resume Lantus 58 units daily          HF recovered EF CHF  -Hold cardiac meds including Entresto, spironolactone, bumetanide due to sepsis, hypotension, REVA     Patient admits daily alcohol use, denies history of withdrawal, AST mildly elevated  -NSVT with symptom triggered CIWA protocol with Ativan  -Continue thiamine          Code status: Full code  Prophylaxis: SC heparin  Care Plan discussed with: Patient, RN  Anticipated Disposition: Home today 3 days  Inpatient  Cardiac monitoring: Telemetry  Central Line:            Social Determinants of Health     Tobacco Use: Medium Risk (3/20/2024)    Patient History     Smoking Tobacco Use: Former     Smokeless Tobacco Use: Never     Passive Exposure: Not on file   Alcohol Use: Not At Risk (2/27/2024)    AUDIT-C     Frequency of Alcohol Consumption: Monthly or less     Average Number of Drinks: 1 or 2     Frequency of Binge Drinking: Never   Financial Resource Strain: Not on file   Food Insecurity: No Food Insecurity (5/6/2024)    Hunger Vital Sign     Worried About Running Out of Food in the Last Year: Never true     Ran Out of Food in the Last Year: Never true   Transportation Needs: No Transportation Needs (5/6/2024)    PRAPARE - Transportation     Lack of Transportation (Medical): No     Lack of Transportation (Non-Medical): No   Physical Activity: Insufficiently Active (2/27/2024)    Exercise Vital Sign     Days of Exercise per Week: 2 days     Minutes of

## 2024-05-06 NOTE — PROGRESS NOTES
PT Contact Note - Pt Refusal     PT consult received and acknowledged, chart reviewed. Attempted to see pt for PT evaluation, but pt politely declining, reporting fatigue after being admitted early this morning and after multiple tests this AM. Will attempt to follow up with patient as able.    Mayte Rosenberg, PT, DPT

## 2024-05-07 LAB
ANION GAP SERPL CALC-SCNC: 9 MMOL/L (ref 5–15)
BACTERIA SPEC CULT: NORMAL
BACTERIA SPEC CULT: NORMAL
BUN SERPL-MCNC: 56 MG/DL (ref 6–20)
BUN/CREAT SERPL: 27 (ref 12–20)
CALCIUM SERPL-MCNC: 8.5 MG/DL (ref 8.5–10.1)
CHLORIDE SERPL-SCNC: 107 MMOL/L (ref 97–108)
CO2 SERPL-SCNC: 21 MMOL/L (ref 21–32)
CREAT SERPL-MCNC: 2.08 MG/DL (ref 0.55–1.02)
GLUCOSE BLD STRIP.AUTO-MCNC: 124 MG/DL (ref 65–117)
GLUCOSE BLD STRIP.AUTO-MCNC: 190 MG/DL (ref 65–117)
GLUCOSE BLD STRIP.AUTO-MCNC: 196 MG/DL (ref 65–117)
GLUCOSE SERPL-MCNC: 130 MG/DL (ref 65–100)
POTASSIUM SERPL-SCNC: 3.6 MMOL/L (ref 3.5–5.1)
SERVICE CMNT-IMP: ABNORMAL
SERVICE CMNT-IMP: NORMAL
SODIUM SERPL-SCNC: 137 MMOL/L (ref 136–145)

## 2024-05-07 PROCEDURE — 6370000000 HC RX 637 (ALT 250 FOR IP): Performed by: HOSPITALIST

## 2024-05-07 PROCEDURE — 2580000003 HC RX 258: Performed by: HOSPITALIST

## 2024-05-07 PROCEDURE — 97535 SELF CARE MNGMENT TRAINING: CPT

## 2024-05-07 PROCEDURE — 97161 PT EVAL LOW COMPLEX 20 MIN: CPT

## 2024-05-07 PROCEDURE — 97165 OT EVAL LOW COMPLEX 30 MIN: CPT

## 2024-05-07 PROCEDURE — 97530 THERAPEUTIC ACTIVITIES: CPT

## 2024-05-07 PROCEDURE — 80048 BASIC METABOLIC PNL TOTAL CA: CPT

## 2024-05-07 PROCEDURE — 87205 SMEAR GRAM STAIN: CPT

## 2024-05-07 PROCEDURE — 36415 COLL VENOUS BLD VENIPUNCTURE: CPT

## 2024-05-07 PROCEDURE — 2060000000 HC ICU INTERMEDIATE R&B

## 2024-05-07 PROCEDURE — 6360000002 HC RX W HCPCS: Performed by: HOSPITALIST

## 2024-05-07 PROCEDURE — 94640 AIRWAY INHALATION TREATMENT: CPT

## 2024-05-07 PROCEDURE — 87070 CULTURE OTHR SPECIMN AEROBIC: CPT

## 2024-05-07 PROCEDURE — 82962 GLUCOSE BLOOD TEST: CPT

## 2024-05-07 PROCEDURE — P9047 ALBUMIN (HUMAN), 25%, 50ML: HCPCS | Performed by: HOSPITALIST

## 2024-05-07 PROCEDURE — 6370000000 HC RX 637 (ALT 250 FOR IP)

## 2024-05-07 RX ORDER — IPRATROPIUM BROMIDE AND ALBUTEROL SULFATE 2.5; .5 MG/3ML; MG/3ML
1 SOLUTION RESPIRATORY (INHALATION) EVERY 6 HOURS PRN
Status: DISCONTINUED | OUTPATIENT
Start: 2024-05-07 | End: 2024-05-10 | Stop reason: HOSPADM

## 2024-05-07 RX ORDER — SODIUM CHLORIDE, SODIUM LACTATE, POTASSIUM CHLORIDE, CALCIUM CHLORIDE 600; 310; 30; 20 MG/100ML; MG/100ML; MG/100ML; MG/100ML
INJECTION, SOLUTION INTRAVENOUS CONTINUOUS
Status: DISCONTINUED | OUTPATIENT
Start: 2024-05-07 | End: 2024-05-09

## 2024-05-07 RX ADMIN — ALLOPURINOL 100 MG: 100 TABLET ORAL at 10:11

## 2024-05-07 RX ADMIN — ARFORMOTEROL TARTRATE: 15 SOLUTION RESPIRATORY (INHALATION) at 19:08

## 2024-05-07 RX ADMIN — MAGNESIUM OXIDE TAB 400 MG (241.3 MG ELEMENTAL MG) 400 MG: 400 (241.3 MG) TAB at 10:11

## 2024-05-07 RX ADMIN — HEPARIN SODIUM 5000 UNITS: 5000 INJECTION INTRAVENOUS; SUBCUTANEOUS at 16:50

## 2024-05-07 RX ADMIN — MAGNESIUM OXIDE TAB 400 MG (241.3 MG ELEMENTAL MG) 400 MG: 400 (241.3 MG) TAB at 20:12

## 2024-05-07 RX ADMIN — ISOSORBIDE MONONITRATE 30 MG: 30 TABLET, EXTENDED RELEASE ORAL at 10:11

## 2024-05-07 RX ADMIN — SODIUM CHLORIDE, PRESERVATIVE FREE 10 ML: 5 INJECTION INTRAVENOUS at 10:11

## 2024-05-07 RX ADMIN — GUAIFENESIN 600 MG: 600 TABLET, EXTENDED RELEASE ORAL at 10:11

## 2024-05-07 RX ADMIN — GUAIFENESIN 600 MG: 600 TABLET, EXTENDED RELEASE ORAL at 20:12

## 2024-05-07 RX ADMIN — OXYCODONE HYDROCHLORIDE AND ACETAMINOPHEN 1 TABLET: 5; 325 TABLET ORAL at 14:13

## 2024-05-07 RX ADMIN — POTASSIUM CHLORIDE 40 MEQ: 750 TABLET, FILM COATED, EXTENDED RELEASE ORAL at 20:12

## 2024-05-07 RX ADMIN — ALBUMIN (HUMAN) 25 G: 0.25 INJECTION, SOLUTION INTRAVENOUS at 16:55

## 2024-05-07 RX ADMIN — HEPARIN SODIUM 5000 UNITS: 5000 INJECTION INTRAVENOUS; SUBCUTANEOUS at 02:58

## 2024-05-07 RX ADMIN — ARFORMOTEROL TARTRATE: 15 SOLUTION RESPIRATORY (INHALATION) at 07:19

## 2024-05-07 RX ADMIN — ATORVASTATIN CALCIUM 40 MG: 40 TABLET, FILM COATED ORAL at 10:11

## 2024-05-07 RX ADMIN — IPRATROPIUM BROMIDE AND ALBUTEROL SULFATE 1 DOSE: .5; 3 SOLUTION RESPIRATORY (INHALATION) at 07:20

## 2024-05-07 RX ADMIN — CEFEPIME 1000 MG: 1 INJECTION, POWDER, FOR SOLUTION INTRAMUSCULAR; INTRAVENOUS at 05:21

## 2024-05-07 RX ADMIN — HYDROMORPHONE HYDROCHLORIDE 0.5 MG: 1 INJECTION, SOLUTION INTRAMUSCULAR; INTRAVENOUS; SUBCUTANEOUS at 07:29

## 2024-05-07 RX ADMIN — THERA TABS 1 TABLET: TAB at 10:11

## 2024-05-07 RX ADMIN — POTASSIUM CHLORIDE 40 MEQ: 750 TABLET, FILM COATED, EXTENDED RELEASE ORAL at 10:10

## 2024-05-07 RX ADMIN — CEFEPIME 1000 MG: 1 INJECTION, POWDER, FOR SOLUTION INTRAMUSCULAR; INTRAVENOUS at 17:58

## 2024-05-07 RX ADMIN — SODIUM CHLORIDE, POTASSIUM CHLORIDE, SODIUM LACTATE AND CALCIUM CHLORIDE: 600; 310; 30; 20 INJECTION, SOLUTION INTRAVENOUS at 18:42

## 2024-05-07 RX ADMIN — SODIUM CHLORIDE, SODIUM LACTATE, POTASSIUM CHLORIDE, AND CALCIUM CHLORIDE 250 ML: .6; .31; .03; .02 INJECTION, SOLUTION INTRAVENOUS at 00:29

## 2024-05-07 RX ADMIN — HEPARIN SODIUM 5000 UNITS: 5000 INJECTION INTRAVENOUS; SUBCUTANEOUS at 10:11

## 2024-05-07 RX ADMIN — Medication 100 MG: at 10:11

## 2024-05-07 RX ADMIN — SODIUM CHLORIDE, POTASSIUM CHLORIDE, SODIUM LACTATE AND CALCIUM CHLORIDE: 600; 310; 30; 20 INJECTION, SOLUTION INTRAVENOUS at 07:59

## 2024-05-07 RX ADMIN — OXYCODONE HYDROCHLORIDE AND ACETAMINOPHEN 1 TABLET: 5; 325 TABLET ORAL at 20:12

## 2024-05-07 ASSESSMENT — PAIN DESCRIPTION - LOCATION
LOCATION: HEAD

## 2024-05-07 ASSESSMENT — PAIN DESCRIPTION - DESCRIPTORS: DESCRIPTORS: ACHING

## 2024-05-07 ASSESSMENT — PAIN SCALES - GENERAL
PAINLEVEL_OUTOF10: 8
PAINLEVEL_OUTOF10: 6

## 2024-05-07 NOTE — PLAN OF CARE
Problem: Discharge Planning  Goal: Discharge to home or other facility with appropriate resources  Outcome: Progressing     Problem: Pain  Goal: Verbalizes/displays adequate comfort level or baseline comfort level  Outcome: Progressing     Problem: Safety - Adult  Goal: Free from fall injury  Outcome: Progressing     Problem: Chronic Conditions and Co-morbidities  Goal: Patient's chronic conditions and co-morbidity symptoms are monitored and maintained or improved  Outcome: Progressing     Problem: Physical Therapy - Adult  Goal: By Discharge: Performs mobility at highest level of function for planned discharge setting.  See evaluation for individualized goals.  Description: FUNCTIONAL STATUS PRIOR TO ADMISSION: Patient was independent and active without use of DME.    HOME SUPPORT PRIOR TO ADMISSION: The patient lived with friend and daughter but did not require assistance.    Physical Therapy Goals  Initiated 5/7/2024  1.  Patient will move from supine to sit and sit to supine in bed with independence within 7 day(s).    2.  Patient will perform sit to stand with modified independence within 7 day(s).  3.  Patient will transfer from bed to chair and chair to bed with modified independence using the least restrictive device within 7 day(s).  4.  Patient will ambulate with supervision/set-up for 150 feet with the least restrictive device within 7 day(s).   5.  Patient will ascend/descend 4 stairs with  handrail(s) with supervision/set-up within 7 day(s).    5/7/2024 1426 by Sharon Rosenberg, PT  Outcome: Progressing     Problem: Occupational Therapy - Adult  Goal: By Discharge: Performs self-care activities at highest level of function for planned discharge setting.  See evaluation for individualized goals.  Description: FUNCTIONAL STATUS PRIOR TO ADMISSION:  Patient was ambulatory using cane intermittently. Patient endorses history of falls. Reports independence in ADLs.     ADL Assistance: Independent, Ambulation

## 2024-05-07 NOTE — PROGRESS NOTES
[facility]                                                                                          Hospitalist Progress Note  Mike Wright MD  Office Phone: [phone]        Date of Service:  2024  NAME:  Zuleyma Sharp  :  1950  MRN:  [id number]       Admission Summary:   Zuleyma Sharp is a 74 y.o. female with PMH significant for type 2 insulin-dependent diabetes, dilated cardiomyopathy/CHF recovered EF presented to the ED for occipital headache/pain behind the right ear.  Patient endorses cough productive of whitish sputum, subjective fever and chills for the last few days.  She also endorses loose bowel movements but denies nausea, vomiting, abdominal pain.  She denies dysuria, urgency or frequency.  She denied sick contact with someone with febrile illness.  She denies recent infection or antibiotic use.     On evaluation in the ED she was found hypotensive BP 82/52, tachycardic   And blood work was significant for , creatinine 4.9, lactic acid 2.7, POC glucose 278, procalcitonin 4.51, proBNP 886, total bilirubin 1.6, AST 52, WBC normal but left shift neutrophils 87%.  UA pending.  Chest x-ray negative.  Head CT negative       Interval history / Subjective:   Admitted on 2024 for severe sepsis  -Today she noted feeling better.  She continues to have intermittent pain on the right side of her neck, this was present on the admission and CT was negative.  -She is still having cough productive of whitish sputum.  She sounds congested.     Assessment & Plan:      Severe sepsis as manifested by hypotension, tachycardia, lactic acidosis, left shift with organ damage as reflected by acute on chronic kidney failure.    Source of infection undetermined likely pneumonia, she has productive cough.  Workup thus far negative.  - Fluid resuscitated, hypotension resolved.  Continue broad-spectrum coverage with antibiotics with cefepime and

## 2024-05-07 NOTE — PLAN OF CARE
Problem: Physical Therapy - Adult  Goal: By Discharge: Performs mobility at highest level of function for planned discharge setting.  See evaluation for individualized goals.  Description: FUNCTIONAL STATUS PRIOR TO ADMISSION: Patient was independent and active without use of DME.    HOME SUPPORT PRIOR TO ADMISSION: The patient lived with friend and daughter but did not require assistance.    Physical Therapy Goals  Initiated 5/7/2024  1.  Patient will move from supine to sit and sit to supine in bed with independence within 7 day(s).    2.  Patient will perform sit to stand with modified independence within 7 day(s).  3.  Patient will transfer from bed to chair and chair to bed with modified independence using the least restrictive device within 7 day(s).  4.  Patient will ambulate with supervision/set-up for 150 feet with the least restrictive device within 7 day(s).   5.  Patient will ascend/descend 4 stairs with  handrail(s) with supervision/set-up within 7 day(s).    Outcome: Progressing     PHYSICAL THERAPY EVALUATION    Patient: Zuleyma Sharp (74 y.o. female)  Date: 5/7/2024  Primary Diagnosis: Sepsis (HCC) [A41.9]  Acute renal failure, unspecified acute renal failure type (HCC) [N17.9]  Nonintractable episodic headache, unspecified headache type [R51.9]  Sepsis with acute renal failure and septic shock, due to unspecified organism, unspecified acute renal failure type (HCC) [A41.9, R65.21, N17.9]       Precautions:                        ASSESSMENT :   DEFICITS/IMPAIRMENTS:   The patient is limited by decreased functional mobility, strength, activity tolerance, endurance, balance   Pt seen for PT evaluation, presenting with above impairments. Pt reports increased fatigue throughout, and requires long seated RB's between functional movements. Pt agreeable to trial standing at EOB; requires Verna for safety. Anticipate pt will progress well with acute PT and be able to return home with HH. Will continue to       Sensation: Intact           Range Of Motion:  AROM: Generally decreased, functional  PROM: Generally decreased, functional    Functional Mobility:  Bed Mobility:     Bed Mobility Training  Bed Mobility Training: Yes  Supine to Sit: Stand-by assistance  Sit to Supine: Stand-by assistance  - HOBe to assist, requires additional time to complete    Transfers:     Transfer Training  Transfer Training: Yes  Sit to Stand: Minimum assistance  Stand to Sit: Minimum assistance  - From EOB x 1 with HHA    Balance:               Balance  Sitting: Intact  Standing: Impaired  Standing - Static: Constant support;Fair  Standing - Dynamic: Constant support;Fair  - Pt able to march in place at bedside with LUE HHA, RUE on bedrail for increased support, overall Verna  - Maintains standing x 1 min before requiring seated RB                                                                                                                                                                                                                                                                               [facility]®      Basic Mobility Inpatient Short Form (6-Clicks) Version 2    How much help is needed turning from your back to your side while in a flat bed without using bedrails?: None  How much help is needed moving from lying on your back to sitting on the side of a flat bed without using bedrails?: A Little  How much help is needed moving to and from a bed to a chair?: A Little  How much help is needed standing up from a chair using your arms?: A Little  How much help is needed walking in hospital room?: A Little  How much help is needed climbing 3-5 steps with a railing?: A Lot    AM-PAC Inpatient Mobility Raw Score : 18  AM-PAC Inpatient T-Scale Score : 43.63     Cutoff score ?171,2,3 had higher odds of discharging home with home health or need of SNF/IPR.    1. Tiffany Ricketts, Kendal Eric, Lauryn Jovel

## 2024-05-07 NOTE — PROGRESS NOTES
Pharmacist Note - Vancomycin Dosing  Therapy day 2  Indication: Sepsis, unknown source  Current regimen: dosing by levels, LD 2000 mg 5/5 @ 1751     Recent Labs     05/05/24  1408 05/06/24  0409 05/06/24  1238   WBC 10.9  --   --    CREATININE 4.90* 3.31* 2.62*   BUN 70* 70* 61*       A random vancomycin level of 15.7 mcg/mL was obtained ~ 24 hrs post dose      Goal target range Trough 10-15 mcg/mL      Plan: Will give Vancomycin 1250 mg x1 then continue to hold pending future levels. Pharmacy will continue to monitor this patient daily for changes in clinical status and renal function.    Riaz Shelby, SwetaD

## 2024-05-07 NOTE — PLAN OF CARE
Problem: Occupational Therapy - Adult  Goal: By Discharge: Performs self-care activities at highest level of function for planned discharge setting.  See evaluation for individualized goals.  Description: FUNCTIONAL STATUS PRIOR TO ADMISSION:  Patient was ambulatory using cane intermittently. Patient endorses history of falls. Reports independence in ADLs.     ADL Assistance: Independent, Ambulation Assistance: Independent, Active : Yes     HOME SUPPORT: Patient lived with friend and 9 year old daughter but didn't require assistance.    Occupational Therapy Goals:  Initiated 5/7/2024  1.  Patient will perform brief standing grooming with Modified [place] within 7 day(s).  2.  Patient will perform seated / standing bathing with Modified [place] within 7 day(s).  3.  Patient will perform lower body dressing with Modified [place] within 7 day(s).  4.  Patient will perform toilet transfers with Modified [place]  within 7 day(s).  5.  Patient will perform all aspects of toileting with Modified [place] within 7 day(s).  6.  Patient will participate in upper extremity therapeutic exercise/activities with Modified [place] for 7 minutes within 7 day(s).    7.  Patient will utilize energy conservation techniques during functional activities with verbal cues within 7 day(s).      Outcome: Progressing   OCCUPATIONAL THERAPY EVALUATION    Patient: Zuleyma Sharp (74 y.o. female)  Date: 5/7/2024  Primary Diagnosis: Sepsis (HCC) [A41.9]  Acute renal failure, unspecified acute renal failure type (HCC) [N17.9]  Nonintractable episodic headache, unspecified headache type [R51.9]  Sepsis with acute renal failure and septic shock, due to unspecified organism, unspecified acute renal failure type (HCC) [A41.9, R65.21, N17.9]         Precautions:                    ASSESSMENT :  The patient is limited by decreased functional mobility, independence in ADLs, ROM, strength, activity tolerance,  Surgical History:   Procedure Laterality Date    COLONOSCOPY N/A 3/19/2021    COLONOSCOPY performed by David Ramirez MD at [facility] ENDOSCOPY    ORTHOPEDIC SURGERY      Arthroscopy right knee    TX UNLISTED PROCEDURE CARDIAC SURGERY      life vest          Expanded or extensive additional review of patient history:   Social/Functional History  Lives With: Daughter, Friend(s)  Type of Home: House  Home Layout: One level  Home Access: Stairs to enter with rails  Entrance Stairs - Number of Steps: 4  Home Equipment:  (pt's friend has walker and cane)  Has the patient had two or more falls in the past year or any fall with injury in the past year?: Yes  ADL Assistance: Independent  Ambulation Assistance: Independent  Active : Yes      Hand Dominance: right     EXAMINATION OF PERFORMANCE DEFICITS:    Cognitive/Behavioral Status:  Orientation  Overall Orientation Status: Within Functional Limits  Orientation Level: Oriented X4  Cognition  Overall Cognitive Status: Exceptions  Arousal/Alertness: Delayed responses to stimuli (quite drowsy, improves with activity)  Following Commands: Follows one step commands with increased time;Follows multistep commands with increased time  Attention Span: Attends with cues to redirect  Problem Solving: Assistance required to generate solutions;Assistance required to implement solutions  Insights: Decreased awareness of deficits  Initiation: Requires cues for some  Sequencing: Requires cues for some    Skin:     Edema:     Hearing:   Hearing  Hearing: Within functional limits    Vision/Perceptual:    Vision  Vision: Within Functional Limits  Perception  Overall Perceptual Status: WFL    Range of Motion:   AROM: Generally decreased, functional    Strength:  Strength: Generally decreased, functional    Coordination:  Coordination: Generally decreased, functional    Tone & Sensation:       Functional Mobility and Transfers for ADLs:    Bed Mobility:  Bed Mobility Training  Bed  placement to push up from seated surface rather than attempt to pull self up, fully positioning self in-front of desired seated location, feeling chair on back of legs and reaching back with 1-2 UE to slowly lower self to seated position.        [facility] \"6 Clicks\"                                                       Daily Activity Inpatient Short Form  How much help from another person does the patient currently need... Total; A Lot A Little None   1.  Putting on and taking off regular lower body clothing? []  1 [x]  2 []  3 []  4   2.  Bathing (including washing, rinsing, drying)? []  1 []  2 [x]  3 []  4   3.  Toileting, which includes using toilet, bedpan or urinal? [] 1 []  2 [x]  3 []  4   4.  Putting on and taking off regular upper body clothing? []  1 []  2 [x]  3 []  4   5.  Taking care of personal grooming such as brushing teeth? []  1 []  2 []  3 [x]  4   6.  Eating meals? []  1 []  2 []  3 [x]  4   © 2007, Trustees of [facility], under license to [employer]. All rights reserved     Score: 19/24     Interpretation of Tool:  Represents clinically-significant functional categories (i.e. Activities of daily living).    Cutoff score 39.4 (19) correlates to a good likelihood of discharging home versus a facility  Tiffany Ricketts, Kendal Eric, Wilfredo Linda, Lauryn Pappas, Miles Olson, Damián Ricketts, AM-PAC “6-Clicks” Functional Assessment Scores Predict Acute Care Hospital Discharge Destination, Physical Therapy, Volume 94, Issue 9, 1 September 2014, Pages 7989-4741, [email or web address]    Pain Rating:  Intermittent reports, head (RN notified and following)    Pain Intervention(s):   rest and repositioning    Activity Tolerance:   Fair  and requires rest breaks    After treatment:   Patient left in no apparent distress in bed, Call bell within reach, Bed/ chair alarm activated, and Side rails x3    COMMUNICATION/EDUCATION:   The patient's plan of

## 2024-05-08 PROBLEM — Z79.4 TYPE 2 DIABETES MELLITUS WITH KIDNEY COMPLICATION, WITH LONG-TERM CURRENT USE OF INSULIN (HCC): Status: ACTIVE | Noted: 2022-03-25

## 2024-05-08 PROBLEM — I50.32 CHRONIC HEART FAILURE WITH PRESERVED EJECTION FRACTION (HCC): Status: ACTIVE | Noted: 2024-05-08

## 2024-05-08 PROBLEM — N17.9 ACUTE KIDNEY INJURY SUPERIMPOSED ON CHRONIC KIDNEY DISEASE (HCC): Status: ACTIVE | Noted: 2024-05-08

## 2024-05-08 PROBLEM — N18.9 ACUTE KIDNEY INJURY SUPERIMPOSED ON CHRONIC KIDNEY DISEASE (HCC): Status: ACTIVE | Noted: 2024-05-08

## 2024-05-08 PROBLEM — E11.29 TYPE 2 DIABETES MELLITUS WITH KIDNEY COMPLICATION, WITH LONG-TERM CURRENT USE OF INSULIN (HCC): Status: ACTIVE | Noted: 2022-03-25

## 2024-05-08 LAB
ANION GAP SERPL CALC-SCNC: 8 MMOL/L (ref 5–15)
BUN SERPL-MCNC: 41 MG/DL (ref 6–20)
BUN/CREAT SERPL: 26 (ref 12–20)
CALCIUM SERPL-MCNC: 9 MG/DL (ref 8.5–10.1)
CHLORIDE SERPL-SCNC: 108 MMOL/L (ref 97–108)
CO2 SERPL-SCNC: 20 MMOL/L (ref 21–32)
CREAT SERPL-MCNC: 1.6 MG/DL (ref 0.55–1.02)
ERYTHROCYTE [DISTWIDTH] IN BLOOD BY AUTOMATED COUNT: 14.3 % (ref 11.5–14.5)
GLUCOSE BLD STRIP.AUTO-MCNC: 117 MG/DL (ref 65–117)
GLUCOSE BLD STRIP.AUTO-MCNC: 122 MG/DL (ref 65–117)
GLUCOSE BLD STRIP.AUTO-MCNC: 124 MG/DL (ref 65–117)
GLUCOSE BLD STRIP.AUTO-MCNC: 217 MG/DL (ref 65–117)
GLUCOSE SERPL-MCNC: 144 MG/DL (ref 65–100)
HCT VFR BLD AUTO: 25.5 % (ref 35–47)
HGB BLD-MCNC: 8.6 G/DL (ref 11.5–16)
MCH RBC QN AUTO: 28.6 PG (ref 26–34)
MCHC RBC AUTO-ENTMCNC: 33.7 G/DL (ref 30–36.5)
MCV RBC AUTO: 84.7 FL (ref 80–99)
NRBC # BLD: 0 K/UL (ref 0–0.01)
NRBC BLD-RTO: 0 PER 100 WBC
PLATELET # BLD AUTO: 121 K/UL (ref 150–400)
POTASSIUM SERPL-SCNC: 4.3 MMOL/L (ref 3.5–5.1)
RBC # BLD AUTO: 3.01 M/UL (ref 3.8–5.2)
SERVICE CMNT-IMP: ABNORMAL
SERVICE CMNT-IMP: NORMAL
SODIUM SERPL-SCNC: 136 MMOL/L (ref 136–145)
VANCOMYCIN SERPL-MCNC: 16.6 UG/ML
WBC # BLD AUTO: 12.7 K/UL (ref 3.6–11)

## 2024-05-08 PROCEDURE — 92610 EVALUATE SWALLOWING FUNCTION: CPT

## 2024-05-08 PROCEDURE — 86022 PLATELET ANTIBODIES: CPT

## 2024-05-08 PROCEDURE — 99232 SBSQ HOSP IP/OBS MODERATE 35: CPT | Performed by: INTERNAL MEDICINE

## 2024-05-08 PROCEDURE — 82962 GLUCOSE BLOOD TEST: CPT

## 2024-05-08 PROCEDURE — 6360000002 HC RX W HCPCS: Performed by: HOSPITALIST

## 2024-05-08 PROCEDURE — 80048 BASIC METABOLIC PNL TOTAL CA: CPT

## 2024-05-08 PROCEDURE — 2060000000 HC ICU INTERMEDIATE R&B

## 2024-05-08 PROCEDURE — 2700000000 HC OXYGEN THERAPY PER DAY

## 2024-05-08 PROCEDURE — 36415 COLL VENOUS BLD VENIPUNCTURE: CPT

## 2024-05-08 PROCEDURE — 97530 THERAPEUTIC ACTIVITIES: CPT

## 2024-05-08 PROCEDURE — 2580000003 HC RX 258: Performed by: HOSPITALIST

## 2024-05-08 PROCEDURE — 87070 CULTURE OTHR SPECIMN AEROBIC: CPT

## 2024-05-08 PROCEDURE — 6370000000 HC RX 637 (ALT 250 FOR IP)

## 2024-05-08 PROCEDURE — 94640 AIRWAY INHALATION TREATMENT: CPT

## 2024-05-08 PROCEDURE — 6370000000 HC RX 637 (ALT 250 FOR IP): Performed by: HOSPITALIST

## 2024-05-08 PROCEDURE — 97535 SELF CARE MNGMENT TRAINING: CPT

## 2024-05-08 PROCEDURE — 87205 SMEAR GRAM STAIN: CPT

## 2024-05-08 PROCEDURE — 97116 GAIT TRAINING THERAPY: CPT

## 2024-05-08 PROCEDURE — 85027 COMPLETE CBC AUTOMATED: CPT

## 2024-05-08 PROCEDURE — 94760 N-INVAS EAR/PLS OXIMETRY 1: CPT

## 2024-05-08 PROCEDURE — 80202 ASSAY OF VANCOMYCIN: CPT

## 2024-05-08 RX ORDER — AZITHROMYCIN 250 MG/1
500 TABLET, FILM COATED ORAL DAILY
Status: DISCONTINUED | OUTPATIENT
Start: 2024-05-08 | End: 2024-05-10 | Stop reason: HOSPADM

## 2024-05-08 RX ADMIN — CEFEPIME 2000 MG: 2 INJECTION, POWDER, FOR SOLUTION INTRAVENOUS at 17:40

## 2024-05-08 RX ADMIN — GUAIFENESIN 600 MG: 600 TABLET, EXTENDED RELEASE ORAL at 09:21

## 2024-05-08 RX ADMIN — POTASSIUM CHLORIDE 40 MEQ: 750 TABLET, FILM COATED, EXTENDED RELEASE ORAL at 20:33

## 2024-05-08 RX ADMIN — ATORVASTATIN CALCIUM 40 MG: 40 TABLET, FILM COATED ORAL at 09:21

## 2024-05-08 RX ADMIN — INSULIN LISPRO 2 UNITS: 100 INJECTION, SOLUTION INTRAVENOUS; SUBCUTANEOUS at 12:32

## 2024-05-08 RX ADMIN — IPRATROPIUM BROMIDE AND ALBUTEROL SULFATE 1 DOSE: .5; 3 SOLUTION RESPIRATORY (INHALATION) at 17:58

## 2024-05-08 RX ADMIN — GUAIFENESIN 600 MG: 600 TABLET, EXTENDED RELEASE ORAL at 20:33

## 2024-05-08 RX ADMIN — HEPARIN SODIUM 5000 UNITS: 5000 INJECTION INTRAVENOUS; SUBCUTANEOUS at 03:06

## 2024-05-08 RX ADMIN — Medication 100 MG: at 09:21

## 2024-05-08 RX ADMIN — SODIUM CHLORIDE, POTASSIUM CHLORIDE, SODIUM LACTATE AND CALCIUM CHLORIDE: 600; 310; 30; 20 INJECTION, SOLUTION INTRAVENOUS at 03:06

## 2024-05-08 RX ADMIN — MAGNESIUM OXIDE TAB 400 MG (241.3 MG ELEMENTAL MG) 400 MG: 400 (241.3 MG) TAB at 20:33

## 2024-05-08 RX ADMIN — POTASSIUM CHLORIDE 40 MEQ: 750 TABLET, FILM COATED, EXTENDED RELEASE ORAL at 09:22

## 2024-05-08 RX ADMIN — ALLOPURINOL 100 MG: 100 TABLET ORAL at 09:21

## 2024-05-08 RX ADMIN — OXYCODONE HYDROCHLORIDE AND ACETAMINOPHEN 1 TABLET: 5; 325 TABLET ORAL at 03:04

## 2024-05-08 RX ADMIN — SODIUM CHLORIDE, PRESERVATIVE FREE 10 ML: 5 INJECTION INTRAVENOUS at 09:22

## 2024-05-08 RX ADMIN — INSULIN GLARGINE 58 UNITS: 100 INJECTION, SOLUTION SUBCUTANEOUS at 09:28

## 2024-05-08 RX ADMIN — ISOSORBIDE MONONITRATE 30 MG: 30 TABLET, EXTENDED RELEASE ORAL at 09:21

## 2024-05-08 RX ADMIN — CEFEPIME 1000 MG: 1 INJECTION, POWDER, FOR SOLUTION INTRAMUSCULAR; INTRAVENOUS at 04:39

## 2024-05-08 RX ADMIN — SODIUM CHLORIDE, POTASSIUM CHLORIDE, SODIUM LACTATE AND CALCIUM CHLORIDE: 600; 310; 30; 20 INJECTION, SOLUTION INTRAVENOUS at 13:15

## 2024-05-08 RX ADMIN — ACETAMINOPHEN 650 MG: 325 TABLET ORAL at 20:32

## 2024-05-08 RX ADMIN — AZITHROMYCIN DIHYDRATE 500 MG: 250 TABLET ORAL at 09:21

## 2024-05-08 RX ADMIN — ARFORMOTEROL TARTRATE: 15 SOLUTION RESPIRATORY (INHALATION) at 07:47

## 2024-05-08 RX ADMIN — ARFORMOTEROL TARTRATE: 15 SOLUTION RESPIRATORY (INHALATION) at 19:30

## 2024-05-08 RX ADMIN — HEPARIN SODIUM 5000 UNITS: 5000 INJECTION INTRAVENOUS; SUBCUTANEOUS at 09:22

## 2024-05-08 RX ADMIN — THERA TABS 1 TABLET: TAB at 09:21

## 2024-05-08 RX ADMIN — MAGNESIUM OXIDE TAB 400 MG (241.3 MG ELEMENTAL MG) 400 MG: 400 (241.3 MG) TAB at 09:21

## 2024-05-08 ASSESSMENT — PAIN DESCRIPTION - LOCATION
LOCATION: HEAD
LOCATION: HEAD

## 2024-05-08 ASSESSMENT — PAIN DESCRIPTION - DESCRIPTORS: DESCRIPTORS: ACHING

## 2024-05-08 ASSESSMENT — PAIN SCALES - GENERAL: PAINLEVEL_OUTOF10: 3

## 2024-05-08 ASSESSMENT — PAIN DESCRIPTION - ORIENTATION: ORIENTATION: RIGHT

## 2024-05-08 NOTE — PROGRESS NOTES
Hospital Progress Note  Celso Roa MD   Answering service: [phone] OR    [employer]      NAME:  Zuleyma Sharp   :   1950   MRN:  [id number]     Date/Time:  2024 8:38 AM    Plan:     Continue cefepime/azithro pending cultures  Jet neb  Ck sputum  Risk of Deterioration: Low  []           Moderate  [x]           High  []                 Assessment:     Principal Problem:    Sepsis (HCC)     Severe sepsis as manifested by hypotension, tachycardia, lactic acidosis, left shift with organ damage as reflected by acute on chronic kidney failure.    Source of infection undetermined likely pneumonia, she has productive cough.  Workup thus far negative.  - Fluid resuscitated, hypotension resolved.  Continue broad-spectrum coverage with antibiotics with cefepime and vancomycin  - UA positive for bacteriuria and leukocyte esterase, no pyuria.  Blood cultures remain negative.  Respiratory viral panel negative.  CT of the chest, abdomen and pelvis negative for any source of infection.    Active Problems:    Acute kidney injury superimposed on chronic kidney disease ([facility])     Pre renal     Responding to fluid recessation      Dyslipidemia     Lipitor      Hypertension     Titrate home meds      Type 2 diabetes mellitus with kidney complication, with long-term current use of insulin ([facility])      Lantus and correctional scale       Chronic heart failure with preserved ejection fraction (HCC)      Hold cardiac meds including Entresto, spironolactone, bumetanide due to sepsis, hypotension, REVA     Resolved Problems:    * No resolved hospital problems. *       Admitting notes: 74 y.o. female with PMH significant for type 2 insulin-dependent diabetes, dilated cardiomyopathy/CHF recovered EF presented to the ED for occipital headache/pain behind the right ear.  Patient endorses cough productive of whitish sputum, subjective fever and chills for the last few days.  She also endorses loose bowel movements but  MG per tablet 1 tablet  1 tablet Oral Q4H PRN    HYDROmorphone HCl PF (DILAUDID) injection 0.5 mg  0.5 mg IntraVENous Q6H PRN    lactated ringers bolus 250 mL  250 mL IntraVENous Once    sodium chloride flush 0.9 % injection 5-40 mL  5-40 mL IntraVENous 2 times per day    sodium chloride flush 0.9 % injection 5-40 mL  5-40 mL IntraVENous PRN    0.9 % sodium chloride infusion   IntraVENous PRN    heparin (porcine) injection 5,000 Units  5,000 Units SubCUTAneous q8h    acetaminophen (TYLENOL) tablet 650 mg  650 mg Oral Q6H PRN    Or    acetaminophen (TYLENOL) suppository 650 mg  650 mg Rectal Q6H PRN    glucose chewable tablet 16 g  4 tablet Oral PRN    dextrose bolus 10% 125 mL  125 mL IntraVENous PRN    Or    dextrose bolus 10% 250 mL  250 mL IntraVENous PRN    glucagon injection 1 mg  1 mg SubCUTAneous PRN    dextrose 10 % infusion   IntraVENous Continuous PRN    insulin lispro (HUMALOG) injection vial 0-8 Units  0-8 Units SubCUTAneous TID WC    insulin lispro (HUMALOG) injection vial 0-4 Units  0-4 Units SubCUTAneous Nightly    allopurinol (ZYLOPRIM) tablet 100 mg  100 mg Oral Daily    atorvastatin (LIPITOR) tablet 40 mg  40 mg Oral Daily    insulin glargine (LANTUS) injection vial 58 Units  58 Units SubCUTAneous QAM    isosorbide mononitrate (IMDUR) extended release tablet 30 mg  30 mg Oral QAM    melatonin tablet 6 mg  6 mg Oral Nightly PRN    arformoterol 15 mcg-budesonide 0.5 mg neb solution   Nebulization BID RT    multivitamin 1 tablet  1 tablet Oral Daily    LORazepam (ATIVAN) tablet 1 mg  1 mg Oral Q1H PRN    Or    LORazepam (ATIVAN) injection 1 mg  1 mg IntraVENous Q1H PRN    Or    LORazepam (ATIVAN) tablet 2 mg  2 mg Oral Q1H PRN    Or    LORazepam (ATIVAN) injection 2 mg  2 mg IntraVENous Q1H PRN    Or    LORazepam (ATIVAN) tablet 3 mg  3 mg Oral Q1H PRN    Or    LORazepam (ATIVAN) injection 3 mg  3 mg IntraVENous Q1H PRN    Or    LORazepam (ATIVAN) tablet 4 mg  4 mg Oral Q1H PRN    Or    LORazepam

## 2024-05-08 NOTE — PLAN OF CARE
Problem: Physical Therapy - Adult  Goal: By Discharge: Performs mobility at highest level of function for planned discharge setting.  See evaluation for individualized goals.  Description: FUNCTIONAL STATUS PRIOR TO ADMISSION: Patient was independent and active without use of DME.    HOME SUPPORT PRIOR TO ADMISSION: The patient lived with friend and daughter but did not require assistance.    Physical Therapy Goals  Initiated 5/7/2024  1.  Patient will move from supine to sit and sit to supine in bed with independence within 7 day(s).    2.  Patient will perform sit to stand with modified independence within 7 day(s).  3.  Patient will transfer from bed to chair and chair to bed with modified independence using the least restrictive device within 7 day(s).  4.  Patient will ambulate with supervision/set-up for 150 feet with the least restrictive device within 7 day(s).   5.  Patient will ascend/descend 4 stairs with  handrail(s) with supervision/set-up within 7 day(s).    Outcome: Progressing   PHYSICAL THERAPY TREATMENT    Patient: Zuleyma Sharp (74 y.o. female)  Date: 5/8/2024  Diagnosis: Sepsis (HCC) [A41.9]  Acute renal failure, unspecified acute renal failure type (HCC) [N17.9]  Nonintractable episodic headache, unspecified headache type [R51.9]  Sepsis with acute renal failure and septic shock, due to unspecified organism, unspecified acute renal failure type (HCC) [A41.9, R65.21, N17.9] Sepsis (HCC)      Precautions:                        ASSESSMENT:  Patient continues to benefit from skilled PT services and is progressing towards goals. Barriers to pt independence with functional mobility include impaired cognition (delayed responses, mild confusion, decreased safety awareness), general weakness, impaired standing balance, gait dysfunction, increased risk for fall.    Pt received in bed with stable vitals on RA. Ox4, but demo delayed responses to questions/ instruction requiring increased time and

## 2024-05-08 NOTE — PROGRESS NOTES
Day #3 of cefepime  Indication:  sepsis  Current regimen:  1000 mg IV Q12H  Abx regimen: cefepime + axithromycin  Recent Labs     24  1238 24  0259 24  0307   CREATININE 2.62* 2.08* 1.60*   BUN 61* 56* 41*     Est CrCl: 35 ml/min; UO: 0.5 ml/kg/hr  Temp (24hrs), Av.9 °F (37.2 °C), Min:98.3 °F (36.8 °C), Max:99.6 °F (37.6 °C)    Cultures:    blood x 2: NGTD    MRSA screen: negative   sputum: few GNR, prelim    Plan: Change to 2000 mg IV Q12H for improved renal function with crcl now > 30 ml/min.

## 2024-05-08 NOTE — PLAN OF CARE
Problem: Occupational Therapy - Adult  Goal: By Discharge: Performs self-care activities at highest level of function for planned discharge setting.  See evaluation for individualized goals.  Description: FUNCTIONAL STATUS PRIOR TO ADMISSION:  Patient was ambulatory using cane intermittently. Patient endorses history of falls. Reports independence in ADLs.     ADL Assistance: Independent, Ambulation Assistance: Independent, Active : Yes     HOME SUPPORT: Patient lived with friend and 9 year old daughter but didn't require assistance.    Occupational Therapy Goals:  Initiated 5/7/2024  1.  Patient will perform brief standing grooming with Modified [place] within 7 day(s).  2.  Patient will perform seated / standing bathing with Modified [place] within 7 day(s).  3.  Patient will perform lower body dressing with Modified [place] within 7 day(s).  4.  Patient will perform toilet transfers with Modified [place]  within 7 day(s).  5.  Patient will perform all aspects of toileting with Modified [place] within 7 day(s).  6.  Patient will participate in upper extremity therapeutic exercise/activities with Modified [place] for 7 minutes within 7 day(s).    7.  Patient will utilize energy conservation techniques during functional activities with verbal cues within 7 day(s).      Outcome: Progressing   OCCUPATIONAL THERAPY TREATMENT  Patient: Zuleyma Sharp (74 y.o. female)  Date: 5/8/2024  Primary Diagnosis: Sepsis (HCC) [A41.9]  Acute renal failure, unspecified acute renal failure type (HCC) [N17.9]  Nonintractable episodic headache, unspecified headache type [R51.9]  Sepsis with acute renal failure and septic shock, due to unspecified organism, unspecified acute renal failure type (HCC) [A41.9, R65.21, N17.9]       Precautions:                  Chart, occupational therapy assessment, plan of care, and goals were reviewed.    ASSESSMENT  Patient continues to benefit from skilled OT  nurse    Patient Education  Education Given To: Patient  Education Provided: Role of Therapy;Energy Conservation;Fall Prevention Strategies;Plan of Care;Precautions;ADL Adaptive Strategies;Transfer Training;Equipment  Education Method: Verbal;Demonstration  Barriers to Learning: Cognition  Education Outcome: Verbalized understanding;Continued education needed    Thank you for this referral.  Lyla Mejia OT  Minutes: 34

## 2024-05-09 ENCOUNTER — APPOINTMENT (OUTPATIENT)
Facility: HOSPITAL | Age: 74
End: 2024-05-09
Payer: MEDICARE

## 2024-05-09 LAB
AMMONIA PLAS-SCNC: 20 UMOL/L
ANION GAP SERPL CALC-SCNC: 2 MMOL/L (ref 5–15)
BACTERIA SPEC CULT: NORMAL
BASOPHILS # BLD: 0 K/UL (ref 0–0.1)
BASOPHILS NFR BLD: 0 % (ref 0–1)
BUN SERPL-MCNC: 32 MG/DL (ref 6–20)
BUN/CREAT SERPL: 23 (ref 12–20)
CALCIUM SERPL-MCNC: 9 MG/DL (ref 8.5–10.1)
CHLORIDE SERPL-SCNC: 110 MMOL/L (ref 97–108)
CO2 SERPL-SCNC: 24 MMOL/L (ref 21–32)
CREAT SERPL-MCNC: 1.42 MG/DL (ref 0.55–1.02)
DIFFERENTIAL METHOD BLD: ABNORMAL
EOSINOPHIL # BLD: 0.7 K/UL (ref 0–0.4)
EOSINOPHIL NFR BLD: 5 % (ref 0–7)
ERYTHROCYTE [DISTWIDTH] IN BLOOD BY AUTOMATED COUNT: 14.2 % (ref 11.5–14.5)
FERRITIN SERPL-MCNC: 1531 NG/ML (ref 26–388)
GLUCOSE BLD STRIP.AUTO-MCNC: 106 MG/DL (ref 65–117)
GLUCOSE BLD STRIP.AUTO-MCNC: 122 MG/DL (ref 65–117)
GLUCOSE BLD STRIP.AUTO-MCNC: 146 MG/DL (ref 65–117)
GLUCOSE BLD STRIP.AUTO-MCNC: 170 MG/DL (ref 65–117)
GLUCOSE BLD STRIP.AUTO-MCNC: 266 MG/DL (ref 65–117)
GLUCOSE SERPL-MCNC: 109 MG/DL (ref 65–100)
GRAM STN SPEC: NORMAL
HCT VFR BLD AUTO: 24 % (ref 35–47)
HGB BLD-MCNC: 8.1 G/DL (ref 11.5–16)
IMM GRANULOCYTES # BLD AUTO: 0 K/UL
IMM GRANULOCYTES NFR BLD AUTO: 0 %
IRON SATN MFR SERPL: 38 % (ref 20–50)
IRON SERPL-MCNC: 44 UG/DL (ref 35–150)
LYMPHOCYTES # BLD: 1.5 K/UL (ref 0.8–3.5)
LYMPHOCYTES NFR BLD: 11 % (ref 12–49)
MAGNESIUM SERPL-MCNC: 2 MG/DL (ref 1.6–2.4)
MCH RBC QN AUTO: 28.1 PG (ref 26–34)
MCHC RBC AUTO-ENTMCNC: 33.8 G/DL (ref 30–36.5)
MCV RBC AUTO: 83.3 FL (ref 80–99)
METAMYELOCYTES NFR BLD MANUAL: 1 %
MONOCYTES # BLD: 0.4 K/UL (ref 0–1)
MONOCYTES NFR BLD: 3 % (ref 5–13)
NEUTS SEG # BLD: 10.6 K/UL (ref 1.8–8)
NEUTS SEG NFR BLD: 80 % (ref 32–75)
NRBC # BLD: 0 K/UL (ref 0–0.01)
NRBC BLD-RTO: 0 PER 100 WBC
PF4 HEPARIN CMPLX AB SER-ACNC: 0.08 OD (ref 0–0.4)
PLATELET # BLD AUTO: 144 K/UL (ref 150–400)
PMV BLD AUTO: 11.3 FL (ref 8.9–12.9)
POTASSIUM SERPL-SCNC: 4.6 MMOL/L (ref 3.5–5.1)
RBC # BLD AUTO: 2.88 M/UL (ref 3.8–5.2)
RBC MORPH BLD: ABNORMAL
SERVICE CMNT-IMP: ABNORMAL
SERVICE CMNT-IMP: NORMAL
SERVICE CMNT-IMP: NORMAL
SODIUM SERPL-SCNC: 136 MMOL/L (ref 136–145)
TIBC SERPL-MCNC: 117 UG/DL (ref 250–450)
WBC # BLD AUTO: 13.2 K/UL (ref 3.6–11)

## 2024-05-09 PROCEDURE — 82728 ASSAY OF FERRITIN: CPT

## 2024-05-09 PROCEDURE — 83550 IRON BINDING TEST: CPT

## 2024-05-09 PROCEDURE — 2060000000 HC ICU INTERMEDIATE R&B

## 2024-05-09 PROCEDURE — 83735 ASSAY OF MAGNESIUM: CPT

## 2024-05-09 PROCEDURE — 83540 ASSAY OF IRON: CPT

## 2024-05-09 PROCEDURE — 6360000002 HC RX W HCPCS: Performed by: HOSPITALIST

## 2024-05-09 PROCEDURE — 6370000000 HC RX 637 (ALT 250 FOR IP): Performed by: HOSPITALIST

## 2024-05-09 PROCEDURE — 2580000003 HC RX 258: Performed by: INTERNAL MEDICINE

## 2024-05-09 PROCEDURE — 94640 AIRWAY INHALATION TREATMENT: CPT

## 2024-05-09 PROCEDURE — 80048 BASIC METABOLIC PNL TOTAL CA: CPT

## 2024-05-09 PROCEDURE — 2580000003 HC RX 258: Performed by: HOSPITALIST

## 2024-05-09 PROCEDURE — 36415 COLL VENOUS BLD VENIPUNCTURE: CPT

## 2024-05-09 PROCEDURE — 85025 COMPLETE CBC W/AUTO DIFF WBC: CPT

## 2024-05-09 PROCEDURE — 99232 SBSQ HOSP IP/OBS MODERATE 35: CPT | Performed by: INTERNAL MEDICINE

## 2024-05-09 PROCEDURE — 70551 MRI BRAIN STEM W/O DYE: CPT

## 2024-05-09 PROCEDURE — 86022 PLATELET ANTIBODIES: CPT

## 2024-05-09 PROCEDURE — 82962 GLUCOSE BLOOD TEST: CPT

## 2024-05-09 PROCEDURE — 6370000000 HC RX 637 (ALT 250 FOR IP)

## 2024-05-09 PROCEDURE — 6370000000 HC RX 637 (ALT 250 FOR IP): Performed by: NURSE PRACTITIONER

## 2024-05-09 PROCEDURE — 82140 ASSAY OF AMMONIA: CPT

## 2024-05-09 PROCEDURE — 6360000002 HC RX W HCPCS: Performed by: INTERNAL MEDICINE

## 2024-05-09 RX ORDER — CITALOPRAM HYDROBROMIDE 10 MG/1
10 TABLET ORAL DAILY
Qty: 30 TABLET | Refills: 3 | Status: SHIPPED | OUTPATIENT
Start: 2024-05-10

## 2024-05-09 RX ORDER — IPRATROPIUM BROMIDE AND ALBUTEROL SULFATE 2.5; .5 MG/3ML; MG/3ML
3 SOLUTION RESPIRATORY (INHALATION) EVERY 6 HOURS PRN
Qty: 360 ML | Refills: 11 | Status: SHIPPED | OUTPATIENT
Start: 2024-05-09

## 2024-05-09 RX ORDER — CITALOPRAM 20 MG/1
10 TABLET ORAL DAILY
Status: DISCONTINUED | OUTPATIENT
Start: 2024-05-09 | End: 2024-05-10 | Stop reason: HOSPADM

## 2024-05-09 RX ORDER — ARFORMOTEROL TARTRATE 15 UG/2ML
1 SOLUTION RESPIRATORY (INHALATION) 2 TIMES DAILY
Qty: 120 ML | Refills: 3 | Status: SHIPPED | OUTPATIENT
Start: 2024-05-09

## 2024-05-09 RX ORDER — SODIUM CHLORIDE, SODIUM LACTATE, POTASSIUM CHLORIDE, CALCIUM CHLORIDE 600; 310; 30; 20 MG/100ML; MG/100ML; MG/100ML; MG/100ML
INJECTION, SOLUTION INTRAVENOUS CONTINUOUS
Status: DISCONTINUED | OUTPATIENT
Start: 2024-05-09 | End: 2024-05-10 | Stop reason: HOSPADM

## 2024-05-09 RX ORDER — AZITHROMYCIN 500 MG/1
500 TABLET, FILM COATED ORAL DAILY
Qty: 3 TABLET | Refills: 0 | Status: SHIPPED | OUTPATIENT
Start: 2024-05-10 | End: 2024-05-13

## 2024-05-09 RX ADMIN — GUAIFENESIN 600 MG: 600 TABLET, EXTENDED RELEASE ORAL at 21:47

## 2024-05-09 RX ADMIN — WATER 1000 MG: 1 INJECTION INTRAMUSCULAR; INTRAVENOUS; SUBCUTANEOUS at 10:24

## 2024-05-09 RX ADMIN — AZITHROMYCIN DIHYDRATE 500 MG: 250 TABLET ORAL at 10:24

## 2024-05-09 RX ADMIN — GUAIFENESIN 600 MG: 600 TABLET, EXTENDED RELEASE ORAL at 10:24

## 2024-05-09 RX ADMIN — SODIUM CHLORIDE, POTASSIUM CHLORIDE, SODIUM LACTATE AND CALCIUM CHLORIDE: 600; 310; 30; 20 INJECTION, SOLUTION INTRAVENOUS at 02:21

## 2024-05-09 RX ADMIN — OXYCODONE HYDROCHLORIDE AND ACETAMINOPHEN 1 TABLET: 5; 325 TABLET ORAL at 05:41

## 2024-05-09 RX ADMIN — POTASSIUM CHLORIDE 40 MEQ: 750 TABLET, FILM COATED, EXTENDED RELEASE ORAL at 21:47

## 2024-05-09 RX ADMIN — ATORVASTATIN CALCIUM 40 MG: 40 TABLET, FILM COATED ORAL at 10:24

## 2024-05-09 RX ADMIN — MAGNESIUM OXIDE TAB 400 MG (241.3 MG ELEMENTAL MG) 400 MG: 400 (241.3 MG) TAB at 21:47

## 2024-05-09 RX ADMIN — ISOSORBIDE MONONITRATE 30 MG: 30 TABLET, EXTENDED RELEASE ORAL at 10:24

## 2024-05-09 RX ADMIN — ALLOPURINOL 100 MG: 100 TABLET ORAL at 10:24

## 2024-05-09 RX ADMIN — SODIUM CHLORIDE, PRESERVATIVE FREE 10 ML: 5 INJECTION INTRAVENOUS at 10:25

## 2024-05-09 RX ADMIN — SODIUM CHLORIDE, POTASSIUM CHLORIDE, SODIUM LACTATE AND CALCIUM CHLORIDE: 600; 310; 30; 20 INJECTION, SOLUTION INTRAVENOUS at 16:51

## 2024-05-09 RX ADMIN — MAGNESIUM OXIDE TAB 400 MG (241.3 MG ELEMENTAL MG) 400 MG: 400 (241.3 MG) TAB at 10:25

## 2024-05-09 RX ADMIN — CEFEPIME 2000 MG: 2 INJECTION, POWDER, FOR SOLUTION INTRAVENOUS at 05:19

## 2024-05-09 RX ADMIN — ARFORMOTEROL TARTRATE: 15 SOLUTION RESPIRATORY (INHALATION) at 08:11

## 2024-05-09 RX ADMIN — INSULIN LISPRO 4 UNITS: 100 INJECTION, SOLUTION INTRAVENOUS; SUBCUTANEOUS at 14:05

## 2024-05-09 RX ADMIN — THERA TABS 1 TABLET: TAB at 10:24

## 2024-05-09 RX ADMIN — POTASSIUM CHLORIDE 40 MEQ: 750 TABLET, FILM COATED, EXTENDED RELEASE ORAL at 10:24

## 2024-05-09 RX ADMIN — SODIUM CHLORIDE, PRESERVATIVE FREE 10 ML: 5 INJECTION INTRAVENOUS at 21:47

## 2024-05-09 RX ADMIN — CITALOPRAM 10 MG: 20 TABLET, FILM COATED ORAL at 14:05

## 2024-05-09 RX ADMIN — Medication 100 MG: at 10:24

## 2024-05-09 RX ADMIN — INSULIN GLARGINE 58 UNITS: 100 INJECTION, SOLUTION SUBCUTANEOUS at 10:25

## 2024-05-09 ASSESSMENT — PAIN DESCRIPTION - DESCRIPTORS: DESCRIPTORS: ACHING

## 2024-05-09 ASSESSMENT — PAIN SCALES - GENERAL: PAINLEVEL_OUTOF10: 8

## 2024-05-09 ASSESSMENT — PAIN DESCRIPTION - LOCATION: LOCATION: HEAD

## 2024-05-09 NOTE — PROGRESS NOTES
Occupational Therapy    Patient chart reviewed up to date and therapy cleared by RN. Attempted visit, however patient politely declines offers of activity/mobility at this time. Agreeable to therapy following up later today as able and appropriate.    Lyla Mejia OTR/L
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
## 2024-05-09 NOTE — PROGRESS NOTES
Hospital Progress Note    NAME:  Goran Porras   :   1950   MRN:  [id number]     Date/Time:  2021 9:01 AM    Plan:   1. Card opinion  2. Pul conslult  3. cta chest  Risk of Deterioration: Low  []           Moderate  [x]           High  []                 Assessment:   Principal Problem:    Angina at rest [facility]) (2021)     3/11/21 cath:      1. Normal coronary arteries without any obstructive disease  2. Severely reduced cardiac index/outputs. By thermodilution cardiac output was 3.05 L/min and index was 1.6 L/min/m². By Bebeto Ran based on Lafarge formula, cardiac output was 2.6 L/min with index of 1.35 L/min/m² meter. Using Maribel formula, cardiac output was 3.4 L/min and index was 1.9 L/min/m². 3.  High SVR of about 20 Wood units. 4.  Moderate pulmonary hypertension mixed pattern with elevated capillary wedge pressure mean of 27. PVR is about 4 Novak units  5. Elevated right-sided filling pressure with mean RA pressure of 25. Active Problems:    Dyslipidemia (2014)      Hypertension (2014)      Titrate home meds      Uncontrolled type 2 diabetes mellitus with hyperglycemia ([facility] [address] (2020)     Correctional scale      Diastolic heart failure, NYHA class 2 (HCC) ()      Pulmonary infiltrates (2021)     CT chest     Pul opinion          Admitting notes:71 y.o. female with history of HFrEF, insulin-dependent type 2 diabetes, hypertension, dyslipidemia, obesity who presents to hospital with complaints of chest pain. Patient states she had been in her usual state of health when she was awoken from her sleep by left-sided sharp and pressure-like pain. Pain is nonradiating, rated a 6/10. Intermittent in nature and occurring at rest.  Currently pain-free.   No associated palpitations, PND, orthopnea or lower extremity edema.     The patient denies any fever, chills, chest or abdominal pain, nausea, vomiting, cough, congestion, recent illness, palpitations, or dysuria.     Remarkable vitals on ER Presentations: Vital signs unremarkable. Labs Remarkable for: At baseline. ER Images: Chest x-ray shows mild diffuse patchy interstitial opacities      Subjective:     Feeling better  - no further cp  11 Point Review of Systems:   Negative except productive cough    []            Unable to obtain ROS due to:       []            mental status change []            sedated []            intubated     Social History     Tobacco Use    Smoking status: Former Smoker     Packs/day: 0.25     Years: 20.00     Pack years: 5.00     Quit date: 2014     Years since quittin.9    Smokeless tobacco: Never Used   Substance Use Topics    Alcohol use:  Yes     Alcohol/week: 14.0 standard drinks     Types: 14 Cans of beer per week     Comment: occ     Medications reviewed:  Current Facility-Administered Medications   Medication Dose Route Frequency    thiamine mononitrate (B-1) tablet 100 mg  100 mg Oral DAILY    allopurinoL (ZYLOPRIM) tablet 100 mg  100 mg Oral DAILY    atorvastatin (LIPITOR) tablet 40 mg  40 mg Oral DAILY    sacubitriL-valsartan (ENTRESTO) 24-26 mg tablet 1 Tablet  1 Tablet Oral BID    hydrALAZINE (APRESOLINE) tablet 50 mg  50 mg Oral TID    isosorbide mononitrate ER (IMDUR) tablet 30 mg  30 mg Oral 7am    melatonin tablet 3 mg  3 mg Oral QHS PRN    oxybutynin (DITROPAN) tablet 5 mg  5 mg Oral TID    spironolactone (ALDACTONE) tablet 25 mg  25 mg Oral DAILY    arformoteroL (BROVANA) neb solution 15 mcg  15 mcg Nebulization BID RT    And    budesonide (PULMICORT) 500 mcg/2 ml nebulizer suspension  500 mcg Nebulization BID RT    sodium chloride (NS) flush 5-40 mL  5-40 mL IntraVENous Q8H    sodium chloride (NS) flush 5-40 mL  5-40 mL IntraVENous PRN    acetaminophen (TYLENOL) tablet 650 mg  650 mg Oral Q6H PRN    Or    acetaminophen (TYLENOL) suppository 650 mg  650 mg Rectal Q6H PRN    polyethylene glycol (MIRALAX) packet 17 g  17 g Oral DAILY PRN    ondansetron (ZOFRAN ODT) tablet 4 mg  4 mg Oral Q8H PRN    Or    ondansetron (ZOFRAN) injection 4 mg  4 mg IntraVENous Q6H PRN    heparin (porcine) injection 5,000 Units  5,000 Units SubCUTAneous Q8H        Objective:   Vitals:  Visit Vitals  /73 (BP 1 Location: Left upper arm, BP Patient Position: At rest)   Pulse 69   Temp 98.1 °F (36.7 °C)   Resp 18   Ht 5' 4\" (1.626 m)   Wt 180 lb (81.6 kg)   SpO2 92%   BMI 30.90 kg/m²     Temp (24hrs), Av.9 °F (36.6 °C), Min:97.6 °F (36.4 °C), Max:98.1 °F (36.7 °C)      O2 Device: None (Room air)    Last 24hr Input/Output:  No intake or output data in the 24 hours ending 21 0901     PHYSICAL EXAM:  General:    Alert, cooperative, no distress, appears stated age. Head:   Normocephalic, without obvious abnormality, atraumatic. Eyes:   Conjunctivae/corneas clear. PERRLA  Nose:  Nares normal. No drainage or sinus tenderness. Throat:    Lips, mucosa, and tongue normal.  No Thrush  Neck:  Supple, symmetrical,  no adenopathy, thyroid: non tender    no carotid bruit and no JVD. Back:    Symmetric,  No CVA tenderness. Lungs:   Clear to auscultation bilaterally. No Wheezing or Rhonchi. No rales. Chest wall:  No tenderness or deformity. No Accessory muscle use. Heart:   Regular rate and rhythm,  no murmur, rub or gallop. Abdomen:   Soft, non-tender. Not distended. Bowel sounds normal. No masses  Extremities: Extremities normal, atraumatic, No cyanosis. No edema. No clubbing  Skin:     Texture, turgor normal. No rashes or lesions. Not Jaundiced  Lymph nodes: Cervical, supraclavicular normal.  Psych:  Good insight. Not depressed. Not anxious or agitated. Neurologic: Normal strength, Alert and oriented X 3.        Lab Data Reviewed:    Recent Labs     21   WBC 5.3   HGB 10.6*   HCT 34.0*        Recent Labs     212 21    136   K 3.2* 3.8    105   CO2 27 27   * 136*   BUN 30* 35*   CREA 0.80 0.99 CA 9.1 9.2   ALB  --  3.4*   TBILI  --  0.4   ALT  --  26     Lab Results   Component Value Date/Time    Glucose (POC) 160 (H) 03/20/2021 11:39 AM    Glucose (POC) 94 03/20/2021 07:58 AM    Glucose (POC) 107 (H) 03/19/2021 09:54 PM    Glucose (POC) 125 (H) 03/19/2021 05:31 PM    Glucose (POC) 99 03/19/2021 12:01 PM     No results for input(s): PH, PCO2, PO2, HCO3, FIO2 in the last 72 hours. No results for input(s): INR, INREXT in the last 72 hours.   ___________________________________________________  ___________________________________________________    Attending Physician: Mike Talamantes MD Oriented - self; Oriented - place; Oriented - time
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
## 2024-05-09 NOTE — PROGRESS NOTES
Spiritual Care Assessment/Progress Note  [facility]    Name: Zuleyma Sharp MRN: [id number]    Age: 74 y.o.     Sex: female   Language: English     Date: 5/9/2024            Total Time Calculated: 8 min              Spiritual Assessment begun in [facility] 4W TELEMETRY  Service Provided For: Patient, Family, Friend  Referral/Consult From: Nurse  Encounter Overview/Reason: Spiritual/Emotional Needs    Spiritual beliefs:      [x] Involved in a rosy tradition/spiritual practice:      [] Supported by a rosy community:      [] Claims no spiritual orientation:      [] Seeking spiritual identity:           [] Adheres to an individual form of spirituality:      [] Not able to assess:                Identified resources for coping and support system:   Support System: Children       [x] Prayer                  [] Devotional reading               [] Music                  [] Guided Imagery     [] Pet visits                                        [] Other: (COMMENT)     Specific area/focus of visit   Encounter:    Crisis:    Spiritual/Emotional needs: Type: Emotional Distress  Ritual, Rites and Sacraments:    Grief, Loss, and Adjustments:    Ethics/Mediation:    Behavioral Health:    Palliative Care:    Advance Care Planning:      Plan/Referrals: Developed Care Plan (see consult note)    Narrative:     This visit is in response to a consult placed by a nurse for a patient who was experiencing emotional distress. Inside the room were the patient, her daughter, and a friend. The patient immediately asked for prayer for the intentions of her family and for her health. She did not elaborate the reasons for her intentions, but the distress was evidenced by her tearful silence. She had four daughters. The daughter who was present was giving her emotional support. She brought flowers for her mother, the patient. I offered prayers for her intentions. She was a Restorationist, and sensing her religiosity, I offered her a bible and a  devotional reading material. She was very appreciative of them. I informed her of the availability of the chaplains should she feel the need for more spiritual support. I will relay my observation to the  of the unit so that she could decide to pay her a visit during her available time. Please call the 's office for further referrals.    tammie Calderón a  [phone]

## 2024-05-09 NOTE — PLAN OF CARE
Problem: Discharge Planning  Goal: Discharge to home or other facility with appropriate resources  5/9/2024 1429 by Yovana Connor RN  Outcome: Progressing  5/9/2024 0449 by Evelio Hunt, RN  Outcome: Progressing     Problem: Pain  Goal: Verbalizes/displays adequate comfort level or baseline comfort level  5/9/2024 1429 by Yovana Connor RN  Outcome: Progressing  5/9/2024 0449 by Evelio Hunt, RN  Outcome: Progressing     Problem: Safety - Adult  Goal: Free from fall injury  5/9/2024 1429 by Yovana Connor RN  Outcome: Progressing  5/9/2024 0449 by Evelio Hunt, RN  Outcome: Progressing     Problem: Chronic Conditions and Co-morbidities  Goal: Patient's chronic conditions and co-morbidity symptoms are monitored and maintained or improved  5/9/2024 1429 by Yovana Connor RN  Outcome: Progressing  5/9/2024 0449 by Evelio Hunt, RN  Outcome: Progressing
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
## 2024-05-10 VITALS
DIASTOLIC BLOOD PRESSURE: 65 MMHG | BODY MASS INDEX: 24.39 KG/M2 | SYSTOLIC BLOOD PRESSURE: 140 MMHG | WEIGHT: 142.86 LBS | RESPIRATION RATE: 18 BRPM | TEMPERATURE: 98 F | OXYGEN SATURATION: 98 % | HEART RATE: 75 BPM | HEIGHT: 64 IN

## 2024-05-10 LAB
ANION GAP SERPL CALC-SCNC: 4 MMOL/L (ref 5–15)
BACTERIA SPEC CULT: NORMAL
BASOPHILS # BLD: 0 K/UL (ref 0–0.1)
BASOPHILS NFR BLD: 0 % (ref 0–1)
BUN SERPL-MCNC: 24 MG/DL (ref 6–20)
BUN/CREAT SERPL: 22 (ref 12–20)
CALCIUM SERPL-MCNC: 9.1 MG/DL (ref 8.5–10.1)
CHLORIDE SERPL-SCNC: 109 MMOL/L (ref 97–108)
CO2 SERPL-SCNC: 25 MMOL/L (ref 21–32)
CREAT SERPL-MCNC: 1.11 MG/DL (ref 0.55–1.02)
DIFFERENTIAL METHOD BLD: ABNORMAL
EOSINOPHIL # BLD: 0.4 K/UL (ref 0–0.4)
EOSINOPHIL NFR BLD: 3 % (ref 0–7)
ERYTHROCYTE [DISTWIDTH] IN BLOOD BY AUTOMATED COUNT: 14.4 % (ref 11.5–14.5)
GLUCOSE BLD STRIP.AUTO-MCNC: 70 MG/DL (ref 65–117)
GLUCOSE BLD STRIP.AUTO-MCNC: 78 MG/DL (ref 65–117)
GLUCOSE SERPL-MCNC: 91 MG/DL (ref 65–100)
GRAM STN SPEC: NORMAL
GRAM STN SPEC: NORMAL
HCT VFR BLD AUTO: 24.5 % (ref 35–47)
HGB BLD-MCNC: 8.2 G/DL (ref 11.5–16)
IMM GRANULOCYTES # BLD AUTO: 0 K/UL
IMM GRANULOCYTES NFR BLD AUTO: 0 %
LYMPHOCYTES # BLD: 2.8 K/UL (ref 0.8–3.5)
LYMPHOCYTES NFR BLD: 23 % (ref 12–49)
MCH RBC QN AUTO: 28.2 PG (ref 26–34)
MCHC RBC AUTO-ENTMCNC: 33.5 G/DL (ref 30–36.5)
MCV RBC AUTO: 84.2 FL (ref 80–99)
MONOCYTES # BLD: 0.9 K/UL (ref 0–1)
MONOCYTES NFR BLD: 7 % (ref 5–13)
NEUTS BAND NFR BLD MANUAL: 3 % (ref 0–6)
NEUTS SEG # BLD: 8.2 K/UL (ref 1.8–8)
NEUTS SEG NFR BLD: 64 % (ref 32–75)
NRBC # BLD: 0 K/UL (ref 0–0.01)
NRBC BLD-RTO: 0 PER 100 WBC
PLATELET # BLD AUTO: 186 K/UL (ref 150–400)
PMV BLD AUTO: 10.8 FL (ref 8.9–12.9)
POTASSIUM SERPL-SCNC: 5 MMOL/L (ref 3.5–5.1)
RBC # BLD AUTO: 2.91 M/UL (ref 3.8–5.2)
RBC MORPH BLD: ABNORMAL
SERVICE CMNT-IMP: NORMAL
SODIUM SERPL-SCNC: 138 MMOL/L (ref 136–145)
WBC # BLD AUTO: 12.3 K/UL (ref 3.6–11)

## 2024-05-10 PROCEDURE — 6370000000 HC RX 637 (ALT 250 FOR IP)

## 2024-05-10 PROCEDURE — 82962 GLUCOSE BLOOD TEST: CPT

## 2024-05-10 PROCEDURE — 99238 HOSP IP/OBS DSCHRG MGMT 30/<: CPT | Performed by: INTERNAL MEDICINE

## 2024-05-10 PROCEDURE — 6360000002 HC RX W HCPCS: Performed by: INTERNAL MEDICINE

## 2024-05-10 PROCEDURE — 6360000002 HC RX W HCPCS: Performed by: HOSPITALIST

## 2024-05-10 PROCEDURE — 6370000000 HC RX 637 (ALT 250 FOR IP): Performed by: HOSPITALIST

## 2024-05-10 PROCEDURE — 94640 AIRWAY INHALATION TREATMENT: CPT

## 2024-05-10 PROCEDURE — 2580000003 HC RX 258: Performed by: INTERNAL MEDICINE

## 2024-05-10 PROCEDURE — 85025 COMPLETE CBC W/AUTO DIFF WBC: CPT

## 2024-05-10 PROCEDURE — 80048 BASIC METABOLIC PNL TOTAL CA: CPT

## 2024-05-10 PROCEDURE — 36415 COLL VENOUS BLD VENIPUNCTURE: CPT

## 2024-05-10 PROCEDURE — 6370000000 HC RX 637 (ALT 250 FOR IP): Performed by: NURSE PRACTITIONER

## 2024-05-10 PROCEDURE — 2580000003 HC RX 258: Performed by: HOSPITALIST

## 2024-05-10 RX ADMIN — CITALOPRAM 10 MG: 20 TABLET, FILM COATED ORAL at 08:52

## 2024-05-10 RX ADMIN — POTASSIUM CHLORIDE 40 MEQ: 750 TABLET, FILM COATED, EXTENDED RELEASE ORAL at 08:51

## 2024-05-10 RX ADMIN — ALLOPURINOL 100 MG: 100 TABLET ORAL at 08:53

## 2024-05-10 RX ADMIN — AZITHROMYCIN DIHYDRATE 500 MG: 250 TABLET ORAL at 08:52

## 2024-05-10 RX ADMIN — MAGNESIUM OXIDE TAB 400 MG (241.3 MG ELEMENTAL MG) 400 MG: 400 (241.3 MG) TAB at 08:52

## 2024-05-10 RX ADMIN — ARFORMOTEROL TARTRATE: 15 SOLUTION RESPIRATORY (INHALATION) at 07:19

## 2024-05-10 RX ADMIN — ACETAMINOPHEN 650 MG: 325 TABLET ORAL at 09:29

## 2024-05-10 RX ADMIN — GUAIFENESIN 600 MG: 600 TABLET, EXTENDED RELEASE ORAL at 08:52

## 2024-05-10 RX ADMIN — WATER 1000 MG: 1 INJECTION INTRAMUSCULAR; INTRAVENOUS; SUBCUTANEOUS at 08:53

## 2024-05-10 RX ADMIN — ATORVASTATIN CALCIUM 40 MG: 40 TABLET, FILM COATED ORAL at 08:52

## 2024-05-10 RX ADMIN — SODIUM CHLORIDE, PRESERVATIVE FREE 10 ML: 5 INJECTION INTRAVENOUS at 08:54

## 2024-05-10 RX ADMIN — ISOSORBIDE MONONITRATE 30 MG: 30 TABLET, EXTENDED RELEASE ORAL at 08:53

## 2024-05-10 RX ADMIN — Medication 100 MG: at 08:52

## 2024-05-10 RX ADMIN — THERA TABS 1 TABLET: TAB at 08:52

## 2024-05-10 ASSESSMENT — PAIN SCALES - GENERAL: PAINLEVEL_OUTOF10: 3

## 2024-05-10 ASSESSMENT — PAIN DESCRIPTION - ORIENTATION: ORIENTATION: RIGHT;LEFT

## 2024-05-10 ASSESSMENT — PAIN DESCRIPTION - DESCRIPTORS: DESCRIPTORS: ACHING

## 2024-05-10 ASSESSMENT — PAIN DESCRIPTION - LOCATION: LOCATION: HEAD

## 2024-05-10 NOTE — PLAN OF CARE
Problem: Discharge Planning  Goal: Discharge to home or other facility with appropriate resources  5/10/2024 1020 by Farrah Michelle RN  Outcome: Completed  Flowsheets (Taken 5/10/2024 0800)  Discharge to home or other facility with appropriate resources: Identify barriers to discharge with patient and caregiver  5/9/2024 2344 by Preston Schroeder RN  Outcome: Progressing  Flowsheets (Taken 5/9/2024 2000)  Discharge to home or other facility with appropriate resources:   Identify barriers to discharge with patient and caregiver   Arrange for needed discharge resources and transportation as appropriate   Identify discharge learning needs (meds, wound care, etc)     Problem: Pain  Goal: Verbalizes/displays adequate comfort level or baseline comfort level  5/10/2024 1020 by Farrah Michelle RN  Outcome: Completed  Flowsheets (Taken 5/10/2024 0800)  Verbalizes/displays adequate comfort level or baseline comfort level: Encourage patient to monitor pain and request assistance  5/9/2024 2344 by Preston Schroeder RN  Outcome: Progressing     Problem: Safety - Adult  Goal: Free from fall injury  5/10/2024 1020 by Farrah Michelle RN  Outcome: Completed  5/9/2024 2344 by Preston Schroeder RN  Outcome: Progressing     Problem: Chronic Conditions and Co-morbidities  Goal: Patient's chronic conditions and co-morbidity symptoms are monitored and maintained or improved  5/10/2024 1020 by Farrah Michelle RN  Outcome: Completed  Flowsheets (Taken 5/10/2024 0800)  Care Plan - Patient's Chronic Conditions and Co-Morbidity Symptoms are Monitored and Maintained or Improved: Monitor and assess patient's chronic conditions and comorbid symptoms for stability, deterioration, or improvement  5/9/2024 2344 by Preston Schroeder RN  Outcome: Progressing  Flowsheets (Taken 5/9/2024 2000)  Care Plan - Patient's Chronic Conditions and Co-Morbidity Symptoms are Monitored and Maintained or Improved: Monitor and assess patient's chronic conditions and comorbid symptoms for

## 2024-05-10 NOTE — PROGRESS NOTES
Patient received a discharge order. The patient was made aware of the discharge and agreed to going home. The patient's IV was removed without any bleeding or complications. The patient was given discharge instructions which included new, changed, and discontinued medication, where they can  their prescriptions, side effects of new medications, opioid safety, follow up appointments, signs and symptoms of heart attack and stroke and when to seek emergency care, and how to access Mychart. The patient's belongings were all packed up and the patient verified they had everything that belongs to them. The patient was wheeled down to the discharge area via wheelchair. Vital signs were all stable at the time of discharge.

## 2024-05-10 NOTE — DISCHARGE SUMMARY
Discharge Summary       PATIENT ID: Zuleyma Sharp  MRN: [id number]   YOB: 1950    DATE OF ADMISSION: 5/5/2024  1:46 PM    DATE OF DISCHARGE: 5/9/2024   PRIMARY CARE PROVIDER: Eliot Leon MD     ATTENDING PHYSICIAN: Celso Roa MD    DISCHARGING PROVIDER: Celso Roa MD    74 y.o. female with PMH significant for type 2 insulin-dependent diabetes, dilated cardiomyopathy/CHF recovered EF presented to the ED for occipital headache/pain behind the right ear.  Patient endorses cough productive of whitish sputum, subjective fever and chills for the last few days.  She also endorses loose bowel movements but denies nausea, vomiting, abdominal pain.  She denies dysuria, urgency or frequency.  She denied sick contact with someone with febrile illness.  She denies recent infection or antibiotic use.     On evaluation in the ED she was found hypotensive BP 82/52, tachycardic   And blood work was significant for , creatinine 4.9, lactic acid 2.7, POC glucose 278, procalcitonin 4.51, proBNP 886, total bilirubin 1.6, AST 52, WBC normal but left shift neutrophils 87%.  UA pending.  Chest x-ray negative.  Head CT negative              The patient denies any headache, blurry vision, sore throat, trouble swallowing, trouble with speech, chest pain, SOB, cough, fever, chills, N/V/D, abd pain, urinary symptoms, constipation, recent travels, sick contacts, focal or generalized neurological symptoms, falls, injuries, rashes, contact with COVID-19 diagnosed patients, hematemesis, melena, hemoptysis, hematuria, rashes, denies starting any new medications and denies any other concerns or problems besides as mentioned above.           Admitting Note:     Vitals:    05/10/24 0715   BP: (!) 140/65   Pulse: 75   Resp: 18   Temp: 98 °F (36.7 °C)   SpO2:         CONSULTATIONS: PHARMACY TO DOSE VANCOMYCIN  IP CONSULT TO SOCIAL WORK  IP CONSULT TO PSYCHIATRY  IP CONSULT TO SPIRITUAL

## 2024-05-10 NOTE — PLAN OF CARE
Problem: Discharge Planning  Goal: Discharge to home or other facility with appropriate resources  5/9/2024 2344 by Preston Schroeder RN  Outcome: Progressing  Flowsheets (Taken 5/9/2024 2000)  Discharge to home or other facility with appropriate resources:   Identify barriers to discharge with patient and caregiver   Arrange for needed discharge resources and transportation as appropriate   Identify discharge learning needs (meds, wound care, etc)  5/9/2024 1429 by Yovana Connor RN  Outcome: Progressing     Problem: Pain  Goal: Verbalizes/displays adequate comfort level or baseline comfort level  5/9/2024 2344 by Preston Schroeder RN  Outcome: Progressing  5/9/2024 1429 by Yovana Connor RN  Outcome: Progressing     Problem: Safety - Adult  Goal: Free from fall injury  5/9/2024 2344 by Preston Schroeder RN  Outcome: Progressing  5/9/2024 1429 by Yovana Connor RN  Outcome: Progressing     Problem: Chronic Conditions and Co-morbidities  Goal: Patient's chronic conditions and co-morbidity symptoms are monitored and maintained or improved  5/9/2024 2344 by Preston Schroeder RN  Outcome: Progressing  Flowsheets (Taken 5/9/2024 2000)  Care Plan - Patient's Chronic Conditions and Co-Morbidity Symptoms are Monitored and Maintained or Improved: Monitor and assess patient's chronic conditions and comorbid symptoms for stability, deterioration, or improvement  5/9/2024 1429 by Yovana Connor RN  Outcome: Progressing

## 2024-05-10 NOTE — DISCHARGE SUMMARY
Discharge Summary       PATIENT ID: Zuleyma Sharp  MRN: [id number]   YOB: 1950    DATE OF ADMISSION: 5/5/2024  1:46 PM    DATE OF DISCHARGE: 5/9/2024   PRIMARY CARE PROVIDER: Eliot Leon MD     ATTENDING PHYSICIAN: Celso Roa MD    DISCHARGING PROVIDER: Celso Roa MD    74 y.o. female with PMH significant for type 2 insulin-dependent diabetes, dilated cardiomyopathy/CHF recovered EF presented to the ED for occipital headache/pain behind the right ear.  Patient endorses cough productive of whitish sputum, subjective fever and chills for the last few days.  She also endorses loose bowel movements but denies nausea, vomiting, abdominal pain.  She denies dysuria, urgency or frequency.  She denied sick contact with someone with febrile illness.  She denies recent infection or antibiotic use.     On evaluation in the ED she was found hypotensive BP 82/52, tachycardic   And blood work was significant for , creatinine 4.9, lactic acid 2.7, POC glucose 278, procalcitonin 4.51, proBNP 886, total bilirubin 1.6, AST 52, WBC normal but left shift neutrophils 87%.  UA pending.  Chest x-ray negative.  Head CT negative              The patient denies any headache, blurry vision, sore throat, trouble swallowing, trouble with speech, chest pain, SOB, cough, fever, chills, N/V/D, abd pain, urinary symptoms, constipation, recent travels, sick contacts, focal or generalized neurological symptoms, falls, injuries, rashes, contact with COVID-19 diagnosed patients, hematemesis, melena, hemoptysis, hematuria, rashes, denies starting any new medications and denies any other concerns or problems besides as mentioned above.           Admitting Note:     Vitals:    05/09/24 1955   BP:    Pulse: 80   Resp:    Temp:    SpO2:         CONSULTATIONS: PHARMACY TO DOSE VANCOMYCIN  IP CONSULT TO SOCIAL WORK  IP CONSULT TO PSYCHIATRY  IP CONSULT TO SPIRITUAL SERVICES    PROCEDURES/SURGERIES:      ADMITTING DIAGNOSES & HOSPITAL COURSE:   Severe sepsis was manifested by hypotension tachycardia lactic acidosis left shift with organ damage as evidenced by acute on chronic kidney failure.  Respiratory etiology was considered although not proven and she received IV fluid resuscitation, IV antibiotics cefepime and vancomycin initially with vancomycin discontinued.  Patient was placed on Zithromax.  Signs and symptoms of sepsis are resolved source is unclear but felt to be pulmonary in spite of the negative studies which are underrepresented due to dehydration.  She completed a 5-day course of IV antibiotics is being discharged home ambulatory.  Acute hyponatremia was due to hypovolemia with fluid resuscitation resulting in resolution.  Acute on chronic kidney disease stage III with acute kidney injury secondary to sepsis and hypotension.  Diabetes mellitus type 2 with chronic kidney disease treated with basal bolus insulin.  Will resume her insulin dosage at home on discharge.  Thrombocytopenia present on admission felt to be secondary to sepsis.  Declining during the hospital stay heparin was discontinued with improvement.  With improvement of her medical status she is being discharged home amatory satisfactory condition improved advised to wean with medication previously ordered.        DISCHARGE DIAGNOSES / PLAN:      Principal Problem:    Sepsis (HCC)  Active Problems:    Dyslipidemia    Hypertension    Type 2 diabetes mellitus with kidney complication, with long-term current use of insulin (HCC)    Acute kidney injury superimposed on chronic kidney disease (HCC)    Chronic heart failure with preserved ejection fraction (HCC)  Resolved Problems:    * No resolved hospital problems. *         FOLLOW UP APPOINTMENTS:       Eliot Leon md 3-5days      ADDITIONAL CARE RECOMMENDATIONS:     DIET: cardiac diet and diabetic diet  Oral Nutritional Supplements:     ACTIVITY: activity as tolerated    DISCHARGE

## 2024-05-11 LAB — PF4 HEPARIN CMPLX AB SER-ACNC: 0.1 OD (ref 0–0.4)

## 2024-05-17 ENCOUNTER — OFFICE VISIT (OUTPATIENT)
Facility: CLINIC | Age: 74
End: 2024-05-17
Payer: MEDICARE

## 2024-05-17 VITALS
DIASTOLIC BLOOD PRESSURE: 59 MMHG | RESPIRATION RATE: 18 BRPM | BODY MASS INDEX: 30.42 KG/M2 | OXYGEN SATURATION: 100 % | SYSTOLIC BLOOD PRESSURE: 107 MMHG | TEMPERATURE: 97.9 F | HEIGHT: 64 IN | WEIGHT: 178.2 LBS | HEART RATE: 67 BPM

## 2024-05-17 DIAGNOSIS — I50.30 DIASTOLIC HEART FAILURE, NYHA CLASS 2 (HCC): ICD-10-CM

## 2024-05-17 DIAGNOSIS — N17.9 ACUTE KIDNEY INJURY SUPERIMPOSED ON CHRONIC KIDNEY DISEASE (HCC): Primary | ICD-10-CM

## 2024-05-17 DIAGNOSIS — N18.9 ACUTE KIDNEY INJURY SUPERIMPOSED ON CHRONIC KIDNEY DISEASE (HCC): Primary | ICD-10-CM

## 2024-05-17 DIAGNOSIS — N17.9 SEPSIS WITH ACUTE RENAL FAILURE AND SEPTIC SHOCK, DUE TO UNSPECIFIED ORGANISM, UNSPECIFIED ACUTE RENAL FAILURE TYPE (HCC): ICD-10-CM

## 2024-05-17 DIAGNOSIS — E11.9 TYPE 2 DIABETES MELLITUS WITHOUT COMPLICATION, WITH LONG-TERM CURRENT USE OF INSULIN (HCC): ICD-10-CM

## 2024-05-17 DIAGNOSIS — A41.9 SEPSIS WITH ACUTE RENAL FAILURE AND SEPTIC SHOCK, DUE TO UNSPECIFIED ORGANISM, UNSPECIFIED ACUTE RENAL FAILURE TYPE (HCC): ICD-10-CM

## 2024-05-17 DIAGNOSIS — R65.21 SEPSIS WITH ACUTE RENAL FAILURE AND SEPTIC SHOCK, DUE TO UNSPECIFIED ORGANISM, UNSPECIFIED ACUTE RENAL FAILURE TYPE (HCC): ICD-10-CM

## 2024-05-17 DIAGNOSIS — Z79.4 TYPE 2 DIABETES MELLITUS WITHOUT COMPLICATION, WITH LONG-TERM CURRENT USE OF INSULIN (HCC): ICD-10-CM

## 2024-05-17 PROCEDURE — 1036F TOBACCO NON-USER: CPT | Performed by: INTERNAL MEDICINE

## 2024-05-17 PROCEDURE — 3074F SYST BP LT 130 MM HG: CPT | Performed by: INTERNAL MEDICINE

## 2024-05-17 PROCEDURE — G8399 PT W/DXA RESULTS DOCUMENT: HCPCS | Performed by: INTERNAL MEDICINE

## 2024-05-17 PROCEDURE — 1111F DSCHRG MED/CURRENT MED MERGE: CPT | Performed by: INTERNAL MEDICINE

## 2024-05-17 PROCEDURE — 1123F ACP DISCUSS/DSCN MKR DOCD: CPT | Performed by: INTERNAL MEDICINE

## 2024-05-17 PROCEDURE — 3017F COLORECTAL CA SCREEN DOC REV: CPT | Performed by: INTERNAL MEDICINE

## 2024-05-17 PROCEDURE — 3051F HG A1C>EQUAL 7.0%<8.0%: CPT | Performed by: INTERNAL MEDICINE

## 2024-05-17 PROCEDURE — 99214 OFFICE O/P EST MOD 30 MIN: CPT | Performed by: INTERNAL MEDICINE

## 2024-05-17 PROCEDURE — G8417 CALC BMI ABV UP PARAM F/U: HCPCS | Performed by: INTERNAL MEDICINE

## 2024-05-17 PROCEDURE — 2022F DILAT RTA XM EVC RTNOPTHY: CPT | Performed by: INTERNAL MEDICINE

## 2024-05-17 PROCEDURE — G8427 DOCREV CUR MEDS BY ELIG CLIN: HCPCS | Performed by: INTERNAL MEDICINE

## 2024-05-17 PROCEDURE — 3078F DIAST BP <80 MM HG: CPT | Performed by: INTERNAL MEDICINE

## 2024-05-17 PROCEDURE — 1090F PRES/ABSN URINE INCON ASSESS: CPT | Performed by: INTERNAL MEDICINE

## 2024-05-17 SDOH — ECONOMIC STABILITY: FOOD INSECURITY: WITHIN THE PAST 12 MONTHS, THE FOOD YOU BOUGHT JUST DIDN'T LAST AND YOU DIDN'T HAVE MONEY TO GET MORE.: NEVER TRUE

## 2024-05-17 SDOH — ECONOMIC STABILITY: INCOME INSECURITY: HOW HARD IS IT FOR YOU TO PAY FOR THE VERY BASICS LIKE FOOD, HOUSING, MEDICAL CARE, AND HEATING?: NOT HARD AT ALL

## 2024-05-17 SDOH — ECONOMIC STABILITY: FOOD INSECURITY: WITHIN THE PAST 12 MONTHS, YOU WORRIED THAT YOUR FOOD WOULD RUN OUT BEFORE YOU GOT MONEY TO BUY MORE.: NEVER TRUE

## 2024-05-17 SDOH — ECONOMIC STABILITY: HOUSING INSECURITY
IN THE LAST 12 MONTHS, WAS THERE A TIME WHEN YOU DID NOT HAVE A STEADY PLACE TO SLEEP OR SLEPT IN A SHELTER (INCLUDING NOW)?: NO

## 2024-05-17 ASSESSMENT — PATIENT HEALTH QUESTIONNAIRE - PHQ9
SUM OF ALL RESPONSES TO PHQ9 QUESTIONS 1 & 2: 0
1. LITTLE INTEREST OR PLEASURE IN DOING THINGS: NOT AT ALL
SUM OF ALL RESPONSES TO PHQ QUESTIONS 1-9: 0
2. FEELING DOWN, DEPRESSED OR HOPELESS: NOT AT ALL
SUM OF ALL RESPONSES TO PHQ QUESTIONS 1-9: 0

## 2024-05-17 ASSESSMENT — SOCIAL DETERMINANTS OF HEALTH (SDOH)
IN A TYPICAL WEEK, HOW MANY TIMES DO YOU TALK ON THE PHONE WITH FAMILY, FRIENDS, OR NEIGHBORS?: MORE THAN THREE TIMES A WEEK
HOW OFTEN DO YOU ATTEND CHURCH OR RELIGIOUS SERVICES?: MORE THAN 4 TIMES PER YEAR
WITHIN THE LAST YEAR, HAVE YOU BEEN HUMILIATED OR EMOTIONALLY ABUSED IN OTHER WAYS BY YOUR PARTNER OR EX-PARTNER?: NO
HOW OFTEN DO YOU GET TOGETHER WITH FRIENDS OR RELATIVES?: MORE THAN THREE TIMES A WEEK
WITHIN THE LAST YEAR, HAVE YOU BEEN AFRAID OF YOUR PARTNER OR EX-PARTNER?: NO
WITHIN THE LAST YEAR, HAVE YOU BEEN KICKED, HIT, SLAPPED, OR OTHERWISE PHYSICALLY HURT BY YOUR PARTNER OR EX-PARTNER?: NO
WITHIN THE LAST YEAR, HAVE TO BEEN RAPED OR FORCED TO HAVE ANY KIND OF SEXUAL ACTIVITY BY YOUR PARTNER OR EX-PARTNER?: NO
DO YOU BELONG TO ANY CLUBS OR ORGANIZATIONS SUCH AS CHURCH GROUPS UNIONS, FRATERNAL OR ATHLETIC GROUPS, OR SCHOOL GROUPS?: NO
HOW OFTEN DO YOU ATTENT MEETINGS OF THE CLUB OR ORGANIZATION YOU BELONG TO?: NEVER

## 2024-05-17 ASSESSMENT — ANXIETY QUESTIONNAIRES
GAD7 TOTAL SCORE: 0
5. BEING SO RESTLESS THAT IT IS HARD TO SIT STILL: NOT AT ALL
2. NOT BEING ABLE TO STOP OR CONTROL WORRYING: NOT AT ALL
6. BECOMING EASILY ANNOYED OR IRRITABLE: NOT AT ALL
7. FEELING AFRAID AS IF SOMETHING AWFUL MIGHT HAPPEN: NOT AT ALL
3. WORRYING TOO MUCH ABOUT DIFFERENT THINGS: NOT AT ALL
1. FEELING NERVOUS, ANXIOUS, OR ON EDGE: NOT AT ALL
IF YOU CHECKED OFF ANY PROBLEMS ON THIS QUESTIONNAIRE, HOW DIFFICULT HAVE THESE PROBLEMS MADE IT FOR YOU TO DO YOUR WORK, TAKE CARE OF THINGS AT HOME, OR GET ALONG WITH OTHER PEOPLE: NOT DIFFICULT AT ALL
4. TROUBLE RELAXING: NOT AT ALL

## 2024-05-17 NOTE — PROGRESS NOTES
Chief Complaint   Patient presents with    Follow-Up from Hospital     Patient here for severe sepsis.      \"Have you been to the ER, urgent care clinic since your last visit?  Hospitalized since your last visit?\"    YES - When: approximately 12  days ago.  Where and Why: [facility] for Severe Sepsis.    “Have you seen or consulted any other health care providers outside of [facility] since your last visit?”    YES - When: approximately 1 months ago.  Where and Why: [facility] for Facial Abrasion.    Have you had a mammogram?”   NO    Date of last Mammogram: 4/20/2021             Click Here for Release of Records Request

## 2024-05-18 LAB
ANION GAP SERPL CALC-SCNC: 5 MMOL/L (ref 5–15)
BASOPHILS # BLD: 0 K/UL (ref 0–0.1)
BASOPHILS NFR BLD: 1 % (ref 0–1)
BUN SERPL-MCNC: 27 MG/DL (ref 6–20)
BUN/CREAT SERPL: 25 (ref 12–20)
CALCIUM SERPL-MCNC: 9.9 MG/DL (ref 8.5–10.1)
CHLORIDE SERPL-SCNC: 105 MMOL/L (ref 97–108)
CO2 SERPL-SCNC: 29 MMOL/L (ref 21–32)
CREAT SERPL-MCNC: 1.06 MG/DL (ref 0.55–1.02)
DIFFERENTIAL METHOD BLD: ABNORMAL
EOSINOPHIL # BLD: 0.2 K/UL (ref 0–0.4)
EOSINOPHIL NFR BLD: 3 % (ref 0–7)
ERYTHROCYTE [DISTWIDTH] IN BLOOD BY AUTOMATED COUNT: 15.7 % (ref 11.5–14.5)
EST. AVERAGE GLUCOSE BLD GHB EST-MCNC: 154 MG/DL
GLUCOSE SERPL-MCNC: 103 MG/DL (ref 65–100)
HBA1C MFR BLD: 7 % (ref 4–5.6)
HCT VFR BLD AUTO: 34.2 % (ref 35–47)
HGB BLD-MCNC: 10.5 G/DL (ref 11.5–16)
IMM GRANULOCYTES # BLD AUTO: 0 K/UL (ref 0–0.04)
IMM GRANULOCYTES NFR BLD AUTO: 0 % (ref 0–0.5)
LYMPHOCYTES # BLD: 2.3 K/UL (ref 0.8–3.5)
LYMPHOCYTES NFR BLD: 40 % (ref 12–49)
MCH RBC QN AUTO: 27.9 PG (ref 26–34)
MCHC RBC AUTO-ENTMCNC: 30.7 G/DL (ref 30–36.5)
MCV RBC AUTO: 91 FL (ref 80–99)
MONOCYTES # BLD: 0.4 K/UL (ref 0–1)
MONOCYTES NFR BLD: 7 % (ref 5–13)
NEUTS SEG # BLD: 2.9 K/UL (ref 1.8–8)
NEUTS SEG NFR BLD: 49 % (ref 32–75)
NRBC # BLD: 0 K/UL (ref 0–0.01)
NRBC BLD-RTO: 0 PER 100 WBC
PLATELET # BLD AUTO: 295 K/UL (ref 150–400)
PMV BLD AUTO: 10.5 FL (ref 8.9–12.9)
POTASSIUM SERPL-SCNC: 4.6 MMOL/L (ref 3.5–5.1)
RBC # BLD AUTO: 3.76 M/UL (ref 3.8–5.2)
SODIUM SERPL-SCNC: 139 MMOL/L (ref 136–145)
WBC # BLD AUTO: 5.9 K/UL (ref 3.6–11)

## 2024-05-18 NOTE — PROGRESS NOTES
[facility]  [address]  [address]  [place] [address]  Phone:  [phone]  Fax: [phone]       Chief Complaint   Patient presents with    Follow-Up from Hospital     Patient here for severe sepsis.    .      SUBJECTIVE:    Zuleyma Sharp is a 74 y.o. female  Dictation on: 05/18/2024 11:21 AM by: MIESHA ALLISON [[id number]]          Current Outpatient Medications   Medication Sig Dispense Refill    arformoterol tartrate (BROVANA) 15 MCG/2ML NEBU Take 1 ampule by nebulization 2 times daily 120 mL 3    ipratropium 0.5 mg-albuterol 2.5 mg (DUONEB) 0.5-2.5 (3) MG/3ML SOLN nebulizer solution Inhale 3 mLs into the lungs every 6 hours as needed for Shortness of Breath 360 mL 11    citalopram (CELEXA) 10 MG tablet Take 1 tablet by mouth daily 30 tablet 3    atorvastatin (LIPITOR) 40 MG tablet Take 1 tablet by mouth daily 90 tablet 3    mirabegron (MYRBETRIQ) 25 MG TB24 Take 1 tablet by mouth daily 90 tablet 3    Blood Glucose Calibration (TRUE METRIX LEVEL 1) Low SOLN       thiamine 100 MG tablet TAKE 1 TABLET BY MOUTH EVERY DAY 90 tablet 1    spironolactone (ALDACTONE) 25 MG tablet TAKE 1 TABLET BY MOUTH EVERY DAY 90 tablet 1    isosorbide mononitrate (IMDUR) 30 MG extended release tablet TAKE 1 TABLET BY MOUTH EVERY DAY IN THE MORNING 90 tablet 1    allopurinol (ZYLOPRIM) 100 MG tablet Take 1 tablet by mouth daily 30 tablet 11    vitamin D (VITAMIN D3) 50 MCG (2000 UT) CAPS capsule TAKE 1 CAPSULE BY MOUTH EVERY DAY 90 capsule 3    FARXIGA 10 MG tablet TAKE 1 TABLET BY MOUTH EVERY DAY 30 tablet 3    ENTRESTO 24-26 MG per tablet TAKE 1 TABLET BY MOUTH TWICE A DAY 60 tablet 2    insulin glargine (LANTUS SOLOSTAR) 100 UNIT/ML injection pen Patient reports taking 58 units nightly 6 Adjustable Dose Pre-filled Pen Syringe 11    Insulin Pen Needle ([employer] PEN NEEDLES) 31G X 6 MM MISC 1 each by Does not apply route daily 100 each 11    bumetanide (BUMEX) 2 MG tablet TAKE 1 TABLET BY MOUTH

## 2024-05-28 ENCOUNTER — TELEMEDICINE (OUTPATIENT)
Age: 74
End: 2024-05-28
Payer: MEDICARE

## 2024-05-28 DIAGNOSIS — G47.33 OSA (OBSTRUCTIVE SLEEP APNEA): Primary | ICD-10-CM

## 2024-05-28 DIAGNOSIS — I50.32 CHRONIC HEART FAILURE WITH PRESERVED EJECTION FRACTION (HCC): ICD-10-CM

## 2024-05-28 PROCEDURE — 99214 OFFICE O/P EST MOD 30 MIN: CPT | Performed by: NURSE PRACTITIONER

## 2024-05-28 PROCEDURE — G8417 CALC BMI ABV UP PARAM F/U: HCPCS | Performed by: NURSE PRACTITIONER

## 2024-05-28 PROCEDURE — 1036F TOBACCO NON-USER: CPT | Performed by: NURSE PRACTITIONER

## 2024-05-28 PROCEDURE — G8427 DOCREV CUR MEDS BY ELIG CLIN: HCPCS | Performed by: NURSE PRACTITIONER

## 2024-05-28 PROCEDURE — 3017F COLORECTAL CA SCREEN DOC REV: CPT | Performed by: NURSE PRACTITIONER

## 2024-05-28 PROCEDURE — 1111F DSCHRG MED/CURRENT MED MERGE: CPT | Performed by: NURSE PRACTITIONER

## 2024-05-28 PROCEDURE — 1090F PRES/ABSN URINE INCON ASSESS: CPT | Performed by: NURSE PRACTITIONER

## 2024-05-28 PROCEDURE — 1123F ACP DISCUSS/DSCN MKR DOCD: CPT | Performed by: NURSE PRACTITIONER

## 2024-05-28 PROCEDURE — G8399 PT W/DXA RESULTS DOCUMENT: HCPCS | Performed by: NURSE PRACTITIONER

## 2024-05-28 ASSESSMENT — SLEEP AND FATIGUE QUESTIONNAIRES
HOW LIKELY ARE YOU TO NOD OFF OR FALL ASLEEP WHILE LYING DOWN TO REST IN THE AFTERNOON WHEN CIRCUMSTANCES PERMIT: SLIGHT CHANCE OF DOZING
HOW LIKELY ARE YOU TO NOD OFF OR FALL ASLEEP WHILE WATCHING TV: HIGH CHANCE OF DOZING
HOW LIKELY ARE YOU TO NOD OFF OR FALL ASLEEP WHEN YOU ARE A PASSENGER IN A CAR FOR AN HOUR WITHOUT A BREAK: SLIGHT CHANCE OF DOZING
HOW LIKELY ARE YOU TO NOD OFF OR FALL ASLEEP WHILE SITTING AND READING: SLIGHT CHANCE OF DOZING
HOW LIKELY ARE YOU TO NOD OFF OR FALL ASLEEP WHILE SITTING INACTIVE IN A PUBLIC PLACE: SLIGHT CHANCE OF DOZING
HOW LIKELY ARE YOU TO NOD OFF OR FALL ASLEEP WHILE SITTING QUIETLY AFTER LUNCH WITHOUT ALCOHOL: WOULD NEVER DOZE
HOW LIKELY ARE YOU TO NOD OFF OR FALL ASLEEP WHILE SITTING AND TALKING TO SOMEONE: WOULD NEVER DOZE
ESS TOTAL SCORE: 7
HOW LIKELY ARE YOU TO NOD OFF OR FALL ASLEEP IN A CAR, WHILE STOPPED FOR A FEW MINUTES IN TRAFFIC: WOULD NEVER DOZE

## 2024-05-28 NOTE — PROGRESS NOTES
Zuleyma Sharp (: 1950) is a 74 y.o. female, established patient, seen for positive airway pressure follow-up, she was last seen by me on 2023, previously seen by Dr. Ortega on 2022, prior notes and sleep testing reviewed in detail. In lab sleep test 2021 showed AHI of 17.7/hr with a lowest SpO2 of 78%, duration of SpO2 < 88% 3.3 min. Weight at time of sleep testing 176 pounds.   CPAP failure, BiPAP titration completed 2024.      ASSESSMENT/PLAN:   Diagnosis Orders   1. JOSE JUAN (obstructive sleep apnea)  DME Order for (Specify) as OP      2. Chronic heart failure with preserved ejection fraction (HCC)        3. Adult BMI 30.0-30.9 kg/sq m            AHI = 17.7(2021).   On ResMed Auto Bi - Level :  Max IPAP 20, Min EPAP 9, PS 4 cmH2O. Set up 3/14/2024.    She is adherent with PAP therapy and PAP continues to benefit patient and remains necessary for control of her sleep apnea.   She needs to resume PAP use now that she has been discharged from hospital.    Follow-up and Dispositions    Return in about 3 months (around 2024) for compliance follow up.         Sleep Apnea -  Sleep apnea treatment is causing negative side effects.  Change in treatment plan is needed.  Change current humidity prescription settings to 5 .  Changes made by provider in airview system [place] [facility] [place] continue with current pressures Auto BiPAP Max IPAP 20, Min EPAP 9, PS 4 cmH2O.    *  Supplies - full face mask and heated tubing    Orders Placed This Encounter   Procedures    DME Order for (Specify) as OP     Diagnosis: (G47.33) JOSE JUAN (obstructive sleep apnea)  (primary encounter diagnosis)     Replacement Supplies for Positive Airway Pressure Therapy Device:   Duration of need: 99 months.        Full Face Mask 1 every 3 months.   Full Face Mask Cushion 1 per month.     Headgear 1 every 6 months.   Pos Airway pressure chin strap     Tubing with heating element 1 every 3 months.

## 2024-06-01 RX ORDER — CITALOPRAM HYDROBROMIDE 10 MG/1
10 TABLET ORAL DAILY
Qty: 90 TABLET | Refills: 0 | Status: SHIPPED | OUTPATIENT
Start: 2024-06-01

## 2024-06-12 RX ORDER — SACUBITRIL AND VALSARTAN 24; 26 MG/1; MG/1
TABLET, FILM COATED ORAL
Qty: 180 TABLET | Refills: 1 | Status: SHIPPED | OUTPATIENT
Start: 2024-06-12

## 2024-06-12 NOTE — TELEPHONE ENCOUNTER
Requested Prescriptions     Pending Prescriptions Disp Refills    ENTRESTO 24-26 MG per tablet [Pharmacy Med Name: ENTRESTO 24 MG-26 MG TABLET] 180 tablet 1     Sig: TAKE 1 TABLET BY MOUTH TWICE A DAY

## 2024-07-08 RX ORDER — DAPAGLIFLOZIN 10 MG/1
10 TABLET, FILM COATED ORAL DAILY
Qty: 30 TABLET | Refills: 4 | Status: SHIPPED | OUTPATIENT
Start: 2024-07-08

## 2024-07-31 RX ORDER — BLOOD-GLUCOSE SENSOR
EACH MISCELLANEOUS
Qty: 2 EACH | Refills: 11 | Status: SHIPPED | OUTPATIENT
Start: 2024-07-31

## 2024-07-31 RX ORDER — KETOROLAC TROMETHAMINE 30 MG/ML
INJECTION, SOLUTION INTRAMUSCULAR; INTRAVENOUS
Qty: 1 EACH | Refills: 0 | Status: SHIPPED | OUTPATIENT
Start: 2024-07-31

## 2024-08-01 ENCOUNTER — OFFICE VISIT (OUTPATIENT)
Facility: CLINIC | Age: 74
End: 2024-08-01
Payer: MEDICARE

## 2024-08-01 VITALS
OXYGEN SATURATION: 95 % | HEIGHT: 64 IN | TEMPERATURE: 98.1 F | HEART RATE: 69 BPM | DIASTOLIC BLOOD PRESSURE: 63 MMHG | WEIGHT: 182.4 LBS | RESPIRATION RATE: 18 BRPM | BODY MASS INDEX: 31.14 KG/M2 | SYSTOLIC BLOOD PRESSURE: 104 MMHG

## 2024-08-01 DIAGNOSIS — Z79.4 CONTROLLED TYPE 2 DIABETES MELLITUS WITHOUT COMPLICATION, WITH LONG-TERM CURRENT USE OF INSULIN (HCC): ICD-10-CM

## 2024-08-01 DIAGNOSIS — I50.30 DIASTOLIC HEART FAILURE, NYHA CLASS 2 (HCC): ICD-10-CM

## 2024-08-01 DIAGNOSIS — Z92.89 H/O DIAGNOSTIC MAMMOGRAPHY: ICD-10-CM

## 2024-08-01 DIAGNOSIS — I20.89 ANGINA AT REST (HCC): ICD-10-CM

## 2024-08-01 DIAGNOSIS — E11.9 CONTROLLED TYPE 2 DIABETES MELLITUS WITHOUT COMPLICATION, WITH LONG-TERM CURRENT USE OF INSULIN (HCC): ICD-10-CM

## 2024-08-01 DIAGNOSIS — G47.00 INSOMNIA, UNSPECIFIED TYPE: ICD-10-CM

## 2024-08-01 DIAGNOSIS — N31.9 NEUROGENIC BLADDER: Primary | ICD-10-CM

## 2024-08-01 LAB
ANION GAP SERPL CALC-SCNC: 6 MMOL/L (ref 5–15)
BUN SERPL-MCNC: 32 MG/DL (ref 6–20)
BUN/CREAT SERPL: 30 (ref 12–20)
CALCIUM SERPL-MCNC: 9.9 MG/DL (ref 8.5–10.1)
CHLORIDE SERPL-SCNC: 106 MMOL/L (ref 97–108)
CO2 SERPL-SCNC: 26 MMOL/L (ref 21–32)
CREAT SERPL-MCNC: 1.07 MG/DL (ref 0.55–1.02)
EST. AVERAGE GLUCOSE BLD GHB EST-MCNC: 169 MG/DL
GLUCOSE SERPL-MCNC: 184 MG/DL (ref 65–100)
HBA1C MFR BLD: 7.5 % (ref 4–5.6)
POTASSIUM SERPL-SCNC: 4.2 MMOL/L (ref 3.5–5.1)
SODIUM SERPL-SCNC: 138 MMOL/L (ref 136–145)

## 2024-08-01 PROCEDURE — G8399 PT W/DXA RESULTS DOCUMENT: HCPCS | Performed by: INTERNAL MEDICINE

## 2024-08-01 PROCEDURE — 3017F COLORECTAL CA SCREEN DOC REV: CPT | Performed by: INTERNAL MEDICINE

## 2024-08-01 PROCEDURE — 3051F HG A1C>EQUAL 7.0%<8.0%: CPT | Performed by: INTERNAL MEDICINE

## 2024-08-01 PROCEDURE — 1090F PRES/ABSN URINE INCON ASSESS: CPT | Performed by: INTERNAL MEDICINE

## 2024-08-01 PROCEDURE — 3078F DIAST BP <80 MM HG: CPT | Performed by: INTERNAL MEDICINE

## 2024-08-01 PROCEDURE — 1036F TOBACCO NON-USER: CPT | Performed by: INTERNAL MEDICINE

## 2024-08-01 PROCEDURE — 2022F DILAT RTA XM EVC RTNOPTHY: CPT | Performed by: INTERNAL MEDICINE

## 2024-08-01 PROCEDURE — 3074F SYST BP LT 130 MM HG: CPT | Performed by: INTERNAL MEDICINE

## 2024-08-01 PROCEDURE — 99214 OFFICE O/P EST MOD 30 MIN: CPT | Performed by: INTERNAL MEDICINE

## 2024-08-01 PROCEDURE — G8417 CALC BMI ABV UP PARAM F/U: HCPCS | Performed by: INTERNAL MEDICINE

## 2024-08-01 PROCEDURE — G8427 DOCREV CUR MEDS BY ELIG CLIN: HCPCS | Performed by: INTERNAL MEDICINE

## 2024-08-01 PROCEDURE — 1123F ACP DISCUSS/DSCN MKR DOCD: CPT | Performed by: INTERNAL MEDICINE

## 2024-08-01 RX ORDER — ECHINACEA PURPUREA EXTRACT 125 MG
2 TABLET ORAL
Qty: 1 EACH | Status: CANCELLED | OUTPATIENT
Start: 2024-08-01

## 2024-08-01 RX ORDER — MIRABEGRON 50 MG/1
50 TABLET, EXTENDED RELEASE ORAL DAILY
Qty: 90 TABLET | Refills: 3 | Status: SHIPPED | OUTPATIENT
Start: 2024-08-01

## 2024-08-01 RX ORDER — LANOLIN ALCOHOL/MO/W.PET/CERES
CREAM (GRAM) TOPICAL
Qty: 90 TABLET | Refills: 3 | Status: SHIPPED | OUTPATIENT
Start: 2024-08-01

## 2024-08-01 NOTE — PROGRESS NOTES
Chief Complaint   Patient presents with    Follow-up       \"Have you been to the ER, urgent care clinic since your last visit?  Hospitalized since your last visit?\"    NO    “Have you seen or consulted any other health care providers outside of [facility] since your last visit?”    NO    Have you had a mammogram?”   NO    Date of last Mammogram: 4/20/2021             Click Here for Release of Records Request

## 2024-08-11 RX ORDER — BLOOD-GLUCOSE SENSOR
EACH MISCELLANEOUS
Qty: 2 EACH | Refills: 11 | Status: SHIPPED | OUTPATIENT
Start: 2024-08-11

## 2024-08-16 RX ORDER — LANOLIN ALCOHOL/MO/W.PET/CERES
100 CREAM (GRAM) TOPICAL DAILY
Qty: 90 TABLET | Refills: 1 | Status: SHIPPED | OUTPATIENT
Start: 2024-08-16

## 2024-08-16 NOTE — TELEPHONE ENCOUNTER
Requested Prescriptions     Pending Prescriptions Disp Refills    thiamine 100 MG tablet [Pharmacy Med Name: VITAMIN B-1 100 MG TABLET] 90 tablet 1     Sig: TAKE 1 TABLET BY MOUTH EVERY DAY     Last visit 12/4  Next visit 12/10

## 2024-08-19 DIAGNOSIS — I50.20 UNSPECIFIED SYSTOLIC (CONGESTIVE) HEART FAILURE (HCC): ICD-10-CM

## 2024-08-19 RX ORDER — ISOSORBIDE MONONITRATE 30 MG/1
TABLET, EXTENDED RELEASE ORAL
Qty: 90 TABLET | Refills: 1 | Status: SHIPPED | OUTPATIENT
Start: 2024-08-19

## 2024-08-19 RX ORDER — SPIRONOLACTONE 25 MG/1
TABLET ORAL
Qty: 90 TABLET | Refills: 1 | Status: SHIPPED | OUTPATIENT
Start: 2024-08-19

## 2024-08-19 NOTE — TELEPHONE ENCOUNTER
Requested Prescriptions     Pending Prescriptions Disp Refills    spironolactone (ALDACTONE) 25 MG tablet [Pharmacy Med Name: SPIRONOLACTONE 25 MG TABLET] 90 tablet 1     Sig: TAKE 1 TABLET BY MOUTH EVERY DAY    isosorbide mononitrate (IMDUR) 30 MG extended release tablet [Pharmacy Med Name: ISOSORBIDE MONONIT ER 30 MG TB] 90 tablet 1     Sig: TAKE 1 TABLET BY MOUTH EVERY DAY IN THE MORNING     Last visit 12/4/23  Next visit 12/10/24

## 2024-08-22 NOTE — PROGRESS NOTES
"  {PROVIDER CHARTING PREFERENCE:681092}    Mj Way is a 20 year old, presenting for the following health issues:  IUD        8/22/2024     7:14 AM   Additional Questions   Roomed by Juliana     IUD         {MA/LPN/RN Pre-Provider Visit Orders- hCG/UA/Strep (Optional):050901}  {SUPERLIST (Optional):267204}  {additonal problems for provider to add (Optional):183749}    {ROS Picklists (Optional):436493}      Objective    /68 (BP Location: Right arm, Patient Position: Sitting, Cuff Size: Adult Regular)   Pulse 84   Resp 16   Ht 1.635 m (5' 4.37\")   Wt 46.7 kg (102 lb 14.4 oz)   LMP 08/03/2024 (Exact Date)   SpO2 99%   BMI 17.46 kg/m    Body mass index is 17.46 kg/m .  Physical Exam   {Exam List (Optional):831948}    {Diagnostic Test Results (Optional):260975}        Signed Electronically by: Kinsey Wyatt NP  {Email feedback regarding this note to [email or web address]   :148566}  " [address] [facility]  [facility]  [address] [place] [address]  Phone:  [phone]  Fax: [phone]       Chief Complaint   Patient presents with    Follow-up    Diabetes    Hypertension     Patient here for follow up high blood pressure and diabetes. .      SUBJECTIVE:    Nani Parish is a 81 y.o. female  Dictation on: 01/22/2023 10:22 PM by: Ev Hannon [[id number]]          Current Outpatient Medications   Medication Sig Dispense Refill    magnesium oxide (MAG-OX) 400 mg tablet TAKE 2 TABLETS BY MOUTH TWO (2) TIMES A DAY. 120 Tablet 5    Farxiga 10 mg tab tablet TAKE 1 TABLET BY MOUTH EVERY DAY 30 Tablet 2    ammonium lactate (LAC-HYDRIN) 12 % lotion APPLY TO FEET TWICE A DAY      isosorbide mononitrate ER (IMDUR) 30 mg tablet TAKE 1 TABLET BY MOUTH EVERY DAY IN THE MORNING 90 Tablet 0    thiamine HCL (B-1) 100 mg tablet TAKE 1 TABLET BY MOUTH EVERY DAY 90 Tablet 1    melatonin 3 mg tablet TAKE 1 TABLET BY MOUTH EVERY NIGHT AS NEEDED FOR INSOMNIA. 90 Tablet 0    spironolactone (ALDACTONE) 25 mg tablet TAKE 1 TABLET BY MOUTH EVERY DAY 90 Tablet 1    atorvastatin (LIPITOR) 40 mg tablet TAKE 1 TABLET BY MOUTH EVERY DAY 90 Tablet 3    flash glucose sensor (FreeStyle Nathaniel 14 Day Sensor) kit Blood sugar checks before meals and at bedtime and as needed 1 Kit 11    flash glucose scanning reader (FreeStyle Nathaniel 14 Day Southold) misc Blood sugar checks before meals and at bedtime and as needed 1 Each 0    Myrbetriq 25 mg ER tablet Take 25 mg by mouth daily. Marcia Nick U-100 Insulin 100 unit/mL (3 mL) inpn 30 UNITS BY SUBCUTANEOUS ROUTE NIGHTLY. AS DISCUSSED INCREASE YOUR INSULIN ACCORDING TO YOUR MORNING BLOOD SUGAR. WOULD LIKE THE MORNING BLOOD SUGAR TO BE BETWEEN 110 .  PLEASE CALL THE OFFICE NURSE IF YOU NEED ASSISTANCE (Patient taking differently: Patient reports taking 58 units nightly) 15 Adjustable Dose Pre-filled Pen Syringe 1    oxybutynin (DITROPAN) 5 mg tablet TAKE 1 TABLET BY MOUTH THREE TIMES A  Tablet 3    cholecalciferol (VITAMIN D3) (2,000 UNITS /50 MCG) cap capsule TAKE 1 CAPSULE BY MOUTH EVERY DAY 90 Capsule 2    allopurinoL (ZYLOPRIM) 100 mg tablet Take 1 Tablet by mouth daily. 30 Tablet 11    Insulin Needles, Disposable, (BD Ultra-Fine Short Pen Needle) 31 gauge x 5/16\" ndle USE WITH INSULIN PENS TWICE DAILY AS DIRECTED 100 Pen Needle 11    calcitRIOL (RocaltroL) 0.25 mcg capsule Take 0.25 mcg by mouth daily. diclofenac (VOLTAREN) 1 % gel Apply 2 g to affected area two (2) times daily as needed for Pain. thin layer to joint pain      budesonide (PULMICORT) 0.25 mg/2 mL nbsp 500 mcg by Nebulization route every six (6) hours as needed for Other (wheezing shortness of breath). fluticasone propion-salmeteroL (ADVAIR/WIXELA) 250-50 mcg/dose diskus inhaler Take 2 Puffs by inhalation two (2) times a day. fluticasone propionate (FLONASE) 50 mcg/actuation nasal spray 2 Sprays by Both Nostrils route daily. 1 Bottle 0    Entresto 24-26 mg tablet TAKE 1 TABLET BY MOUTH TWICE A DAY 60 Tablet 3    bumetanide (BUMEX) 2 mg tablet Take 1 Tablet by mouth daily as needed for PRN Reason (Other) (if weight gain of 3lb overnight or 5lb in one week).  (Patient not taking: Reported on 1/4/2023) 90 Tablet 2     Past Medical History:   Diagnosis Date    Arthritis     Diabetes ([address]     Heart failure ([address]     Hypertension      Past Surgical History:   Procedure Laterality Date    COLONOSCOPY N/A 3/19/2021    COLONOSCOPY performed by Estefany Joe [facility]    HX ORTHOPAEDIC      Arthroscopy right knee    OK UNLISTED PROCEDURE CARDIAC SURGERY      life vest     No Known Allergies      REVIEW OF SYSTEMS:  General: negative for - chills or fever  ENT: negative for - headaches, nasal congestion or tinnitus  Respiratory: negative for - cough, hemoptysis, shortness of breath or wheezing  Cardiovascular : negative for - chest pain, edema, palpitations or shortness of breath  Gastrointestinal: negative for - abdominal pain, blood in stools, heartburn or nausea/vomiting  Genito-Urinary: no dysuria, trouble voiding, or hematuria  Musculoskeletal: negative for - gait disturbance, joint pain, joint stiffness or joint swelling  Neurological: no TIA or stroke symptoms  Hematologic: no bruises, no bleeding, no swollen glands  Integument: no lumps, mole changes, nail changes or rash  Endocrine: no malaise/lethargy or unexpected weight changes      Social History     Socioeconomic History    Marital status:     Number of children: 3    Years of education: 15   Occupational History    Occupation: retired   Tobacco Use    Smoking status: Former     Packs/day: 0.25     Years: 20.00     Pack years: 5.00     Types: Cigarettes     Quit date: 2014     Years since quittin.1    Smokeless tobacco: Never   Vaping Use    Vaping Use: Never used   Substance and Sexual Activity    Alcohol use: Yes     Comment: Beer 2-3 times monthly    Drug use: No    Sexual activity: Yes     Partners: Male     Family History   Problem Relation Age of Onset    Diabetes Mother        OBJECTIVE:    Visit Vitals  BP (!) 107/59   Pulse 82   Temp 98.4 °F (36.9 °C) (Oral)   Resp 16   Ht 5' 4\" (1.626 m)   Wt 188 lb 6.4 oz (85.5 kg)   LMP  (LMP Unknown)   SpO2 98%   BMI 32.34 kg/m²     CONSTITUTIONAL: well , well nourished, appears age appropriate  EYES: perrla, eom intact  ENMT:moist mucous membranes, pharynx clear  NECK: supple. Thyroid normal  RESPIRATORY: Chest: clear to ascultation and percussion   CARDIOVASCULAR: Heart: regular rate and rhythm  GASTROINTESTINAL: Abdomen: soft, bowel sounds active  HEMATOLOGIC: no pathological lymph nodes palpated  MUSCULOSKELETAL: Extremities: no edema, pulse 1+   INTEGUMENT: No unusual rashes or suspicious skin lesions noted. Nails appear normal.  NEUROLOGIC: non-focal exam   MENTAL STATUS: alert and oriented, appropriate affect      ASSESSMENT:  1. Dilated cardiomyopathy ([facility] [address]    2. Primary hypertension    3. Controlled type 2 diabetes mellitus without complication, with long-term current use of insulin (HCC)    4. Panlobular emphysema ([facility] [address] [facility]    5. Osteoarthrosis, localized, primary, knee, right    6. Obesity (BMI 30.0-34. 9)        PLAN:   Dictation on: 01/22/2023 10:23 PM by: Princess Alonzo [[id number]]           Follow-up and Dispositions    Return in about 4 weeks (around 2/1/2023).            Shawnee Sparks MD

## 2024-08-31 NOTE — PROGRESS NOTES
[address] [facility]  [place] [address]  [address] [facility] [address]  Phone:  [phone]  Fax: [phone]       Chief Complaint   Patient presents with    Breathing Problem     chest tightness   . SUBJECTIVE:    Bailey Toribio is a 78 y.o. female Comes in complaining of increasing shortness of breath, typically worse with exertion. She has had some intermittent wheezing, but this has not been overwhelming. She saw her cardiologist, who did not really make any changes in her medication and did not specifically increase her diuretic back up to the 120 mg level. She has a past history of primary hypertension, dyslipidemia, as well as diabetes mellitus. Current Outpatient Medications   Medication Sig Dispense Refill    diclofenac (VOLTAREN) 1 % gel APPLY TO AFFECTED AREA TWICE A DAY AS NEEDED      fluticasone propion-salmeteroL (ADVAIR/WIXELA) 250-50 mcg/dose diskus inhaler       Sidestream misc       doxycycline (MONODOX) 100 mg capsule Take 1 Cap by mouth two (2) times a day. 14 Cap 0    fluticasone propionate (FLONASE) 50 mcg/actuation nasal spray 2 Sprays by Both Nostrils route daily. 1 Bottle 0    allopurinoL (ZYLOPRIM) 300 mg tablet TAKE 1 TABLET BY MOUTH DAILY 90 Tab 3    insulin glargine (LANTUS,BASAGLAR) 100 unit/mL (3 mL) inpn 60 units every morning 6 Adjustable Dose Pre-filled Pen Syringe 11    oxybutynin (DITROPAN) 5 mg tablet Take 1 Tab by mouth three (3) times daily.  90 Tab 11    atorvastatin (LIPITOR) 40 mg tablet TAKE 1 TABLET BY MOUTH EVERY DAY 90 Tab 3    metFORMIN ER (GLUCOPHAGE XR) 500 mg tablet TAKE 1 TABLET BY MOUTH THREE TIMES DAILY WITH MEALS 270 Tab 3    Insulin Needles, Disposable, (BD ULTRA-FINE SHORT PEN NEEDLE) 31 gauge x 5/16\" ndle USE WITH INSULIN PENS TWICE DAILY AS DIRECTED 100 Pen Needle 11    alcohol swabs (BD SINGLE USE SWABS REGULAR) padm USE TO TEST BLOOD SUGAR.dx.e11.9 100 Pad 11    potassium chloride SR (KLOR-CON 10) Report given to Noah OR RN.    10 mEq tablet 2  Tabs 2 times a day 360 Tab 3    metOLazone (ZAROXOLYN) 5 mg tablet Take 1 Tab by mouth daily. 30 Tab 0    furosemide (LASIX) 40 mg tablet TAKE 3 TABLETS BY MOUTH EVERY MORNING (Patient taking differently: TAKE 3 TABLETS BY MOUTH EVERY MORNING AND 1 TABLET IN THE EVENING) 90 Tab 11    glimepiride (AMARYL) 4 mg tablet   TAKE 1 TABLET BY MOUTH TWICE DAILY BEFORE A MEAL      mirabegron ER (MYRBETRIQ) 50 mg ER tablet Take 1 Tab by mouth daily. 30 Tab 11    glucose blood VI test strips (FREESTYLE LITE STRIPS) strip Use to test blood sugar three times daily. Dx.e11.9 100 Strip 11    Blood-Glucose Meter (FREESTYLE LITE METER) monitoring kit Test three times a day dx. e119 1 Kit 0    glyBURIDE (DIABETA) 1.25 mg tablet Take 1.25 mg by mouth Daily (before breakfast).       lisinopril (PRINIVIL, ZESTRIL) 20 mg tablet TAKE 1 TABLET BY MOUTH ONCE DAILY IN THE MORNING 90 Tab 3    ONE TOUCH DELICA 33 gauge misc   11    NURIA PEN NEEDLE 32 x 5/32 \" ndle       Blood-Glucose Meter monitoring kit Testing BID-DX:250.00 1 kit 0    Lancets (ONE TOUCH ULTRASOFT LANCETS) misc TESTING BID 1 Package 11    glucose blood VI test strips (ASCENSIA AUTODISC VI, ONE TOUCH ULTRA TEST VI) strip TESTING BID 1 Package 11     Past Medical History:   Diagnosis Date    Arthritis     Congestive heart failure, unspecified     Diabetes ([address]     Hypercholesterolemia     Hypertension      Past Surgical History:   Procedure Laterality Date    HX ORTHOPAEDIC      Arthroscopy right knee     No Known Allergies      REVIEW OF SYSTEMS:  General: negative for - chills or fever  ENT: negative for - headaches, nasal congestion or tinnitus  Respiratory: negative for - cough, hemoptysis, shortness of breath or wheezing  Cardiovascular : negative for - chest pain, edema, palpitations or shortness of breath  Gastrointestinal: negative for - abdominal pain, blood in stools, heartburn or nausea/vomiting  Genito-Urinary: no dysuria, trouble voiding, or hematuria  Musculoskeletal: negative for - gait disturbance, joint pain, joint stiffness or joint swelling  Neurological: no TIA or stroke symptoms  Hematologic: no bruises, no bleeding, no swollen glands  Integument: no lumps, mole changes, nail changes or rash  Endocrine: no malaise/lethargy or unexpected weight changes      Social History     Socioeconomic History    Marital status:      Spouse name: Not on file    Number of children: 3    Years of education: 15    Highest education level: Not on file   Occupational History    Occupation: retired   Tobacco Use    Smoking status: Former Smoker     Packs/day: 0.25     Years: 20.00     Pack years: 5.00     Quit date: 2014     Years since quittin.1    Smokeless tobacco: Never Used   Substance and Sexual Activity    Alcohol use: Yes     Alcohol/week: 0.0 standard drinks     Comment: 21-24 Beers per week    Drug use: No    Sexual activity: Yes     Partners: Male     Family History   Problem Relation Age of Onset    Diabetes Mother        OBJECTIVE:    Visit Vitals  BP (!) 142/89   Pulse 97   Temp 98 °F (36.7 °C) (Oral)   Resp 14   Ht 5' 4\" (1.626 m)   Wt 193 lb (87.5 kg)   SpO2 93%   BMI 33.13 kg/m²     CONSTITUTIONAL: well , well nourished, appears age appropriate  EYES: perrla, eom intact  ENMT:moist mucous membranes, pharynx clear  NECK: supple. Thyroid normal,JVD to angle of jaw  RESPIRATORY: Chest: clear to ascultation and percussion   CARDIOVASCULAR: Heart: regular rate and rhythm  GASTROINTESTINAL: Abdomen: soft, bowel sounds active  HEMATOLOGIC: no pathological lymph nodes palpated  MUSCULOSKELETAL: Extremities: no edema, pulse 1+   INTEGUMENT: No unusual rashes or suspicious skin lesions noted. Nails appear normal.  NEUROLOGIC: non-focal exam   MENTAL STATUS: alert and oriented, appropriate affect      ASSESSMENT:  1. CHF (congestive heart failure) (HCC)    2. Cardiomyopathy, unspecified type ([address]    3.  Dyspnea, unspecified type    4. Uncontrolled type 2 diabetes mellitus with hyperglycemia ([address]    5. Essential hypertension    6. Obesity (BMI 30.0-34. 9)        PLAN:    1. The patient is probably in congestive heart failure. I will increase her Furosemide to 40 mg in the morning and 20 mg in the late afternoon. I will see how she fares with this. I will also check a D-dimer. Her last BNP was slightly elevated as compared to the previous one, but not as high as I would have anticipated if indeed she was in gross congestive heart failure. She does have existing cardiomyopathy. Chest x-ray revealed cardiomegaly predominantly. 2. Her diabetes historically has been doing reasonably well. 3. Blood pressure is excellent, no adjustments are made. 4. She really needs to lose weight. This can be accomplished by eating meals, eliminating snacks and avoiding the consumption of processed carbohydrates. .  Orders Placed This Encounter    XR CHEST PA LAT    D DIMER         Follow-up and Dispositions    · Return in about 1 week (around 2/1/2021).            Lamar Szymanski MD

## 2024-09-10 ENCOUNTER — OFFICE VISIT (OUTPATIENT)
Age: 74
End: 2024-09-10
Payer: MEDICARE

## 2024-09-10 VITALS
HEIGHT: 64 IN | WEIGHT: 185 LBS | SYSTOLIC BLOOD PRESSURE: 118 MMHG | OXYGEN SATURATION: 97 % | DIASTOLIC BLOOD PRESSURE: 62 MMHG | BODY MASS INDEX: 31.58 KG/M2 | HEART RATE: 90 BPM

## 2024-09-10 DIAGNOSIS — I42.0 DILATED CARDIOMYOPATHY (HCC): Primary | ICD-10-CM

## 2024-09-10 DIAGNOSIS — I10 PRIMARY HYPERTENSION: ICD-10-CM

## 2024-09-10 DIAGNOSIS — E78.2 MIXED HYPERLIPIDEMIA: ICD-10-CM

## 2024-09-10 DIAGNOSIS — G47.33 OBSTRUCTIVE SLEEP APNEA: ICD-10-CM

## 2024-09-10 PROCEDURE — 3017F COLORECTAL CA SCREEN DOC REV: CPT | Performed by: SPECIALIST

## 2024-09-10 PROCEDURE — 1036F TOBACCO NON-USER: CPT | Performed by: SPECIALIST

## 2024-09-10 PROCEDURE — G8399 PT W/DXA RESULTS DOCUMENT: HCPCS | Performed by: SPECIALIST

## 2024-09-10 PROCEDURE — 3078F DIAST BP <80 MM HG: CPT | Performed by: SPECIALIST

## 2024-09-10 PROCEDURE — 3074F SYST BP LT 130 MM HG: CPT | Performed by: SPECIALIST

## 2024-09-10 PROCEDURE — 99214 OFFICE O/P EST MOD 30 MIN: CPT | Performed by: SPECIALIST

## 2024-09-10 PROCEDURE — G8427 DOCREV CUR MEDS BY ELIG CLIN: HCPCS | Performed by: SPECIALIST

## 2024-09-10 PROCEDURE — 1123F ACP DISCUSS/DSCN MKR DOCD: CPT | Performed by: SPECIALIST

## 2024-09-10 PROCEDURE — 1090F PRES/ABSN URINE INCON ASSESS: CPT | Performed by: SPECIALIST

## 2024-09-10 PROCEDURE — G8417 CALC BMI ABV UP PARAM F/U: HCPCS | Performed by: SPECIALIST

## 2024-09-10 ASSESSMENT — PATIENT HEALTH QUESTIONNAIRE - PHQ9
2. FEELING DOWN, DEPRESSED OR HOPELESS: NOT AT ALL
SUM OF ALL RESPONSES TO PHQ9 QUESTIONS 1 & 2: 0
SUM OF ALL RESPONSES TO PHQ QUESTIONS 1-9: 0
1. LITTLE INTEREST OR PLEASURE IN DOING THINGS: NOT AT ALL
SUM OF ALL RESPONSES TO PHQ QUESTIONS 1-9: 0

## 2024-09-11 LAB
CHOLEST SERPL-MCNC: 117 MG/DL
HDLC SERPL-MCNC: 49 MG/DL
HDLC SERPL: 2.4 (ref 0–5)
LDLC SERPL CALC-MCNC: 26.2 MG/DL (ref 0–100)
TRIGL SERPL-MCNC: 209 MG/DL
VLDLC SERPL CALC-MCNC: 41.8 MG/DL

## 2024-09-12 ENCOUNTER — TELEPHONE (OUTPATIENT)
Age: 74
End: 2024-09-12

## 2024-11-06 ENCOUNTER — OFFICE VISIT (OUTPATIENT)
Facility: CLINIC | Age: 74
End: 2024-11-06

## 2024-11-06 VITALS
SYSTOLIC BLOOD PRESSURE: 104 MMHG | TEMPERATURE: 98 F | DIASTOLIC BLOOD PRESSURE: 55 MMHG | RESPIRATION RATE: 16 BRPM | HEART RATE: 86 BPM | BODY MASS INDEX: 31.6 KG/M2 | HEIGHT: 64 IN | OXYGEN SATURATION: 97 % | WEIGHT: 185.1 LBS

## 2024-11-06 DIAGNOSIS — E66.811 OBESITY (BMI 30.0-34.9): ICD-10-CM

## 2024-11-06 DIAGNOSIS — E11.9 CONTROLLED TYPE 2 DIABETES MELLITUS WITHOUT COMPLICATION, WITH LONG-TERM CURRENT USE OF INSULIN (HCC): ICD-10-CM

## 2024-11-06 DIAGNOSIS — E78.5 DYSLIPIDEMIA: ICD-10-CM

## 2024-11-06 DIAGNOSIS — Z79.4 CONTROLLED TYPE 2 DIABETES MELLITUS WITHOUT COMPLICATION, WITH LONG-TERM CURRENT USE OF INSULIN (HCC): ICD-10-CM

## 2024-11-06 DIAGNOSIS — N31.9 NEUROGENIC BLADDER: ICD-10-CM

## 2024-11-06 DIAGNOSIS — I50.32 CHRONIC HEART FAILURE WITH PRESERVED EJECTION FRACTION (HCC): ICD-10-CM

## 2024-11-06 DIAGNOSIS — I42.8 NICM (NONISCHEMIC CARDIOMYOPATHY) (HCC): Primary | ICD-10-CM

## 2024-11-06 DIAGNOSIS — G56.01 CARPAL TUNNEL SYNDROME OF RIGHT WRIST: ICD-10-CM

## 2024-11-06 DIAGNOSIS — I10 PRIMARY HYPERTENSION: ICD-10-CM

## 2024-11-06 LAB
ANION GAP SERPL CALC-SCNC: 4 MMOL/L (ref 2–12)
APPEARANCE UR: CLEAR
BACTERIA URNS QL MICRO: NEGATIVE /HPF
BILIRUB UR QL: NEGATIVE
BUN SERPL-MCNC: 26 MG/DL (ref 6–20)
BUN/CREAT SERPL: 22 (ref 12–20)
CALCIUM SERPL-MCNC: 9.3 MG/DL (ref 8.5–10.1)
CHLORIDE SERPL-SCNC: 110 MMOL/L (ref 97–108)
CO2 SERPL-SCNC: 28 MMOL/L (ref 21–32)
COLOR UR: ABNORMAL
CREAT SERPL-MCNC: 1.19 MG/DL (ref 0.55–1.02)
EPITH CASTS URNS QL MICRO: ABNORMAL /LPF
EST. AVERAGE GLUCOSE BLD GHB EST-MCNC: 192 MG/DL
GLUCOSE SERPL-MCNC: 157 MG/DL (ref 65–100)
GLUCOSE UR STRIP.AUTO-MCNC: >1000 MG/DL
HBA1C MFR BLD: 8.3 % (ref 4–5.6)
HGB UR QL STRIP: NEGATIVE
KETONES UR QL STRIP.AUTO: NEGATIVE MG/DL
LEUKOCYTE ESTERASE UR QL STRIP.AUTO: NEGATIVE
NITRITE UR QL STRIP.AUTO: NEGATIVE
PH UR STRIP: 5.5 (ref 5–8)
POTASSIUM SERPL-SCNC: 3.6 MMOL/L (ref 3.5–5.1)
PROT UR STRIP-MCNC: NEGATIVE MG/DL
RBC #/AREA URNS HPF: ABNORMAL /HPF (ref 0–5)
SODIUM SERPL-SCNC: 142 MMOL/L (ref 136–145)
SP GR UR REFRACTOMETRY: 1.02 (ref 1–1.03)
UROBILINOGEN UR QL STRIP.AUTO: 0.2 EU/DL (ref 0.2–1)
WBC URNS QL MICRO: ABNORMAL /HPF (ref 0–4)

## 2024-11-06 SDOH — ECONOMIC STABILITY: FOOD INSECURITY: WITHIN THE PAST 12 MONTHS, THE FOOD YOU BOUGHT JUST DIDN'T LAST AND YOU DIDN'T HAVE MONEY TO GET MORE.: NEVER TRUE

## 2024-11-06 SDOH — ECONOMIC STABILITY: INCOME INSECURITY: HOW HARD IS IT FOR YOU TO PAY FOR THE VERY BASICS LIKE FOOD, HOUSING, MEDICAL CARE, AND HEATING?: NOT HARD AT ALL

## 2024-11-06 SDOH — ECONOMIC STABILITY: FOOD INSECURITY: WITHIN THE PAST 12 MONTHS, YOU WORRIED THAT YOUR FOOD WOULD RUN OUT BEFORE YOU GOT MONEY TO BUY MORE.: NEVER TRUE

## 2024-11-06 ASSESSMENT — PATIENT HEALTH QUESTIONNAIRE - PHQ9
SUM OF ALL RESPONSES TO PHQ QUESTIONS 1-9: 0
2. FEELING DOWN, DEPRESSED OR HOPELESS: NOT AT ALL
SUM OF ALL RESPONSES TO PHQ QUESTIONS 1-9: 0
SUM OF ALL RESPONSES TO PHQ9 QUESTIONS 1 & 2: 0
1. LITTLE INTEREST OR PLEASURE IN DOING THINGS: NOT AT ALL

## 2024-11-06 ASSESSMENT — ANXIETY QUESTIONNAIRES
GAD7 TOTAL SCORE: 0
1. FEELING NERVOUS, ANXIOUS, OR ON EDGE: NOT AT ALL
IF YOU CHECKED OFF ANY PROBLEMS ON THIS QUESTIONNAIRE, HOW DIFFICULT HAVE THESE PROBLEMS MADE IT FOR YOU TO DO YOUR WORK, TAKE CARE OF THINGS AT HOME, OR GET ALONG WITH OTHER PEOPLE: NOT DIFFICULT AT ALL
4. TROUBLE RELAXING: NOT AT ALL
3. WORRYING TOO MUCH ABOUT DIFFERENT THINGS: NOT AT ALL
5. BEING SO RESTLESS THAT IT IS HARD TO SIT STILL: NOT AT ALL
2. NOT BEING ABLE TO STOP OR CONTROL WORRYING: NOT AT ALL
6. BECOMING EASILY ANNOYED OR IRRITABLE: NOT AT ALL
7. FEELING AFRAID AS IF SOMETHING AWFUL MIGHT HAPPEN: NOT AT ALL

## 2024-11-07 DIAGNOSIS — Z79.4 CONTROLLED TYPE 2 DIABETES MELLITUS WITHOUT COMPLICATION, WITH LONG-TERM CURRENT USE OF INSULIN (HCC): ICD-10-CM

## 2024-11-07 DIAGNOSIS — E11.9 CONTROLLED TYPE 2 DIABETES MELLITUS WITHOUT COMPLICATION, WITH LONG-TERM CURRENT USE OF INSULIN (HCC): ICD-10-CM

## 2024-11-07 RX ORDER — INSULIN GLARGINE 100 [IU]/ML
INJECTION, SOLUTION SUBCUTANEOUS
Qty: 6 ADJUSTABLE DOSE PRE-FILLED PEN SYRINGE | Refills: 11 | Status: SHIPPED | OUTPATIENT
Start: 2024-11-07

## 2024-11-07 RX ORDER — PEN NEEDLE, DIABETIC 30 GX5/16"
NEEDLE, DISPOSABLE MISCELLANEOUS
Qty: 100 EACH | Refills: 11 | Status: SHIPPED | OUTPATIENT
Start: 2024-11-07

## 2024-11-07 NOTE — PROGRESS NOTES
Chief Complaint   Patient presents with    Diabetes     Patient states \" she is here for a follow-up.\"    \"Have you been to the ER, urgent care clinic since your last visit?  Hospitalized since your last visit?\"    NO    “Have you seen or consulted any other health care providers outside our system since your last visit?”    NO    Have you had a mammogram?”   NO    Date of last Mammogram: 4/20/2021       “Have you had a diabetic eye exam?”    NO     Date of last diabetic eye exam: 3/16/2015          
Comes in for a return visit, stating that she has done quite well. She denies dyspnea on exertion, orthopnea, PND.  She follows up with the cardiologist on a regular basis.  She has a cardiac profile for congestive heart failure secondary to reduced systolic function.    She states that her diabetes is doing reasonably well also. She is currently taking 73 units of insulin.    She also complains of paresthesias and numbness of her hands.    She has increased urinary frequency with poor control and Myrbetriq was not successful in reducing this.    
Microscopic     Standing Status:   Future     Number of Occurrences:   1     Standing Expiration Date:   11/6/2025     Order Specific Question:   SPECIFY(EX-CATH,MIDSTREAM,CYSTO,ETC)?     Answer:   midstream    Hemoglobin A1C     Standing Status:   Future     Number of Occurrences:   1     Standing Expiration Date:   11/6/2025    Basic Metabolic Panel     Standing Status:   Future     Number of Occurrences:   1     Standing Expiration Date:   11/6/2025        Follow-up and Dispositions    Return in about 3 months (around 2/6/2025).             Eliot Leon MD

## 2024-11-26 ENCOUNTER — TELEPHONE (OUTPATIENT)
Facility: CLINIC | Age: 74
End: 2024-11-26

## 2024-11-26 NOTE — TELEPHONE ENCOUNTER
----- Message from Dr. Eliot Leon MD sent at 11/17/2024 10:16 AM EST -----  Increase insulin to 80 units assuming she is currently taking 73 units

## 2024-12-05 DIAGNOSIS — I50.22 CHRONIC SYSTOLIC CONGESTIVE HEART FAILURE (HCC): Primary | ICD-10-CM

## 2024-12-05 RX ORDER — SACUBITRIL AND VALSARTAN 24; 26 MG/1; MG/1
TABLET, FILM COATED ORAL
Qty: 180 TABLET | Refills: 0 | Status: SHIPPED | OUTPATIENT
Start: 2024-12-05

## 2024-12-05 NOTE — TELEPHONE ENCOUNTER
Requested Prescriptions     Pending Prescriptions Disp Refills    sacubitril-valsartan (ENTRESTO) 24-26 MG per tablet [Pharmacy Med Name: ENTRESTO 24 MG-26 MG TABLET] 180 tablet 0     Sig: TAKE 1 TABLET BY MOUTH TWICE A DAY

## 2024-12-06 ENCOUNTER — TELEPHONE (OUTPATIENT)
Age: 74
End: 2024-12-06

## 2024-12-10 ENCOUNTER — OFFICE VISIT (OUTPATIENT)
Age: 74
End: 2024-12-10
Payer: MEDICARE

## 2024-12-10 VITALS
DIASTOLIC BLOOD PRESSURE: 82 MMHG | BODY MASS INDEX: 32.17 KG/M2 | TEMPERATURE: 96.3 F | HEART RATE: 77 BPM | WEIGHT: 187.4 LBS | SYSTOLIC BLOOD PRESSURE: 140 MMHG | OXYGEN SATURATION: 94 %

## 2024-12-10 DIAGNOSIS — I42.9 CARDIOMYOPATHY, UNSPECIFIED TYPE (HCC): ICD-10-CM

## 2024-12-10 DIAGNOSIS — I42.8 NICM (NONISCHEMIC CARDIOMYOPATHY) (HCC): Primary | ICD-10-CM

## 2024-12-10 DIAGNOSIS — I50.32 CHRONIC HEART FAILURE WITH PRESERVED EJECTION FRACTION (HCC): ICD-10-CM

## 2024-12-10 PROCEDURE — 99214 OFFICE O/P EST MOD 30 MIN: CPT | Performed by: INTERNAL MEDICINE

## 2024-12-10 PROCEDURE — G8484 FLU IMMUNIZE NO ADMIN: HCPCS | Performed by: INTERNAL MEDICINE

## 2024-12-10 PROCEDURE — 1159F MED LIST DOCD IN RCRD: CPT | Performed by: INTERNAL MEDICINE

## 2024-12-10 PROCEDURE — 1160F RVW MEDS BY RX/DR IN RCRD: CPT | Performed by: INTERNAL MEDICINE

## 2024-12-10 PROCEDURE — G8399 PT W/DXA RESULTS DOCUMENT: HCPCS | Performed by: INTERNAL MEDICINE

## 2024-12-10 PROCEDURE — 1036F TOBACCO NON-USER: CPT | Performed by: INTERNAL MEDICINE

## 2024-12-10 PROCEDURE — 3017F COLORECTAL CA SCREEN DOC REV: CPT | Performed by: INTERNAL MEDICINE

## 2024-12-10 PROCEDURE — G8417 CALC BMI ABV UP PARAM F/U: HCPCS | Performed by: INTERNAL MEDICINE

## 2024-12-10 PROCEDURE — 3077F SYST BP >= 140 MM HG: CPT | Performed by: INTERNAL MEDICINE

## 2024-12-10 PROCEDURE — 1126F AMNT PAIN NOTED NONE PRSNT: CPT | Performed by: INTERNAL MEDICINE

## 2024-12-10 PROCEDURE — 3079F DIAST BP 80-89 MM HG: CPT | Performed by: INTERNAL MEDICINE

## 2024-12-10 PROCEDURE — G8427 DOCREV CUR MEDS BY ELIG CLIN: HCPCS | Performed by: INTERNAL MEDICINE

## 2024-12-10 PROCEDURE — 1123F ACP DISCUSS/DSCN MKR DOCD: CPT | Performed by: INTERNAL MEDICINE

## 2024-12-10 PROCEDURE — 1090F PRES/ABSN URINE INCON ASSESS: CPT | Performed by: INTERNAL MEDICINE

## 2024-12-10 RX ORDER — CARVEDILOL 3.12 MG/1
3.12 TABLET ORAL 2 TIMES DAILY
Qty: 60 TABLET | Refills: 3 | Status: SHIPPED | OUTPATIENT
Start: 2024-12-10

## 2024-12-10 NOTE — PATIENT INSTRUCTIONS
Dear Zuleyma,    Thank you for visiting my office today. Your commitment to improving your health and managing your cardiomyopathy is commendable.    Following our consultation, here are the key instructions and recommendations for your care plan:    - Medications:    - Start taking Bumex 2 mg as needed for shortness of breath or if your weight increases by 3 pounds in a day.    - Increase your dose of Entresto; an updated prescription will be sent to [facility].    - Begin taking beta blockers (coreg) as discussed to help manage your blood pressure.    - Tests and Monitoring:    - Schedule blood work in one week, focusing on a CBC and renal function panel to monitor your potassium levels.    - Echocardiogram needed to assess your current heart function. You will be getting a call or my chart message to schedule echocardiogram    - Lifestyle and Activity:    - Continue to avoid salt in your diet to help manage your heart condition.  -- join exercise program    - Follow-Up Care:    - Follow up appointment in 4 months to review your progress and adjust treatment as necessary.    Please ensure to monitor your symptoms and adhere to the medication adjustments. If you have any questions or concerns before our next scheduled visit, do not hesitate to contact the office.    Best regards,    Dr. Frandy Quinones MD  Cardiology

## 2024-12-10 NOTE — PROGRESS NOTES
09/09/2014    Nonischemic       Heart failure (HCC)     Hypertension        PAST SURGICAL HISTORY:  Past Surgical History:   Procedure Laterality Date    COLONOSCOPY N/A 3/19/2021    COLONOSCOPY performed by David Ramirez MD at [facility] ENDOSCOPY    ORTHOPEDIC SURGERY      Arthroscopy right knee    KY UNLISTED PROCEDURE CARDIAC SURGERY      life vest       FAMILY HISTORY:  Family History   Problem Relation Age of Onset    Diabetes Mother        SOCIAL HISTORY:  Social History     Socioeconomic History    Marital status:      Spouse name: Not on file    Number of children: 4    Years of education: 13    Highest education level: High school graduate   Occupational History    Not on file   Tobacco Use    Smoking status: Former     Current packs/day: 0.00     Average packs/day: 0.3 packs/day for 15.0 years (3.8 ttl pk-yrs)     Types: Cigarettes     Quit date: 12/11/2014     Years since quitting: 10.0    Smokeless tobacco: Never   Vaping Use    Vaping status: Never Used   Substance and Sexual Activity    Alcohol use: Yes     Comment: socially    Drug use: No    Sexual activity: Not Currently     Partners: Male   Other Topics Concern    Not on file   Social History Narrative    High School Graduate.      Social Determinants of Health     Financial Resource Strain: Low Risk  (11/6/2024)    Overall Financial Resource Strain (CARDIA)     Difficulty of Paying Living Expenses: Not hard at all   Food Insecurity: No Food Insecurity (11/6/2024)    Hunger Vital Sign     Worried About Running Out of Food in the Last Year: Never true     Ran Out of Food in the Last Year: Never true   Transportation Needs: No Transportation Needs (11/6/2024)    PRAPARE - Transportation     Lack of Transportation (Medical): No     Lack of Transportation (Non-Medical): No   Physical Activity: Insufficiently Active (2/27/2024)    Exercise Vital Sign     Days of Exercise per Week: 2 days     Minutes of Exercise per Session: 30 min   Stress: No

## 2024-12-11 DIAGNOSIS — I50.20 UNSPECIFIED SYSTOLIC (CONGESTIVE) HEART FAILURE (HCC): ICD-10-CM

## 2024-12-11 DIAGNOSIS — M10.9 GOUT, UNSPECIFIED CAUSE, UNSPECIFIED CHRONICITY, UNSPECIFIED SITE: ICD-10-CM

## 2024-12-11 RX ORDER — ALLOPURINOL 100 MG/1
100 TABLET ORAL DAILY
Qty: 90 TABLET | Refills: 3 | Status: SHIPPED | OUTPATIENT
Start: 2024-12-11

## 2024-12-12 RX ORDER — ISOSORBIDE MONONITRATE 30 MG/1
TABLET, EXTENDED RELEASE ORAL
Qty: 90 TABLET | Refills: 1 | Status: SHIPPED | OUTPATIENT
Start: 2024-12-12

## 2024-12-12 RX ORDER — DAPAGLIFLOZIN 10 MG/1
10 TABLET, FILM COATED ORAL DAILY
Qty: 30 TABLET | Refills: 4 | Status: SHIPPED | OUTPATIENT
Start: 2024-12-12

## 2024-12-12 RX ORDER — SPIRONOLACTONE 25 MG/1
TABLET ORAL
Qty: 90 TABLET | Refills: 1 | Status: SHIPPED | OUTPATIENT
Start: 2024-12-12

## 2024-12-12 NOTE — TELEPHONE ENCOUNTER
Requested Prescriptions     Pending Prescriptions Disp Refills    FARXIGA 10 MG tablet [Pharmacy Med Name: FARXIGA 10 MG TABLET] 30 tablet 4     Sig: TAKE 1 TABLET BY MOUTH EVERY DAY    spironolactone (ALDACTONE) 25 MG tablet [Pharmacy Med Name: SPIRONOLACTONE 25 MG TABLET] 90 tablet 1     Sig: TAKE 1 TABLET BY MOUTH EVERY DAY    isosorbide mononitrate (IMDUR) 30 MG extended release tablet [Pharmacy Med Name: ISOSORBIDE MONONIT ER 30 MG TB] 90 tablet 1     Sig: TAKE 1 TABLET BY MOUTH EVERY DAY IN THE MORNING        Last appt 12/10  Next appt TBD April 2025

## 2025-01-13 DIAGNOSIS — I42.9 CARDIOMYOPATHY, UNSPECIFIED TYPE (HCC): ICD-10-CM

## 2025-01-13 DIAGNOSIS — I42.8 NICM (NONISCHEMIC CARDIOMYOPATHY) (HCC): ICD-10-CM

## 2025-01-13 DIAGNOSIS — I50.32 CHRONIC HEART FAILURE WITH PRESERVED EJECTION FRACTION (HCC): ICD-10-CM

## 2025-01-14 LAB
ANION GAP SERPL CALC-SCNC: 6 MMOL/L (ref 2–12)
BASOPHILS # BLD: 0.06 K/UL (ref 0–0.1)
BASOPHILS NFR BLD: 1.4 % (ref 0–1)
BUN SERPL-MCNC: 24 MG/DL (ref 6–20)
BUN/CREAT SERPL: 23 (ref 12–20)
CALCIUM SERPL-MCNC: 9.9 MG/DL (ref 8.5–10.1)
CHLORIDE SERPL-SCNC: 106 MMOL/L (ref 97–108)
CO2 SERPL-SCNC: 30 MMOL/L (ref 21–32)
CREAT SERPL-MCNC: 1.04 MG/DL (ref 0.55–1.02)
DIFFERENTIAL METHOD BLD: ABNORMAL
EOSINOPHIL # BLD: 0.24 K/UL (ref 0–0.4)
EOSINOPHIL NFR BLD: 5.7 % (ref 0–7)
ERYTHROCYTE [DISTWIDTH] IN BLOOD BY AUTOMATED COUNT: 12.9 % (ref 11.5–14.5)
GLUCOSE SERPL-MCNC: 191 MG/DL (ref 65–100)
HCT VFR BLD AUTO: 35.9 % (ref 35–47)
HGB BLD-MCNC: 11.3 G/DL (ref 11.5–16)
IMM GRANULOCYTES # BLD AUTO: 0.01 K/UL (ref 0–0.04)
IMM GRANULOCYTES NFR BLD AUTO: 0.2 % (ref 0–0.5)
LYMPHOCYTES # BLD: 1.38 K/UL (ref 0.8–3.5)
LYMPHOCYTES NFR BLD: 32.8 % (ref 12–49)
MCH RBC QN AUTO: 28.6 PG (ref 26–34)
MCHC RBC AUTO-ENTMCNC: 31.5 G/DL (ref 30–36.5)
MCV RBC AUTO: 90.9 FL (ref 80–99)
MONOCYTES # BLD: 0.42 K/UL (ref 0–1)
MONOCYTES NFR BLD: 10 % (ref 5–13)
NEUTS SEG # BLD: 2.1 K/UL (ref 1.8–8)
NEUTS SEG NFR BLD: 49.9 % (ref 32–75)
NRBC # BLD: 0 K/UL (ref 0–0.01)
NRBC BLD-RTO: 0 PER 100 WBC
NT PRO BNP: 88 PG/ML
PLATELET # BLD AUTO: 281 K/UL (ref 150–400)
PMV BLD AUTO: 10.5 FL (ref 8.9–12.9)
POTASSIUM SERPL-SCNC: 4.2 MMOL/L (ref 3.5–5.1)
RBC # BLD AUTO: 3.95 M/UL (ref 3.8–5.2)
SODIUM SERPL-SCNC: 142 MMOL/L (ref 136–145)
WBC # BLD AUTO: 4.2 K/UL (ref 3.6–11)

## 2025-01-16 NOTE — RESULT ENCOUNTER NOTE
Labs reviewed hemoglobin 11.3 g/dL creatinine stable at 1.04 mg/dL proBNP normal continue same treatment

## 2025-01-17 DIAGNOSIS — I50.32 CHRONIC HEART FAILURE WITH PRESERVED EJECTION FRACTION (HCC): ICD-10-CM

## 2025-01-17 DIAGNOSIS — I42.9 CARDIOMYOPATHY, UNSPECIFIED TYPE (HCC): ICD-10-CM

## 2025-01-17 DIAGNOSIS — I42.8 NICM (NONISCHEMIC CARDIOMYOPATHY) (HCC): ICD-10-CM

## 2025-01-17 RX ORDER — CARVEDILOL 3.12 MG/1
3.12 TABLET ORAL 2 TIMES DAILY
Qty: 180 TABLET | Refills: 1 | Status: SHIPPED | OUTPATIENT
Start: 2025-01-17

## 2025-01-17 NOTE — TELEPHONE ENCOUNTER
Requested Prescriptions     Pending Prescriptions Disp Refills    carvedilol (COREG) 3.125 MG tablet [Pharmacy Med Name: CARVEDILOL 3.125 MG TABLET] 180 tablet 0     Sig: TAKE 1 TABLET BY MOUTH TWICE A DAY     Last visit 12/10  Next visit 4/14

## 2025-01-27 NOTE — PROGRESS NOTES
Chief Complaint   Patient presents with    Diabetes     2 month follow up          1. Have you been to the ER, urgent care clinic since your last visit? Hospitalized since your last visit? No    2. Have you seen or consulted any other health care providers outside of the [address] [place] [address] since your last visit? Include any pap smears or colon screening.  No Refill Decision Note   Hellen Vinson  is requesting a refill authorization.  Brief Assessment and Rationale for Refill:  Approve     Medication Therapy Plan:       Medication Reconciliation Completed: No   Comments:     No Care Gaps recommended.     Note composed:11:14 AM 01/27/2025

## 2025-02-10 ENCOUNTER — OFFICE VISIT (OUTPATIENT)
Facility: CLINIC | Age: 75
End: 2025-02-10

## 2025-02-10 VITALS
OXYGEN SATURATION: 96 % | HEIGHT: 64 IN | SYSTOLIC BLOOD PRESSURE: 153 MMHG | HEART RATE: 73 BPM | BODY MASS INDEX: 31.82 KG/M2 | RESPIRATION RATE: 16 BRPM | DIASTOLIC BLOOD PRESSURE: 72 MMHG | TEMPERATURE: 97.9 F | WEIGHT: 186.4 LBS

## 2025-02-10 DIAGNOSIS — I50.32 CHRONIC HEART FAILURE WITH PRESERVED EJECTION FRACTION (HCC): Primary | ICD-10-CM

## 2025-02-10 DIAGNOSIS — E78.5 DYSLIPIDEMIA: ICD-10-CM

## 2025-02-10 DIAGNOSIS — E11.9 CONTROLLED TYPE 2 DIABETES MELLITUS WITHOUT COMPLICATION, WITH LONG-TERM CURRENT USE OF INSULIN (HCC): ICD-10-CM

## 2025-02-10 DIAGNOSIS — Z79.4 CONTROLLED TYPE 2 DIABETES MELLITUS WITHOUT COMPLICATION, WITH LONG-TERM CURRENT USE OF INSULIN (HCC): ICD-10-CM

## 2025-02-10 DIAGNOSIS — E66.811 OBESITY (BMI 30.0-34.9): ICD-10-CM

## 2025-02-10 DIAGNOSIS — N39.0 URINARY TRACT INFECTION WITHOUT HEMATURIA, SITE UNSPECIFIED: ICD-10-CM

## 2025-02-10 DIAGNOSIS — Z00.00 MEDICARE ANNUAL WELLNESS VISIT, SUBSEQUENT: ICD-10-CM

## 2025-02-10 DIAGNOSIS — I10 PRIMARY HYPERTENSION: ICD-10-CM

## 2025-02-10 DIAGNOSIS — M10.9 GOUT, UNSPECIFIED CAUSE, UNSPECIFIED CHRONICITY, UNSPECIFIED SITE: ICD-10-CM

## 2025-02-10 LAB
ALBUMIN SERPL-MCNC: 3.7 G/DL (ref 3.5–5)
ALBUMIN/GLOB SERPL: 0.8 (ref 1.1–2.2)
ALP SERPL-CCNC: 54 U/L (ref 45–117)
ALT SERPL-CCNC: 33 U/L (ref 12–78)
ANION GAP SERPL CALC-SCNC: 5 MMOL/L (ref 2–12)
APPEARANCE UR: CLEAR
AST SERPL-CCNC: 23 U/L (ref 15–37)
BACTERIA URNS QL MICRO: NEGATIVE /HPF
BASOPHILS # BLD: 0.03 K/UL (ref 0–0.1)
BASOPHILS NFR BLD: 1 % (ref 0–1)
BILIRUB SERPL-MCNC: 0.5 MG/DL (ref 0.2–1)
BILIRUB UR QL: NEGATIVE
BUN SERPL-MCNC: 25 MG/DL (ref 6–20)
BUN/CREAT SERPL: 28 (ref 12–20)
CALCIUM SERPL-MCNC: 9.5 MG/DL (ref 8.5–10.1)
CHLORIDE SERPL-SCNC: 108 MMOL/L (ref 97–108)
CHOLEST SERPL-MCNC: 132 MG/DL
CO2 SERPL-SCNC: 29 MMOL/L (ref 21–32)
COLOR UR: ABNORMAL
CREAT SERPL-MCNC: 0.89 MG/DL (ref 0.55–1.02)
CRP SERPL HS-MCNC: 2 MG/L
DIFFERENTIAL METHOD BLD: ABNORMAL
EOSINOPHIL # BLD: 0.23 K/UL (ref 0–0.4)
EOSINOPHIL NFR BLD: 7.4 % (ref 0–7)
EPITH CASTS URNS QL MICRO: ABNORMAL /LPF
ERYTHROCYTE [DISTWIDTH] IN BLOOD BY AUTOMATED COUNT: 13.1 % (ref 11.5–14.5)
EST. AVERAGE GLUCOSE BLD GHB EST-MCNC: 143 MG/DL
GLOBULIN SER CALC-MCNC: 4.6 G/DL (ref 2–4)
GLUCOSE SERPL-MCNC: 102 MG/DL (ref 65–100)
GLUCOSE UR STRIP.AUTO-MCNC: >1000 MG/DL
HBA1C MFR BLD: 6.6 % (ref 4–5.6)
HCT VFR BLD AUTO: 37.2 % (ref 35–47)
HDLC SERPL-MCNC: 60 MG/DL
HDLC SERPL: 2.2 (ref 0–5)
HGB BLD-MCNC: 11.6 G/DL (ref 11.5–16)
HGB UR QL STRIP: NEGATIVE
IMM GRANULOCYTES # BLD AUTO: 0.01 K/UL (ref 0–0.04)
IMM GRANULOCYTES NFR BLD AUTO: 0.3 % (ref 0–0.5)
KETONES UR QL STRIP.AUTO: NEGATIVE MG/DL
LDLC SERPL CALC-MCNC: 31.8 MG/DL (ref 0–100)
LEUKOCYTE ESTERASE UR QL STRIP.AUTO: NEGATIVE
LYMPHOCYTES # BLD: 1.11 K/UL (ref 0.8–3.5)
LYMPHOCYTES NFR BLD: 35.7 % (ref 12–49)
MCH RBC QN AUTO: 28.3 PG (ref 26–34)
MCHC RBC AUTO-ENTMCNC: 31.2 G/DL (ref 30–36.5)
MCV RBC AUTO: 90.7 FL (ref 80–99)
MONOCYTES # BLD: 0.26 K/UL (ref 0–1)
MONOCYTES NFR BLD: 8.4 % (ref 5–13)
NEUTS SEG # BLD: 1.47 K/UL (ref 1.8–8)
NEUTS SEG NFR BLD: 47.2 % (ref 32–75)
NITRITE UR QL STRIP.AUTO: NEGATIVE
NRBC # BLD: 0 K/UL (ref 0–0.01)
NRBC BLD-RTO: 0 PER 100 WBC
PH UR STRIP: 5.5 (ref 5–8)
PLATELET # BLD AUTO: 149 K/UL (ref 150–400)
PMV BLD AUTO: 10.6 FL (ref 8.9–12.9)
POTASSIUM SERPL-SCNC: 3.6 MMOL/L (ref 3.5–5.1)
PROT SERPL-MCNC: 8.3 G/DL (ref 6.4–8.2)
PROT UR STRIP-MCNC: 30 MG/DL
RBC # BLD AUTO: 4.1 M/UL (ref 3.8–5.2)
RBC #/AREA URNS HPF: ABNORMAL /HPF (ref 0–5)
SODIUM SERPL-SCNC: 142 MMOL/L (ref 136–145)
SP GR UR REFRACTOMETRY: 1.02 (ref 1–1.03)
TRIGL SERPL-MCNC: 201 MG/DL
TSH SERPL DL<=0.05 MIU/L-ACNC: 0.7 UIU/ML (ref 0.36–3.74)
UROBILINOGEN UR QL STRIP.AUTO: 0.2 EU/DL (ref 0.2–1)
VLDLC SERPL CALC-MCNC: 40.2 MG/DL
WBC # BLD AUTO: 3.1 K/UL (ref 3.6–11)
WBC URNS QL MICRO: ABNORMAL /HPF (ref 0–4)

## 2025-02-10 SDOH — ECONOMIC STABILITY: FOOD INSECURITY: WITHIN THE PAST 12 MONTHS, THE FOOD YOU BOUGHT JUST DIDN'T LAST AND YOU DIDN'T HAVE MONEY TO GET MORE.: NEVER TRUE

## 2025-02-10 SDOH — ECONOMIC STABILITY: FOOD INSECURITY: WITHIN THE PAST 12 MONTHS, YOU WORRIED THAT YOUR FOOD WOULD RUN OUT BEFORE YOU GOT MONEY TO BUY MORE.: NEVER TRUE

## 2025-02-10 ASSESSMENT — LIFESTYLE VARIABLES
HOW MANY STANDARD DRINKS CONTAINING ALCOHOL DO YOU HAVE ON A TYPICAL DAY: 1 OR 2
HOW OFTEN DO YOU HAVE A DRINK CONTAINING ALCOHOL: NEVER

## 2025-02-10 ASSESSMENT — ANXIETY QUESTIONNAIRES
5. BEING SO RESTLESS THAT IT IS HARD TO SIT STILL: NOT AT ALL
3. WORRYING TOO MUCH ABOUT DIFFERENT THINGS: NOT AT ALL
4. TROUBLE RELAXING: NOT AT ALL
IF YOU CHECKED OFF ANY PROBLEMS ON THIS QUESTIONNAIRE, HOW DIFFICULT HAVE THESE PROBLEMS MADE IT FOR YOU TO DO YOUR WORK, TAKE CARE OF THINGS AT HOME, OR GET ALONG WITH OTHER PEOPLE: NOT DIFFICULT AT ALL
7. FEELING AFRAID AS IF SOMETHING AWFUL MIGHT HAPPEN: NOT AT ALL
6. BECOMING EASILY ANNOYED OR IRRITABLE: NOT AT ALL
GAD7 TOTAL SCORE: 0
1. FEELING NERVOUS, ANXIOUS, OR ON EDGE: NOT AT ALL
2. NOT BEING ABLE TO STOP OR CONTROL WORRYING: NOT AT ALL

## 2025-02-10 ASSESSMENT — PATIENT HEALTH QUESTIONNAIRE - PHQ9
2. FEELING DOWN, DEPRESSED OR HOPELESS: NOT AT ALL
SUM OF ALL RESPONSES TO PHQ9 QUESTIONS 1 & 2: 0
1. LITTLE INTEREST OR PLEASURE IN DOING THINGS: NOT AT ALL
SUM OF ALL RESPONSES TO PHQ QUESTIONS 1-9: 0

## 2025-02-10 NOTE — PROGRESS NOTES
Chief Complaint   Patient presents with    Medicare AWV     Patient states \" she is present for a annual exam.\"    \"Have you been to the ER, urgent care clinic since your last visit?  Hospitalized since your last visit?\"    NO    “Have you seen or consulted any other health care providers outside our system since your last visit?”    NO    Have you had a mammogram?”   NO    Date of last Mammogram: 4/20/2021       “Have you had a diabetic eye exam?”    NO     Date of last diabetic eye exam: 3/16/2015

## 2025-02-11 LAB
BACTERIA SPEC CULT: NORMAL
CC UR VC: NORMAL
SERVICE CMNT-IMP: NORMAL

## 2025-02-12 LAB — APO B SERPL-MCNC: 54 MG/DL

## 2025-02-18 RX ORDER — ALLOPURINOL 100 MG/1
100 TABLET ORAL DAILY
Qty: 90 TABLET | Refills: 3 | Status: SHIPPED | OUTPATIENT
Start: 2025-02-18

## 2025-02-18 NOTE — PATIENT INSTRUCTIONS
Learning About Stress  What is stress?     Stress is your body's response to a hard situation. Your body can have a physical, emotional, or mental response. Stress is a fact of life for most people, and it affects everyone differently. What causes stress for you may not be stressful for someone else.  A lot of things can cause stress. You may feel stress when you go on a job interview, take a test, or run a race. This kind of short-term stress is normal and even useful. It can help you if you need to work hard or react quickly. For example, stress can help you finish an important job on time.  Long-term stress is caused by ongoing stressful situations or events. Examples of long-term stress include long-term health problems, ongoing problems at work, or conflicts in your family. Long-term stress can harm your health.  How does stress affect your health?  When you are stressed, your body responds as though you are in danger. It makes hormones that speed up your heart, make you breathe faster, and give you a burst of energy. This is called the fight-or-flight stress response. If the stress is over quickly, your body goes back to normal and no harm is done.  But if stress happens too often or lasts too long, it can have bad effects. Long-term stress can make you more likely to get sick, and it can make symptoms of some diseases worse. If you tense up when you are stressed, you may develop neck, shoulder, or low back pain. Stress is linked to high blood pressure and heart disease.  Stress also harms your emotional health. It can make you nolasco, tense, or depressed. Your relationships may suffer, and you may not do well at work or school.  What can you do to manage stress?  You can try these things to help manage stress:   Do something active. Exercise or activity can help reduce stress. Walking is a great way to get started. Even everyday activities such as housecleaning or yard work can help.  Try yoga or lottie chi.

## 2025-03-05 RX ORDER — ATORVASTATIN CALCIUM 40 MG/1
40 TABLET, FILM COATED ORAL DAILY
Qty: 90 TABLET | Refills: 3 | Status: SHIPPED | OUTPATIENT
Start: 2025-03-05

## 2025-03-18 ENCOUNTER — OFFICE VISIT (OUTPATIENT)
Age: 75
End: 2025-03-18
Payer: MEDICARE

## 2025-03-18 VITALS
HEIGHT: 64 IN | BODY MASS INDEX: 31.58 KG/M2 | SYSTOLIC BLOOD PRESSURE: 130 MMHG | WEIGHT: 185 LBS | OXYGEN SATURATION: 99 % | HEART RATE: 69 BPM | DIASTOLIC BLOOD PRESSURE: 76 MMHG

## 2025-03-18 DIAGNOSIS — G47.33 OBSTRUCTIVE SLEEP APNEA: ICD-10-CM

## 2025-03-18 DIAGNOSIS — I10 PRIMARY HYPERTENSION: ICD-10-CM

## 2025-03-18 DIAGNOSIS — E78.2 MIXED HYPERLIPIDEMIA: ICD-10-CM

## 2025-03-18 DIAGNOSIS — I42.9 CARDIOMYOPATHY, UNSPECIFIED TYPE (HCC): Primary | ICD-10-CM

## 2025-03-18 DIAGNOSIS — R06.02 SOB (SHORTNESS OF BREATH): ICD-10-CM

## 2025-03-18 PROCEDURE — 99214 OFFICE O/P EST MOD 30 MIN: CPT | Performed by: SPECIALIST

## 2025-03-18 PROCEDURE — 3075F SYST BP GE 130 - 139MM HG: CPT | Performed by: SPECIALIST

## 2025-03-18 PROCEDURE — 1159F MED LIST DOCD IN RCRD: CPT | Performed by: SPECIALIST

## 2025-03-18 PROCEDURE — 1126F AMNT PAIN NOTED NONE PRSNT: CPT | Performed by: SPECIALIST

## 2025-03-18 PROCEDURE — 1123F ACP DISCUSS/DSCN MKR DOCD: CPT | Performed by: SPECIALIST

## 2025-03-18 PROCEDURE — 1160F RVW MEDS BY RX/DR IN RCRD: CPT | Performed by: SPECIALIST

## 2025-03-18 PROCEDURE — 3078F DIAST BP <80 MM HG: CPT | Performed by: SPECIALIST

## 2025-03-18 RX ORDER — LANOLIN ALCOHOL/MO/W.PET/CERES
100 CREAM (GRAM) TOPICAL DAILY
Qty: 90 TABLET | Refills: 1 | Status: SHIPPED | OUTPATIENT
Start: 2025-03-18

## 2025-03-18 ASSESSMENT — PATIENT HEALTH QUESTIONNAIRE - PHQ9
SUM OF ALL RESPONSES TO PHQ QUESTIONS 1-9: 0
1. LITTLE INTEREST OR PLEASURE IN DOING THINGS: NOT AT ALL
SUM OF ALL RESPONSES TO PHQ QUESTIONS 1-9: 0
SUM OF ALL RESPONSES TO PHQ QUESTIONS 1-9: 0
2. FEELING DOWN, DEPRESSED OR HOPELESS: NOT AT ALL
SUM OF ALL RESPONSES TO PHQ QUESTIONS 1-9: 0

## 2025-03-18 NOTE — TELEPHONE ENCOUNTER
Requested Prescriptions     Pending Prescriptions Disp Refills    thiamine 100 MG tablet [Pharmacy Med Name: VITAMIN B-1 100 MG TABLET] 90 tablet 1     Sig: TAKE 1 TABLET BY MOUTH EVERY DAY     Last visit 12/10/24  Next visit 4/14/25

## 2025-03-18 NOTE — PROGRESS NOTES
HISTORY OF PRESENT ILLNESS  Zuleyma Sharp is a 75 y.o. female     SUMMARY:   Patient Active Problem List   Diagnosis    Controlled type 2 diabetes mellitus without complication, with long-term current use of insulin (HCC)    NICM (nonischemic cardiomyopathy) (HCC)    Carpal tunnel syndrome of right wrist    Diastolic heart failure, NYHA class 2 (HCC)    Dermatitis    Angina at rest    Obesity (BMI 30.0-34.9)    Gout    Osteoarthrosis, localized, primary, knee, right    Primary osteoarthritis of right knee    Dyslipidemia    Former tobacco use    Prerenal azotemia    Moderate pulmonary arterial systolic hypertension (HCC)    Status post total right knee replacement    Epistaxis    Hypertension    Advance care planning    Reactive airway disease    Decreased libido    Alcohol abuse    Type 2 diabetes mellitus with kidney complication, with long-term current use of insulin (HCC)    Sepsis (HCC)    Acute kidney injury superimposed on chronic kidney disease    Chronic heart failure with preserved ejection fraction (HCC)            CARDIOLOGY STUDIES TO DATE:  5/18 negative lexiscan   11/20/19   ECHO ADULT COMPLETE 11/20/2019 11/20/2019   Narrative   · Left Ventricle: Normal wall thickness. Mildly dilated left ventricle. Low normal systolic dysfunction. Estimated left ventricular ejection fraction is 51 - 55%. Visually measured ejection fraction. Inconclusive left ventricular diastolic function.· Mitral Valve: Mitral valve non-specific thickening. Trace mitral valve regurgitation is present.· Tricuspid Valve: Mild tricuspid valve regurgitation is present.· Pulmonary Artery: Pulmonary hypertension.· Left Atrium: Mildly dilated left atrium.   Signed by: Nael Matson MD      01/29/21  echo lvef 20-25%, lae, mod decreased rvef, mild to mod mr, mild to mod tr, pa pressure 46mm    12/22 echo normal lvef, lvh, mild mr     3/21 cardiac cath, normal coronaries     04/08/21 echo lvef 50-55%     11/21 normal

## 2025-03-18 NOTE — PROGRESS NOTES
1. Have you been to the ER, urgent care clinic since your last visit?  Hospitalized since your last visit?No    2. Have you seen or consulted any other health care providers outside of the [facility] since your last visit?  Include any pap smears or colon screening. No

## 2025-04-11 ENCOUNTER — TELEPHONE (OUTPATIENT)
Age: 75
End: 2025-04-11

## 2025-04-13 NOTE — PROGRESS NOTES
[facility]  [place], [place]  Heart Failure Outpatient Clinic Note    Patient name: Zuleyma Sharp  Patient : 1950  Patient MRN: [id number]  Date of service: 25    Primary care physician: Eliot Leon MD  Primary cardiologist: Dr Rutherford  Primary [facility] cardiologist: Frandy Quinones MD      CHIEF COMPLAINT:  Follow up for chronic systolic heart failure    CURRENT VISIT 2025   Zuleyma Sharp presents for HF f/u.   Overall patient feels about the same from a heart failure standpoint.  She is not short of breath at rest or with normal activities.  She does get dyspnea on exertion with walking distances of about a block and with stairs.  She denies orthopnea.  Denies swelling.  She denies dizziness, palpitations, or syncope.  She says sometimes she feels a little off balance if she stands up too quickly.  Her weights are stable.  She would like to lose weight and is interested in a weight loss program.  Her blood pressures are within goal at home, SBP usually around 120.  Blood pressure slightly on the lower side today, 96/50 however patient denies any symptoms of dizziness or lightheadedness.  She is compliant with all medications.      ASSESSMENT:  Zuleyma Sharp is a 75 y.o. female with a history of    Nonischemic cardiomyopathy with normalization of EF to 60 to 65%  Hypertension   Diabetes mellitus  Obesity  Obstructive sleep apnea   moderate pulmonary hypertension, WHO group 2      PLAN:  Heart failure/Cardiomyopathy, HFimpEF (EF 20-25% in 2021 up to 50-55% 2021 and 60-65% 2022):  Stable and NYHA class II  Continue current medical therapy for heart failure:  Beta-blocker:  -- .  Continue Coreg 3.25 mg twice daily for GDMT  ACE/ARB/ARNi: Continue  .Entresto [sacubitril 49/valsartan 51 mg] p.o. twice daily  Hydralazine/Isordil: -- Will consider starting on follow-up visit based on blood pressure readings  MRA: Continue  .Spironolactone 25

## 2025-04-14 ENCOUNTER — OFFICE VISIT (OUTPATIENT)
Age: 75
End: 2025-04-14
Payer: COMMERCIAL

## 2025-04-14 VITALS
RESPIRATION RATE: 18 BRPM | HEIGHT: 64 IN | BODY MASS INDEX: 31.62 KG/M2 | WEIGHT: 185.2 LBS | OXYGEN SATURATION: 96 % | TEMPERATURE: 98.1 F | DIASTOLIC BLOOD PRESSURE: 50 MMHG | SYSTOLIC BLOOD PRESSURE: 96 MMHG | HEART RATE: 77 BPM

## 2025-04-14 DIAGNOSIS — I42.8 NICM (NONISCHEMIC CARDIOMYOPATHY) (HCC): ICD-10-CM

## 2025-04-14 DIAGNOSIS — I42.9 CARDIOMYOPATHY, UNSPECIFIED TYPE (HCC): ICD-10-CM

## 2025-04-14 DIAGNOSIS — I50.22 CHRONIC SYSTOLIC HEART FAILURE (HCC): ICD-10-CM

## 2025-04-14 DIAGNOSIS — G47.33 OSA (OBSTRUCTIVE SLEEP APNEA): ICD-10-CM

## 2025-04-14 DIAGNOSIS — E66.811 OBESITY (BMI 30.0-34.9): ICD-10-CM

## 2025-04-14 DIAGNOSIS — I50.32 HEART FAILURE WITH IMPROVED EJECTION FRACTION (HFIMPEF) (HCC): Primary | ICD-10-CM

## 2025-04-14 PROCEDURE — 1123F ACP DISCUSS/DSCN MKR DOCD: CPT | Performed by: NURSE PRACTITIONER

## 2025-04-14 PROCEDURE — 3078F DIAST BP <80 MM HG: CPT | Performed by: NURSE PRACTITIONER

## 2025-04-14 PROCEDURE — 99214 OFFICE O/P EST MOD 30 MIN: CPT | Performed by: NURSE PRACTITIONER

## 2025-04-14 PROCEDURE — 3074F SYST BP LT 130 MM HG: CPT | Performed by: NURSE PRACTITIONER

## 2025-04-14 RX ORDER — DAPAGLIFLOZIN 10 MG/1
10 TABLET, FILM COATED ORAL DAILY
Qty: 30 TABLET | Refills: 4 | Status: SHIPPED | OUTPATIENT
Start: 2025-04-14

## 2025-04-14 ASSESSMENT — PATIENT HEALTH QUESTIONNAIRE - PHQ9
SUM OF ALL RESPONSES TO PHQ QUESTIONS 1-9: 0
2. FEELING DOWN, DEPRESSED OR HOPELESS: NOT AT ALL
1. LITTLE INTEREST OR PLEASURE IN DOING THINGS: NOT AT ALL
SUM OF ALL RESPONSES TO PHQ QUESTIONS 1-9: 0

## 2025-04-14 NOTE — PATIENT INSTRUCTIONS
Medication Changes:    NONE      Testing Ordered:    ECHOCARDIOGRAM - please call Central Scheduling at [phone]      Referrals:    SLEEP MEDICINE     Call one of the numbers below to schedule your appointment:    [address]   [place] [address]   Tel.  [phone]   Fax. [phone]  [address]   [place] [address]   Tel.  [phone]   Fax. [phone]  [address] [place] [address]   Tel.  [phone]   Fax. [phone]        WEIGHT MANAGEMENT CLINIC:  Call to schedule at [phone]      Other Recommendations:      - Record blood pressure and heart rate 2 times daily: before medication and two hours after medication  - Record weight daily upon waking/after using the bathroom.   - Keep a written records of your weights and blood pressure and bring to your next appointment.   - Call the clinic if you have a weight gain of 3 or more pounds overnight OR 5 or more pounds in one week, new/worsened shortness of breath or swelling, or if your blood pressure begins to consistently run below 90/60 and/or you begin to experience dizziness or lightheadedness. Our office phone number [phone] option 2.  - Drink an adequate amount of water with a goal of 6-8 eight ounce glasses (1.5-2 liters) of fluid daily. Your urine should be clear and light yellow straw colored.       Follow up     3 MONTH FOLLOW UP with [facility]      Thank you for allowing us the privilege of being a part of your healthcare team!  Please do not hesitate to contact our office at [phone] option 2 with any questions or concerns.     Please make sure to have an active My Chart account. Having issues logging in? Contact the [employer] Help Desk at [phone].   - "[employer]" is the best way to communicate with [facility] Nurses. We can answer your questions, you can report your weight and BP logs and we notify you of any abnormal results requiring changes to your

## 2025-04-23 ENCOUNTER — RESULTS FOLLOW-UP (OUTPATIENT)
Age: 75
End: 2025-04-23

## 2025-04-23 ENCOUNTER — HOSPITAL ENCOUNTER (OUTPATIENT)
Facility: HOSPITAL | Age: 75
Discharge: HOME | End: 2025-04-25
Attending: INTERNAL MEDICINE
Payer: MEDICARE

## 2025-04-23 DIAGNOSIS — I42.8 NICM (NONISCHEMIC CARDIOMYOPATHY) (HCC): ICD-10-CM

## 2025-04-23 DIAGNOSIS — I42.9 CARDIOMYOPATHY, UNSPECIFIED TYPE (HCC): ICD-10-CM

## 2025-04-23 DIAGNOSIS — I50.32 CHRONIC HEART FAILURE WITH PRESERVED EJECTION FRACTION (HCC): ICD-10-CM

## 2025-04-23 LAB
ECHO AO ASC DIAM: 3.1 CM
ECHO AO ROOT DIAM: 3 CM
ECHO AV AREA PEAK VELOCITY: 1.7 CM2
ECHO AV AREA VTI: 2.3 CM2
ECHO AV MEAN GRADIENT: 3 MMHG
ECHO AV MEAN VELOCITY: 0.9 M/S
ECHO AV PEAK GRADIENT: 6 MMHG
ECHO AV PEAK VELOCITY: 1.3 M/S
ECHO AV VELOCITY RATIO: 0.54
ECHO AV VTI: 21.4 CM
ECHO LA DIAMETER: 3.6 CM
ECHO LA TO AORTIC ROOT RATIO: 1.2
ECHO LA VOL A-L A2C: 66 ML (ref 22–52)
ECHO LA VOL A-L A4C: 50 ML (ref 22–52)
ECHO LA VOL BP: 53 ML (ref 22–52)
ECHO LA VOL MOD A2C: 62 ML (ref 22–52)
ECHO LA VOL MOD A4C: 44 ML (ref 22–52)
ECHO LA VOLUME AREA LENGTH: 58 ML
ECHO LV EDV A2C: 68 ML
ECHO LV EDV A4C: 77 ML
ECHO LV EDV BP: 73 ML (ref 56–104)
ECHO LV EF PHYSICIAN: 55 %
ECHO LV EJECTION FRACTION A2C: 53 %
ECHO LV EJECTION FRACTION A4C: 66 %
ECHO LV EJECTION FRACTION BIPLANE: 59 % (ref 55–100)
ECHO LV ESV A2C: 32 ML
ECHO LV ESV A4C: 27 ML
ECHO LV ESV BP: 30 ML (ref 19–49)
ECHO LV FRACTIONAL SHORTENING: 27 % (ref 28–44)
ECHO LV INTERNAL DIMENSION DIASTOLIC: 4.4 CM (ref 3.9–5.3)
ECHO LV INTERNAL DIMENSION SYSTOLIC: 3.2 CM
ECHO LV IVSD: 0.7 CM (ref 0.6–0.9)
ECHO LV MASS 2D: 118.6 G (ref 67–162)
ECHO LV POSTERIOR WALL DIASTOLIC: 1 CM (ref 0.6–0.9)
ECHO LV RELATIVE WALL THICKNESS RATIO: 0.45
ECHO LVOT AREA: 3.1 CM2
ECHO LVOT AV VTI INDEX: 0.74
ECHO LVOT DIAM: 2 CM
ECHO LVOT MEAN GRADIENT: 1 MMHG
ECHO LVOT PEAK GRADIENT: 2 MMHG
ECHO LVOT PEAK VELOCITY: 0.7 M/S
ECHO LVOT SV: 49.6 ML
ECHO LVOT VTI: 15.8 CM
ECHO MV A VELOCITY: 1.16 M/S
ECHO MV AREA PHT: 4.9 CM2
ECHO MV E DECELERATION TIME (DT): 153.3 MS
ECHO MV E VELOCITY: 0.64 M/S
ECHO MV E/A RATIO: 0.55
ECHO MV PRESSURE HALF TIME (PHT): 44.5 MS
ECHO RV INTERNAL DIMENSION: 2.9 CM

## 2025-04-23 PROCEDURE — 93306 TTE W/DOPPLER COMPLETE: CPT

## 2025-04-23 NOTE — RESULT ENCOUNTER NOTE
Echo shows normal heart function-good news continue same treatment  No significant valvular heart disease

## 2025-05-08 ENCOUNTER — TELEPHONE (OUTPATIENT)
Age: 75
End: 2025-05-08

## 2025-05-08 NOTE — TELEPHONE ENCOUNTER
Patient has been sent the link for our pre-recorded [facility] Medical Weight Loss Orientation. Patient is required to register, view the recording, call insurance to verify benefits, then send an email to the  to schedule a consultation.

## 2025-05-13 ENCOUNTER — OFFICE VISIT (OUTPATIENT)
Facility: CLINIC | Age: 75
End: 2025-05-13
Payer: MEDICARE

## 2025-05-13 VITALS
RESPIRATION RATE: 16 BRPM | SYSTOLIC BLOOD PRESSURE: 139 MMHG | HEART RATE: 64 BPM | BODY MASS INDEX: 32.2 KG/M2 | DIASTOLIC BLOOD PRESSURE: 75 MMHG | OXYGEN SATURATION: 99 % | WEIGHT: 188.6 LBS | TEMPERATURE: 98.5 F | HEIGHT: 64 IN

## 2025-05-13 DIAGNOSIS — E11.9 CONTROLLED TYPE 2 DIABETES MELLITUS WITHOUT COMPLICATION, WITH LONG-TERM CURRENT USE OF INSULIN (HCC): ICD-10-CM

## 2025-05-13 DIAGNOSIS — Z79.4 CONTROLLED TYPE 2 DIABETES MELLITUS WITHOUT COMPLICATION, WITH LONG-TERM CURRENT USE OF INSULIN (HCC): ICD-10-CM

## 2025-05-13 DIAGNOSIS — E78.5 DYSLIPIDEMIA: ICD-10-CM

## 2025-05-13 DIAGNOSIS — R41.3 MEMORY LOSS, SHORT TERM: ICD-10-CM

## 2025-05-13 DIAGNOSIS — I10 PRIMARY HYPERTENSION: ICD-10-CM

## 2025-05-13 DIAGNOSIS — Z79.4 CONTROLLED TYPE 2 DIABETES MELLITUS WITHOUT COMPLICATION, WITH LONG-TERM CURRENT USE OF INSULIN (HCC): Primary | ICD-10-CM

## 2025-05-13 DIAGNOSIS — I50.30 DIASTOLIC HEART FAILURE, NYHA CLASS 2 (HCC): ICD-10-CM

## 2025-05-13 DIAGNOSIS — E11.9 CONTROLLED TYPE 2 DIABETES MELLITUS WITHOUT COMPLICATION, WITH LONG-TERM CURRENT USE OF INSULIN (HCC): Primary | ICD-10-CM

## 2025-05-13 LAB
ANION GAP SERPL CALC-SCNC: 0 MMOL/L (ref 2–12)
BUN SERPL-MCNC: 13 MG/DL (ref 6–20)
BUN/CREAT SERPL: 15 (ref 12–20)
CALCIUM SERPL-MCNC: 9.2 MG/DL (ref 8.5–10.1)
CHLORIDE SERPL-SCNC: 110 MMOL/L (ref 97–108)
CO2 SERPL-SCNC: 33 MMOL/L (ref 21–32)
CREAT SERPL-MCNC: 0.85 MG/DL (ref 0.55–1.02)
EST. AVERAGE GLUCOSE BLD GHB EST-MCNC: 137 MG/DL
GLUCOSE SERPL-MCNC: 113 MG/DL (ref 65–100)
HBA1C MFR BLD: 6.4 % (ref 4–5.6)
POTASSIUM SERPL-SCNC: 3.8 MMOL/L (ref 3.5–5.1)
SODIUM SERPL-SCNC: 143 MMOL/L (ref 136–145)

## 2025-05-13 PROCEDURE — 1125F AMNT PAIN NOTED PAIN PRSNT: CPT | Performed by: INTERNAL MEDICINE

## 2025-05-13 PROCEDURE — 3075F SYST BP GE 130 - 139MM HG: CPT | Performed by: INTERNAL MEDICINE

## 2025-05-13 PROCEDURE — 3078F DIAST BP <80 MM HG: CPT | Performed by: INTERNAL MEDICINE

## 2025-05-13 PROCEDURE — 1159F MED LIST DOCD IN RCRD: CPT | Performed by: INTERNAL MEDICINE

## 2025-05-13 PROCEDURE — 3044F HG A1C LEVEL LT 7.0%: CPT | Performed by: INTERNAL MEDICINE

## 2025-05-13 PROCEDURE — 99214 OFFICE O/P EST MOD 30 MIN: CPT | Performed by: INTERNAL MEDICINE

## 2025-05-13 PROCEDURE — 1123F ACP DISCUSS/DSCN MKR DOCD: CPT | Performed by: INTERNAL MEDICINE

## 2025-05-14 RX ORDER — ACYCLOVIR 800 MG/1
TABLET ORAL
Qty: 2 EACH | Refills: 11 | Status: SHIPPED | OUTPATIENT
Start: 2025-05-14

## 2025-05-14 RX ORDER — ACETAMINOPHEN 160 MG
TABLET,DISINTEGRATING ORAL
Qty: 90 CAPSULE | Refills: 3 | Status: SHIPPED | OUTPATIENT
Start: 2025-05-14

## 2025-05-15 LAB — APO B SERPL-MCNC: 53 MG/DL

## 2025-05-20 NOTE — PROGRESS NOTES
Chief Complaint   Patient presents with    Diabetes    Hypertension     Have you been to the ER, urgent care clinic since your last visit?  Hospitalized since your last visit?   NO    Have you seen or consulted any other health care providers outside our system since your last visit?   NO    Have you had a mammogram?”   NO    Date of last Mammogram: 4/20/2021       “Have you had a diabetic eye exam?”    NO     Date of last diabetic eye exam: 3/16/2015          
   thiamine 100 MG tablet TAKE 1 TABLET BY MOUTH EVERY DAY 90 tablet 1    atorvastatin (LIPITOR) 40 MG tablet TAKE 1 TABLET BY MOUTH EVERY DAY 90 tablet 3    allopurinol (ZYLOPRIM) 100 MG tablet Take 1 tablet by mouth daily 90 tablet 3    carvedilol (COREG) 3.125 MG tablet TAKE 1 TABLET BY MOUTH TWICE A  tablet 1    spironolactone (ALDACTONE) 25 MG tablet TAKE 1 TABLET BY MOUTH EVERY DAY 90 tablet 1    isosorbide mononitrate (IMDUR) 30 MG extended release tablet TAKE 1 TABLET BY MOUTH EVERY DAY IN THE MORNING 90 tablet 1    Insulin Pen Needle (PEN NEEDLES 31GX5/16\") 31G X 8 MM MISC Use to inject insulin daily. Dx.[id number] 100 each 11    insulin glargine (LANTUS SOLOSTAR) 100 UNIT/ML injection pen PATIENT REPORTS TAKING 58 UNITS SUBCUTANEOUSLY EVERY NIGHT 6 Adjustable Dose Pre-filled Pen Syringe 11    mirabegron (MYRBETRIQ) 50 MG TB24 Take 50 mg by mouth daily 90 tablet 3    melatonin 3 MG TABS tablet TAKE 1 TABLET BY MOUTH EVERY NIGHT AS NEEDED FOR INSOMNIA. 90 tablet 3    Continuous Glucose  (FREESTYLE SHAW 3 READER) MONICA Use to read blood sugars daily 1 each 0    citalopram (CELEXA) 10 MG tablet TAKE 1 TABLET BY MOUTH EVERY DAY 90 tablet 0    arformoterol tartrate (BROVANA) 15 MCG/2ML NEBU Take 1 ampule by nebulization 2 times daily 120 mL 3    ipratropium 0.5 mg-albuterol 2.5 mg (DUONEB) 0.5-2.5 (3) MG/3ML SOLN nebulizer solution Inhale 3 mLs into the lungs every 6 hours as needed for Shortness of Breath 360 mL 11    bumetanide (BUMEX) 2 MG tablet TAKE 1 TABLET BY MOUTH DAILY AS NEEDED (FOR WEIGHT GAIN OF 2-3 LB OVERNIGHT). 90 tablet 0    ammonium lactate (LAC-HYDRIN) 12 % lotion APPLY TO FEET TWICE A DAY      budesonide (PULMICORT) 0.25 MG/2ML nebulizer suspension Inhale into the lungs every 6 hours as needed      diclofenac sodium (VOLTAREN) 1 % GEL Apply topically 2 times daily as needed      sodium chloride (OCEAN) 0.65 % nasal spray 2 sprays by Nasal route      vitamin D (VITAMIN D3) 50 MCG

## 2025-05-21 ENCOUNTER — TELEMEDICINE (OUTPATIENT)
Age: 75
End: 2025-05-21
Payer: MEDICARE

## 2025-05-21 DIAGNOSIS — I50.32 CHRONIC HEART FAILURE WITH PRESERVED EJECTION FRACTION (HCC): ICD-10-CM

## 2025-05-21 DIAGNOSIS — G47.33 OSA (OBSTRUCTIVE SLEEP APNEA): Primary | ICD-10-CM

## 2025-05-21 PROCEDURE — 99214 OFFICE O/P EST MOD 30 MIN: CPT | Performed by: NURSE PRACTITIONER

## 2025-05-21 ASSESSMENT — SLEEP AND FATIGUE QUESTIONNAIRES
ESS TOTAL SCORE: 11
HOW LIKELY ARE YOU TO NOD OFF OR FALL ASLEEP WHILE WATCHING TV: HIGH CHANCE OF DOZING
HOW LIKELY ARE YOU TO NOD OFF OR FALL ASLEEP IN A CAR, WHILE STOPPED FOR A FEW MINUTES IN TRAFFIC: WOULD NEVER DOZE
HOW LIKELY ARE YOU TO NOD OFF OR FALL ASLEEP WHILE SITTING AND READING: MODERATE CHANCE OF DOZING
HOW LIKELY ARE YOU TO NOD OFF OR FALL ASLEEP WHILE SITTING INACTIVE IN A PUBLIC PLACE: HIGH CHANCE OF DOZING
HOW LIKELY ARE YOU TO NOD OFF OR FALL ASLEEP WHEN YOU ARE A PASSENGER IN A CAR FOR AN HOUR WITHOUT A BREAK: SLIGHT CHANCE OF DOZING
HOW LIKELY ARE YOU TO NOD OFF OR FALL ASLEEP WHILE SITTING QUIETLY AFTER LUNCH WITHOUT ALCOHOL: SLIGHT CHANCE OF DOZING
HOW LIKELY ARE YOU TO NOD OFF OR FALL ASLEEP WHILE SITTING AND TALKING TO SOMEONE: WOULD NEVER DOZE
HOW LIKELY ARE YOU TO NOD OFF OR FALL ASLEEP WHILE LYING DOWN TO REST IN THE AFTERNOON WHEN CIRCUMSTANCES PERMIT: SLIGHT CHANCE OF DOZING

## 2025-05-21 NOTE — PROGRESS NOTES
were provided through a video synchronous discussion virtually to substitute for in-person clinic visit.  I was  at home  while conducting this encounter, patient located at their home or alternate location of their choice.    Zuleyma Sharp, was evaluated through a synchronous (real-time) audio-video encounter. The patient (or guardian if applicable) is aware that this is a billable service, which includes applicable co-pays. This Virtual Visit was conducted with patient's (and/or legal guardian's) consent. Patient identification was verified, and a caregiver was present when appropriate.   The patient was located at Home: [address] [address]  Provider was located at Home (Appt Dept State[place]  Confirm you are appropriately licensed, registered, or certified to deliver care in the state where the patient is located as indicated above. If you are not or unsure, please re-schedule the visit: Yes, I confirm.       --BRY Pennintgon NP on 5/21/2025 at 10:28 AM    An electronic signature was used to authenticate this note.

## 2025-06-25 ENCOUNTER — HOSPITAL ENCOUNTER (OUTPATIENT)
Facility: HOSPITAL | Age: 75
Discharge: HOME | End: 2025-06-28
Payer: MEDICARE

## 2025-06-25 VITALS
HEIGHT: 64 IN | DIASTOLIC BLOOD PRESSURE: 72 MMHG | WEIGHT: 180 LBS | HEART RATE: 66 BPM | TEMPERATURE: 96.3 F | SYSTOLIC BLOOD PRESSURE: 138 MMHG | BODY MASS INDEX: 30.73 KG/M2 | OXYGEN SATURATION: 98 %

## 2025-06-25 DIAGNOSIS — G47.33 OSA (OBSTRUCTIVE SLEEP APNEA): ICD-10-CM

## 2025-06-25 PROCEDURE — 95811 POLYSOM 6/>YRS CPAP 4/> PARM: CPT | Performed by: INTERNAL MEDICINE

## 2025-06-26 ENCOUNTER — OFFICE VISIT (OUTPATIENT)
Facility: CLINIC | Age: 75
End: 2025-06-26
Payer: MEDICARE

## 2025-06-26 VITALS
WEIGHT: 191.6 LBS | OXYGEN SATURATION: 99 % | TEMPERATURE: 98.4 F | DIASTOLIC BLOOD PRESSURE: 52 MMHG | BODY MASS INDEX: 32.71 KG/M2 | HEIGHT: 64 IN | SYSTOLIC BLOOD PRESSURE: 114 MMHG | HEART RATE: 69 BPM | RESPIRATION RATE: 16 BRPM

## 2025-06-26 DIAGNOSIS — R79.89 PRERENAL AZOTEMIA: ICD-10-CM

## 2025-06-26 DIAGNOSIS — I10 PRIMARY HYPERTENSION: ICD-10-CM

## 2025-06-26 DIAGNOSIS — I50.32 CHRONIC HEART FAILURE WITH PRESERVED EJECTION FRACTION (HCC): ICD-10-CM

## 2025-06-26 DIAGNOSIS — E78.5 DYSLIPIDEMIA: ICD-10-CM

## 2025-06-26 DIAGNOSIS — Z79.4 CONTROLLED TYPE 2 DIABETES MELLITUS WITHOUT COMPLICATION, WITH LONG-TERM CURRENT USE OF INSULIN (HCC): Primary | ICD-10-CM

## 2025-06-26 DIAGNOSIS — E11.9 CONTROLLED TYPE 2 DIABETES MELLITUS WITHOUT COMPLICATION, WITH LONG-TERM CURRENT USE OF INSULIN (HCC): Primary | ICD-10-CM

## 2025-06-26 PROCEDURE — 3044F HG A1C LEVEL LT 7.0%: CPT | Performed by: INTERNAL MEDICINE

## 2025-06-26 PROCEDURE — 1126F AMNT PAIN NOTED NONE PRSNT: CPT | Performed by: INTERNAL MEDICINE

## 2025-06-26 PROCEDURE — 1123F ACP DISCUSS/DSCN MKR DOCD: CPT | Performed by: INTERNAL MEDICINE

## 2025-06-26 PROCEDURE — 99214 OFFICE O/P EST MOD 30 MIN: CPT | Performed by: INTERNAL MEDICINE

## 2025-06-26 PROCEDURE — 3074F SYST BP LT 130 MM HG: CPT | Performed by: INTERNAL MEDICINE

## 2025-06-26 PROCEDURE — 3078F DIAST BP <80 MM HG: CPT | Performed by: INTERNAL MEDICINE

## 2025-06-26 PROCEDURE — 1159F MED LIST DOCD IN RCRD: CPT | Performed by: INTERNAL MEDICINE

## 2025-06-26 RX ORDER — PEN NEEDLE, DIABETIC 31 GX5/16"
NEEDLE, DISPOSABLE MISCELLANEOUS
Qty: 100 EACH | Refills: 11 | Status: SHIPPED | OUTPATIENT
Start: 2025-06-26

## 2025-06-26 SDOH — ECONOMIC STABILITY: FOOD INSECURITY: WITHIN THE PAST 12 MONTHS, THE FOOD YOU BOUGHT JUST DIDN'T LAST AND YOU DIDN'T HAVE MONEY TO GET MORE.: NEVER TRUE

## 2025-06-26 SDOH — ECONOMIC STABILITY: FOOD INSECURITY: WITHIN THE PAST 12 MONTHS, YOU WORRIED THAT YOUR FOOD WOULD RUN OUT BEFORE YOU GOT MONEY TO BUY MORE.: NEVER TRUE

## 2025-06-26 ASSESSMENT — PATIENT HEALTH QUESTIONNAIRE - PHQ9
SUM OF ALL RESPONSES TO PHQ QUESTIONS 1-9: 0
1. LITTLE INTEREST OR PLEASURE IN DOING THINGS: NOT AT ALL
SUM OF ALL RESPONSES TO PHQ QUESTIONS 1-9: 0
2. FEELING DOWN, DEPRESSED OR HOPELESS: NOT AT ALL
SUM OF ALL RESPONSES TO PHQ QUESTIONS 1-9: 0
SUM OF ALL RESPONSES TO PHQ QUESTIONS 1-9: 0

## 2025-06-26 NOTE — PROGRESS NOTES
Chief Complaint   Patient presents with    Diabetes     Patient states she is present for a follow up.    Have you been to the ER, urgent care clinic since your last visit?  Hospitalized since your last visit?   NO    Have you seen or consulted any other health care providers outside our system since your last visit?   NO    Have you had a mammogram?   NO    Date of last Mammogram: 4/20/2021       Have you had a diabetic eye exam?   NO     Date of last diabetic eye exam: 3/16/2015

## 2025-06-27 LAB
ANION GAP SERPL CALC-SCNC: 7 MMOL/L (ref 2–12)
BUN SERPL-MCNC: 18 MG/DL (ref 6–20)
BUN/CREAT SERPL: 22 (ref 12–20)
CALCIUM SERPL-MCNC: 9.5 MG/DL (ref 8.5–10.1)
CHLORIDE SERPL-SCNC: 108 MMOL/L (ref 97–108)
CO2 SERPL-SCNC: 26 MMOL/L (ref 21–32)
CREAT SERPL-MCNC: 0.83 MG/DL (ref 0.55–1.02)
CREAT UR-MCNC: 85.2 MG/DL
EST. AVERAGE GLUCOSE BLD GHB EST-MCNC: 148 MG/DL
GLUCOSE SERPL-MCNC: 81 MG/DL (ref 65–100)
HBA1C MFR BLD: 6.8 % (ref 4–5.6)
POTASSIUM SERPL-SCNC: 3.5 MMOL/L (ref 3.5–5.1)
PROT UR-MCNC: 26 MG/DL (ref 0–11.9)
PROT/CREAT UR-RTO: 0.3
SODIUM SERPL-SCNC: 141 MMOL/L (ref 136–145)

## 2025-06-28 ENCOUNTER — CLINICAL DOCUMENTATION (OUTPATIENT)
Age: 75
End: 2025-06-28

## 2025-06-28 LAB — APO B SERPL-MCNC: 58 MG/DL

## 2025-06-28 NOTE — PROGRESS NOTES
Titration study interpreted.     * Device pressure change to Resmed  Max 20; Min 12; PS 04 cmH2O by lead technologist either remotely or at PAP clinic (notify patient).    Patient should avoid sleeping in supine position using extra pillows or one of the commercially available sleep positioning devices.    * PAP card download in 4 weeks.     * Office visit in 6 months with Shannan Nicole or as needed.

## 2025-06-30 ENCOUNTER — TELEPHONE (OUTPATIENT)
Age: 75
End: 2025-06-30

## 2025-06-30 NOTE — TELEPHONE ENCOUNTER
Left voicemail to review sleep study results and message has been sent to [employer].       Device pressures changed remotely to: Resmed  Max 20; Min 12; PS 04 cmH2O by lead technologist either remotely or at PAP clinic (notify patient).     PAP card download in 4 weeks.

## 2025-07-01 NOTE — TELEPHONE ENCOUNTER
Reviewed sleep study results.  Patient will contact [facility] is any issues with changed pressures.  DL 4 wks.

## 2025-07-09 DIAGNOSIS — I42.8 NICM (NONISCHEMIC CARDIOMYOPATHY) (HCC): ICD-10-CM

## 2025-07-09 DIAGNOSIS — I50.20 UNSPECIFIED SYSTOLIC (CONGESTIVE) HEART FAILURE (HCC): ICD-10-CM

## 2025-07-09 DIAGNOSIS — I50.32 CHRONIC HEART FAILURE WITH PRESERVED EJECTION FRACTION (HCC): ICD-10-CM

## 2025-07-09 DIAGNOSIS — I42.9 CARDIOMYOPATHY, UNSPECIFIED TYPE (HCC): ICD-10-CM

## 2025-07-09 RX ORDER — CARVEDILOL 3.12 MG/1
3.12 TABLET ORAL 2 TIMES DAILY
Qty: 180 TABLET | Refills: 0 | Status: SHIPPED | OUTPATIENT
Start: 2025-07-09

## 2025-07-09 RX ORDER — LANOLIN ALCOHOL/MO/W.PET/CERES
100 CREAM (GRAM) TOPICAL DAILY
Qty: 90 TABLET | Refills: 0 | Status: SHIPPED | OUTPATIENT
Start: 2025-07-09

## 2025-07-09 RX ORDER — SPIRONOLACTONE 25 MG/1
25 TABLET ORAL DAILY
Qty: 90 TABLET | Refills: 0 | Status: SHIPPED | OUTPATIENT
Start: 2025-07-09

## 2025-07-09 RX ORDER — ISOSORBIDE MONONITRATE 30 MG/1
30 TABLET, EXTENDED RELEASE ORAL EVERY MORNING
Qty: 90 TABLET | Refills: 0 | Status: SHIPPED | OUTPATIENT
Start: 2025-07-09

## 2025-07-09 NOTE — TELEPHONE ENCOUNTER
Requested Prescriptions     Pending Prescriptions Disp Refills    isosorbide mononitrate (IMDUR) 30 MG extended release tablet [Pharmacy Med Name: ISOSORBIDE MONONIT ER 30 MG TB] 90 tablet 0     Sig: TAKE 1 TABLET BY MOUTH EVERY DAY IN THE MORNING    thiamine 100 MG tablet [Pharmacy Med Name: VITAMIN B-1 100 MG TABLET] 90 tablet 0     Sig: TAKE 1 TABLET BY MOUTH EVERY DAY    carvedilol (COREG) 3.125 MG tablet [Pharmacy Med Name: CARVEDILOL 3.125 MG TABLET] 180 tablet 0     Sig: TAKE 1 TABLET BY MOUTH TWICE A DAY    spironolactone (ALDACTONE) 25 MG tablet [Pharmacy Med Name: SPIRONOLACTONE 25 MG TABLET] 90 tablet 0     Sig: TAKE 1 TABLET BY MOUTH EVERY DAY     Last visit 4/14  Next visit 7/23

## 2025-07-15 ENCOUNTER — TELEPHONE (OUTPATIENT)
Age: 75
End: 2025-07-15

## 2025-07-15 NOTE — TELEPHONE ENCOUNTER
Pt okayed 7/23/25 appt time change from 9:00 to 11:00 am due to Dr. Francois's RHC procedure that morning.

## 2025-07-21 ENCOUNTER — TELEPHONE (OUTPATIENT)
Age: 75
End: 2025-07-21

## 2025-07-21 NOTE — TELEPHONE ENCOUNTER
Telephone Call RE:  Appointment reminder     Outcome:     [x] Patient confirmed appointment   [] Patient rescheduled appointment for    [] Unable to reach  [] Left message              [x] Other: Copay $20

## 2025-07-22 ENCOUNTER — RESULTS FOLLOW-UP (OUTPATIENT)
Facility: CLINIC | Age: 75
End: 2025-07-22

## 2025-07-22 NOTE — PROGRESS NOTES
[facility]  [facility] in [place], [place]  Heart Failure Outpatient Clinic Note    Patient name: Zuleyma Sharp  Patient : 1950  Patient MRN: [id number]  Date of service: 25    Primary care physician: Eliot Leon MD  Primary cardiologist: Delonte Rutherford MD  Primary [facility] cardiologist: Frandy Quinones MD      CHIEF COMPLAINT:  Follow up for chronic systolic heart failure  Chief Complaint   Patient presents with    Shortness of Breath     W/ exertion    Congestive Heart Failure    Follow-up         ASSESSMENT:  Zuleyma Sharp is a 75 y.o. 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     PLAN:  Heart failure/Cardiomyopathy:  NYHA II  Continue current medical therapy for heart failure: BP will not tolerate further titration currently  Beta-blocker: Continue Carvedilol 3.125 mg BID  ACE/ARB/ARNi: Continue Entresto 49-51 mg BID  Hydralazine/Isordil: Not indicated, normal EF, NYHA class II  MRA: Continue Spironolactone 25 mg daily  SGLT2 inhibitor: Continue Farxiga 10 mg daily  Diuretic: Bumex 2 mg PRN, she has not required recently  Reinforced low salt diet  Reinforced fluid restriction to 6 x 8oz glasses per day  Recommended 30 minutes of aerobic exercise 5 days weekly  Labs: Labs 2025 reviewed and normal    Diabetes:  Last A1c 6.8, managed with Farxiga and Insulin. Would benefit from GLP-1 in the setting of diabetes and obesity with CVD    Hypertension:  Normotensive without symptomatic hypotension. No medication changes made today    JOSE JUAN:  Continue CPAP therapy. She is having trouble with air leak and dry mouth. I asked her to discuss with sleep medicine. Discussed risk of untreated sleep apnea.     Obesity:  Needs to schedule appointment with weight management. Referral sent last visit    Advanced Care Plan on file  Recommend flu vaccine yearly  Follow-up with primary cardiologist  Return to [facility] in 6 months       HISTORY

## 2025-07-23 ENCOUNTER — OFFICE VISIT (OUTPATIENT)
Age: 75
End: 2025-07-23
Payer: MEDICARE

## 2025-07-23 VITALS
TEMPERATURE: 98.2 F | HEART RATE: 70 BPM | OXYGEN SATURATION: 99 % | DIASTOLIC BLOOD PRESSURE: 66 MMHG | SYSTOLIC BLOOD PRESSURE: 104 MMHG | HEIGHT: 64 IN | BODY MASS INDEX: 32.27 KG/M2 | WEIGHT: 189 LBS | RESPIRATION RATE: 18 BRPM

## 2025-07-23 DIAGNOSIS — Z79.4 CONTROLLED TYPE 2 DIABETES MELLITUS WITHOUT COMPLICATION, WITH LONG-TERM CURRENT USE OF INSULIN (HCC): ICD-10-CM

## 2025-07-23 DIAGNOSIS — E11.9 CONTROLLED TYPE 2 DIABETES MELLITUS WITHOUT COMPLICATION, WITH LONG-TERM CURRENT USE OF INSULIN (HCC): ICD-10-CM

## 2025-07-23 DIAGNOSIS — I10 PRIMARY HYPERTENSION: ICD-10-CM

## 2025-07-23 DIAGNOSIS — E66.811 OBESITY (BMI 30.0-34.9): ICD-10-CM

## 2025-07-23 DIAGNOSIS — I50.32 CHRONIC HEART FAILURE WITH PRESERVED EJECTION FRACTION (HCC): Primary | ICD-10-CM

## 2025-07-23 DIAGNOSIS — I42.9 CARDIOMYOPATHY, UNSPECIFIED TYPE (HCC): ICD-10-CM

## 2025-07-23 PROCEDURE — 3078F DIAST BP <80 MM HG: CPT | Performed by: INTERNAL MEDICINE

## 2025-07-23 PROCEDURE — 1159F MED LIST DOCD IN RCRD: CPT | Performed by: INTERNAL MEDICINE

## 2025-07-23 PROCEDURE — 1160F RVW MEDS BY RX/DR IN RCRD: CPT | Performed by: INTERNAL MEDICINE

## 2025-07-23 PROCEDURE — 1123F ACP DISCUSS/DSCN MKR DOCD: CPT | Performed by: INTERNAL MEDICINE

## 2025-07-23 PROCEDURE — 99214 OFFICE O/P EST MOD 30 MIN: CPT | Performed by: INTERNAL MEDICINE

## 2025-07-23 PROCEDURE — 3074F SYST BP LT 130 MM HG: CPT | Performed by: INTERNAL MEDICINE

## 2025-07-23 PROCEDURE — 1126F AMNT PAIN NOTED NONE PRSNT: CPT | Performed by: INTERNAL MEDICINE

## 2025-07-23 PROCEDURE — 3044F HG A1C LEVEL LT 7.0%: CPT | Performed by: INTERNAL MEDICINE

## 2025-07-23 ASSESSMENT — PATIENT HEALTH QUESTIONNAIRE - PHQ9
SUM OF ALL RESPONSES TO PHQ QUESTIONS 1-9: 1
1. LITTLE INTEREST OR PLEASURE IN DOING THINGS: NOT AT ALL
SUM OF ALL RESPONSES TO PHQ QUESTIONS 1-9: 1
2. FEELING DOWN, DEPRESSED OR HOPELESS: SEVERAL DAYS

## 2025-07-23 NOTE — PATIENT INSTRUCTIONS
Medication Changes:    NONE      Testing Ordered:    NONE      Referrals:    WEIGHT MANAGEMENT - refer to Receptor message  - Phone number:  [phone]       Other Recommendations:      - Record blood pressure and heart rate 2 times daily: before medication and two hours after medication  - Record weight daily upon waking/after using the bathroom.   - Keep a written records of your weights and blood pressure and bring to your next appointment.   - Call the clinic if you have a weight gain of 3 or more pounds overnight OR 5 or more pounds in one week, new/worsened shortness of breath or swelling, or if your blood pressure begins to consistently run below 90/60 and/or you begin to experience dizziness or lightheadedness. Our office phone number [phone] option 2.  - Drink an adequate amount of water with a goal of 6-8 eight ounce glasses (1.5-2 liters) of fluid daily. Your urine should be clear and light yellow straw colored.       Follow up     6 MONTH FOLLOW UP with [facility]. We will contact you to schedule your appointment once our calendar is open for JANUARY 2026.        Thank you for allowing us the privilege of being a part of your healthcare team!  Please do not hesitate to contact our office at [phone] option 2 with any questions or concerns.     Please make sure to have an active My Chart account. Having issues logging in? Contact the [employer] Help Desk at [phone].   - Receptor is the best way to communicate with [facility]. We can answer your questions, you can report your weight and BP logs and we notify you of any abnormal results requiring changes to your current plan of care.     The monthly Heart Failure Support Group meets the last Wednesday of every month from 5-6pm at [facility]. If you would like to attend, please RSVP to [email or web address]

## 2025-07-28 ENCOUNTER — TELEPHONE (OUTPATIENT)
Age: 75
End: 2025-07-28

## 2025-07-28 NOTE — TELEPHONE ENCOUNTER
Patient has been sent the link for our pre-recorded [facility] Medical Weight Loss Orientation. Patient is required to register, view the recording, call insurance to verify benefits, then send an email to the  to schedule a consultation.

## 2025-07-30 DIAGNOSIS — N31.9 NEUROGENIC BLADDER: ICD-10-CM

## 2025-07-30 RX ORDER — MIRABEGRON 50 MG/1
50 TABLET, FILM COATED, EXTENDED RELEASE ORAL DAILY
Qty: 90 TABLET | Refills: 3 | Status: SHIPPED | OUTPATIENT
Start: 2025-07-30

## 2025-08-11 ENCOUNTER — TELEPHONE (OUTPATIENT)
Age: 75
End: 2025-08-11

## 2025-08-25 ENCOUNTER — TELEPHONE (OUTPATIENT)
Age: 75
End: 2025-08-25

## 2025-09-03 DIAGNOSIS — I42.8 NICM (NONISCHEMIC CARDIOMYOPATHY) (HCC): ICD-10-CM

## 2025-09-03 DIAGNOSIS — I42.9 CARDIOMYOPATHY, UNSPECIFIED TYPE (HCC): ICD-10-CM

## 2025-09-03 RX ORDER — SACUBITRIL AND VALSARTAN 49; 51 MG/1; MG/1
1 TABLET ORAL 2 TIMES DAILY
Qty: 60 TABLET | Refills: 3 | Status: SHIPPED | OUTPATIENT
Start: 2025-09-03

## (undated) DEVICE — ANGIOGRAPHIC CATHETER: Brand: IMPULSE™

## (undated) DEVICE — Device

## (undated) DEVICE — 4-PORT MANIFOLD: Brand: NEPTUNE 2

## (undated) DEVICE — WASTE KIT - ST MARY: Brand: MEDLINE INDUSTRIES, INC.

## (undated) DEVICE — TR BAND RADIAL ARTERY COMPRESSION DEVICE: Brand: TR BAND

## (undated) DEVICE — ZIMMER® STERILE DISPOSABLE TOURNIQUET CUFF WITH PROTECTIVE SLEEVE AND PLC, DUAL PORT, SINGLE BLADDER, 34 IN. (86 CM)

## (undated) DEVICE — TRAP SURG QUAD PARABOLA SLOT DSGN SFTY SCRN TRAPEASE

## (undated) DEVICE — KIT MFLD ISOLATN NACL CNTRST PRT TBNG SPIK W/ PRSS TRNSDUC

## (undated) DEVICE — ANGIOGRAPHY KIT

## (undated) DEVICE — MEDI-VAC SUCTION HIGH CAPACITY: Brand: CARDINAL HEALTH

## (undated) DEVICE — HANDPIECE SET WITH COAXIAL HIGH FLOW TIP AND SUCTION TUBE: Brand: INTERPULSE

## (undated) DEVICE — GUIDEWIRE VASC L260CM DIA0.035IN TIP L3MM STD EXCHG PTFE J

## (undated) DEVICE — PACK PROCEDURE SURG HRT CATH

## (undated) DEVICE — SUTURE VCRL SZ 1 L27IN ABSRB VLT L36MM CT-1 1/2 CIR J341H

## (undated) DEVICE — PRESSURE MONITORING SET: Brand: TRUWAVE

## (undated) DEVICE — T4 HOOD

## (undated) DEVICE — INTENDED FOR TISSUE SEPARATION, AND OTHER PROCEDURES THAT REQUIRE A SHARP SURGICAL BLADE TO PUNCTURE OR CUT.: Brand: BARD-PARKER ® CARBON RIB-BACK BLADES

## (undated) DEVICE — CO-SET DELIVERY SYSTEM FOR 123 ROOM TEMPATURE INJECTATE: Brand: CO-SET+

## (undated) DEVICE — TUBING HYDR IRR --

## (undated) DEVICE — SUTURE ETHBND EXCEL SZ 1 L30IN NONABSORBABLE GRN L36MM CT-1 X425H

## (undated) DEVICE — HOOK LOCK LATEX FREE ELASTIC BANDAGE D/L 6INX10YD

## (undated) DEVICE — NEEDLE HYPO 18GA L1.5IN PNK S STL HUB POLYPR SHLD REG BVL

## (undated) DEVICE — KIT MED IMAG CNTRST AGNT W/ IOPAMIDOL REUSE

## (undated) DEVICE — SYR 50ML LR LCK 1ML GRAD NSAF --

## (undated) DEVICE — KENDALL SCD EXPRESS SLEEVES, KNEE LENGTH, MEDIUM: Brand: KENDALL SCD

## (undated) DEVICE — SNARE ENDOSCP M L240CM W27MM SHTH DIA2.4MM CHN 2.8MM OVL

## (undated) DEVICE — CUSTOM CAST PD STR

## (undated) DEVICE — 3M™ IOBAN™ 2 ANTIMICROBIAL INCISE DRAPE 6651EZ: Brand: IOBAN™ 2

## (undated) DEVICE — FORCEPS BX L240CM JAW DIA2.8MM L CAP W/ NDL MIC MESH TOOTH

## (undated) DEVICE — (D)PREP SKN CHLRAPRP APPL 26ML -- CONVERT TO ITEM 371833

## (undated) DEVICE — KIT HND CTRL 3 W STPCOCK ROT END 54IN PREM HI PRSS TBNG AT

## (undated) DEVICE — SOLUTION IRRIG 3000ML 0.9% SOD CHL FLX CONT 0797208] ICU MEDICAL INC]

## (undated) DEVICE — DEVON™ KNEE AND BODY STRAP 60" X 3" (1.5 M X 7.6 CM): Brand: DEVON

## (undated) DEVICE — HANDLE LT SNAP ON ULT DURABLE LENS FOR TRUMPF ALC DISPOSABLE

## (undated) DEVICE — 6FR THERMODILUTION BALLOON CATHETER SWAN (ARROW)

## (undated) DEVICE — DUAL CUT SAGITTAL BLADE

## (undated) DEVICE — STERILE POLYISOPRENE POWDER-FREE SURGICAL GLOVES: Brand: PROTEXIS

## (undated) DEVICE — INFECTION CONTROL KIT SYS

## (undated) DEVICE — SLIM BODY SKIN STAPLER: Brand: APPOSE ULC

## (undated) DEVICE — CEMENT MIXING SYSTEM WITH FEMORAL BREAKWAY NOZZLE: Brand: REVOLUTION

## (undated) DEVICE — SOLUTION IRRIG 1000ML H2O STRL BLT

## (undated) DEVICE — GLIDESHEATH SLENDER STAINLESS STEEL KIT: Brand: GLIDESHEATH SLENDER

## (undated) DEVICE — REM POLYHESIVE ADULT PATIENT RETURN ELECTRODE: Brand: VALLEYLAB

## (undated) DEVICE — DRAIN KT WND 10FR RND 400ML --

## (undated) DEVICE — HOOD: Brand: FLYTE

## (undated) DEVICE — DRAPE PRB US TRNSDCR 6X96IN --

## (undated) DEVICE — SPECIAL PROCEDURE DRAPE 32" X 34": Brand: SPECIAL PROCEDURE DRAPE

## (undated) DEVICE — SUTURE ABSORBABLE BRAIDED 2-0 CT-1 27 IN UD VICRYL J259H